# Patient Record
Sex: FEMALE | Race: WHITE | Employment: OTHER | URBAN - METROPOLITAN AREA
[De-identification: names, ages, dates, MRNs, and addresses within clinical notes are randomized per-mention and may not be internally consistent; named-entity substitution may affect disease eponyms.]

---

## 2018-01-23 ENCOUNTER — TRANSFERRED RECORDS (OUTPATIENT)
Dept: HEALTH INFORMATION MANAGEMENT | Facility: CLINIC | Age: 68
End: 2018-01-23

## 2018-05-25 ENCOUNTER — TRANSFERRED RECORDS (OUTPATIENT)
Dept: HEALTH INFORMATION MANAGEMENT | Facility: CLINIC | Age: 68
End: 2018-05-25

## 2018-06-08 ENCOUNTER — TRANSFERRED RECORDS (OUTPATIENT)
Dept: HEALTH INFORMATION MANAGEMENT | Facility: CLINIC | Age: 68
End: 2018-06-08

## 2018-06-12 ENCOUNTER — TRANSFERRED RECORDS (OUTPATIENT)
Dept: HEALTH INFORMATION MANAGEMENT | Facility: CLINIC | Age: 68
End: 2018-06-12

## 2018-06-13 ENCOUNTER — TELEPHONE (OUTPATIENT)
Dept: OTHER | Facility: CLINIC | Age: 68
End: 2018-06-13

## 2018-06-13 DIAGNOSIS — I65.29 CAROTID STENOSIS: Primary | ICD-10-CM

## 2018-06-13 NOTE — TELEPHONE ENCOUNTER
Reason for call:  Symptom   Symptom or request: Referral    Duration (how long have symptoms been present): N/A  Have you been treated for this before? Yes    Additional comments: Patient is coming to [place] for 3 months from [place] and was told to schedule a 2nd opinion consult when she gets here.    Patient stated she is having the clinic send records and US report and that she has a disc of a CTA of her carotid arteries.    Phone number to reach patient:  Home number on file [phone] (home)    Best Time:  Any    Can we leave a detailed message on this number?  YES

## 2018-06-13 NOTE — TELEPHONE ENCOUNTER
Patient called back and said she saw Dr. Salmon in [place]. They can be reached at [phone] (fax: [phone]).

## 2018-06-15 NOTE — TELEPHONE ENCOUNTER
I called the number given for Clinic in [place], medical records department. LVM for Maria Isabel in medical records.     KIAH SinghN, RN

## 2018-06-21 NOTE — TELEPHONE ENCOUNTER
Patient was scheduled on 6/27/18 with Dr. Posada. Patient declined to schedule US as she states she has had a recent US done in [place] and she is unclear if insurance will pay for another one. I left another message for medical records at Dr. Salmon's office. I also asked the patient to call to request medical records be sent to us.

## 2018-06-21 NOTE — TELEPHONE ENCOUNTER
Pt needs to be scheduled for bilateral u/s of carotids and OV with vascular surgery. Pt needs to bring in medical records or have them sent to us from [place] in order to conduct OV.     Routing to  to coordinate.     Camila Jackson, KIAHN, RN

## 2018-06-26 ENCOUNTER — TELEPHONE (OUTPATIENT)
Dept: OTHER | Facility: CLINIC | Age: 68
End: 2018-06-26

## 2018-06-26 NOTE — TELEPHONE ENCOUNTER
Contacted Dr. Salmon's office and asked to speak with a member of his care team.  LVM requesting a call back ASAP.  Provided direct phone number.  KIAH BenderN, RN

## 2018-06-26 NOTE — TELEPHONE ENCOUNTER
Attempted to contact pt regarding necessary reports and progress notes.  Of note, no reports or progress notes have been received.  Provided pt direct phone number for pt to call back to discuss.  KIAH BenderN, RN

## 2018-06-26 NOTE — TELEPHONE ENCOUNTER
Received a call back from [facility]  They stated they cannot take verbal requests via phone and all requests must be sent via fax.  Faxed request for relevant progress notes/imaging reports to [phone].  Verified via RightFax.    Updated pt.  Pt states she has an MRI on disc, and the report.  Pt has had a carotid US completed, but has no documentation of results.  Will await reports to be faxed from [facility] and also discuss with Dr. Posada.   Ciera Aly, KIAHN, RN

## 2018-06-26 NOTE — TELEPHONE ENCOUNTER
Spoke with patient.  Informed her as of note, there have been no reports or progress notes received.      Contacted Dr. Salmon's office and was transferred to medical records.  [facility] for Maria Isabel to call back ASAP and provided direct phone number.  Will continue to attempt to locate records.  Ciera Aly, KIAHN, RN

## 2018-06-27 ENCOUNTER — OFFICE VISIT (OUTPATIENT)
Dept: SURGERY | Facility: CLINIC | Age: 68
End: 2018-06-27
Payer: MEDICARE

## 2018-06-27 VITALS
HEIGHT: 65 IN | OXYGEN SATURATION: 97 % | SYSTOLIC BLOOD PRESSURE: 130 MMHG | WEIGHT: 142 LBS | RESPIRATION RATE: 16 BRPM | DIASTOLIC BLOOD PRESSURE: 74 MMHG | HEART RATE: 83 BPM | BODY MASS INDEX: 23.66 KG/M2

## 2018-06-27 DIAGNOSIS — I65.22 CAROTID STENOSIS, ASYMPTOMATIC, LEFT: Primary | ICD-10-CM

## 2018-06-27 PROCEDURE — 99204 OFFICE O/P NEW MOD 45 MIN: CPT | Performed by: SURGERY

## 2018-06-27 ASSESSMENT — ENCOUNTER SYMPTOMS
CONSTIPATION: 1
CHANGE IN BOWEL HABIT: 1

## 2018-06-27 NOTE — MR AVS SNAPSHOT
"              After Visit Summary   2018    Maria Alejandra Buck    MRN: [id number]           Patient Information     Date Of Birth          1950        Visit Information        Provider Department      2018 10:15 AM Jorge Posada MD [facility]      Today's Diagnoses     Carotid stenosis, asymptomatic, left    -  1       Follow-ups after your visit        Who to contact     If you have questions or need follow up information about today's clinic visit or your schedule please contact [facility] directly at [phone].  Normal or non-critical lab and imaging results will be communicated to you by xiao qu wu youhart, letter or phone within 4 business days after the clinic has received the results. If you do not hear from us within 7 days, please contact the clinic through xiao qu wu youhart or phone. If you have a critical or abnormal lab result, we will notify you by phone as soon as possible.  Submit refill requests through [employer] or call your pharmacy and they will forward the refill request to us. Please allow 3 business days for your refill to be completed.          Additional Information About Your Visit        MyChart Information     [employer] lets you send messages to your doctor, view your test results, renew your prescriptions, schedule appointments and more. To sign up, go to [email or web address] . Click on \"Log in\" on the left side of the screen, which will take you to the Welcome page. Then click on \"Sign up Now\" on the right side of the page.     You will be asked to enter the access code listed below, as well as some personal information. Please follow the directions to create your username and password.     Your access code is: [email or web address]  Expires: 2018  9:45 AM     Your access code will  in 90 days. If you need help or a new code, please call your [facility] clinic or [phone].        Care EveryWhere ID     " "This is your Care EveryWhere ID. This could be used by other organizations to access your [facility] medical records  [id number]        Your Vitals Were     Pulse Respirations Height Pulse Oximetry Breastfeeding? BMI (Body Mass Index)    83 16 5' 5\" (1.651 m) 97% No 23.63 kg/m2       Blood Pressure from Last 3 Encounters:   06/27/18 130/74   09/09/16 149/77   08/20/15 104/70    Weight from Last 3 Encounters:   06/27/18 142 lb (64.4 kg)   08/20/15 142 lb 14.4 oz (64.8 kg)   08/17/15 150 lb (68 kg)              Today, you had the following     No orders found for display       Primary Care Provider Fax #    Provider Not In System [phone]                Equal Access to Services     JUSTYN OSBORN : Juanita paulino Sofito, waaxda luqadaha, qaybta kaalmada audreyyalayla, balbina farfan . So [facility] [phone].    ATENCIÓN: Si habla español, tiene a pereira disposición servicios gratuitos de asistencia lingüística. Llame al [phone].    We comply with applicable federal civil rights laws and [place] laws. We do not discriminate on the basis of race, color, national origin, age, disability, sex, sexual orientation, or gender identity.            Thank you!     Thank you for choosing [facility]  for your care. Our goal is always to provide you with excellent care. Hearing back from our patients is one way we can continue to improve our services. Please take a few minutes to complete the written survey that you may receive in the mail after your visit with us. Thank you!             Your Updated Medication List - Protect others around you: Learn how to safely use, store and throw away your medicines at www.disposemymeds.org.          This list is accurate as of 6/27/18 11:59 PM.  Always use your most recent med list.                   Brand Name Dispense Instructions for use Diagnosis    AMLODIPINE BESYLATE PO      Take 10 mg by mouth daily        ASPIRIN PO      Take 81 " mg by mouth daily        atorvastatin 10 MG tablet    LIPITOR     Take 10 mg by mouth daily        GEMFIBROZIL PO      Take 600 mg by mouth        LISINOPRIL PO      Take 20 mg by mouth daily        OMEPRAZOLE PO           PAROXETINE HCL PO      Take 20 mg by mouth daily        PRORENAL VITAL PO

## 2018-06-27 NOTE — TELEPHONE ENCOUNTER
Fax received with recent progress notes, CTA Head Neck results, and carotid US from [facility].  Sent to [facility] for stat scan into Epic, and also faxed to [facility] for Dr. Posada to review.  Ciera Aly, KIAHN, RN

## 2018-06-27 NOTE — PROGRESS NOTES
HPI      ROS (Review of Systems):      Positive for Hx of rheumatic fever, hoarseness and osteoporosis.   Respiratory: Positive for dyspnea.    Cardiovascular: Positive for hypertension, hyperlipidemia and arrhythmia.   GASTROINTESTINAL: Positive for constipation and change in bowel habit.   Genitourinary: Positive for nocturia.          Physical Exam

## 2018-06-28 ENCOUNTER — TELEPHONE (OUTPATIENT)
Dept: OTHER | Facility: CLINIC | Age: 68
End: 2018-06-28

## 2018-06-28 NOTE — PROGRESS NOTES
67 y/o female smoker with left carotid stenosis and changes in left MCA distribution on MRI (outside) as well as a documented left carotid stenosis 70% on Ultrasound and CTA.  Carotids 4/4 left bruit. Cr.N. 2-12 intact motor/sensoryt intact.  Counseled in smoking cessation.  Left carotid endarterectomy indicated.  Discussed risks goals and alternatives in detail with the patient.  She appears to understand and wishes to proceed.  Face to face time 45 minutes greater than 50% in consultation.

## 2018-06-29 NOTE — TELEPHONE ENCOUNTER
Maria Alejandra called to schedule.  She lives in [place] but is up here visiting her sister.  She will go to her sister's clinic to get her pre-op @ [facility].  I mailed the packet to Maria Alejandra at her sister's address of [address] [place] [id number].    Type of surgery: LEFT CAROTID ENDARTERECTOMY WITH EEG  Location of surgery: [facility]  Date and time of surgery: 07/17/18 @ 7:30AM  Surgeon: Dr. Posada  Pre-Op Appt Date: pt to schedule at [facility]  Post-Op Appt Date: Pt to schedule   Packet sent out: Yes  Pre-cert/Authorization completed:  Yes  Date: 06/29/18  Nai Phelps,

## 2018-07-10 ENCOUNTER — OFFICE VISIT (OUTPATIENT)
Dept: OTHER | Facility: CLINIC | Age: 68
End: 2018-07-10
Attending: SURGERY
Payer: MEDICARE

## 2018-07-10 VITALS — DIASTOLIC BLOOD PRESSURE: 78 MMHG | HEART RATE: 82 BPM | SYSTOLIC BLOOD PRESSURE: 150 MMHG

## 2018-07-10 DIAGNOSIS — I65.22 CAROTID STENOSIS, ASYMPTOMATIC, LEFT: Primary | ICD-10-CM

## 2018-07-10 PROCEDURE — G0463 HOSPITAL OUTPT CLINIC VISIT: HCPCS

## 2018-07-10 PROCEDURE — 99213 OFFICE O/P EST LOW 20 MIN: CPT | Mod: ZP | Performed by: SURGERY

## 2018-07-10 NOTE — LETTER
[facility] [address] [place] [place] [address]  [place] [address]  Phone: [phone]  Fax: [phone]          July 10, 2018    RE: Maria Alejandra Buck, : 1950      Discussed operation risks goals and alternatives in detail with the patient and her  again.  She appears to understand and wishes to proceed.          Sincerely,    Jorge Posada MD          [facility] [address] [place] [place]  [place] [address]  Phone: [phone]  Fax: [phone]

## 2018-07-10 NOTE — MR AVS SNAPSHOT
"              After Visit Summary   7/10/2018    Maria Alejandra Bukc    MRN: [id number]           Patient Information     Date Of Birth          1950        Visit Information        Provider Department      7/10/2018 9:45 AM Jorge Posada MD [facility]      Today's Diagnoses     Carotid stenosis, asymptomatic, left    -  1       Follow-ups after your visit        Your next 10 appointments already scheduled     Jul 17, 2018   Procedure with Jorge Posada MD   [facility] (--)    [address]  [facility] [address]   [phone]            Jul 17, 2018  7:30 AM CDT   [facility] [place] OR with Jorge Posada MD   [facility]    [facility]   [phone]              Who to contact     If you have questions or need follow up information about today's clinic visit or your schedule please contact [facility] directly at [phone].  Normal or non-critical lab and imaging results will be communicated to you by Squeehart, letter or phone within 4 business days after the clinic has received the results. If you do not hear from us within 7 days, please contact the clinic through buuteeqt or phone. If you have a critical or abnormal lab result, we will notify you by phone as soon as possible.  Submit refill requests through [employer] or call your pharmacy and they will forward the refill request to us. Please allow 3 business days for your refill to be completed.          Additional Information About Your Visit        Squeehart Information     [employer] lets you send messages to your doctor, view your test results, renew your prescriptions, schedule appointments and more. To sign up, go to [email or web address] . Click on \"Log in\" on the left side of the screen, which " "will take you to the Welcome page. Then click on \"Sign up Now\" on the right side of the page.     You will be asked to enter the access code listed below, as well as some personal information. Please follow the directions to create your username and password.     Your access code is: [email or web address]  Expires: 2018  9:45 AM     Your access code will  in 90 days. If you need help or a new code, please call your [place] clinic or [phone].        Care EveryWhere ID     This is your Care EveryWhere ID. This could be used by other organizations to access your [place] medical records  [id number]        Your Vitals Were     Pulse Breastfeeding?                82 No           Blood Pressure from Last 3 Encounters:   07/10/18 150/78   18 130/74   16 149/77    Weight from Last 3 Encounters:   18 142 lb (64.4 kg)   08/20/15 142 lb 14.4 oz (64.8 kg)   08/17/15 150 lb (68 kg)              Today, you had the following     No orders found for display       Primary Care Provider Office Phone # Fax #    Mckenna Wilkinson [phone]       [facility] [address]  [place] [address]        Equal Access to Services     JUSTYN OSBORN : Hadii aad ku hadasho Soomaali, waaxda luqadaha, qaybta kaalmada adeegyada, waxay idiin hayaan audrey farfna . So [facility] [phone].    ATENCIÓN: Si habla español, tiene a pereira disposición servicios gratuitos de asistencia lingüística. Llame al [phone].    We comply with applicable federal civil rights laws and [place] laws. We do not discriminate on the basis of race, color, national origin, age, disability, sex, sexual orientation, or gender identity.            Thank you!     Thank you for choosing [facility] [place]  for your care. Our goal is always to provide you with excellent care. Hearing back from our patients is one way we can continue to improve our services. Please take a few minutes to complete the " written survey that you may receive in the mail after your visit with us. Thank you!             Your Updated Medication List - Protect others around you: Learn how to safely use, store and throw away your medicines at www.disposemymeds.org.          This list is accurate as of 7/10/18 10:33 AM.  Always use your most recent med list.                   Brand Name Dispense Instructions for use Diagnosis    AMLODIPINE BESYLATE PO      Take 10 mg by mouth daily        ASPIRIN PO      Take 81 mg by mouth daily        atorvastatin 10 MG tablet    LIPITOR     Take 10 mg by mouth daily        GEMFIBROZIL PO      Take 600 mg by mouth        LISINOPRIL PO      Take 20 mg by mouth daily        OMEPRAZOLE PO           PAROXETINE HCL PO      Take 20 mg by mouth daily        PRORENAL VITAL PO

## 2018-07-10 NOTE — NURSING NOTE
"Maria Alejandra Buck is a 68 year old female who presents for:  Chief Complaint   Patient presents with     RECHECK     wanted to meet with Dr. Posada prior to surgery scheduled on 7/17         Vitals:    Vitals:    07/10/18 0958 07/10/18 0959   BP: 144/82 150/78   BP Location: Left arm Left arm   Patient Position: Chair Chair   Cuff Size: Adult Regular Adult Regular   Pulse: 86 82       BMI:  Estimated body mass index is 23.63 kg/(m^2) as calculated from the following:    Height as of 6/27/18: 5' 5\" (1.651 m).    Weight as of 6/27/18: 142 lb (64.4 kg).    Pain Score:  Data Unavailable        Vanda Norton MA      "

## 2018-07-17 ENCOUNTER — OFFICE VISIT (OUTPATIENT)
Dept: SURGERY | Facility: PHYSICIAN GROUP | Age: 68
End: 2018-07-17
Payer: MEDICARE

## 2018-07-17 ENCOUNTER — ANESTHESIA EVENT (OUTPATIENT)
Dept: SURGERY | Facility: CLINIC | Age: 68
DRG: 039 | End: 2018-07-17
Payer: MEDICARE

## 2018-07-17 ENCOUNTER — HOSPITAL ENCOUNTER (INPATIENT)
Facility: CLINIC | Age: 68
LOS: 1 days | Discharge: HOME | DRG: 039 | End: 2018-07-18
Attending: SURGERY | Admitting: SURGERY
Payer: MEDICARE

## 2018-07-17 ENCOUNTER — ANESTHESIA (OUTPATIENT)
Dept: SURGERY | Facility: CLINIC | Age: 68
DRG: 039 | End: 2018-07-17
Payer: MEDICARE

## 2018-07-17 DIAGNOSIS — E78.5 HYPERLIPIDEMIA LDL GOAL <70: ICD-10-CM

## 2018-07-17 DIAGNOSIS — Z53.9 ERRONEOUS ENCOUNTER--DISREGARD: Primary | ICD-10-CM

## 2018-07-17 DIAGNOSIS — I65.22 LEFT CAROTID STENOSIS: Primary | ICD-10-CM

## 2018-07-17 DIAGNOSIS — Z72.0 TOBACCO ABUSE: ICD-10-CM

## 2018-07-17 LAB
ABO + RH BLD: NORMAL
ABO + RH BLD: NORMAL
ANION GAP SERPL CALCULATED.3IONS-SCNC: 7 MMOL/L (ref 3–14)
BLD GP AB SCN SERPL QL: NORMAL
BLOOD BANK CMNT PATIENT-IMP: NORMAL
BUN SERPL-MCNC: 31 MG/DL (ref 7–30)
CALCIUM SERPL-MCNC: 9.1 MG/DL (ref 8.5–10.1)
CHLORIDE SERPL-SCNC: 109 MMOL/L (ref 94–109)
CHOLEST SERPL-MCNC: 178 MG/DL
CO2 SERPL-SCNC: 25 MMOL/L (ref 20–32)
CREAT SERPL-MCNC: 1.49 MG/DL (ref 0.52–1.04)
GFR SERPL CREATININE-BSD FRML MDRD: 35 ML/MIN/1.7M2
GLUCOSE SERPL-MCNC: 102 MG/DL (ref 70–99)
HBA1C MFR BLD: 6.1 % (ref 0–5.6)
HDLC SERPL-MCNC: 33 MG/DL
LDLC SERPL CALC-MCNC: 72 MG/DL
NONHDLC SERPL-MCNC: 145 MG/DL
POTASSIUM SERPL-SCNC: 4.1 MMOL/L (ref 3.4–5.3)
SODIUM SERPL-SCNC: 141 MMOL/L (ref 133–144)
SPECIMEN EXP DATE BLD: NORMAL
TRIGL SERPL-MCNC: 364 MG/DL

## 2018-07-17 PROCEDURE — 25000125 ZZHC RX 250: Performed by: ANESTHESIOLOGY

## 2018-07-17 PROCEDURE — 03UJ0JZ SUPPLEMENT LEFT COMMON CAROTID ARTERY WITH SYNTHETIC SUBSTITUTE, OPEN APPROACH: ICD-10-PCS | Performed by: SURGERY

## 2018-07-17 PROCEDURE — 25000128 H RX IP 250 OP 636: Performed by: NURSE ANESTHETIST, CERTIFIED REGISTERED

## 2018-07-17 PROCEDURE — 25000132 ZZH RX MED GY IP 250 OP 250 PS 637: Mod: GY | Performed by: PHYSICIAN ASSISTANT

## 2018-07-17 PROCEDURE — 25000128 H RX IP 250 OP 636: Performed by: SURGERY

## 2018-07-17 PROCEDURE — 37000008 ZZH ANESTHESIA TECHNICAL FEE, 1ST 30 MIN: Performed by: SURGERY

## 2018-07-17 PROCEDURE — 36000093 ZZH SURGERY LEVEL 4 1ST 30 MIN: Performed by: SURGERY

## 2018-07-17 PROCEDURE — 71000012 ZZH RECOVERY PHASE 1 LEVEL 1 FIRST HR: Performed by: SURGERY

## 2018-07-17 PROCEDURE — 36415 COLL VENOUS BLD VENIPUNCTURE: CPT | Performed by: SURGERY

## 2018-07-17 PROCEDURE — 80048 BASIC METABOLIC PNL TOTAL CA: CPT | Performed by: SURGERY

## 2018-07-17 PROCEDURE — 27211022 ZZHC OR IOM SUPPLIES OPNP: Performed by: SURGERY

## 2018-07-17 PROCEDURE — 35301 RECHANNELING OF ARTERY: CPT | Mod: LT | Performed by: SURGERY

## 2018-07-17 PROCEDURE — 03CJ0Z6: ICD-10-PCS | Performed by: SURGERY

## 2018-07-17 PROCEDURE — C1768 GRAFT, VASCULAR: HCPCS | Performed by: SURGERY

## 2018-07-17 PROCEDURE — A9270 NON-COVERED ITEM OR SERVICE: HCPCS | Mod: GY | Performed by: PHYSICIAN ASSISTANT

## 2018-07-17 PROCEDURE — 25000128 H RX IP 250 OP 636: Performed by: ANESTHESIOLOGY

## 2018-07-17 PROCEDURE — 86901 BLOOD TYPING SEROLOGIC RH(D): CPT | Performed by: SURGERY

## 2018-07-17 PROCEDURE — 86850 RBC ANTIBODY SCREEN: CPT | Performed by: SURGERY

## 2018-07-17 PROCEDURE — 84132 ASSAY OF SERUM POTASSIUM: CPT | Performed by: SURGERY

## 2018-07-17 PROCEDURE — 80061 LIPID PANEL: CPT | Performed by: SURGERY

## 2018-07-17 PROCEDURE — 12000007 ZZH R&B INTERMEDIATE

## 2018-07-17 PROCEDURE — 25000125 ZZHC RX 250: Performed by: NURSE ANESTHETIST, CERTIFIED REGISTERED

## 2018-07-17 PROCEDURE — 27210995 ZZH RX 272: Performed by: SURGERY

## 2018-07-17 PROCEDURE — 25000566 ZZH SEVOFLURANE, EA 15 MIN: Performed by: SURGERY

## 2018-07-17 PROCEDURE — 40000169 ZZH STATISTIC PRE-PROCEDURE ASSESSMENT I: Performed by: SURGERY

## 2018-07-17 PROCEDURE — 25000128 H RX IP 250 OP 636

## 2018-07-17 PROCEDURE — 86900 BLOOD TYPING SEROLOGIC ABO: CPT | Performed by: SURGERY

## 2018-07-17 PROCEDURE — 25000131 ZZH RX MED GY IP 250 OP 636 PS 637: Mod: GY | Performed by: ANESTHESIOLOGY

## 2018-07-17 PROCEDURE — 03CL0ZZ EXTIRPATION OF MATTER FROM LEFT INTERNAL CAROTID ARTERY, OPEN APPROACH: ICD-10-PCS | Performed by: SURGERY

## 2018-07-17 PROCEDURE — 37000009 ZZH ANESTHESIA TECHNICAL FEE, EACH ADDTL 15 MIN: Performed by: SURGERY

## 2018-07-17 PROCEDURE — 99223 1ST HOSP IP/OBS HIGH 75: CPT | Performed by: INTERNAL MEDICINE

## 2018-07-17 PROCEDURE — 03UL0JZ SUPPLEMENT LEFT INTERNAL CAROTID ARTERY WITH SYNTHETIC SUBSTITUTE, OPEN APPROACH: ICD-10-PCS | Performed by: SURGERY

## 2018-07-17 PROCEDURE — 4A10X4Z MONITORING OF CENTRAL NERVOUS ELECTRICAL ACTIVITY, EXTERNAL APPROACH: ICD-10-PCS | Performed by: SURGERY

## 2018-07-17 PROCEDURE — 83036 HEMOGLOBIN GLYCOSYLATED A1C: CPT | Performed by: SURGERY

## 2018-07-17 PROCEDURE — 93010 ELECTROCARDIOGRAM REPORT: CPT | Performed by: INTERNAL MEDICINE

## 2018-07-17 PROCEDURE — 03CN0ZZ EXTIRPATION OF MATTER FROM LEFT EXTERNAL CAROTID ARTERY, OPEN APPROACH: ICD-10-PCS | Performed by: SURGERY

## 2018-07-17 PROCEDURE — 71000013 ZZH RECOVERY PHASE 1 LEVEL 1 EA ADDTL HR: Performed by: SURGERY

## 2018-07-17 PROCEDURE — 27210794 ZZH OR GENERAL SUPPLY STERILE: Performed by: SURGERY

## 2018-07-17 PROCEDURE — 25000128 H RX IP 250 OP 636: Performed by: PHYSICIAN ASSISTANT

## 2018-07-17 PROCEDURE — 36000063 ZZH SURGERY LEVEL 4 EA 15 ADDTL MIN: Performed by: SURGERY

## 2018-07-17 PROCEDURE — 93005 ELECTROCARDIOGRAM TRACING: CPT

## 2018-07-17 DEVICE — GRAFT HEMASHIELD 0.3X3" M002000195090: Type: IMPLANTABLE DEVICE | Site: CAROTID | Status: FUNCTIONAL

## 2018-07-17 RX ORDER — HYDROMORPHONE HYDROCHLORIDE 1 MG/ML
.3-.5 INJECTION, SOLUTION INTRAMUSCULAR; INTRAVENOUS; SUBCUTANEOUS EVERY 5 MIN PRN
Status: DISCONTINUED | OUTPATIENT
Start: 2018-07-17 | End: 2018-07-17 | Stop reason: HOSPADM

## 2018-07-17 RX ORDER — LISINOPRIL 20 MG/1
20 TABLET ORAL EVERY EVENING
Status: DISCONTINUED | OUTPATIENT
Start: 2018-07-17 | End: 2018-07-18 | Stop reason: HOSPADM

## 2018-07-17 RX ORDER — SODIUM CHLORIDE, SODIUM LACTATE, POTASSIUM CHLORIDE, CALCIUM CHLORIDE 600; 310; 30; 20 MG/100ML; MG/100ML; MG/100ML; MG/100ML
INJECTION, SOLUTION INTRAVENOUS CONTINUOUS
Status: DISCONTINUED | OUTPATIENT
Start: 2018-07-17 | End: 2018-07-17 | Stop reason: HOSPADM

## 2018-07-17 RX ORDER — MAGNESIUM HYDROXIDE 1200 MG/15ML
LIQUID ORAL PRN
Status: DISCONTINUED | OUTPATIENT
Start: 2018-07-17 | End: 2018-07-17 | Stop reason: HOSPADM

## 2018-07-17 RX ORDER — HYDROMORPHONE HYDROCHLORIDE 1 MG/ML
0.2 INJECTION, SOLUTION INTRAMUSCULAR; INTRAVENOUS; SUBCUTANEOUS
Status: DISCONTINUED | OUTPATIENT
Start: 2018-07-17 | End: 2018-07-18 | Stop reason: HOSPADM

## 2018-07-17 RX ORDER — METOPROLOL TARTRATE 1 MG/ML
5 INJECTION, SOLUTION INTRAVENOUS EVERY 6 HOURS
Status: DISCONTINUED | OUTPATIENT
Start: 2018-07-18 | End: 2018-07-18 | Stop reason: HOSPADM

## 2018-07-17 RX ORDER — AMOXICILLIN 250 MG
2 CAPSULE ORAL 2 TIMES DAILY
Status: DISCONTINUED | OUTPATIENT
Start: 2018-07-17 | End: 2018-07-18 | Stop reason: HOSPADM

## 2018-07-17 RX ORDER — PROPOFOL 10 MG/ML
INJECTION, EMULSION INTRAVENOUS PRN
Status: DISCONTINUED | OUTPATIENT
Start: 2018-07-17 | End: 2018-07-17

## 2018-07-17 RX ORDER — FENTANYL CITRATE 50 UG/ML
INJECTION, SOLUTION INTRAMUSCULAR; INTRAVENOUS PRN
Status: DISCONTINUED | OUTPATIENT
Start: 2018-07-17 | End: 2018-07-17

## 2018-07-17 RX ORDER — CEFAZOLIN SODIUM 2 G/100ML
2 INJECTION, SOLUTION INTRAVENOUS
Status: COMPLETED | OUTPATIENT
Start: 2018-07-17 | End: 2018-07-17

## 2018-07-17 RX ORDER — PROCHLORPERAZINE MALEATE 5 MG
5 TABLET ORAL EVERY 6 HOURS PRN
Status: DISCONTINUED | OUTPATIENT
Start: 2018-07-17 | End: 2018-07-18 | Stop reason: HOSPADM

## 2018-07-17 RX ORDER — NEOSTIGMINE METHYLSULFATE 1 MG/ML
VIAL (ML) INJECTION PRN
Status: DISCONTINUED | OUTPATIENT
Start: 2018-07-17 | End: 2018-07-17

## 2018-07-17 RX ORDER — ACETAMINOPHEN 325 MG/1
975 TABLET ORAL EVERY 8 HOURS
Status: DISCONTINUED | OUTPATIENT
Start: 2018-07-17 | End: 2018-07-18 | Stop reason: HOSPADM

## 2018-07-17 RX ORDER — DEXAMETHASONE SODIUM PHOSPHATE 4 MG/ML
INJECTION, SOLUTION INTRA-ARTICULAR; INTRALESIONAL; INTRAMUSCULAR; INTRAVENOUS; SOFT TISSUE PRN
Status: DISCONTINUED | OUTPATIENT
Start: 2018-07-17 | End: 2018-07-17

## 2018-07-17 RX ORDER — ONDANSETRON 2 MG/ML
INJECTION INTRAMUSCULAR; INTRAVENOUS PRN
Status: DISCONTINUED | OUTPATIENT
Start: 2018-07-17 | End: 2018-07-17

## 2018-07-17 RX ORDER — HEPARIN SODIUM 1000 [USP'U]/ML
INJECTION, SOLUTION INTRAVENOUS; SUBCUTANEOUS PRN
Status: DISCONTINUED | OUTPATIENT
Start: 2018-07-17 | End: 2018-07-17 | Stop reason: HOSPADM

## 2018-07-17 RX ORDER — LIDOCAINE 40 MG/G
CREAM TOPICAL
Status: DISCONTINUED | OUTPATIENT
Start: 2018-07-17 | End: 2018-07-18 | Stop reason: HOSPADM

## 2018-07-17 RX ORDER — ATORVASTATIN CALCIUM 40 MG/1
40 TABLET, FILM COATED ORAL EVERY EVENING
Status: DISCONTINUED | OUTPATIENT
Start: 2018-07-17 | End: 2018-07-17

## 2018-07-17 RX ORDER — ONDANSETRON 2 MG/ML
4 INJECTION INTRAMUSCULAR; INTRAVENOUS EVERY 6 HOURS PRN
Status: DISCONTINUED | OUTPATIENT
Start: 2018-07-17 | End: 2018-07-18 | Stop reason: HOSPADM

## 2018-07-17 RX ORDER — ESMOLOL HYDROCHLORIDE 10 MG/ML
INJECTION INTRAVENOUS PRN
Status: DISCONTINUED | OUTPATIENT
Start: 2018-07-17 | End: 2018-07-17

## 2018-07-17 RX ORDER — GEMFIBROZIL 600 MG/1
600 TABLET, FILM COATED ORAL EVERY OTHER DAY
Status: DISCONTINUED | OUTPATIENT
Start: 2018-07-18 | End: 2018-07-17 | Stop reason: ALTCHOICE

## 2018-07-17 RX ORDER — NALOXONE HYDROCHLORIDE 0.4 MG/ML
.1-.4 INJECTION, SOLUTION INTRAMUSCULAR; INTRAVENOUS; SUBCUTANEOUS
Status: DISCONTINUED | OUTPATIENT
Start: 2018-07-17 | End: 2018-07-18 | Stop reason: HOSPADM

## 2018-07-17 RX ORDER — FENTANYL CITRATE 50 UG/ML
25-50 INJECTION, SOLUTION INTRAMUSCULAR; INTRAVENOUS
Status: DISCONTINUED | OUTPATIENT
Start: 2018-07-17 | End: 2018-07-17 | Stop reason: HOSPADM

## 2018-07-17 RX ORDER — ONDANSETRON 4 MG/1
4 TABLET, ORALLY DISINTEGRATING ORAL EVERY 30 MIN PRN
Status: DISCONTINUED | OUTPATIENT
Start: 2018-07-17 | End: 2018-07-17 | Stop reason: HOSPADM

## 2018-07-17 RX ORDER — ACETAMINOPHEN 325 MG/1
650 TABLET ORAL EVERY 4 HOURS PRN
Status: DISCONTINUED | OUTPATIENT
Start: 2018-07-20 | End: 2018-07-18 | Stop reason: HOSPADM

## 2018-07-17 RX ORDER — DIPHENHYDRAMINE HYDROCHLORIDE 50 MG/ML
12.5 INJECTION INTRAMUSCULAR; INTRAVENOUS EVERY 6 HOURS PRN
Status: DISCONTINUED | OUTPATIENT
Start: 2018-07-17 | End: 2018-07-18 | Stop reason: HOSPADM

## 2018-07-17 RX ORDER — PAROXETINE 40 MG/1
40 TABLET, FILM COATED ORAL DAILY
Status: DISCONTINUED | OUTPATIENT
Start: 2018-07-17 | End: 2018-07-18 | Stop reason: HOSPADM

## 2018-07-17 RX ORDER — GLYCOPYRROLATE 0.2 MG/ML
INJECTION, SOLUTION INTRAMUSCULAR; INTRAVENOUS PRN
Status: DISCONTINUED | OUTPATIENT
Start: 2018-07-17 | End: 2018-07-17

## 2018-07-17 RX ORDER — SODIUM CHLORIDE 9 MG/ML
INJECTION, SOLUTION INTRAVENOUS CONTINUOUS
Status: DISCONTINUED | OUTPATIENT
Start: 2018-07-17 | End: 2018-07-18 | Stop reason: HOSPADM

## 2018-07-17 RX ORDER — DIPHENHYDRAMINE HCL 12.5MG/5ML
12.5 LIQUID (ML) ORAL EVERY 6 HOURS PRN
Status: DISCONTINUED | OUTPATIENT
Start: 2018-07-17 | End: 2018-07-18 | Stop reason: HOSPADM

## 2018-07-17 RX ORDER — LABETALOL HYDROCHLORIDE 5 MG/ML
10 INJECTION, SOLUTION INTRAVENOUS ONCE
Status: COMPLETED | OUTPATIENT
Start: 2018-07-17 | End: 2018-07-17

## 2018-07-17 RX ORDER — AMOXICILLIN 250 MG
1 CAPSULE ORAL 2 TIMES DAILY
Status: DISCONTINUED | OUTPATIENT
Start: 2018-07-17 | End: 2018-07-18 | Stop reason: HOSPADM

## 2018-07-17 RX ORDER — NICOTINE 21 MG/24HR
1 PATCH, TRANSDERMAL 24 HOURS TRANSDERMAL DAILY
Status: DISCONTINUED | OUTPATIENT
Start: 2018-07-17 | End: 2018-07-18 | Stop reason: HOSPADM

## 2018-07-17 RX ORDER — EPHEDRINE SULFATE 50 MG/ML
INJECTION, SOLUTION INTRAMUSCULAR; INTRAVENOUS; SUBCUTANEOUS PRN
Status: DISCONTINUED | OUTPATIENT
Start: 2018-07-17 | End: 2018-07-17

## 2018-07-17 RX ORDER — LIDOCAINE HYDROCHLORIDE 20 MG/ML
INJECTION, SOLUTION INFILTRATION; PERINEURAL PRN
Status: DISCONTINUED | OUTPATIENT
Start: 2018-07-17 | End: 2018-07-17

## 2018-07-17 RX ORDER — ASPIRIN 81 MG/1
81 TABLET ORAL DAILY
Status: DISCONTINUED | OUTPATIENT
Start: 2018-07-18 | End: 2018-07-18 | Stop reason: HOSPADM

## 2018-07-17 RX ORDER — OXYCODONE HYDROCHLORIDE 5 MG/1
5 TABLET ORAL
Status: DISCONTINUED | OUTPATIENT
Start: 2018-07-17 | End: 2018-07-18 | Stop reason: HOSPADM

## 2018-07-17 RX ORDER — ONDANSETRON 2 MG/ML
4 INJECTION INTRAMUSCULAR; INTRAVENOUS EVERY 30 MIN PRN
Status: DISCONTINUED | OUTPATIENT
Start: 2018-07-17 | End: 2018-07-17 | Stop reason: HOSPADM

## 2018-07-17 RX ORDER — ONDANSETRON 4 MG/1
4 TABLET, ORALLY DISINTEGRATING ORAL EVERY 6 HOURS PRN
Status: DISCONTINUED | OUTPATIENT
Start: 2018-07-17 | End: 2018-07-18 | Stop reason: HOSPADM

## 2018-07-17 RX ORDER — LABETALOL HYDROCHLORIDE 5 MG/ML
10 INJECTION, SOLUTION INTRAVENOUS EVERY 10 MIN PRN
Status: DISCONTINUED | OUTPATIENT
Start: 2018-07-17 | End: 2018-07-18 | Stop reason: HOSPADM

## 2018-07-17 RX ORDER — HYDROMORPHONE HYDROCHLORIDE 1 MG/ML
INJECTION, SOLUTION INTRAMUSCULAR; INTRAVENOUS; SUBCUTANEOUS
Status: COMPLETED
Start: 2018-07-17 | End: 2018-07-17

## 2018-07-17 RX ORDER — ASPIRIN 600 MG/1
600 SUPPOSITORY RECTAL ONCE
Status: COMPLETED | OUTPATIENT
Start: 2018-07-17 | End: 2018-07-17

## 2018-07-17 RX ORDER — NALOXONE HYDROCHLORIDE 0.4 MG/ML
.1-.4 INJECTION, SOLUTION INTRAMUSCULAR; INTRAVENOUS; SUBCUTANEOUS
Status: DISCONTINUED | OUTPATIENT
Start: 2018-07-17 | End: 2018-07-17

## 2018-07-17 RX ORDER — ATORVASTATIN CALCIUM 40 MG/1
80 TABLET, FILM COATED ORAL EVERY EVENING
Status: DISCONTINUED | OUTPATIENT
Start: 2018-07-17 | End: 2018-07-18 | Stop reason: HOSPADM

## 2018-07-17 RX ADMIN — FENTANYL CITRATE 100 MCG: 50 INJECTION, SOLUTION INTRAMUSCULAR; INTRAVENOUS at 07:35

## 2018-07-17 RX ADMIN — NEOSTIGMINE METHYLSULFATE 4 MG: 1 INJECTION, SOLUTION INTRAVENOUS at 09:20

## 2018-07-17 RX ADMIN — Medication 0.5 MG: at 12:33

## 2018-07-17 RX ADMIN — SODIUM CHLORIDE, POTASSIUM CHLORIDE, SODIUM LACTATE AND CALCIUM CHLORIDE: 600; 310; 30; 20 INJECTION, SOLUTION INTRAVENOUS at 06:31

## 2018-07-17 RX ADMIN — LIDOCAINE HYDROCHLORIDE 80 MG: 20 INJECTION, SOLUTION INFILTRATION; PERINEURAL at 07:35

## 2018-07-17 RX ADMIN — LIDOCAINE HYDROCHLORIDE 1 ML: 10 INJECTION, SOLUTION EPIDURAL; INFILTRATION; INTRACAUDAL; PERINEURAL at 06:31

## 2018-07-17 RX ADMIN — ACETAMINOPHEN 975 MG: 325 TABLET, FILM COATED ORAL at 20:59

## 2018-07-17 RX ADMIN — GLYCOPYRROLATE 0.6 MG: 0.2 INJECTION, SOLUTION INTRAMUSCULAR; INTRAVENOUS at 09:20

## 2018-07-17 RX ADMIN — LABETALOL HYDROCHLORIDE 10 MG: 5 INJECTION, SOLUTION INTRAVENOUS at 09:54

## 2018-07-17 RX ADMIN — LIDOCAINE HYDROCHLORIDE 20 MG: 20 INJECTION, SOLUTION INFILTRATION; PERINEURAL at 08:03

## 2018-07-17 RX ADMIN — Medication 5 MG: at 09:06

## 2018-07-17 RX ADMIN — PHENYLEPHRINE HYDROCHLORIDE 0.25 MCG/KG/MIN: 10 INJECTION, SOLUTION INTRAMUSCULAR; INTRAVENOUS; SUBCUTANEOUS at 07:45

## 2018-07-17 RX ADMIN — FENTANYL CITRATE 50 MCG: 50 INJECTION, SOLUTION INTRAMUSCULAR; INTRAVENOUS at 08:03

## 2018-07-17 RX ADMIN — PROPOFOL 50 MG: 10 INJECTION, EMULSION INTRAVENOUS at 07:39

## 2018-07-17 RX ADMIN — CEFAZOLIN SODIUM 2 G: 2 INJECTION, SOLUTION INTRAVENOUS at 07:45

## 2018-07-17 RX ADMIN — ATORVASTATIN CALCIUM 80 MG: 40 TABLET, FILM COATED ORAL at 20:59

## 2018-07-17 RX ADMIN — LISINOPRIL 20 MG: 20 TABLET ORAL at 20:59

## 2018-07-17 RX ADMIN — Medication 5 MG: at 08:07

## 2018-07-17 RX ADMIN — PAROXETINE 40 MG: 40 TABLET, FILM COATED ORAL at 20:59

## 2018-07-17 RX ADMIN — DEXAMETHASONE SODIUM PHOSPHATE 4 MG: 4 INJECTION, SOLUTION INTRA-ARTICULAR; INTRALESIONAL; INTRAMUSCULAR; INTRAVENOUS; SOFT TISSUE at 07:45

## 2018-07-17 RX ADMIN — MIDAZOLAM 1 MG: 1 INJECTION INTRAMUSCULAR; INTRAVENOUS at 07:30

## 2018-07-17 RX ADMIN — ESMOLOL HYDROCHLORIDE 30 MG: 10 INJECTION, SOLUTION INTRAVENOUS at 09:32

## 2018-07-17 RX ADMIN — ASPIRIN 600 MG: 600 SUPPOSITORY RECTAL at 10:25

## 2018-07-17 RX ADMIN — ONDANSETRON 4 MG: 2 INJECTION INTRAMUSCULAR; INTRAVENOUS at 09:08

## 2018-07-17 RX ADMIN — OXYCODONE HYDROCHLORIDE 5 MG: 5 TABLET ORAL at 21:00

## 2018-07-17 RX ADMIN — SODIUM CHLORIDE, POTASSIUM CHLORIDE, SODIUM LACTATE AND CALCIUM CHLORIDE: 600; 310; 30; 20 INJECTION, SOLUTION INTRAVENOUS at 08:37

## 2018-07-17 RX ADMIN — ROCURONIUM BROMIDE 50 MG: 10 INJECTION INTRAVENOUS at 07:35

## 2018-07-17 RX ADMIN — NICOTINE 1 PATCH: 14 PATCH, EXTENDED RELEASE TRANSDERMAL at 14:50

## 2018-07-17 RX ADMIN — PHENYLEPHRINE HYDROCHLORIDE 50 MCG: 10 INJECTION, SOLUTION INTRAMUSCULAR; INTRAVENOUS; SUBCUTANEOUS at 07:56

## 2018-07-17 RX ADMIN — SODIUM CHLORIDE: 9 INJECTION, SOLUTION INTRAVENOUS at 14:51

## 2018-07-17 RX ADMIN — ACETAMINOPHEN 975 MG: 325 TABLET, FILM COATED ORAL at 14:50

## 2018-07-17 RX ADMIN — MIDAZOLAM 1 MG: 1 INJECTION INTRAMUSCULAR; INTRAVENOUS at 07:27

## 2018-07-17 RX ADMIN — ESMOLOL HYDROCHLORIDE 20 MG: 10 INJECTION, SOLUTION INTRAVENOUS at 09:27

## 2018-07-17 RX ADMIN — FENTANYL CITRATE 50 MCG: 50 INJECTION, SOLUTION INTRAMUSCULAR; INTRAVENOUS at 07:39

## 2018-07-17 RX ADMIN — ESMOLOL HYDROCHLORIDE 20 MG: 10 INJECTION, SOLUTION INTRAVENOUS at 09:29

## 2018-07-17 RX ADMIN — PROPOFOL 150 MG: 10 INJECTION, EMULSION INTRAVENOUS at 07:35

## 2018-07-17 RX ADMIN — FENTANYL CITRATE 50 MCG: 50 INJECTION, SOLUTION INTRAMUSCULAR; INTRAVENOUS at 08:02

## 2018-07-17 ASSESSMENT — VISUAL ACUITY
OU: NORMAL ACUITY

## 2018-07-17 ASSESSMENT — ACTIVITIES OF DAILY LIVING (ADL)
ADLS_ACUITY_SCORE: 10
COGNITION: 0 - NO COGNITION ISSUES REPORTED
ADLS_ACUITY_SCORE: 10
ADLS_ACUITY_SCORE: 10

## 2018-07-17 ASSESSMENT — ENCOUNTER SYMPTOMS: DYSRHYTHMIAS: 1

## 2018-07-17 ASSESSMENT — LIFESTYLE VARIABLES: TOBACCO_USE: 1

## 2018-07-17 ASSESSMENT — COPD QUESTIONNAIRES: COPD: 1

## 2018-07-17 NOTE — PROGRESS NOTES
Admission medication history interview status for the 7/17/2018  admission is complete. See EPIC admission navigator for prior to admission medications     Medication history source reliability:Good    Medication history interview source(s):Patient    Medication history resources (including written lists, pill bottles, clinic record):None    Primary pharmacy.[facility]    Additional medication history information not noted on PTA med list :None    Time spent in this activity: 45 minutes    Prior to Admission medications    Medication Sig Last Dose Taking? Auth Provider   Acetaminophen (TYLENOL PO) Take 500 mg by mouth daily as needed for mild pain or fever more than a week at prn Yes Reported, Patient   ASPIRIN PO Take 81 mg by mouth daily 7/16/2018 at pm Yes Reported, Patient   ATORVASTATIN CALCIUM PO Take 40 mg by mouth every evening 7/16/2018 at pm Yes Reported, Patient   Doxycycline Monohydrate (VIBRAMYCIN PO) Take 100 mg by mouth 2 times daily For skin more than a 2 weeks Yes Reported, Patient   GEMFIBROZIL PO Take 600 mg by mouth every other day  7/16/2018 at pm Yes Reported, Patient   LISINOPRIL PO Take 20 mg by mouth every evening  7/16/2018 at pm Yes Reported, Patient   LORATADINE PO Take 10 mg by mouth daily as needed  more than a week at prn Yes Reported, Patient   Multiple Vitamins-Minerals (PRORENAL VITAL PO) Take 1 tablet by mouth daily  more than a week Yes Reported, Patient   OMEPRAZOLE PO Take 20 mg by mouth every other day  7/17/2018 at 0430 Yes Reported, Patient   PAROXETINE HCL PO Take 40 mg by mouth daily  7/16/2018 at pm Yes Reported, Patient   Probiotic Product (PROBIOTIC PO) Take 1 tablet by mouth every evening  7/16/2018 at pm Yes Reported, Patient   Clopidogrel Bisulfate (PLAVIX PO) Take 75 mg by mouth daily 7/10/2018  Reported, Patient

## 2018-07-17 NOTE — IP AVS SNAPSHOT
MRN:[id number]                      After Visit Summary   7/17/2018    Maria Alejandra Buck    MRN: [id number]           Thank you!     Thank you for choosing [facility] for your care. Our goal is always to provide you with excellent care. Hearing back from our patients is one way we can continue to improve our services. Please take a few minutes to complete the written survey that you may receive in the mail after you visit with us. Thank you!        Patient Information     Date Of Birth          1950        Designated Caregiver       Most Recent Value    Caregiver    Will someone help with your care after discharge? yes    Name of designated caregiver Shandra spouse    Phone number of caregiver [phone]    Caregiver address [address] [place], [place]  ( staying with sister)      About your hospital stay     You were admitted on:  July 17, 2018 You last received care in the:  [facility] [address] [facility]    You were discharged on:  July 18, 2018        Reason for your hospital stay       Left carotid artery stenosis                  Who to Call     For medical emergencies, please call 911.  For non-urgent questions about your medical care, please call your primary care provider or clinic, [phone]  For questions related to your surgery, please call your surgery clinic        Attending Provider     Provider Specialty    Jorge Posada MD Surgery       Primary Care Provider Office Phone # Fax #    Mckenna Wilkinson [phone]      After Care Instructions     Activity       No heavy lifting (<10 lbs for 4 weeks            Diet       Regular diet as tolerated            Discharge Instructions       1. Increase your Atorvastatin to 80 mg daily. Stop your Gemfibrozil.     2. Stop smoking! Utilize nicotine patches to assist with this. Start with 14 mg patches and continue for 30 days. Then decrease to 7 mg patches daily for 30 days.                   Follow-up Appointments     Follow-up and recommended labs and tests        Please call to setup a followup with Dr. Posada in 2 weeks                  Further instructions from your care team       Carotid Endartectomy  Post-Operative Instructions    Typical length of stay in the hospital is 1-2 days.  On occasion, an evening in the Intensive Care Unit may be required for blood pressure regulation.    Activity    Most people are able to resume normal activities 1-2 weeks after surgery.  The key is to gradually increase your level of activity as you are able.  It is not uncommon to feel easily fatigued - this will improve with time.      You should avoid heavy lifting more than 30 lbs for 1 week.      You may return to work when you feel able - typically 1-2 weeks after surgery, depending on the type of work you do.      You should not drive a car for 1 week after surgery.  In addition,  you can not be taking prescription strength pain medication and you must feel strong enough to drive safely.    Incision Care    You can shower or bathe 2 days after surgery - avoid rubbing and soaking your incision.      You may have strips of white tape called  steri-strips  across your incision; they should stay on for 5-7 days or until the ends start to curl up.  You can then remove the steri-strips, or we will remove them at your post-operative appointment.      Avoid shaving directly over the incision for 2-3 weeks.    Common Concerns    You may notice mild skin numbness on the side of your surgery in your neck, chin, face, and earlobe.  This is due to nerve  irritation  and will gradually resolve over the next several months.  Call our office if you experience any short-lasting episode of numbness or weakness affecting one side of your body.      Some bruising and firmness around you incision can be expected.  This will soften and resolve over the next few weeks.  You may notice that some discoloration spreads to an area  lower than the incision, such as the shoulder, neck or upper chest.  Likewise, swelling can occur near the incision or below the chin.  Call our office if the swelling or bruising worsens, or if you have difficulty swallowing.    Diet    You may resume a regular diet before you leave the hospital.  You may find that it is best to try smaller, more frequent meals until your appetite returns.    Medications    You may be started on a blood thinner after surgery - typically Aspirin and / or Plavix.      You may be given a prescription for pain medication after surgery.  If you need to have this refilled, please call your pharmacy where you had it filled.  They will then call us for approval.  Please do not call after hours for a refill on pain medication.    Post-Operative Appointments    You need to see your surgeon in the clinic approximately 1-2 weeks after surgery.  Post-operative appointment:   Date: _____________________________  Time: ____________________________  Dr. ______________________________      You will have an ultrasound test and evaluation with your surgeon 90 days (3 months) after surgery.      We will notify you by mail when you are due to schedule further ultrasound testing and evaluation; typically this is done annually.     When You Should Call Our Office    Body aches, chills or temperature greater than 101      Incision redness or drainage      Loss of vision in one eye      Loss of strength / weakness in one side of your body      Severe headache      Difficulty with speech      If you will be having an invasive procedure, such as a colonoscopy or dental work within one year of your surgery, you may need to take an antibiotic prior to the procedure if you had a Dacron patch with your surgery; please call us so we can assist you with this.    Call our main number, [phone], for assistance with any concerns or questions.     Revised 5/2014      Follow-up with Dr. Posada in 1-2 weeks. Call  "[phone]    Pending Results     Date and Time Order Name Status Description    2018 0606 EKG 12-lead, tracing only Preliminary             Admission Information     Date & Time Provider Department Dept. Phone    2018 Jorge Posada MD [facility] [address] [facility] [phone]      Your Vitals Were     Blood Pressure Pulse Temperature Respirations Height Weight    156/76 (BP Location: Right arm) 61 98  F (36.7  C) (Oral) 11 1.676 m (5' 6\") 65.9 kg (145 lb 3.2 oz)    Pulse Oximetry BMI (Body Mass Index)                100% 23.44 kg/m2          MyChart Information     [facility] lets you send messages to your doctor, view your test results, renew your prescriptions, schedule appointments and more. To sign up, go to [email or web address] . Click on \"Log in\" on the left side of the screen, which will take you to the Welcome page. Then click on \"Sign up Now\" on the right side of the page.     You will be asked to enter the access code listed below, as well as some personal information. Please follow the directions to create your username and password.     Your access code is: [email or web address]  Expires: 2018  9:45 AM     Your access code will  in 90 days. If you need help or a new code, please call your [facility] clinic or [phone].        Care EveryWhere ID     This is your Care EveryWhere ID. This could be used by other organizations to access your [facility] medical records  [id number]        Equal Access to Services     [facility]: Hadii ewa paulino Sofito, waaxda luqadaha, qaybta kaalmada balbina ramirez idiin hayaan adeeg kharash la'aan . So [facility] [phone].    ATENCIÓN: Si habla español, tiene a pereira disposición servicios gratuitos de asistencia lingüística. Llame al [phone].    We comply with applicable federal civil rights laws and [place] laws. We do not discriminate on the basis of race, color, national origin, age, disability, sex, sexual " orientation, or gender identity.               Review of your medicines      START taking        Dose / Directions    * nicotine 7 MG/24HR 24 hr patch   Commonly known as:  NICODERM CQ   Used for:  Tobacco abuse        Dose:  1 patch   Place 1 patch onto the skin every 24 hours Start after completing 14 mg patches.   Quantity:  30 patch   Refills:  0       * nicotine 14 MG/24HR 24 hr patch   Commonly known as:  NICODERM CQ   Used for:  Tobacco abuse        Dose:  1 patch   Start taking on:  7/19/2018   Place 1 patch onto the skin daily Step 1   Quantity:  30 patch   Refills:  0       oxyCODONE IR 5 MG tablet   Commonly known as:  ROXICODONE        Dose:  5 mg   Take 1 tablet (5 mg) by mouth every 3 hours as needed for other (pain control or improvement in physical function. Hold dose for analgesic side effects.)   Quantity:  10 tablet   Refills:  0       * Notice:  This list has 2 medication(s) that are the same as other medications prescribed for you. Read the directions carefully, and ask your doctor or other care provider to review them with you.      CONTINUE these medicines which may have CHANGED, or have new prescriptions. If we are uncertain of the size of tablets/capsules you have at home, strength may be listed as something that might have changed.        Dose / Directions    atorvastatin 80 MG tablet   Commonly known as:  LIPITOR   This may have changed:    - medication strength  - how much to take   Used for:  Hyperlipidemia LDL goal <70        Dose:  80 mg   Take 1 tablet (80 mg) by mouth every evening   Quantity:  30 tablet   Refills:  0         CONTINUE these medicines which have NOT CHANGED        Dose / Directions    ASPIRIN PO        Dose:  81 mg   Take 81 mg by mouth daily   Refills:  0       LISINOPRIL PO        Dose:  20 mg   Take 20 mg by mouth every evening   Refills:  0       LORATADINE PO        Dose:  10 mg   Take 10 mg by mouth daily as needed   Refills:  0       OMEPRAZOLE PO        Dose:   20 mg   Take 20 mg by mouth every other day   Refills:  0       PAROXETINE HCL PO        Dose:  40 mg   Take 40 mg by mouth daily   Refills:  0       PROBIOTIC PO        Dose:  1 tablet   Take 1 tablet by mouth every evening   Refills:  0       PRORENAL VITAL PO        Dose:  1 tablet   Take 1 tablet by mouth daily   Refills:  0       TYLENOL PO        Dose:  500 mg   Take 500 mg by mouth daily as needed for mild pain or fever   Refills:  0       VIBRAMYCIN PO        Dose:  100 mg   Take 100 mg by mouth 2 times daily For skin   Refills:  0         STOP taking     GEMFIBROZIL PO           PLAVIX PO                Where to get your medicines      These medications were sent to [facility] [place], [place] - [address]  [address], [place] [address]     Phone:  [phone]     nicotine 14 MG/24HR 24 hr patch    nicotine 7 MG/24HR 24 hr patch         Some of these will need a paper prescription and others can be bought over the counter. Ask your nurse if you have questions.     Bring a paper prescription for each of these medications     oxyCODONE IR 5 MG tablet       You don't need a prescription for these medications     atorvastatin 80 MG tablet                Protect others around you: Learn how to safely use, store and throw away your medicines at www.disposemymeds.org.        ANTIBIOTIC INSTRUCTION     You've Been Prescribed an Antibiotic - Now What?  Your healthcare team thinks that you or your loved one might have an infection. Some infections can be treated with antibiotics, which are powerful, life-saving drugs. Like all medications, antibiotics have side effects and should only be used when necessary. There are some important things you should know about your antibiotic treatment.      Your healthcare team may run tests before you start taking an antibiotic.    Your team may take samples (e.g., from your blood, urine or other areas) to run tests to look for bacteria. These test  can be important to determine if you need an antibiotic at all and, if you do, which antibiotic will work best.      Within a few days, your healthcare team might change or even stop your antibiotic.    Your team may start you on an antibiotic while they are working to find out what is making you sick.    Your team might change your antibiotic because test results show that a different antibiotic would be better to treat your infection.    In some cases, once your team has more information, they learn that you do not need an antibiotic at all. They may find out that you don't have an infection, or that the antibiotic you're taking won't work against your infection. For example, an infection caused by a virus can't be treated with antibiotics. Staying on an antibiotic when you don't need it is more likely to be harmful than helpful.      You may experience side effects from your antibiotic.    Like all medications, antibiotics have side effects. Some of these can be serious.    Let you healthcare team know if you have any known allergies when you are admitted to the hospital.    One significant side effect of nearly all antibiotics is the risk of severe and sometimes deadly diarrhea caused by Clostridium difficile (C. Difficile). This occurs when a person takes antibiotics because some good germs are destroyed. Antibiotic use allows C. diificile to take over, putting patients at high risk for this serious infection.    As a patient or caregiver, it is important to understand your or your loved one's antibiotic treatment. It is especially important for caregivers to speak up when patients can't speak for themselves. Here are some important questions to ask your healthcare team.    What infection is this antibiotic treating and how do you know I have that infection?    What side effects might occur from this antibiotic?    How long will I need to take this antibiotic?    Is it safe to take this antibiotic with other  medications or supplements (e.g., vitamins) that I am taking?     Are there any special directions I need to know about taking this antibiotic? For example, should I take it with food?    How will I be monitored to know whether my infection is responding to the antibiotic?    What tests may help to make sure the right antibiotic is prescribed for me?      Information provided by:  www.cdc.gov/getsmart  U.S. Department of Health and Human Services  Centers for disease Control and Prevention  National Center for Emerging and Zoonotic Infectious Diseases  Division of Healthcare Quality Promotion        Information about OPIOIDS     PRESCRIPTION OPIOIDS: WHAT YOU NEED TO KNOW   We gave you an opioid (narcotic) pain medicine. It is important to manage your pain, but opioids are not always the best choice. You should first try all the other options your care team gave you. Take this medicine for as short a time (and as few doses) as possible.     These medicines have risks:    DO NOT drive when on new or higher doses of pain medicine. These medicines can affect your alertness and reaction times, and you could be arrested for driving under the influence (DUI). If you need to use opioids long-term, talk to your care team about driving.    DO NOT operate heave machinery    DO NOT do any other dangerous activities while taking these medicines.     DO NOT drink any alcohol while taking these medicines.      If the opioid prescribed includes acetaminophen, DO NOT take with any other medicines that contain acetaminophen. Read all labels carefully. Look for the word  acetaminophen  or  Tylenol.  Ask your pharmacist if you have questions or are unsure.    You can get addicted to pain medicines, especially if you have a history of addiction (chemical, alcohol or substance dependence). Talk to your care team about ways to reduce this risk.    Store your pills in a secure place, locked if possible. We will not replace any lost or  stolen medicine. If you don t finish your medicine, please throw away (dispose) as directed by your pharmacist. The [place] Pollution Control Agency has more information about safe disposal: https://www.pca.[facility].[place].us/living-green/managing-unwanted-medications.     All opioids tend to cause constipation. Drink plenty of water and eat foods that have a lot of fiber, such as fruits, vegetables, prune juice, apple juice and high-fiber cereal. Take a laxative (Miralax, milk of magnesia, Colace, Senna) if you don t move your bowels at least every other day.              Medication List: This is a list of all your medications and when to take them. Check marks below indicate your daily home schedule. Keep this list as a reference.      Medications           Morning Afternoon Evening Bedtime As Needed    ASPIRIN PO   Take 81 mg by mouth daily   Last time this was given:  81 mg on 7/18/2018  8:26 AM                                atorvastatin 80 MG tablet   Commonly known as:  LIPITOR   Take 1 tablet (80 mg) by mouth every evening   Last time this was given:  80 mg on 7/17/2018  8:59 PM                                   LISINOPRIL PO   Take 20 mg by mouth every evening   Last time this was given:  20 mg on 7/17/2018  8:59 PM                                   LORATADINE PO   Take 10 mg by mouth daily as needed                                   * nicotine 7 MG/24HR 24 hr patch   Commonly known as:  NICODERM CQ   Place 1 patch onto the skin every 24 hours Start after completing 14 mg patches.                                   * nicotine 14 MG/24HR 24 hr patch   Commonly known as:  NICODERM CQ   Place 1 patch onto the skin daily Step 1   Start taking on:  7/19/2018   Last time this was given:  1 patch on 7/18/2018  8:26 AM                                   OMEPRAZOLE PO   Take 20 mg by mouth every other day            Every other day - resume home schedule                       oxyCODONE IR 5 MG tablet   Commonly known as:   ROXICODONE   Take 1 tablet (5 mg) by mouth every 3 hours as needed for other (pain control or improvement in physical function. Hold dose for analgesic side effects.)   Last time this was given:  5 mg on 7/17/2018  9:00 PM                                   PAROXETINE HCL PO   Take 40 mg by mouth daily   Last time this was given:  40 mg on 7/17/2018  8:59 PM                                   PROBIOTIC PO   Take 1 tablet by mouth every evening                                   PRORENAL VITAL PO   Take 1 tablet by mouth daily                                   TYLENOL PO   Take 500 mg by mouth daily as needed for mild pain or fever   Last time this was given:  975 mg on 7/18/2018 12:22 PM                                   VIBRAMYCIN PO   Take 100 mg by mouth 2 times daily For skin                                      * Notice:  This list has 2 medication(s) that are the same as other medications prescribed for you. Read the directions carefully, and ask your doctor or other care provider to review them with you.              More Information        After Carotid Artery Surgery: At Home  You ll start feeling back to normal within a day or 2 of getting home. But don t forget that you just had surgery. You need to take it easy, even if you feel fine. Follow any instructions your doctor gives you. In most cases, stitches dissolve on their own.    Speeding your recovery  The following tips can help speed your recovery:    Spend your first few days relaxing at home. You can read, watch TV, or do other quiet, restful activities.    Don t do strenuous activities until your doctor says it s OK (usually 1 to 2 weeks).     Take medicine as instructed.    Don t drive until your doctor says it s OK. This will most likely take 1 to 2 weeks.    Once your doctor says you can shower again, it s OK to get the area of your incision wet. But don t scrub it.    If you shave, be careful around the wound. You may want to use an electric  oxana.  When to call 911  A stroke is a medical emergency. Call 911 right away if you have any of these symptoms of stroke:    Weakness, tingling, or loss of feeling on one side of your face or body    Sudden double vision or trouble seeing in one or both eyes    Sudden trouble taking or slurred speech    Sudden, severe headache  F.A.S.T is an easy way to remember the signs of stroke.  F.A.S.T stands for:    F is for face drooping. One side of the face is drooping or numb.    A is for arm weakness. One arm is weak or numb. When the person lifts both arms at the same time, one arm may drift downward.    S is for speech difficulty. You may notice slurred speech or trouble speaking. The person can't repeat a simple sentence correctly when asked.    T is for time to call 911. If someone shows any of these symptoms, even if they go away, call 911 right away. Make note of the time the symptoms first appeared.      When to call your healthcare provider  Contact your healthcare provider right away if you have any of these:    Your neck swells    Redness or fluid is coming from the wound    Your face, an arm or a leg becomes numb or weak    You have sudden changes in vision or loss of vision in 1 eye    You have trouble speaking, swallowing, or breathing    You have a fever above 100.4 F (38 C)    You have a severe headache or eye pain on the same side of the body you had surgery on  If your doctor s office is closed, go to the hospital emergency room.  Date Last Reviewed: 5/1/2017 2000-2017 [employer]. [address] [place] [address], [place] [address]. All rights reserved. This information is not intended as a substitute for professional medical care. Always follow your healthcare professional's instructions.

## 2018-07-17 NOTE — PROGRESS NOTES
HOSPITALIST CONSULT CHART CHECK:    Hospitalist service was consulted for cross coverage only. We will peripherally follow and chart check throughout the week.      - For vascular medical concerns during business hours M-F, call the [facility] at [phone] to have the rounding/on call Vascular Medicine (NOT VASCULAR SURGERY) MD paged.    - After business hours M-F, for medical concerns on this patient, please page hospitalist staff.    - For vascular surgical questions, please page the appropriate surgeon (primary vascular surgeon or on call vascular surgeon) based upon the time of day.       Fritz Cantor PA-C

## 2018-07-17 NOTE — ANESTHESIA POSTPROCEDURE EVALUATION
Patient: Maria Alejandra Buck    Procedure(s):  LEFT CAROTID ENDARTERECTOMY WITH EEG - Wound Class: I-Clean    Diagnosis:LEFT CAROTID STENOSIS  Diagnosis Additional Information: No value filed.    Anesthesia Type:  General, ETT    Note:  Anesthesia Post Evaluation    Patient location during evaluation: PACU  Patient participation: Able to fully participate in evaluation  Level of consciousness: awake and alert  Pain management: adequate  Airway patency: patent  Cardiovascular status: acceptable  Respiratory status: acceptable and unassisted  Hydration status: acceptable  PONV: none             Last vitals:  Vitals:    07/17/18 1020 07/17/18 1025 07/17/18 1030   BP: 114/69 113/59 108/55   Pulse:      Resp: 14 13 12   Temp:      SpO2: 94% 94% 95%         Electronically Signed By: Monster Cedillo MD  July 17, 2018  11:04 AM

## 2018-07-17 NOTE — CONSULTS
[facility]    Vascular Medicine Consultation     Date of Admission:  7/17/2018  Date of Consult (When I saw the patient): 07/17/18         Physician Supervisory Attestation:   I have reviewed and discussed with the physician assistant their history, physical and plan and independently interviewed and examined Maria Alejandra Buck and agree with the plan as stated in the physician assistant note.      Maria Alejandra Buck is a 68 year old female smoker with hyperlipidemia, hypertension, prior AAA s/p EVAR 2010, right renal artery stenosis s/p right renal artery stenting 2010, and now progressive left carotid artery stenosis. She lives in [place] 9 months out of the year. At her recent visit, they ordered a CT scan which revealed significant left carotid artery stenosis to 60-70% with some ulceration. She was started on Plavix in addition to her aspirin and advised to see Vascular Surgery. She elected to be seen by Vascular Surgery in [place] instead. On 6/27/18 she was seen in consultation by Dr. Posada. A left carotid endarterectomy was recommended.   She presented for this today. She is doing well post-operatively and remains neurologically intact.   Her pain is presently well controlled. Family at bed side.  Surgical site looks good.  She is in NSR, BP good rtange    At present our recs,    Post op cares, activity, antiplatelet meds per vas surgery  Monitor neuro status  Keep SBP under 130 range  She was taking atorva and gemfibrozil , risk of myalgias, myopathy or Rhabdo is high . Will stop gemfibrozil and increase atorva dose to 80 mg daily  Consume fish 2-3 times a day, lose weight  assist in quitting smoking see below  ? Hx of afib but previous ekgs available data does not mention A fib , monitor for now, if any recurrence of afib, she will be a candidate for anticoagulation    Thank you for the consultation  Vascular medicine service will follow with you    Cc Dr. DYAN Posada, primary MD [place]  Reynaldo Roe MD,[facility]  Vascular Medicine   7/17/2018          Assessment & Plan   1. Asymptomatic high grade left carotid artery stenosis s/p left carotid endarterectomy 7/17/18    Post-operative cares per Dr. Posada / Vascular Surgery. She is doing well and remains neurologically intact. She should continue aspirin 81 mg daily.     2. Hyperlipidemia    Her lipid panel this admission is significant for an LDL of 72, HDL 33, triglycerides 364, and total cholesterol 178. She has been on Atorvastatin 40 mg daily and Gemfibrozil. Given her carotid disease, she would benefit from more aggressive lipid lowering to an LDL of less than 70. In addition, she does complain of bilateral lower extremity cramping. This may be secondary to the combination of her Gemfibrozil and Atorvastatin. Therefore, her Gemfibrozil will be discontinued and her Atorvastatin dose will be increased to 80 mg daily. She should have a lipid panel repeated in 3 months through her primary care provider to ascertain whether or not her lipids are at goal on this regimen.     3. Ongoing tobacco use    She continues to smoke about 1/2 pack per day. The importance of smoking cessation was discussed. She would like some assistance with this. She tried Chantix in the past but did not like the vivid dreams she experienced while on this. He is presently on Paxil, so will avoid adding Wellbutrin. She is willing to try nicotine patches. She will start with 14 mg patches daily for 30 days and then decrease to 7 mg patches for the next 30 days.     4. Hypertension    She will be continued on her outpatient Lisinopril. Her blood pressure will be monitored closely.     5. Infrarenal abdominal aortic aneurysm and right renal artery stenosis s/p EVAR and right renal stenting 2010 in [place]    This has been followed in [place] with her last scan being 1-2 years ago. This should continue to be monitored by her providers in [place].     6. Possible atrial  fibrillation    Her chart lists in the history section atrial fibrillation. She states she has had atrial fibrillation in the past. However, no note on file mentions anything about this, all cardiac imaging in the system is noting normal sinus rhythm, and she has been only in normal sinus rhythm in the hospital so far. She denies ever being on any anticoagulation. She will be monitored closely on telemetry this admission. If she does go into atrial fibrillation at some point, consider initiating anticoagulation.         Reason for Consult   Reason for consult: Asked by Dr. Posada to evaluate vascular risk factors and assist with medical management in this 68 year old female smoker with hyperlipidemia, hypertension, prior AAA s/p EVAR and now critical left carotid artery stenosis s/p left carotid endarterectomy today.     Primary Care Physician   Mckenna Wilkinson      History of Present Illness   Maria Alejandra Buck is a 68 year old female smoker with hyperlipidemia, hypertension, prior AAA s/p EVAR 2010, right renal artery stenosis s/p right renal artery stenting 2010, and now progressive left carotid artery stenosis. She lives in [place] 9 months out of the year. At her recent visit, they ordered a CT scan which revealed significant left carotid artery stenosis to 60-70% with some ulceration. She was started on Plavix in addition to her aspirin and advised to see Vascular Surgery. She elected to be seen by Vascular Surgery in [place] instead. On 6/27/18 she was seen in consultation by Dr. Posada. A left carotid endarterectomy was recommended. She presented for this today. She is doing well post-operatively and remains neurologically intact. Her pain is presently well controlled.     Past Medical History   Past Medical History:   Diagnosis Date     Aneurysm of renal artery (H)     left     Atrial fibrillation (H)      COPD (chronic obstructive pulmonary disease) (H)      High cholesterol      Hypertension      Mixed  hyperlipidemia      PVD (peripheral vascular disease) (H)      Stenosis of left carotid artery      Tobacco use disorder        Past Surgical History   EVAR 2010 - in [place]  Right renal artery stenting 2010 - in [place]  Hysterectomy    Prior to Admission Medications   Prior to Admission Medications   Prescriptions Last Dose Informant Patient Reported? Taking?   ASPIRIN PO 7/16/2018 at pm Self Yes Yes   Sig: Take 81 mg by mouth daily   ATORVASTATIN CALCIUM PO 7/16/2018 at pm Self Yes Yes   Sig: Take 40 mg by mouth every evening   Acetaminophen (TYLENOL PO) more than a week at prn Self Yes Yes   Sig: Take 500 mg by mouth daily as needed for mild pain or fever   Clopidogrel Bisulfate (PLAVIX PO) 7/10/2018 Self Yes No   Sig: Take 75 mg by mouth daily   Doxycycline Monohydrate (VIBRAMYCIN PO) more than a 2 weeks Self Yes Yes   Sig: Take 100 mg by mouth 2 times daily For skin   GEMFIBROZIL PO 7/16/2018 at pm Self Yes Yes   Sig: Take 600 mg by mouth every other day    LISINOPRIL PO 7/16/2018 at pm Self Yes Yes   Sig: Take 20 mg by mouth every evening    LORATADINE PO more than a week at prn Self Yes Yes   Sig: Take 10 mg by mouth daily as needed    Multiple Vitamins-Minerals (PRORENAL VITAL PO) more than a week Self Yes Yes   Sig: Take 1 tablet by mouth daily    OMEPRAZOLE PO 7/17/2018 at 0430 Self Yes Yes   Sig: Take 20 mg by mouth every other day    PAROXETINE HCL PO 7/16/2018 at pm Self Yes Yes   Sig: Take 40 mg by mouth daily    Probiotic Product (PROBIOTIC PO) 7/16/2018 at pm Self Yes Yes   Sig: Take 1 tablet by mouth every evening       Facility-Administered Medications: None     Allergies   Allergies   Allergen Reactions     Iodine Hives       Social History   Maria Alejandra Buck  reports that she has been smoking.  She has a 22.50 pack-year smoking history. She has never used smokeless tobacco. She reports that she drinks alcohol. She reports that she does not use illicit drugs.    Family History   Family History    Problem Relation Age of Onset     Alzheimer Disease Mother      Cerebrovascular Disease Father      Dementia Maternal Grandmother      Diabetes Brother      Alzheimer Disease Brother      Parkinsonism Brother      Cerebrovascular Disease Brother      Breast Cancer Sister      HEART DISEASE Sister        Review of Systems   The 10 point Review of Systems is negative other than noted in the HPI or here.     Physical Exam   Temp: 97  F (36.1  C) Temp src: Temporal BP: 108/55 Pulse: 61 Heart Rate: 72 Resp: 12 SpO2: 95 % O2 Device: Nasal cannula Oxygen Delivery: 4 LPM  Vital Signs with Ranges  Temp:  [97  F (36.1  C)] 97  F (36.1  C)  Pulse:  [61] 61  Heart Rate:  [61-96] 72  Resp:  [10-18] 12  BP: (102-175)/(55-93) 108/55  MAP:  [77 mmHg-184 mmHg] 81 mmHg  Arterial Line BP: (127-184)/(50-84) 133/57  SpO2:  [94 %-100 %] 95 %  143 lbs 0 oz    Constitutional: awake, alert, cooperative, no apparent distress, and appears stated age  Eyes: Lids and lashes normal, pupils equal, round and reactive to light, extra ocular muscles intact, sclera clear, conjunctiva normal  ENT: normocepalic, without obvious abnormality, oropharynx pink and moist  Hematologic / Lymphatic: no lymphadenopathy  Respiratory: No increased work of breathing, good air exchange, clear to auscultation bilaterally, no crackles or wheezing  Cardiovascular: regular rate and rhythm, normal S1 and S2 and no murmur noted  GI: Normal bowel sounds, soft, non-distended, non-tender  Skin: no redness, warmth, or swelling, no rashes. Surgical site c/d/i with bandage over it.   Musculoskeletal: There is no redness, warmth, or swelling of the joints.  Full range of motion noted.  Motor strength is 5 out of 5 all extremities bilaterally.  Tone is normal.  Neurologic: Awake, alert, oriented to name, place and time.  Cranial nerves II-XII are grossly intact.  Motor is 5 out of 5 bilaterally.    Neuropsychiatric:  Normal affect, memory, insight.  Pulses: Palpable pedal  pulses bilaterally. No carotid bruits appreciated.     Data   Most Recent 3 CBC's:  Recent Labs   Lab Test  09/09/16   1555  08/17/15   1052   WBC  7.6  5.7   HGB  12.8  13.0   MCV  103*  101*   PLT  305  262     Most Recent 3 BMP's:  Recent Labs   Lab Test  07/17/18   0630  09/09/16   1555  08/20/15   1034   NA  141  139  138   POTASSIUM  4.1  4.0  4.6   CHLORIDE  109  107  107   CO2  25  24  20   BUN  31*  31*  34*   CR  1.49*  1.13*  1.38*   ANIONGAP  7  8  11   BRUCE  9.1  9.0  9.2   GLC  102*  123*  91     Most Recent Cholesterol Panel:  Recent Labs   Lab Test  07/17/18   0630   CHOL  178   LDL  72   HDL  33*   TRIG  364*     Most Recent Hemoglobin A1c:  Recent Labs   Lab Test  07/17/18   0630   A1C  6.1*

## 2018-07-17 NOTE — ANESTHESIA CARE TRANSFER NOTE
Patient: Maria Alejandra Buck    Procedure(s):  LEFT CAROTID ENDARTERECTOMY WITH EEG - Wound Class: I-Clean    Diagnosis: LEFT CAROTID STENOSIS  Diagnosis Additional Information: No value filed.    Anesthesia Type:   General, ETT     Note:  Airway :Face Mask  Patient transferred to:PACU  Comments: RN called for an antihypertensive due to pressures  Of 180/80 approximately.Handoff Report: Identifed the Patient, Identified the Reponsible Provider, Reviewed the pertinent medical history, Discussed the surgical course, Reviewed Intra-OP anesthesia mangement and issues during anesthesia, Set expectations for post-procedure period and Allowed opportunity for questions and acknowledgement of understanding      Vitals: (Last set prior to Anesthesia Care Transfer)    CRNA VITALS  7/17/2018 0904 - 7/17/2018 0943      7/17/2018             Pulse: 93    ART BP: 191/82    ART Mean: 132    Ht Rate: 94    SpO2: 100 %    Resp Rate (set): 10                Electronically Signed By: FERNANDO Duarte CRNA  July 17, 2018  9:43 AM

## 2018-07-17 NOTE — ANESTHESIA PROCEDURE NOTES
ARTERIAL LINE PROCEDURE NOTE:   Pre-Procedure  Performed by KOFI SALINAS  Location: pre-op      Pre-Anesthestic Checklist: patient identified, IV checked, site marked, risks and benefits discussed, informed consent, monitors and equipment checked, pre-op evaluation, at physician/surgeon's request and post-op pain management    Timeout  Correct Patient: Yes   Correct Procedure: Yes   Correct Site: Yes   Correct Laterality: Yes   Correct Position: Yes   Site Marked: Yes   .   Procedure Documentation  Procedure: arterial line  Diagnosis:Carotic Stenosis ASA 3  Supine  Insertion Site:radial, right.Skin infiltrated with 1 mL of 1% lidocaine. Injection technique: Seldinger Technique  .  .  Patient Prep;all elements of maximal sterile barrier technique followed, mask, hat, sterile gown, sterile gloves, draped, hand hygiene, chlorhexidine gluconate and isopropyl alcohol    Assessment/Narrative    Catheter: 20 gauge, 1.75 in/4.5 cm quick cath (integral wire)      Tegaderm dressing used.    Arterial waveform: Yes IBP within 10% of NIBP: Yes

## 2018-07-17 NOTE — MR AVS SNAPSHOT
"              After Visit Summary   2018    Maria Alejandra Buck    MRN: [id number]           Patient Information     Date Of Birth          1950        Visit Information        Provider Department      2018 7:30 AM Wilfredo Hoskins PA-C; Jorge Posada MD [facility]      Today's Diagnoses     ERRONEOUS ENCOUNTER--DISREGARD    -  1       Follow-ups after your visit        Who to contact     If you have questions or need follow up information about today's clinic visit or your schedule please contact [facility] directly at [phone].  Normal or non-critical lab and imaging results will be communicated to you by [employer], letter or phone within 4 business days after the clinic has received the results. If you do not hear from us within 7 days, please contact the clinic through [employer] or phone. If you have a critical or abnormal lab result, we will notify you by phone as soon as possible.  Submit refill requests through [employer] or call your pharmacy and they will forward the refill request to us. Please allow 3 business days for your refill to be completed.          Additional Information About Your Visit        MyChart Information     [employer] lets you send messages to your doctor, view your test results, renew your prescriptions, schedule appointments and more. To sign up, go to [email or web address] . Click on \"Log in\" on the left side of the screen, which will take you to the Welcome page. Then click on \"Sign up Now\" on the right side of the page.     You will be asked to enter the access code listed below, as well as some personal information. Please follow the directions to create your username and password.     Your access code is: [email or web address]  Expires: 2019 10:10 AM     Your access code will  in 90 days. If you need help or a new code, please call your [facility] clinic or " [phone].        Care EveryWhere ID     This is your Care EveryWhere ID. This could be used by other organizations to access your [facility] medical records  [id number]         Blood Pressure from Last 3 Encounters:   09/13/18 136/80   07/25/18 118/73   07/18/18 156/76    Weight from Last 3 Encounters:   09/13/18 145 lb (65.8 kg)   07/18/18 145 lb 3.2 oz (65.9 kg)   06/27/18 142 lb (64.4 kg)              We Performed the Following     ERRONEOUS ENCOUNTER--DISREGARD          Today's Medication Changes      Notice     This visit is during an admission. Changes to the med list made in this visit will be reflected in the After Visit Summary of the admission.             Primary Care Provider Office Phone # Fax #    Mckenna Wilkinson [phone]       [facility] [address]   [facility] [address]        Equal Access to Services     JUSTYN OSBONR AH: Hadii ewa ku hadasho Soomaali, waaxda luqadaha, qaybta kaalmada adeegyada, balbina dickey haycorby farfan . So [facility] [phone].    ATENCIÓN: Si habla español, tiene a pereira disposición servicios gratuitos de asistencia lingüística. Llame al [phone].    We comply with applicable federal civil rights laws and [place] laws. We do not discriminate on the basis of race, color, national origin, age, disability, sex, sexual orientation, or gender identity.            Thank you!     Thank you for choosing [facility]  for your care. Our goal is always to provide you with excellent care. Hearing back from our patients is one way we can continue to improve our services. Please take a few minutes to complete the written survey that you may receive in the mail after your visit with us. Thank you!             Your Updated Medication List - Protect others around you: Learn how to safely use, store and throw away your medicines at www.disposemymeds.org.      Notice     This visit is during an admission. Changes to  the med list made in this visit will be reflected in the After Visit Summary of the admission.

## 2018-07-17 NOTE — BRIEF OP NOTE
[facility] Brief Operative Note    Pre-operative diagnosis: LEFT CAROTID STENOSIS   Post-operative diagnosis same   Procedure: Procedure(s):  LEFT CAROTID ENDARTERECTOMY WITH DACRON PATCH ANGIOPLASTY, SHUNTING AND EEG MONITORING - Wound Class: I-Clean   Surgeon(s): Surgeon(s) and Role:     * Jorge Posada MD - Primary     * Wilfredo Hoskins PA-C - Assisting   Estimated blood loss: 20 mL    Specimens: * No specimens in log *   Findings: Stable EEG  Patient moved extremities at completion.  Hypertensive at wake up.   No apparent complications.   Wilfredo Hoskins PA-C  Office: [phone]  Pager: [phone]

## 2018-07-17 NOTE — OP NOTE
Procedure Date: 07/17/2018      DATE OF PROCEDURE: 07/17/2018.      PREOPERATIVE DIAGNOSIS:  High-grade stenosis left internal carotid artery.      POSTOPERATIVE DIAGNOSIS:  High grade stenosis left internal carotid artery.      PROCEDURE:  Left carotid thromboendarterectomy with Dacron patch angioplasty.      SURGICAL ASSISTANT:  Wilfredo Hoskins PA-C.       DESCRIPTION OF PROCEDURE:  Under satisfactory general endotracheal anesthesia, the patient's left neck was prepped and draped in routine sterile fashion.  Appropriate timeout was performed and then a primary longitudinal incision was made along the anterior border of the left sternocleidomastoid muscle.  Assistance was required in this case for exposure of the artery, help with hemostasis as well as help with the patch angioplasty of the carotid artery.  The platysma was divided using electrocautery.  Dissection was sharply carried down to the carotid sheath at the level of the omohyoid muscle.  The carotid sheath was sharply opened and the common carotid artery was dissected out using sharp and blunt dissection and encircled with a vessel loop.  Dissection was then carried up along the anterior surface of the carotid artery to its bifurcation.  Venous tributaries to internal jugular system were clamped, divided and ligated with interrupted ties of 2-0 and 3-0 silk.  Superior thyroid and external carotid arteries were isolated using sharp and blunt dissection and encircled with vessel loops.  The internal carotid artery was then freed up almost to the base of the skull, again using sharp and blunt dissection.  Hypoglossal nerve was identified and preserved.  The patient was systemically heparinized with 5000 units of IV heparin.  The internal carotid, external carotid and common carotid arteries were occluded.  The common carotid artery was opened along to the arteriotomy.  This was carried up onto the internal carotid artery for approximately 3-3.5 cm.  The  patient had very high-grade ulcerative plaque present in the proximal internal carotid artery I would estimate at 80% stenosis.  A Sundt shunt was placed in the usual fashion and flow was restored to left cerebral hemisphere.  Atheromatous plaque was in her rectovaginal level of the common carotid artery and transected.  The endarterectomy was then carried up to the carotid bifurcation.  The external carotid artery was endarterectomized using eversion technique over approximately 5-6 mm and plaque was removed.  The endarterectomy was then carried up onto the internal carotid artery to a level that began to thin out posteriorly.  At this level, the intima was sharply divided and specimen was removed en bloc.  There was a shallow intimal flap left distally which was tacked down with interrupted sutures of 7-0 Prolene.  The endarterectomy segment was thoroughly washed with dilute heparinized saline solution.  All this debris was removed.  The arteriotomy was then closed with a Dacron patch.  This was cut to size and fixed in place circumferentially in the internal carotid artery with running suture of 7-0 Prolene and in the common carotid artery with a running suture of 6-0 Prolene.  Prior to completion of the patch angioplasty, the Sundt shunt was removed.  The internal carotid, external carotid and common carotid arteries were all flushed.  The endarterectomy segment was again washed with dilute heparinized saline solution until clear, then closure of patch angioplasty was completed.  Flow was restored up through the external carotid artery for several beats and then up through the internal carotid artery.  The wound was irrigated out with saline solution and checked for hemostasis.  The wound was then closed in layers with interrupted sutures of 3-0 Vicryl.  Skin was closed with a 4-0 subcuticular Vicryl stitch.  Sponge and needle counts were correct at the termination of the procedure.  The patient tolerated the  procedure well and was taken to the post-anesthetic recovery room in satisfactory condition.  She did undergo EEG monitoring throughout the case, and no significant changes were noted.         MONICA ATKINSON III, MD             D: 2018   T: 2018   MT: [id number]      Name:     ADRIANA CARUSO   MRN:      [id number]        Account:        [id number]   :      1950           Procedure Date: 2018      Document: [id number]

## 2018-07-17 NOTE — IP AVS SNAPSHOT
[facility] [facility]    [address] [place] [address]    Phone:  [phone]                                       After Visit Summary   7/17/2018    Maria Alejandra Buck    MRN: [id number]           After Visit Summary Signature Page     I have received my discharge instructions, and my questions have been answered. I have discussed any challenges I see with this plan with the nurse or doctor.    ..........................................................................................................................................  Patient/Patient Representative Signature      ..........................................................................................................................................  Patient Representative Print Name and Relationship to Patient    ..................................................               ................................................  Date                                            Time    ..........................................................................................................................................  Reviewed by Signature/Title    ...................................................              ..............................................  Date                                                            Time

## 2018-07-17 NOTE — ANESTHESIA PREPROCEDURE EVALUATION
Anesthesia Evaluation     .             ROS/MED HX    ENT/Pulmonary:     (+)tobacco use, Current use COPD, , . .   (-) sleep apnea   Neurologic:     (+)TIA     Cardiovascular:     (+) Dyslipidemia, hypertension-Peripheral Vascular Disease-- Carotid Stenosis, --. : . . . :. dysrhythmias a-fib, .       METS/Exercise Tolerance:  >4 METS   Hematologic:         Musculoskeletal:         GI/Hepatic:        (-) GERD   Renal/Genitourinary:     (+) chronic renal disease, type: CRI,       Endo:      (-) Type II DM   Psychiatric:     (+) psychiatric history depression      Infectious Disease:         Malignancy:         Other:                     Physical Exam  Normal systems: cardiovascular, pulmonary and dental    Airway   Mallampati: II  TM distance: >3 FB  Neck ROM: full    Dental     Cardiovascular       Pulmonary                     Anesthesia Plan      History & Physical Review  History and physical reviewed and following examination; no interval change.    ASA Status:  3 .    NPO Status:  > 8 hours    Plan for General and ETT with Intravenous and Propofol induction. Maintenance will be Inhalation.    PONV prophylaxis:  Ondansetron (or other 5HT-3)  Additional equipment: Arterial Line      Postoperative Care  Postoperative pain management:  IV analgesics and Oral pain medications.      Consents  Anesthetic plan, risks, benefits and alternatives discussed with:  Patient and Spouse..                          .

## 2018-07-18 VITALS
HEIGHT: 66 IN | OXYGEN SATURATION: 100 % | WEIGHT: 145.2 LBS | BODY MASS INDEX: 23.33 KG/M2 | DIASTOLIC BLOOD PRESSURE: 76 MMHG | RESPIRATION RATE: 16 BRPM | SYSTOLIC BLOOD PRESSURE: 156 MMHG | TEMPERATURE: 98 F | HEART RATE: 61 BPM

## 2018-07-18 LAB
GLUCOSE BLDC GLUCOMTR-MCNC: 91 MG/DL (ref 70–99)
INTERPRETATION ECG - MUSE: NORMAL

## 2018-07-18 PROCEDURE — 25000132 ZZH RX MED GY IP 250 OP 250 PS 637: Mod: GY | Performed by: PHYSICIAN ASSISTANT

## 2018-07-18 PROCEDURE — 25000128 H RX IP 250 OP 636: Performed by: PHYSICIAN ASSISTANT

## 2018-07-18 PROCEDURE — A9270 NON-COVERED ITEM OR SERVICE: HCPCS | Mod: GY | Performed by: PHYSICIAN ASSISTANT

## 2018-07-18 PROCEDURE — 00000146 ZZHCL STATISTIC GLUCOSE BY METER IP

## 2018-07-18 PROCEDURE — 25000125 ZZHC RX 250: Performed by: PHYSICIAN ASSISTANT

## 2018-07-18 PROCEDURE — 99233 SBSQ HOSP IP/OBS HIGH 50: CPT | Performed by: INTERNAL MEDICINE

## 2018-07-18 RX ORDER — NICOTINE 21 MG/24HR
1 PATCH, TRANSDERMAL 24 HOURS TRANSDERMAL DAILY
Qty: 30 PATCH | Refills: 0 | Status: SHIPPED | OUTPATIENT
Start: 2018-07-19

## 2018-07-18 RX ORDER — OXYCODONE HYDROCHLORIDE 5 MG/1
5 TABLET ORAL
Qty: 10 TABLET | Refills: 0 | Status: SHIPPED | OUTPATIENT
Start: 2018-07-18 | End: 2018-09-13

## 2018-07-18 RX ORDER — ATORVASTATIN CALCIUM 80 MG/1
80 TABLET, FILM COATED ORAL EVERY EVENING
Qty: 30 TABLET
Start: 2018-07-18

## 2018-07-18 RX ADMIN — NICOTINE 1 PATCH: 14 PATCH, EXTENDED RELEASE TRANSDERMAL at 08:26

## 2018-07-18 RX ADMIN — ACETAMINOPHEN 975 MG: 325 TABLET, FILM COATED ORAL at 12:22

## 2018-07-18 RX ADMIN — ASPIRIN 81 MG: 81 TABLET, COATED ORAL at 08:26

## 2018-07-18 RX ADMIN — OXYCODONE HYDROCHLORIDE 5 MG: 5 TABLET ORAL at 12:53

## 2018-07-18 RX ADMIN — SODIUM CHLORIDE: 9 INJECTION, SOLUTION INTRAVENOUS at 04:56

## 2018-07-18 RX ADMIN — ACETAMINOPHEN 975 MG: 325 TABLET, FILM COATED ORAL at 04:29

## 2018-07-18 RX ADMIN — METOPROLOL TARTRATE 5 MG: 5 INJECTION, SOLUTION INTRAVENOUS at 08:37

## 2018-07-18 RX ADMIN — SENNOSIDES AND DOCUSATE SODIUM 1 TABLET: 8.6; 5 TABLET ORAL at 08:25

## 2018-07-18 ASSESSMENT — VISUAL ACUITY
OU: NORMAL ACUITY

## 2018-07-18 ASSESSMENT — ACTIVITIES OF DAILY LIVING (ADL)
ADLS_ACUITY_SCORE: 9

## 2018-07-18 NOTE — DISCHARGE INSTRUCTIONS
Carotid Endartectomy  Post-Operative Instructions    Typical length of stay in the hospital is 1-2 days.  On occasion, an evening in the Intensive Care Unit may be required for blood pressure regulation.    Activity    Most people are able to resume normal activities 1-2 weeks after surgery.  The key is to gradually increase your level of activity as you are able.  It is not uncommon to feel easily fatigued - this will improve with time.      You should avoid heavy lifting more than 30 lbs for 1 week.      You may return to work when you feel able - typically 1-2 weeks after surgery, depending on the type of work you do.      You should not drive a car for 1 week after surgery.  In addition,  you can not be taking prescription strength pain medication and you must feel strong enough to drive safely.    Incision Care    You can shower or bathe 2 days after surgery - avoid rubbing and soaking your incision.      You may have strips of white tape called  steri-strips  across your incision; they should stay on for 5-7 days or until the ends start to curl up.  You can then remove the steri-strips, or we will remove them at your post-operative appointment.      Avoid shaving directly over the incision for 2-3 weeks.    Common Concerns    You may notice mild skin numbness on the side of your surgery in your neck, chin, face, and earlobe.  This is due to nerve  irritation  and will gradually resolve over the next several months.  Call our office if you experience any short-lasting episode of numbness or weakness affecting one side of your body.      Some bruising and firmness around you incision can be expected.  This will soften and resolve over the next few weeks.  You may notice that some discoloration spreads to an area lower than the incision, such as the shoulder, neck or upper chest.  Likewise, swelling can occur near the incision or below the chin.  Call our office if the swelling or bruising worsens, or if you have  difficulty swallowing.    Diet    You may resume a regular diet before you leave the hospital.  You may find that it is best to try smaller, more frequent meals until your appetite returns.    Medications    You may be started on a blood thinner after surgery - typically Aspirin and / or Plavix.      You may be given a prescription for pain medication after surgery.  If you need to have this refilled, please call your pharmacy where you had it filled.  They will then call us for approval.  Please do not call after hours for a refill on pain medication.    Post-Operative Appointments    You need to see your surgeon in the clinic approximately 1-2 weeks after surgery.  Post-operative appointment:   Date: _____________________________  Time: ____________________________  Dr. ______________________________      You will have an ultrasound test and evaluation with your surgeon 90 days (3 months) after surgery.      We will notify you by mail when you are due to schedule further ultrasound testing and evaluation; typically this is done annually.     When You Should Call Our Office    Body aches, chills or temperature greater than 101      Incision redness or drainage      Loss of vision in one eye      Loss of strength / weakness in one side of your body      Severe headache      Difficulty with speech      If you will be having an invasive procedure, such as a colonoscopy or dental work within one year of your surgery, you may need to take an antibiotic prior to the procedure if you had a Dacron patch with your surgery; please call us so we can assist you with this.    Call our main number, [phone], for assistance with any concerns or questions.     Revised 5/2014      Follow-up with Dr. Posada in 1-2 weeks. Call [phone]

## 2018-07-18 NOTE — PLAN OF CARE
Problem: Patient Care Overview  Goal: Plan of Care/Patient Progress Review  Outcome: Improving  Pt alert and oriented; lethargic most of shift. VSS on RA (2L NC overnight d/t desaturation to mid 80s). Tele: NSR. Neuros/CMS intact. LS diminished. BS active; passing flatus. AUO. Neck incision dressing with scant shadow drainage; marked and unchanged. Pain managed with tylenol, oxycodone, and ice packs. Up with assist of 1. D/C'd off of IMC this AM.

## 2018-07-18 NOTE — PROGRESS NOTES
HOSPITALIST CONSULT CHART CHECK:  7/18/2018      Hospitalist service was consulted for cross coverage only. We will peripherally follow and chart check throughout the week.        - For vascular medical concerns during business hours M-F, call the [facility] at [phone] to have the rounding/on call Vascular Medicine (NOT VASCULAR SURGERY) MD paged.      - After business hours M-F, for medical concerns on this patient, please page hospitalist staff.      - For vascular surgical questions, please page the appropriate surgeon (primary vascular surgeon or on call vascular surgeon) based upon the time of day.           FERNANDO Prakash, CNP  Hospitalist Service  [facility]  7/18/2018   7:46 AM  [phone]

## 2018-07-18 NOTE — PROGRESS NOTES
[facility]  Vascular Medicine Progress Note          Assessment and Plan:     1. Asymptomatic high grade left carotid artery stenosis s/p left carotid endarterectomy 7/17/18     Doing well     Neurologically intact.     Continue aspirin 81 mg daily.     Home today.     2. Hyperlipidemia     LDL of 72 on Atorvastatin 40 mg daily and Gemfibrozil.     Given carotid disease, would benefit from  LDL of less than 70.     Does complain of bilateral lower extremity cramping, likely secondary to the combination of  Gemfibrozil and Atorvastatin. Gemfibrozil discontinued. Atorvastatin increased to 80 mg daily. She should have a lipid panel repeated in 3 months through her primary care provider to ascertain whether or not her lipids are at goal on this regimen. If still having myalgias, switch to Cresotr 40 mg daily.     3. Ongoing tobacco use     Continues to smoke about 1/2 pack per day.     Smoking cessation was discussed.    Nicotine 14 mg patches daily for 30 days and then decrease to 7 mg patches for the next 30 days.      4. Hypertension     Continue outpatient Lisinopril.     5. Infrarenal abdominal aortic aneurysm and right renal artery stenosis s/p EVAR and right renal stenting 2010 in [place]     This has been followed in [place] with her last scan being 1-2 years ago. This should continue to be monitored by her providers in [place].      6. Possible atrial fibrillation     Her chart lists in the history section atrial fibrillation. She states she has had atrial fibrillation in the past. However, no note on file mentions anything about this, all cardiac imaging in the system is noting normal sinus rhythm, and she has been only in normal sinus rhythm in the hospital so far. She denies ever being on any anticoagulation. She will be monitored closely on telemetry this admission. If she does go into atrial fibrillation at some point, consider initiating anticoagulation.              Interval History:    doing well; no cp, sob, n/v/d, or abd pain.              Review of Systems:   The 10 point Review of Systems is negative other than noted in the HPI             Medications:       acetaminophen  975 mg Oral Q8H     aspirin  81 mg Oral Daily     atorvastatin (LIPITOR) tablet 80 mg  80 mg Oral QPM     lisinopril (PRINIVIL/ZESTRIL) tablet 20 mg  20 mg Oral QPM     metoprolol  5 mg Intravenous Q6H     nicotine  1 patch Transdermal Daily     nicotine   Transdermal Q8H     nicotine   Transdermal Daily     [START ON 7/19/2018] omeprazole (priLOSEC) CR capsule 20 mg  20 mg Oral Every Other Day     PARoxetine (PAXIL) tablet 40 mg  40 mg Oral Daily     senna-docusate  1 tablet Oral BID    Or     senna-docusate  2 tablet Oral BID     sodium chloride (PF)  3 mL Intracatheter Q8H                  Physical Exam:     Patient Vitals for the past 24 hrs:   BP Temp Temp src Heart Rate Resp SpO2 Weight   07/18/18 0758 156/76 98  F (36.7  C) Oral 76 - 100 % -   07/18/18 0600 140/71 - - 76 11 99 % -   07/18/18 0430 - 97.6  F (36.4  C) Oral - - - -   07/18/18 0429 - - - - - - 65.9 kg (145 lb 3.2 oz)   07/18/18 0400 134/73 - - 69 10 99 % -   07/18/18 0200 136/69 - - 72 14 99 % -   07/18/18 0000 165/79 98  F (36.7  C) Oral 84 15 95 % -   07/17/18 2200 149/76 - - 80 11 90 % -   07/17/18 2145 - - - 75 14 93 % -   07/17/18 2100 150/88 - - 78 13 94 % -   07/17/18 1949 139/73 98  F (36.7  C) Oral 77 12 90 % -   07/17/18 1500 120/64 - - 76 10 95 % -   07/17/18 1445 113/64 - - 72 (!) 6 94 % -   07/17/18 1149 135/76 - - 75 (!) 5 95 % -   07/17/18 1110 109/60 - - 73 12 95 % -   07/17/18 1100 112/60 - - 72 11 95 % -   07/17/18 1050 109/59 - - 73 11 96 % -   07/17/18 1040 110/58 - - 71 12 95 % -   07/17/18 1030 108/55 - - 72 12 95 % -   07/17/18 1025 113/59 - - 70 13 94 % -   07/17/18 1020 114/69 - - 71 14 94 % -   07/17/18 1015 114/59 - - 70 13 94 % -   07/17/18 1010 102/64 - - 72 14 95 % -   07/17/18 1005 114/60 - - 73 17 94 % -   07/17/18 1000  123/60 - - 70 15 99 % -   07/17/18 0955 115/61 - - 70 14 98 % -   07/17/18 0950 125/61 - - 83 18 100 % -   07/17/18 0948 124/65 - - 96 10 99 % -     Wt Readings from Last 4 Encounters:   07/18/18 65.9 kg (145 lb 3.2 oz)   06/27/18 64.4 kg (142 lb)   08/20/15 64.8 kg (142 lb 14.4 oz)   08/17/15 68 kg (150 lb)       Intake/Output Summary (Last 24 hours) at 07/18/18 0939  Last data filed at 07/18/18 0641   Gross per 24 hour   Intake             1455 ml   Output             1200 ml   Net              255 ml     Constitutional: normal  Eyes: normal  ENT: normal  Neck: Supple, symmetrical, trachea midline, no adenopathy, thyroid symmetric, not enlarged and no tenderness, skin normal. No hematoma  Hematologic / Lymphatic: normal  Back: normal  Lungs: No increased work of breathing, good air exchange, clear to auscultation bilaterally, no crackles or wheezing.  Cardiovascular: Regular rate and rhythm, normal S1 and S2, no S3 or S4, and no murmur noted.  Chest / Breast: normal  Abdomen: normal  Genitourinary: normal  Musculoskeletal: No redness, warmth, or swelling of the joints.  Full range of motion noted.  Motor strength is 5 out of 5 all extremities bilaterally.  Tone is normal.  Neurologic: CN intact  Neuropsychiatric: normal  Skin: normal           Data:     Results for orders placed or performed during the hospital encounter of 07/17/18 (from the past 24 hour(s))   Glucose by meter   Result Value Ref Range    Glucose 91 70 - 99 mg/dL     35 minutes total care today. Extended time secondary to first time I have seen this patient, chart review.

## 2018-07-18 NOTE — PLAN OF CARE
Problem: Carotid Endarterectomy (Adult)  Goal: Signs and Symptoms of Listed Potential Problems Will be Absent, Minimized or Managed (Carotid Endarterectomy)  Signs and symptoms of listed potential problems will be absent, minimized or managed by discharge/transition of care (reference Carotid Endarterectomy (Adult) CPG).   Outcome: Improving  Oriented x4 although remains lethargic, rates pain 5-7/10 although rests comfortably between cares. Neuros and CMS intact-deneis any N/T, or change in facial sensation. Dress marked upon arrival from PACU, no change throughout shift, site soft. VSS, 92% on RA, remains on IMC. Ambulating often with assist of 1 to BR. Voiding frequently.Tolerating small amount of dinner.

## 2018-07-18 NOTE — DISCHARGE SUMMARY
[facility]    Discharge Summary  Vascular Surgery    Date of Admission:  7/17/2018  Date of Discharge:  7/18/2018  1:19 PM  Discharging Provider: Hollis Sun MD    Discharge Diagnoses   Patient Active Problem List   Diagnosis     Hypertension     High cholesterol     Renal insufficiency     Left carotid stenosis       Procedure/Surgery Information   Procedure: Procedure(s):  LEFT CAROTID ENDARTERECTOMY WITH EEG - Wound Class: I-Clean   Surgeon(s): Surgeon(s) and Role:     * Jorge Posada MD - Primary     * Wilfredo Hoskins PA-C - Assisting   Specimens: * No specimens in log *   Non-operative procedures None performed     History of Present Illness   Maria Alejandra Buck is a 68 year old female smoker with hyperlipidemia, hypertension, prior AAA s/p EVAR 2010, right renal artery stenosis s/p right renal artery stenting 2010, and now progressive left carotid artery stenosis. She lives in [place] 9 months out of the year. At her recent visit, they ordered a CT scan which revealed significant left carotid artery stenosis to 60-70% with some ulceration. She was started on Plavix in addition to her aspirin and advised to see Vascular Surgery. She elected to be seen by Vascular Surgery in [place] instead. On 6/27/18 she was seen in consultation by Dr. Posada. A left carotid endarterectomy was recommended.     Hospital Course   The patient was admitted to the Vascular Surgery Service on 7/17/18, when they had been previously evaluated and cleared by Anesthesia.  The patient went to the operating room for the above mentioned procedure.  For full details of the procedure, please refer to Dr. Posada's previously dictated operative report.  Following the procedure, the patient was taken from the operating room to the Post Anesthesia Care Unit for further resuscitation and was deemed stable to be transferred to the floor for further monitoring.    On POD 1, the patient was tolerating a diet, ambulating,  and taking pain medication by mouth with adequate control.  She was neuro intact and had no major complaints.  The patient had been evaluated by optimized by the Vascular Medicine Service.  Activity restrictions and wound care instructions were also provided;the patient expressed understanding. The patient was deemed stable for discharge home.    Active Problems:    Left carotid stenosis      Post-operative pain control: included Oxycodone and will be Oxycodone on discharge.   - During her hospitalization, Maria Alejandra experienced pain due to postop pain.  The pain plan for discharge was discussed with Maria Alejandra and the plan was created in a collaborative fashion.      Medications discontinued or adjusted during this hospitalization: per Vascular Medicine    Antibiotics prescribed at discharge: None prescribed     Imaging study follow up needs:   -None performed    Significant Findings: As above    Hollis Sun MD    Discharge Disposition   Discharged to home   Condition at discharge: Stable    Pending Results   Final pathology results: No pathology submitted    Primary Care Physician   Mckenna Wilkinson        Consultations This Hospital Stay   ANESTHESIOLOGY IP CONSULT  SMOKING CESSATION PROGRAM IP CONSULT  HOSPITALIST IP CONSULT    Time Spent on this Encounter   I have spent greater than 30 minutes on this discharge.    Discharge Orders     Reason for your hospital stay   Left carotid artery stenosis     Follow-up and recommended labs and tests    Please call to setup a followup with Dr. Posada in 2 weeks     Activity   No heavy lifting (<10 lbs for 4 weeks     Discharge Instructions   1. Increase your Atorvastatin to 80 mg daily. Stop your Gemfibrozil.     2. Stop smoking! Utilize nicotine patches to assist with this. Start with 14 mg patches and continue for 30 days. Then decrease to 7 mg patches daily for 30 days.     Full Code     Diet   Regular diet as tolerated       Discharge Medications   Discharge Medication List as  of 7/18/2018 12:31 PM      START taking these medications    Details   nicotine (NICODERM CQ) 14 MG/24HR 24 hr patch Place 1 patch onto the skin daily Step 1, Disp-30 patch, R-0, E-Prescribe      nicotine (NICODERM CQ) 7 MG/24HR 24 hr patch Place 1 patch onto the skin every 24 hours Start after completing 14 mg patches., Disp-30 patch, R-0, E-Prescribe      oxyCODONE IR (ROXICODONE) 5 MG tablet Take 1 tablet (5 mg) by mouth every 3 hours as needed for other (pain control or improvement in physical function. Hold dose for analgesic side effects.), Disp-10 tablet, R-0, Local Print         CONTINUE these medications which have CHANGED    Details   atorvastatin (LIPITOR) 80 MG tablet Take 1 tablet (80 mg) by mouth every evening, Disp-30 tablet, No Print Out         CONTINUE these medications which have NOT CHANGED    Details   Acetaminophen (TYLENOL PO) Take 500 mg by mouth daily as needed for mild pain or fever, Historical      ASPIRIN PO Take 81 mg by mouth daily, Historical      Doxycycline Monohydrate (VIBRAMYCIN PO) Take 100 mg by mouth 2 times daily For skin, Historical      LISINOPRIL PO Take 20 mg by mouth every evening , Historical      LORATADINE PO Take 10 mg by mouth daily as needed , Historical      Multiple Vitamins-Minerals (PRORENAL VITAL PO) Take 1 tablet by mouth daily , Historical      OMEPRAZOLE PO Take 20 mg by mouth every other day , Historical      PAROXETINE HCL PO Take 40 mg by mouth daily , Historical      Probiotic Product (PROBIOTIC PO) Take 1 tablet by mouth every evening , Historical         STOP taking these medications       Clopidogrel Bisulfate (PLAVIX PO) Comments:   Reason for Stopping:         GEMFIBROZIL PO Comments:   Reason for Stopping:             Allergies   Allergies   Allergen Reactions     Iodine Hives     Data   Most Recent 3 CBC's:  Recent Labs   Lab Test  09/09/16   1555  08/17/15   1052   WBC  7.6  5.7   HGB  12.8  13.0   MCV  103*  101*   PLT  305  262      Most Recent  3 BMP's:  Recent Labs   Lab Test  07/17/18   0630  09/09/16   1555  08/20/15   1034   NA  141  139  138   POTASSIUM  4.1  4.0  4.6   CHLORIDE  109  107  107   CO2  25  24  20   BUN  31*  31*  34*   CR  1.49*  1.13*  1.38*   ANIONGAP  7  8  11   BRUCE  9.1  9.0  9.2   GLC  102*  123*  91     Most Recent 2 LFT's:No lab results found.  Most Recent INR's and Anticoagulation Dosing History:  Anticoagulation Dose History     There is no flowsheet data to display.        Most Recent 3 Troponin's:No lab results found.  Most Recent Cholesterol Panel:  Recent Labs   Lab Test  07/17/18   0630   CHOL  178   LDL  72   HDL  33*   TRIG  364*     Most Recent 6 Bacteria Isolates From Any Culture (See EPIC Reports for Culture Details):  Recent Labs   Lab Test  08/17/15   1012   CULT  >100,000 colonies/mL Escherichia coli*     Most Recent TSH, T4 and A1c Labs:  Recent Labs   Lab Test  07/17/18   0630   A1C  6.1*

## 2018-07-18 NOTE — PROGRESS NOTES
Surgery    Patient seen and examined.   Good progress.  Agree with student note below.  Home today    Wilfredo Hoskins PA-C      General Surgery Progress Note    Subj: Maria Alejandra is doing well this morning, with pain well-controlled with scheduled Tylenol. Breathing comfortably on RA, with VSS. She is OOB with assistance to the bathroom, good UOP. She is tolerating CLD, she is hungry and asking for regular diet today.  No HA.    I/O  UOP- 1200 mL 7/17    Exam  Gen: alert, pleasant, and cooperative, in NAD  HEENT: steri-strips in place over wound on left side of neck, surgical site c/d/i. No evidence of hematoma or infection. Mildly tender to palpation. Trachea is midline.  Resp: breathing comfortably on RA  CV: RRR, no murmurs  Extremities: WWP  Neuro: CN II-XII intact. Normal strength, tone, and movement bilateral UE/LE.    No new labs or imaging    A/P  Maria Alejandra is a 68 year-old female who is POD#1 s/p L CEA. Neurological exam is intact, surgical site is c/d/i. Will advance diet to regular today, continue to encourage OOB. Continue pain management with prn Tylenol. From a surgical standpoint, okay to discharge home today, patient's  is planning to provide transport. Discussed wound care, post-operative driving recommendations, and follow-up with Dr. Posada in Surgery Clinic in 2 weeks. Emphasized importance of smoking cessation. Patient voiced understanding.    SARITA BuchananS  General Surgery

## 2018-07-18 NOTE — PLAN OF CARE
Problem: Patient Care Overview  Goal: Plan of Care/Patient Progress Review  Outcome: Completed Date Met: 07/18/18  Alert and oriented x4. Vital signs stable except asymptomatic HTN. Standby assist. Tolerating regular diet. Bowel sounds normoactive, passing gas. Voiding adequately. Incision closed with steri strips; incision clean, dry, and intact. Pain managed with PRN oxycodone. Patient discharging to home, verbalized understanding of discharge teaching.

## 2018-07-18 NOTE — PROGRESS NOTES
[facility]    Vascular Surgery  Daily Progress Note    Assessment & Plan     67 yo F s/p L CEA on 7/17  Plan:    -doing well postop, neuro intact, incision c/d/i  -ADAT, HLIV  -OOB  -cont pain regimen prn  -discussed wound care and activity restrictions  -f/u Vascular Medicine recs  -dispo pending    Active Problems:    Left carotid stenosis      # Pain Assessment:  Current Pain Score 7/18/2018   Patient currently in pain? yes   Pain score (0-10) 5   Pain location Incision   Pain descriptors Aching   Maria Alejandra s pain level was assessed and she currently denies pain.        Hollis Sun MD    Interval History     NAEON.  Neuro intact, L neck incision c/d/i, tolerated PO intake    Physical Exam   Temp: 98  F (36.7  C) Temp src: Oral BP: 140/71   Heart Rate: 76 Resp: 11 SpO2: 100 % O2 Device: Nasal cannula Oxygen Delivery: 1.5 LPM  Vitals:    07/17/18 0627 07/18/18 0429   Weight: 64.9 kg (143 lb) 65.9 kg (145 lb 3.2 oz)     Vital Signs with Ranges  Temp:  [97.6  F (36.4  C)-98  F (36.7  C)] 98  F (36.7  C)  Heart Rate:  [69-96] 76  Resp:  [5-18] 11  BP: (102-175)/(55-93) 140/71  MAP:  [76 mmHg-184 mmHg] 76 mmHg  Arterial Line BP: (122-184)/(50-84) 122/54  SpO2:  [90 %-100 %] 100 %  I/O last 3 completed shifts:  In: 2455 [P.O.:640; I.V.:1815]  Out: 1220 [Urine:1200; Blood:20]    Constitutional:  NAD  HEENT: normocephalic, L neck c/d/i, dressing in place  CV: RRR  Pulm: CTAB, nonlabored respirations  GI: soft, NT/ND  MSK: warm, dry, pink  Neuro: A&O, motor/sensory grossly intact  Psych: Appropriate    Medications     nitroPRUsside (NIPRIDE) IV infusion ADULT/PEDS GREATER than or EQUAL to 45 kg std conc       sodium chloride 70 mL/hr at 07/18/18 0456        acetaminophen  975 mg Oral Q8H     aspirin  81 mg Oral Daily     atorvastatin (LIPITOR) tablet 80 mg  80 mg Oral QPM     lisinopril (PRINIVIL/ZESTRIL) tablet 20 mg  20 mg Oral QPM     metoprolol  5 mg Intravenous Q6H     nicotine  1 patch Transdermal  Daily     nicotine   Transdermal Q8H     nicotine   Transdermal Daily     [START ON 7/19/2018] omeprazole (priLOSEC) CR capsule 20 mg  20 mg Oral Every Other Day     PARoxetine (PAXIL) tablet 40 mg  40 mg Oral Daily     senna-docusate  1 tablet Oral BID    Or     senna-docusate  2 tablet Oral BID     sodium chloride (PF)  3 mL Intracatheter Q8H       Data   No lab results found in last 7 days.

## 2018-07-19 ENCOUNTER — TELEPHONE (OUTPATIENT)
Dept: INTERNAL MEDICINE | Facility: CLINIC | Age: 68
End: 2018-07-19

## 2018-07-20 ENCOUNTER — TELEPHONE (OUTPATIENT)
Dept: OTHER | Facility: CLINIC | Age: 68
End: 2018-07-20

## 2018-07-20 NOTE — TELEPHONE ENCOUNTER
Pt is status post LEFT CAROTID ENDARTERECTOMY WITH DACRON PATCH ANGIOPLASTY on 7/17/18    [facility] for patient to call with questions or concerns.   Pt scheduled with Dr. Posada on 7/25/18 for post op.        Eliane DUPONTN, RN

## 2018-07-25 ENCOUNTER — OFFICE VISIT (OUTPATIENT)
Dept: OTHER | Facility: CLINIC | Age: 68
End: 2018-07-25
Attending: SURGERY
Payer: MEDICARE

## 2018-07-25 VITALS — HEART RATE: 96 BPM | SYSTOLIC BLOOD PRESSURE: 118 MMHG | DIASTOLIC BLOOD PRESSURE: 73 MMHG

## 2018-07-25 DIAGNOSIS — Z09 FOLLOW-UP EXAMINATION FOLLOWING SURGERY: Primary | ICD-10-CM

## 2018-07-25 DIAGNOSIS — I65.22 CAROTID STENOSIS, LEFT: Primary | ICD-10-CM

## 2018-07-25 PROCEDURE — 99024 POSTOP FOLLOW-UP VISIT: CPT | Mod: ZP | Performed by: SURGERY

## 2018-07-25 PROCEDURE — G0463 HOSPITAL OUTPT CLINIC VISIT: HCPCS

## 2018-07-25 NOTE — MR AVS SNAPSHOT
"              After Visit Summary   2018    Maria Alejandra Buck    MRN: [id number]           Patient Information     Date Of Birth          1950        Visit Information        Provider Department      2018 1:15 PM Jorge Posada MD [facility] [place]      Today's Diagnoses     Follow-up examination following surgery    -  1       Follow-ups after your visit        Who to contact     If you have questions or need follow up information about today's clinic visit or your schedule please contact [facility] directly at [phone].  Normal or non-critical lab and imaging results will be communicated to you by Hoot.Mehart, letter or phone within 4 business days after the clinic has received the results. If you do not hear from us within 7 days, please contact the clinic through Hoot.Mehart or phone. If you have a critical or abnormal lab result, we will notify you by phone as soon as possible.  Submit refill requests through Maraquia or call your pharmacy and they will forward the refill request to us. Please allow 3 business days for your refill to be completed.          Additional Information About Your Visit        MyChart Information     Maraquia lets you send messages to your doctor, view your test results, renew your prescriptions, schedule appointments and more. To sign up, go to [email or web address] . Click on \"Log in\" on the left side of the screen, which will take you to the Welcome page. Then click on \"Sign up Now\" on the right side of the page.     You will be asked to enter the access code listed below, as well as some personal information. Please follow the directions to create your username and password.     Your access code is: [email or web address]  Expires: 2018  9:45 AM     Your access code will  in 90 days. If you need help or a new code, please call your [place] clinic or [phone].        Care " EveryWhere ID     This is your Care EveryWhere ID. This could be used by other organizations to access your [place] medical records  [id number]        Your Vitals Were     Pulse Breastfeeding?                96 No           Blood Pressure from Last 3 Encounters:   07/25/18 118/73   07/18/18 156/76   07/10/18 150/78    Weight from Last 3 Encounters:   07/18/18 145 lb 3.2 oz (65.9 kg)   06/27/18 142 lb (64.4 kg)   08/20/15 142 lb 14.4 oz (64.8 kg)              Today, you had the following     No orders found for display       Primary Care Provider Office Phone # Fax #    Mckenna Wilkinson [phone]       [facility] [address] [address]        Equal Access to Services     [facility]: Hadii ewa damon hadasho Soomaali, waaxda luqadaha, qaybta kaalmada adeegyada, balbina farfan . So [facility] [phone].    ATENCIÓN: Si habla español, tiene a pereira disposición servicios gratuitos de asistencia lingüística. Farrah al [phone].    We comply with applicable federal civil rights laws and [place] laws. We do not discriminate on the basis of race, color, national origin, age, disability, sex, sexual orientation, or gender identity.            Thank you!     Thank you for choosing [facility] [place]  for your care. Our goal is always to provide you with excellent care. Hearing back from our patients is one way we can continue to improve our services. Please take a few minutes to complete the written survey that you may receive in the mail after your visit with us. Thank you!             Your Updated Medication List - Protect others around you: Learn how to safely use, store and throw away your medicines at www.disposemymeds.org.          This list is accurate as of 7/25/18  1:38 PM.  Always use your most recent med list.                   Brand Name Dispense Instructions for use Diagnosis    ASPIRIN PO      Take 81 mg by mouth daily         atorvastatin 80 MG tablet    LIPITOR    30 tablet    Take 1 tablet (80 mg) by mouth every evening    Hyperlipidemia LDL goal <70       LISINOPRIL PO      Take 20 mg by mouth every evening        LORATADINE PO      Take 10 mg by mouth daily as needed        * nicotine 7 MG/24HR 24 hr patch    NICODERM CQ    30 patch    Place 1 patch onto the skin every 24 hours Start after completing 14 mg patches.    Tobacco abuse       * nicotine 14 MG/24HR 24 hr patch    NICODERM CQ    30 patch    Place 1 patch onto the skin daily Step 1    Tobacco abuse       OMEPRAZOLE PO      Take 20 mg by mouth every other day        oxyCODONE IR 5 MG tablet    ROXICODONE    10 tablet    Take 1 tablet (5 mg) by mouth every 3 hours as needed for other (pain control or improvement in physical function. Hold dose for analgesic side effects.)    Left carotid stenosis       PAROXETINE HCL PO      Take 40 mg by mouth daily        PROBIOTIC PO      Take 1 tablet by mouth every evening        PRORENAL VITAL PO      Take 1 tablet by mouth daily        TYLENOL PO      Take 500 mg by mouth daily as needed for mild pain or fever        VIBRAMYCIN PO      Take 100 mg by mouth 2 times daily For skin        * Notice:  This list has 2 medication(s) that are the same as other medications prescribed for you. Read the directions carefully, and ask your doctor or other care provider to review them with you.

## 2018-07-25 NOTE — PROGRESS NOTES
Doing well  No complaints other than tightness of her incision.  Evan removed incision  Healing nicely.  Neuro  Intact.  Discussed  Wound care and activity.  F/U with U/S early September--she leaves for [place] 9/15.

## 2018-07-25 NOTE — LETTER
[facility] [place] [address]  [place] [address]  Phone: [phone]  Fax: [phone]        2018    Re: Maria Alejandra ROWE Heber - 1950    Doing well  No complaints other than tightness of her incision.  Steris removed incision  Healing nicely.  Neuro  Intact.  Discussed  Wound care and activity.  F/U with U/S early September--she leaves for [place] 9/15.      Jorge Posada MD

## 2018-07-25 NOTE — NURSING NOTE
"Maria Alejandra Buck is a 68 year old female who presents for:  Chief Complaint   Patient presents with     RECHECK     1st PO for LEFT CAROTID ENDARTERECTOMY, patient requested the date due to possibly being out of town the following week.         Vitals:    Vitals:    07/25/18 1311   BP: 118/73   BP Location: Left arm   Patient Position: Chair   Cuff Size: Adult Regular   Pulse: 96       BMI:  Estimated body mass index is 23.44 kg/(m^2) as calculated from the following:    Height as of 7/17/18: 5' 6\" (1.676 m).    Weight as of 7/18/18: 145 lb 3.2 oz (65.9 kg).    Pain Score:  Data Unavailable        Annalise Gage"

## 2018-07-26 ENCOUNTER — TELEPHONE (OUTPATIENT)
Dept: OTHER | Facility: CLINIC | Age: 68
End: 2018-07-26

## 2018-07-26 NOTE — TELEPHONE ENCOUNTER
Left message on home number for patient to call back to schedule carotid US and follow up appointment with Dr. Posada in September.

## 2018-07-30 NOTE — TELEPHONE ENCOUNTER
Left message for patient to return our call to schedule an appointment with Dr. Posada.      Annalise Gage MA

## 2018-08-27 NOTE — PROGRESS NOTES
This encounter was opened in error. Please disregard.    This encounter was opened in error. Please disregard.

## 2018-09-13 ENCOUNTER — HOSPITAL ENCOUNTER (OUTPATIENT)
Dept: ULTRASOUND IMAGING | Facility: CLINIC | Age: 68
Discharge: HOME | End: 2018-09-13
Attending: SURGERY | Admitting: SURGERY
Payer: MEDICARE

## 2018-09-13 ENCOUNTER — OFFICE VISIT (OUTPATIENT)
Dept: SURGERY | Facility: CLINIC | Age: 68
End: 2018-09-13
Payer: MEDICARE

## 2018-09-13 VITALS
RESPIRATION RATE: 16 BRPM | SYSTOLIC BLOOD PRESSURE: 136 MMHG | HEIGHT: 66 IN | WEIGHT: 145 LBS | DIASTOLIC BLOOD PRESSURE: 80 MMHG | HEART RATE: 86 BPM | OXYGEN SATURATION: 95 % | BODY MASS INDEX: 23.3 KG/M2

## 2018-09-13 DIAGNOSIS — I65.22 CAROTID STENOSIS, LEFT: ICD-10-CM

## 2018-09-13 DIAGNOSIS — I65.22 CAROTID STENOSIS, ASYMPTOMATIC, LEFT: Primary | ICD-10-CM

## 2018-09-13 PROCEDURE — 99024 POSTOP FOLLOW-UP VISIT: CPT | Performed by: SURGERY

## 2018-09-13 PROCEDURE — 93882 EXTRACRANIAL UNI/LTD STUDY: CPT | Mod: LT

## 2018-09-13 NOTE — PROGRESS NOTES
No complaints Wound well healed. Carotids 4/4 no bruits motor/sensory intact Cr. N. 2-12 intact. Ultrasound shows widely patent endarterectomy site  Continue you ASA follow up one year.

## 2018-09-13 NOTE — LETTER
[facility] [place] [address]  [place] [address]  Phone: [phone]  Fax: [phone]      2018    RE: Maria Alejandra Buck, : 1950      No complaints Wound well healed. Carotids 4/4 no bruits motor/sensory intact Cr. N. 2-12 intact. Ultrasound shows widely patent endarterectomy site  Continue you ASA follow up one year.        Sincerely,    Jorge Posada MD          [facility] [address]  [facility] [address]  Phone: [phone]  Fax: [phone]

## 2018-09-13 NOTE — MR AVS SNAPSHOT
"              After Visit Summary   9/13/2018    Maria Alejandra Buck    MRN: [id number]           Patient Information     Date Of Birth          1950        Visit Information        Provider Department      9/13/2018 2:15 PM Jorge Posada MD [facility]      Today's Diagnoses     Carotid stenosis, asymptomatic, left    -  1       Follow-ups after your visit        Your next 10 appointments already scheduled     Sep 13, 2018  2:15 PM CDT   Return Visit with Jorge Posada MD   [facility] ([facility])    [facility] [address] [facility] [address]  [facility] [id number] [phone]              Who to contact     If you have questions or need follow up information about today's clinic visit or your schedule please contact [facility] directly at [phone].  Normal or non-critical lab and imaging results will be communicated to you by MyChart, letter or phone within 4 business days after the clinic has received the results. If you do not hear from us within 7 days, please contact the clinic through MyChart or phone. If you have a critical or abnormal lab result, we will notify you by phone as soon as possible.  Submit refill requests through [employer] or call your pharmacy and they will forward the refill request to us. Please allow 3 business days for your refill to be completed.          Additional Information About Your Visit        MyChart Information     [employer] lets you send messages to your doctor, view your test results, renew your prescriptions, schedule appointments and more. To sign up, go to [email or web address] . Click on \"Log in\" on the left side of the screen, which will take you to the Welcome page. Then click on \"Sign up Now\" on the right side of the page.     You will be asked to enter the access code listed below, as well as some personal information. Please follow " "the directions to create your username and password.     Your access code is: [email or web address]  Expires: 2018  9:45 AM     Your access code will  in 90 days. If you need help or a new code, please call your [place] clinic or [phone].        Care EveryWhere ID     This is your Care EveryWhere ID. This could be used by other organizations to access your [place] medical records  [id number]        Your Vitals Were     Pulse Respirations Height Pulse Oximetry BMI (Body Mass Index)       86 16 5' 6\" (1.676 m) 95% 23.4 kg/m2        Blood Pressure from Last 3 Encounters:   18 136/80   18 118/73   18 156/76    Weight from Last 3 Encounters:   18 145 lb (65.8 kg)   18 145 lb 3.2 oz (65.9 kg)   18 142 lb (64.4 kg)              Today, you had the following     No orders found for display         Today's Medication Changes          These changes are accurate as of 18  2:14 PM.  If you have any questions, ask your nurse or doctor.               Stop taking these medicines if you haven't already. Please contact your care team if you have questions.     oxyCODONE IR 5 MG tablet   Commonly known as:  ROXICODONE   Stopped by:  Jorge Posada MD                    Primary Care Provider Office Phone # Fax #    Mckenna Wilkinson [phone]       [facility] [address]  [place] [address]        Equal Access to Services     [facility]: Hadii ewa damon hadarono Sofito, waaxda luqadaha, qaybta kaalmada ashley, waxkhushboo noble farfan . So [facility] [phone].    ATENCIÓN: Si habla español, tiene a pereira disposición servicios gratuitos de asistencia lingüística. Llame al [phone].    We comply with applicable federal civil rights laws and [place] laws. We do not discriminate on the basis of race, color, national origin, age, disability, sex, sexual orientation, or gender identity.            Thank you!     Thank you for " choosing [facility] [place]  for your care. Our goal is always to provide you with excellent care. Hearing back from our patients is one way we can continue to improve our services. Please take a few minutes to complete the written survey that you may receive in the mail after your visit with us. Thank you!             Your Updated Medication List - Protect others around you: Learn how to safely use, store and throw away your medicines at www.disposemymeds.org.          This list is accurate as of 9/13/18  2:14 PM.  Always use your most recent med list.                   Brand Name Dispense Instructions for use Diagnosis    ASPIRIN PO      Take 81 mg by mouth daily        atorvastatin 80 MG tablet    LIPITOR    30 tablet    Take 1 tablet (80 mg) by mouth every evening    Hyperlipidemia LDL goal <70       LISINOPRIL PO      Take 20 mg by mouth every evening        LORATADINE PO      Take 10 mg by mouth daily as needed        * nicotine 7 MG/24HR 24 hr patch    NICODERM CQ    30 patch    Place 1 patch onto the skin every 24 hours Start after completing 14 mg patches.    Tobacco abuse       * nicotine 14 MG/24HR 24 hr patch    NICODERM CQ    30 patch    Place 1 patch onto the skin daily Step 1    Tobacco abuse       OMEPRAZOLE PO      Take 20 mg by mouth every other day        PAROXETINE HCL PO      Take 40 mg by mouth daily        PROBIOTIC PO      Take 1 tablet by mouth every evening        PRORENAL VITAL PO      Take 1 tablet by mouth daily        TYLENOL PO      Take 500 mg by mouth daily as needed for mild pain or fever        VIBRAMYCIN PO      Take 100 mg by mouth 2 times daily For skin        * Notice:  This list has 2 medication(s) that are the same as other medications prescribed for you. Read the directions carefully, and ask your doctor or other care provider to review them with you.

## 2019-07-11 DIAGNOSIS — I65.22 LEFT CAROTID STENOSIS: Primary | ICD-10-CM

## 2019-07-11 DIAGNOSIS — I65.29 CAROTID STENOSIS: ICD-10-CM

## 2020-06-24 ENCOUNTER — APPOINTMENT (OUTPATIENT)
Dept: GENERAL RADIOLOGY | Facility: CLINIC | Age: 70
End: 2020-06-24
Attending: FAMILY MEDICINE
Payer: MEDICARE

## 2020-06-24 ENCOUNTER — HOSPITAL ENCOUNTER (EMERGENCY)
Facility: CLINIC | Age: 70
Discharge: HOME | End: 2020-06-24
Attending: FAMILY MEDICINE | Admitting: FAMILY MEDICINE
Payer: MEDICARE

## 2020-06-24 VITALS
TEMPERATURE: 97.2 F | HEART RATE: 90 BPM | RESPIRATION RATE: 18 BRPM | DIASTOLIC BLOOD PRESSURE: 90 MMHG | SYSTOLIC BLOOD PRESSURE: 156 MMHG | OXYGEN SATURATION: 98 %

## 2020-06-24 DIAGNOSIS — R09.A2 SENSATION OF FOREIGN BODY IN THROAT: ICD-10-CM

## 2020-06-24 PROCEDURE — 70360 X-RAY EXAM OF NECK: CPT | Mod: TC

## 2020-06-24 PROCEDURE — 99283 EMERGENCY DEPT VISIT LOW MDM: CPT | Performed by: FAMILY MEDICINE

## 2020-06-24 PROCEDURE — 99282 EMERGENCY DEPT VISIT SF MDM: CPT | Mod: Z6 | Performed by: FAMILY MEDICINE

## 2020-06-24 ASSESSMENT — ENCOUNTER SYMPTOMS
GASTROINTESTINAL NEGATIVE: 1
FEVER: 0
CONSTITUTIONAL NEGATIVE: 1
SHORTNESS OF BREATH: 0
COUGH: 0
CHEST TIGHTNESS: 0
TROUBLE SWALLOWING: 1

## 2020-06-24 NOTE — ED AVS SNAPSHOT
[facility] Emergency Department  [address] [place] [address]  Phone:  [phone]  Fax:  [phone]                                    Maria Alejandra Buck   MRN: [id number]    Department:  [facility] Emergency Department   Date of Visit:  6/24/2020           After Visit Summary Signature Page    I have received my discharge instructions, and my questions have been answered. I have discussed any challenges I see with this plan with the nurse or doctor.    ..........................................................................................................................................  Patient/Patient Representative Signature      ..........................................................................................................................................  Patient Representative Print Name and Relationship to Patient    ..................................................               ................................................  Date                                   Time    ..........................................................................................................................................  Reviewed by Signature/Title    ...................................................              ..............................................  Date                                               Time          22EPIC Rev 08/18

## 2020-06-25 NOTE — DISCHARGE INSTRUCTIONS
Please read and follow the handout(s) instructions. Return, if needed, for increased or worsening symptoms and as directed by the handout(s). This handout describes dysphagia and not a foreign body but the treatment recommendations are the same.    I'm glad you are feeling improved.

## 2020-06-25 NOTE — ED PROVIDER NOTES
History     Chief Complaint   Patient presents with     Swallowed Foreign Body     HPI  Maria Alejandra Buck is a 70 year old female who presents emergency room today secondary concerns of having a candied mint stuck in her throat.  She [place] her home in the [facility] when the patient states that she was sucking on a peppermint candy when she swallowed it and it got stuck in her food too.  She denies any problems breathing.  She has had no difficulty handling her secretions but states that she has pain to her neck area when swallowing.  She denies difficulty talking or change in her voice.  She states that the episode happened about 15 minutes ago.    Allergies:  Allergies   Allergen Reactions     Iodine Hives       Problem List:    Patient Active Problem List    Diagnosis Date Noted     Left carotid stenosis 07/17/2018     Priority: Medium     Renal insufficiency 08/20/2015     Priority: Medium     Hypertension      Priority: Medium     High cholesterol      Priority: Medium        Past Medical History:    Past Medical History:   Diagnosis Date     Aneurysm of renal artery (H)      Atrial fibrillation (H)      COPD (chronic obstructive pulmonary disease) (H)      High cholesterol      Hypertension      Mixed hyperlipidemia      PVD (peripheral vascular disease) (H)      Stenosis of left carotid artery      Tobacco use disorder        Past Surgical History:    Past Surgical History:   Procedure Laterality Date     ABDOMEN SURGERY       aneurism      left femoral, mesh     ENDARTERECTOMY CAROTID Left 7/17/2018    Procedure: ENDARTERECTOMY CAROTID;  LEFT CAROTID ENDARTERECTOMY WITH EEG;  Surgeon: Jorge Posada MD;  Location:  OR     GENITOURINARY SURGERY      stent rt kidney       Family History:    Family History   Problem Relation Age of Onset     Alzheimer Disease Mother      Cerebrovascular Disease Father      Dementia Maternal Grandmother      Diabetes  Brother      Alzheimer Disease Brother      Parkinsonism Brother      Cerebrovascular Disease Brother      Breast Cancer Sister      Heart Disease Sister        Social History:  Marital Status:   [2]  Social History     Tobacco Use     Smoking status: Current Every Day Smoker     Packs/day: 0.50     Years: 45.00     Pack years: 22.50     Smokeless tobacco: Never Used   Substance Use Topics     Alcohol use: Yes     Comment: social     Drug use: No        Medications:    Acetaminophen (TYLENOL PO)  ASPIRIN PO  atorvastatin (LIPITOR) 80 MG tablet  Doxycycline Monohydrate (VIBRAMYCIN PO)  LISINOPRIL PO  LORATADINE PO  Multiple Vitamins-Minerals (PRORENAL VITAL PO)  nicotine (NICODERM CQ) 14 MG/24HR 24 hr patch  nicotine (NICODERM CQ) 7 MG/24HR 24 hr patch  OMEPRAZOLE PO  PAROXETINE HCL PO  Probiotic Product (PROBIOTIC PO)          Review of Systems   Constitutional: Negative.  Negative for fever.   HENT: Positive for trouble swallowing (FB sensation ).    Respiratory: Negative for cough, chest tightness and shortness of breath.    Cardiovascular: Negative for chest pain.   Gastrointestinal: Negative.    All other systems reviewed and are negative.      Physical Exam   BP: (!) 156/90  Pulse: 90  Temp: 97.2  F (36.2  C)  Resp: 18  SpO2: 98 %      Physical Exam  Nursing note reviewed.   Constitutional:       General: She is in acute distress (Mild - FB sensation throat).      Appearance: She is normal weight.   HENT:      Head: Normocephalic and atraumatic.      Nose: Nose normal.      Mouth/Throat:      Mouth: Mucous membranes are moist.      Pharynx: Oropharynx is clear. No oropharyngeal exudate or posterior oropharyngeal erythema.      Comments: No FB noted to the posterior pharyngeal area.  Neck:      Musculoskeletal: Normal range of motion and neck supple. No neck rigidity.   Cardiovascular:      Rate and Rhythm: Normal rate.   Pulmonary:      Effort: Pulmonary effort is normal. No respiratory distress.       Breath sounds: No stridor. No wheezing, rhonchi or rales.   Abdominal:      Palpations: Abdomen is soft.      Tenderness: There is no abdominal tenderness.   Musculoskeletal: Normal range of motion.   Skin:     Capillary Refill: Capillary refill takes less than 2 seconds.   Neurological:      General: No focal deficit present.      Mental Status: She is alert and oriented to person, place, and time.         ED Course     ED Course as of Jun 24 2141 Wed Jun 24, 2020 2020 I rechecked on the patient and gave her some water in a cup with a straw to sip on.  She states that she thinks it is actually getting smaller less painful currently.  Still awaiting her x-ray to be done.        Procedures               Critical Care time:  none               Results for orders placed or performed during the hospital encounter of 06/24/20 (from the past 24 hour(s))   Neck soft tissue XR    Narrative    NECK SOFT TISSUE   6/24/2020 8:18 PM     HISTORY: Foreign body sensation throat. Swallowed a candy mint.    COMPARISON: None.      Impression    IMPRESSION: No radiopaque foreign body is identified. Moderate  degenerative change is present throughout the cervical spine with  reversal of the normal cervical lordosis and grade 1 anterolisthesis  of C4 on C5. Carotid calcifications noted on the right.    MIRANDA ZURITA MD       Medications - No data to display    Assessments & Plan (with Medical Decision Making)  Patient with gradual resolution of her symptomatology during the ER stay.  She was feeling much improved was able to tolerate oral liquids without difficulty.  She had no difficulty breathing throughout her ER stay.  X-ray exam was reassuring.  Patient instructed on symptoms of dysphasia and some things that she can do to try to help those symptoms as needed.  Encouraged to return should she have any increasing or worsening symptoms.  She felt comfortable with return to home at this time with her friend.     I have reviewed  the nursing notes.    I have reviewed the findings, diagnosis, plan and need for follow up with the patient.       Discharge Medication List as of 6/24/2020  8:58 PM               I verbally discussed the findings of the evaluation today in the ER. I have verbally discussed with Maria Alejandra the suggested treatment(s) as described in the discharge instructions and handouts.      I have verbally suggested she follow-up in her clinic or return to the ER for increased symptoms. See the follow-up recommendations documented  in the after visit summary in this visit's EPIC chart.      Final diagnoses:   Sensation of foreign body in throat       6/24/2020   [facility] EMERGENCY DEPARTMENT     Luis Eduardo Coppola,   06/24/20 5186

## 2025-07-15 ENCOUNTER — APPOINTMENT (OUTPATIENT)
Dept: CT IMAGING | Facility: CLINIC | Age: 75
End: 2025-07-15
Attending: PHYSICIAN ASSISTANT
Payer: MEDICARE

## 2025-07-15 ENCOUNTER — APPOINTMENT (OUTPATIENT)
Dept: GENERAL RADIOLOGY | Facility: CLINIC | Age: 75
End: 2025-07-15
Attending: PHYSICIAN ASSISTANT
Payer: MEDICARE

## 2025-07-15 ENCOUNTER — HOSPITAL ENCOUNTER (INPATIENT)
Facility: CLINIC | Age: 75
End: 2025-07-15
Attending: PHYSICIAN ASSISTANT | Admitting: INTERNAL MEDICINE
Payer: MEDICARE

## 2025-07-15 DIAGNOSIS — E87.20 METABOLIC ACIDOSIS: ICD-10-CM

## 2025-07-15 DIAGNOSIS — R11.0 NAUSEA: ICD-10-CM

## 2025-07-15 DIAGNOSIS — N18.4 CHRONIC KIDNEY DISEASE, STAGE IV (SEVERE) (H): ICD-10-CM

## 2025-07-15 DIAGNOSIS — I10 HYPERTENSION, UNSPECIFIED TYPE: ICD-10-CM

## 2025-07-15 DIAGNOSIS — S32.501A CLOSED FRACTURE OF RIGHT PUBIS, UNSPECIFIED PORTION OF PUBIS, INITIAL ENCOUNTER (H): ICD-10-CM
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
## 2025-07-15 DIAGNOSIS — S00.03XA SCALP HEMATOMA, INITIAL ENCOUNTER: ICD-10-CM

## 2025-07-15 DIAGNOSIS — R42 DIZZINESS: Primary | ICD-10-CM

## 2025-07-15 LAB
ANION GAP SERPL CALCULATED.3IONS-SCNC: 15 MMOL/L (ref 7–15)
ATRIAL RATE - MUSE: 61 BPM
BASOPHILS # BLD AUTO: 0.1 10E3/UL (ref 0–0.2)
BASOPHILS NFR BLD AUTO: 1 %
BUN SERPL-MCNC: 49 MG/DL (ref 8–23)
CALCIUM SERPL-MCNC: 9 MG/DL (ref 8.8–10.4)
CHLORIDE SERPL-SCNC: 105 MMOL/L (ref 98–107)
CREAT SERPL-MCNC: 3.28 MG/DL (ref 0.51–0.95)
DIASTOLIC BLOOD PRESSURE - MUSE: NORMAL MMHG
EGFRCR SERPLBLD CKD-EPI 2021: 14 ML/MIN/1.73M2
EOSINOPHIL # BLD AUTO: 0.8 10E3/UL (ref 0–0.7)
EOSINOPHIL NFR BLD AUTO: 10 %
ERYTHROCYTE [DISTWIDTH] IN BLOOD BY AUTOMATED COUNT: 14.8 % (ref 10–15)
GLUCOSE SERPL-MCNC: 114 MG/DL (ref 70–99)
HCO3 SERPL-SCNC: 21 MMOL/L (ref 22–29)
HCT VFR BLD AUTO: 33.6 % (ref 35–47)
HGB BLD-MCNC: 11 G/DL (ref 11.7–15.7)
HOLD SPECIMEN: NORMAL
HOLD SPECIMEN: NORMAL
IMM GRANULOCYTES # BLD: 0 10E3/UL
IMM GRANULOCYTES NFR BLD: 0 %
INTERPRETATION ECG - MUSE: NORMAL
LYMPHOCYTES # BLD AUTO: 2.1 10E3/UL (ref 0.8–5.3)
LYMPHOCYTES NFR BLD AUTO: 29 %
MCH RBC QN AUTO: 33.7 PG (ref 26.5–33)
MCHC RBC AUTO-ENTMCNC: 32.7 G/DL (ref 31.5–36.5)
MCV RBC AUTO: 103 FL (ref 78–100)
MONOCYTES # BLD AUTO: 0.7 10E3/UL (ref 0–1.3)
MONOCYTES NFR BLD AUTO: 9 %
NEUTROPHILS # BLD AUTO: 3.8 10E3/UL (ref 1.6–8.3)
NEUTROPHILS NFR BLD AUTO: 51 %
NRBC # BLD AUTO: 0 10E3/UL
NRBC BLD AUTO-RTO: 0 /100
P AXIS - MUSE: 31 DEGREES
PLATELET # BLD AUTO: 235 10E3/UL (ref 150–450)
POTASSIUM SERPL-SCNC: 4 MMOL/L (ref 3.4–5.3)
PR INTERVAL - MUSE: 190 MS
QRS DURATION - MUSE: 62 MS
QT - MUSE: 444 MS
QTC - MUSE: 446 MS
R AXIS - MUSE: -13 DEGREES
RBC # BLD AUTO: 3.26 10E6/UL (ref 3.8–5.2)
SODIUM SERPL-SCNC: 141 MMOL/L (ref 135–145)
SYSTOLIC BLOOD PRESSURE - MUSE: NORMAL MMHG
T AXIS - MUSE: -17 DEGREES
VENTRICULAR RATE- MUSE: 61 BPM
WBC # BLD AUTO: 7.4 10E3/UL (ref 4–11)

## 2025-07-15 PROCEDURE — 72125 CT NECK SPINE W/O DYE: CPT

## 2025-07-15 PROCEDURE — G0378 HOSPITAL OBSERVATION PER HR: HCPCS

## 2025-07-15 PROCEDURE — 250N000013 HC RX MED GY IP 250 OP 250 PS 637: Performed by: PHYSICIAN ASSISTANT

## 2025-07-15 PROCEDURE — 85025 COMPLETE CBC W/AUTO DIFF WBC: CPT | Performed by: PHYSICIAN ASSISTANT

## 2025-07-15 PROCEDURE — 93005 ELECTROCARDIOGRAM TRACING: CPT

## 2025-07-15 PROCEDURE — 73502 X-RAY EXAM HIP UNI 2-3 VIEWS: CPT

## 2025-07-15 PROCEDURE — 70450 CT HEAD/BRAIN W/O DYE: CPT

## 2025-07-15 PROCEDURE — 73700 CT LOWER EXTREMITY W/O DYE: CPT | Mod: RT

## 2025-07-15 PROCEDURE — 99285 EMERGENCY DEPT VISIT HI MDM: CPT | Mod: 25 | Performed by: PHYSICIAN ASSISTANT

## 2025-07-15 PROCEDURE — 96374 THER/PROPH/DIAG INJ IV PUSH: CPT

## 2025-07-15 PROCEDURE — 36415 COLL VENOUS BLD VENIPUNCTURE: CPT | Performed by: PHYSICIAN ASSISTANT

## 2025-07-15 PROCEDURE — 73030 X-RAY EXAM OF SHOULDER: CPT | Mod: LT

## 2025-07-15 PROCEDURE — 70486 CT MAXILLOFACIAL W/O DYE: CPT

## 2025-07-15 PROCEDURE — 82374 ASSAY BLOOD CARBON DIOXIDE: CPT | Performed by: PHYSICIAN ASSISTANT

## 2025-07-15 PROCEDURE — 258N000003 HC RX IP 258 OP 636: Performed by: PHYSICIAN ASSISTANT

## 2025-07-15 PROCEDURE — 250N000011 HC RX IP 250 OP 636: Performed by: PHYSICIAN ASSISTANT

## 2025-07-15 PROCEDURE — 99223 1ST HOSP IP/OBS HIGH 75: CPT | Performed by: PHYSICIAN ASSISTANT

## 2025-07-15 RX ORDER — ACETAMINOPHEN 325 MG/1
650 TABLET ORAL ONCE
Status: COMPLETED | OUTPATIENT
Start: 2025-07-15 | End: 2025-07-15

## 2025-07-15 RX ORDER — CLOPIDOGREL BISULFATE 75 MG/1
75 TABLET ORAL DAILY
Status: DISCONTINUED | OUTPATIENT
Start: 2025-07-16 | End: 2025-07-19 | Stop reason: HOSPADM

## 2025-07-15 RX ORDER — HYDRALAZINE HYDROCHLORIDE 25 MG/1
25 TABLET, FILM COATED ORAL ONCE
Status: COMPLETED | OUTPATIENT
Start: 2025-07-15 | End: 2025-07-15

## 2025-07-15 RX ORDER — NIFEDIPINE 30 MG/1
30 TABLET, EXTENDED RELEASE ORAL DAILY
Status: ON HOLD | COMMUNITY
End: 2025-07-19

## 2025-07-15 RX ORDER — HYDROXYZINE HYDROCHLORIDE 25 MG/1
25 TABLET, FILM COATED ORAL EVERY 6 HOURS PRN
Status: DISCONTINUED | OUTPATIENT
Start: 2025-07-15 | End: 2025-07-18

## 2025-07-15 RX ORDER — FAMOTIDINE 20 MG/1
40 TABLET, FILM COATED ORAL DAILY
Status: DISCONTINUED | OUTPATIENT
Start: 2025-07-16 | End: 2025-07-16

## 2025-07-15 RX ORDER — ONDANSETRON 2 MG/ML
4 INJECTION INTRAMUSCULAR; INTRAVENOUS EVERY 6 HOURS PRN
Status: DISCONTINUED | OUTPATIENT
Start: 2025-07-15 | End: 2025-07-19 | Stop reason: HOSPADM

## 2025-07-15 RX ORDER — NALOXONE HYDROCHLORIDE 0.4 MG/ML
0.4 INJECTION, SOLUTION INTRAMUSCULAR; INTRAVENOUS; SUBCUTANEOUS
Status: DISCONTINUED | OUTPATIENT
Start: 2025-07-15 | End: 2025-07-19 | Stop reason: HOSPADM

## 2025-07-15 RX ORDER — CARVEDILOL 25 MG/1
12.5 TABLET ORAL 2 TIMES DAILY WITH MEALS
Status: ON HOLD | COMMUNITY

## 2025-07-15 RX ORDER — HYDROXYZINE HYDROCHLORIDE 50 MG/1
50 TABLET, FILM COATED ORAL EVERY 6 HOURS PRN
Status: DISCONTINUED | OUTPATIENT
Start: 2025-07-15 | End: 2025-07-18

## 2025-07-15 RX ORDER — NIFEDIPINE 30 MG/1
30 TABLET, EXTENDED RELEASE ORAL DAILY
Status: DISCONTINUED | OUTPATIENT
Start: 2025-07-16 | End: 2025-07-16

## 2025-07-15 RX ORDER — NALOXONE HYDROCHLORIDE 0.4 MG/ML
0.2 INJECTION, SOLUTION INTRAMUSCULAR; INTRAVENOUS; SUBCUTANEOUS
Status: DISCONTINUED | OUTPATIENT
Start: 2025-07-15 | End: 2025-07-19 | Stop reason: HOSPADM

## 2025-07-15 RX ORDER — FAMOTIDINE 40 MG/1
40 TABLET, FILM COATED ORAL DAILY
Status: ON HOLD | COMMUNITY

## 2025-07-15 RX ORDER — LABETALOL HYDROCHLORIDE 5 MG/ML
10 INJECTION, SOLUTION INTRAVENOUS EVERY 6 HOURS PRN
Status: DISCONTINUED | OUTPATIENT
Start: 2025-07-15 | End: 2025-07-19 | Stop reason: HOSPADM

## 2025-07-15 RX ORDER — ICOSAPENT ETHYL 1 G/1
2 CAPSULE ORAL 2 TIMES DAILY WITH MEALS
Status: ON HOLD | COMMUNITY

## 2025-07-15 RX ORDER — ATORVASTATIN CALCIUM 40 MG/1
80 TABLET, FILM COATED ORAL EVERY EVENING
Status: DISCONTINUED | OUTPATIENT
Start: 2025-07-15 | End: 2025-07-19 | Stop reason: HOSPADM

## 2025-07-15 RX ORDER — ONDANSETRON 4 MG/1
4 TABLET, ORALLY DISINTEGRATING ORAL EVERY 6 HOURS PRN
Status: DISCONTINUED | OUTPATIENT
Start: 2025-07-15 | End: 2025-07-19 | Stop reason: HOSPADM

## 2025-07-15 RX ORDER — ACETAMINOPHEN 500 MG
1000 TABLET ORAL 3 TIMES DAILY
Status: DISCONTINUED | OUTPATIENT
Start: 2025-07-15 | End: 2025-07-19 | Stop reason: HOSPADM

## 2025-07-15 RX ORDER — PROCHLORPERAZINE MALEATE 5 MG/1
5 TABLET ORAL EVERY 6 HOURS PRN
Status: DISCONTINUED | OUTPATIENT
Start: 2025-07-15 | End: 2025-07-19 | Stop reason: HOSPADM

## 2025-07-15 RX ORDER — METHOCARBAMOL 500 MG/1
500 TABLET, FILM COATED ORAL 3 TIMES DAILY PRN
Status: DISCONTINUED | OUTPATIENT
Start: 2025-07-15 | End: 2025-07-19 | Stop reason: HOSPADM

## 2025-07-15 RX ORDER — AMOXICILLIN 250 MG
2 CAPSULE ORAL 2 TIMES DAILY PRN
Status: DISCONTINUED | OUTPATIENT
Start: 2025-07-15 | End: 2025-07-19 | Stop reason: HOSPADM

## 2025-07-15 RX ORDER — CARVEDILOL 12.5 MG/1
12.5 TABLET ORAL 2 TIMES DAILY WITH MEALS
Status: DISCONTINUED | OUTPATIENT
Start: 2025-07-15 | End: 2025-07-19 | Stop reason: HOSPADM

## 2025-07-15 RX ORDER — HYDROMORPHONE HCL IN WATER/PF 6 MG/30 ML
0.2 PATIENT CONTROLLED ANALGESIA SYRINGE INTRAVENOUS
Refills: 0 | Status: DISCONTINUED | OUTPATIENT
Start: 2025-07-15 | End: 2025-07-19 | Stop reason: HOSPADM

## 2025-07-15 RX ORDER — ACETAMINOPHEN 325 MG/1
325-650 TABLET ORAL EVERY 6 HOURS PRN
Status: ON HOLD | COMMUNITY
End: 2025-07-19

## 2025-07-15 RX ORDER — ICOSAPENT ETHYL 1 G/1
2 CAPSULE ORAL 2 TIMES DAILY WITH MEALS
Status: DISCONTINUED | OUTPATIENT
Start: 2025-07-15 | End: 2025-07-19 | Stop reason: HOSPADM

## 2025-07-15 RX ORDER — LIDOCAINE 4 G/G
1 PATCH TOPICAL
Status: DISCONTINUED | OUTPATIENT
Start: 2025-07-15 | End: 2025-07-19 | Stop reason: HOSPADM

## 2025-07-15 RX ORDER — PAROXETINE 20 MG/1
40 TABLET, FILM COATED ORAL DAILY
Status: DISCONTINUED | OUTPATIENT
Start: 2025-07-16 | End: 2025-07-19 | Stop reason: HOSPADM

## 2025-07-15 RX ORDER — HYDRALAZINE HYDROCHLORIDE 25 MG/1
25 TABLET, FILM COATED ORAL 3 TIMES DAILY
Status: DISCONTINUED | OUTPATIENT
Start: 2025-07-15 | End: 2025-07-16

## 2025-07-15 RX ORDER — CLOPIDOGREL BISULFATE 75 MG/1
75 TABLET ORAL DAILY
Status: ON HOLD | COMMUNITY

## 2025-07-15 RX ORDER — MECLIZINE HYDROCHLORIDE 25 MG/1
25 TABLET ORAL 3 TIMES DAILY PRN
Status: DISCONTINUED | OUTPATIENT
Start: 2025-07-15 | End: 2025-07-17

## 2025-07-15 RX ORDER — ISOSORBIDE DINITRATE 20 MG/1
20 TABLET ORAL
Status: ON HOLD | COMMUNITY

## 2025-07-15 RX ORDER — HYDROMORPHONE HCL IN WATER/PF 6 MG/30 ML
0.4 PATIENT CONTROLLED ANALGESIA SYRINGE INTRAVENOUS
Refills: 0 | Status: DISCONTINUED | OUTPATIENT
Start: 2025-07-15 | End: 2025-07-19 | Stop reason: HOSPADM

## 2025-07-15 RX ORDER — ISOSORBIDE DINITRATE 10 MG/1
20 TABLET ORAL
Status: DISCONTINUED | OUTPATIENT
Start: 2025-07-15 | End: 2025-07-19 | Stop reason: HOSPADM

## 2025-07-15 RX ORDER — OXYCODONE HYDROCHLORIDE 5 MG/1
5 TABLET ORAL EVERY 4 HOURS PRN
Refills: 0 | Status: DISCONTINUED | OUTPATIENT
Start: 2025-07-15 | End: 2025-07-18

## 2025-07-15 RX ORDER — HYDRALAZINE HYDROCHLORIDE 25 MG/1
25 TABLET, FILM COATED ORAL 3 TIMES DAILY
Status: ON HOLD | COMMUNITY
End: 2025-07-19

## 2025-07-15 RX ORDER — AMOXICILLIN 250 MG
1 CAPSULE ORAL 2 TIMES DAILY PRN
Status: DISCONTINUED | OUTPATIENT
Start: 2025-07-15 | End: 2025-07-19 | Stop reason: HOSPADM

## 2025-07-15 RX ADMIN — LIDOCAINE 1 PATCH: 4 PATCH TOPICAL at 20:21

## 2025-07-15 RX ADMIN — OXYCODONE HYDROCHLORIDE 5 MG: 5 TABLET ORAL at 17:28

## 2025-07-15 RX ADMIN — HYDROMORPHONE HYDROCHLORIDE 0.2 MG: 0.2 INJECTION, SOLUTION INTRAMUSCULAR; INTRAVENOUS; SUBCUTANEOUS at 16:32

## 2025-07-15 RX ADMIN — ISOSORBIDE DINITRATE 20 MG: 10 TABLET ORAL at 16:32

## 2025-07-15 RX ADMIN — ATORVASTATIN CALCIUM 80 MG: 40 TABLET, FILM COATED ORAL at 20:22

## 2025-07-15 RX ADMIN — HYDRALAZINE HYDROCHLORIDE 25 MG: 25 TABLET, FILM COATED ORAL at 14:01

## 2025-07-15 RX ADMIN — METHOCARBAMOL 500 MG: 500 TABLET ORAL at 20:22

## 2025-07-15 RX ADMIN — ACETAMINOPHEN 1000 MG: 500 TABLET, FILM COATED ORAL at 16:37

## 2025-07-15 RX ADMIN — ACETAMINOPHEN 650 MG: 325 TABLET, FILM COATED ORAL at 11:01

## 2025-07-15 RX ADMIN — SODIUM CHLORIDE 500 ML: 0.9 INJECTION, SOLUTION INTRAVENOUS at 13:59

## 2025-07-15 RX ADMIN — CARVEDILOL 12.5 MG: 12.5 TABLET, FILM COATED ORAL at 16:31

## 2025-07-15 RX ADMIN — HYDRALAZINE HYDROCHLORIDE 25 MG: 25 TABLET ORAL at 18:25

## 2025-07-15 RX ADMIN — ACETAMINOPHEN 1000 MG: 500 TABLET, FILM COATED ORAL at 20:22

## 2025-07-15 ASSESSMENT — ACTIVITIES OF DAILY LIVING (ADL)
ADLS_ACUITY_SCORE: 42
ADLS_ACUITY_SCORE: 38
ADLS_ACUITY_SCORE: 38
ADLS_ACUITY_SCORE: 42
ADLS_ACUITY_SCORE: 38
ADLS_ACUITY_SCORE: 42
ADLS_ACUITY_SCORE: 38
ADLS_ACUITY_SCORE: 42
ADLS_ACUITY_SCORE: 38
ADLS_ACUITY_SCORE: 26
ADLS_ACUITY_SCORE: 42

## 2025-07-15 ASSESSMENT — COLUMBIA-SUICIDE SEVERITY RATING SCALE - C-SSRS
6. HAVE YOU EVER DONE ANYTHING, STARTED TO DO ANYTHING, OR PREPARED TO DO ANYTHING TO END YOUR LIFE?: NO
1. IN THE PAST MONTH, HAVE YOU WISHED YOU WERE DEAD OR WISHED YOU COULD GO TO SLEEP AND NOT WAKE UP?: NO
2. HAVE YOU ACTUALLY HAD ANY THOUGHTS OF KILLING YOURSELF IN THE PAST MONTH?: NO

## 2025-07-15 NOTE — PROVIDER NOTIFICATION
1820  Spoke with Dorene Ag PA-C regarding /87.  Administer scheduled Hydralazine and PRN order entered.

## 2025-07-15 NOTE — ED PROVIDER NOTES
ED APC SUPERVISION NOTE:   I evaluated this patient in conjunction with Lita Cagle PA-C  I have participated in the care of the patient and personally performed key elements of the history, exam, and medical decision making.      HPI:   Maria Alejandra Buck is a 75 year old female on Plavix with a history of PAD, CKD, AAA, hyperlipidemia, and hypertension here for evaluation of fall. She tripped last night and landed on a tile floor, and reports pain in her right groin, hip, buttock, and lower back. She also has bruising to the right side of her face and left shoulder pain. She can weight bear but not ambulate, and was unable to get up after the incident. She has had no loss of consciousness. She denies headache, chest pain, shortness of breath, dizziness, dental injury, head pain, neck pain, abdominal pain, or pain when taking a deep breath. She has no hematuria or changes in urination. No history of hip fracture or alcohol use. She takes Plavix daily and notes that she has not missed any doses of this. She reports that she has also been experiencing some vertigo for the past four months but none recently.     Independent Historian:   Family member as detailed above.    Review of External Notes: Did review recent admission in June of this year      EXAM:   Nursing note and vitals reviewed.  HENT:   Head: Ecchymosis noted to the right periorbital area  Mouth/Throat: Oropharynx is clear and moist.   Eyes: Conjunctivae and EOM are normal. Pupils are equal, round, and reactive to light.   Cardiovascular: Normal rate, regular rhythm.  Pulmonary/Chest: Effort normal.  Musculoskeletal: The patient exhibits no edema.   Neurological: The patient is alert.        No facial droop or focal extremity weakness.   Skin: Skin is warm and dry. No rash noted.   Psychiatric: The patient has a normal mood and affect. The patient's behavior is normal.      Independent Interpretation (X-rays, CTs, rhythm strip):  Head CT shows No  intracranial hemorrhage or midline shift.   XR shoulder left shows no fracture or dislocation  XR Pelvis w Hip Right shows No obvious right hip fracture    Consultations/Discussion of Management or Tests:  Admitting Hospitalist, Dr. Estrada     MIPS:      None    MEDICAL DECISION MAKING/ASSESSMENT AND PLAN:   Patient is a 75-year-old female presenting to the emergency department after a mechanical fall.  She did have head strike and is complaining of pain to the right hip and groin.  CT scans were done here which showed no evidence of acute intracranial etiology, of facial fracture or cervical spine fracture.  Patient's initial pelvis x-ray was negative for fracture but she was endorsing continued significant pain.  Because of this, CT scan was done which showed a pubic rami fracture.  Given the amount of pain the patient is having, feel that she would benefit from admission to the hospital for further evaluation and management.     DIAGNOSIS:     ICD-10-CM    1. Closed fracture of right pubis, unspecified portion of pubis, initial encounter (H)  S32.501A       2. Scalp hematoma, initial encounter  S00.03XA       3. Fall, initial encounter  W19.XXXA       4. Hypertension, unspecified type  I10             Scribe Disclosure:  IRizwan, am serving as a scribe at 1:18 PM on 7/15/2025 to document services personally performed by Lita Cagle PA-C based on my observations and the provider's statements to me.     Scribe Disclosure:  IDianelys, am serving as a scribe  at 1:19 PM on 7/15/2025 to document services personally performed by Lita Cagle PA-C based on my observations and the provider's statements to me.     7/15/2025  [facility] EMERGENCY DEPT     Josemanuel Banda, DO  07/15/25 [id number]

## 2025-07-15 NOTE — ED PROVIDER NOTES
Emergency Department Note      History of Present Illness     Chief Complaint   Fall      HPI   Maria Alejandra Buck is a 75 year old female on Plavix with a history of PAD, CKD stage 4, COPD, AAA, hyperlipidemia, and hypertension here for evaluation of fall. The patient states that last night she tripped on her flip flops in the laundry room at an extended stay in [place] and landed on her face on tile floor. She reports having pain in her right groin, hip, buttock, and lower back. She also has left shoulder pain. She also has bruising to the right side face. She was unable to get up after the incident and she is able to bear weight, but not ambulate. She has tried using ice which has helped with her swelling to her face. No loss of consciousness. No headache. No chest pain. No shortness of breath. No dizziness when the fall occurred. No dental injury. No head pain. No pain when taking a deep breath. No neck pain. No abdominal pain. No hematuria. No changes in urination. No missed dosage of Plavix. No history of a broken hip or hip surgery. No alcohol use. She notes that she has been having vertigo for the past 4 months. She also has been hospitalized in [place] 2 times recently for     Independent Historian   None    Review of External Notes   I reviewed family medicine note from 06/16/2025.  Hospital discharge summary 6/5/2025. Admitted for ROMI on CKD stage 4 and hypertension. Discharge Cr was 2.4. Taking isordil, hydralazine, nifedipine, and Coreg for BP control.     Past Medical History     Medical History and Problem List   Mixed hyperlipidemia   Tobacco use disorder  Essential hypertension  Pure hypercholesterolemia  PAD   Depressive disorder  Raynaud's syndrome  CKD stage 3a  AAA    Medications   Aspirin 81 mg  Plavix  Paxil  Lisinopril  Acetaminophen  Nascobal  Lipitor  Omeprazole    Surgical History   Abdomen surgery  Aneurism  Endarterectomy carotid  Genitourinary surgery  IR renal biopsy left    Physical  Exam     Patient Vitals for the past 24 hrs:   BP Temp Pulse Resp SpO2   07/15/25 1500 (!) 187/106 -- 60 -- 95 %   07/15/25 1450 -- -- -- -- 97 %   07/15/25 1440 -- -- -- -- 96 %   07/15/25 1430 -- -- -- -- 96 %   07/15/25 1401 (!) 186/97 -- -- -- --   07/15/25 1400 -- -- -- -- 96 %   07/15/25 1330 -- -- -- -- 96 %   07/15/25 1315 (!) 202/100 -- 59 20 97 %   07/15/25 1004 (!) 208/98 98.1  F (36.7  C) 71 18 98 %     Physical Exam  Constitutional: Alert, attentive, GCS 15  HENT:    Head: Periorbital ecchymosis and right-sided scalp hematoma. No open wounds.     Nose: Nose normal.    Mouth/Throat: Oropharynx is clear, mucous membranes are moist    Ears: No hemotympanum  Eyes: EOM are normal and without pain. Pupils equal and reactive.  Neck: Normal range of motion. No rigidity.  CV: regular rate and rhythm; no murmurs, rubs or gallups  Chest: Effort normal and breath sounds normal.   GI:  There is no tenderness. No distension. Normal bowel sounds  MSK: Reproducible tenderness along lateral right hip. Pain with active and passive flexion of right hip. Posterior left shoulder tenderness along proximal left humeral head. Equal hand . Peripheral pulses intact in upper and lower extremities with normal sensation.   Neurological: Alert, attentive, Oriented x 3. No facial asymmetry. CN II-XII intact. 5/5 strength in upper and lower extremities. Normal range of motion in all four extremities. Distal sensation in hands and feet intact. Unable to bear weight and ambulate.  Skin: Skin is warm and dry.    Diagnostics     Lab Results   Labs Ordered and Resulted from Time of ED Arrival to Time of ED Departure   BASIC METABOLIC PANEL - Abnormal       Result Value    Sodium 141      Potassium 4.0      Chloride 105      Carbon Dioxide (CO2) 21 (*)     Anion Gap 15      Urea Nitrogen 49.0 (*)     Creatinine 3.28 (*)     GFR Estimate 14 (*)     Calcium 9.0      Glucose 114 (*)    CBC WITH PLATELETS AND DIFFERENTIAL - Abnormal    WBC  Count 7.4      RBC Count 3.26 (*)     Hemoglobin 11.0 (*)     Hematocrit 33.6 (*)      (*)     MCH 33.7 (*)     MCHC 32.7      RDW 14.8      Platelet Count 235      % Neutrophils 51      % Lymphocytes 29      % Monocytes 9      % Eosinophils 10      % Basophils 1      % Immature Granulocytes 0      NRBCs per 100 WBC 0      Absolute Neutrophils 3.8      Absolute Lymphocytes 2.1      Absolute Monocytes 0.7      Absolute Eosinophils 0.8 (*)     Absolute Basophils 0.1      Absolute Immature Granulocytes 0.0      Absolute NRBCs 0.0         Imaging   CT Hip Right w/o Contrast   Final Result   IMPRESSION:   1.  The right hip is negative for fracture or CT evidence of avascular necrosis.   2.  Nondisplaced but minimally comminuted fracture of the right pubic body extends into the junction with the right superior and inferior pubic rami.   3.  Degenerative change at the right hip joint itself.   4.  No evidence for soft tissue mass or fluid collection.         CT Cervical Spine w/o Contrast   Final Result   IMPRESSION:   HEAD CT:   1.  No acute intracranial hemorrhage, extra-axial fluid collection, or mass effect.   2.  Age-indeterminate right basal ganglia infarct, potentially chronic.   3.  Brain atrophy and presumed chronic small vessel ischemic changes, as described.   4.  Right frontotemporal scalp hematoma without underlying calvarial fracture.      FACIAL BONE CT:   1.  No acute facial fracture.   2.  Right frontotemporal scalp soft tissue swelling, which may represent contusion.      CERVICAL SPINE CT:   1.  No acute cervical spine fracture.   2.  Multilevel degenerative changes, as described.   3.  Irregular soft tissue density in the left upper pulmonary lobe/apex, nonspecific. This could potentially represent scarring.      Pulmonary nodule recommendation: Proceed with a complete chest CT examination for further evaluation as early as possible (Per Fleischner Society guidelines).         CT Facial Bones  without Contrast   Final Result   IMPRESSION:   HEAD CT:   1.  No acute intracranial hemorrhage, extra-axial fluid collection, or mass effect.   2.  Age-indeterminate right basal ganglia infarct, potentially chronic.   3.  Brain atrophy and presumed chronic small vessel ischemic changes, as described.   4.  Right frontotemporal scalp hematoma without underlying calvarial fracture.      FACIAL BONE CT:   1.  No acute facial fracture.   2.  Right frontotemporal scalp soft tissue swelling, which may represent contusion.      CERVICAL SPINE CT:   1.  No acute cervical spine fracture.   2.  Multilevel degenerative changes, as described.   3.  Irregular soft tissue density in the left upper pulmonary lobe/apex, nonspecific. This could potentially represent scarring.      Pulmonary nodule recommendation: Proceed with a complete chest CT examination for further evaluation as early as possible (Per Fleischner Society guidelines).         Head CT w/o contrast   Final Result   IMPRESSION:   HEAD CT:   1.  No acute intracranial hemorrhage, extra-axial fluid collection, or mass effect.   2.  Age-indeterminate right basal ganglia infarct, potentially chronic.   3.  Brain atrophy and presumed chronic small vessel ischemic changes, as described.   4.  Right frontotemporal scalp hematoma without underlying calvarial fracture.      FACIAL BONE CT:   1.  No acute facial fracture.   2.  Right frontotemporal scalp soft tissue swelling, which may represent contusion.      CERVICAL SPINE CT:   1.  No acute cervical spine fracture.   2.  Multilevel degenerative changes, as described.   3.  Irregular soft tissue density in the left upper pulmonary lobe/apex, nonspecific. This could potentially represent scarring.      Pulmonary nodule recommendation: Proceed with a complete chest CT examination for further evaluation as early as possible (Per Fleischner Society guidelines).         XR Pelvis w Hip Right 1 View   Final Result   IMPRESSION:  Both hips negative for fracture. Pelvis negative for fracture. Aortic endograft.      XR Shoulder Left G/E 3 Views   Final Result   IMPRESSION: No acute fracture or malalignment. Mild glenohumeral and acromioclavicular joint degenerative changes. Osteopenia. Chronic lung changes. Aortic atherosclerosis.          Independent Interpretation   CT Head: No intracranial hemorrhage.  X-ray right hip shows no fracture  X-ray left shoulder shows no fracture    ED Course      Medications Administered   Medications   acetaminophen (TYLENOL) tablet 650 mg (650 mg Oral $Given 7/15/25 1101)   sodium chloride 0.9% BOLUS 500 mL (500 mLs Intravenous $New Bag 7/15/25 1359)   hydrALAZINE (APRESOLINE) tablet 25 mg (25 mg Oral $Given 7/15/25 1401)       Procedures   Procedures     Discussion of Management   Admitting Hospitalist, Monica FELIPE for Dr. Ryan.    ED Course   ED Course as of 07/15/25 1516   Tue Jul 15, 2025   1031 I obtained history and examined the patient as noted above.        Additional Documentation  None    Medical Decision Making / Diagnosis     CMS Diagnoses: None    MIPS   None      MDM   Maria Alejandra Buck is a 75 year old female on Plavix with PAD, CKD stage 4, COPD, and hypertension who presents for evaluation after mechanical fall at home last night.  She is afebrile, hypertensive at 187/126, nonhypoxic.  Physical examination reveals no focal neurological deficits.  There is a right sided scalp hematoma with right periorbital ecchymosis. Extraocular eye movements are normal.  There is also right hip pain suspicious for hip or pelvic fracture.  CT scan of the head  reveals a right-sided scalp hematoma consistent with exam.  No intracranial hemorrhage.  Cervical CT also shows no evidence of vertebral injury.  Facial CT negative for fractures.  Left x-ray of the shoulder reveals no fractures or dislocation.  Initial x-ray of the bilateral hips and pelvis reveals no fractures however patient unable to bear weight  and I am suspicious for occult pelvic or hip fracture.  CT scan of the right hip reveals evidence of a right pubic body fracture.  Screening labs today reveal likely acute kidney injury on top of chronic kidney disease and IV fluids started.  Screening EKG reveals no evidence of ACS.  Results reviewed with patient and family at bedside.  She is unable to bear weight and ambulate and so will be admitted to the hospital team for further evaluation of pelvic fracture. She remains hemodynamically stable and I ordered her usual dose of hydralazine to bring her blood pressure down. Patient and family comfortable with plan.  She is ultimately accepted for hospital service with Monica ARCOS PA-C for Dr. Ryan.    Disposition   The patient was admitted to the hospital.     Diagnosis     ICD-10-CM    1. Closed fracture of right pubis, unspecified portion of pubis, initial encounter (H)  S32.501A       2. Scalp hematoma, initial encounter  S00.03XA       3. Fall, initial encounter  W19.XXXA       4. Hypertension, unspecified type  I10            Discharge Medications   New Prescriptions    No medications on file         Scribe Disclosure:  I, Latanya Hoskins, am serving as a scribe at 10:21 AM on 7/15/2025 to document services personally performed by Lita Cagle PA-C based on my observations and the provider's statements to me.        Lita Cagle PA-C  07/15/25 [id number]

## 2025-07-15 NOTE — H&P
[facility]    Hospitalist History and Physical    Name: Maria Alejandra Buck    MRN: [id number]  YOB: 1950    Age: 75 year old  Date of Admission:  7/15/2025  Date of Service (when I saw the patient): 07/15/25    Assessment & Plan   Maria Alejandra Buck is a 75 year old female with PMH significant for CAD, CKD stage IV, hypertension, hyperlipidemia, intra-abdominal aortic aneurysm with prior stenting, chronic vertigo who presents to the ED on 7/15/2025 for evaluation of right hip pain after a fall.    Of note the patient is currently visiting from [place] and was recently admitted at [facility] in [place] from 6/5-6/17/2025 for hypertensive urgency, ROMI on CKD stage IIIa with recent renal biopsy per nephrology recommendations.    ED workup reveals: initial BP of 208/98, intermittently bradycardic into the 50s, CO2 of 21, BUN of 49, creatinine of 3.28, GFR 14, glucose of 114, hemoglobin of 11.0, EKG tracing showing rate of 61 bpm in SR with minimal voltage criteria for LVH, nonspecific ST abnormality, CT of head negative for acute intracranial apology, age-indeterminate right basal ganglia infarct, potentially chronic, brain atrophy and presumed chronic small vessel ischemic changes, right frontal temporal scalp hematoma, without acute fracture, CT cervical spine without acute fracture, irregular soft tissue density in the left upper pulmonary lobe, apex, nonspecific, could potentially represent scarring, CT of right hip negative for fracture, nondisplaced but minimally comminuted fracture of the right pubic body extended into the junction with the right superior and inferior pubic rami.     S/p fall  Closed head injury with right frontotemporal scalp hematoma  Right hip pain due to right pubic body fracture   W[facility] the [facility] when she slipped on her sandals and fell onto her right side hitting her face. Patient has  significant periorbital ecchymosis and frontotemporal hematoma.  CT of head and CT cervical spine negative for acute fracture.  Noted to have pain in right hip located in groin and lateral hip with palpation.  Patient has increased pain with any attempts at mobility.  CT of right hip shows right pubic body fracture that extends into the junction with the right superior and inferior pubic rami.  - Orthopedic surgery consult, suspect nonoperative management   - WBAT  - Pain control with scheduled Tylenol, lidocaine patch, as needed Robaxin, as needed Atarax, as needed low-dose oxycodone, and IV Dilaudid available for severe breakthrough pain  - PT consult   - SW consult to assist with discharge planning     CKD stage IV  Baseline creatinine had previously been 1.3, had previously gone up to 2.7 then down to 2.4 when admitted in May of 2025.  Has continued to trend upward and has been around 3.4 when admitted in 06/2025.  Patient follows with nephrology and during this most recent hospital stay had an ultrasound that was unremarkable and noted to have significant proteinuria with protein/creatinine ratio of 15, 525.  Patient was on Plavix for PVD which was held during her last hospital stay and she had a renal biopsy done on 6/16. Creatinine on 6/17 in care everywhere was 3.30  -unable to review results of recent renal biopsy, will see if patient's daughter has more information in regards to this  -creatinine near recent baseline at 3.28  -pending creatinine trend would consider nephrology consult here, otherwise patient reports follow up with provider while here in [place]     Hypertensive urgency  Per most recent discharge summary from [place] the patient is on carvedilol, amlodipine at baseline with addition of hydralazine during her last hospitalization.  Nephrology had recommended Imdur, hydralazine, nifedipine, and carvedilol.  Most recent renal Doppler on 5/21 did not show evidence of JACY.  Recent echocardiogram  while in [place] on 5/22 showed EF of 65% with grade 1 diastolic dysfunction.  Patient is again extremely hypertensive, suspect in relation to pain.   - per patient SBP has been 190-200 recently, she has not been telling her daughter due to not being told to come back into the hospital   - Continue PTA isordil dinitrate, hydralazine, nifedipine, and carvedilol   - consider further adjustments of antihypertensives pending BP trend overnight once pain improving     Chronic vertigo  Following with ENT in [place]. MRI brain on 5/22 negative for acute pathology.  -PRN meclizine     AAA  Endoleak and dilation with aorta proximal to endograft.  S/p recent endovascular aneurysm repair on 4/25.  - Continue PTA Plavix and statin    H/o CVA, chronic infarcts in right basal ganglia and right thalamus   Previously known to have chronic infarct based on MRI of brain in 05/2025. Chronic infarct in right basal ganglia again noted on CT of head on admission.     Right lung adenocarcinoma  S/p SBRT in 2021, continue outpatient follow-up once back in [place]    Cognitive impairment  During most recent hospitalization in June 2025 in [place] there was concern for patient having poor short-term memory as well as daughter noted concern that patient and her  are not safe to live at home.  Patient typically would come stay with her daughter for the summer in [place], but this may be a more permanent resident for them to be closer to family for more support.  -will further discuss support and disposition with daughter     Clinically Significant Risk Factors Present on Admission                 # Drug Induced Platelet Defect: home medication list includes an antiplatelet medication   # Hypertension: Noted on problem list                    DVT Prophylaxis: Pneumatic Compression Devices  Code Status: Full Code, discussed with patient  Disposition: Expected discharge in 24-48 hours pending pain control and stable kidney function, will  admit to observation     Primary Care Physician   Mckenna Wilkinson    Chief Complaint   S/p fall, right hip pain     History obtained from discussion with ED provider, Lita Cagle PA-C, chart review, and interview with patient which is limited due to being a poor historian.     History of Present Illness   Maria Alejandra Buck is a 75 year old female who presents after a fall with right hip pain.  Patient states that she typically visits [place] from June to September to get away from the [place] heat and due to previously living here.  She tends to be visiting her daughter.  Patient's daughter has recently been helping her set up her medications.  The patient is staying at an extended stay hotel in [place] and today was going to get her laundry out of the dryer when she opened the door and slipped on the sandals she was wearing onto her right side and hitting the right side of her face.  She states he had a difficult time crawling due to pain in her right groin and buttocks.  Denies any pain on her left side or associated numbness or tingling into her right leg.  Denies any headache, nausea, vomiting, or chest pain.  She does note chronic dizziness for the last 4 months.  She notes shortness of breath and weakness in her legs with walking an extended period of time from what she believes to be deconditioning due to recent hospital stays the last couple of months.  She does state that she will have intermittent nausea with an episode of emesis after eating.  She does have a history of acid reflux for which she is on Pepcid.  She does note recently that her blood pressure has been higher at home with her systolic pressure being greater than 200, but patient has been fearful to tell her daughter due to concern that she would have her come in for evaluation.    Past Medical History    Past Medical History:   Diagnosis Date    Aneurysm of renal artery     left    Atrial fibrillation (H)     COPD (chronic obstructive  pulmonary disease) (H)     High cholesterol     Hypertension     Mixed hyperlipidemia     PVD (peripheral vascular disease)     Stenosis of left carotid artery     Tobacco use disorder      Past Surgical History   Past Surgical History:   Procedure Laterality Date    ABDOMEN SURGERY      aneurism      left femoral, mesh    ENDARTERECTOMY CAROTID Left 7/17/2018    Procedure: ENDARTERECTOMY CAROTID;  LEFT CAROTID ENDARTERECTOMY WITH EEG;  Surgeon: Jorge Posada MD;  Location: [facility] OR    GENITOURINARY SURGERY      stent rt kidney    IR RENAL BIOPSY LEFT  6/16/2025     Prior to Admission Medications   Prior to Admission Medications   Prescriptions Last Dose Informant Patient Reported? Taking?   LORATADINE PO  Self Yes Yes   Sig: Take 10 mg by mouth daily as needed    Multiple Vitamins-Minerals (MULTIVITAMIN WOMEN PO) 7/14/2025  Yes Yes   Sig: Take 1 tablet by mouth daily.   NIFEdipine ER OSMOTIC (PROCARDIA XL) 30 MG 24 hr tablet 7/15/2025 Morning  Yes Yes   Sig: Take 30 mg by mouth daily.   PAROXETINE HCL PO 7/15/2025 Morning Self Yes Yes   Sig: Take 40 mg by mouth daily    UNKNOWN TO PATIENT 7/15/2025 Morning  Yes Yes   Sig: Take 1 tablet by mouth daily. A vitamin for dizziness - they can't remember name of vitamin   acetaminophen (TYLENOL) 325 MG tablet 7/14/2025  Yes Yes   Sig: Take 325-650 mg by mouth every 6 hours as needed for mild pain.   atorvastatin (LIPITOR) 80 MG tablet 7/14/2025 Evening  No Yes   Sig: Take 1 tablet (80 mg) by mouth every evening   carvedilol (COREG) 25 MG tablet 7/15/2025 Morning  Yes Yes   Sig: Take 12.5 mg by mouth 2 times daily (with meals).   clopidogrel (PLAVIX) 75 MG tablet 7/15/2025 Morning  Yes Yes   Sig: Take 75 mg by mouth daily.   diphenhydrAMINE-acetaminophen (TYLENOL PM)  MG tablet 7/14/2025 Evening  Yes Yes   Sig: Take 1 tablet by mouth nightly as needed for sleep.   famotidine (PEPCID) 40 MG tablet 7/15/2025 Morning  Yes Yes   Sig: Take 40 mg by mouth daily.    hydrALAZINE (APRESOLINE) 25 MG tablet 7/15/2025 Morning  Yes Yes   Sig: Take 25 mg by mouth 3 times daily.   icosapent ethyl (VASCEPA) 1 g CAPS capsule 7/15/2025 Morning  Yes Yes   Sig: Take 2 g by mouth 2 times daily (with meals).   isosorbide dinitrate (ISORDIL) 20 MG tablet 7/15/2025 Morning  Yes Yes   Sig: Take 20 mg by mouth 3 times daily.      Facility-Administered Medications: None     Allergies   Allergies   Allergen Reactions    Iodine Hives       Social History   Social History     Tobacco Use    Smoking status: Every Day     Current packs/day: 0.50     Average packs/day: 0.5 packs/day for 45.0 years (22.5 ttl pk-yrs)     Types: Cigarettes    Smokeless tobacco: Never   Substance Use Topics    Alcohol use: Yes     Comment: social     Social History     Social History Narrative    Not on file     Family History   Family history reviewed with patient and is noncontributory.    Review of Systems   A Comprehensive greater than 10 system review of systems was carried out.  Pertinent positives and negatives are noted above.  Otherwise negative for contributory information.    Physical Exam   Temp: 98.7  F (37.1  C) Temp src: Oral BP: (!) 190/105 Pulse: 62   Resp: 20 SpO2: 98 % O2 Device: None (Room air)    Vital Signs with Ranges  Temp:  [98.1  F (36.7  C)-98.7  F (37.1  C)] 98.7  F (37.1  C)  Pulse:  [59-71] 62  Resp:  [18-20] 20  BP: (186-208)/() 190/105  SpO2:  [95 %-98 %] 98 %  0 lbs 0 oz    GEN:  Alert, oriented x 3, appears comfortable laying on bed, no overt distress  HEENT:  Normocephalic, hematoma to right forehead, periorbital ecchymosis, no scleral icterus, no nasal discharge, mouth moist.  CV:  Regular rate and rhythm, no murmur or JVD.  S1 + S2 noted, no S3 or S4.  LUNGS:  Clear to auscultation bilaterally without rales/rhonchi/wheezing/retractions.  Symmetric chest rise on inhalation noted.  ABD:  Active bowel sounds, soft, non-tender/non-distended.  No rebound/guarding/rigidity.  EXT: mild  bilateral LE pitting edema.  No cyanosis.  No acute joint synovitis noted. Tenderness over right groin and lateral hip with palpation   SKIN:  Dry to touch, no exanthems noted in the visualized areas.  NEURO: slightly decreased strength in bilateral LEs otherwise intact in UEs, sensation to touch grossly intact.  Coordination symmetric on general exam.  No new focal deficits appreciated.    Data   Data reviewed today:  I personally reviewed the EKG tracing showing rate of 61 bpm in SR with minimal voltage criteria for LVH, nonspecific ST abnormality.    Results for orders placed or performed during the hospital encounter of 07/15/25   Head CT w/o contrast     Status: None    Narrative    EXAM: CT HEAD W/O CONTRAST, CT FACIAL BONES WITHOUT CONTRAST, CT CERVICAL SPINE W/O CONTRAST  LOCATION: [facility]  DATE: 7/15/2025    INDICATION: Fall, right scalp hematoma  COMPARISON: None.  TECHNIQUE:   1) Routine CT Head without IV contrast. Multiplanar reformats. Dose reduction techniques were used.  2) Routine CT Facial Bones without IV contrast. Multiplanar reformats. Dose reduction techniques were used.  3) Routine CT Cervical Spine without IV contrast. Multiplanar reformats. Dose reduction techniques were used.    FINDINGS:  HEAD CT:   INTRACRANIAL CONTENTS: The ventricles appear proportionate to the sulci. There is a right basal ganglia infarct, age indeterminate, but potentially chronic. Mild to moderate patchy nonspecific hypoattenuation in the cerebral white matter, likely due to   chronic small vessel ischemic disease. Mild to moderate generalized brain parenchymal volume loss. No hyperdense acute intracranial hemorrhage, extra-axial fluid collection, or mass effect. Atherosclerotic calcifications primarily involving the carotid   siphons.    OSSEOUS STRUCTURES/SOFT TISSUES: Focal right frontotemporal scalp hematoma measuring approximately 12-13 mm with adjacent superficial soft tissue swelling  of the right scalp. No underlying displaced calvarial fracture is appreciated.     FACIAL BONE CT:  The pterygoid plates are intact. The bilateral zygomatic arches, sphenotemporal buttresses, the walls of both orbits, and the walls of the maxillary sinuses appear intact. No displaced nasal arch or nasal septal fracture is identified. The anterior skull   base appears to be intact. The mandible appears intact. There appears to be mild symmetric anterior subluxation of the mandibular condyles with respect to the glenoid fossae, which may be positional.    Right frontotemporal scalp soft tissue swelling, which may represent soft tissue contusion. Changes of bilateral cataract surgery are noted. The post septal intraorbital soft tissue structures otherwise appear unremarkable. The paranasal sinuses are free   of significant disease. The mastoid and middle ear cavities appear grossly clear.    CERVICAL SPINE CT:   VERTEBRA: No acute cervical spine fracture identified. Vertebral body heights are normal. Straightening of the normal cervical lordosis. Minimal anterolisthesis of C4 on C5. Minimal broad levoconvex curvature centered at the C7 level.     CANAL/FORAMINA: Moderate to severe C6-7 disc space narrowing and moderate appearing disc space narrowing at C3-4 and C5-6 with mild disc space narrowing elsewhere. Scattered marginal endplate and uncovertebral spurs. Multilevel degenerative facet   disease. Mild degenerative change of the median atlantoaxial joint. Multilevel disc osteophyte complexes/disc bulges. There appears to be up to moderate central spinal canal stenosis at the C3-4 and C5-6 levels with some flattening of the normal spinal   cord contour at those levels. There is at least moderate neural foraminal stenosis on the left at C3-C4 with at least mild to moderate bordering on moderate left C4-5 and right C5-6 neural foraminal stenosis. Milder degrees of foraminal narrowing   elsewhere.    PARASPINAL: Right  greater than left carotid bifurcation atherosclerotic calcification. There is some irregular soft tissue density in the left upper pulmonary lobe/apex, nonspecific, possibly representing scarring. One of these lesions measures up to   approximately 11-12 mm (series 4 image 84). The prevertebral soft tissues otherwise appear unremarkable.       Impression    IMPRESSION:  HEAD CT:  1.  No acute intracranial hemorrhage, extra-axial fluid collection, or mass effect.  2.  Age-indeterminate right basal ganglia infarct, potentially chronic.  3.  Brain atrophy and presumed chronic small vessel ischemic changes, as described.  4.  Right frontotemporal scalp hematoma without underlying calvarial fracture.    FACIAL BONE CT:  1.  No acute facial fracture.  2.  Right frontotemporal scalp soft tissue swelling, which may represent contusion.    CERVICAL SPINE CT:  1.  No acute cervical spine fracture.  2.  Multilevel degenerative changes, as described.  3.  Irregular soft tissue density in the left upper pulmonary lobe/apex, nonspecific. This could potentially represent scarring.    Pulmonary nodule recommendation: Proceed with a complete chest CT examination for further evaluation as early as possible (Per Fleischner Society guidelines).     CT Facial Bones without Contrast     Status: None    Narrative    EXAM: CT HEAD W/O CONTRAST, CT FACIAL BONES WITHOUT CONTRAST, CT CERVICAL SPINE W/O CONTRAST  LOCATION: [facility]  DATE: 7/15/2025    INDICATION: Fall, right scalp hematoma  COMPARISON: None.  TECHNIQUE:   1) Routine CT Head without IV contrast. Multiplanar reformats. Dose reduction techniques were used.  2) Routine CT Facial Bones without IV contrast. Multiplanar reformats. Dose reduction techniques were used.  3) Routine CT Cervical Spine without IV contrast. Multiplanar reformats. Dose reduction techniques were used.    FINDINGS:  HEAD CT:   INTRACRANIAL CONTENTS: The ventricles appear proportionate  to the sulci. There is a right basal ganglia infarct, age indeterminate, but potentially chronic. Mild to moderate patchy nonspecific hypoattenuation in the cerebral white matter, likely due to   chronic small vessel ischemic disease. Mild to moderate generalized brain parenchymal volume loss. No hyperdense acute intracranial hemorrhage, extra-axial fluid collection, or mass effect. Atherosclerotic calcifications primarily involving the carotid   siphons.    OSSEOUS STRUCTURES/SOFT TISSUES: Focal right frontotemporal scalp hematoma measuring approximately 12-13 mm with adjacent superficial soft tissue swelling of the right scalp. No underlying displaced calvarial fracture is appreciated.     FACIAL BONE CT:  The pterygoid plates are intact. The bilateral zygomatic arches, sphenotemporal buttresses, the walls of both orbits, and the walls of the maxillary sinuses appear intact. No displaced nasal arch or nasal septal fracture is identified. The anterior skull   base appears to be intact. The mandible appears intact. There appears to be mild symmetric anterior subluxation of the mandibular condyles with respect to the glenoid fossae, which may be positional.    Right frontotemporal scalp soft tissue swelling, which may represent soft tissue contusion. Changes of bilateral cataract surgery are noted. The post septal intraorbital soft tissue structures otherwise appear unremarkable. The paranasal sinuses are free   of significant disease. The mastoid and middle ear cavities appear grossly clear.    CERVICAL SPINE CT:   VERTEBRA: No acute cervical spine fracture identified. Vertebral body heights are normal. Straightening of the normal cervical lordosis. Minimal anterolisthesis of C4 on C5. Minimal broad levoconvex curvature centered at the C7 level.     CANAL/FORAMINA: Moderate to severe C6-7 disc space narrowing and moderate appearing disc space narrowing at C3-4 and C5-6 with mild disc space narrowing elsewhere.  Scattered marginal endplate and uncovertebral spurs. Multilevel degenerative facet   disease. Mild degenerative change of the median atlantoaxial joint. Multilevel disc osteophyte complexes/disc bulges. There appears to be up to moderate central spinal canal stenosis at the C3-4 and C5-6 levels with some flattening of the normal spinal   cord contour at those levels. There is at least moderate neural foraminal stenosis on the left at C3-C4 with at least mild to moderate bordering on moderate left C4-5 and right C5-6 neural foraminal stenosis. Milder degrees of foraminal narrowing   elsewhere.    PARASPINAL: Right greater than left carotid bifurcation atherosclerotic calcification. There is some irregular soft tissue density in the left upper pulmonary lobe/apex, nonspecific, possibly representing scarring. One of these lesions measures up to   approximately 11-12 mm (series 4 image 84). The prevertebral soft tissues otherwise appear unremarkable.       Impression    IMPRESSION:  HEAD CT:  1.  No acute intracranial hemorrhage, extra-axial fluid collection, or mass effect.  2.  Age-indeterminate right basal ganglia infarct, potentially chronic.  3.  Brain atrophy and presumed chronic small vessel ischemic changes, as described.  4.  Right frontotemporal scalp hematoma without underlying calvarial fracture.    FACIAL BONE CT:  1.  No acute facial fracture.  2.  Right frontotemporal scalp soft tissue swelling, which may represent contusion.    CERVICAL SPINE CT:  1.  No acute cervical spine fracture.  2.  Multilevel degenerative changes, as described.  3.  Irregular soft tissue density in the left upper pulmonary lobe/apex, nonspecific. This could potentially represent scarring.    Pulmonary nodule recommendation: Proceed with a complete chest CT examination for further evaluation as early as possible (Per Fleischner Society guidelines).     CT Cervical Spine w/o Contrast     Status: None    Narrative    EXAM: CT HEAD  W/O CONTRAST, CT FACIAL BONES WITHOUT CONTRAST, CT CERVICAL SPINE W/O CONTRAST  LOCATION: [facility]  DATE: 7/15/2025    INDICATION: Fall, right scalp hematoma  COMPARISON: None.  TECHNIQUE:   1) Routine CT Head without IV contrast. Multiplanar reformats. Dose reduction techniques were used.  2) Routine CT Facial Bones without IV contrast. Multiplanar reformats. Dose reduction techniques were used.  3) Routine CT Cervical Spine without IV contrast. Multiplanar reformats. Dose reduction techniques were used.    FINDINGS:  HEAD CT:   INTRACRANIAL CONTENTS: The ventricles appear proportionate to the sulci. There is a right basal ganglia infarct, age indeterminate, but potentially chronic. Mild to moderate patchy nonspecific hypoattenuation in the cerebral white matter, likely due to   chronic small vessel ischemic disease. Mild to moderate generalized brain parenchymal volume loss. No hyperdense acute intracranial hemorrhage, extra-axial fluid collection, or mass effect. Atherosclerotic calcifications primarily involving the carotid   siphons.    OSSEOUS STRUCTURES/SOFT TISSUES: Focal right frontotemporal scalp hematoma measuring approximately 12-13 mm with adjacent superficial soft tissue swelling of the right scalp. No underlying displaced calvarial fracture is appreciated.     FACIAL BONE CT:  The pterygoid plates are intact. The bilateral zygomatic arches, sphenotemporal buttresses, the walls of both orbits, and the walls of the maxillary sinuses appear intact. No displaced nasal arch or nasal septal fracture is identified. The anterior skull   base appears to be intact. The mandible appears intact. There appears to be mild symmetric anterior subluxation of the mandibular condyles with respect to the glenoid fossae, which may be positional.    Right frontotemporal scalp soft tissue swelling, which may represent soft tissue contusion. Changes of bilateral cataract surgery are noted. The post  septal intraorbital soft tissue structures otherwise appear unremarkable. The paranasal sinuses are free   of significant disease. The mastoid and middle ear cavities appear grossly clear.    CERVICAL SPINE CT:   VERTEBRA: No acute cervical spine fracture identified. Vertebral body heights are normal. Straightening of the normal cervical lordosis. Minimal anterolisthesis of C4 on C5. Minimal broad levoconvex curvature centered at the C7 level.     CANAL/FORAMINA: Moderate to severe C6-7 disc space narrowing and moderate appearing disc space narrowing at C3-4 and C5-6 with mild disc space narrowing elsewhere. Scattered marginal endplate and uncovertebral spurs. Multilevel degenerative facet   disease. Mild degenerative change of the median atlantoaxial joint. Multilevel disc osteophyte complexes/disc bulges. There appears to be up to moderate central spinal canal stenosis at the C3-4 and C5-6 levels with some flattening of the normal spinal   cord contour at those levels. There is at least moderate neural foraminal stenosis on the left at C3-C4 with at least mild to moderate bordering on moderate left C4-5 and right C5-6 neural foraminal stenosis. Milder degrees of foraminal narrowing   elsewhere.    PARASPINAL: Right greater than left carotid bifurcation atherosclerotic calcification. There is some irregular soft tissue density in the left upper pulmonary lobe/apex, nonspecific, possibly representing scarring. One of these lesions measures up to   approximately 11-12 mm (series 4 image 84). The prevertebral soft tissues otherwise appear unremarkable.       Impression    IMPRESSION:  HEAD CT:  1.  No acute intracranial hemorrhage, extra-axial fluid collection, or mass effect.  2.  Age-indeterminate right basal ganglia infarct, potentially chronic.  3.  Brain atrophy and presumed chronic small vessel ischemic changes, as described.  4.  Right frontotemporal scalp hematoma without underlying calvarial  fracture.    FACIAL BONE CT:  1.  No acute facial fracture.  2.  Right frontotemporal scalp soft tissue swelling, which may represent contusion.    CERVICAL SPINE CT:  1.  No acute cervical spine fracture.  2.  Multilevel degenerative changes, as described.  3.  Irregular soft tissue density in the left upper pulmonary lobe/apex, nonspecific. This could potentially represent scarring.    Pulmonary nodule recommendation: Proceed with a complete chest CT examination for further evaluation as early as possible (Per Fleischner Society guidelines).     XR Shoulder Left G/E 3 Views     Status: None    Narrative    EXAM: XR SHOULDER LEFT G/E 3 VIEWS  LOCATION: [facility]  DATE: 7/15/2025    INDICATION: fall, proximal humerus pain  COMPARISON: None.      Impression    IMPRESSION: No acute fracture or malalignment. Mild glenohumeral and acromioclavicular joint degenerative changes. Osteopenia. Chronic lung changes. Aortic atherosclerosis.   XR Pelvis w Hip Right 1 View     Status: None    Narrative    EXAM: XR PELVIS AND HIP RIGHT 1 VIEW  LOCATION: [facility]  DATE: 7/15/2025    INDICATION: fall, suspected right hip fracture  COMPARISON: None.      Impression    IMPRESSION: Both hips negative for fracture. Pelvis negative for fracture. Aortic endograft.   CT Hip Right w/o Contrast     Status: None    Narrative    EXAM: CT HIP RIGHT W/O CONTRAST  LOCATION: [facility]  DATE: 7/15/2025    INDICATION: right hip pain, negative x ray  COMPARISON: None.  TECHNIQUE: Noncontrast. Axial, sagittal and coronal thin-section reconstruction. Dose reduction techniques were used.     FINDINGS:     BONES:  -The right hip is negative for fracture or CT evidence of avascular necrosis. There is a nondisplaced fracture of the right pubic body which extends into the junction with the right-sided pubic rami. There is degenerative change at the right hip joint.   The visualized  portion of the bony pelvis is otherwise negative.    SOFT TISSUES:  -Normal.      Impression    IMPRESSION:  1.  The right hip is negative for fracture or CT evidence of avascular necrosis.  2.  Nondisplaced but minimally comminuted fracture of the right pubic body extends into the junction with the right superior and inferior pubic rami.  3.  Degenerative change at the right hip joint itself.  4.  No evidence for soft tissue mass or fluid collection.     Basic metabolic panel (BMP)     Status: Abnormal   Result Value Ref Range    Sodium 141 135 - 145 mmol/L    Potassium 4.0 3.4 - 5.3 mmol/L    Chloride 105 98 - 107 mmol/L    Carbon Dioxide (CO2) 21 (L) 22 - 29 mmol/L    Anion Gap 15 7 - 15 mmol/L    Urea Nitrogen 49.0 (H) 8.0 - 23.0 mg/dL    Creatinine 3.28 (H) 0.51 - 0.95 mg/dL    GFR Estimate 14 (L) >60 mL/min/1.73m2    Calcium 9.0 8.8 - 10.4 mg/dL    Glucose 114 (H) 70 - 99 mg/dL   Burns Draw     Status: None    Narrative    The following orders were created for panel order Burns Draw.  Procedure                               Abnormality         Status                     ---------                               -----------         ------                     Extra Blue Top Tube[[id number]]                             Final result               Extra Red Top Tube[[id number]]                              Final result                 Please view results for these tests on the individual orders.   CBC with platelets and differential     Status: Abnormal   Result Value Ref Range    WBC Count 7.4 4.0 - 11.0 10e3/uL    RBC Count 3.26 (L) 3.80 - 5.20 10e6/uL    Hemoglobin 11.0 (L) 11.7 - 15.7 g/dL    Hematocrit 33.6 (L) 35.0 - 47.0 %     (H) 78 - 100 fL    MCH 33.7 (H) 26.5 - 33.0 pg    MCHC 32.7 31.5 - 36.5 g/dL    RDW 14.8 10.0 - 15.0 %    Platelet Count 235 150 - 450 10e3/uL    % Neutrophils 51 %    % Lymphocytes 29 %    % Monocytes 9 %    % Eosinophils 10 %    % Basophils 1 %    % Immature Granulocytes 0 %     NRBCs per 100 WBC 0 <1 /100    Absolute Neutrophils 3.8 1.6 - 8.3 10e3/uL    Absolute Lymphocytes 2.1 0.8 - 5.3 10e3/uL    Absolute Monocytes 0.7 0.0 - 1.3 10e3/uL    Absolute Eosinophils 0.8 (H) 0.0 - 0.7 10e3/uL    Absolute Basophils 0.1 0.0 - 0.2 10e3/uL    Absolute Immature Granulocytes 0.0 <=0.4 10e3/uL    Absolute NRBCs 0.0 10e3/uL   Extra Blue Top Tube     Status: None   Result Value Ref Range    Hold Specimen JIC    Extra Red Top Tube     Status: None   Result Value Ref Range    Hold Specimen JIC    EKG 12 lead     Status: None   Result Value Ref Range    Systolic Blood Pressure  mmHg    Diastolic Blood Pressure  mmHg    Ventricular Rate 61 BPM    Atrial Rate 61 BPM    HI Interval 190 ms    QRS Duration 62 ms     ms    QTc 446 ms    P Axis 31 degrees    R AXIS -13 degrees    T Axis -17 degrees    Interpretation ECG       Sinus rhythm  Minimal voltage criteria for LVH, may be normal variant ( R in aVL )  Nonspecific ST abnormality  Abnormal ECG  When compared with ECG of 17-Jul-2018 07:07,  ST now depressed in Inferior leads  T wave inversion now evident in Inferior leads  Unconfirmed report - interpretation of this ECG is computer generated - see medical record for final interpretation  Confirmed by - EMERGENCY ROOM, PHYSICIAN ([id number]),  Rafi Queen ([id number]) on 7/15/2025 1:17:31 PM     CBC with differential     Status: Abnormal    Narrative    The following orders were created for panel order CBC with differential.  Procedure                               Abnormality         Status                     ---------                               -----------         ------                     CBC with platelets and ...[[id number]]  Abnormal            Final result                 Please view results for these tests on the individual orders.     Orly King PA-C  [facility]  Securely message with the Vocera Web Console (learn more here)  Text page via Eko  Paging/Directory

## 2025-07-15 NOTE — PHARMACY-ADMISSION MEDICATION HISTORY
Pharmacist Admission Medication History    Admission medication history is complete. The information provided in this note is only as accurate as the sources available at the time of the update.    Information Source(s): Patient, Family member, and CareEverywhere/SureScripts via in-person and phone    Pertinent Information: daughter had list and sets up meds  spoke with by phone    Changes made to PTA medication list:  Added: hydralazine, Icosapent, coreg, tylenol pm, plavix, famotidine, isosorbide, Nifedipine, unknown vitamin  Deleted: Doxycyline (old rx), lisinopril 20 mg daily, Nicotine patches, omeprazole 20 mg, probiotic  Changed: tylenol to regular strength, mvi    Allergies reviewed with patient and updates made in EHR: yes    Medication History Completed By: Asmita Szymanski [facility] 7/15/2025 2:32 PM    PTA Med List   Medication Sig Last Dose/Taking    acetaminophen (TYLENOL) 325 MG tablet Take 325-650 mg by mouth every 6 hours as needed for mild pain. 7/14/2025    atorvastatin (LIPITOR) 80 MG tablet Take 1 tablet (80 mg) by mouth every evening 7/14/2025 Evening    carvedilol (COREG) 25 MG tablet Take 12.5 mg by mouth 2 times daily (with meals). 7/15/2025 Morning    clopidogrel (PLAVIX) 75 MG tablet Take 75 mg by mouth daily. 7/15/2025 Morning    diphenhydrAMINE-acetaminophen (TYLENOL PM)  MG tablet Take 1 tablet by mouth nightly as needed for sleep. 7/14/2025 Evening    famotidine (PEPCID) 40 MG tablet Take 40 mg by mouth daily. 7/15/2025 Morning    hydrALAZINE (APRESOLINE) 25 MG tablet Take 25 mg by mouth 3 times daily. 7/15/2025 Morning    icosapent ethyl (VASCEPA) 1 g CAPS capsule Take 2 g by mouth 2 times daily (with meals). 7/15/2025 Morning    isosorbide dinitrate (ISORDIL) 20 MG tablet Take 20 mg by mouth 3 times daily. 7/15/2025 Morning    LORATADINE PO Take 10 mg by mouth daily as needed  Taking As Needed    Multiple Vitamins-Minerals (MULTIVITAMIN WOMEN PO) Take 1 tablet by mouth daily.  7/14/2025    NIFEdipine ER OSMOTIC (PROCARDIA XL) 30 MG 24 hr tablet Take 30 mg by mouth daily. 7/15/2025 Morning    PAROXETINE HCL PO Take 40 mg by mouth daily  7/15/2025 Morning    UNKNOWN TO PATIENT Take 1 tablet by mouth daily. A vitamin for dizziness - they can't remember name of vitamin 7/15/2025 Morning

## 2025-07-15 NOTE — ED TRIAGE NOTES
Patient states she tripped in her laundry room last night causing her to fall on a tile floor. Patient reports pain in her right groin, buttock, low back. Patient also has brusing to her face.      Triage Assessment (Adult)       Row Name 07/15/25 1002          Triage Assessment    Airway WDL WDL        Respiratory WDL    Respiratory WDL WDL        Peripheral/Neurovascular WDL    Peripheral Neurovascular WDL WDL

## 2025-07-15 NOTE — PLAN OF CARE
"Goal Outcome Evaluation:    Acute Traumatic Pain     1.  Pain controlled with oral analgesia: No      2.  Vital signs stable: No      3.  Diagnotic testing complete: No      4.  Cleared from consultants (if applicable): No      5. Return to near baseline physical activity: No    Hypertensive restarted scheduled BP medications; administered Hydralazine early.  PRN available. Received PRN IV Dilaudid x 1; Oxycodone PO x 1 and scheduled Tylenol.  Pain titrated down from 10/10 to 6/10. Daughter reports pt is on a renal diet at home with potassium/protein restrictions and would like her diet changed--sticky note to MD--okay to address in AM per daughter.         Plan of Care Reviewed With: patient    Overall Patient Progress: no changeOverall Patient Progress: no change    Outcome Evaluation: Orthopedic consult; pain control.          Problem: Adult Inpatient Plan of Care  Goal: Plan of Care Review  Description: The Plan of Care Review/Shift note should be completed every shift.  The Outcome Evaluation is a brief statement about your assessment that the patient is improving, declining, or no change.  This information will be displayed automatically on your shift  note.  Outcome: Progressing  Flowsheets (Taken 7/15/2025 1811)  Outcome Evaluation:   Orthopedic consult   pain control.  Plan of Care Reviewed With: patient  Overall Patient Progress: no change  Goal: Patient-Specific Goal (Individualized)  Description: You can add care plan individualizations to a care plan. Examples of Individualization might be:  \"Parent requests to be called daily at 9am for status\", \"I have a hard time hearing out of my right ear\", or \"Do not touch me to wake me up as it startles  me\".  Outcome: Progressing  Goal: Absence of Hospital-Acquired Illness or Injury  Outcome: Progressing  Intervention: Prevent Skin Injury  Recent Flowsheet Documentation  Taken 7/15/2025 6980 by Quin Gardiner RN  Body Position:   position changed " independently   weight shifting  Intervention: Prevent and Manage VTE (Venous Thromboembolism) Risk  Recent Flowsheet Documentation  Taken 7/15/2025 1529 by Quin Gardiner RN  VTE Prevention/Management: patient refused intervention  Intervention: Prevent Infection  Recent Flowsheet Documentation  Taken 7/15/2025 1529 by Quin Gardiner RN  Infection Prevention:   hand hygiene promoted   personal protective equipment utilized   rest/sleep promoted   single patient room provided  Goal: Optimal Comfort and Wellbeing  Outcome: Progressing  Intervention: Monitor Pain and Promote Comfort  Recent Flowsheet Documentation  Taken 7/15/2025 1728 by Quin Gardiner RN  Pain Management Interventions: medication (see MAR)  Taken 7/15/2025 1632 by Quin Gardiner RN  Pain Management Interventions: medication (see MAR)  Taken 7/15/2025 1529 by Quin Gardiner RN  Pain Management Interventions: (no orders) MD notified (comment)  Goal: Readiness for Transition of Care  Outcome: Progressing  Intervention: Mutually Develop Transition Plan  Recent Flowsheet Documentation  Taken 7/15/2025 1541 by Quin Gardiner RN  Equipment Currently Used at Home: (pt reports supposed to use a walker) none     Problem: Delirium  Goal: Optimal Coping  Outcome: Progressing  Goal: Improved Behavioral Control  Outcome: Progressing  Goal: Improved Attention and Thought Clarity  Outcome: Progressing  Goal: Improved Sleep  Outcome: Progressing     Problem: Pain Acute  Goal: Optimal Pain Control and Function  Outcome: Progressing  Intervention: Develop Pain Management Plan  Recent Flowsheet Documentation  Taken 7/15/2025 1728 by Quin Gardiner RN  Pain Management Interventions: medication (see MAR)  Taken 7/15/2025 1632 by Quin Gardiner RN  Pain Management Interventions: medication (see MAR)  Taken 7/15/2025 1529 by Quin Gardiner RN  Pain Management Interventions: (no orders) MD notified (comment)

## 2025-07-15 NOTE — ED NOTES
[facility]  ED Nurse Handoff Report    ED Chief complaint: Fall  . ED Diagnosis:   Final diagnoses:   Closed fracture of right pubis, unspecified portion of pubis, initial encounter (H)   Scalp hematoma, initial encounter   Fall, initial encounter   Hypertension, unspecified type       Allergies:   Allergies   Allergen Reactions    Iodine Hives       Code Status: Full Code    Activity level - Baseline/Home:  independent.  Activity Level - Current:   in bed.   Lift room needed: No.   Bariatric: No   Needed: No   Isolation: No.   Infection: Not Applicable.     Respiratory status: Room air    Vital Signs (within 30 minutes):   Vitals:    07/15/25 1004   BP: (!) 208/98   Pulse: 71   Resp: 18   Temp: 98.1  F (36.7  C)   SpO2: 98%       Cardiac Rhythm:  ,      Pain level:    Patient confused: No.   Patient Falls Risk: nonskid shoes/slippers when out of bed and patient and family education.   Elimination Status: has not voided this shift     Patient Report - Initial Complaint: Fall.   Focused Assessment: per provider note:   Maria Alejandra Buck is a 75 year old female on Plavix with a history of PAD, CKD stage 4, COPD, AAA, hyperlipidemia, and hypertension here for evaluation of fall. The patient states that last night she tripped on her flip flops in the laundry room at an extended stay in [place] and landed on her face on tile floor. She reports having pain in her right groin, hip, buttock, and lower back. She also has left shoulder pain. She also has bruising to the right side face. She was unable to get up after the incident and she is able to bear weight, but not ambulate. She has tried using ice which has helped with her swelling to her face. No loss of consciousness. No headache. No chest pain. No shortness of breath. No dizziness when the fall occurred. No dental injury. No head pain. No pain when taking a deep breath. No neck pain. No abdominal pain. No hematuria. No changes in  urination. No missed dosage of Plavix. No history of a broken hip or hip surgery. No alcohol use. She notes that she has been having vertigo for the past 4 months. She also has been hospitalized in [place] 2 times recently     Abnormal Results:   Labs Ordered and Resulted from Time of ED Arrival to Time of ED Departure   BASIC METABOLIC PANEL - Abnormal       Result Value    Sodium 141      Potassium 4.0      Chloride 105      Carbon Dioxide (CO2) 21 (*)     Anion Gap 15      Urea Nitrogen 49.0 (*)     Creatinine 3.28 (*)     GFR Estimate 14 (*)     Calcium 9.0      Glucose 114 (*)    CBC WITH PLATELETS AND DIFFERENTIAL - Abnormal    WBC Count 7.4      RBC Count 3.26 (*)     Hemoglobin 11.0 (*)     Hematocrit 33.6 (*)      (*)     MCH 33.7 (*)     MCHC 32.7      RDW 14.8      Platelet Count 235      % Neutrophils 51      % Lymphocytes 29      % Monocytes 9      % Eosinophils 10      % Basophils 1      % Immature Granulocytes 0      NRBCs per 100 WBC 0      Absolute Neutrophils 3.8      Absolute Lymphocytes 2.1      Absolute Monocytes 0.7      Absolute Eosinophils 0.8 (*)     Absolute Basophils 0.1      Absolute Immature Granulocytes 0.0      Absolute NRBCs 0.0          CT Hip Right w/o Contrast   Final Result   IMPRESSION:   1.  The right hip is negative for fracture or CT evidence of avascular necrosis.   2.  Nondisplaced but minimally comminuted fracture of the right pubic body extends into the junction with the right superior and inferior pubic rami.   3.  Degenerative change at the right hip joint itself.   4.  No evidence for soft tissue mass or fluid collection.         CT Cervical Spine w/o Contrast   Final Result   IMPRESSION:   HEAD CT:   1.  No acute intracranial hemorrhage, extra-axial fluid collection, or mass effect.   2.  Age-indeterminate right basal ganglia infarct, potentially chronic.   3.  Brain atrophy and presumed chronic small vessel ischemic changes, as described.   4.  Right  frontotemporal scalp hematoma without underlying calvarial fracture.      FACIAL BONE CT:   1.  No acute facial fracture.   2.  Right frontotemporal scalp soft tissue swelling, which may represent contusion.      CERVICAL SPINE CT:   1.  No acute cervical spine fracture.   2.  Multilevel degenerative changes, as described.   3.  Irregular soft tissue density in the left upper pulmonary lobe/apex, nonspecific. This could potentially represent scarring.      Pulmonary nodule recommendation: Proceed with a complete chest CT examination for further evaluation as early as possible (Per Fleischner Society guidelines).         CT Facial Bones without Contrast   Final Result   IMPRESSION:   HEAD CT:   1.  No acute intracranial hemorrhage, extra-axial fluid collection, or mass effect.   2.  Age-indeterminate right basal ganglia infarct, potentially chronic.   3.  Brain atrophy and presumed chronic small vessel ischemic changes, as described.   4.  Right frontotemporal scalp hematoma without underlying calvarial fracture.      FACIAL BONE CT:   1.  No acute facial fracture.   2.  Right frontotemporal scalp soft tissue swelling, which may represent contusion.      CERVICAL SPINE CT:   1.  No acute cervical spine fracture.   2.  Multilevel degenerative changes, as described.   3.  Irregular soft tissue density in the left upper pulmonary lobe/apex, nonspecific. This could potentially represent scarring.      Pulmonary nodule recommendation: Proceed with a complete chest CT examination for further evaluation as early as possible (Per Fleischner Society guidelines).         Head CT w/o contrast   Final Result   IMPRESSION:   HEAD CT:   1.  No acute intracranial hemorrhage, extra-axial fluid collection, or mass effect.   2.  Age-indeterminate right basal ganglia infarct, potentially chronic.   3.  Brain atrophy and presumed chronic small vessel ischemic changes, as described.   4.  Right frontotemporal scalp hematoma without  underlying calvarial fracture.      FACIAL BONE CT:   1.  No acute facial fracture.   2.  Right frontotemporal scalp soft tissue swelling, which may represent contusion.      CERVICAL SPINE CT:   1.  No acute cervical spine fracture.   2.  Multilevel degenerative changes, as described.   3.  Irregular soft tissue density in the left upper pulmonary lobe/apex, nonspecific. This could potentially represent scarring.      Pulmonary nodule recommendation: Proceed with a complete chest CT examination for further evaluation as early as possible (Per Fleischner Society guidelines).         XR Pelvis w Hip Right 1 View   Final Result   IMPRESSION: Both hips negative for fracture. Pelvis negative for fracture. Aortic endograft.      XR Shoulder Left G/E 3 Views   Final Result   IMPRESSION: No acute fracture or malalignment. Mild glenohumeral and acromioclavicular joint degenerative changes. Osteopenia. Chronic lung changes. Aortic atherosclerosis.          Treatments provided: see MAR  Family Comments: at bedside  OBS brochure/video discussed/provided to patient:  yes  ED Medications:   Medications   sodium chloride 0.9% BOLUS 500 mL (has no administration in time range)   acetaminophen (TYLENOL) tablet 650 mg (650 mg Oral $Given 7/15/25 1101)       Drips infusing:  No  For the majority of the shift this patient was Green.   Interventions performed were see above/epic.    Sepsis treatment initiated: No    Cares/treatment/interventions/medications to be completed following ED care: consults and symptom control    ED Nurse Name: Chelsi Henning RN  1:14 PM    RECEIVING UNIT ED HANDOFF REVIEW    Above ED Nurse Handoff Report was reviewed: Yes  Reviewed by: ej Gardiner RN on July 15, 2025 at 2:15 PM   COLIN Ann called the ED to inform them the note was read: No

## 2025-07-16 ENCOUNTER — APPOINTMENT (OUTPATIENT)
Dept: PHYSICAL THERAPY | Facility: CLINIC | Age: 75
End: 2025-07-16
Attending: PHYSICIAN ASSISTANT
Payer: MEDICARE

## 2025-07-16 LAB
ALBUMIN MFR UR ELPH: 7.3 MG/DL
ANION GAP SERPL CALCULATED.3IONS-SCNC: 10 MMOL/L (ref 7–15)
BASOPHILS # BLD AUTO: 0.1 10E3/UL (ref 0–0.2)
BASOPHILS NFR BLD AUTO: 1 %
BUN SERPL-MCNC: 43 MG/DL (ref 8–23)
CALCIUM SERPL-MCNC: 8.6 MG/DL (ref 8.8–10.4)
CHLORIDE SERPL-SCNC: 110 MMOL/L (ref 98–107)
CREAT SERPL-MCNC: 3.15 MG/DL (ref 0.51–0.95)
CREAT UR-MCNC: <1.1 MG/DL
EGFRCR SERPLBLD CKD-EPI 2021: 15 ML/MIN/1.73M2
EOSINOPHIL # BLD AUTO: 0.8 10E3/UL (ref 0–0.7)
EOSINOPHIL NFR BLD AUTO: 12 %
ERYTHROCYTE [DISTWIDTH] IN BLOOD BY AUTOMATED COUNT: 15 % (ref 10–15)
GLUCOSE SERPL-MCNC: 111 MG/DL (ref 70–99)
HCO3 SERPL-SCNC: 19 MMOL/L (ref 22–29)
HCT VFR BLD AUTO: 31.7 % (ref 35–47)
HGB BLD-MCNC: 10.4 G/DL (ref 11.7–15.7)
IMM GRANULOCYTES # BLD: 0 10E3/UL
IMM GRANULOCYTES NFR BLD: 0 %
LYMPHOCYTES # BLD AUTO: 1.7 10E3/UL (ref 0.8–5.3)
LYMPHOCYTES NFR BLD AUTO: 27 %
MCH RBC QN AUTO: 34.2 PG (ref 26.5–33)
MCHC RBC AUTO-ENTMCNC: 32.8 G/DL (ref 31.5–36.5)
MCV RBC AUTO: 104 FL (ref 78–100)
MONOCYTES # BLD AUTO: 0.6 10E3/UL (ref 0–1.3)
MONOCYTES NFR BLD AUTO: 9 %
NEUTROPHILS # BLD AUTO: 3.4 10E3/UL (ref 1.6–8.3)
NEUTROPHILS NFR BLD AUTO: 51 %
NRBC # BLD AUTO: 0 10E3/UL
NRBC BLD AUTO-RTO: 0 /100
PLATELET # BLD AUTO: 218 10E3/UL (ref 150–450)
POTASSIUM SERPL-SCNC: 3.9 MMOL/L (ref 3.4–5.3)
PROT/CREAT 24H UR: NORMAL MG/G{CREAT}
RBC # BLD AUTO: 3.04 10E6/UL (ref 3.8–5.2)
SODIUM SERPL-SCNC: 139 MMOL/L (ref 135–145)
WBC # BLD AUTO: 6.6 10E3/UL (ref 4–11)

## 2025-07-16 PROCEDURE — 85025 COMPLETE CBC W/AUTO DIFF WBC: CPT | Performed by: PHYSICIAN ASSISTANT

## 2025-07-16 PROCEDURE — G0378 HOSPITAL OBSERVATION PER HR: HCPCS

## 2025-07-16 PROCEDURE — 36415 COLL VENOUS BLD VENIPUNCTURE: CPT | Performed by: PHYSICIAN ASSISTANT

## 2025-07-16 PROCEDURE — 250N000011 HC RX IP 250 OP 636: Performed by: PHYSICIAN ASSISTANT

## 2025-07-16 PROCEDURE — 96375 TX/PRO/DX INJ NEW DRUG ADDON: CPT

## 2025-07-16 PROCEDURE — 250N000013 HC RX MED GY IP 250 OP 250 PS 637: Performed by: INTERNAL MEDICINE

## 2025-07-16 PROCEDURE — 99222 1ST HOSP IP/OBS MODERATE 55: CPT | Performed by: INTERNAL MEDICINE

## 2025-07-16 PROCEDURE — 84156 ASSAY OF PROTEIN URINE: CPT | Performed by: INTERNAL MEDICINE

## 2025-07-16 PROCEDURE — 97530 THERAPEUTIC ACTIVITIES: CPT | Mod: GP | Performed by: PHYSICAL THERAPIST

## 2025-07-16 PROCEDURE — 97161 PT EVAL LOW COMPLEX 20 MIN: CPT | Mod: GP | Performed by: PHYSICAL THERAPIST

## 2025-07-16 PROCEDURE — 250N000013 HC RX MED GY IP 250 OP 250 PS 637: Performed by: PHYSICIAN ASSISTANT

## 2025-07-16 PROCEDURE — 97116 GAIT TRAINING THERAPY: CPT | Mod: GP | Performed by: PHYSICAL THERAPIST

## 2025-07-16 PROCEDURE — 250N000009 HC RX 250: Performed by: INTERNAL MEDICINE

## 2025-07-16 PROCEDURE — 82435 ASSAY OF BLOOD CHLORIDE: CPT | Performed by: PHYSICIAN ASSISTANT

## 2025-07-16 PROCEDURE — 120N000001 HC R&B MED SURG/OB

## 2025-07-16 PROCEDURE — 258N000002 HC RX IP 258 OP 250: Performed by: INTERNAL MEDICINE

## 2025-07-16 PROCEDURE — 99232 SBSQ HOSP IP/OBS MODERATE 35: CPT | Performed by: PHYSICIAN ASSISTANT

## 2025-07-16 RX ORDER — HYDRALAZINE HYDROCHLORIDE 25 MG/1
50 TABLET, FILM COATED ORAL 3 TIMES DAILY
Status: DISCONTINUED | OUTPATIENT
Start: 2025-07-16 | End: 2025-07-19 | Stop reason: HOSPADM

## 2025-07-16 RX ORDER — FAMOTIDINE 20 MG/1
20 TABLET, FILM COATED ORAL EVERY OTHER DAY
Status: DISCONTINUED | OUTPATIENT
Start: 2025-07-17 | End: 2025-07-19 | Stop reason: HOSPADM

## 2025-07-16 RX ORDER — FAMOTIDINE 20 MG/1
20 TABLET, FILM COATED ORAL DAILY
Status: DISCONTINUED | OUTPATIENT
Start: 2025-07-17 | End: 2025-07-16

## 2025-07-16 RX ORDER — NIFEDIPINE 30 MG/1
60 TABLET, EXTENDED RELEASE ORAL DAILY
Status: DISCONTINUED | OUTPATIENT
Start: 2025-07-17 | End: 2025-07-18

## 2025-07-16 RX ORDER — SODIUM BICARBONATE 650 MG/1
1300 TABLET ORAL 2 TIMES DAILY
Status: DISCONTINUED | OUTPATIENT
Start: 2025-07-16 | End: 2025-07-19 | Stop reason: HOSPADM

## 2025-07-16 RX ADMIN — ATORVASTATIN CALCIUM 80 MG: 40 TABLET, FILM COATED ORAL at 21:00

## 2025-07-16 RX ADMIN — CLOPIDOGREL BISULFATE 75 MG: 75 TABLET, FILM COATED ORAL at 08:07

## 2025-07-16 RX ADMIN — PAROXETINE HYDROCHLORIDE 40 MG: 20 TABLET, FILM COATED ORAL at 08:07

## 2025-07-16 RX ADMIN — LIDOCAINE 1 PATCH: 4 PATCH TOPICAL at 20:59

## 2025-07-16 RX ADMIN — ACETAMINOPHEN 1000 MG: 500 TABLET, FILM COATED ORAL at 20:59

## 2025-07-16 RX ADMIN — ISOSORBIDE DINITRATE 20 MG: 10 TABLET ORAL at 12:05

## 2025-07-16 RX ADMIN — CARVEDILOL 12.5 MG: 12.5 TABLET, FILM COATED ORAL at 17:12

## 2025-07-16 RX ADMIN — OXYCODONE HYDROCHLORIDE 5 MG: 5 TABLET ORAL at 13:37

## 2025-07-16 RX ADMIN — NIFEDIPINE 30 MG: 30 TABLET, FILM COATED, EXTENDED RELEASE ORAL at 09:31

## 2025-07-16 RX ADMIN — LABETALOL HYDROCHLORIDE 10 MG: 5 INJECTION, SOLUTION INTRAVENOUS at 08:23

## 2025-07-16 RX ADMIN — HYDRALAZINE HYDROCHLORIDE 50 MG: 25 TABLET ORAL at 21:00

## 2025-07-16 RX ADMIN — SODIUM BICARBONATE: 84 INJECTION, SOLUTION INTRAVENOUS at 16:15

## 2025-07-16 RX ADMIN — HYDRALAZINE HYDROCHLORIDE 25 MG: 25 TABLET ORAL at 08:07

## 2025-07-16 RX ADMIN — CARVEDILOL 12.5 MG: 12.5 TABLET, FILM COATED ORAL at 08:07

## 2025-07-16 RX ADMIN — OXYCODONE HYDROCHLORIDE 2.5 MG: 5 TABLET ORAL at 08:15

## 2025-07-16 RX ADMIN — MECLIZINE HYDROCHLORIDE 25 MG: 25 TABLET ORAL at 13:55

## 2025-07-16 RX ADMIN — ISOSORBIDE DINITRATE 20 MG: 10 TABLET ORAL at 17:12

## 2025-07-16 RX ADMIN — OXYCODONE HYDROCHLORIDE 2.5 MG: 5 TABLET ORAL at 17:17

## 2025-07-16 RX ADMIN — ACETAMINOPHEN 1000 MG: 500 TABLET, FILM COATED ORAL at 13:36

## 2025-07-16 RX ADMIN — SODIUM BICARBONATE 1300 MG: 650 TABLET ORAL at 20:59

## 2025-07-16 RX ADMIN — HYDRALAZINE HYDROCHLORIDE 25 MG: 25 TABLET ORAL at 13:37

## 2025-07-16 RX ADMIN — ISOSORBIDE DINITRATE 20 MG: 10 TABLET ORAL at 08:07

## 2025-07-16 RX ADMIN — ACETAMINOPHEN 1000 MG: 500 TABLET, FILM COATED ORAL at 08:06

## 2025-07-16 RX ADMIN — FAMOTIDINE 40 MG: 20 TABLET, FILM COATED ORAL at 08:08

## 2025-07-16 ASSESSMENT — ACTIVITIES OF DAILY LIVING (ADL)
ADLS_ACUITY_SCORE: 37
ADLS_ACUITY_SCORE: 37
ADLS_ACUITY_SCORE: 34
ADLS_ACUITY_SCORE: 38
ADLS_ACUITY_SCORE: 37
ADLS_ACUITY_SCORE: 37
ADLS_ACUITY_SCORE: 38
ADLS_ACUITY_SCORE: 38
ADLS_ACUITY_SCORE: 37
ADLS_ACUITY_SCORE: 37
ADLS_ACUITY_SCORE: 34
ADLS_ACUITY_SCORE: 38
ADLS_ACUITY_SCORE: 37
DEPENDENT_IADLS:: CLEANING;COOKING;LAUNDRY;SHOPPING;TRANSPORTATION
ADLS_ACUITY_SCORE: 38
ADLS_ACUITY_SCORE: 37
ADLS_ACUITY_SCORE: 38
ADLS_ACUITY_SCORE: 37
ADLS_ACUITY_SCORE: 38
ADLS_ACUITY_SCORE: 38

## 2025-07-16 NOTE — PROGRESS NOTES
[facility]                                                                                   OUTPATIENT PHYSICAL THERAPY    PLAN OF TREATMENT FOR OUTPATIENT REHABILITATION   Patient's Last Name, First Name, Maria Alejandra Wallace YOB: 1950   Provider's Name   [facility]   Medical Record No.  [id number]     Onset Date: 07/15/25 Start of Care Date: 07/16/25     Medical Diagnosis:  Right hip pain due to right pubic body fracture               PT Diagnosis:  Impaired functional mobility Certification Dates:  From: 07/16/25  To: 07/21/25       See note for plan of treatment, functional goals, and certification details.    I CERTIFY THE NEED FOR THESE SERVICES FURNISHED UNDER        THIS PLAN OF TREATMENT AND WHILE UNDER MY CARE (Physician co-signature of this document indicates review and certification of the therapy plan).               07/16/25 0952   Appointment Info   Signing Clinician's Name / Credentials (PT) Gemini Urbina DPT   Quick Adds   Quick Adds Certification   Living Environment   Living Environment Comments Pt resides in [place], but is visiting family in [place] and staying at an extended stay [facility] through september. Hotel has elevator access, and hotel room is about 100' from elevator.   Self-Care   Usual Activity Tolerance good   Current Activity Tolerance fair   Regular Exercise No   Equipment Currently Used at Home none   Fall history within last six months yes   Number of times patient has fallen within last six months 2   General Information   Onset of Illness/Injury or Date of Surgery 07/15/25   Referring Physician Orly King PA-C   Patient/Family Therapy Goals Statement (PT) Open to suggestions   Pertinent History of Current Problem (include personal factors and/or comorbidities that impact the POC) Maria Alejandra Buck is a 75 year old female with PMH significant for CAD, CKD stage IV, hypertension, hyperlipidemia,  intra-abdominal aortic aneurysm with prior stenting, chronic vertigo who presents to the ED on 7/15/2025 for evaluation of right hip pain after a fall.   Weight-Bearing Status - LLE weight-bearing as tolerated   Weight-Bearing Status - RLE weight-bearing as tolerated   Cognition   Affect/Mental Status (Cognition) WFL   Orientation Status (Cognition) oriented x 4   Follows Commands (Cognition) WFL   Cognitive Status Comments forgetful   Pain Assessment   Patient Currently in Pain Yes, see Vital Sign flowsheet   Integumentary/Edema   Integumentary/Edema Comments bruising noted to R eye region   Posture    Posture Forward head position;Protracted shoulders   Range of Motion (ROM)   Range of Motion ROM is WFL   Strength (Manual Muscle Testing)   Strength (Manual Muscle Testing) Deficits observed during functional mobility   Bed Mobility   Comment, (Bed Mobility) sit<>supine with CGA   Transfers   Comment, (Transfers) sit<>stand with Gunjan   Gait/Stairs (Locomotion)   Distance in Feet (Gait) 10   Comment, (Gait/Stairs) Gunjan with FWW   Balance   Balance Comments benefits from B UE support for safe dynamic mobility   Sensory Examination   Sensory Perception patient reports no sensory changes   Coordination   Coordination no deficits were identified   Muscle Tone   Muscle Tone no deficits were identified   Clinical Impression   Criteria for Skilled Therapeutic Intervention Yes, treatment indicated   PT Diagnosis (PT) Impaired functional mobility   Influenced by the following impairments Pain, weakness, deconditioning, baseline dizziness   Functional limitations due to impairments Difficulty with bed mobiltiy, transfers, ambulation   Clinical Presentation (PT Evaluation Complexity) stable   Clinical Presentation Rationale clinical reasoning   Clinical Decision Making (Complexity) low complexity   Planned Therapy Interventions (PT) balance training;bed mobility training;gait training;home exercise program;patient/family  education;strengthening;stretching;transfer training;progressive activity/exercise;home program guidelines   Risk & Benefits of therapy have been explained evaluation/treatment results reviewed;care plan/treatment goals reviewed;risks/benefits reviewed;current/potential barriers reviewed;participants voiced agreement with care plan;participants included;patient;spouse/significant other;daughter   PT Total Evaluation Time   PT Eval, Low Complexity Minutes ([id number]) 12   Therapy Certification   Start of care date 07/16/25   Certification date from 07/16/25   Certification date to 07/21/25   Medical Diagnosis Right hip pain due to right pubic body fracture   Physical Therapy Goals   PT Frequency Daily   PT Predicted Duration/Target Date for Goal Attainment 07/21/25   PT Goals Bed Mobility;Transfers;Gait   PT: Bed Mobility Modified independent;Supine to/from sit   PT: Transfers Modified independent;Sit to/from stand   PT: Gait Modified independent;Rolling walker;150 feet   PT Discharge Planning   PT Discharge Recommendation (DC Rec) home with assist;home with home care physical therapy   PT Rationale for DC Rec At this time the patient is below baseline mobility, requiring Ax1 for all functional mobility. Pt would benefit from TCU stay, however does not have coverage at this time. Therefore rec return to extended stay hotel with spouse and HHPT/OT to progress functional mobility in her current home setting.   PT Brief overview of current status Ax1 WW x 90   PT Total Distance Amb During Session (feet) 90   Physical Therapy Time and Intention   Total Session Time (sum of timed and untimed services) 12

## 2025-07-16 NOTE — PLAN OF CARE
PRIMARY DIAGNOSIS: Pubis Fracture s/p Fall  OUTPATIENT/OBSERVATION GOALS TO BE MET BEFORE DISCHARGE:  ADLs back to baseline: No    Activity and level of assistance: Up with maximum assistance. Consider SW and/or PT evaluation.     Pain status: Improved-controlled with oral pain medications.    Return to near baseline physical activity: No     Discharge Planner Nurse   Safe discharge environment identified: No  Barriers to discharge: Yes       Entered by: Melvina Parks RN 07/16/2025 3:09 AM     Please review provider order for any additional goals.   Nurse to notify provider when observation goals have been met and patient is ready for discharge.    Goal Outcome Evaluation:      Plan of Care Reviewed With: patient    Overall Patient Progress: improvingOverall Patient Progress: improving    Outcome Evaluation: Pt is alert and oriented x4, forgetful. Pain managed with Tylenol, Robaxin and Oxycodone. Lidocaine applied to the R Hip. Ortho, PT/SW consult.

## 2025-07-16 NOTE — CONSULTS
[facility]    Orthopedic Consultation    Maria Alejandra Buck MRN# [id number]   Age: 75 year old YOB: 1950     Date of Admission:  7/15/2025    Reason for consult: right fracture of pubic body, extends into the junction with right superior and inferior pubic rami        Requesting physician: Orly King PA-C        Level of consult: One-time consult to assist in determining a diagnosis and to recommend an appropriate treatment plan           Assessment and Plan:   Assessment:   75-year-old female admitted to the observation unit following a fall at her extended stay hotel and sustained fracture of the right pubic body, no further fractures noted of the pelvis or hip.  Of note patient was admitted in hypertensive urgency.  She has past medical history significant for chronic kidney disease, hypertension, hyperlipidemia and intra-abdominal aortic aneurysm.      Plan:   Nonoperative management is recommended for her right pubic body fracture which extends to the junction with the right superior and inferior pubic rami:    -WBAT RLE with walker (likely x 6-8 weeks).  If patient has significant pain of the right pelvic region, it is appropriate for her to limit mobilization to chair for bed to chair x approximately 2 weeks.   -Mobilize with PT/OT.  -Pain control regimen per primary team.   -Disposition per primary team.  Of note, patient's daughter does mention recent history of generalized weakness and it seems that the patient could potentially benefit from rehabilitation.  Please discuss further with the family.  Physical therapy recommends home with assist her home with physical therapy today.  Follow-up with the social/care management referral.    -Follow up with hip specialist at [facility] in 6 weeks for repeat x-rays and reevaluation. Please call [phone] ([place]) or [phone] ([place]) to schedule.     Please contact orthopedic trauma team if any  questions or concerns arise interim otherwise orthopedics will sign off at this time.           Chief Complaint:   Mechanical fall, pubic body fracture         History of Present Illness:     Maria Alejandra Buck is a markedly pleasant 75-year-old female with the below listed past medical history who presented to the emergency department 7/15/2025 for mechanical fall.  Patient is visiting from [place] and is currently staying at an extended stay hotel and reports that she fell while walking to the laundry room.  She reports tripping over her sandals.  She presented to the emergency department for evaluation and was found to be in hypertensive urgency.  In addition to her right pubic body fracture she does have right frontal temporal scalp hematoma, periorbital ecchymosis.  Patient was admitted overnight to the observation unit for further management.  On the orthopedic consultation this morning, the patient does endorse having right pelvic pain.  She was able to transfer from bed to bedside commode with pain properly controlled.  She was able to bear weight within expectations this morning.  She does endorse feeling weak and it is difficult for her to straight leg raise.  She otherwise has no other orthopedic spine.  Upon talking with her daughter, her daughter has a concern for progressive weakness and deconditioning.  Daughter has concerned about her kidney function and overall health.  She reports that her mother is unable to use a walker due to the lack of upper body strength.           Past Medical History:     Past Medical History:   Diagnosis Date    Aneurysm of renal artery     left    Atrial fibrillation (H)     COPD (chronic obstructive pulmonary disease) (H)     High cholesterol     Hypertension     Mixed hyperlipidemia     PVD (peripheral vascular disease)     Stenosis of left carotid artery     Tobacco use disorder              Past Surgical History:     Past Surgical History:   Procedure Laterality Date     ABDOMEN SURGERY      aneurism      left femoral, mesh    ENDARTERECTOMY CAROTID Left 7/17/2018    Procedure: ENDARTERECTOMY CAROTID;  LEFT CAROTID ENDARTERECTOMY WITH EEG;  Surgeon: Jorge Posada MD;  Location: [facility] OR    GENITOURINARY SURGERY      stent rt kidney    IR RENAL BIOPSY LEFT  6/16/2025             Social History:     Social History     Tobacco Use    Smoking status: Every Day     Current packs/day: 0.50     Average packs/day: 0.5 packs/day for 45.0 years (22.5 ttl pk-yrs)     Types: Cigarettes    Smokeless tobacco: Never   Substance Use Topics    Alcohol use: Yes     Comment: social             Family History:     Family History   Problem Relation Age of Onset    Alzheimer Disease Mother     Cerebrovascular Disease Father     Dementia Maternal Grandmother     Diabetes Brother     Alzheimer Disease Brother     Parkinsonism Brother     Cerebrovascular Disease Brother     Breast Cancer Sister     Heart Disease Sister              Immunizations:     VACCINE / DOSE   Diptheria   DPT   DTAP   HBIG   Hepatitis A   Hepatitis B   HIB   Influenza   Measles   Meningococcal   MMR   Mumps   Pneumococcal   Polio   Rubella   Small Pox   TDAP   Varicella   Zoster             Allergies:     Allergies   Allergen Reactions    Iodine Hives             Medications:     Current Facility-Administered Medications   Medication Dose Route Frequency Provider Last Rate Last Admin    acetaminophen (TYLENOL) tablet 1,000 mg  1,000 mg Oral TID Orly King PA-C   1,000 mg at 07/16/25 0806    atorvastatin (LIPITOR) tablet 80 mg  80 mg Oral QPM Orly King PA-C   80 mg at 07/15/25 2022    carvedilol (COREG) tablet 12.5 mg  12.5 mg Oral BID w/meals Orly King PA-C   12.5 mg at 07/16/25 0807    clopidogrel (PLAVIX) tablet 75 mg  75 mg Oral Daily Orly King PA-C   75 mg at 07/16/25 0807    diphenhydrAMINE (BENADRYL) 25 mg, acetaminophen (TYLENOL) 500 mg alternative for Tylenol PM   Oral At  Bedtime PRN Orly King PA-C        famotidine (PEPCID) tablet 40 mg  40 mg Oral Daily Orly King PA-C   40 mg at 07/16/25 0808    hydrALAZINE (APRESOLINE) tablet 25 mg  25 mg Oral TID Orly King PA-C   25 mg at 07/16/25 0807    HYDROmorphone (DILAUDID) injection 0.2 mg  0.2 mg Intravenous Q2H PRN Orly King PA-C   0.2 mg at 07/15/25 1632    HYDROmorphone (DILAUDID) injection 0.4 mg  0.4 mg Intravenous Q2H PRN Orly King PA-C        hydrOXYzine HCl (ATARAX) tablet 25 mg  25 mg Oral Q6H PRN Orly King PA-C        Or    hydrOXYzine HCl (ATARAX) tablet 50 mg  50 mg Oral Q6H PRN Orly King PA-C        [Held by provider] icosapent ethyl (VASCEPA) capsule 2 g  2 g Oral BID w/meals Orly King PA-C        isosorbide dinitrate (ISORDIL) tablet 20 mg  20 mg Oral TID Orly King PA-C   20 mg at 07/16/25 0807    labetalol (NORMODYNE/TRANDATE) injection 10 mg  10 mg Intravenous Q6H PRN Dorene Ag PA-C   10 mg at 07/16/25 0823    Lidocaine (LIDOCARE) 4 % Patch 1 patch  1 patch Transdermal Q24h Orly King PA-C   1 patch at 07/15/25 2021    meclizine (ANTIVERT) tablet 25 mg  25 mg Oral TID PRN Orly King PA-C        methocarbamol (ROBAXIN) tablet 500 mg  500 mg Oral TID PRN Orly King PA-C   500 mg at 07/15/25 2022    naloxone (NARCAN) injection 0.2 mg  0.2 mg Intravenous Q2 Min PRN Orly King PA-C        Or    naloxone (NARCAN) injection 0.4 mg  0.4 mg Intravenous Q2 Min PRN Orly King PA-C        Or    naloxone (NARCAN) injection 0.2 mg  0.2 mg Intramuscular Q2 Min PRN Orly King PA-C        Or    naloxone (NARCAN) injection 0.4 mg  0.4 mg Intramuscular Q2 Min PRN Orly King PA-C        NIFEdipine ER OSMOTIC (PROCARDIA XL) 24 hr tablet 30 mg  30 mg Oral Daily Orly King PA-C   30 mg at 07/16/25 0931    ondansetron (ZOFRAN ODT) ODT tab 4 mg  4 mg Oral Q6H PRN  Orly King PA-C        Or    ondansetron (ZOFRAN) injection 4 mg  4 mg Intravenous Q6H PRN Orly King PA-C        oxyCODONE (ROXICODONE) tablet 5 mg  5 mg Oral Q4H PRN Orly King PA-C   5 mg at 07/15/25 1728    oxyCODONE IR (ROXICODONE) half-tab 2.5 mg  2.5 mg Oral Q4H PRN Orly King PA-C   2.5 mg at 07/16/25 0815    PARoxetine (PAXIL) tablet 40 mg  40 mg Oral Daily Orly King PA-C   40 mg at 07/16/25 0807    prochlorperazine (COMPAZINE) injection 5 mg  5 mg Intravenous Q6H PRN Orly King PA-C        Or    prochlorperazine (COMPAZINE) tablet 5 mg  5 mg Oral Q6H PRN Orly King PA-C        senna-docusate (SENOKOT-S/PERICOLACE) 8.6-50 MG per tablet 1 tablet  1 tablet Oral BID PRN Orly King PA-C        Or    senna-docusate (SENOKOT-S/PERICOLACE) 8.6-50 MG per tablet 2 tablet  2 tablet Oral BID PRN Orly King PA-C                   Physical Exam:   All vitals have been reviewed  Patient Vitals for the past 24 hrs:   BP Temp Temp src Pulse Resp SpO2   07/16/25 0934 (!) 167/79 -- -- -- -- --   07/16/25 0816 (!) 209/107 98.4  F (36.9  C) Oral -- 16 96 %   07/16/25 0437 (!) 149/77 97.7  F (36.5  C) Oral 66 16 96 %   07/15/25 2337 135/69 97.9  F (36.6  C) Oral 63 16 95 %   07/15/25 1818 (!) 173/87 97.9  F (36.6  C) Oral 61 18 95 %   07/15/25 1531 (!) 190/105 98.7  F (37.1  C) Oral 62 20 98 %   07/15/25 1500 (!) 187/106 -- -- 60 -- 95 %   07/15/25 1450 -- -- -- -- -- 97 %   07/15/25 1440 -- -- -- -- -- 96 %   07/15/25 1430 -- -- -- -- -- 96 %   07/15/25 1401 (!) 186/97 -- -- -- -- --   07/15/25 1400 -- -- -- -- -- 96 %   07/15/25 1330 -- -- -- -- -- 96 %   07/15/25 1315 (!) 202/100 -- -- 59 20 97 %       Intake/Output Summary (Last 24 hours) at 7/16/2025 1141  Last data filed at 7/16/2025 0440  Gross per 24 hour   Intake --   Output 400 ml   Net -400 ml       Constitutional: Pleasant, alert, appropriate, following commands.  HEENT:  Periorbital ecchymosis  Respiratory: Unlabored breathing no audible wheeze  Cardiovascular: Regular rate and rhythm per pulses.  GI: Abdomen is non-distended.  Lymph/Hematologic: No lymphadenopathy in areas examined.  Genitourinary:  No butt  Skin: No rashes, no cyanosis, no edema.  Musculoskeletal: Patient lying supine in hospital bed with bilateral lower extremity extended.  On evaluation of the right hip and pelvic region, her skin is intact with no abrasions, rashes, ecchymosis or erythema.  She is tender to palpation along the lateral aspect of the right hip.  Denies pain along the anterior joint line.  She does report pain into the groin region.  Her compartments are soft and compressible.  She has reduced ability to straight leg raise on the right in comparison to the left.  She has 5 out of 5 strength of dorsiflexion plantarflexion bilateral lower extremity.  Sensation is intact in all distributions.  Palpable DP pulse.  She is neurovascularly intact.  She does tolerate full motion of the right hip including flexion and rotation at the hip joint with no limitations however she does endorse pain.  Neurologic: normal without focal findings, mental status, speech normal  Neuropsychiatric: Stable          Data:   All laboratory data reviewed  Results for orders placed or performed during the hospital encounter of 07/15/25   Head CT w/o contrast     Status: None    Narrative    EXAM: CT HEAD W/O CONTRAST, CT FACIAL BONES WITHOUT CONTRAST, CT CERVICAL SPINE W/O CONTRAST  LOCATION: [facility]  DATE: 7/15/2025    INDICATION: Fall, right scalp hematoma  COMPARISON: None.  TECHNIQUE:   1) Routine CT Head without IV contrast. Multiplanar reformats. Dose reduction techniques were used.  2) Routine CT Facial Bones without IV contrast. Multiplanar reformats. Dose reduction techniques were used.  3) Routine CT Cervical Spine without IV contrast. Multiplanar reformats. Dose reduction techniques were  used.    FINDINGS:  HEAD CT:   INTRACRANIAL CONTENTS: The ventricles appear proportionate to the sulci. There is a right basal ganglia infarct, age indeterminate, but potentially chronic. Mild to moderate patchy nonspecific hypoattenuation in the cerebral white matter, likely due to   chronic small vessel ischemic disease. Mild to moderate generalized brain parenchymal volume loss. No hyperdense acute intracranial hemorrhage, extra-axial fluid collection, or mass effect. Atherosclerotic calcifications primarily involving the carotid   siphons.    OSSEOUS STRUCTURES/SOFT TISSUES: Focal right frontotemporal scalp hematoma measuring approximately 12-13 mm with adjacent superficial soft tissue swelling of the right scalp. No underlying displaced calvarial fracture is appreciated.     FACIAL BONE CT:  The pterygoid plates are intact. The bilateral zygomatic arches, sphenotemporal buttresses, the walls of both orbits, and the walls of the maxillary sinuses appear intact. No displaced nasal arch or nasal septal fracture is identified. The anterior skull   base appears to be intact. The mandible appears intact. There appears to be mild symmetric anterior subluxation of the mandibular condyles with respect to the glenoid fossae, which may be positional.    Right frontotemporal scalp soft tissue swelling, which may represent soft tissue contusion. Changes of bilateral cataract surgery are noted. The post septal intraorbital soft tissue structures otherwise appear unremarkable. The paranasal sinuses are free   of significant disease. The mastoid and middle ear cavities appear grossly clear.    CERVICAL SPINE CT:   VERTEBRA: No acute cervical spine fracture identified. Vertebral body heights are normal. Straightening of the normal cervical lordosis. Minimal anterolisthesis of C4 on C5. Minimal broad levoconvex curvature centered at the C7 level.     CANAL/FORAMINA: Moderate to severe C6-7 disc space narrowing and moderate  appearing disc space narrowing at C3-4 and C5-6 with mild disc space narrowing elsewhere. Scattered marginal endplate and uncovertebral spurs. Multilevel degenerative facet   disease. Mild degenerative change of the median atlantoaxial joint. Multilevel disc osteophyte complexes/disc bulges. There appears to be up to moderate central spinal canal stenosis at the C3-4 and C5-6 levels with some flattening of the normal spinal   cord contour at those levels. There is at least moderate neural foraminal stenosis on the left at C3-C4 with at least mild to moderate bordering on moderate left C4-5 and right C5-6 neural foraminal stenosis. Milder degrees of foraminal narrowing   elsewhere.    PARASPINAL: Right greater than left carotid bifurcation atherosclerotic calcification. There is some irregular soft tissue density in the left upper pulmonary lobe/apex, nonspecific, possibly representing scarring. One of these lesions measures up to   approximately 11-12 mm (series 4 image 84). The prevertebral soft tissues otherwise appear unremarkable.       Impression    IMPRESSION:  HEAD CT:  1.  No acute intracranial hemorrhage, extra-axial fluid collection, or mass effect.  2.  Age-indeterminate right basal ganglia infarct, potentially chronic.  3.  Brain atrophy and presumed chronic small vessel ischemic changes, as described.  4.  Right frontotemporal scalp hematoma without underlying calvarial fracture.    FACIAL BONE CT:  1.  No acute facial fracture.  2.  Right frontotemporal scalp soft tissue swelling, which may represent contusion.    CERVICAL SPINE CT:  1.  No acute cervical spine fracture.  2.  Multilevel degenerative changes, as described.  3.  Irregular soft tissue density in the left upper pulmonary lobe/apex, nonspecific. This could potentially represent scarring.    Pulmonary nodule recommendation: Proceed with a complete chest CT examination for further evaluation as early as possible (Per Fleischner Society  guidelines).     CT Facial Bones without Contrast     Status: None    Narrative    EXAM: CT HEAD W/O CONTRAST, CT FACIAL BONES WITHOUT CONTRAST, CT CERVICAL SPINE W/O CONTRAST  LOCATION: [facility]  DATE: 7/15/2025    INDICATION: Fall, right scalp hematoma  COMPARISON: None.  TECHNIQUE:   1) Routine CT Head without IV contrast. Multiplanar reformats. Dose reduction techniques were used.  2) Routine CT Facial Bones without IV contrast. Multiplanar reformats. Dose reduction techniques were used.  3) Routine CT Cervical Spine without IV contrast. Multiplanar reformats. Dose reduction techniques were used.    FINDINGS:  HEAD CT:   INTRACRANIAL CONTENTS: The ventricles appear proportionate to the sulci. There is a right basal ganglia infarct, age indeterminate, but potentially chronic. Mild to moderate patchy nonspecific hypoattenuation in the cerebral white matter, likely due to   chronic small vessel ischemic disease. Mild to moderate generalized brain parenchymal volume loss. No hyperdense acute intracranial hemorrhage, extra-axial fluid collection, or mass effect. Atherosclerotic calcifications primarily involving the carotid   siphons.    OSSEOUS STRUCTURES/SOFT TISSUES: Focal right frontotemporal scalp hematoma measuring approximately 12-13 mm with adjacent superficial soft tissue swelling of the right scalp. No underlying displaced calvarial fracture is appreciated.     FACIAL BONE CT:  The pterygoid plates are intact. The bilateral zygomatic arches, sphenotemporal buttresses, the walls of both orbits, and the walls of the maxillary sinuses appear intact. No displaced nasal arch or nasal septal fracture is identified. The anterior skull   base appears to be intact. The mandible appears intact. There appears to be mild symmetric anterior subluxation of the mandibular condyles with respect to the glenoid fossae, which may be positional.    Right frontotemporal scalp soft tissue swelling, which  may represent soft tissue contusion. Changes of bilateral cataract surgery are noted. The post septal intraorbital soft tissue structures otherwise appear unremarkable. The paranasal sinuses are free   of significant disease. The mastoid and middle ear cavities appear grossly clear.    CERVICAL SPINE CT:   VERTEBRA: No acute cervical spine fracture identified. Vertebral body heights are normal. Straightening of the normal cervical lordosis. Minimal anterolisthesis of C4 on C5. Minimal broad levoconvex curvature centered at the C7 level.     CANAL/FORAMINA: Moderate to severe C6-7 disc space narrowing and moderate appearing disc space narrowing at C3-4 and C5-6 with mild disc space narrowing elsewhere. Scattered marginal endplate and uncovertebral spurs. Multilevel degenerative facet   disease. Mild degenerative change of the median atlantoaxial joint. Multilevel disc osteophyte complexes/disc bulges. There appears to be up to moderate central spinal canal stenosis at the C3-4 and C5-6 levels with some flattening of the normal spinal   cord contour at those levels. There is at least moderate neural foraminal stenosis on the left at C3-C4 with at least mild to moderate bordering on moderate left C4-5 and right C5-6 neural foraminal stenosis. Milder degrees of foraminal narrowing   elsewhere.    PARASPINAL: Right greater than left carotid bifurcation atherosclerotic calcification. There is some irregular soft tissue density in the left upper pulmonary lobe/apex, nonspecific, possibly representing scarring. One of these lesions measures up to   approximately 11-12 mm (series 4 image 84). The prevertebral soft tissues otherwise appear unremarkable.       Impression    IMPRESSION:  HEAD CT:  1.  No acute intracranial hemorrhage, extra-axial fluid collection, or mass effect.  2.  Age-indeterminate right basal ganglia infarct, potentially chronic.  3.  Brain atrophy and presumed chronic small vessel ischemic changes, as  described.  4.  Right frontotemporal scalp hematoma without underlying calvarial fracture.    FACIAL BONE CT:  1.  No acute facial fracture.  2.  Right frontotemporal scalp soft tissue swelling, which may represent contusion.    CERVICAL SPINE CT:  1.  No acute cervical spine fracture.  2.  Multilevel degenerative changes, as described.  3.  Irregular soft tissue density in the left upper pulmonary lobe/apex, nonspecific. This could potentially represent scarring.    Pulmonary nodule recommendation: Proceed with a complete chest CT examination for further evaluation as early as possible (Per Fleischner Society guidelines).     CT Cervical Spine w/o Contrast     Status: None    Narrative    EXAM: CT HEAD W/O CONTRAST, CT FACIAL BONES WITHOUT CONTRAST, CT CERVICAL SPINE W/O CONTRAST  LOCATION: [facility]  DATE: 7/15/2025    INDICATION: Fall, right scalp hematoma  COMPARISON: None.  TECHNIQUE:   1) Routine CT Head without IV contrast. Multiplanar reformats. Dose reduction techniques were used.  2) Routine CT Facial Bones without IV contrast. Multiplanar reformats. Dose reduction techniques were used.  3) Routine CT Cervical Spine without IV contrast. Multiplanar reformats. Dose reduction techniques were used.    FINDINGS:  HEAD CT:   INTRACRANIAL CONTENTS: The ventricles appear proportionate to the sulci. There is a right basal ganglia infarct, age indeterminate, but potentially chronic. Mild to moderate patchy nonspecific hypoattenuation in the cerebral white matter, likely due to   chronic small vessel ischemic disease. Mild to moderate generalized brain parenchymal volume loss. No hyperdense acute intracranial hemorrhage, extra-axial fluid collection, or mass effect. Atherosclerotic calcifications primarily involving the carotid   siphons.    OSSEOUS STRUCTURES/SOFT TISSUES: Focal right frontotemporal scalp hematoma measuring approximately 12-13 mm with adjacent superficial soft tissue  swelling of the right scalp. No underlying displaced calvarial fracture is appreciated.     FACIAL BONE CT:  The pterygoid plates are intact. The bilateral zygomatic arches, sphenotemporal buttresses, the walls of both orbits, and the walls of the maxillary sinuses appear intact. No displaced nasal arch or nasal septal fracture is identified. The anterior skull   base appears to be intact. The mandible appears intact. There appears to be mild symmetric anterior subluxation of the mandibular condyles with respect to the glenoid fossae, which may be positional.    Right frontotemporal scalp soft tissue swelling, which may represent soft tissue contusion. Changes of bilateral cataract surgery are noted. The post septal intraorbital soft tissue structures otherwise appear unremarkable. The paranasal sinuses are free   of significant disease. The mastoid and middle ear cavities appear grossly clear.    CERVICAL SPINE CT:   VERTEBRA: No acute cervical spine fracture identified. Vertebral body heights are normal. Straightening of the normal cervical lordosis. Minimal anterolisthesis of C4 on C5. Minimal broad levoconvex curvature centered at the C7 level.     CANAL/FORAMINA: Moderate to severe C6-7 disc space narrowing and moderate appearing disc space narrowing at C3-4 and C5-6 with mild disc space narrowing elsewhere. Scattered marginal endplate and uncovertebral spurs. Multilevel degenerative facet   disease. Mild degenerative change of the median atlantoaxial joint. Multilevel disc osteophyte complexes/disc bulges. There appears to be up to moderate central spinal canal stenosis at the C3-4 and C5-6 levels with some flattening of the normal spinal   cord contour at those levels. There is at least moderate neural foraminal stenosis on the left at C3-C4 with at least mild to moderate bordering on moderate left C4-5 and right C5-6 neural foraminal stenosis. Milder degrees of foraminal narrowing   elsewhere.    PARASPINAL:  Right greater than left carotid bifurcation atherosclerotic calcification. There is some irregular soft tissue density in the left upper pulmonary lobe/apex, nonspecific, possibly representing scarring. One of these lesions measures up to   approximately 11-12 mm (series 4 image 84). The prevertebral soft tissues otherwise appear unremarkable.       Impression    IMPRESSION:  HEAD CT:  1.  No acute intracranial hemorrhage, extra-axial fluid collection, or mass effect.  2.  Age-indeterminate right basal ganglia infarct, potentially chronic.  3.  Brain atrophy and presumed chronic small vessel ischemic changes, as described.  4.  Right frontotemporal scalp hematoma without underlying calvarial fracture.    FACIAL BONE CT:  1.  No acute facial fracture.  2.  Right frontotemporal scalp soft tissue swelling, which may represent contusion.    CERVICAL SPINE CT:  1.  No acute cervical spine fracture.  2.  Multilevel degenerative changes, as described.  3.  Irregular soft tissue density in the left upper pulmonary lobe/apex, nonspecific. This could potentially represent scarring.    Pulmonary nodule recommendation: Proceed with a complete chest CT examination for further evaluation as early as possible (Per Fleischner Society guidelines).     XR Shoulder Left G/E 3 Views     Status: None    Narrative    EXAM: XR SHOULDER LEFT G/E 3 VIEWS  LOCATION: [facility]  DATE: 7/15/2025    INDICATION: fall, proximal humerus pain  COMPARISON: None.      Impression    IMPRESSION: No acute fracture or malalignment. Mild glenohumeral and acromioclavicular joint degenerative changes. Osteopenia. Chronic lung changes. Aortic atherosclerosis.   XR Pelvis w Hip Right 1 View     Status: None    Narrative    EXAM: XR PELVIS AND HIP RIGHT 1 VIEW  LOCATION: [facility]  DATE: 7/15/2025    INDICATION: fall, suspected right hip fracture  COMPARISON: None.      Impression    IMPRESSION: Both hips negative  for fracture. Pelvis negative for fracture. Aortic endograft.   CT Hip Right w/o Contrast     Status: None    Narrative    EXAM: CT HIP RIGHT W/O CONTRAST  LOCATION: [facility]  DATE: 7/15/2025    INDICATION: right hip pain, negative x ray  COMPARISON: None.  TECHNIQUE: Noncontrast. Axial, sagittal and coronal thin-section reconstruction. Dose reduction techniques were used.     FINDINGS:     BONES:  -The right hip is negative for fracture or CT evidence of avascular necrosis. There is a nondisplaced fracture of the right pubic body which extends into the junction with the right-sided pubic rami. There is degenerative change at the right hip joint.   The visualized portion of the bony pelvis is otherwise negative.    SOFT TISSUES:  -Normal.      Impression    IMPRESSION:  1.  The right hip is negative for fracture or CT evidence of avascular necrosis.  2.  Nondisplaced but minimally comminuted fracture of the right pubic body extends into the junction with the right superior and inferior pubic rami.  3.  Degenerative change at the right hip joint itself.  4.  No evidence for soft tissue mass or fluid collection.     Basic metabolic panel (BMP)     Status: Abnormal   Result Value Ref Range    Sodium 141 135 - 145 mmol/L    Potassium 4.0 3.4 - 5.3 mmol/L    Chloride 105 98 - 107 mmol/L    Carbon Dioxide (CO2) 21 (L) 22 - 29 mmol/L    Anion Gap 15 7 - 15 mmol/L    Urea Nitrogen 49.0 (H) 8.0 - 23.0 mg/dL    Creatinine 3.28 (H) 0.51 - 0.95 mg/dL    GFR Estimate 14 (L) >60 mL/min/1.73m2    Calcium 9.0 8.8 - 10.4 mg/dL    Glucose 114 (H) 70 - 99 mg/dL   [place] Draw     Status: None    Narrative    The following orders were created for panel order [place] Draw.  Procedure                               Abnormality         Status                     ---------                               -----------         ------                     Extra Blue Top Tube[[id number]]                             Final  result               Extra Red Top Tube[[id number]]                              Final result                 Please view results for these tests on the individual orders.   CBC with platelets and differential     Status: Abnormal   Result Value Ref Range    WBC Count 7.4 4.0 - 11.0 10e3/uL    RBC Count 3.26 (L) 3.80 - 5.20 10e6/uL    Hemoglobin 11.0 (L) 11.7 - 15.7 g/dL    Hematocrit 33.6 (L) 35.0 - 47.0 %     (H) 78 - 100 fL    MCH 33.7 (H) 26.5 - 33.0 pg    MCHC 32.7 31.5 - 36.5 g/dL    RDW 14.8 10.0 - 15.0 %    Platelet Count 235 150 - 450 10e3/uL    % Neutrophils 51 %    % Lymphocytes 29 %    % Monocytes 9 %    % Eosinophils 10 %    % Basophils 1 %    % Immature Granulocytes 0 %    NRBCs per 100 WBC 0 <1 /100    Absolute Neutrophils 3.8 1.6 - 8.3 10e3/uL    Absolute Lymphocytes 2.1 0.8 - 5.3 10e3/uL    Absolute Monocytes 0.7 0.0 - 1.3 10e3/uL    Absolute Eosinophils 0.8 (H) 0.0 - 0.7 10e3/uL    Absolute Basophils 0.1 0.0 - 0.2 10e3/uL    Absolute Immature Granulocytes 0.0 <=0.4 10e3/uL    Absolute NRBCs 0.0 10e3/uL   Extra Blue Top Tube     Status: None   Result Value Ref Range    Hold Specimen [facility]    Extra Red Top Tube     Status: None   Result Value Ref Range    Hold Specimen [facility]    Basic metabolic panel     Status: Abnormal   Result Value Ref Range    Sodium 139 135 - 145 mmol/L    Potassium 3.9 3.4 - 5.3 mmol/L    Chloride 110 (H) 98 - 107 mmol/L    Carbon Dioxide (CO2) 19 (L) 22 - 29 mmol/L    Anion Gap 10 7 - 15 mmol/L    Urea Nitrogen 43.0 (H) 8.0 - 23.0 mg/dL    Creatinine 3.15 (H) 0.51 - 0.95 mg/dL    GFR Estimate 15 (L) >60 mL/min/1.73m2    Calcium 8.6 (L) 8.8 - 10.4 mg/dL    Glucose 111 (H) 70 - 99 mg/dL   CBC with platelets and differential     Status: Abnormal   Result Value Ref Range    WBC Count 6.6 4.0 - 11.0 10e3/uL    RBC Count 3.04 (L) 3.80 - 5.20 10e6/uL    Hemoglobin 10.4 (L) 11.7 - 15.7 g/dL    Hematocrit 31.7 (L) 35.0 - 47.0 %     (H) 78 - 100 fL    MCH 34.2 (H) 26.5 - 33.0  pg    MCHC 32.8 31.5 - 36.5 g/dL    RDW 15.0 10.0 - 15.0 %    Platelet Count 218 150 - 450 10e3/uL    % Neutrophils 51 %    % Lymphocytes 27 %    % Monocytes 9 %    % Eosinophils 12 %    % Basophils 1 %    % Immature Granulocytes 0 %    NRBCs per 100 WBC 0 <1 /100    Absolute Neutrophils 3.4 1.6 - 8.3 10e3/uL    Absolute Lymphocytes 1.7 0.8 - 5.3 10e3/uL    Absolute Monocytes 0.6 0.0 - 1.3 10e3/uL    Absolute Eosinophils 0.8 (H) 0.0 - 0.7 10e3/uL    Absolute Basophils 0.1 0.0 - 0.2 10e3/uL    Absolute Immature Granulocytes 0.0 <=0.4 10e3/uL    Absolute NRBCs 0.0 10e3/uL   EKG 12 lead     Status: None   Result Value Ref Range    Systolic Blood Pressure  mmHg    Diastolic Blood Pressure  mmHg    Ventricular Rate 61 BPM    Atrial Rate 61 BPM    ND Interval 190 ms    QRS Duration 62 ms     ms    QTc 446 ms    P Axis 31 degrees    R AXIS -13 degrees    T Axis -17 degrees    Interpretation ECG       Sinus rhythm  Minimal voltage criteria for LVH, may be normal variant ( R in aVL )  Nonspecific ST abnormality  Abnormal ECG  When compared with ECG of 17-Jul-2018 07:07,  ST now depressed in Inferior leads  T wave inversion now evident in Inferior leads  Unconfirmed report - interpretation of this ECG is computer generated - see medical record for final interpretation  Confirmed by - EMERGENCY ROOM, PHYSICIAN ([id number]),  Rafi Queen ([id number]) on 7/15/2025 1:17:31 PM     CBC with differential     Status: Abnormal    Narrative    The following orders were created for panel order CBC with differential.  Procedure                               Abnormality         Status                     ---------                               -----------         ------                     CBC with platelets and ...[[id number]]  Abnormal            Final result                 Please view results for these tests on the individual orders.   CBC with platelets differential     Status: Abnormal    Narrative    The following orders were  created for panel order CBC with platelets differential.  Procedure                               Abnormality         Status                     ---------                               -----------         ------                     CBC with platelets and ...[[id number]]  Abnormal            Final result                 Please view results for these tests on the individual orders.          Attestation:  Amount of time performed on this consult: 50 minutes.    Daniela Amezcua PA-C  [facility] Trauma Team   [facility]  Non-operative provider (Floor TRACY/ Trauma Rounds)

## 2025-07-16 NOTE — PROGRESS NOTES
[facility]    Medicine Progress Note - Hospitalist Service    Date of Admission:  7/15/2025    Assessment & Plan   Maria Alejandra Buck is a 75 year old female with PMH significant for CAD, CKD stage IV, hypertension, hyperlipidemia, intra-abdominal aortic aneurysm with prior stenting, chronic vertigo who presents to the ED on 7/15/2025 for evaluation of right hip pain after a fall.    Of note the patient is currently visiting from [place] and was recently admitted at [facility] in [place] from 6/5-6/17/2025 for hypertensive urgency, ROMI on CKD stage IIIa with recent renal biopsy per nephrology recommendations.    ED workup reveals: initial BP of 208/98, intermittently bradycardic into the 50s, CO2 of 21, BUN of 49, creatinine of 3.28, GFR 14, glucose of 114, hemoglobin of 11.0, EKG tracing showing rate of 61 bpm in SR with minimal voltage criteria for LVH, nonspecific ST abnormality, CT of head negative for acute intracranial apology, age-indeterminate right basal ganglia infarct, potentially chronic, brain atrophy and presumed chronic small vessel ischemic changes, right frontal temporal scalp hematoma, without acute fracture, CT cervical spine without acute fracture, irregular soft tissue density in the left upper pulmonary lobe, apex, nonspecific, could potentially represent scarring, CT of right hip negative for fracture, nondisplaced but minimally comminuted fracture of the right pubic body extended into the junction with the right superior and inferior pubic rami.     S/p fall  Closed head injury with right frontotemporal scalp hematoma  Right hip pain due to right pubic body fracture   Walking into laundry room at extended stay hotel where the patient is residing when she slipped on her sandals and fell onto her right side hitting her face. Patient has significant periorbital ecchymosis and frontotemporal hematoma.  CT of head and CT cervical spine negative for acute fracture.   Noted to have pain in right hip located in groin and lateral hip with palpation.  Patient has increased pain with any attempts at mobility.  CT of right hip shows right pubic body fracture that extends into the junction with the right superior and inferior pubic rami.  - Orthopedic surgery consult, nonoperative management   - WBAT of RLE with walker (6-8 weeks), if significant pain at the right pelvic region it would be appropriate to limit her mobilization to chair for bed to chair for approximately 2 weeks  - Pain control with scheduled Tylenol, lidocaine patch, as needed Robaxin, as needed Atarax, as needed low-dose oxycodone, and IV Dilaudid available for severe breakthrough pain  - PT consult, recommending home with HC if able since from out of state and does not have TCU coverage which would be more ideal    - follow up with [facility] specialist in 6 weeks for repeat xrays and reevaluation   - SW consult to assist with discharge planning     CKD stage IV  Baseline creatinine had previously been 1.3, had previously gone up to 2.7 then down to 2.4 when admitted in May of 2025.  Has continued to trend upward and has been around 3.4 when admitted in 06/2025.  Patient follows with nephrology and during this most recent hospital stay had an ultrasound that was unremarkable and noted to have significant proteinuria with protein/creatinine ratio of 15, 525.  Patient was on Plavix for PVD which was held during her last hospital stay and she had a renal biopsy done on 6/16. 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  Most recent renal doppler on 5/21 did not show evidence of JACY.  Recent echocardiogram while in [place] on 5/22 showed EF of 65% with grade 1 diastolic dysfunction.  Patient is again extremely hypertensive, suspected initially in part due to pain.   - per patient SBP has been 190-200 recently, she has not been telling her daughter due to not being told to come back into the hospital   - Continued PTA isordil dinitrate, hydralazine, nifedipine, and carvedilol   - nephrology consulted on 7/16 to assist with BP management, recommending increasing Hydralazine to 50 mg TID and increase Nifedipine ER to 60 mg     Chronic vertigo  Following with ENT in [place]. MRI brain on 5/22 negative for acute pathology.  -PRN meclizine   -suppose to follow up with ENT as outpatient for further testing but unable to arrange due to readmission in 06/2025, supposedly wanted to avoid any medications for vertigo but preventing further falls with increased dizziness more important currently and can coordinate holding if needed when back in [place] for ENT follow up     AAA  PVD  Endoleak and dilation with aorta proximal to endograft.  S/p recent endovascular aneurysm repair on 4/25.  - Continue PTA Plavix and statin    H/o CVA, chronic infarcts in right basal ganglia and right thalamus   Previously known to have chronic infarct based on MRI of brain in 05/2025. Chronic infarct in right basal ganglia again noted on CT of head on admission.     Right lung adenocarcinoma  S/p SBRT in 2021, continue outpatient follow-up once back in [place]    COPD without acute exacerbation  Tobacco abuse, ongoing  Continues to smoke 0.5 ppd. No wheezing on exam. Not currently on daily inhalers.   -encourage smoking cessation    Cognitive impairment  During most recent hospitalization in June 2025  in [place] there was concern for patient having poor short-term memory as well as daughter noted concern that patient and her  are not safe to live at home.  Patient typically would come stay with her daughter for the summer in [place].  -confirmed with daughter plan for the patient to return back to [place] in the fall  -Patient's  will need to attend appointments with her  -Daughter is considering if she can convince her parents to move here to be able to help with her chronic medical issues, patient is reluctant for treatment/lifestyle changes to better her health at this time        Diet: Renal Diet (non-dialysis)    DVT Prophylaxis: Pneumatic Compression Devices  Rao Catheter: Not present  Lines: None     Cardiac Monitoring: None  Code Status: Full Code      Clinically Significant Risk Factors Present on Admission          # Hyperchloremia: Highest Cl = 110 mmol/L in last 2 days, will monitor as appropriate            # Drug Induced Platelet Defect: home medication list includes an antiplatelet medication   # Hypertension: Noted on problem list      # Anemia: based on hgb <11                  Social Drivers of Health    Depression: At risk (6/5/2025)    Received from [facility]    PHQ-2     Depression Risk: 3   Tobacco Use: High Risk (7/15/2025)    Patient History     Smoking Tobacco Use: Every Day     Smokeless Tobacco Use: Never          Disposition Plan     Medically Ready for Discharge: Anticipated Tomorrow     The patient's care was discussed with the Bedside Nurse, Patient, and Patient's Family.    Orly King PA-C  Hospitalist Service  [facility]  Securely message with [employer] (more info)  Text page via [employer] Paging/Directory   ______________________________________________________________________    Interval History   Patient reports pain controlled on current regimen.  Patient states that when her blood pressure is good it still is consistently elevated  in the 1 6170 range.  Blood pressure has been higher here which is concerning to her daughter.  Discussed recent adjustments made by nephrology prior to coming to [place].  She was supposed to have follow-up with nephrology today but this is rescheduled for 2 weeks from now.    Patient's daughter and  at bedside and updated on current plan with all questions answered.     Physical Exam   Vital Signs: Temp: 98.4  F (36.9  C) Temp src: Oral BP: (!) 174/92 Pulse: 63   Resp: 16 SpO2: 96 % O2 Device: None (Room air)    Weight: 126 lbs 8.7 oz    GEN:  Alert, oriented x 3, appears comfortable sitting up in bed, no overt distress, poor historian   HEENT:  Normocephalic, hematoma to right forehead, periorbital ecchymosis  CV:  Regular rate and rhythm, no murmur or JVD.  LUNGS:  Clear to auscultation bilaterally without rales/rhonchi/wheezing/retractions.  Symmetric chest rise on inhalation noted.  ABD:  Active bowel sounds, soft, non-tender/non-distended.  No rebound/guarding/rigidity.  EXT: mild bilateral LE pitting edema.  No cyanosis.  No acute joint synovitis noted. Tenderness over right groin and lateral hip with palpation   SKIN:  Dry to touch, no exanthems noted in the visualized areas.  NEURO: slightly decreased strength in bilateral LEs otherwise intact in UEs, sensation to touch grossly intact.  Coordination symmetric on general exam.  No new focal deficits appreciated.    Medical Decision Making             Data

## 2025-07-16 NOTE — PLAN OF CARE
PRIMARY DIAGNOSIS: Pubis Fracture s/p Fall  OUTPATIENT/OBSERVATION GOALS TO BE MET BEFORE DISCHARGE:  ADLs back to baseline: No    Activity and level of assistance: Up with maximum assistance. Consider SW and/or PT evaluation.     Pain status: Improved-controlled with oral pain medications.    Return to near baseline physical activity: No     Discharge Planner Nurse   Safe discharge environment identified: No  Barriers to discharge: Yes       Entered by: Melvina Parks RN 07/16/2025 3:11 AM   Blood pressure 135/69, pulse 63, temperature 97.9  F (36.6  C), temperature source Oral, resp. rate 16, SpO2 95%, not currently breastfeeding.   Please review provider order for any additional goals.   Nurse to notify provider when observation goals have been met and patient is ready for discharge.    Goal Outcome Evaluation:      Plan of Care Reviewed With: patient    Overall Patient Progress: improvingOverall Patient Progress: improving    Outcome Evaluation: Pt is alert and oriented x4, forgetful. Pain managed with Tylenol, Robaxin and Oxycodone. Lidocaine applied to the R Hip. Ortho, PT/SW consult.

## 2025-07-16 NOTE — CONSULTS
Nephrology Consultation      Maria Alejandra Buck MRN# [id number]   YOB: 1950 Age: 75 year old   Date of Admission: 7/15/2025     Reason for consult: I was asked by Dr. King to evaulate this patient for chronic kidney disease stage IV..           Assessment and Plan:   Chronic kidney disease stage IV: She appears to have longstanding chronic kidney disease and has been followed by a nephrologist in [place] where she lives.  She is up here visiting.  It is uncertain clear whether she has seen a nephrologist locally or not.  In the medical record I see some chart entries from [facility].  She apparently had a kidney biopsy done down in [place] in 6/25.  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  She was hospitalized last month with chronic kidney disease stage IV and increasing creatinine.    Comorbidities include hypertension, peripheral vascular disease, aortic aneurysm, ASCVD, COPD with ongoing smoking.  She apparently has anemia as well.    She appears to have chronic progressive kidney disease but no acute kidney trouble at this point.  I am going to adjust her blood pressure medications.  I will start oral sodium bicarbonate.  We will give her IV fluids with bicarb overnight to make sure she is well-hydrated after her fall.  We will recheck her labs in the morning.    Fall with pelvic fracture: She has been seen by orthopedic surgery.  Does not appear that surgical intervention is planned.  Our main concern here will be to avoid nonsteroidal anti-inflammatory drugs or IV contrast and to keep her well-hydrated.    She has hypertension.  He renal ultrasound with vascular evaluation was done in 5/25 and 6/25.  The kidneys were of normal size and configuration without hydronephrosis but the study of the renal artery is unclear from the report that I found.  She does not have any abdominal bruits but she certainly has a  history of intra-abdominal vascular disease with an aortic aneurysm.  She is an ongoing smoker.  Again I will adjust her blood pressure medications.  We will avoid diuretics and ACE/ARB for now.             Chief Complaint:   Fall with pelvic fracture    History is obtained from the patient and electronic health record         History of Present Illness:   This patient is a 75 year old female who presents with fall and pelvic fracture.  She has been seen by orthopedic surgery.  She has a longstanding history of chronic kidney disease and is followed by nephrologist in [place] where she lives.    8/15 creatinine 1.38 estimated due 2038 7/18 creatinine 1.41 estimated GFR 35  1/24 creatinine 1.30 estimated GFR 44  5/25 Creatinine 3.0 eGFR 16  6/25 creatinine 3.3 estimated GFR 14  7/15/2025 (admission here) creatinine 3.28  7/16/2025 creatinine 3.15 estimated GFR 15 sodium 139 potassium 3.9 bicarbonate 19    As noted above ultrasound studies have been done which showed normal-sized kidneys without hydronephrosis and normal echogenicity.  It is unclear what the results of the vascular evaluation were.  The patient tells me that she has been followed by her nephrologist for a long time in [place] and they have told her that her kidney function is steady.  My review of the historical record shows that her creatinine has been slowly getting worse over time.      She is not diabetic.  She has COPD and is an ongoing smoker.  She apparently has diffuse vascular disease.                Past Medical History:     Past Medical History:   Diagnosis Date    Aneurysm of renal artery     left    Atrial fibrillation (H)     COPD (chronic obstructive pulmonary disease) (H)     High cholesterol     Hypertension     Mixed hyperlipidemia     PVD (peripheral vascular disease)     Stenosis of left carotid artery     Tobacco use disorder              Past Surgical History:     Past Surgical History:   Procedure Laterality Date    ABDOMEN  SURGERY      aneurism      left femoral, mesh    ENDARTERECTOMY CAROTID Left 7/17/2018    Procedure: ENDARTERECTOMY CAROTID;  LEFT CAROTID ENDARTERECTOMY WITH EEG;  Surgeon: Jorge Posada MD;  Location: [facility] OR    GENITOURINARY SURGERY      stent rt kidney    IR RENAL BIOPSY LEFT  6/16/2025               Social History:     Social History     Tobacco Use    Smoking status: Every Day     Current packs/day: 0.50     Average packs/day: 0.5 packs/day for 45.0 years (22.5 ttl pk-yrs)     Types: Cigarettes    Smokeless tobacco: Never   Substance Use Topics    Alcohol use: Yes     Comment: social             Family History:     Family History   Problem Relation Age of Onset    Alzheimer Disease Mother     Cerebrovascular Disease Father     Dementia Maternal Grandmother     Diabetes Brother     Alzheimer Disease Brother     Parkinsonism Brother     Cerebrovascular Disease Brother     Breast Cancer Sister     Heart Disease Sister              Allergies:     Allergies   Allergen Reactions    Iodine Hives             Medications:     Current Facility-Administered Medications   Medication Dose Route Frequency Provider Last Rate Last Admin    acetaminophen (TYLENOL) tablet 1,000 mg  1,000 mg Oral TID Orly King PA-C   1,000 mg at 07/16/25 1336    atorvastatin (LIPITOR) tablet 80 mg  80 mg Oral QPM Orly King PA-C   80 mg at 07/15/25 2022    carvedilol (COREG) tablet 12.5 mg  12.5 mg Oral BID w/meals Orly King PA-C   12.5 mg at 07/16/25 0807    clopidogrel (PLAVIX) tablet 75 mg  75 mg Oral Daily Orly King PA-C   75 mg at 07/16/25 0807    [START ON 7/17/2025] famotidine (PEPCID) tablet 20 mg  20 mg Oral Daily Orly King PA-C        hydrALAZINE (APRESOLINE) tablet 50 mg  50 mg Oral TID Andrew Mansfield MD        [Held by provider] icosapent ethyl (VASCEPA) capsule 2 g  2 g Oral BID w/meals Orly King PA-C        isosorbide dinitrate (ISORDIL) tablet 20 mg   20 mg Oral TID Orly King PA-C   20 mg at 25 1205    Lidocaine (LIDOCARE) 4 % Patch 1 patch  1 patch Transdermal Q24h Orly King PA-C   1 patch at 07/15/25 2021    [START ON 2025] NIFEdipine ER OSMOTIC (PROCARDIA XL) 24 hr tablet 60 mg  60 mg Oral Daily Andrew Mansfield MD        PARoxetine (PAXIL) tablet 40 mg  40 mg Oral Daily Orly King PA-C   40 mg at 25 0807    sodium bicarbonate tablet 1,300 mg  1,300 mg Oral BID Andrew Mansfield MD                 Review of Systems:     She currently has pain from her closed head injury from falling.  She is not short of breath and denies chest or abdominal pain.  She has a walker by the bedside but has not been ambulating yet.            Physical Exam:   Vitals were reviewed  Temp: 98.2  F (36.8  C) Temp src: Oral BP: (!) 189/93 Pulse: 63   Resp: 16 SpO2: 97 % O2 Device: None (Room air)    Wt Readings from Last 4 Encounters:   25 57.4 kg (126 lb 8.7 oz)   18 65.8 kg (145 lb)   18 65.9 kg (145 lb 3.2 oz)   18 64.4 kg (142 lb)     Blood pressure range: Systolic (24hrs), Av , Min:135 , Max:209     Blood pressure range: Diastolic (24hrs), Av, Min:69, Max:107      Intake/Output Summary (Last 24 hours) at 2025 1532  Last data filed at 2025 1000  Gross per 24 hour   Intake 210 ml   Output 400 ml   Net -190 ml     She is frail but alert.  She has a large scalp hematoma and periorbital ecchymosis on the right these are tender to palpation  Neck is supple  Lungs show clear anterior breath sounds  Cardiac exam shows a regular rhythm normal S1-S2 I do not hear a murmur or rub  Abdomen shows normal bowel sounds I do not appreciate a bruit it is soft and nontender  Lower extremities show diminished pulses but no edema           Data:     Recent Results (from the past 24 hours)   CBC with platelets differential    Narrative    The following orders were created for panel order CBC with  platelets differential.  Procedure                               Abnormality         Status                     ---------                               -----------         ------                     CBC with platelets and ...[[id number]]  Abnormal            Final result                 Please view results for these tests on the individual orders.   Basic metabolic panel   Result Value Ref Range    Sodium 139 135 - 145 mmol/L    Potassium 3.9 3.4 - 5.3 mmol/L    Chloride 110 (H) 98 - 107 mmol/L    Carbon Dioxide (CO2) 19 (L) 22 - 29 mmol/L    Anion Gap 10 7 - 15 mmol/L    Urea Nitrogen 43.0 (H) 8.0 - 23.0 mg/dL    Creatinine 3.15 (H) 0.51 - 0.95 mg/dL    GFR Estimate 15 (L) >60 mL/min/1.73m2    Calcium 8.6 (L) 8.8 - 10.4 mg/dL    Glucose 111 (H) 70 - 99 mg/dL   CBC with platelets and differential   Result Value Ref Range    WBC Count 6.6 4.0 - 11.0 10e3/uL    RBC Count 3.04 (L) 3.80 - 5.20 10e6/uL    Hemoglobin 10.4 (L) 11.7 - 15.7 g/dL    Hematocrit 31.7 (L) 35.0 - 47.0 %     (H) 78 - 100 fL    MCH 34.2 (H) 26.5 - 33.0 pg    MCHC 32.8 31.5 - 36.5 g/dL    RDW 15.0 10.0 - 15.0 %    Platelet Count 218 150 - 450 10e3/uL    % Neutrophils 51 %    % Lymphocytes 27 %    % Monocytes 9 %    % Eosinophils 12 %    % Basophils 1 %    % Immature Granulocytes 0 %    NRBCs per 100 WBC 0 <1 /100    Absolute Neutrophils 3.4 1.6 - 8.3 10e3/uL    Absolute Lymphocytes 1.7 0.8 - 5.3 10e3/uL    Absolute Monocytes 0.6 0.0 - 1.3 10e3/uL    Absolute Eosinophils 0.8 (H) 0.0 - 0.7 10e3/uL    Absolute Basophils 0.1 0.0 - 0.2 10e3/uL    Absolute Immature Granulocytes 0.0 <=0.4 10e3/uL    Absolute NRBCs 0.0 10e3/uL

## 2025-07-16 NOTE — PLAN OF CARE
"Goal Outcome Evaluation:       Oxy 2.5 mg given for pain. Patient walked in hallway.      PRIMARY DIAGNOSIS: \"GENERIC\" NURSING  OUTPATIENT/OBSERVATION GOALS TO BE MET BEFORE DISCHARGE:  ADLs back to baseline: Yes    Activity and level of assistance: Up with standby assistance.    Pain status: Improved-controlled with oral pain medications.    Return to near baseline physical activity: No     Discharge Planner Nurse   Safe discharge environment identified: Yes  Barriers to discharge: Yes, Cr 3.1       Entered by: Patrizia Nettles RN 07/16/2025 6:23 PM     Please review provider order for any additional goals.   Nurse to notify provider when observation goals have been met and patient is ready for discharge.    Problem: Adult Inpatient Plan of Care  Goal: Plan of Care Review  Description: The Plan of Care Review/Shift note should be completed every shift.  The Outcome Evaluation is a brief statement about your assessment that the patient is improving, declining, or no change.  This information will be displayed automatically on your shift  note.  Outcome: Progressing  Goal: Patient-Specific Goal (Individualized)  Description: You can add care plan individualizations to a care plan. Examples of Individualization might be:  \"Parent requests to be called daily at 9am for status\", \"I have a hard time hearing out of my right ear\", or \"Do not touch me to wake me up as it startles  me\".  Outcome: Progressing  Goal: Absence of Hospital-Acquired Illness or Injury  Outcome: Progressing  Intervention: Identify and Manage Fall Risk  Recent Flowsheet Documentation  Taken 7/16/2025 1739 by Patrizia Nettles RN  Safety Promotion/Fall Prevention:   activity supervised   assistive device/personal items within reach   clutter free environment maintained   patient and family education   nonskid shoes/slippers when out of bed  Intervention: Prevent Skin Injury  Recent Flowsheet Documentation  Taken 7/16/2025 1739 by Patrizia Nettles, " RN  Body Position: position changed independently  Goal: Optimal Comfort and Wellbeing  Outcome: Progressing  Goal: Readiness for Transition of Care  Outcome: Progressing     Problem: Delirium  Goal: Optimal Coping  Outcome: Progressing  Goal: Improved Behavioral Control  Outcome: Progressing  Intervention: Minimize Safety Risk  Recent Flowsheet Documentation  Taken 7/16/2025 1739 by Patrizia Nettles RN  Enhanced Safety Measures: pain management  Goal: Improved Attention and Thought Clarity  Outcome: Progressing  Goal: Improved Sleep  Outcome: Progressing     Problem: Pain Acute  Goal: Optimal Pain Control and Function  Outcome: Progressing

## 2025-07-16 NOTE — PROGRESS NOTES
PRIMARY DIAGNOSIS: Fall/Pelvic Fx  OUTPATIENT/OBSERVATION GOALS TO BE MET BEFORE DISCHARGE  1. Orthostatic performed: No    2. Tolerating PO medications: Yes    3. Return to near baseline physical activity: No    4. Cleared for discharge by consultants (if involved): No    Discharge Planner Nurse   Safe discharge environment identified: No  Barriers to discharge: Yes       Entered by: Fide Menjivar RN 07/16/2025 9:53 AM     Please review provider order for any additional goals.   Nurse to notify provider when observation goals have been met and patient is ready for discharge.

## 2025-07-16 NOTE — CONSULTS
Care Management Initial Consult    General Information  Assessment completed with: Patient,    Type of CM/SW Visit: Initial Assessment    Primary Care Provider verified and updated as needed: Yes   Readmission within the last 30 days: no previous admission in last 30 days      Reason for Consult: discharge planning    Communication Assessment  Patient's communication style: spoken language (English or Bilingual)    Hearing Difficulty or Deaf: no   Wear Glasses or Blind: no    Cognitive  Cognitive/Neuro/Behavioral: WDL, all  Level of Consciousness: alert  Arousal Level: opens eyes spontaneously  Orientation: oriented x 4  Mood/Behavior: calm, cooperative  Best Language: 0 - No aphasia  Speech: clear, spontaneous, logical    Living Environment:   People in home: spouse     Current living Arrangements: other (see comments) (Extended stay hotel)      Able to return to prior arrangements: yes     Family/Social Support:  Care provided by: self  Provides care for: no one  Marital Status:   Support system: , Children          Description of Support System: Supportive, Involved    Support Assessment: Adequate family and caregiver support    Current Resources:   Patient receiving home care services: No  Community Resources: None  Equipment currently used at home: none  Supplies currently used at home: None    Lifestyle & Psychosocial Needs:  Social Drivers of Health     Food Insecurity: Low Risk  (7/15/2025)    Food Insecurity     Within the past 12 months, did you worry that your food would run out before you got money to buy more?: No     Within the past 12 months, did the food you bought just not last and you didn t have money to get more?: No   Depression: At risk (6/5/2025)    Received from [facility]    PHQ-2     Depression Risk: 3   Housing Stability: Low Risk  (7/15/2025)    Housing Stability     Do you have housing? : Yes     Are you worried about losing your housing?: No   Tobacco Use: High Risk  (7/15/2025)    Patient History     Smoking Tobacco Use: Every Day     Smokeless Tobacco Use: Never     Passive Exposure: Not on file   Financial Resource Strain: Low Risk  (7/15/2025)    Financial Resource Strain     Within the past 12 months, have you or your family members you live with been unable to get utilities (heat, electricity) when it was really needed?: No   Alcohol Use: Not on file   Transportation Needs: Low Risk  (7/15/2025)    Transportation Needs     Within the past 12 months, has lack of transportation kept you from medical appointments, getting your medicines, non-medical meetings or appointments, work, or from getting things that you need?: No   Physical Activity: Not on file   Interpersonal Safety: Not At Risk (6/6/2025)    Received from [facility]    Humiliation, Afraid, Rape, and Kick questionnaire     Fear of Current or Ex-Partner: No     Emotionally Abused: No     Physically Abused: No     Sexually Abused: No   Stress: Not on file   Social Connections: Not on file   Health Literacy: Not on file     Functional Status:  Prior to admission patient needed assistance:   Dependent ADLs:: Independent  Dependent IADLs:: Cleaning, Cooking, Laundry, Shopping, Transportation (Shared with )  Assesssment of Functional Status: Not at  functional baseline    Additional Information:  CM consulted for discharge planning, met with pt at bedside to discuss. Pt lives with  in [place] but spends summers in [place] from June- September. They have been staying in an extended stay hotel in [place]. She is independent at baseline, pt and  share in IADLs.     Reviewed PT recommendations for TCU at discharge, reviewed deposit and OOP costs associated due to no coverage for TCU with [facility] status and Medicare. Pt reports that she would be unable to pay for this. Discussed return to hotel with support from  + HC PT/OT services. Unfortunately pt is not established with PCP in [place] and would need  an appointment after discharge, HC could then be ordered at that appointment. Pt agreeable to this plan but would like CM to coordinate with her dtr Yesenia, called Yesenia and LUIS A requesting call back.     Next Steps: Follow-up with Yesenia, arrange follow-up appointment     Nannette Crespo RN BSN   Inpatient Care Coordination  [facility]   Phone [phone]

## 2025-07-16 NOTE — PLAN OF CARE
PRIMARY DIAGNOSIS: Right hip pain d/t right pubic bone fracture   OUTPATIENT/OBSERVATION GOALS TO BE MET BEFORE DISCHARGE:  ADLs back to baseline: No    Activity and level of assistance: Up with maximum assistance. Consider SW and/or PT evaluation.     Pain status: Improved-controlled with oral pain medications. Pt reports having pain in her right groin, hip, buttock, and lower back. She also has left shoulder pain. She also has bruising to the right side face    Return to near baseline physical activity: No, WBAT     Discharge Planner Nurse   Safe discharge environment identified: No  Barriers to discharge: Yes       Entered by: Melvina Parks RN 07/16/2025 5:02 AM   Blood pressure (!) 149/77, pulse 66, temperature 97.7  F (36.5  C), temperature source Oral, resp. rate 16, SpO2 96%, not currently breastfeeding.   Please review provider order for any additional goals.   Nurse to notify provider when observation goals have been met and patient is ready for discharge.    Goal Outcome Evaluation:      Plan of Care Reviewed With: patient    Overall Patient Progress: improvingOverall Patient Progress: improving    Outcome Evaluation: Pt is alert and oriented x4, forgetful. Pain managed with Tylenol, Robaxin and Oxycodone. Lidocaine applied to the R Hip. Ortho, PT/SW consult.

## 2025-07-17 ENCOUNTER — APPOINTMENT (OUTPATIENT)
Dept: PHYSICAL THERAPY | Facility: CLINIC | Age: 75
End: 2025-07-17
Payer: MEDICARE

## 2025-07-17 VITALS
SYSTOLIC BLOOD PRESSURE: 154 MMHG | OXYGEN SATURATION: 95 % | WEIGHT: 117.9 LBS | RESPIRATION RATE: 16 BRPM | TEMPERATURE: 98.3 F | DIASTOLIC BLOOD PRESSURE: 80 MMHG | HEART RATE: 70 BPM | HEIGHT: 65 IN | BODY MASS INDEX: 19.64 KG/M2

## 2025-07-17 LAB
ALBUMIN SERPL BCG-MCNC: 3.2 G/DL (ref 3.5–5.2)
ALP SERPL-CCNC: 61 U/L (ref 40–150)
ALT SERPL W P-5'-P-CCNC: 11 U/L (ref 0–50)
ANION GAP SERPL CALCULATED.3IONS-SCNC: 12 MMOL/L (ref 7–15)
AST SERPL W P-5'-P-CCNC: 22 U/L (ref 0–45)
BILIRUB SERPL-MCNC: 0.3 MG/DL
BUN SERPL-MCNC: 40.6 MG/DL (ref 8–23)
CALCIUM SERPL-MCNC: 8.4 MG/DL (ref 8.8–10.4)
CHLORIDE SERPL-SCNC: 106 MMOL/L (ref 98–107)
CREAT SERPL-MCNC: 3.01 MG/DL (ref 0.51–0.95)
EGFRCR SERPLBLD CKD-EPI 2021: 16 ML/MIN/1.73M2
ERYTHROCYTE [DISTWIDTH] IN BLOOD BY AUTOMATED COUNT: 14.9 % (ref 10–15)
GLUCOSE SERPL-MCNC: 101 MG/DL (ref 70–99)
HCO3 SERPL-SCNC: 23 MMOL/L (ref 22–29)
HCT VFR BLD AUTO: 31.6 % (ref 35–47)
HGB BLD-MCNC: 10.5 G/DL (ref 11.7–15.7)
MAGNESIUM SERPL-MCNC: 2 MG/DL (ref 1.7–2.3)
MCH RBC QN AUTO: 34.1 PG (ref 26.5–33)
MCHC RBC AUTO-ENTMCNC: 33.2 G/DL (ref 31.5–36.5)
MCV RBC AUTO: 103 FL (ref 78–100)
PHOSPHATE SERPL-MCNC: 3.6 MG/DL (ref 2.5–4.5)
PLATELET # BLD AUTO: 216 10E3/UL (ref 150–450)
POTASSIUM SERPL-SCNC: 3.8 MMOL/L (ref 3.4–5.3)
PROT SERPL-MCNC: 6.7 G/DL (ref 6.4–8.3)
RBC # BLD AUTO: 3.08 10E6/UL (ref 3.8–5.2)
SODIUM SERPL-SCNC: 141 MMOL/L (ref 135–145)
WBC # BLD AUTO: 7.5 10E3/UL (ref 4–11)

## 2025-07-17 PROCEDURE — 84100 ASSAY OF PHOSPHORUS: CPT | Performed by: INTERNAL MEDICINE

## 2025-07-17 PROCEDURE — 36415 COLL VENOUS BLD VENIPUNCTURE: CPT | Performed by: INTERNAL MEDICINE

## 2025-07-17 PROCEDURE — 250N000009 HC RX 250: Performed by: INTERNAL MEDICINE

## 2025-07-17 PROCEDURE — 97530 THERAPEUTIC ACTIVITIES: CPT | Mod: GP | Performed by: PHYSICAL THERAPIST

## 2025-07-17 PROCEDURE — 250N000013 HC RX MED GY IP 250 OP 250 PS 637: Performed by: INTERNAL MEDICINE

## 2025-07-17 PROCEDURE — 258N000002 HC RX IP 258 OP 250: Performed by: INTERNAL MEDICINE

## 2025-07-17 PROCEDURE — 99232 SBSQ HOSP IP/OBS MODERATE 35: CPT | Performed by: INTERNAL MEDICINE

## 2025-07-17 PROCEDURE — 85014 HEMATOCRIT: CPT | Performed by: INTERNAL MEDICINE

## 2025-07-17 PROCEDURE — 250N000013 HC RX MED GY IP 250 OP 250 PS 637: Performed by: PHYSICIAN ASSISTANT

## 2025-07-17 PROCEDURE — 82040 ASSAY OF SERUM ALBUMIN: CPT | Performed by: INTERNAL MEDICINE

## 2025-07-17 PROCEDURE — 120N000001 HC R&B MED SURG/OB

## 2025-07-17 PROCEDURE — 97116 GAIT TRAINING THERAPY: CPT | Mod: GP | Performed by: PHYSICAL THERAPIST

## 2025-07-17 PROCEDURE — 83735 ASSAY OF MAGNESIUM: CPT | Performed by: INTERNAL MEDICINE

## 2025-07-17 RX ORDER — MECLIZINE HYDROCHLORIDE 25 MG/1
25 TABLET ORAL EVERY 6 HOURS SCHEDULED
Status: DISCONTINUED | OUTPATIENT
Start: 2025-07-17 | End: 2025-07-19 | Stop reason: HOSPADM

## 2025-07-17 RX ORDER — CLONIDINE 0.3 MG/24H
1 PATCH, EXTENDED RELEASE TRANSDERMAL WEEKLY
Status: DISCONTINUED | OUTPATIENT
Start: 2025-07-17 | End: 2025-07-19 | Stop reason: HOSPADM

## 2025-07-17 RX ADMIN — HYDRALAZINE HYDROCHLORIDE 50 MG: 25 TABLET ORAL at 08:21

## 2025-07-17 RX ADMIN — HYDRALAZINE HYDROCHLORIDE 50 MG: 25 TABLET ORAL at 21:32

## 2025-07-17 RX ADMIN — NIFEDIPINE 60 MG: 30 TABLET, FILM COATED, EXTENDED RELEASE ORAL at 08:20

## 2025-07-17 RX ADMIN — MECLIZINE HYDROCHLORIDE 25 MG: 25 TABLET ORAL at 19:38

## 2025-07-17 RX ADMIN — CLONIDINE 1 PATCH: 0.3 PATCH TRANSDERMAL at 12:04

## 2025-07-17 RX ADMIN — ISOSORBIDE DINITRATE 20 MG: 10 TABLET ORAL at 12:09

## 2025-07-17 RX ADMIN — MECLIZINE HYDROCHLORIDE 25 MG: 25 TABLET ORAL at 12:40

## 2025-07-17 RX ADMIN — CARVEDILOL 12.5 MG: 12.5 TABLET, FILM COATED ORAL at 19:38

## 2025-07-17 RX ADMIN — SODIUM BICARBONATE: 84 INJECTION, SOLUTION INTRAVENOUS at 03:22

## 2025-07-17 RX ADMIN — PAROXETINE HYDROCHLORIDE 40 MG: 20 TABLET, FILM COATED ORAL at 08:20

## 2025-07-17 RX ADMIN — SODIUM BICARBONATE 1300 MG: 650 TABLET ORAL at 21:32

## 2025-07-17 RX ADMIN — HYDRALAZINE HYDROCHLORIDE 50 MG: 25 TABLET ORAL at 14:01

## 2025-07-17 RX ADMIN — OXYCODONE HYDROCHLORIDE 5 MG: 5 TABLET ORAL at 21:31

## 2025-07-17 RX ADMIN — ACETAMINOPHEN 1000 MG: 500 TABLET, FILM COATED ORAL at 21:31

## 2025-07-17 RX ADMIN — ACETAMINOPHEN 1000 MG: 500 TABLET, FILM COATED ORAL at 14:01

## 2025-07-17 RX ADMIN — OXYCODONE HYDROCHLORIDE 2.5 MG: 5 TABLET ORAL at 08:25

## 2025-07-17 RX ADMIN — SODIUM BICARBONATE 1300 MG: 650 TABLET ORAL at 08:21

## 2025-07-17 RX ADMIN — CARVEDILOL 12.5 MG: 12.5 TABLET, FILM COATED ORAL at 08:20

## 2025-07-17 RX ADMIN — MECLIZINE HYDROCHLORIDE 25 MG: 25 TABLET ORAL at 08:26

## 2025-07-17 RX ADMIN — LIDOCAINE 1 PATCH: 4 PATCH TOPICAL at 21:32

## 2025-07-17 RX ADMIN — ACETAMINOPHEN 1000 MG: 500 TABLET, FILM COATED ORAL at 08:21

## 2025-07-17 RX ADMIN — ISOSORBIDE DINITRATE 20 MG: 10 TABLET ORAL at 19:38

## 2025-07-17 RX ADMIN — FAMOTIDINE 20 MG: 20 TABLET, FILM COATED ORAL at 08:21

## 2025-07-17 RX ADMIN — METHOCARBAMOL 500 MG: 500 TABLET ORAL at 12:40

## 2025-07-17 RX ADMIN — CLOPIDOGREL BISULFATE 75 MG: 75 TABLET, FILM COATED ORAL at 08:20

## 2025-07-17 RX ADMIN — ATORVASTATIN CALCIUM 80 MG: 40 TABLET, FILM COATED ORAL at 21:31

## 2025-07-17 RX ADMIN — ISOSORBIDE DINITRATE 20 MG: 10 TABLET ORAL at 08:21

## 2025-07-17 ASSESSMENT — ACTIVITIES OF DAILY LIVING (ADL)
ADLS_ACUITY_SCORE: 34

## 2025-07-17 NOTE — PLAN OF CARE
"BP (!) 142/72 (BP Location: Left arm)   Pulse 71   Temp 98  F (36.7  C) (Oral)   Resp 16   Ht 1.651 m (5' 5\")   Wt 53.5 kg (117 lb 14.4 oz)   SpO2 95%   BMI 19.62 kg/m      Pt's alert and oriented x4. Able to make needs known. Denies headache/n/v/d. Pain managed with scheduled Tylenol, Lidocaine patch, prn Oxycodone and utilizing T-Pump. Voids spontaneously. SBA w/ walker and GB. Able to ambulating to the bathroom. SL. Renal diet and tolerating well. Has Catapres patch in place on left shoulder.    Plan: Pain management  Consults: Nephrology/ PT- recommends TCU, SW.     Goal Outcome Evaluation:      Plan of Care Reviewed With: patient    Overall Patient Progress: improvingOverall Patient Progress: improving    Outcome Evaluation: Pt's alert and oriented x4. Able to make needs known.      Problem: Adult Inpatient Plan of Care  Goal: Plan of Care Review  Description: The Plan of Care Review/Shift note should be completed every shift.  The Outcome Evaluation is a brief statement about your assessment that the patient is improving, declining, or no change.  This information will be displayed automatically on your shift  note.  Outcome: Progressing  Flowsheets (Taken 7/17/2025 0538)  Outcome Evaluation: Pt's alert and oriented x4. Able to make needs known.  Plan of Care Reviewed With: patient  Overall Patient Progress: improving  Goal: Patient-Specific Goal (Individualized)  Description: You can add care plan individualizations to a care plan. Examples of Individualization might be:  \"Parent requests to be called daily at 9am for status\", \"I have a hard time hearing out of my right ear\", or \"Do not touch me to wake me up as it startles  me\".  Outcome: Progressing  Goal: Absence of Hospital-Acquired Illness or Injury  Outcome: Progressing  Intervention: Identify and Manage Fall Risk  Recent Flowsheet Documentation  Taken 7/17/2025 1603 by Miguelina Tesfaye RN  Safety Promotion/Fall Prevention:   activity " supervised   lighting adjusted   mobility aid in reach   nonskid shoes/slippers when out of bed   clutter free environment maintained   room organization consistent  Intervention: Prevent Skin Injury  Recent Flowsheet Documentation  Taken 7/17/2025 1600 by Miguelina Tesfaye RN  Body Position:   position changed independently   weight shifting  Intervention: Prevent Infection  Recent Flowsheet Documentation  Taken 7/17/2025 1603 by Miguelina Tesfaye RN  Infection Prevention:   hand hygiene promoted   personal protective equipment utilized   rest/sleep promoted   single patient room provided   cohorting utilized  Goal: Optimal Comfort and Wellbeing  Outcome: Progressing  Intervention: Provide Person-Centered Care  Recent Flowsheet Documentation  Taken 7/17/2025 1603 by Miguelina Tesfaye RN  Trust Relationship/Rapport: care explained  Goal: Readiness for Transition of Care  Outcome: Progressing     Problem: Delirium  Goal: Optimal Coping  Outcome: Progressing  Goal: Improved Behavioral Control  Outcome: Progressing  Intervention: Minimize Safety Risk  Recent Flowsheet Documentation  Taken 7/17/2025 1603 by Miguelina Tesfaye RN  Enhanced Safety Measures: pain management  Trust Relationship/Rapport: care explained  Goal: Improved Attention and Thought Clarity  Outcome: Progressing  Goal: Improved Sleep  Outcome: Progressing     Problem: Pain Acute  Goal: Optimal Pain Control and Function  Outcome: Progressing  Intervention: Prevent or Manage Pain  Recent Flowsheet Documentation  Taken 7/17/2025 1603 by Miguelina Tesfaye RN  Medication Review/Management: medications reviewed

## 2025-07-17 NOTE — PLAN OF CARE
"  Problem: Adult Inpatient Plan of Care  Goal: Plan of Care Review  Description: The Plan of Care Review/Shift note should be completed every shift.  The Outcome Evaluation is a brief statement about your assessment that the patient is improving, declining, or no change.  This information will be displayed automatically on your shift  note.  Outcome: Progressing  Flowsheets (Taken 7/17/2025 1031)  Outcome Evaluation: Pt walking witgh standy be walker/gaitbelt. Stable VS with hypertension. Pt tolerating med and po gettin good paincontrilwith oxy po.IV infusing ns with na/bico3 at 100/hr.  Goal: Patient-Specific Goal (Individualized)  Description: You can add care plan individualizations to a care plan. Examples of Individualization might be:  \"Parent requests to be called daily at 9am for status\", \"I have a hard time hearing out of my right ear\", or \"Do not touch me to wake me up as it startles  me\".  Outcome: Progressing  Goal: Absence of Hospital-Acquired Illness or Injury  Outcome: Progressing  Goal: Optimal Comfort and Wellbeing  Outcome: Progressing  Goal: Readiness for Transition of Care  Outcome: Progressing     Problem: Delirium  Goal: Optimal Coping  Outcome: Progressing  Goal: Improved Behavioral Control  Outcome: Progressing  Goal: Improved Attention and Thought Clarity  Outcome: Progressing  Goal: Improved Sleep  Outcome: Progressing     Problem: Pain Acute  Goal: Optimal Pain Control and Function  Outcome: Progressing   Goal Outcome Evaluation:                 Outcome Evaluation: Pt walking witgh standy be walker/gaitbelt. Stable VS with hypertension. Pt tolerating med and po gettin good paincontrilwith oxy po.IV infusing ns with na/bico3 at 100/hr.          "

## 2025-07-17 NOTE — PROGRESS NOTES
"SPIRITUAL HEALTH SERVICES Progress Note  Obs 2    Met with patient and her  regarding length of stay visit for emotional support.    Patient shared that she fell at their extended stay motel, where they spend the summer to escape the heat in [place], [place], where they retired.  (While here, they catch up with their daughter and other relatives.)      She added, \"I got a black eye, and also broke my hip.\"  Medical record lists history of CAD, CKD -IV, hypertension, hyperlipidemia, intra-abdominal aortic aneurysm, chronic vertigo and other hospital problems.    Patient is Jehovah's witness.  She is not affiliated with a [employer]ab[employer]ist at present.    No spiritual needs.  [facility] remains available for emotional support upon request.    Rev. Kym Kenyon M.Div.  Staff   Phone  [phone]    "

## 2025-07-17 NOTE — PLAN OF CARE
"Goal Outcome Evaluation:      Plan of Care Reviewed With: patient    Overall Patient Progress: improvingOverall Patient Progress: improving    Outcome Evaluation: Pt is alert nd oriented x4. Pleasantly interactive. SBA with walker and has been ambulating to the bathroom. Pain is well controlled.  L PIV infusing [id number] at 75ml/hr. Tolerating Renal diet.  PT recommends TCU, SW following for discharge planning.    Blood pressure (!) 168/86, pulse 68, temperature 98  F (36.7  C), temperature source Oral, resp. rate 18, height 1.651 m (5' 5\"), weight 57.4 kg (126 lb 8.7 oz), SpO2 93%, not currently breastfeeding.     Problem: Adult Inpatient Plan of Care  Goal: Plan of Care Review  Description: The Plan of Care Review/Shift note should be completed every shift.  The Outcome Evaluation is a brief statement about your assessment that the patient is improving, declining, or no change.  This information will be displayed automatically on your shift  note.  Outcome: Progressing  Flowsheets (Taken 7/17/2025 1352)  Outcome Evaluation: Pt is alert nd oriented x4. Pleasantly interactive. SBA with walker and has been ambulating to the bathroom. Pain is well controlled.  L PIV infusing [id number] at 75ml/hr. Tolerating Renal diet.  PT recommends TCU, SW following for discharge planning.  Plan of Care Reviewed With: patient  Overall Patient Progress: improving  Goal: Patient-Specific Goal (Individualized)  Description: You can add care plan individualizations to a care plan. Examples of Individualization might be:  \"Parent requests to be called daily at 9am for status\", \"I have a hard time hearing out of my right ear\", or \"Do not touch me to wake me up as it startles  me\".  Outcome: Progressing  Goal: Absence of Hospital-Acquired Illness or Injury  Outcome: Progressing  Intervention: Identify and Manage Fall Risk  Recent Flowsheet Documentation  Taken 7/16/2025 2295 by Melvina Parks, RN  Safety Promotion/Fall " Prevention:   activity supervised   lighting adjusted   clutter free environment maintained   mobility aid in reach   nonskid shoes/slippers when out of bed   room door open  Intervention: Prevent Skin Injury  Recent Flowsheet Documentation  Taken 7/16/2025 2146 by Melvina Parks RN  Body Position:   position changed independently   weight shifting  Intervention: Prevent and Manage VTE (Venous Thromboembolism) Risk  Recent Flowsheet Documentation  Taken 7/16/2025 2146 by Melvina Parks RN  VTE Prevention/Management: SCDs on (sequential compression devices)  Intervention: Prevent Infection  Recent Flowsheet Documentation  Taken 7/16/2025 2146 by Melvina Parks RN  Infection Prevention:   hand hygiene promoted   personal protective equipment utilized   rest/sleep promoted   single patient room provided  Goal: Optimal Comfort and Wellbeing  Outcome: Progressing  Goal: Readiness for Transition of Care  Outcome: Progressing     Problem: Delirium  Goal: Optimal Coping  Outcome: Progressing  Goal: Improved Behavioral Control  Outcome: Progressing  Intervention: Minimize Safety Risk  Recent Flowsheet Documentation  Taken 7/16/2025 2146 by Melvina Praks RN  Enhanced Safety Measures: pain management  Goal: Improved Attention and Thought Clarity  Outcome: Progressing  Goal: Improved Sleep  Outcome: Progressing     Problem: Pain Acute  Goal: Optimal Pain Control and Function  Outcome: Progressing  Intervention: Prevent or Manage Pain  Recent Flowsheet Documentation  Taken 7/16/2025 2146 by Melvina Parks RN  Medication Review/Management: medications reviewed

## 2025-07-17 NOTE — PROGRESS NOTES
[facility]    Medicine Progress Note - Hospitalist Service    Date of Admission:  7/15/2025    Assessment & Plan   Maria Alejandra Buck is a 75 year old female with PMH significant for CAD, CKD stage IV, hypertension, hyperlipidemia, intra-abdominal aortic aneurysm with prior stenting, chronic vertigo who presents to the ED on 7/15/2025 for evaluation of right hip pain after a fall.    Of note the patient is currently visiting from [place] and was recently admitted at [facility] in [place] from 6/5-6/17/2025 for hypertensive urgency, ROMI on CKD stage IIIa with recent renal biopsy per nephrology recommendations.    ED workup reveals: initial BP of 208/98, intermittently bradycardic into the 50s, CO2 of 21, BUN of 49, creatinine of 3.28, GFR 14, glucose of 114, hemoglobin of 11.0, EKG tracing showing rate of 61 bpm in SR with minimal voltage criteria for LVH, nonspecific ST abnormality, CT of head negative for acute intracranial apology, age-indeterminate right basal ganglia infarct, potentially chronic, brain atrophy and presumed chronic small vessel ischemic changes, right frontal temporal scalp hematoma, without acute fracture, CT cervical spine without acute fracture, irregular soft tissue density in the left upper pulmonary lobe, apex, nonspecific, could potentially represent scarring, CT of right hip negative for fracture, nondisplaced but minimally comminuted fracture of the right pubic body extended into the junction with the right superior and inferior pubic rami.     S/p fall  Closed head injury with right frontotemporal scalp hematoma  Right hip pain due to right pubic body fracture   Walking into laundry room at extended stay hotel where the patient is residing when she slipped on her sandals and fell onto her right side hitting her face. Patient has significant periorbital ecchymosis and frontotemporal hematoma.  CT of head and CT cervical spine negative for acute fracture.   Noted to have pain in right hip located in groin and lateral hip with palpation.  Patient has increased pain with any attempts at mobility.  CT of right hip shows right pubic body fracture that extends into the junction with the right superior and inferior pubic rami.  - Orthopedic surgery consult, nonoperative management   - WBAT of RLE with walker (6-8 weeks), if significant pain at the right pelvic region it would be appropriate to limit her mobilization to chair for bed to chair for approximately 2 week  - pain previously controlled on 7/16, increased pain on 7/17, will try to utilize atarax with oxycodone and robaxin to see if helpful   - Pain control with scheduled Tylenol, lidocaine patch, as needed Robaxin, as needed Atarax, as needed low-dose oxycodone, and IV Dilaudid available for severe breakthrough pain (limit as much as possible)   - PT consult, recommending home with HC if able since from out of state and does not have TCU coverage, reassessed on 7/17 and more strongly recommending TCU at this point, will need to continue to monitor pending improvement in pain   - follow up with [facility] specialist in 6 weeks for repeat xrays and reevaluation   - SW consult to assist with discharge planning     CKD stage IV  Baseline creatinine had previously been 1.3, had previously gone up to 2.7 then down to 2.4 when admitted in May of 2025.  Has continued to trend upward and has been around 3.4 when admitted in 06/2025.  Patient follows with nephrology and during this most recent hospital stay in [place] had an ultrasound that was unremarkable and noted to have significant proteinuria with protein/creatinine ratio of 15, 525.  Patient was on Plavix for PVD which was held during her last hospital stay and she had a renal biopsy done on 6/16. Creatinine on 6/17 in care everywhere was 3.30  -renal biopsy result from [place] reports cholesterol emboli and nodular glomerulosclerosis without further details but per discussion with  daughter thought to be in relation to untreated diabetes  -creatinine near recent baseline  -no current plans for dialysis, producing urine   -nephrology consult to assist with BP management, recommending starting sodium bicarbonate and give IVFs to hydrate patient as best as possible prior to discharge   -creatinine improved to 3.01 on 7/17 with nephrology recommending stopping IVFs    Hypertensive urgency  CAD  Per most recent discharge summary from [place] [facility] had recommended isordil dinitrate, hydralazine, nifedipine, and carvedilol.  Most recent renal doppler on 5/21 did not show evidence of JACY.  Recent echocardiogram while in [place] on 5/22 showed EF of 65% with grade 1 diastolic dysfunction.  Patient is again extremely hypertensive, suspected initially in part due to pain.   - per patient SBP has been 190-200 recently, she has not been telling her daughter due to not being told to come back into the hospital   - Continued PTA isordil dinitrate, hydralazine, nifedipine, and carvedilol   - nephrology consulted on 7/16 to assist with BP management, recommending increasing Hydralazine to 50 mg TID and increase Nifedipine ER to 60 mg   - 7/17 - BP improving to 150s/80s after medications, nephrology added weekly Clonidine patch     Chronic vertigo  Following with ENT in [place]. MRI brain on 5/22 negative for acute pathology.  -change to scheduled Meclizine 25 mg every 6 hours to see if improvement in dizziness since worsening on 7/17  -orthostatic blood pressures negative on 7/17   -suppose to follow up with ENT as outpatient for further testing but unable to arrange due to readmission in 06/2025, supposedly wanted to avoid any medications for vertigo but preventing further falls with increased dizziness more important currently and can coordinate holding if needed when back in [place] for ENT follow up     AAA  PVD  Endoleak and dilation with aorta proximal to endograft.  S/p recent endovascular aneurysm  repair on 4/25.  - Continue PTA Plavix and statin    H/o CVA, chronic infarcts in right basal ganglia and right thalamus   Previously known to have chronic infarct based on MRI of brain in 05/2025. Chronic infarct in right basal ganglia again noted on CT of head on admission.     Right lung adenocarcinoma  S/p SBRT in 2021, continue outpatient follow-up once back in [place]    COPD without acute exacerbation  Tobacco abuse, ongoing  Continues to smoke 0.5 ppd. No wheezing on exam. Not currently on daily inhalers.   -encourage smoking cessation    Cognitive impairment  During most recent hospitalization in June 2025 in [place] there was concern for patient having poor short-term memory as well as daughter noted concern that patient and her  are not safe to live at home.  Patient typically would come stay with her daughter for the summer in [place].  -confirmed with daughter plan for the patient to return back to [place] in the fall  -Patient's  will need to attend appointments with her  -Daughter is considering if she can convince her parents to move here to be able to help with her chronic medical issues, patient is reluctant for treatment/lifestyle changes to better her health at this time        Diet: Renal Diet (non-dialysis)    DVT Prophylaxis: Pneumatic Compression Devices  Rao Catheter: Not present  Lines: None     Cardiac Monitoring: None  Code Status: Full Code      Clinically Significant Risk Factors Present on Admission          # Hyperchloremia: Highest Cl = 110 mmol/L in last 2 days, will monitor as appropriate      # Hypocalcemia: Lowest Ca = 8.4 mg/dL in last 2 days, will monitor and replace as appropriate     # Hypoalbuminemia: Lowest albumin = 3.2 g/dL at 7/17/2025  6:20 AM, will monitor as appropriate   # Drug Induced Platelet Defect: home medication list includes an antiplatelet medication   # Hypertension: Noted on problem list      # Anemia: based on hgb <11                 Social Drivers of Health    Depression: At risk (6/5/2025)    Received from [facility]    PHQ-2     Depression Risk: 3   Tobacco Use: High Risk (7/15/2025)    Patient History     Smoking Tobacco Use: Every Day     Smokeless Tobacco Use: Never          Disposition Plan     Medically Ready for Discharge: Anticipated Tomorrow       The patient's care was discussed with the Bedside Nurse, Patient, and Patient's Family.    Orly King PA-C  Hospitalist Service  [facility]  Securely message with "[email or web address] (more info)  Text page via [email or web address] Paging/Directory   ______________________________________________________________________    Interval History   Has received 2 doses of the meclizine but is stating that her dizziness seems to be worse today.  She reports increased pain with ambulating today.  She has been taking the same medications as yesterday with not as much relief.  Blood pressure has improved with adjustments made by nephrology.    Daughter, , and sister are at bedside and thought that nephrology was going to further adjust medications.  Daughter expresses concern that patient has increased dizziness with her recent fall if she went home today she would be at higher risk for falling again.     Physical Exam   Vital Signs: Temp: 98  F (36.7  C) Temp src: Oral BP: (!) 151/83 Pulse: 73   Resp: 16 SpO2: 94 % O2 Device: None (Room air)    Weight: 117 lbs 14.4 oz    General Appearance: Sitting up in bed, pleasant, forgetful and poor history, NAD  HEENT: NC, hematoma to right forehead, periorbital ecchymosis  Respiratory: CTAB without wheezing or rales   Cardiovascular: RRR without murmur or rub  GI: soft, nontender, nondistended, normoactive bowel sounds   Skin: warm, dry  Ext:tenderness over right groin and lateral hip with palpation; slight decreased strength in bilateral LEs    Medical Decision Making       60 MINUTES SPENT BY ME on the date of service doing chart review, history,  exam, documentation & further activities per the note.      Data   ------------------------- PAST 24 HR DATA REVIEWED -----------------------------------------------

## 2025-07-17 NOTE — PROGRESS NOTES
Care Management Follow Up    Length of Stay (days): 1    Expected Discharge Date: 07/18/2025     Concerns to be Addressed: discharge planning     Patient plan of care discussed at interdisciplinary rounds: Yes    Anticipated Discharge Disposition: Home     Anticipated Discharge Services: None  Anticipated Discharge DME: None    Patient/family educated on Medicare website which has current facility and service quality ratings: yes  Education Provided on the Discharge Plan: Yes  Patient/Family in Agreement with the Plan: yes    Handoff Completed: No, handoff not indicated or clinically appropriate    Additional Information:  Met with pt, dtr and  at bedside, reviewed plan for discharge back to extended stay hot with  providing assist of 1 w/ WW for all mobility. Pt is scheduled to establish care with a new PCP on Monday 7/21 and will plan to have HC PT/OT ordered at that time.     Of note, pt flipped to IP on 7/16 and has had 1 midnight as IP. If she remains hospitalized for 2 more midnights and PT continues to recommend TCU, we could explore TCU at discharge as she would then have traditional Medicare coverage. Reviewed this information with pt and family at bedside.     Nannette Crespo RN BSN   Inpatient Care Coordination  [facility]   Phone [phone]

## 2025-07-17 NOTE — PROGRESS NOTES
Assessment and Plan:     CKD-4: Longstanding chronic kidney disease, followed by nephrologist in [place].  She is up here visiting.    She is on half-normal saline with 75 of bicarb at 100 mL/h.  She is also getting sodium bicarbonate tablets 1300 mg twice a day.    Labs today show sodium 141, potassium 3.8, bicarbonate 23.  Her creatinine has improved:3.28 > 3.15 > 3.01.   Her hemoglobin is 10.5.    I will stop the IV fluids.  We can simply monitor her kidney function at this point.  Avoid IV contrast and nonsteroidal anti-inflammatory drugs.    F/U in our clinic 2 weeks after discharge: [facility] Hospital Follow up. [phone].            Interval History:   Hypertension: Currently on carvedilol 12.5 mg twice a day, hydralazine 50 mg 3 times a day, isosorbide 20 mg 3 times a day, nifedipine ER 60 mg/day.  Average blood pressure over the last 24 hours 168/87 with pulse 66.  I will further adjust her blood pressure medications.    Add catapres patch.       Peripheral vascular disease    COPD with ongoing smoking    ASCVD    Fall with pelvic fracture: Followed by orthopedic surgery.                 Review of Systems:   Ambulating with walker.  Not much pain with ambulation.  Denies shortness of breath or chest pain.  Taking p.o. well.          Medications:     Current Facility-Administered Medications   Medication Dose Route Frequency Provider Last Rate Last Admin    acetaminophen (TYLENOL) tablet 1,000 mg  1,000 mg Oral TID Orly King PA-C   1,000 mg at 07/17/25 0821    atorvastatin (LIPITOR) tablet 80 mg  80 mg Oral QPM Orly King PA-C   80 mg at 07/16/25 2100    carvedilol (COREG) tablet 12.5 mg  12.5 mg Oral BID w/meals Orly King PA-C   12.5 mg at 07/17/25 0820    clopidogrel (PLAVIX) tablet 75 mg  75 mg Oral Daily Orly King PA-C   75 mg at 07/17/25 0820    famotidine (PEPCID) tablet 20 mg  20 mg Oral Every Other Day Diana Estrada MD   20 mg  "at 07/17/25 0821    hydrALAZINE (APRESOLINE) tablet 50 mg  50 mg Oral TID Andrew Mansfield MD   50 mg at 07/17/25 0821    [Held by provider] icosapent ethyl (VASCEPA) capsule 2 g  2 g Oral BID w/meals Orly King PA-C        isosorbide dinitrate (ISORDIL) tablet 20 mg  20 mg Oral TID Orly King PA-C   20 mg at 07/17/25 0821    Lidocaine (LIDOCARE) 4 % Patch 1 patch  1 patch Transdermal Q24h Orly King PA-C   1 patch at 07/16/25 2059    NIFEdipine ER OSMOTIC (PROCARDIA XL) 24 hr tablet 60 mg  60 mg Oral Daily Andrew Mansfield MD   60 mg at 07/17/25 0820    PARoxetine (PAXIL) tablet 40 mg  40 mg Oral Daily Orly King PA-C   40 mg at 07/17/25 0820    sodium bicarbonate tablet 1,300 mg  1,300 mg Oral BID Andrew Mansfield MD   1,300 mg at 07/17/25 0821     Current Facility-Administered Medications   Medication Dose Route Frequency Provider Last Rate Last Admin    sodium chloride 0.45 % 1,000 mL with sodium bicarbonate 75 mEq/L infusion   Intravenous Continuous Andrew Mansfield  mL/hr at 07/17/25 0322 New Bag at 07/17/25 0322     Current active medications and PTA medications reviewed, see medication list for details.            Physical Exam:   Vitals were reviewed  Patient Vitals for the past 24 hrs:   BP Temp Temp src Pulse Resp SpO2 Height Weight   07/17/25 1006 (!) 151/83 -- -- -- -- -- -- --   07/17/25 0802 (!) 191/93 98  F (36.7  C) Oral 73 16 94 % -- --   07/17/25 0609 -- -- -- -- -- -- -- 53.5 kg (117 lb 14.4 oz)   07/17/25 0333 (!) 168/86 98  F (36.7  C) Oral 68 18 93 % -- --   07/16/25 2341 134/75 97.8  F (36.6  C) Oral 64 18 92 % -- --   07/16/25 2025 (!) 168/89 98.5  F (36.9  C) Oral 62 18 96 % -- --   07/16/25 1545 (!) 174/92 98.4  F (36.9  C) Oral 63 16 96 % -- --   07/16/25 1508 -- -- -- -- -- -- 1.651 m (5' 5\") 57.4 kg (126 lb 8.7 oz)   07/16/25 1142 (!) 189/93 98.2  F (36.8  C) Oral 63 16 97 % -- --       Temp:  [97.8  F (36.6 "  C)-98.5  F (36.9  C)] 98  F (36.7  C)  Pulse:  [62-73] 73  Resp:  [16-18] 16  BP: (134-191)/(75-93) 151/83  SpO2:  [92 %-97 %] 94 %    Temperatures:  Current - Temp: 98  F (36.7  C); Max - Temp  Av.2  F (36.8  C)  Min: 97.8  F (36.6  C)  Max: 98.5  F (36.9  C)  Respiration range: Resp  Av  Min: 16  Max: 18  Pulse range: Pulse  Av.5  Min: 62  Max: 73  Blood pressure range: Systolic (24hrs), Av , Min:134 , Max:191   ; Diastolic (24hrs), Av, Min:75, Max:93    Pulse oximetry range: SpO2  Av.7 %  Min: 92 %  Max: 97 %    I/O last 3 completed shifts:  In: 210 [P.O.:210]  Out: -     No intake or output data in the 24 hours ending 25 1012    Alert and responsive  Has right periorbital ecchymosis and right scalp hematoma  Lungs with clear anterior breath sounds  Cardiac exam regular rhythm normal S1-S2 2/6 systolic murmur no jugular venous distention  Lower extremities no edema       Wt Readings from Last 4 Encounters:   25 53.5 kg (117 lb 14.4 oz)   18 65.8 kg (145 lb)   18 65.9 kg (145 lb 3.2 oz)   18 64.4 kg (142 lb)          Data:          Lab Results   Component Value Date     2025     2025     07/15/2025     2018     2016     2015    Lab Results   Component Value Date    CHLORIDE 106 2025    CHLORIDE 110 2025    CHLORIDE 105 07/15/2025    CHLORIDE 109 2018    CHLORIDE 107 2016    CHLORIDE 107 2015    Lab Results   Component Value Date    BUN 40.6 2025    BUN 43.0 2025    BUN 49.0 07/15/2025    BUN 31 2018    BUN 31 2016    BUN 34 2015      Lab Results   Component Value Date    POTASSIUM 3.8 2025    POTASSIUM 3.9 2025    POTASSIUM 4.0 07/15/2025    POTASSIUM 4.1 2018    POTASSIUM 4.0 2016    POTASSIUM 4.6 2015    Lab Results   Component Value Date    CO2 23 2025    CO2 19 2025    CO2 21 07/15/2025     CO2 25 07/17/2018    CO2 24 09/09/2016    CO2 20 08/20/2015    Lab Results   Component Value Date    CR 3.01 07/17/2025    CR 3.15 07/16/2025    CR 3.28 07/15/2025    CR 1.49 07/17/2018    CR 1.13 09/09/2016    CR 1.38 08/20/2015        Recent Labs   Lab Test 07/17/25  0620 07/16/25  0839 07/15/25  1243   WBC 7.5 6.6 7.4   HGB 10.5* 10.4* 11.0*   HCT 31.6* 31.7* 33.6*   * 104* 103*    218 235     Recent Labs   Lab Test 07/17/25  0620   AST 22   ALT 11   ALKPHOS 61   BILITOTAL 0.3       Recent Labs   Lab Test 07/17/25  0620   MAG 2.0     Recent Labs   Lab Test 07/17/25 0620   PHOS 3.6     Recent Labs   Lab Test 07/17/25  0620 07/16/25  0839 07/15/25  1243   BRUCE 8.4* 8.6* 9.0       Lab Results   Component Value Date    BRUCE 8.4 (L) 07/17/2025     Lab Results   Component Value Date    WBC 7.5 07/17/2025    HGB 10.5 (L) 07/17/2025    HCT 31.6 (L) 07/17/2025     (H) 07/17/2025     07/17/2025     Lab Results   Component Value Date     07/17/2025    POTASSIUM 3.8 07/17/2025    CHLORIDE 106 07/17/2025    CO2 23 07/17/2025     (H) 07/17/2025     Lab Results   Component Value Date    BUN 40.6 (H) 07/17/2025    CR 3.01 (H) 07/17/2025     Lab Results   Component Value Date    MAG 2.0 07/17/2025     Lab Results   Component Value Date    PHOS 3.6 07/17/2025       Creatinine   Date Value Ref Range Status   07/17/2025 3.01 (H) 0.51 - 0.95 mg/dL Final   07/16/2025 3.15 (H) 0.51 - 0.95 mg/dL Final   07/15/2025 3.28 (H) 0.51 - 0.95 mg/dL Final   07/17/2018 1.49 (H) 0.52 - 1.04 mg/dL Final   09/09/2016 1.13 (H) 0.52 - 1.04 mg/dL Final   08/20/2015 1.38 (H) 0.52 - 1.04 mg/dL Final   08/17/2015 1.55 (H) 0.52 - 1.04 mg/dL Final       Attestation:  I have reviewed today's vital signs, notes, medications, labs and imaging.     Andrew Mansfield MD

## 2025-07-18 ENCOUNTER — APPOINTMENT (OUTPATIENT)
Dept: PHYSICAL THERAPY | Facility: CLINIC | Age: 75
End: 2025-07-18
Payer: MEDICARE

## 2025-07-18 LAB
ANION GAP SERPL CALCULATED.3IONS-SCNC: 11 MMOL/L (ref 7–15)
BUN SERPL-MCNC: 41 MG/DL (ref 8–23)
CALCIUM SERPL-MCNC: 8.4 MG/DL (ref 8.8–10.4)
CHLORIDE SERPL-SCNC: 102 MMOL/L (ref 98–107)
CREAT SERPL-MCNC: 3.11 MG/DL (ref 0.51–0.95)
EGFRCR SERPLBLD CKD-EPI 2021: 15 ML/MIN/1.73M2
GLUCOSE SERPL-MCNC: 98 MG/DL (ref 70–99)
HCO3 SERPL-SCNC: 24 MMOL/L (ref 22–29)
MAGNESIUM SERPL-MCNC: 2 MG/DL (ref 1.7–2.3)
PHOSPHATE SERPL-MCNC: 3.5 MG/DL (ref 2.5–4.5)
POTASSIUM SERPL-SCNC: 3.1 MMOL/L (ref 3.4–5.3)
POTASSIUM SERPL-SCNC: 3.7 MMOL/L (ref 3.4–5.3)
SODIUM SERPL-SCNC: 137 MMOL/L (ref 135–145)

## 2025-07-18 PROCEDURE — 99232 SBSQ HOSP IP/OBS MODERATE 35: CPT | Performed by: INTERNAL MEDICINE

## 2025-07-18 PROCEDURE — 250N000011 HC RX IP 250 OP 636: Performed by: PHYSICIAN ASSISTANT

## 2025-07-18 PROCEDURE — 84100 ASSAY OF PHOSPHORUS: CPT | Performed by: INTERNAL MEDICINE

## 2025-07-18 PROCEDURE — 120N000001 HC R&B MED SURG/OB

## 2025-07-18 PROCEDURE — 84132 ASSAY OF SERUM POTASSIUM: CPT | Performed by: INTERNAL MEDICINE

## 2025-07-18 PROCEDURE — 83735 ASSAY OF MAGNESIUM: CPT | Performed by: INTERNAL MEDICINE

## 2025-07-18 PROCEDURE — 97530 THERAPEUTIC ACTIVITIES: CPT | Mod: GP

## 2025-07-18 PROCEDURE — 250N000013 HC RX MED GY IP 250 OP 250 PS 637: Performed by: PHYSICIAN ASSISTANT

## 2025-07-18 PROCEDURE — 97116 GAIT TRAINING THERAPY: CPT | Mod: GP

## 2025-07-18 PROCEDURE — 99232 SBSQ HOSP IP/OBS MODERATE 35: CPT | Performed by: NURSE PRACTITIONER

## 2025-07-18 PROCEDURE — 84132 ASSAY OF SERUM POTASSIUM: CPT | Performed by: NURSE PRACTITIONER

## 2025-07-18 PROCEDURE — 36415 COLL VENOUS BLD VENIPUNCTURE: CPT | Performed by: NURSE PRACTITIONER

## 2025-07-18 PROCEDURE — 36415 COLL VENOUS BLD VENIPUNCTURE: CPT | Performed by: INTERNAL MEDICINE

## 2025-07-18 PROCEDURE — 250N000013 HC RX MED GY IP 250 OP 250 PS 637: Performed by: NURSE PRACTITIONER

## 2025-07-18 PROCEDURE — 250N000013 HC RX MED GY IP 250 OP 250 PS 637: Performed by: INTERNAL MEDICINE

## 2025-07-18 RX ORDER — NIFEDIPINE 30 MG/1
90 TABLET, EXTENDED RELEASE ORAL DAILY
Status: DISCONTINUED | OUTPATIENT
Start: 2025-07-19 | End: 2025-07-19 | Stop reason: HOSPADM

## 2025-07-18 RX ORDER — HYDROMORPHONE HYDROCHLORIDE 2 MG/1
2 TABLET ORAL
Refills: 0 | Status: DISCONTINUED | OUTPATIENT
Start: 2025-07-18 | End: 2025-07-19 | Stop reason: HOSPADM

## 2025-07-18 RX ORDER — POTASSIUM CHLORIDE 1500 MG/1
20 TABLET, EXTENDED RELEASE ORAL ONCE
Status: COMPLETED | OUTPATIENT
Start: 2025-07-18 | End: 2025-07-18

## 2025-07-18 RX ADMIN — PAROXETINE HYDROCHLORIDE 40 MG: 20 TABLET, FILM COATED ORAL at 10:36

## 2025-07-18 RX ADMIN — MECLIZINE HYDROCHLORIDE 25 MG: 25 TABLET ORAL at 00:52

## 2025-07-18 RX ADMIN — ISOSORBIDE DINITRATE 20 MG: 10 TABLET ORAL at 10:35

## 2025-07-18 RX ADMIN — ISOSORBIDE DINITRATE 20 MG: 10 TABLET ORAL at 19:34

## 2025-07-18 RX ADMIN — SODIUM BICARBONATE 1300 MG: 650 TABLET ORAL at 19:33

## 2025-07-18 RX ADMIN — MECLIZINE HYDROCHLORIDE 25 MG: 25 TABLET ORAL at 13:09

## 2025-07-18 RX ADMIN — MECLIZINE HYDROCHLORIDE 25 MG: 25 TABLET ORAL at 19:34

## 2025-07-18 RX ADMIN — MECLIZINE HYDROCHLORIDE 25 MG: 25 TABLET ORAL at 05:58

## 2025-07-18 RX ADMIN — HYDRALAZINE HYDROCHLORIDE 50 MG: 25 TABLET ORAL at 19:34

## 2025-07-18 RX ADMIN — CARVEDILOL 12.5 MG: 12.5 TABLET, FILM COATED ORAL at 19:34

## 2025-07-18 RX ADMIN — ACETAMINOPHEN 1000 MG: 500 TABLET, FILM COATED ORAL at 19:33

## 2025-07-18 RX ADMIN — LIDOCAINE 1 PATCH: 4 PATCH TOPICAL at 19:20

## 2025-07-18 RX ADMIN — CARVEDILOL 12.5 MG: 12.5 TABLET, FILM COATED ORAL at 10:35

## 2025-07-18 RX ADMIN — SODIUM BICARBONATE 1300 MG: 650 TABLET ORAL at 10:35

## 2025-07-18 RX ADMIN — ATORVASTATIN CALCIUM 80 MG: 40 TABLET, FILM COATED ORAL at 19:33

## 2025-07-18 RX ADMIN — NIFEDIPINE 60 MG: 30 TABLET, FILM COATED, EXTENDED RELEASE ORAL at 10:34

## 2025-07-18 RX ADMIN — HYDRALAZINE HYDROCHLORIDE 50 MG: 25 TABLET ORAL at 10:34

## 2025-07-18 RX ADMIN — ONDANSETRON 4 MG: 4 TABLET, ORALLY DISINTEGRATING ORAL at 11:21

## 2025-07-18 RX ADMIN — MECLIZINE HYDROCHLORIDE 25 MG: 25 TABLET ORAL at 23:28

## 2025-07-18 RX ADMIN — ACETAMINOPHEN 1000 MG: 500 TABLET, FILM COATED ORAL at 10:35

## 2025-07-18 RX ADMIN — POTASSIUM CHLORIDE 20 MEQ: 20 TABLET, EXTENDED RELEASE ORAL at 10:36

## 2025-07-18 RX ADMIN — CLOPIDOGREL BISULFATE 75 MG: 75 TABLET, FILM COATED ORAL at 10:35

## 2025-07-18 ASSESSMENT — ACTIVITIES OF DAILY LIVING (ADL)
ADLS_ACUITY_SCORE: 34

## 2025-07-18 NOTE — PLAN OF CARE
"Summary: Closed head injury with right frontotemporal scalp hematoma;   Right hip pain due to right pubic body fracture  Orientation: A&O x 4  Vitals/Tele: VSS on RA; HTN  IV Access/drains: PIV SL  Diet: Renal  Mobility: A1GBW  GI/: Continent of B&B  Wound/Skin: Significant scattered bruising from fall   Discharge Plan: Pending    See Flow sheets for assessment     Goal Outcome Evaluation:    Plan of Care Reviewed With: patient  Overall Patient Progress: improving  Outcome Evaluation: VSS on RA; A&O x 4; Endorses pain on ambulation (10/10); CMS intact; Mild nausea - no emesis; good appetite    Problem: Adult Inpatient Plan of Care  Goal: Plan of Care Review  Description: The Plan of Care Review/Shift note should be completed every shift.  The Outcome Evaluation is a brief statement about your assessment that the patient is improving, declining, or no change.  This information will be displayed automatically on your shift  note.  Outcome: Progressing  Flowsheets (Taken 7/18/2025 9643)  Outcome Evaluation:   VSS on RA   A&O x 4   Endorses pain on ambulation (10/10)   CMS intact   Mild nausea - no emesis   good appetite  Plan of Care Reviewed With: patient  Overall Patient Progress: improving  Goal: Patient-Specific Goal (Individualized)  Description: You can add care plan individualizations to a care plan. Examples of Individualization might be:  \"Parent requests to be called daily at 9am for status\", \"I have a hard time hearing out of my right ear\", or \"Do not touch me to wake me up as it startles  me\".  Outcome: Progressing  Goal: Absence of Hospital-Acquired Illness or Injury  Outcome: Progressing  Intervention: Identify and Manage Fall Risk  Recent Flowsheet Documentation  Taken 7/18/2025 7536 by Sneha Tinoco, RN  Safety Promotion/Fall Prevention:   activity supervised   assistive device/personal items within reach   clutter free environment maintained   mobility aid in reach   lighting adjusted   supervised " activity   safety round/check completed  Intervention: Prevent Skin Injury  Recent Flowsheet Documentation  Taken 7/18/2025 0800 by Sneha Tinoco RN  Body Position: position changed independently  Taken 7/18/2025 0730 by Sneha Tinoco RN  Skin Protection: adhesive use limited  Intervention: Prevent Infection  Recent Flowsheet Documentation  Taken 7/18/2025 0730 by Sneha Tinoco RN  Infection Prevention:   rest/sleep promoted   personal protective equipment utilized   hand hygiene promoted  Goal: Optimal Comfort and Wellbeing  Outcome: Progressing  Goal: Readiness for Transition of Care  Outcome: Progressing     Problem: Delirium  Goal: Optimal Coping  Outcome: Progressing  Goal: Improved Behavioral Control  Outcome: Progressing  Goal: Improved Attention and Thought Clarity  Outcome: Progressing  Goal: Improved Sleep  Outcome: Progressing     Problem: Pain Acute  Goal: Optimal Pain Control and Function  Outcome: Progressing

## 2025-07-18 NOTE — PLAN OF CARE
"Patient is Aox4, VSS, up with SBA walker and gait, tolerating renal diet and oral medications, PIV in the left arm SL, has scheduled Meclizine for vertigo, RN Mg & Phos protocol's, PT and Ortho following, on room air, no pain noted at this time, Clonidine patch in place, SW following for safe discharge planning, sleeping well, able to make needs known, will continue to monitor and provide cares.       Problem: Adult Inpatient Plan of Care  Goal: Plan of Care Review  Description: The Plan of Care Review/Shift note should be completed every shift.  The Outcome Evaluation is a brief statement about your assessment that the patient is improving, declining, or no change.  This information will be displayed automatically on your shift  note.  Outcome: Progressing  Flowsheets (Taken 7/18/2025 0508)  Outcome Evaluation: Patient Aox4, VSS, up with SBA in the room, able to make needs known.  Plan of Care Reviewed With: patient  Overall Patient Progress: improving  Goal: Patient-Specific Goal (Individualized)  Description: You can add care plan individualizations to a care plan. Examples of Individualization might be:  \"Parent requests to be called daily at 9am for status\", \"I have a hard time hearing out of my right ear\", or \"Do not touch me to wake me up as it startles  me\".  Outcome: Progressing  Goal: Absence of Hospital-Acquired Illness or Injury  Outcome: Progressing  Intervention: Identify and Manage Fall Risk  Recent Flowsheet Documentation  Taken 7/18/2025 0051 by Genevieve Cary RN  Safety Promotion/Fall Prevention:   activity supervised   assistive device/personal items within reach   clutter free environment maintained   nonskid shoes/slippers when out of bed   room near nurse's station   room organization consistent   safety round/check completed   supervised activity  Intervention: Prevent Skin Injury  Recent Flowsheet Documentation  Taken 7/18/2025 0051 by Genevieve Cary RN  Body Position: position changed " independently  Intervention: Prevent and Manage VTE (Venous Thromboembolism) Risk  Recent Flowsheet Documentation  Taken 7/18/2025 0051 by Genevieve Cary RN  VTE Prevention/Management: SCDs on (sequential compression devices)  Goal: Optimal Comfort and Wellbeing  Outcome: Progressing  Intervention: Provide Person-Centered Care  Recent Flowsheet Documentation  Taken 7/18/2025 0051 by Genevieve Cary RN  Trust Relationship/Rapport: care explained  Goal: Readiness for Transition of Care  Outcome: Progressing  Problem: Delirium  Goal: Optimal Coping  Outcome: Progressing  Goal: Improved Behavioral Control  Outcome: Progressing  Intervention: Minimize Safety Risk  Recent Flowsheet Documentation  Taken 7/18/2025 0051 by Genevieve Cary RN  Enhanced Safety Measures: pain management  Trust Relationship/Rapport: care explained  Goal: Improved Attention and Thought Clarity  Outcome: Progressing  Goal: Improved Sleep  Outcome: Progressing     Problem: Pain Acute  Goal: Optimal Pain Control and Function  Outcome: Progressing  Intervention: Prevent or Manage Pain  Recent Flowsheet Documentation  Taken 7/18/2025 0051 by Genevieve Cary RN  Medication Review/Management: medications reviewed         Goal Outcome Evaluation:      Plan of Care Reviewed With: patient    Overall Patient Progress: improvingOverall Patient Progress: improving    Outcome Evaluation: Patient Aox4, VSS, up with SBA in the room, able to make needs known.

## 2025-07-18 NOTE — PROGRESS NOTES
Assessment and Plan:     CKD-4: She follows with a nephrologist where she lives in [place].  Labs today show sodium 137, potassium 3.1, bicarbonate 24.  Her creatinine is 3.11, stable to improved.  Estimated GFR 15.    IV fluids have been stopped.    She got potassium replacement today with 20 mill equivalents x 1.  She is on sodium bicarbonate 1300 mg twice a day.    We will sign off.  We can see her back in clinic after she finishes her TCU stay.    F/[facility] in our clinic [facility] after [facility]. [phone].             Interval History:   Hypertension: She is on carvedilol, Catapres patch, hydralazine, isosorbide, nifedipine ER.  Blood pressure remains elevated, currently 169/85 with pulse 77.  I will adjust her blood pressure medications.  Further adjustments can be made as an outpatient.    Peripheral vascular disease:     COPD: History of smoking.  ASCVD    Recent fall with pelvic fracture: Followed by orthopedic surgery.                     Review of Systems:   She is up with a walker and gait belt.  Taking p.o. diet well.  She is alert and oriented.          Medications:     Current Facility-Administered Medications   Medication Dose Route Frequency Provider Last Rate Last Admin    acetaminophen (TYLENOL) tablet 1,000 mg  1,000 mg Oral TID Orly King PA-C   1,000 mg at 07/18/25 1035    atorvastatin (LIPITOR) tablet 80 mg  80 mg Oral QPM Orly King PA-C   80 mg at 07/17/25 2131    carvedilol (COREG) tablet 12.5 mg  12.5 mg Oral BID w/meals Orly King PA-C   12.5 mg at 07/18/25 1035    cloNIDine (CATAPRES-TTS1) Patch in Place   Transdermal Q8H Andrew Mansfield MD        cloNIDine (CATAPRES-TTS3) 0.3 MG/24HR WK patch 1 patch  1 patch Transdermal Weekly Andrew Mansfield MD   1 patch at 07/17/25 1204    clopidogrel (PLAVIX) tablet 75 mg  75 mg Oral Daily Orly King PA-C   75 mg at 07/18/25 1035    famotidine  (PEPCID) tablet 20 mg  20 mg Oral Every Other Day Diana Estrada MD   20 mg at 07/17/25 0821    hydrALAZINE (APRESOLINE) tablet 50 mg  50 mg Oral TID Andrew Mansfield MD   50 mg at 07/18/25 1034    [Held by provider] icosapent ethyl (VASCEPA) capsule 2 g  2 g Oral BID w/meals Orly King PA-C        isosorbide dinitrate (ISORDIL) tablet 20 mg  20 mg Oral TID Orly King PA-C   20 mg at 07/18/25 1035    Lidocaine (LIDOCARE) 4 % Patch 1 patch  1 patch Transdermal Q24h Orly King PA-C   1 patch at 07/17/25 2132    meclizine (ANTIVERT) tablet 25 mg  25 mg Oral Q6H CHIDI Orly King PA-C   25 mg at 07/18/25 0558    [START ON 7/19/2025] NIFEdipine ER OSMOTIC (PROCARDIA XL) 24 hr tablet 90 mg  90 mg Oral Daily Andrew Mansfield MD        PARoxetine (PAXIL) tablet 40 mg  40 mg Oral Daily Orly King PA-C   40 mg at 07/18/25 1036    sodium bicarbonate tablet 1,300 mg  1,300 mg Oral BID Andrew Mansfield MD   1,300 mg at 07/18/25 1035     Current Facility-Administered Medications   Medication Dose Route Frequency Provider Last Rate Last Admin     Current active medications and PTA medications reviewed, see medication list for details.            Physical Exam:   Vitals were reviewed  Patient Vitals for the past 24 hrs:   BP Temp Temp src Pulse Resp SpO2   07/18/25 0845 (!) 169/85 98.7  F (37.1  C) Oral 77 16 95 %   07/18/25 0330 (!) 141/88 97.8  F (36.6  C) Oral 74 16 95 %   07/17/25 2300 (!) 154/80 98.3  F (36.8  C) Oral 70 16 95 %   07/17/25 2126 (!) 166/89 -- -- 78 -- --   07/17/25 1925 (!) 163/82 98.2  F (36.8  C) Oral 77 18 95 %   07/17/25 1543 (!) 142/72 98  F (36.7  C) Oral 71 16 95 %   07/17/25 1401 (!) 159/82 -- -- -- -- --   07/17/25 1330 (!) 150/85 -- -- -- -- --       Temp:  [97.8  F (36.6  C)-98.7  F (37.1  C)] 98.7  F (37.1  C)  Pulse:  [70-78] 77  Resp:  [16-18] 16  BP: (141-169)/(72-89) 169/85  SpO2:  [95 %] 95 %    Temperatures:  Current -  Temp: 98.7  F (37.1  C); Max - Temp  Av.2  F (36.8  C)  Min: 97.8  F (36.6  C)  Max: 98.7  F (37.1  C)  Respiration range: Resp  Av.4  Min: 16  Max: 18  Pulse range: Pulse  Av.5  Min: 70  Max: 78  Blood pressure range: Systolic (24hrs), Av , Min:141 , Max:169   ; Diastolic (24hrs), Av, Min:72, Max:89    Pulse oximetry range: SpO2  Av %  Min: 95 %  Max: 95 %    No intake/output data recorded.    No intake or output data in the 24 hours ending 25 1048    Alert and responsive resting comfortably in bed  Periorbital and facial ecchymosis is resolving       Wt Readings from Last 4 Encounters:   25 53.5 kg (117 lb 14.4 oz)   18 65.8 kg (145 lb)   18 65.9 kg (145 lb 3.2 oz)   18 64.4 kg (142 lb)          Data:          Lab Results   Component Value Date     2025     2025     2025     2018     2016     2015    Lab Results   Component Value Date    CHLORIDE 102 2025    CHLORIDE 106 2025    CHLORIDE 110 2025    CHLORIDE 109 2018    CHLORIDE 107 2016    CHLORIDE 107 2015    Lab Results   Component Value Date    BUN 41.0 2025    BUN 40.6 2025    BUN 43.0 2025    BUN 31 2018    BUN 31 2016    BUN 34 2015      Lab Results   Component Value Date    POTASSIUM 3.1 2025    POTASSIUM 3.8 2025    POTASSIUM 3.9 2025    POTASSIUM 4.1 2018    POTASSIUM 4.0 2016    POTASSIUM 4.6 2015    Lab Results   Component Value Date    CO2 24 2025    CO2 23 2025    CO2 19 2025    CO2 25 2018    CO2 24 2016    CO2 20 2015    Lab Results   Component Value Date    CR 3.11 2025    CR 3.01 2025    CR 3.15 2025    CR 1.49 2018    CR 1.13 2016    CR 1.38 2015        Recent Labs   Lab Test 25  0620 25  0839 07/15/25  1243   WBC 7.5 6.6 7.4    HGB 10.5* 10.4* 11.0*   HCT 31.6* 31.7* 33.6*   * 104* 103*    218 235     Recent Labs   Lab Test 07/17/25  0620   AST 22   ALT 11   ALKPHOS 61   BILITOTAL 0.3       Recent Labs   Lab Test 07/18/25  0550 07/17/25  0620   MAG 2.0 2.0     Recent Labs   Lab Test 07/18/25  0550 07/17/25  0620   PHOS 3.5 3.6     Recent Labs   Lab Test 07/18/25  0550 07/17/25  0620 07/16/25  0839   BRUCE 8.4* 8.4* 8.6*       Lab Results   Component Value Date    BRUCE 8.4 (L) 07/18/2025     Lab Results   Component Value Date    WBC 7.5 07/17/2025    HGB 10.5 (L) 07/17/2025    HCT 31.6 (L) 07/17/2025     (H) 07/17/2025     07/17/2025     Lab Results   Component Value Date     07/18/2025    POTASSIUM 3.1 (L) 07/18/2025    CHLORIDE 102 07/18/2025    CO2 24 07/18/2025    GLC 98 07/18/2025     Lab Results   Component Value Date    BUN 41.0 (H) 07/18/2025    CR 3.11 (H) 07/18/2025     Lab Results   Component Value Date    MAG 2.0 07/18/2025     Lab Results   Component Value Date    PHOS 3.5 07/18/2025       Creatinine   Date Value Ref Range Status   07/18/2025 3.11 (H) 0.51 - 0.95 mg/dL Final   07/17/2025 3.01 (H) 0.51 - 0.95 mg/dL Final   07/16/2025 3.15 (H) 0.51 - 0.95 mg/dL Final   07/15/2025 3.28 (H) 0.51 - 0.95 mg/dL Final   07/17/2018 1.49 (H) 0.52 - 1.04 mg/dL Final   09/09/2016 1.13 (H) 0.52 - 1.04 mg/dL Final   08/20/2015 1.38 (H) 0.52 - 1.04 mg/dL Final   08/17/2015 1.55 (H) 0.52 - 1.04 mg/dL Final       Attestation:  I have reviewed today's vital signs, notes, medications, labs and imaging.     Andrew Mansfield MD

## 2025-07-18 NOTE — PROGRESS NOTES
Care Management Follow Up    Length of Stay (days): 2    Expected Discharge Date: 07/18/2025     Concerns to be Addressed: discharge planning     Patient plan of care discussed at interdisciplinary rounds: Yes    Anticipated Discharge Disposition: Transitional Care     Anticipated Discharge Services: None  Anticipated Discharge DME: None    Patient/family educated on Medicare website which has current facility and service quality ratings: yes  Education Provided on the Discharge Plan: Yes  Patient/Family in Agreement with the Plan: yes    Referrals Placed by CM/SW: Post Acute Facilities  Private pay costs discussed: Not applicable    Discussed  Partnership in Safe Discharge Planning  document with patient/family: Yes     Handoff Completed: No, handoff not indicated or clinically appropriate    Additional Information:  Met with pt and  at bedside to discuss TCU recommendations further as pt has been IP 2 nights and will not be discharging today per provider, thus pt will have TCU coverage. They are agreeable to TCU at discharge and would like referrals sent to 1) [facility] 2) [facility] 3) [place] for a shared room. Referrals sent, awaiting responses. Family will plan to transport at discharge.     Next Steps: follow-up with TCU referrals, PAS    Update 4119: Pt was accepted to [facility] TCU tomorrow 7/19 at 1300, updated pt at bedside and spoke with  and daughter via phone. PAS completed. CM to call and update TCU tomorrow at P: [phone] and send orders to F: [phone]. Pt is agreeable to facility requirements of not smoking.     Nannette Crespo RN BSN   Inpatient Care Coordination  [facility]   Phone [phone]

## 2025-07-18 NOTE — PROGRESS NOTES
[facility]    Medicine Progress Note - Hospitalist Service    Date of Admission:  7/15/2025    Assessment & Plan   Maria Alejandra Buck is a 75 year old female with PMH significant for CAD, CKD stage IV, hypertension, hyperlipidemia, intra-abdominal aortic aneurysm with prior stenting, chronic vertigo who presents to the ED on 7/15/2025 for evaluation of right hip pain after a fall.    Of note the patient is currently visiting from [place] and was recently admitted at [facility] in [place] from 6/5-6/17/2025 for hypertensive urgency, ROMI on CKD stage IIIa with recent renal biopsy per nephrology recommendations.    ED workup reveals: initial BP of 208/98, intermittently bradycardic into the 50s, CO2 of 21, BUN of 49, creatinine of 3.28, GFR 14, glucose of 114, hemoglobin of 11.0, EKG tracing showing rate of 61 bpm in SR with minimal voltage criteria for LVH, nonspecific ST abnormality, CT of head negative for acute intracranial pathology age-indeterminate right basal ganglia infarct, potentially chronic, brain atrophy and presumed chronic small vessel ischemic changes, right frontal temporal scalp hematoma, without acute fracture, CT cervical spine without acute fracture, irregular soft tissue density in the left upper pulmonary lobe, apex, nonspecific, could potentially represent scarring, CT of right hip negative for fracture, nondisplaced but minimally comminuted fracture of the right pubic body extended into the junction with the right superior and inferior pubic rami.     S/p fall  Closed head injury with right frontotemporal scalp hematoma  Right hip pain due to right pubic body fracture   Walking into laundry room at extended stay hotel where the patient is residing when she slipped on her sandals and fell onto her right side hitting her face. Patient has significant periorbital ecchymosis and frontotemporal hematoma.  CT of head and CT cervical spine negative for acute fracture.   Noted to have pain in right hip located in groin and lateral hip with palpation.  Patient has increased pain with any attempts at mobility.  CT of right hip shows right pubic body fracture that extends into the junction with the right superior and inferior pubic rami.  - Orthopedic surgery consult, nonoperative management   - WBAT of RLE with walker (6-8 weeks), if significant pain at the right pelvic region it would be appropriate to limit her mobilization to chair for bed to chair for approximately 2 week  - pain previously controlled on 7/16, increased pain on 7/17 and again on 7/18.  Discontinue PO oxycodone and trail PO dilaudid.  Scheduled and PRN APAP, lidocaine patch, and Robaxin.  Discontinue atarax.  - PT consult. Pending TCU placement. CM/SW following for discharge coordination.   - Follow up with [facility] hip specialist in 6 weeks for repeat xrays and reevaluation     Hypertensive urgency  CAD  Per most recent discharge summary from [place] [facility] had recommended isordil dinitrate, hydralazine, nifedipine, and carvedilol.  Most recent renal doppler on 5/21 did not show evidence of JACY.  Recent echocardiogram while in [place] on 5/22 showed EF of 65% with grade 1 diastolic dysfunction.- Continue carvedilol, clonidine patch (added this stay weekly), hydralazine, and nifedipine.   - Continue isosorbide     AAA  PVD  Endoleak and dilation with aorta proximal to endograft.  S/p recent endovascular aneurysm repair on 4/25.  - Continue PTA Plavix and statin    CKD stage IV  Hypokalemia  Baseline creatinine had previously been 1.3, had previously gone up to 2.7 then down to 2.4 when admitted in May of 2025.  Has continued to trend upward and has been around 3.4 when admitted in 06/2025.  Patient follows with nephrology and during this most recent hospital stay in [place] had an ultrasound that was unremarkable and noted to have significant proteinuria with protein/creatinine ratio of 15, 525.  Patient was on Plavix  for PVD which was held during her last hospital stay and she had a renal biopsy done on 6/16. Creatinine on 6/17 in care everywhere was 3.30. 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 Creatinine near recent baseline.  Initially received IVF and bicarb but now on sodium bicarb alone. Seen by nephrology during this hospital stay and they signed off on 7/18/2025.  Will see as outpatient.    - Replete K PRN.  Mg normal.      --------------------------------------------------  Chronic medical issues:    Chronic vertigo  Following with ENT in [place]. MRI brain on 5/22 negative for acute pathology. Continue meclizine.  Discontinue additional atarax.  Orthostatic blood pressures negative on 7/17. Follow up with ENT as outpatient as per prior plan.      H/o CVA, chronic infarcts in right basal ganglia and right thalamus   Previously known to have chronic infarct based on MRI of brain in 05/2025. Chronic infarct in right basal ganglia again noted on CT of head on admission.     Right lung adenocarcinoma  S/p SBRT in 2021, continue outpatient follow-up once back in [place]    COPD without acute exacerbation  Tobacco abuse, ongoing  Continues to smoke 0.5 ppd. No wheezing on exam. Not currently on daily inhalers.   Encourage smoking cessation    Cognitive impairment  During most recent hospitalization in June 2025 in [place] there was concern for patient having poor short-term memory as well as daughter noted concern that patient and her  are not safe to live at home.  Patient typically would come stay with her daughter for the summer in [place]. Confirmed with daughter plan for the patient to return back to [place] in the fall.  Spouse/family will need to attend appointments with her.  Would benefit from close family supervision. Consider ADELIA.            Diet: Renal  Diet (non-dialysis)    DVT Prophylaxis: Pneumatic Compression Devices  Rao Catheter: Not present  Lines: None     Cardiac Monitoring: None  Code Status: Full Code      Clinically Significant Risk Factors        # Hypokalemia: Lowest K = 3.1 mmol/L in last 2 days, will replace as needed    # Hypocalcemia: Lowest Ca = 8.4 mg/dL in last 2 days, will monitor and replace as appropriate     # Hypoalbuminemia: Lowest albumin = 3.2 g/dL at 7/17/2025  6:20 AM, will monitor as appropriate     # Hypertension: Noted on problem list                       Social Drivers of Health    Depression: At risk (6/5/2025)    Received from [employer]    PHQ-2     Depression Risk: 3   Tobacco Use: High Risk (7/15/2025)    Patient History     Smoking Tobacco Use: Every Day     Smokeless Tobacco Use: Never          Disposition Plan     Medically Ready for Discharge: Anticipated Tomorrow       The patient's care was discussed with the Bedside Nurse, Patient, and Patient's Family.    FERNANDO Gomez [facility]  Hospitalist Service  [facility]  Securely message with alike (more info)  Text page via [employer] Paging/Directory   ______________________________________________________________________    Interval History   Seen and examined. Chart, imaging, and data reviewed.    Denies CP or SOB.  VSS. BP improving with medication changes.    Pain control remains an issue and medications being adjusted.    Planning for TCU.  Hope to be able to discharge next 1-2 days.     Physical Exam   Vital Signs: Temp: 98.7  F (37.1  C) Temp src: Oral BP: (!) 169/85 Pulse: 77   Resp: 16 SpO2: 95 % O2 Device: None (Room air)    Weight: 117 lbs 14.4 oz    General Appearance: Sitting up in bed, pleasant, forgetful and poor historian, NAD  HEENT: NC, hematoma to right forehead, periorbital ecchymosis  Respiratory: CTAB without wheezing or rales   Cardiovascular: RRR without murmur or rub  GI: soft, nontender, nondistended, normoactive bowel  sounds   Skin: warm, dry  Ext:tenderness over right groin and lateral hip with palpation; slight decreased strength in bilateral LEs    Medical Decision Making       45 MINUTES SPENT BY ME on the date of service doing chart review, history, exam, documentation & further activities per the note.      Data   ------------------------- PAST 24 HR DATA REVIEWED -----------------------------------------------

## 2025-07-19 VITALS
HEART RATE: 75 BPM | HEIGHT: 65 IN | SYSTOLIC BLOOD PRESSURE: 155 MMHG | BODY MASS INDEX: 19.64 KG/M2 | WEIGHT: 117.9 LBS | OXYGEN SATURATION: 90 % | DIASTOLIC BLOOD PRESSURE: 77 MMHG | RESPIRATION RATE: 16 BRPM | TEMPERATURE: 98 F

## 2025-07-19 LAB
HOLD SPECIMEN: NORMAL
MAGNESIUM SERPL-MCNC: 2.1 MG/DL (ref 1.7–2.3)
POTASSIUM SERPL-SCNC: 3.5 MMOL/L (ref 3.4–5.3)

## 2025-07-19 PROCEDURE — 250N000013 HC RX MED GY IP 250 OP 250 PS 637: Performed by: INTERNAL MEDICINE

## 2025-07-19 PROCEDURE — 250N000013 HC RX MED GY IP 250 OP 250 PS 637: Performed by: PHYSICIAN ASSISTANT

## 2025-07-19 PROCEDURE — 36415 COLL VENOUS BLD VENIPUNCTURE: CPT | Performed by: NURSE PRACTITIONER

## 2025-07-19 PROCEDURE — 99239 HOSP IP/OBS DSCHRG MGMT >30: CPT | Performed by: NURSE PRACTITIONER

## 2025-07-19 PROCEDURE — 83735 ASSAY OF MAGNESIUM: CPT | Performed by: NURSE PRACTITIONER

## 2025-07-19 PROCEDURE — 84132 ASSAY OF SERUM POTASSIUM: CPT | Performed by: INTERNAL MEDICINE

## 2025-07-19 RX ORDER — HYDROMORPHONE HYDROCHLORIDE 2 MG/1
1-2 TABLET ORAL
Qty: 20 TABLET | Refills: 0 | Status: SHIPPED | OUTPATIENT
Start: 2025-07-19 | End: 2025-07-21

## 2025-07-19 RX ORDER — LIDOCAINE 4 G/G
2 PATCH TOPICAL EVERY 24 HOURS
Status: ON HOLD | DISCHARGE
Start: 2025-07-19

## 2025-07-19 RX ORDER — HYDRALAZINE HYDROCHLORIDE 50 MG/1
50 TABLET, FILM COATED ORAL 3 TIMES DAILY
Status: ON HOLD | DISCHARGE
Start: 2025-07-19

## 2025-07-19 RX ORDER — ACETAMINOPHEN 500 MG
1000 TABLET ORAL 3 TIMES DAILY
DISCHARGE
Start: 2025-07-19 | End: 2025-07-28

## 2025-07-19 RX ORDER — SODIUM BICARBONATE 650 MG/1
1300 TABLET ORAL 2 TIMES DAILY
Status: ON HOLD | DISCHARGE
Start: 2025-07-19

## 2025-07-19 RX ORDER — ONDANSETRON 4 MG/1
4 TABLET, ORALLY DISINTEGRATING ORAL EVERY 6 HOURS PRN
Status: ON HOLD | DISCHARGE
Start: 2025-07-19

## 2025-07-19 RX ORDER — NIFEDIPINE 90 MG/1
90 TABLET, FILM COATED, EXTENDED RELEASE ORAL DAILY
Status: ON HOLD | DISCHARGE
Start: 2025-07-20

## 2025-07-19 RX ORDER — AMOXICILLIN 250 MG
1 CAPSULE ORAL 2 TIMES DAILY PRN
Status: ON HOLD | DISCHARGE
Start: 2025-07-19

## 2025-07-19 RX ORDER — ACETAMINOPHEN 325 MG/1
325-650 TABLET ORAL EVERY 6 HOURS PRN
DISCHARGE
Start: 2025-07-19 | End: 2025-07-21

## 2025-07-19 RX ORDER — CLONIDINE 0.3 MG/24H
1 PATCH, EXTENDED RELEASE TRANSDERMAL WEEKLY
Status: ON HOLD | DISCHARGE
Start: 2025-07-24

## 2025-07-19 RX ORDER — MECLIZINE HYDROCHLORIDE 25 MG/1
25 TABLET ORAL EVERY 6 HOURS
Status: ON HOLD | DISCHARGE
Start: 2025-07-19

## 2025-07-19 RX ORDER — METHOCARBAMOL 500 MG/1
500 TABLET, FILM COATED ORAL 3 TIMES DAILY PRN
Status: ON HOLD | DISCHARGE
Start: 2025-07-19

## 2025-07-19 RX ADMIN — ISOSORBIDE DINITRATE 20 MG: 10 TABLET ORAL at 09:00

## 2025-07-19 RX ADMIN — FAMOTIDINE 20 MG: 20 TABLET, FILM COATED ORAL at 08:24

## 2025-07-19 RX ADMIN — CARVEDILOL 12.5 MG: 12.5 TABLET, FILM COATED ORAL at 08:24

## 2025-07-19 RX ADMIN — MECLIZINE HYDROCHLORIDE 25 MG: 25 TABLET ORAL at 11:40

## 2025-07-19 RX ADMIN — ISOSORBIDE DINITRATE 20 MG: 10 TABLET ORAL at 02:51

## 2025-07-19 RX ADMIN — PAROXETINE HYDROCHLORIDE 40 MG: 20 TABLET, FILM COATED ORAL at 08:24

## 2025-07-19 RX ADMIN — ACETAMINOPHEN 1000 MG: 500 TABLET, FILM COATED ORAL at 08:24

## 2025-07-19 RX ADMIN — SODIUM BICARBONATE 1300 MG: 650 TABLET ORAL at 08:57

## 2025-07-19 RX ADMIN — HYDRALAZINE HYDROCHLORIDE 50 MG: 25 TABLET ORAL at 08:23

## 2025-07-19 RX ADMIN — CLOPIDOGREL BISULFATE 75 MG: 75 TABLET, FILM COATED ORAL at 08:24

## 2025-07-19 RX ADMIN — MECLIZINE HYDROCHLORIDE 25 MG: 25 TABLET ORAL at 05:46

## 2025-07-19 RX ADMIN — NIFEDIPINE 90 MG: 30 TABLET, FILM COATED, EXTENDED RELEASE ORAL at 08:23

## 2025-07-19 ASSESSMENT — ACTIVITIES OF DAILY LIVING (ADL)
ADLS_ACUITY_SCORE: 33
ADLS_ACUITY_SCORE: 34
ADLS_ACUITY_SCORE: 33
ADLS_ACUITY_SCORE: 34
ADLS_ACUITY_SCORE: 33
ADLS_ACUITY_SCORE: 33
ADLS_ACUITY_SCORE: 34
ADLS_ACUITY_SCORE: 33

## 2025-07-19 NOTE — PROGRESS NOTES
[facility]    Medicine Progress Note - Hospitalist Service    Date of Admission:  7/15/2025    Assessment & Plan   Maria Alejandra Buck is a 75 year old female with PMH significant for CAD, CKD stage IV, hypertension, hyperlipidemia, intra-abdominal aortic aneurysm with prior stenting, chronic vertigo who presents to the ED on 7/15/2025 for evaluation of right hip pain after a fall.    Of note the patient is currently visiting from [place] and was recently admitted at [facility] in [place] from 6/5-6/17/2025 for hypertensive urgency, ROMI on CKD stage IIIa with recent renal biopsy per nephrology recommendations.    ED workup reveals: initial BP of 208/98, intermittently bradycardic into the 50s, CO2 of 21, BUN of 49, creatinine of 3.28, GFR 14, glucose of 114, hemoglobin of 11.0, EKG tracing showing rate of 61 bpm in SR with minimal voltage criteria for LVH, nonspecific ST abnormality, CT of head negative for acute intracranial pathology age-indeterminate right basal ganglia infarct, potentially chronic, brain atrophy and presumed chronic small vessel ischemic changes, right frontal temporal scalp hematoma, without acute fracture, CT cervical spine without acute fracture, irregular soft tissue density in the left upper pulmonary lobe, apex, nonspecific, could potentially represent scarring, CT of right hip negative for fracture, nondisplaced but minimally comminuted fracture of the right pubic body extended into the junction with the right superior and inferior pubic rami.     S/p fall  Closed head injury with right frontotemporal scalp hematoma  Right hip pain due to right pubic body fracture   Walking into laundry room at extended stay hotel where the patient is residing when she slipped on her sandals and fell onto her right side hitting her face. Patient has significant periorbital ecchymosis and frontotemporal hematoma.  CT of head and CT cervical spine negative for acute fracture.   Noted to have pain in right hip located in groin and lateral hip with palpation.  Patient has increased pain with any attempts at mobility.  CT of right hip shows right pubic body fracture that extends into the junction with the right superior and inferior pubic rami.  - Orthopedic surgery consult, nonoperative management   - WBAT of RLE with walker (6-8 weeks), if significant pain at the right pelvic region it would be appropriate to limit her mobilization to chair for bed to chair for approximately 2 week  - pain previously controlled on 7/16, increased pain on 7/17 and again on 7/18.  Discontinue PO oxycodone and trail PO dilaudid which has been beneficial.  Scheduled and PRN APAP, lidocaine patch, and Robaxin.  Discontinue atarax. Judicious use of opiates given CKD stage IV.    - PT consult. Pending TCU placement. CM/SW following for discharge coordination.   - Follow up with [facility] specialist in 6 weeks for repeat xrays and reevaluation     Hypertensive urgency  CAD  Per most recent discharge summary from [place] [facility] had recommended isordil dinitrate, hydralazine, nifedipine, and carvedilol.  Most recent renal doppler on 5/21 did not show evidence of JACY.  Recent echocardiogram while in [place] on 5/22 showed EF of 65% with grade 1 diastolic dysfunction.- Continue carvedilol, clonidine patch (added this stay weekly), hydralazine, and nifedipine.   - Continue isosorbide     AAA  PVD  Endoleak and dilation with aorta proximal to endograft.  S/p recent endovascular aneurysm repair on 4/25.  - Continue PTA Plavix and statin    CKD stage IV  Hypokalemia  Baseline creatinine had previously been 1.3, had previously gone up to 2.7 then down to 2.4 when admitted in May of 2025.  Has continued to trend upward and has been around 3.4 when admitted in 06/2025.  Patient follows with nephrology and during this most recent hospital stay in [place] had an ultrasound that was unremarkable and noted to have significant  proteinuria with protein/creatinine ratio of 15, 525.  Patient was on Plavix for PVD which was held during her last hospital stay and she had a renal biopsy done on 6/16. Creatinine on 6/17 in care everywhere was 3.30. 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 Creatinine near recent baseline.  Initially received IVF and bicarb but now on sodium bicarb alone. Seen by nephrology during this hospital stay and they signed off on 7/18/2025.  Will see as outpatient.    - Replete K PRN.  Mg normal.    - Repeat BMP in 1 week.     --------------------------------------------------  Chronic medical issues:    Chronic vertigo  Following with ENT in [place]. MRI brain on 5/22 negative for acute pathology. Continue meclizine.  Discontinue additional atarax.  Orthostatic blood pressures negative on 7/17. Follow up with ENT as outpatient as per prior plan.      H/o CVA, chronic infarcts in right basal ganglia and right thalamus   Previously known to have chronic infarct based on MRI of brain in 05/2025. Chronic infarct in right basal ganglia again noted on CT of head on admission.  Continue clopidogrel.     Right lung adenocarcinoma  S/p SBRT in 2021, continue outpatient follow-up once back in [place]    COPD without acute exacerbation  Tobacco abuse, ongoing  Continues to smoke 0.5 ppd. No wheezing on exam. Not currently on daily inhalers.   Encourage smoking cessation    Cognitive impairment  During most recent hospitalization in June 2025 in [place] there was concern for patient having poor short-term memory as well as daughter noted concern that patient and her  are not safe to live at home.  Patient typically would come stay with her daughter for the summer in [place]. Confirmed with daughter plan for the patient to return back to [place] in the fall.  Spouse/family  will need to attend appointments with her.  Would benefit from close family supervision. Consider ADELIA.  OT consult at TCU.           Diet: Renal Diet (non-dialysis)  Diet    DVT Prophylaxis: Pneumatic Compression Devices  Rao Catheter: Not present  Lines: None     Cardiac Monitoring: None  Code Status: Full Code      Clinically Significant Risk Factors        # Hypokalemia: Lowest K = 3.1 mmol/L in last 2 days, will replace as needed        # Hypoalbuminemia: Lowest albumin = 3.2 g/dL at 7/17/2025  6:20 AM, will monitor as appropriate     # Hypertension: Noted on problem list                       Social Drivers of Health    Depression: At risk (6/5/2025)    Received from [employer]    PHQ-2     Depression Risk: 3   Tobacco Use: High Risk (7/15/2025)    Patient History     Smoking Tobacco Use: Every Day     Smokeless Tobacco Use: Never          Disposition Plan     Medically Ready for Discharge: Anticipated Tomorrow       The patient's care was discussed with the Bedside Nurse, Patient, and Patient's Family.    FERNANDO [facility]  Hospitalist Service  [facility]  Securely message with [employer] (more info)  Text page via [employer] Paging/Directory   ______________________________________________________________________    Interval History   Resting comfortably.  Pain control improved.  VSS.    Planning for rehab today.      Physical Exam   Vital Signs: Temp: 98  F (36.7  C) Temp src: Oral BP: (!) 155/77 Pulse: 75   Resp: 16 SpO2: (!) 90 % O2 Device: None (Room air)    Weight: 117 lbs 14.4 oz    General Appearance: Resting comfortably, NAD  HEENT: NC, hematoma to right forehead, periorbital ecchymosis  Respiratory: CTAB without wheezing or rales   Cardiovascular: RRR without murmur or rub  GI: soft, nontender, nondistended, normoactive bowel sounds   Skin: warm, dry  Ext:tenderness over right groin and lateral hip with palpation; slight decreased strength in bilateral LE. CR < 3 seconds.  BRITTNEE.    Medical Decision Making       45 MINUTES SPENT BY ME on the date of service doing chart review, history, exam, documentation & further activities per the note.      Data   ------------------------- PAST 24 HR DATA REVIEWED -----------------------------------------------  Potassium   Date Value Ref Range Status   07/19/2025 3.5 3.4 - 5.3 mmol/L Final   07/17/2018 4.1 3.4 - 5.3 mmol/L Final      Creatinine   Date Value Ref Range Status   07/18/2025 3.11 (H) 0.51 - 0.95 mg/dL Final   07/17/2018 1.49 (H) 0.52 - 1.04 mg/dL Final

## 2025-07-19 NOTE — PLAN OF CARE
"[id number]    Inpatient Progress Note:    BP (!) 149/81 (BP Location: Left arm)   Pulse 71   Temp 97.8  F (36.6  C) (Oral)   Resp 16   Ht 1.651 m (5' 5\")   Wt 53.5 kg (117 lb 14.4 oz)   SpO2 92%   BMI 19.62 kg/m         Orientation: A&Ox4  Pain status: Pain when ambulating managed w Scheduled medications  Activity: Ax1 walker GB, WBAT on RLE  Resp: WDL, on RA  Cardiac: WDL, denies chest pain  GI: WDL  :  WDL  Skin: X, scattered bruising  LDA: PIV  Infusions: SL  Pertinent Labs: K and Mag protocol  Diet: Renal diet  Consults: Ortho/SW/PT  Discharge Plan: [facility] 7/19 @1300 family to transport    Will continue to monitor and provide cares.     Gemini Rodriguez RN       Problem: Adult Inpatient Plan of Care  Goal: Plan of Care Review  Description: The Plan of Care Review/Shift note should be completed every shift.  The Outcome Evaluation is a brief statement about your assessment that the patient is improving, declining, or no change.  This information will be displayed automatically on your shift  note.  Outcome: Progressing  Flowsheets (Taken 7/19/2025 6057)  Outcome Evaluation: A&Ox4, VVS Ax1 walke GB. R hip pain. Ortho/PT/Sw following- TCU 7/19 @1300 family to transport to [facility]. WBAt on RLE  Plan of Care Reviewed With: patient  Overall Patient Progress: improving  Goal: Patient-Specific Goal (Individualized)  Description: You can add care plan individualizations to a care plan. Examples of Individualization might be:  \"Parent requests to be called daily at 9am for status\", \"I have a hard time hearing out of my right ear\", or \"Do not touch me to wake me up as it startles  me\".  Outcome: Progressing  Goal: Absence of Hospital-Acquired Illness or Injury  Outcome: Progressing  Intervention: Identify and Manage Fall Risk  Recent Flowsheet Documentation  Taken 7/19/2025 0336 by Gemini Rodriguez RN  Safety Promotion/Fall Prevention:   activity supervised   assistive device/personal items within reach   clutter " free environment maintained   nonskid shoes/slippers when out of bed   patient and family education   safety round/check completed  Intervention: Prevent Infection  Recent Flowsheet Documentation  Taken 7/19/2025 0336 by Gemini Rodriguez RN  Infection Prevention:   rest/sleep promoted   personal protective equipment utilized   hand hygiene promoted  Goal: Optimal Comfort and Wellbeing  Outcome: Progressing  Goal: Readiness for Transition of Care  Outcome: Progressing     Problem: Delirium  Goal: Optimal Coping  Outcome: Progressing  Goal: Improved Behavioral Control  Outcome: Progressing  Intervention: Minimize Safety Risk  Recent Flowsheet Documentation  Taken 7/19/2025 0336 by Gemini Rodriguez RN  Enhanced Safety Measures: pain management  Goal: Improved Attention and Thought Clarity  Outcome: Progressing  Goal: Improved Sleep  Outcome: Progressing     Problem: Pain Acute  Goal: Optimal Pain Control and Function  Outcome: Progressing  Intervention: Prevent or Manage Pain  Recent Flowsheet Documentation  Taken 7/19/2025 0336 by Gemini Rodriguez RN  Medication Review/Management: medications reviewed   Goal Outcome Evaluation:      Plan of Care Reviewed With: patient    Overall Patient Progress: improvingOverall Patient Progress: improving    Outcome Evaluation: A&Ox4, VVS Ax1 walke GB. R hip pain. Ortho/PT/Sw following- TCU 7/19 @1300 family to transport to [facility]. WBAt on RLE

## 2025-07-19 NOTE — PLAN OF CARE
"Temp: 98.1  F (36.7  C) Temp src: Oral BP: (!) 154/87 Pulse: 74   Resp: 17 SpO2: 92 % O2 Device: None (Room air)       A&Ox4, forgetful. Up to the bathroom Ax1 with walker and gait belt, per pt she can be unsteady at times due to chronic vertigo- scheduled meclizine given. Complains of right hip/groin pain, 10/10 pain with movement, denies pain at rest, scheduled tylenol given, lidocaine patch placed to right hip. Plan- DW, ortho signed off- WBAT RLE, PT recommending TCU- [facility] TCU accepted pt for 7/19- at 1300, labetalol prn for sbp>180, pain control prn, mag/potassium protocol- recheck in am, nephrology signed off.      Problem: Adult Inpatient Plan of Care  Goal: Plan of Care Review  Description: The Plan of Care Review/Shift note should be completed every shift.  The Outcome Evaluation is a brief statement about your assessment that the patient is improving, declining, or no change.  This information will be displayed automatically on your shift  note.  Outcome: Progressing  Goal: Patient-Specific Goal (Individualized)  Description: You can add care plan individualizations to a care plan. Examples of Individualization might be:  \"Parent requests to be called daily at 9am for status\", \"I have a hard time hearing out of my right ear\", or \"Do not touch me to wake me up as it startles  me\".  Outcome: Progressing  Goal: Absence of Hospital-Acquired Illness or Injury  Outcome: Progressing  Intervention: Identify and Manage Fall Risk  Recent Flowsheet Documentation  Taken 7/18/2025 1918 by Tonya Shah, RN  Safety Promotion/Fall Prevention:   safety round/check completed   nonskid shoes/slippers when out of bed  Intervention: Prevent Skin Injury  Recent Flowsheet Documentation  Taken 7/18/2025 1918 by Tonya Shah RN  Body Position: position changed independently  Intervention: Prevent and Manage VTE (Venous Thromboembolism) Risk  Recent Flowsheet Documentation  Taken 7/18/2025 1918 by Tonya Shah, " RN  VTE Prevention/Management: (refusing) SCDs off (sequential compression devices)  Goal: Optimal Comfort and Wellbeing  Outcome: Progressing  Intervention: Monitor Pain and Promote Comfort  Recent Flowsheet Documentation  Taken 7/18/2025 1932 by Tonya Shah RN  Pain Management Interventions: (scheduled tylenol given and lidocaine patch placed to right hip) medication (see MAR)  Goal: Readiness for Transition of Care  Outcome: Progressing     Problem: Delirium  Goal: Optimal Coping  Outcome: Progressing  Goal: Improved Behavioral Control  Outcome: Progressing  Goal: Improved Attention and Thought Clarity  Outcome: Progressing  Goal: Improved Sleep  Outcome: Progressing     Problem: Pain Acute  Goal: Optimal Pain Control and Function  Outcome: Progressing  Intervention: Develop Pain Management Plan  Recent Flowsheet Documentation  Taken 7/18/2025 1932 by Tonya Shah RN  Pain Management Interventions: (scheduled tylenol given and lidocaine patch placed to right hip) medication (see MAR)  Intervention: Prevent or Manage Pain  Recent Flowsheet Documentation  Taken 7/18/2025 1918 by Tonya Shah RN  Medication Review/Management: medications reviewed

## 2025-07-19 NOTE — DISCHARGE SUMMARY
[facility]  Hospitalist Discharge Summary      Date of Admission:  7/15/2025  Date of Discharge:  7/19/2025  Discharging Provider: FERNANDO Gomez CNP  Discharge Service: Hospitalist Service    Discharge Diagnoses   See below    Clinically Significant Risk Factors          Follow-ups Needed After Discharge   Follow-up Appointments       Follow Up and recommended labs and tests      Follow up with [facility].  The following labs/tests are recommended: CBC and BMP in 1 week.  Follow with [facility] after 2-4 weeks.  Call [phone] to schedule.  Let them know that you were seen in the hospital by renal medicine and need to be seen in follow up.  Follow up with hip specialist at [facility] in 6 weeks for repeat x-rays and reevaluation. Please call [phone] ([place]) or [phone] ([place]) to schedule.                Unresulted Labs Ordered in the Past 30 Days of this Admission       No orders found from 6/15/2025 to 7/16/2025.        These results will be followed up by NA    Discharge Disposition   Discharged to short-term care facility  Condition at discharge: Stable    Hospital Course   Maria Alejandra Buck is a 75 year old female with PMH significant for CAD, CKD stage IV, hypertension, hyperlipidemia, intra-abdominal aortic aneurysm with prior stenting, chronic vertigo who presents to the ED on 7/15/2025 for evaluation of right hip pain after a fall.    Of note the patient is currently visiting from [place] and was recently admitted at [facility] in [place] from 6/5-6/17/2025 for hypertensive urgency, ROMI on CKD stage IIIa with recent renal biopsy per nephrology recommendations.    ED workup reveals: initial BP of 208/98, intermittently bradycardic into the 50s, CO2 of 21, BUN of 49, creatinine of 3.28, GFR 14, glucose of 114, hemoglobin of 11.0, EKG tracing showing rate of 61 bpm in SR with minimal voltage criteria for LVH,  nonspecific ST abnormality, CT of head negative for acute intracranial pathology age-indeterminate right basal ganglia infarct, potentially chronic, brain atrophy and presumed chronic small vessel ischemic changes, right frontal temporal scalp hematoma, without acute fracture, CT cervical spine without acute fracture, irregular soft tissue density in the left upper pulmonary lobe, apex, nonspecific, could potentially represent scarring, CT of right hip negative for fracture, nondisplaced but minimally comminuted fracture of the right pubic body extended into the junction with the right superior and inferior pubic rami.     S/p fall  Closed head injury with right frontotemporal scalp hematoma  Right hip pain due to right pubic body fracture   Walking into laundry room at extended stay hotel where the patient is residing when she slipped on her sandals and fell onto her right side hitting her face. Patient has significant periorbital ecchymosis and frontotemporal hematoma.  CT of head and CT cervical spine negative for acute fracture.  Noted to have pain in right hip located in groin and lateral hip with palpation.  Patient has increased pain with any attempts at mobility.  CT of right hip shows right pubic body fracture that extends into the junction with the right superior and inferior pubic rami.  - Orthopedic surgery consult, nonoperative management   - WBAT of RLE with walker (6-8 weeks), if significant pain at the right pelvic region it would be appropriate to limit her mobilization to chair for bed to chair for approximately 2 week  - pain previously controlled on 7/16, increased pain on 7/17 and again on 7/18.  Discontinue PO oxycodone and trail PO dilaudid which has been beneficial.  Scheduled and PRN APAP, lidocaine patch, and Robaxin.  Discontinue atarax. Judicious use of opiates given CKD stage IV  - PT consult. Pending TCU placement. CM/SW following for discharge coordination  - Follow up with [facility]  specialist in 6 weeks for repeat xrays and reevaluation   - PCP referral placed for outpatient follow up    Hypertensive urgency  CAD  Per most recent discharge summary from [place] [facility] had recommended isordil dinitrate, hydralazine, nifedipine, and carvedilol.  Most recent renal doppler on 5/21 did not show evidence of JACY.  Recent echocardiogram while in [place] on 5/22 showed EF of 65% with grade 1 diastolic dysfunction.- Continue carvedilol, clonidine patch (added this stay weekly), hydralazine, and nifedipine.   - Continue isosorbide     AAA  PVD  Endoleak and dilation with aorta proximal to endograft.  S/p recent endovascular aneurysm repair on 4/25.  - Continue PTA Plavix and statin    CKD stage IV  Hypokalemia  Baseline creatinine had previously been 1.3, had previously gone up to 2.7 then down to 2.4 when admitted in May of 2025.  Has continued to trend upward and has been around 3.4 when admitted in 06/2025.  Patient follows with nephrology and during this most recent hospital stay in [place] had an ultrasound that was unremarkable and noted to have significant proteinuria with protein/creatinine ratio of 15, 525.  Patient was on Plavix for PVD which was held during her last hospital stay and she had a renal biopsy done on 6/16. Creatinine on 6/17 in care everywhere was 3.30. 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 Creatinine near recent baseline.  Initially received IVF and bicarb but now on sodium bicarb alone. Seen by nephrology during this hospital stay and they signed off on 7/18/2025.  Will see as outpatient.    - Replete K PRN.  Mg normal    - Repeat BMP in 1 week    --------------------------------------------------  Chronic medical issues:    Chronic vertigo  Following with ENT in [place]. MRI brain on 5/22 negative for acute pathology. Continue  meclizine.  Discontinue additional atarax.  Orthostatic blood pressures negative on 7/17. Follow up with ENT as outpatient as per prior plan.      H/o CVA, chronic infarcts in right basal ganglia and right thalamus   Previously known to have chronic infarct based on MRI of brain in 05/2025. Chronic infarct in right basal ganglia again noted on CT of head on admission.  Continue clopidogrel.     Right lung adenocarcinoma  S/p SBRT in 2021, continue outpatient follow-up once back in [place]    COPD without acute exacerbation  Tobacco abuse, ongoing  Continues to smoke 0.5 ppd. No wheezing on exam. Not currently on daily inhalers.   Encourage smoking cessation    Cognitive impairment  During most recent hospitalization in June 2025 in [place] there was concern for patient having poor short-term memory as well as daughter noted concern that patient and her  are not safe to live at home.  Patient typically would come stay with her daughter for the summer in [place]. Confirmed with daughter plan for the patient to return back to [place] in the fall.  Spouse/family will need to attend appointments with her.  Would benefit from close family supervision. Consider shelter.  OT consult at TCU.     Consultations This Hospital Stay   CARE MANAGEMENT / SOCIAL WORK IP CONSULT  PHYSICAL THERAPY ADULT IP CONSULT  ORTHOPEDIC SURGERY IP CONSULT  NEPHROLOGY IP CONSULT  PHYSICAL THERAPY ADULT IP CONSULT  OCCUPATIONAL THERAPY ADULT IP CONSULT    Code Status   Full Code    Time Spent on this Encounter   I, FERNANDO Gomez CNP, personally saw the patient today and spent greater than 30 minutes discharging this patient.       FERNANDO Gomez CNP  [facility]BSERVATION DEPT  [address] [facility] [address]  Phone: [phone]  ______________________________________________________________________    Physical Exam   Vital Signs: Temp: 98  F (36.7  C) Temp src: Oral BP: (!) 155/77 Pulse: 75    Resp: 16 SpO2: (!) 90 % O2 Device: None (Room air)    Weight: 117 lbs 14.4 oz  General Appearance:  Resting comfortably, NAD  HEENT: NC, hematoma to right forehead, periorbital ecchymosis  Respiratory: CTAB without wheezing or rales   Cardiovascular: RRR without murmur or rub  GI: soft, nontender, nondistended, normoactive bowel sounds   Skin: warm, dry  Ext:tenderness over right groin and lateral hip with palpation; slight decreased strength in bilateral LE. CR < 3 seconds. SILT.       Primary Care Physician   Mckenna Wilkinson    Discharge Orders      Primary Care Referral      General info for SNF    Length of Stay Estimate: Short Term Care: Estimated # of Days <30  Condition at Discharge: Stable  Level of care:skilled   Rehabilitation Potential: good  Admission H&P remains valid and up-to-date: Yes  Recent Chemotherapy: N/A  Use Nursing Home Standing Orders: Yes     Mantoux instructions    Give two-step Mantoux (PPD) Per Facility Policy Yes     Follow Up and recommended labs and tests    Follow up with intermediate physician.  The following labs/tests are recommended: CBC and BMP in 1 week.  Follow with [facility] after 2-4 weeks.  Call [phone] to schedule.  Let them know that you were seen in the hospital by renal medicine and need to be seen in follow up.  Follow up with hip specialist at [facility] in 6 weeks for repeat x-rays and reevaluation. Please call [phone] ([place]) or [phone] ([place]) to schedule.     Activity - Up with nursing assistance     Weight bearing status    WBAT with walker.  Wean from walker as able.     Reason for your hospital stay    Fall  Scalp/facial hematoma  Pubic body fracture  Essential HTN  CAD  AAA  PVD  CKD stage IV  COPD  Cognitive impairment     Full Code     Physical Therapy Adult Consult    Evaluate and treat as clinically indicated.    Reason:  pubic rami fracture.  Deconditioning.     Occupational Therapy Adult Consult    Evaluate and  treat as clinically indicated.    Reason:  pubic fx.  Cognitive impairment.  Deconditioning.     Fall precautions     Diet    Follow this diet upon discharge: Current Diet:Orders Placed This Encounter      Renal Diet (non-dialysis)       Significant Results and Procedures   Most Recent 3 CBC's:  Recent Labs   Lab Test 07/17/25  0620 07/16/25  0839 07/15/25  1243   WBC 7.5 6.6 7.4   HGB 10.5* 10.4* 11.0*   * 104* 103*    218 235     Most Recent 3 BMP's:  Recent Labs   Lab Test 07/19/25  0510 07/18/25  1554 07/18/25  0550 07/17/25  0620 07/16/25  0839   NA  --   --  137 141 139   POTASSIUM 3.5 3.7 3.1* 3.8 3.9   CHLORIDE  --   --  102 106 110*   CO2  --   --  24 23 19*   BUN  --   --  41.0* 40.6* 43.0*   CR  --   --  3.11* 3.01* 3.15*   ANIONGAP  --   --  11 12 10   BRUCE  --   --  8.4* 8.4* 8.6*   GLC  --   --  98 101* 111*     Most Recent 2 LFT's:  Recent Labs   Lab Test 07/17/25  0620   AST 22   ALT 11   ALKPHOS 61   BILITOTAL 0.3     Most Recent 6 glucoses:  Recent Labs   Lab Test 07/18/25  0550 07/17/25  0620 07/16/25  0839 07/15/25  1243 07/17/18  0630   GLC 98 101* 111* 114* 102*     Most Recent Anemia Panel:  Recent Labs   Lab Test 07/17/25  0620   WBC 7.5   HGB 10.5*   HCT 31.6*   *          Results for orders placed or performed during the hospital encounter of 07/15/25   Head CT w/o contrast    Narrative    EXAM: CT HEAD W/O CONTRAST, CT FACIAL BONES WITHOUT CONTRAST, CT CERVICAL SPINE W/O CONTRAST  LOCATION: [facility]  DATE: 7/15/2025    INDICATION: Fall, right scalp hematoma  COMPARISON: None.  TECHNIQUE:   1) Routine CT Head without IV contrast. Multiplanar reformats. Dose reduction techniques were used.  2) Routine CT Facial Bones without IV contrast. Multiplanar reformats. Dose reduction techniques were used.  3) Routine CT Cervical Spine without IV contrast. Multiplanar reformats. Dose reduction techniques were used.    FINDINGS:  HEAD CT:    INTRACRANIAL CONTENTS: The ventricles appear proportionate to the sulci. There is a right basal ganglia infarct, age indeterminate, but potentially chronic. Mild to moderate patchy nonspecific hypoattenuation in the cerebral white matter, likely due to   chronic small vessel ischemic disease. Mild to moderate generalized brain parenchymal volume loss. No hyperdense acute intracranial hemorrhage, extra-axial fluid collection, or mass effect. Atherosclerotic calcifications primarily involving the carotid   siphons.    OSSEOUS STRUCTURES/SOFT TISSUES: Focal right frontotemporal scalp hematoma measuring approximately 12-13 mm with adjacent superficial soft tissue swelling of the right scalp. No underlying displaced calvarial fracture is appreciated.     FACIAL BONE CT:  The pterygoid plates are intact. The bilateral zygomatic arches, sphenotemporal buttresses, the walls of both orbits, and the walls of the maxillary sinuses appear intact. No displaced nasal arch or nasal septal fracture is identified. The anterior skull   base appears to be intact. The mandible appears intact. There appears to be mild symmetric anterior subluxation of the mandibular condyles with respect to the glenoid fossae, which may be positional.    Right frontotemporal scalp soft tissue swelling, which may represent soft tissue contusion. Changes of bilateral cataract surgery are noted. The post septal intraorbital soft tissue structures otherwise appear unremarkable. The paranasal sinuses are free   of significant disease. The mastoid and middle ear cavities appear grossly clear.    CERVICAL SPINE CT:   VERTEBRA: No acute cervical spine fracture identified. Vertebral body heights are normal. Straightening of the normal cervical lordosis. Minimal anterolisthesis of C4 on C5. Minimal broad levoconvex curvature centered at the C7 level.     CANAL/FORAMINA: Moderate to severe C6-7 disc space narrowing and moderate appearing disc space narrowing at  C3-4 and C5-6 with mild disc space narrowing elsewhere. Scattered marginal endplate and uncovertebral spurs. Multilevel degenerative facet   disease. Mild degenerative change of the median atlantoaxial joint. Multilevel disc osteophyte complexes/disc bulges. There appears to be up to moderate central spinal canal stenosis at the C3-4 and C5-6 levels with some flattening of the normal spinal   cord contour at those levels. There is at least moderate neural foraminal stenosis on the left at C3-C4 with at least mild to moderate bordering on moderate left C4-5 and right C5-6 neural foraminal stenosis. Milder degrees of foraminal narrowing   elsewhere.    PARASPINAL: Right greater than left carotid bifurcation atherosclerotic calcification. There is some irregular soft tissue density in the left upper pulmonary lobe/apex, nonspecific, possibly representing scarring. One of these lesions measures up to   approximately 11-12 mm (series 4 image 84). The prevertebral soft tissues otherwise appear unremarkable.       Impression    IMPRESSION:  HEAD CT:  1.  No acute intracranial hemorrhage, extra-axial fluid collection, or mass effect.  2.  Age-indeterminate right basal ganglia infarct, potentially chronic.  3.  Brain atrophy and presumed chronic small vessel ischemic changes, as described.  4.  Right frontotemporal scalp hematoma without underlying calvarial fracture.    FACIAL BONE CT:  1.  No acute facial fracture.  2.  Right frontotemporal scalp soft tissue swelling, which may represent contusion.    CERVICAL SPINE CT:  1.  No acute cervical spine fracture.  2.  Multilevel degenerative changes, as described.  3.  Irregular soft tissue density in the left upper pulmonary lobe/apex, nonspecific. This could potentially represent scarring.    Pulmonary nodule recommendation: Proceed with a complete chest CT examination for further evaluation as early as possible (Per Fleischner Society guidelines).     CT Facial Bones without  Contrast    Narrative    EXAM: CT HEAD W/O CONTRAST, CT FACIAL BONES WITHOUT CONTRAST, CT CERVICAL SPINE W/O CONTRAST  LOCATION: [facility]  DATE: 7/15/2025    INDICATION: Fall, right scalp hematoma  COMPARISON: None.  TECHNIQUE:   1) Routine CT Head without IV contrast. Multiplanar reformats. Dose reduction techniques were used.  2) Routine CT Facial Bones without IV contrast. Multiplanar reformats. Dose reduction techniques were used.  3) Routine CT Cervical Spine without IV contrast. Multiplanar reformats. Dose reduction techniques were used.    FINDINGS:  HEAD CT:   INTRACRANIAL CONTENTS: The ventricles appear proportionate to the sulci. There is a right basal ganglia infarct, age indeterminate, but potentially chronic. Mild to moderate patchy nonspecific hypoattenuation in the cerebral white matter, likely due to   chronic small vessel ischemic disease. Mild to moderate generalized brain parenchymal volume loss. No hyperdense acute intracranial hemorrhage, extra-axial fluid collection, or mass effect. Atherosclerotic calcifications primarily involving the carotid   siphons.    OSSEOUS STRUCTURES/SOFT TISSUES: Focal right frontotemporal scalp hematoma measuring approximately 12-13 mm with adjacent superficial soft tissue swelling of the right scalp. No underlying displaced calvarial fracture is appreciated.     FACIAL BONE CT:  The pterygoid plates are intact. The bilateral zygomatic arches, sphenotemporal buttresses, the walls of both orbits, and the walls of the maxillary sinuses appear intact. No displaced nasal arch or nasal septal fracture is identified. The anterior skull   base appears to be intact. The mandible appears intact. There appears to be mild symmetric anterior subluxation of the mandibular condyles with respect to the glenoid fossae, which may be positional.    Right frontotemporal scalp soft tissue swelling, which may represent soft tissue contusion. Changes of bilateral  cataract surgery are noted. The post septal intraorbital soft tissue structures otherwise appear unremarkable. The paranasal sinuses are free   of significant disease. The mastoid and middle ear cavities appear grossly clear.    CERVICAL SPINE CT:   VERTEBRA: No acute cervical spine fracture identified. Vertebral body heights are normal. Straightening of the normal cervical lordosis. Minimal anterolisthesis of C4 on C5. Minimal broad levoconvex curvature centered at the C7 level.     CANAL/FORAMINA: Moderate to severe C6-7 disc space narrowing and moderate appearing disc space narrowing at C3-4 and C5-6 with mild disc space narrowing elsewhere. Scattered marginal endplate and uncovertebral spurs. Multilevel degenerative facet   disease. Mild degenerative change of the median atlantoaxial joint. Multilevel disc osteophyte complexes/disc bulges. There appears to be up to moderate central spinal canal stenosis at the C3-4 and C5-6 levels with some flattening of the normal spinal   cord contour at those levels. There is at least moderate neural foraminal stenosis on the left at C3-C4 with at least mild to moderate bordering on moderate left C4-5 and right C5-6 neural foraminal stenosis. Milder degrees of foraminal narrowing   elsewhere.    PARASPINAL: Right greater than left carotid bifurcation atherosclerotic calcification. There is some irregular soft tissue density in the left upper pulmonary lobe/apex, nonspecific, possibly representing scarring. One of these lesions measures up to   approximately 11-12 mm (series 4 image 84). The prevertebral soft tissues otherwise appear unremarkable.       Impression    IMPRESSION:  HEAD CT:  1.  No acute intracranial hemorrhage, extra-axial fluid collection, or mass effect.  2.  Age-indeterminate right basal ganglia infarct, potentially chronic.  3.  Brain atrophy and presumed chronic small vessel ischemic changes, as described.  4.  Right frontotemporal scalp hematoma without  underlying calvarial fracture.    FACIAL BONE CT:  1.  No acute facial fracture.  2.  Right frontotemporal scalp soft tissue swelling, which may represent contusion.    CERVICAL SPINE CT:  1.  No acute cervical spine fracture.  2.  Multilevel degenerative changes, as described.  3.  Irregular soft tissue density in the left upper pulmonary lobe/apex, nonspecific. This could potentially represent scarring.    Pulmonary nodule recommendation: Proceed with a complete chest CT examination for further evaluation as early as possible (Per Fleischner Society guidelines).     CT Cervical Spine w/o Contrast    Narrative    EXAM: CT HEAD W/O CONTRAST, CT FACIAL BONES WITHOUT CONTRAST, CT CERVICAL SPINE W/O CONTRAST  LOCATION: [facility]  DATE: 7/15/2025    INDICATION: Fall, right scalp hematoma  COMPARISON: None.  TECHNIQUE:   1) Routine CT Head without IV contrast. Multiplanar reformats. Dose reduction techniques were used.  2) Routine CT Facial Bones without IV contrast. Multiplanar reformats. Dose reduction techniques were used.  3) Routine CT Cervical Spine without IV contrast. Multiplanar reformats. Dose reduction techniques were used.    FINDINGS:  HEAD CT:   INTRACRANIAL CONTENTS: The ventricles appear proportionate to the sulci. There is a right basal ganglia infarct, age indeterminate, but potentially chronic. Mild to moderate patchy nonspecific hypoattenuation in the cerebral white matter, likely due to   chronic small vessel ischemic disease. Mild to moderate generalized brain parenchymal volume loss. No hyperdense acute intracranial hemorrhage, extra-axial fluid collection, or mass effect. Atherosclerotic calcifications primarily involving the carotid   siphons.    OSSEOUS STRUCTURES/SOFT TISSUES: Focal right frontotemporal scalp hematoma measuring approximately 12-13 mm with adjacent superficial soft tissue swelling of the right scalp. No underlying displaced calvarial fracture is  appreciated.     FACIAL BONE CT:  The pterygoid plates are intact. The bilateral zygomatic arches, sphenotemporal buttresses, the walls of both orbits, and the walls of the maxillary sinuses appear intact. No displaced nasal arch or nasal septal fracture is identified. The anterior skull   base appears to be intact. The mandible appears intact. There appears to be mild symmetric anterior subluxation of the mandibular condyles with respect to the glenoid fossae, which may be positional.    Right frontotemporal scalp soft tissue swelling, which may represent soft tissue contusion. Changes of bilateral cataract surgery are noted. The post septal intraorbital soft tissue structures otherwise appear unremarkable. The paranasal sinuses are free   of significant disease. The mastoid and middle ear cavities appear grossly clear.    CERVICAL SPINE CT:   VERTEBRA: No acute cervical spine fracture identified. Vertebral body heights are normal. Straightening of the normal cervical lordosis. Minimal anterolisthesis of C4 on C5. Minimal broad levoconvex curvature centered at the C7 level.     CANAL/FORAMINA: Moderate to severe C6-7 disc space narrowing and moderate appearing disc space narrowing at C3-4 and C5-6 with mild disc space narrowing elsewhere. Scattered marginal endplate and uncovertebral spurs. Multilevel degenerative facet   disease. Mild degenerative change of the median atlantoaxial joint. Multilevel disc osteophyte complexes/disc bulges. There appears to be up to moderate central spinal canal stenosis at the C3-4 and C5-6 levels with some flattening of the normal spinal   cord contour at those levels. There is at least moderate neural foraminal stenosis on the left at C3-C4 with at least mild to moderate bordering on moderate left C4-5 and right C5-6 neural foraminal stenosis. Milder degrees of foraminal narrowing   elsewhere.    PARASPINAL: Right greater than left carotid bifurcation atherosclerotic calcification.  There is some irregular soft tissue density in the left upper pulmonary lobe/apex, nonspecific, possibly representing scarring. One of these lesions measures up to   approximately 11-12 mm (series 4 image 84). The prevertebral soft tissues otherwise appear unremarkable.       Impression    IMPRESSION:  HEAD CT:  1.  No acute intracranial hemorrhage, extra-axial fluid collection, or mass effect.  2.  Age-indeterminate right basal ganglia infarct, potentially chronic.  3.  Brain atrophy and presumed chronic small vessel ischemic changes, as described.  4.  Right frontotemporal scalp hematoma without underlying calvarial fracture.    FACIAL BONE CT:  1.  No acute facial fracture.  2.  Right frontotemporal scalp soft tissue swelling, which may represent contusion.    CERVICAL SPINE CT:  1.  No acute cervical spine fracture.  2.  Multilevel degenerative changes, as described.  3.  Irregular soft tissue density in the left upper pulmonary lobe/apex, nonspecific. This could potentially represent scarring.    Pulmonary nodule recommendation: Proceed with a complete chest CT examination for further evaluation as early as possible (Per Fleischner Society guidelines).     XR Shoulder Left G/E 3 Views    Narrative    EXAM: XR SHOULDER LEFT G/E 3 VIEWS  LOCATION: [facility]  DATE: 7/15/2025    INDICATION: fall, proximal humerus pain  COMPARISON: None.      Impression    IMPRESSION: No acute fracture or malalignment. Mild glenohumeral and acromioclavicular joint degenerative changes. Osteopenia. Chronic lung changes. Aortic atherosclerosis.   XR Pelvis w Hip Right 1 View    Narrative    EXAM: XR PELVIS AND HIP RIGHT 1 VIEW  LOCATION: [facility]  DATE: 7/15/2025    INDICATION: fall, suspected right hip fracture  COMPARISON: None.      Impression    IMPRESSION: Both hips negative for fracture. Pelvis negative for fracture. Aortic endograft.   CT Hip Right w/o Contrast    Narrative     EXAM: CT HIP RIGHT W/O CONTRAST  LOCATION: [facility]  DATE: 7/15/2025    INDICATION: right hip pain, negative x ray  COMPARISON: None.  TECHNIQUE: Noncontrast. Axial, sagittal and coronal thin-section reconstruction. Dose reduction techniques were used.     FINDINGS:     BONES:  -The right hip is negative for fracture or CT evidence of avascular necrosis. There is a nondisplaced fracture of the right pubic body which extends into the junction with the right-sided pubic rami. There is degenerative change at the right hip joint.   The visualized portion of the bony pelvis is otherwise negative.    SOFT TISSUES:  -Normal.      Impression    IMPRESSION:  1.  The right hip is negative for fracture or CT evidence of avascular necrosis.  2.  Nondisplaced but minimally comminuted fracture of the right pubic body extends into the junction with the right superior and inferior pubic rami.  3.  Degenerative change at the right hip joint itself.  4.  No evidence for soft tissue mass or fluid collection.         Discharge Medications      Review of your medicines        START taking        Dose / Directions   cloNIDine 0.3 MG/24HR WK patch  Commonly known as: CATAPRES-TTS3      Dose: 1 patch  Start taking on: July 24, 2025  Place 1 patch over 168 hours onto the skin once a week.  Refills: 0     HYDROmorphone 2 MG tablet  Commonly known as: DILAUDID      Dose: 1-2 mg  Take 0.5-1 tablets (1-2 mg) by mouth every 3 hours as needed (pain 4-6/10 = 1 mg and pain 7-10/10 = 2 mg).  Quantity: 20 tablet  Refills: 0     Lidocaine 4 % Patch  Commonly known as: LIDOCARE      Dose: 2 patch  Place 2 patches over 12 hours onto the skin every 24 hours. To prevent lidocaine toxicity, patient should be patch free for 12 hrs daily.  Apply to affected area.  Refills: 0     meclizine 25 MG tablet  Commonly known as: ANTIVERT  Used for: Dizziness      Dose: 25 mg  Take 1 tablet (25 mg) by mouth every 6 hours.  Refills: 0      methocarbamol 500 MG tablet  Commonly known as: ROBAXIN      Dose: 500 mg  Take 1 tablet (500 mg) by mouth 3 times daily as needed for muscle spasms.  Refills: 0     ondansetron 4 MG ODT tab  Commonly known as: ZOFRAN ODT  Used for: Nausea      Dose: 4 mg  Take 1 tablet (4 mg) by mouth every 6 hours as needed.  Refills: 0     senna-docusate 8.6-50 MG tablet  Commonly known as: SENOKOT-S/PERICOLACE      Dose: 1 tablet  Take 1 tablet by mouth 2 times daily as needed for constipation.  Refills: 0     sodium bicarbonate 650 MG tablet  Used for: Metabolic acidosis      Dose: 1,300 mg  Take 2 tablets (1,300 mg) by mouth 2 times daily.  Refills: 0            CHANGE how you take these medications        Dose / Directions   * acetaminophen 500 MG tablet  Commonly known as: TYLENOL  This may have changed:   medication strength  how much to take  when to take this  reasons to take this      Dose: 1,000 mg  Take 2 tablets (1,000 mg) by mouth 3 times daily.  Refills: 0     * acetaminophen 325 MG tablet  Commonly known as: TYLENOL  This may have changed: You were already taking a medication with the same name, and this prescription was added. Make sure you understand how and when to take each.      Dose: 325-650 mg  Take 1-2 tablets (325-650 mg) by mouth every 6 hours as needed for mild pain.  Refills: 0     hydrALAZINE 50 MG tablet  Commonly known as: APRESOLINE  This may have changed:   medication strength  how much to take      Dose: 50 mg  Take 1 tablet (50 mg) by mouth 3 times daily.  Refills: 0     NIFEdipine ER 90 MG 24 hr tablet  Commonly known as: ADALAT CC  This may have changed:   medication strength  how much to take      Dose: 90 mg  Start taking on: July 20, 2025  Take 1 tablet (90 mg) by mouth daily.  Refills: 0           * This list has 2 medication(s) that are the same as other medications prescribed for you. Read the directions carefully, and ask your doctor or other care provider to review them with you.                 CONTINUE these medicines which have NOT CHANGED        Dose / Directions   atorvastatin 80 MG tablet  Commonly known as: LIPITOR  Used for: Hyperlipidemia LDL goal <70      Dose: 80 mg  Take 1 tablet (80 mg) by mouth every evening  Quantity: 30 tablet  Refills: 0     carvedilol 25 MG tablet  Commonly known as: COREG      Dose: 12.5 mg  Take 12.5 mg by mouth 2 times daily (with meals).  Refills: 0     clopidogrel 75 MG tablet  Commonly known as: PLAVIX      Dose: 75 mg  Take 75 mg by mouth daily.  Refills: 0     diphenhydrAMINE-acetaminophen  MG tablet  Commonly known as: TYLENOL PM      Dose: 1 tablet  Take 1 tablet by mouth nightly as needed for sleep.  Refills: 0     famotidine 40 MG tablet  Commonly known as: PEPCID      Dose: 40 mg  Take 40 mg by mouth daily.  Refills: 0     icosapent ethyl 1 g Caps capsule  Commonly known as: VASCEPA      Dose: 2 g  Take 2 g by mouth 2 times daily (with meals).  Refills: 0     isosorbide dinitrate 20 MG tablet  Commonly known as: ISORDIL      Dose: 20 mg  Take 20 mg by mouth 3 times daily.  Refills: 0     MULTIVITAMIN WOMEN PO      Dose: 1 tablet  Take 1 tablet by mouth daily.  Refills: 0     PAROXETINE HCL PO      Dose: 40 mg  Take 40 mg by mouth daily  Refills: 0            STOP taking      LORATADINE PO        UNKNOWN TO PATIENT                  Where to get your medicines        Some of these will need a paper prescription and others can be bought over the counter. Ask your nurse if you have questions.    Bring a paper prescription for each of these medications  HYDROmorphone 2 MG tablet       Allergies   Allergies   Allergen Reactions    Iodine Hives

## 2025-07-19 NOTE — PLAN OF CARE
"Physical Therapy Discharge Summary     Reason for therapy discharge:    Discharged to transitional care facility.     Progress towards therapy goal(s). See goals on Care Plan in [facility] electronic health record for goal details.  Goals not met.  Barriers to achieving goals:  discharge to TCU   Therapy recommendation(s):    Continued therapy is recommended.  Rationale/Recommendations:  Per last documenting therapist \"At this time the patient is below baseline mobility, requiring Ax1 for all functional mobility with decreased tolerance to activity. Pt would benefit from TCU stay, and family has significant concerns about pt returning home and falling again. Recommend use of FWW instead of 4WW for all mobility at this time and TCU for further strengthening and mobility training once discharged.\"  *Note based on previous PT notes, not seen by documenting therapist    "

## 2025-07-19 NOTE — PROGRESS NOTES
Care Management Discharge Note    Discharge Date: 07/19/2025       Discharge Disposition: Transitional Care    Discharge Services: None    Discharge DME: None    Discharge Transportation: family or friend will provide    Private pay costs discussed: Not applicable    Does the patient's insurance plan have a 3 day qualifying hospital stay waiver?  No    PAS Confirmation Code: [id number]  Patient/family educated on Medicare website which has current facility and service quality ratings: yes    Education Provided on the Discharge Plan: Yes  Persons Notified of Discharge Plans: [facility]  Patient/Family in Agreement with the Plan: yes    Handoff Referral Completed: No, handoff not indicated or clinically appropriate    Additional Information:  Spoke with Padmini charge nurse at [facility] and confirmed patient can come today.  Have sent discharge orders and family will be transporting about 1300.    ENMA Garcia  [phone]

## 2025-07-21 ENCOUNTER — TRANSITIONAL CARE UNIT VISIT (OUTPATIENT)
Dept: GERIATRICS | Facility: CLINIC | Age: 75
End: 2025-07-21
Payer: MEDICARE

## 2025-07-21 ENCOUNTER — LAB REQUISITION (OUTPATIENT)
Dept: LAB | Facility: CLINIC | Age: 75
End: 2025-07-21
Payer: COMMERCIAL

## 2025-07-21 VITALS
SYSTOLIC BLOOD PRESSURE: 137 MMHG | HEART RATE: 82 BPM | TEMPERATURE: 97.2 F | OXYGEN SATURATION: 96 % | DIASTOLIC BLOOD PRESSURE: 80 MMHG | WEIGHT: 119.2 LBS | BODY MASS INDEX: 19.86 KG/M2 | HEIGHT: 65 IN | RESPIRATION RATE: 18 BRPM

## 2025-07-21 DIAGNOSIS — Z86.73 HISTORY OF CVA (CEREBROVASCULAR ACCIDENT): ICD-10-CM

## 2025-07-21 DIAGNOSIS — Z91.81 HISTORY OF FALL: Primary | ICD-10-CM

## 2025-07-21 DIAGNOSIS — Z11.1 ENCOUNTER FOR SCREENING FOR RESPIRATORY TUBERCULOSIS: ICD-10-CM

## 2025-07-21 DIAGNOSIS — I73.9 PERIPHERAL VASCULAR DISEASE: ICD-10-CM

## 2025-07-21 DIAGNOSIS — N18.5 CKD (CHRONIC KIDNEY DISEASE) STAGE 5, GFR LESS THAN 15 ML/MIN (H): ICD-10-CM

## 2025-07-21 DIAGNOSIS — S09.90XD CLOSED HEAD INJURY, SUBSEQUENT ENCOUNTER: ICD-10-CM

## 2025-07-21 DIAGNOSIS — S32.591D OTHER CLOSED FRACTURE OF RIGHT PUBIS WITH ROUTINE HEALING, SUBSEQUENT ENCOUNTER: ICD-10-CM

## 2025-07-21 DIAGNOSIS — I10 HYPERTENSION, UNSPECIFIED TYPE: ICD-10-CM

## 2025-07-21 DIAGNOSIS — S32.501A CLOSED FRACTURE OF RIGHT PUBIS, UNSPECIFIED PORTION OF PUBIS, INITIAL ENCOUNTER (H): ICD-10-CM

## 2025-07-21 PROCEDURE — 99310 SBSQ NF CARE HIGH MDM 45: CPT | Performed by: PHYSICIAN ASSISTANT

## 2025-07-21 RX ORDER — HYDROMORPHONE HYDROCHLORIDE 2 MG/1
TABLET ORAL
Qty: 10 TABLET | Refills: 0 | Status: SHIPPED | OUTPATIENT
Start: 2025-07-21 | End: 2025-07-24

## 2025-07-21 NOTE — LETTER
7/21/2025      Maria Alejandra Buck  [address]  [facility] [address]        [facility] GERIATRICS    PRIMARY CARE PROVIDER AND CLINIC:  Mckenna Wilkinson MD, [address] / [facility] [id number]  Chief Complaint   Patient presents with     Hospital F/U      [facility] Medical Record Number:  [id number]  Place of Service where encounter took place:  [facility] ([facility]) [[id number]]    Maria Alejandra Buck  is a 75 year old  (1950), admitted to the above facility from  [facility]. Hospital stay 7/15/25 through 7/19/25..   HPI:    Notes from hospitalization reviewed including H&P Ortho consult, nephrology consult and D/c summary    Maria Alejandra Buck is a pleasant 75-year-old female with a past medical history of CKD, peripheral vascular disease, AAA, hypertension who was recently hospitalized at [facility].  Presented for evaluation after a fall.  Found to have a right pubic bone fracture as well as facial hematoma.  Additionally noted to have acute ROMI.  Recently had hospitalizations in [place] for hypertensive urgency and progressive CKD.  Nephrology was consulted this hospital stay.  Her BP medications were adjusted.  Sodium bicarb was added.  She is staying locally in [place] until Labor Day and it was encouraged that she establish with nephrology in the area.  Deconditioned compared to baseline so discharged to [facility]    Maria Alejandra is evaluated alongside her sister.  She just finished working with physical therapy.  Therapy noted she did well but having a lot of pain with any mobility.  We reviewed current pain management.  She feels it would be helpful to take hydromorphone scheduled in the morning with additional available as needed.  Does note some mild nausea.  Appetite has been diminished.  Urinates multiple times per day.  Denies edema.  No shortness of breath.  She lives in [place] full-time with her  but they do spend the summers here.  At baseline she  does not ambulate with a walker    Message left for daughter Lindsay to introduce self and will discuss plan of care    Review of nursing home EMR: -[id number]  CODE STATUS/ADVANCE DIRECTIVES DISCUSSION:  Full Code  CPR/Full code   ALLERGIES:   Allergies   Allergen Reactions     Iodine Hives      PAST MEDICAL HISTORY:   Past Medical History:   Diagnosis Date     Aneurysm of renal artery     left     Atrial fibrillation (H)      COPD (chronic obstructive pulmonary disease) (H)      High cholesterol      Hypertension      Mixed hyperlipidemia      PVD (peripheral vascular disease)      Stenosis of left carotid artery      Tobacco use disorder       PAST SURGICAL HISTORY:   has a past surgical history that includes Abdomen surgery; aneurism; Abdomen surgery; Endarterectomy carotid (Left, 7/17/2018); and IR Renal Biopsy Left (6/16/2025).  FAMILY HISTORY: family history includes Alzheimer Disease in her brother and mother; Breast Cancer in her sister; Cerebrovascular Disease in her brother and father; Dementia in her maternal grandmother; Diabetes in her brother; Heart Disease in her sister; Parkinsonism in her brother.  SOCIAL HISTORY:   reports that she has been smoking. She has a 22.5 pack-year smoking history. She has never used smokeless tobacco. She reports current alcohol use. She reports that she does not use drugs.  Patient's living condition: lives with spouse lives in [place] majority of the year.  Spends summers in [place]    Post Discharge Medication Reconciliation Status:   MED REC REQUIRED  Post Medication Reconciliation Status: discharge medications reconciled and changed, per note/orders       Current Outpatient Medications   Medication Sig Dispense Refill     acetaminophen (TYLENOL) 500 MG tablet Take 2 tablets (1,000 mg) by mouth 3 times daily.       atorvastatin (LIPITOR) 80 MG tablet Take 1 tablet (80 mg) by mouth every evening 30 tablet      carvedilol (COREG) 25 MG tablet Take 12.5 mg by mouth 2 times  daily (with meals).       [START ON 7/24/2025] cloNIDine (CATAPRES-TTS3) 0.3 MG/24HR WK patch Place 1 patch over 168 hours onto the skin once a week.       clopidogrel (PLAVIX) 75 MG tablet Take 75 mg by mouth daily.       famotidine (PEPCID) 40 MG tablet Take 40 mg by mouth daily.       hydrALAZINE (APRESOLINE) 50 MG tablet Take 1 tablet (50 mg) by mouth 3 times daily.       HYDROmorphone (DILAUDID) 2 MG tablet Take 1 tablet (2 mg) by mouth daily (with breakfast). May also take 0.5-1 tablets (1-2 mg) 4 times daily as needed (pain 4-6/10 = 1 mg and pain 7-10/10 = 2 mg). 10 tablet 0     icosapent ethyl (VASCEPA) 1 g CAPS capsule Take 2 g by mouth 2 times daily (with meals).       isosorbide dinitrate (ISORDIL) 20 MG tablet Take 20 mg by mouth 3 times daily.       Lidocaine (LIDOCARE) 4 % Patch Place 2 patches over 12 hours onto the skin every 24 hours. To prevent lidocaine toxicity, patient should be patch free for 12 hrs daily.  Apply to affected area.       meclizine (ANTIVERT) 25 MG tablet Take 1 tablet (25 mg) by mouth every 6 hours.       methocarbamol (ROBAXIN) 500 MG tablet Take 1 tablet (500 mg) by mouth 3 times daily as needed for muscle spasms.       Multiple Vitamins-Minerals (MULTIVITAMIN WOMEN PO) Take 1 tablet by mouth daily.       NIFEdipine ER OSMOTIC (ADALAT CC) 90 MG 24 hr tablet Take 1 tablet (90 mg) by mouth daily.       ondansetron (ZOFRAN ODT) 4 MG ODT tab Take 1 tablet (4 mg) by mouth every 6 hours as needed.       PAROXETINE HCL PO Take 40 mg by mouth daily        senna-docusate (SENOKOT-S/PERICOLACE) 8.6-50 MG tablet Take 1 tablet by mouth 2 times daily as needed for constipation.       sodium bicarbonate 650 MG tablet Take 2 tablets (1,300 mg) by mouth 2 times daily.       No current facility-administered medications for this visit.       ROS:  10 point ROS of systems including Constitutional, Eyes, Respiratory, Cardiovascular, Gastroenterology, Genitourinary, Integumentary, Musculoskeletal,  "Psychiatric were all negative except for pertinent positives noted in my HPI.    Vitals:  /80   Pulse 82   Temp 97.2  F (36.2  C)   Resp 18   Ht 1.651 m (5' 5\")   Wt 54.1 kg (119 lb 3.2 oz)   SpO2 96%   BMI 19.84 kg/m    Exam:  Physical Exam  Vitals (Facility EMR) reviewed.   Constitutional:       General: She is not in acute distress.  HENT:      Head: Normocephalic.      Comments: Right facial ecchymosis  Eyes:      General: No scleral icterus.  Cardiovascular:      Rate and Rhythm: Normal rate and regular rhythm.   Pulmonary:      Effort: Pulmonary effort is normal.   Abdominal:      General: There is no distension.      Tenderness: There is no abdominal tenderness.   Musculoskeletal:      Right lower leg: No edema.      Left lower leg: No edema.   Skin:     General: Skin is warm and dry.      Findings: No rash.   Neurological:      Mental Status: She is alert. Mental status is at baseline.   Psychiatric:         Behavior: Behavior normal.         Lab/Diagnostic data:  Recent labs in [facility] reviewed by me today.  and Most Recent 3 CBC's:  Recent Labs   Lab Test 07/17/25  0620 07/16/25  0839 07/15/25  1243   WBC 7.5 6.6 7.4   HGB 10.5* 10.4* 11.0*   * 104* 103*    218 235     Most Recent 3 BMP's:  Recent Labs   Lab Test 07/19/25  0510 07/18/25  1554 07/18/25  0550 07/17/25  0620 07/16/25  0839   NA  --   --  137 141 139   POTASSIUM 3.5 3.7 3.1* 3.8 3.9   CHLORIDE  --   --  102 106 110*   CO2  --   --  24 23 19*   BUN  --   --  41.0* 40.6* 43.0*   CR  --   --  3.11* 3.01* 3.15*   ANIONGAP  --   --  11 12 10   BRUCE  --   --  8.4* 8.4* 8.6*   GLC  --   --  98 101* 111*     Most Recent 2 LFT's:  Recent Labs   Lab Test 07/17/25  0620   AST 22   ALT 11   ALKPHOS 61   BILITOTAL 0.3     Most Recent TSH and T4:No lab results found.  Most Recent Hemoglobin A1c:  Recent Labs   Lab Test 07/17/18  0630   A1C 6.1*     Most Recent 6 glucoses:  Recent Labs   Lab Test 07/18/25  0550 07/17/25  0620 " 07/16/25  0839 07/15/25  1243 07/17/18  0630   GLC 98 101* 111* 114* 102*     Most Recent Urinalysis:No lab results found.    Results for orders placed or performed during the hospital encounter of 07/15/25   Head CT w/o contrast     Narrative     EXAM: CT HEAD W/O CONTRAST, CT FACIAL BONES WITHOUT CONTRAST, CT CERVICAL SPINE W/O CONTRAST  LOCATION: [facility]  DATE: 7/15/2025     INDICATION: Fall, right scalp hematoma  COMPARISON: None.  TECHNIQUE:   1) Routine CT Head without IV contrast. Multiplanar reformats. Dose reduction techniques were used.  2) Routine CT Facial Bones without IV contrast. Multiplanar reformats. Dose reduction techniques were used.  3) Routine CT Cervical Spine without IV contrast. Multiplanar reformats. Dose reduction techniques were used.     FINDINGS:  HEAD CT:   INTRACRANIAL CONTENTS: The ventricles appear proportionate to the sulci. There is a right basal ganglia infarct, age indeterminate, but potentially chronic. Mild to moderate patchy nonspecific hypoattenuation in the cerebral white matter, likely due to   chronic small vessel ischemic disease. Mild to moderate generalized brain parenchymal volume loss. No hyperdense acute intracranial hemorrhage, extra-axial fluid collection, or mass effect. Atherosclerotic calcifications primarily involving the carotid   siphons.     OSSEOUS STRUCTURES/SOFT TISSUES: Focal right frontotemporal scalp hematoma measuring approximately 12-13 mm with adjacent superficial soft tissue swelling of the right scalp. No underlying displaced calvarial fracture is appreciated.      FACIAL BONE CT:  The pterygoid plates are intact. The bilateral zygomatic arches, sphenotemporal buttresses, the walls of both orbits, and the walls of the maxillary sinuses appear intact. No displaced nasal arch or nasal septal fracture is identified. The anterior skull   base appears to be intact. The mandible appears intact. There appears to be mild  symmetric anterior subluxation of the mandibular condyles with respect to the glenoid fossae, which may be positional.     Right frontotemporal scalp soft tissue swelling, which may represent soft tissue contusion. Changes of bilateral cataract surgery are noted. The post septal intraorbital soft tissue structures otherwise appear unremarkable. The paranasal sinuses are free   of significant disease. The mastoid and middle ear cavities appear grossly clear.     CERVICAL SPINE CT:   VERTEBRA: No acute cervical spine fracture identified. Vertebral body heights are normal. Straightening of the normal cervical lordosis. Minimal anterolisthesis of C4 on C5. Minimal broad levoconvex curvature centered at the C7 level.      CANAL/FORAMINA: Moderate to severe C6-7 disc space narrowing and moderate appearing disc space narrowing at C3-4 and C5-6 with mild disc space narrowing elsewhere. Scattered marginal endplate and uncovertebral spurs. Multilevel degenerative facet   disease. Mild degenerative change of the median atlantoaxial joint. Multilevel disc osteophyte complexes/disc bulges. There appears to be up to moderate central spinal canal stenosis at the C3-4 and C5-6 levels with some flattening of the normal spinal   cord contour at those levels. There is at least moderate neural foraminal stenosis on the left at C3-C4 with at least mild to moderate bordering on moderate left C4-5 and right C5-6 neural foraminal stenosis. Milder degrees of foraminal narrowing   elsewhere.     PARASPINAL: Right greater than left carotid bifurcation atherosclerotic calcification. There is some irregular soft tissue density in the left upper pulmonary lobe/apex, nonspecific, possibly representing scarring. One of these lesions measures up to   approximately 11-12 mm (series 4 image 84). The prevertebral soft tissues otherwise appear unremarkable.         Impression     IMPRESSION:  HEAD CT:  1.  No acute intracranial hemorrhage, extra-axial  fluid collection, or mass effect.  2.  Age-indeterminate right basal ganglia infarct, potentially chronic.  3.  Brain atrophy and presumed chronic small vessel ischemic changes, as described.  4.  Right frontotemporal scalp hematoma without underlying calvarial fracture.     FACIAL BONE CT:  1.  No acute facial fracture.  2.  Right frontotemporal scalp soft tissue swelling, which may represent contusion.     CERVICAL SPINE CT:  1.  No acute cervical spine fracture.  2.  Multilevel degenerative changes, as described.  3.  Irregular soft tissue density in the left upper pulmonary lobe/apex, nonspecific. This could potentially represent scarring.     Pulmonary nodule recommendation: Proceed with a complete chest CT examination for further evaluation as early as possible (Per Fleischner Society guidelines).        CT Hip Right w/o Contrast     Narrative     EXAM: CT HIP RIGHT W/O CONTRAST  LOCATION: [facility]  DATE: 7/15/2025     INDICATION: right hip pain, negative x ray  COMPARISON: None.  TECHNIQUE: Noncontrast. Axial, sagittal and coronal thin-section reconstruction. Dose reduction techniques were used.      FINDINGS:      BONES:  -The right hip is negative for fracture or CT evidence of avascular necrosis. There is a nondisplaced fracture of the right pubic body which extends into the junction with the right-sided pubic rami. There is degenerative change at the right hip joint.   The visualized portion of the bony pelvis is otherwise negative.     SOFT TISSUES:  -Normal.        Impression     IMPRESSION:  1.  The right hip is negative for fracture or CT evidence of avascular necrosis.  2.  Nondisplaced but minimally comminuted fracture of the right pubic body extends into the junction with the right superior and inferior pubic rami.  3.  Degenerative change at the right hip joint itself.  4.  No evidence for soft tissue mass or fluid collection.        ort Surgical Pathology Report                          Case: [id number]                                  Authorizing Provider:  Christopher Arias MD      Collected:           06/16/2025 1314              Ordering Location:     [place] [address] [address]    Received:            06/16/2025 1402                                     (B) Telemetry                                                                Pathologist:           Yasir Dale MD                                                      Specimen:    Kidney, Left, Left kidney biopsy                                                       FINAL DIAGNOSIS    Left kidney:  -Cholesterol emboli.  -Nodular glomerulosclerosis.  -Please see the [facility] report for complete details (document scanned and attached to this surgical pathology report).       ASSESSMENT/PLAN:  Mechanical fall  Closed head injury  PT/OT  Supportive cares    Right Pubic body fracture Orthopedics consulted.  Conservative management recommended  WBAT  Scheduled Tylenol  Adjust hydromorphone to 2 mg scheduled in the morning.  May also take 1-2 mg every 4 hours as needed  Continue as needed methocarbamol  As needed methocarbamol and hydromorphone  Follow-up with orthopedics 9/2    Hypertensive urgency  CAD: Nephrology adjusted medications during hospital stay.  Continues on carvedilol, weekly clonidine patch, hydralazine, isosorbide, and nifedipine  Monitor BP.  Do expect some fluctuations in the setting of pain    CKD stage V: Estimated Creatinine Clearance: 13.3 mL/min (A) (based on SCr of 3.11 mg/dL (H)).  Continue sodium bicarb  BMP 7/28  Planning to establish with nephrology locally      AAA  PVD: S/p endovascular repair April 2025  Continue atorvastatin, icosapent and clopidogrel    Chronic vertigo  Follow-up with ENT in [place]  Continue as needed meclizine  Denies dizziness today    History of CVA  Continue atorvastatin  BP management as above    COPD  Tobacco use, ongoing  History of right lung adenocarcinoma does not appear  in acute exacerbation.  Not on inhalers at baseline  Monitor:     A total >45 min were spent on this hospital follow-up visit including review of hospitalization, medication reconciliation, evaluating patient discussing plan of care, writing orders and documenting.  All services performed the day of visit    This note was completed in part using Dragon voice recognition software. Although reviewed after completion, some word and grammatical errors may occur.    Electronically signed by:  Tyra Heller PA-C                    Sincerely,        Tyra Heller PA-C    Electronically signed

## 2025-07-21 NOTE — PROGRESS NOTES
[facility] GERIATRICS    PRIMARY CARE PROVIDER AND CLINIC:  Mckenna Wilkinson MD, [address] / [facility] [address]  Chief Complaint   Patient presents with    Hospital F/U      [facility] Medical Record Number:  [id number]  Place of Service where encounter took place:  [facility] ([facility]) [[id number]]    Maria Alejandra Buck  is a 75 year old  (1950), admitted to the above facility from  [facility]. Hospital stay 7/15/25 through 7/19/25..   HPI:    Notes from hospitalization reviewed including H&P Ortho consult, nephrology consult and D/c summary    Maria Alejandra Buck is a pleasant 75-year-old female with a past medical history of CKD, peripheral vascular disease, AAA, hypertension who was recently hospitalized at [facility].  Presented for evaluation after a fall.  Found to have a right pubic bone fracture as well as facial hematoma.  Additionally noted to have acute ROMI.  Recently had hospitalizations in [place] for hypertensive urgency and progressive CKD.  Nephrology was consulted this hospital stay.  Her BP medications were adjusted.  Sodium bicarb was added.  She is staying locally in [place] until Labor Day and it was encouraged that she establish with nephrology in the area.  Deconditioned compared to baseline so discharged to [facility]    Maria Alejandra is evaluated alongside her sister.  She just finished working with physical therapy.  Therapy noted she did well but having a lot of pain with any mobility.  We reviewed current pain management.  She feels it would be helpful to take hydromorphone scheduled in the morning with additional available as needed.  Does note some mild nausea.  Appetite has been diminished.  Urinates multiple times per day.  Denies edema.  No shortness of breath.  She lives in [place] full-time with her  but they do spend the summers here.  At baseline she does not ambulate with a walker    Message left for daughter Lindsay to introduce self  and will discuss plan of care    Review of nursing home EMR: -148  CODE STATUS/ADVANCE DIRECTIVES DISCUSSION:  Full Code  CPR/Full code   ALLERGIES:   Allergies   Allergen Reactions    Iodine Hives      PAST MEDICAL HISTORY:   Past Medical History:   Diagnosis Date    Aneurysm of renal artery     left    Atrial fibrillation (H)     COPD (chronic obstructive pulmonary disease) (H)     High cholesterol     Hypertension     Mixed hyperlipidemia     PVD (peripheral vascular disease)     Stenosis of left carotid artery     Tobacco use disorder       PAST SURGICAL HISTORY:   has a past surgical history that includes Abdomen surgery; aneurism; Abdomen surgery; Endarterectomy carotid (Left, 7/17/2018); and IR Renal Biopsy Left (6/16/2025).  FAMILY HISTORY: family history includes Alzheimer Disease in her brother and mother; Breast Cancer in her sister; Cerebrovascular Disease in her brother and father; Dementia in her maternal grandmother; Diabetes in her brother; Heart Disease in her sister; Parkinsonism in her brother.  SOCIAL HISTORY:   reports that she has been smoking. She has a 22.5 pack-year smoking history. She has never used smokeless tobacco. She reports current alcohol use. She reports that she does not use drugs.  Patient's living condition: lives with spouse lives in [place] majority of the year.  Spends summers in [place]    Post Discharge Medication Reconciliation Status:   MED REC REQUIRED  Post Medication Reconciliation Status: discharge medications reconciled and changed, per note/orders       Current Outpatient Medications   Medication Sig Dispense Refill    acetaminophen (TYLENOL) 500 MG tablet Take 2 tablets (1,000 mg) by mouth 3 times daily.      atorvastatin (LIPITOR) 80 MG tablet Take 1 tablet (80 mg) by mouth every evening 30 tablet     carvedilol (COREG) 25 MG tablet Take 12.5 mg by mouth 2 times daily (with meals).      [START ON 7/24/2025] cloNIDine (CATAPRES-TTS3) 0.3 MG/24HR WK patch Place  1 patch over 168 hours onto the skin once a week.      clopidogrel (PLAVIX) 75 MG tablet Take 75 mg by mouth daily.      famotidine (PEPCID) 40 MG tablet Take 40 mg by mouth daily.      hydrALAZINE (APRESOLINE) 50 MG tablet Take 1 tablet (50 mg) by mouth 3 times daily.      HYDROmorphone (DILAUDID) 2 MG tablet Take 1 tablet (2 mg) by mouth daily (with breakfast). May also take 0.5-1 tablets (1-2 mg) 4 times daily as needed (pain 4-6/10 = 1 mg and pain 7-10/10 = 2 mg). 10 tablet 0    icosapent ethyl (VASCEPA) 1 g CAPS capsule Take 2 g by mouth 2 times daily (with meals).      isosorbide dinitrate (ISORDIL) 20 MG tablet Take 20 mg by mouth 3 times daily.      Lidocaine (LIDOCARE) 4 % Patch Place 2 patches over 12 hours onto the skin every 24 hours. To prevent lidocaine toxicity, patient should be patch free for 12 hrs daily.  Apply to affected area.      meclizine (ANTIVERT) 25 MG tablet Take 1 tablet (25 mg) by mouth every 6 hours.      methocarbamol (ROBAXIN) 500 MG tablet Take 1 tablet (500 mg) by mouth 3 times daily as needed for muscle spasms.      Multiple Vitamins-Minerals (MULTIVITAMIN WOMEN PO) Take 1 tablet by mouth daily.      NIFEdipine ER OSMOTIC (ADALAT CC) 90 MG 24 hr tablet Take 1 tablet (90 mg) by mouth daily.      ondansetron (ZOFRAN ODT) 4 MG ODT tab Take 1 tablet (4 mg) by mouth every 6 hours as needed.      PAROXETINE HCL PO Take 40 mg by mouth daily       senna-docusate (SENOKOT-S/PERICOLACE) 8.6-50 MG tablet Take 1 tablet by mouth 2 times daily as needed for constipation.      sodium bicarbonate 650 MG tablet Take 2 tablets (1,300 mg) by mouth 2 times daily.       No current facility-administered medications for this visit.       ROS:  10 point ROS of systems including Constitutional, Eyes, Respiratory, Cardiovascular, Gastroenterology, Genitourinary, Integumentary, Musculoskeletal, Psychiatric were all negative except for pertinent positives noted in my HPI.    Vitals:  /80   Pulse 82    "Temp 97.2  F (36.2  C)   Resp 18   Ht 1.651 m (5' 5\")   Wt 54.1 kg (119 lb 3.2 oz)   SpO2 96%   BMI 19.84 kg/m    Exam:  Physical Exam  Vitals (Facility EMR) reviewed.   Constitutional:       General: She is not in acute distress.  HENT:      Head: Normocephalic.      Comments: Right facial ecchymosis  Eyes:      General: No scleral icterus.  Cardiovascular:      Rate and Rhythm: Normal rate and regular rhythm.   Pulmonary:      Effort: Pulmonary effort is normal.   Abdominal:      General: There is no distension.      Tenderness: There is no abdominal tenderness.   Musculoskeletal:      Right lower leg: No edema.      Left lower leg: No edema.   Skin:     General: Skin is warm and dry.      Findings: No rash.   Neurological:      Mental Status: She is alert. Mental status is at baseline.   Psychiatric:         Behavior: Behavior normal.         Lab/Diagnostic data:  Recent labs in [facility] reviewed by me today.  and Most Recent 3 CBC's:  Recent Labs   Lab Test 07/17/25  0620 07/16/25  0839 07/15/25  1243   WBC 7.5 6.6 7.4   HGB 10.5* 10.4* 11.0*   * 104* 103*    218 235     Most Recent 3 BMP's:  Recent Labs   Lab Test 07/19/25  0510 07/18/25  1554 07/18/25  0550 07/17/25  0620 07/16/25  0839   NA  --   --  137 141 139   POTASSIUM 3.5 3.7 3.1* 3.8 3.9   CHLORIDE  --   --  102 106 110*   CO2  --   --  24 23 19*   BUN  --   --  41.0* 40.6* 43.0*   CR  --   --  3.11* 3.01* 3.15*   ANIONGAP  --   --  11 12 10   BRUCE  --   --  8.4* 8.4* 8.6*   GLC  --   --  98 101* 111*     Most Recent 2 LFT's:  Recent Labs   Lab Test 07/17/25  0620   AST 22   ALT 11   ALKPHOS 61   BILITOTAL 0.3     Most Recent TSH and T4:No lab results found.  Most Recent Hemoglobin A1c:  Recent Labs   Lab Test 07/17/18  0630   A1C 6.1*     Most Recent 6 glucoses:  Recent Labs   Lab Test 07/18/25  0550 07/17/25  0620 07/16/25  0839 07/15/25  1243 07/17/18  0630   GLC 98 101* 111* 114* 102*     Most Recent Urinalysis:No lab results " found.    Results for orders placed or performed during the hospital encounter of 07/15/25   Head CT w/o contrast     Narrative     EXAM: CT HEAD W/O CONTRAST, CT FACIAL BONES WITHOUT CONTRAST, CT CERVICAL SPINE W/O CONTRAST  LOCATION: [facility]  DATE: 7/15/2025     INDICATION: Fall, right scalp hematoma  COMPARISON: None.  TECHNIQUE:   1) Routine CT Head without IV contrast. Multiplanar reformats. Dose reduction techniques were used.  2) Routine CT Facial Bones without IV contrast. Multiplanar reformats. Dose reduction techniques were used.  3) Routine CT Cervical Spine without IV contrast. Multiplanar reformats. Dose reduction techniques were used.     FINDINGS:  HEAD CT:   INTRACRANIAL CONTENTS: The ventricles appear proportionate to the sulci. There is a right basal ganglia infarct, age indeterminate, but potentially chronic. Mild to moderate patchy nonspecific hypoattenuation in the cerebral white matter, likely due to   chronic small vessel ischemic disease. Mild to moderate generalized brain parenchymal volume loss. No hyperdense acute intracranial hemorrhage, extra-axial fluid collection, or mass effect. Atherosclerotic calcifications primarily involving the carotid   siphons.     OSSEOUS STRUCTURES/SOFT TISSUES: Focal right frontotemporal scalp hematoma measuring approximately 12-13 mm with adjacent superficial soft tissue swelling of the right scalp. No underlying displaced calvarial fracture is appreciated.      FACIAL BONE CT:  The pterygoid plates are intact. The bilateral zygomatic arches, sphenotemporal buttresses, the walls of both orbits, and the walls of the maxillary sinuses appear intact. No displaced nasal arch or nasal septal fracture is identified. The anterior skull   base appears to be intact. The mandible appears intact. There appears to be mild symmetric anterior subluxation of the mandibular condyles with respect to the glenoid fossae, which may be positional.      Right frontotemporal scalp soft tissue swelling, which may represent soft tissue contusion. Changes of bilateral cataract surgery are noted. The post septal intraorbital soft tissue structures otherwise appear unremarkable. The paranasal sinuses are free   of significant disease. The mastoid and middle ear cavities appear grossly clear.     CERVICAL SPINE CT:   VERTEBRA: No acute cervical spine fracture identified. Vertebral body heights are normal. Straightening of the normal cervical lordosis. Minimal anterolisthesis of C4 on C5. Minimal broad levoconvex curvature centered at the C7 level.      CANAL/FORAMINA: Moderate to severe C6-7 disc space narrowing and moderate appearing disc space narrowing at C3-4 and C5-6 with mild disc space narrowing elsewhere. Scattered marginal endplate and uncovertebral spurs. Multilevel degenerative facet   disease. Mild degenerative change of the median atlantoaxial joint. Multilevel disc osteophyte complexes/disc bulges. There appears to be up to moderate central spinal canal stenosis at the C3-4 and C5-6 levels with some flattening of the normal spinal   cord contour at those levels. There is at least moderate neural foraminal stenosis on the left at C3-C4 with at least mild to moderate bordering on moderate left C4-5 and right C5-6 neural foraminal stenosis. Milder degrees of foraminal narrowing   elsewhere.     PARASPINAL: Right greater than left carotid bifurcation atherosclerotic calcification. There is some irregular soft tissue density in the left upper pulmonary lobe/apex, nonspecific, possibly representing scarring. One of these lesions measures up to   approximately 11-12 mm (series 4 image 84). The prevertebral soft tissues otherwise appear unremarkable.         Impression     IMPRESSION:  HEAD CT:  1.  No acute intracranial hemorrhage, extra-axial fluid collection, or mass effect.  2.  Age-indeterminate right basal ganglia infarct, potentially chronic.  3.  Brain  atrophy and presumed chronic small vessel ischemic changes, as described.  4.  Right frontotemporal scalp hematoma without underlying calvarial fracture.     FACIAL BONE CT:  1.  No acute facial fracture.  2.  Right frontotemporal scalp soft tissue swelling, which may represent contusion.     CERVICAL SPINE CT:  1.  No acute cervical spine fracture.  2.  Multilevel degenerative changes, as described.  3.  Irregular soft tissue density in the left upper pulmonary lobe/apex, nonspecific. This could potentially represent scarring.     Pulmonary nodule recommendation: Proceed with a complete chest CT examination for further evaluation as early as possible (Per Fleischner Society guidelines).        CT Hip Right w/o Contrast     Narrative     EXAM: CT HIP RIGHT W/O CONTRAST  LOCATION: [facility]  DATE: 7/15/2025     INDICATION: right hip pain, negative x ray  COMPARISON: None.  TECHNIQUE: Noncontrast. Axial, sagittal and coronal thin-section reconstruction. Dose reduction techniques were used.      FINDINGS:      BONES:  -The right hip is negative for fracture or CT evidence of avascular necrosis. There is a nondisplaced fracture of the right pubic body which extends into the junction with the right-sided pubic rami. There is degenerative change at the right hip joint.   The visualized portion of the bony pelvis is otherwise negative.     SOFT TISSUES:  -Normal.        Impression     IMPRESSION:  1.  The right hip is negative for fracture or CT evidence of avascular necrosis.  2.  Nondisplaced but minimally comminuted fracture of the right pubic body extends into the junction with the right superior and inferior pubic rami.  3.  Degenerative change at the right hip joint itself.  4.  No evidence for soft tissue mass or fluid collection.        ort Surgical Pathology Report                         Case: [id number]                                  Authorizing Provider:  Christopher Arias MD       Collected:           06/16/2025 1314              Ordering Location:     [address]    Received:            06/16/2025 1402                                     (B) Telemetry                                                                Pathologist:           Yasir Dale MD                                                      Specimen:    Kidney, Left, Left kidney biopsy                                                       FINAL DIAGNOSIS    Left kidney:  -Cholesterol emboli.  -Nodular glomerulosclerosis.  -Please see the [facility] report for complete details (document scanned and attached to this surgical pathology report).       ASSESSMENT/PLAN:  Mechanical fall  Closed head injury  PT/OT  Supportive cares    Right Pubic body fracture Orthopedics consulted.  Conservative management recommended  WBAT  Scheduled Tylenol  Adjust hydromorphone to 2 mg scheduled in the morning.  May also take 1-2 mg every 4 hours as needed  Continue as needed methocarbamol  As needed methocarbamol and hydromorphone  Follow-up with orthopedics 9/2    Hypertensive urgency  CAD: Nephrology adjusted medications during hospital stay.  Continues on carvedilol, weekly clonidine patch, hydralazine, isosorbide, and nifedipine  Monitor BP.  Do expect some fluctuations in the setting of pain    CKD stage V: Estimated Creatinine Clearance: 13.3 mL/min (A) (based on SCr of 3.11 mg/dL (H)).  Continue sodium bicarb  BMP 7/28  Planning to establish with nephrology locally      AAA  PVD: S/p endovascular repair April 2025  Continue atorvastatin, icosapent and clopidogrel    Chronic vertigo  Follow-up with ENT in [place]  Continue as needed meclizine  Denies dizziness today    History of CVA  Continue atorvastatin  BP management as above    COPD  Tobacco use, ongoing  History of right lung adenocarcinoma does not appear in acute exacerbation.  Not on inhalers at baseline  Monitor:     A total >45 min were spent on this hospital  follow-up visit including review of hospitalization, medication reconciliation, evaluating patient discussing plan of care, writing orders and documenting.  All services performed the day of visit    This note was completed in part using Dragon voice recognition software. Although reviewed after completion, some word and grammatical errors may occur.    Electronically signed by:  Tyra Heller PA-C

## 2025-07-23 ENCOUNTER — TRANSITIONAL CARE UNIT VISIT (OUTPATIENT)
Dept: GERIATRICS | Facility: CLINIC | Age: 75
End: 2025-07-23
Payer: COMMERCIAL

## 2025-07-23 VITALS
HEIGHT: 65 IN | HEART RATE: 71 BPM | DIASTOLIC BLOOD PRESSURE: 74 MMHG | WEIGHT: 120 LBS | SYSTOLIC BLOOD PRESSURE: 131 MMHG | RESPIRATION RATE: 19 BRPM | BODY MASS INDEX: 19.99 KG/M2 | TEMPERATURE: 97.4 F | OXYGEN SATURATION: 95 %

## 2025-07-23 DIAGNOSIS — Z91.81 HISTORY OF FALL: Primary | ICD-10-CM

## 2025-07-23 DIAGNOSIS — N18.5 CKD (CHRONIC KIDNEY DISEASE) STAGE 5, GFR LESS THAN 15 ML/MIN (H): ICD-10-CM

## 2025-07-23 DIAGNOSIS — S32.591D OTHER CLOSED FRACTURE OF RIGHT PUBIS WITH ROUTINE HEALING, SUBSEQUENT ENCOUNTER: ICD-10-CM

## 2025-07-23 DIAGNOSIS — I10 HYPERTENSION, UNSPECIFIED TYPE: ICD-10-CM

## 2025-07-23 LAB
GAMMA INTERFERON BACKGROUND BLD IA-ACNC: 0.04 IU/ML
M TB IFN-G BLD-IMP: NEGATIVE
M TB IFN-G CD4+ BCKGRND COR BLD-ACNC: 9.96 IU/ML
MITOGEN IGNF BCKGRD COR BLD-ACNC: 0.01 IU/ML
MITOGEN IGNF BCKGRD COR BLD-ACNC: 0.02 IU/ML
QUANTIFERON MITOGEN: 10 IU/ML
QUANTIFERON NIL TUBE: 0.04 IU/ML
QUANTIFERON TB1 TUBE: 0.05 IU/ML
QUANTIFERON TB2 TUBE: 0.06

## 2025-07-23 PROCEDURE — 99309 SBSQ NF CARE MODERATE MDM 30: CPT | Performed by: PHYSICIAN ASSISTANT

## 2025-07-23 NOTE — LETTER
" 7/23/2025      Maria Alejandra Buck  [address]  [facility] [address]          Chief Complaint   Patient presents with     Nursing Home Acute       HPI:  Maria Alejandra Buck is a 75 year old  (1950), who is being seen today for an episodic care visit at: [facility] ([facility]) [[id number]].     Brief summary: Maria Alejandra Buck is a pleasant 75-year-old female with a past medical history of CKD, peripheral vascular disease, AAA, hypertension who was recently hospitalized at [facility].  Presented for evaluation after a fall.  Found to have a right pubic bone fracture as well as facial hematoma.  Additionally noted to have acute ROMI.  Recently had hospitalizations in [place] for hypertensive urgency and progressive CKD.  Nephrology was consulted this hospital stay.  Her BP medications were adjusted.  Sodium bicarb was added.  She is staying locally in [place] until Labor Day and it was encouraged that she establish with nephrology in the area.  Deconditioned compared to baseline so discharged to [facility]    Today's concern is: Bedside.  Overall doing well.  Still with pain in pelvis but better with scheduled and as needed hydromorphone.  On average using 2 doses per day.  Blood pressures can be high in the morning but to come down nicely with therapy.  Does note some occasional dizziness.  No constipation.        Review of nursing home EMR:-191, Wt 120,       Allergies, and PMH/PSH reviewed in [facility] today.    REVIEW OF SYSTEMS:  4 point ROS including Respiratory, CV, GI and , other than that noted in the HPI,  is negative    Objective:   /74   Pulse 71   Temp 97.4  F (36.3  C)   Resp 19   Ht 1.651 m (5' 5\")   Wt 54.4 kg (120 lb)   SpO2 95%   BMI 19.97 kg/m      Physical Exam  Vitals (Facility EMR) reviewed.   Constitutional:       General: She is not in acute distress.  HENT:      Head: Normocephalic.      Comments: Right frontal forehead contusion.  Facial ecchymosis.  Eyes:      " General: No scleral icterus.  Cardiovascular:      Rate and Rhythm: Regular rhythm.   Pulmonary:      Effort: Pulmonary effort is normal.      Breath sounds: No wheezing.   Musculoskeletal:      Right lower leg: No edema.      Left lower leg: No edema.   Skin:     General: Skin is warm and dry.      Findings: No rash.   Neurological:      Mental Status: She is alert. Mental status is at baseline.   Psychiatric:         Behavior: Behavior normal.          MED REC REQUIRED  Post Medication Reconciliation Status: discharge medications reconciled and changed, per note/orders      Recent labs in [facility] reviewed by me today.     Assessment/Plan:  Mechanical fall  Closed head injury  PT/OT  Supportive cares    Right Pubic body fracture Orthopedics consulted.  Conservative management recommended  WBAT  Continue multimodal pain regimen with scheduled Tylenol, hydromorphone and methocarbamol  PT/OT  Follow-up with orthopedics 9/2    Hypertensive urgency  CAD: Nephrology adjusted medications during hospital stay.  Blood pressure labile.  Likely multifactorial in the setting of pain and narcotics.  Tends to be high in the morning and improves with medication administration  Continues on carvedilol, weekly clonidine patch, hydralazine, isosorbide, and nifedipine. BP meds seem appropriately spaced through out the day. Do have room to uptitrate if needed. HR 70-80s but will hold for now      CKD stage V: Estimated Creatinine Clearance: 13.3 mL/min (A) (based on SCr of 3.11 mg/dL (H)).  Continue sodium bicarb  BMP scheduled 7/28  Planning to establish with nephrology locally      AAA  PVD: S/p endovascular repair April 2025  Continue atorvastatin, icosapent and clopidogrel    Chronic vertigo  Follow-up with ENT in [place]  Continue as needed meclizine      History of CVA  Continue atorvastatin  BP management as above    COPD  Tobacco use, ongoing  History of right lung adenocarcinoma does not appear in acute exacerbation.  Not on  inhalers at baseline  Monitor:     Orders:  NNO    Electronically signed by: Tyra Heller PA-C       Sincerely,        Tyra Heller PA-C    Electronically signed

## 2025-07-23 NOTE — PROGRESS NOTES
"  Chief Complaint   Patient presents with    Nursing Home Acute       HPI:  Maria Alejandra Buck is a 75 year old  (1950), who is being seen today for an episodic care visit at: [facility] ([facility]) [[id number]].     Brief summary: Maria Alejandra Buck is a pleasant 75-year-old female with a past medical history of CKD, peripheral vascular disease, AAA, hypertension who was recently hospitalized at [facility].  Presented for evaluation after a fall.  Found to have a right pubic bone fracture as well as facial hematoma.  Additionally noted to have acute ROMI.  Recently had hospitalizations in [place] for hypertensive urgency and progressive CKD.  Nephrology was consulted this hospital stay.  Her BP medications were adjusted.  Sodium bicarb was added.  She is staying locally in [place] until Labor Day and it was encouraged that she establish with nephrology in the area.  Deconditioned compared to baseline so discharged to U    Today's concern is: Bedside.  Overall doing well.  Still with pain in pelvis but better with scheduled and as needed hydromorphone.  On average using 2 doses per day.  Blood pressures can be high in the morning but to come down nicely with therapy.  Does note some occasional dizziness.  No constipation.        Review of nursing home EMR:-191, Wt 120,       Allergies, and PMH/PSH reviewed in [facility] today.    REVIEW OF SYSTEMS:  4 point ROS including Respiratory, CV, GI and , other than that noted in the HPI,  is negative    Objective:   /74   Pulse 71   Temp 97.4  F (36.3  C)   Resp 19   Ht 1.651 m (5' 5\")   Wt 54.4 kg (120 lb)   SpO2 95%   BMI 19.97 kg/m      Physical Exam  Vitals (Facility EMR) reviewed.   Constitutional:       General: She is not in acute distress.  HENT:      Head: Normocephalic.      Comments: Right frontal forehead contusion.  Facial ecchymosis.  Eyes:      General: No scleral icterus.  Cardiovascular:      Rate and Rhythm: Regular rhythm. "   Pulmonary:      Effort: Pulmonary effort is normal.      Breath sounds: No wheezing.   Musculoskeletal:      Right lower leg: No edema.      Left lower leg: No edema.   Skin:     General: Skin is warm and dry.      Findings: No rash.   Neurological:      Mental Status: She is alert. Mental status is at baseline.   Psychiatric:         Behavior: Behavior normal.          MED REC REQUIRED  Post Medication Reconciliation Status: discharge medications reconciled and changed, per note/orders      Recent labs in [facility] reviewed by me today.     Assessment/Plan:  Mechanical fall  Closed head injury  PT/OT  Supportive cares    Right Pubic body fracture Orthopedics consulted.  Conservative management recommended  WBAT  Continue multimodal pain regimen with scheduled Tylenol, hydromorphone and methocarbamol  PT/OT  Follow-up with orthopedics 9/2    Hypertensive urgency  CAD: Nephrology adjusted medications during hospital stay.  Blood pressure labile.  Likely multifactorial in the setting of pain and narcotics.  Tends to be high in the morning and improves with medication administration  Continues on carvedilol, weekly clonidine patch, hydralazine, isosorbide, and nifedipine. BP meds seem appropriately spaced through out the day. Do have room to uptitrate if needed. HR 70-80s but will hold for now      CKD stage V: Estimated Creatinine Clearance: 13.3 mL/min (A) (based on SCr of 3.11 mg/dL (H)).  Continue sodium bicarb  BMP scheduled 7/28  Planning to establish with nephrology locally      AAA  PVD: S/p endovascular repair April 2025  Continue atorvastatin, icosapent and clopidogrel    Chronic vertigo  Follow-up with ENT in [place]  Continue as needed meclizine      History of CVA  Continue atorvastatin  BP management as above    COPD  Tobacco use, ongoing  History of right lung adenocarcinoma does not appear in acute exacerbation.  Not on inhalers at baseline  Monitor:     Orders:  NNO    Electronically signed by: Tyra York  ALEJO Heller

## 2025-07-24 ENCOUNTER — LAB REQUISITION (OUTPATIENT)
Dept: LAB | Facility: CLINIC | Age: 75
End: 2025-07-24
Payer: MEDICARE

## 2025-07-24 DIAGNOSIS — S32.501A CLOSED FRACTURE OF RIGHT PUBIS, UNSPECIFIED PORTION OF PUBIS, INITIAL ENCOUNTER (H): ICD-10-CM

## 2025-07-24 DIAGNOSIS — N18.4 CHRONIC KIDNEY DISEASE, STAGE 4 (SEVERE) (H): ICD-10-CM

## 2025-07-24 RX ORDER — HYDROMORPHONE HYDROCHLORIDE 2 MG/1
TABLET ORAL
Qty: 30 TABLET | Refills: 0 | Status: SHIPPED | OUTPATIENT
Start: 2025-07-24

## 2025-07-28 ENCOUNTER — APPOINTMENT (OUTPATIENT)
Dept: GENERAL RADIOLOGY | Facility: CLINIC | Age: 75
DRG: 291 | End: 2025-07-28
Attending: EMERGENCY MEDICINE
Payer: MEDICARE

## 2025-07-28 ENCOUNTER — APPOINTMENT (OUTPATIENT)
Dept: ULTRASOUND IMAGING | Facility: CLINIC | Age: 75
DRG: 291 | End: 2025-07-28
Attending: EMERGENCY MEDICINE
Payer: MEDICARE

## 2025-07-28 ENCOUNTER — HOSPITAL ENCOUNTER (INPATIENT)
Facility: CLINIC | Age: 75
End: 2025-07-28
Attending: EMERGENCY MEDICINE | Admitting: STUDENT IN AN ORGANIZED HEALTH CARE EDUCATION/TRAINING PROGRAM
Payer: MEDICARE

## 2025-07-28 ENCOUNTER — APPOINTMENT (OUTPATIENT)
Dept: NUCLEAR MEDICINE | Facility: CLINIC | Age: 75
DRG: 291 | End: 2025-07-28
Attending: EMERGENCY MEDICINE
Payer: MEDICARE

## 2025-07-28 DIAGNOSIS — I21.4 NSTEMI (NON-ST ELEVATED MYOCARDIAL INFARCTION) (H): ICD-10-CM

## 2025-07-28 DIAGNOSIS — I65.22 LEFT CAROTID STENOSIS: ICD-10-CM

## 2025-07-28 DIAGNOSIS — R06.02 SHORTNESS OF BREATH: Primary | ICD-10-CM

## 2025-07-28 DIAGNOSIS — K21.9 GASTROESOPHAGEAL REFLUX DISEASE WITHOUT ESOPHAGITIS: ICD-10-CM

## 2025-07-28 DIAGNOSIS — R63.0 POOR APPETITE: ICD-10-CM

## 2025-07-28 DIAGNOSIS — J18.9 PNEUMONIA DUE TO INFECTIOUS ORGANISM, UNSPECIFIED LATERALITY, UNSPECIFIED PART OF LUNG: ICD-10-CM

## 2025-07-28 DIAGNOSIS — I73.9 PVD (PERIPHERAL VASCULAR DISEASE): ICD-10-CM

## 2025-07-28 DIAGNOSIS — I48.0 PAROXYSMAL ATRIAL FIBRILLATION (H): ICD-10-CM

## 2025-07-28 DIAGNOSIS — S32.501A CLOSED FRACTURE OF RIGHT PUBIS, UNSPECIFIED PORTION OF PUBIS, INITIAL ENCOUNTER (H): ICD-10-CM

## 2025-07-28 DIAGNOSIS — F41.9 ANXIETY: ICD-10-CM

## 2025-07-28 DIAGNOSIS — E78.00 HIGH CHOLESTEROL: ICD-10-CM

## 2025-07-28 DIAGNOSIS — I10 HYPERTENSION, UNSPECIFIED TYPE: ICD-10-CM

## 2025-07-28 DIAGNOSIS — J96.01 ACUTE RESPIRATORY FAILURE WITH HYPOXIA (H): ICD-10-CM

## 2025-07-28 DIAGNOSIS — N28.9 RENAL INSUFFICIENCY: ICD-10-CM

## 2025-07-28 DIAGNOSIS — N28.9 ACUTE ON CHRONIC RENAL INSUFFICIENCY: ICD-10-CM

## 2025-07-28 DIAGNOSIS — N18.9 ACUTE ON CHRONIC RENAL INSUFFICIENCY: ICD-10-CM

## 2025-07-28 LAB
ANION GAP SERPL CALCULATED.3IONS-SCNC: 18 MMOL/L (ref 7–15)
ATRIAL RATE - MUSE: 77 BPM
ATRIAL RATE - MUSE: 78 BPM
BASE EXCESS BLDV CALC-SCNC: 1 MMOL/L (ref -3–3)
BASE EXCESS BLDV CALC-SCNC: 3.6 MMOL/L (ref -3–3)
BASOPHILS # BLD AUTO: 0.1 10E3/UL (ref 0–0.2)
BASOPHILS NFR BLD AUTO: 1 %
BUN SERPL-MCNC: 58.1 MG/DL (ref 8–23)
CALCIUM SERPL-MCNC: 8 MG/DL (ref 8.8–10.4)
CHLORIDE SERPL-SCNC: 95 MMOL/L (ref 98–107)
CREAT SERPL-MCNC: 4.25 MG/DL (ref 0.51–0.95)
D DIMER PPP FEU-MCNC: 3.15 UG/ML FEU (ref 0–0.5)
DIASTOLIC BLOOD PRESSURE - MUSE: NORMAL MMHG
DIASTOLIC BLOOD PRESSURE - MUSE: NORMAL MMHG
EGFRCR SERPLBLD CKD-EPI 2021: 10 ML/MIN/1.73M2
EOSINOPHIL # BLD AUTO: 0.7 10E3/UL (ref 0–0.7)
EOSINOPHIL NFR BLD AUTO: 10 %
ERYTHROCYTE [DISTWIDTH] IN BLOOD BY AUTOMATED COUNT: 15.6 % (ref 10–15)
FLUAV RNA SPEC QL NAA+PROBE: NEGATIVE
FLUBV RNA RESP QL NAA+PROBE: NEGATIVE
GLUCOSE BLDC GLUCOMTR-MCNC: 129 MG/DL (ref 70–99)
GLUCOSE BLDC GLUCOMTR-MCNC: 137 MG/DL (ref 70–99)
GLUCOSE BLDC GLUCOMTR-MCNC: 139 MG/DL (ref 70–99)
GLUCOSE SERPL-MCNC: 101 MG/DL (ref 70–99)
HCO3 BLDV-SCNC: 26 MMOL/L (ref 21–28)
HCO3 BLDV-SCNC: 28 MMOL/L (ref 21–28)
HCO3 SERPL-SCNC: 23 MMOL/L (ref 22–29)
HCT VFR BLD AUTO: 26.7 % (ref 35–47)
HGB BLD-MCNC: 9.1 G/DL (ref 11.7–15.7)
HOLD SPECIMEN: NORMAL
HOLD SPECIMEN: NORMAL
IMM GRANULOCYTES # BLD: 0 10E3/UL
IMM GRANULOCYTES NFR BLD: 0 %
INTERPRETATION ECG - MUSE: NORMAL
INTERPRETATION ECG - MUSE: NORMAL
LACTATE SERPL-SCNC: 1 MMOL/L (ref 0.7–2)
LYMPHOCYTES # BLD AUTO: 2.4 10E3/UL (ref 0.8–5.3)
LYMPHOCYTES NFR BLD AUTO: 31 %
MAGNESIUM SERPL-MCNC: 2.1 MG/DL (ref 1.7–2.3)
MAGNESIUM SERPL-MCNC: 2.2 MG/DL (ref 1.7–2.3)
MCH RBC QN AUTO: 34.2 PG (ref 26.5–33)
MCHC RBC AUTO-ENTMCNC: 34.1 G/DL (ref 31.5–36.5)
MCV RBC AUTO: 100 FL (ref 78–100)
MONOCYTES # BLD AUTO: 0.9 10E3/UL (ref 0–1.3)
MONOCYTES NFR BLD AUTO: 11 %
NEUTROPHILS # BLD AUTO: 3.7 10E3/UL (ref 1.6–8.3)
NEUTROPHILS NFR BLD AUTO: 48 %
NRBC # BLD AUTO: 0 10E3/UL
NRBC BLD AUTO-RTO: 0 /100
NT-PROBNP SERPL-MCNC: ABNORMAL PG/ML (ref 0–624)
O2/TOTAL GAS SETTING VFR VENT: 3 %
O2/TOTAL GAS SETTING VFR VENT: 5 %
OXYHGB MFR BLDV: 85 % (ref 70–75)
OXYHGB MFR BLDV: 88 % (ref 70–75)
P AXIS - MUSE: 28 DEGREES
P AXIS - MUSE: 54 DEGREES
PCO2 BLDV: 39 MM HG (ref 40–50)
PCO2 BLDV: 41 MM HG (ref 40–50)
PH BLDV: 7.41 [PH] (ref 7.32–7.43)
PH BLDV: 7.46 [PH] (ref 7.32–7.43)
PLATELET # BLD AUTO: 349 10E3/UL (ref 150–450)
PO2 BLDV: 50 MM HG (ref 25–47)
PO2 BLDV: 58 MM HG (ref 25–47)
POTASSIUM SERPL-SCNC: 3.2 MMOL/L (ref 3.4–5.3)
PR INTERVAL - MUSE: 166 MS
PR INTERVAL - MUSE: 172 MS
PROCALCITONIN SERPL IA-MCNC: 0.67 NG/ML
QRS DURATION - MUSE: 74 MS
QRS DURATION - MUSE: 82 MS
QT - MUSE: 456 MS
QT - MUSE: 482 MS
QTC - MUSE: 519 MS
QTC - MUSE: 545 MS
R AXIS - MUSE: 18 DEGREES
R AXIS - MUSE: 23 DEGREES
RBC # BLD AUTO: 2.66 10E6/UL (ref 3.8–5.2)
RSV RNA SPEC NAA+PROBE: NEGATIVE
SAO2 % BLDV: 86.8 % (ref 70–75)
SAO2 % BLDV: 89.6 % (ref 70–75)
SARS-COV-2 RNA RESP QL NAA+PROBE: NEGATIVE
SODIUM SERPL-SCNC: 136 MMOL/L (ref 135–145)
SYSTOLIC BLOOD PRESSURE - MUSE: NORMAL MMHG
SYSTOLIC BLOOD PRESSURE - MUSE: NORMAL MMHG
T AXIS - MUSE: -12 DEGREES
T AXIS - MUSE: -18 DEGREES
TROPONIN T SERPL HS-MCNC: 52 NG/L
TROPONIN T SERPL HS-MCNC: 58 NG/L
TROPONIN T SERPL HS-MCNC: 65 NG/L
VENTRICULAR RATE- MUSE: 77 BPM
VENTRICULAR RATE- MUSE: 78 BPM
WBC # BLD AUTO: 7.7 10E3/UL (ref 4–11)

## 2025-07-28 PROCEDURE — 99285 EMERGENCY DEPT VISIT HI MDM: CPT | Mod: 25 | Performed by: EMERGENCY MEDICINE

## 2025-07-28 PROCEDURE — 250N000013 HC RX MED GY IP 250 OP 250 PS 637: Performed by: STUDENT IN AN ORGANIZED HEALTH CARE EDUCATION/TRAINING PROGRAM

## 2025-07-28 PROCEDURE — 85025 COMPLETE CBC W/AUTO DIFF WBC: CPT | Performed by: EMERGENCY MEDICINE

## 2025-07-28 PROCEDURE — 36415 COLL VENOUS BLD VENIPUNCTURE: CPT | Performed by: EMERGENCY MEDICINE

## 2025-07-28 PROCEDURE — 84145 PROCALCITONIN (PCT): CPT | Performed by: EMERGENCY MEDICINE

## 2025-07-28 PROCEDURE — 93005 ELECTROCARDIOGRAM TRACING: CPT | Mod: 76

## 2025-07-28 PROCEDURE — A9540 TC99M MAA: HCPCS | Performed by: EMERGENCY MEDICINE

## 2025-07-28 PROCEDURE — 93970 EXTREMITY STUDY: CPT

## 2025-07-28 PROCEDURE — 96368 THER/DIAG CONCURRENT INF: CPT

## 2025-07-28 PROCEDURE — 83880 ASSAY OF NATRIURETIC PEPTIDE: CPT | Performed by: EMERGENCY MEDICINE

## 2025-07-28 PROCEDURE — 250N000012 HC RX MED GY IP 250 OP 636 PS 637: Performed by: EMERGENCY MEDICINE

## 2025-07-28 PROCEDURE — 87637 SARSCOV2&INF A&B&RSV AMP PRB: CPT | Performed by: EMERGENCY MEDICINE

## 2025-07-28 PROCEDURE — 84484 ASSAY OF TROPONIN QUANT: CPT | Performed by: EMERGENCY MEDICINE

## 2025-07-28 PROCEDURE — 80048 BASIC METABOLIC PNL TOTAL CA: CPT | Performed by: EMERGENCY MEDICINE

## 2025-07-28 PROCEDURE — 250N000009 HC RX 250: Performed by: EMERGENCY MEDICINE

## 2025-07-28 PROCEDURE — 82805 BLOOD GASES W/O2 SATURATION: CPT | Performed by: EMERGENCY MEDICINE

## 2025-07-28 PROCEDURE — 96367 TX/PROPH/DG ADDL SEQ IV INF: CPT

## 2025-07-28 PROCEDURE — 85379 FIBRIN DEGRADATION QUANT: CPT | Performed by: EMERGENCY MEDICINE

## 2025-07-28 PROCEDURE — 83735 ASSAY OF MAGNESIUM: CPT | Performed by: EMERGENCY MEDICINE

## 2025-07-28 PROCEDURE — 94660 CPAP INITIATION&MGMT: CPT

## 2025-07-28 PROCEDURE — 83605 ASSAY OF LACTIC ACID: CPT | Performed by: EMERGENCY MEDICINE

## 2025-07-28 PROCEDURE — 343N000001 HC RX 343 MED OP 636: Performed by: EMERGENCY MEDICINE

## 2025-07-28 PROCEDURE — 71046 X-RAY EXAM CHEST 2 VIEWS: CPT

## 2025-07-28 PROCEDURE — 85004 AUTOMATED DIFF WBC COUNT: CPT | Performed by: EMERGENCY MEDICINE

## 2025-07-28 PROCEDURE — 93005 ELECTROCARDIOGRAM TRACING: CPT

## 2025-07-28 PROCEDURE — 999N000157 HC STATISTIC RCP TIME EA 10 MIN

## 2025-07-28 PROCEDURE — 5A09357 ASSISTANCE WITH RESPIRATORY VENTILATION, LESS THAN 24 CONSECUTIVE HOURS, CONTINUOUS POSITIVE AIRWAY PRESSURE: ICD-10-PCS | Performed by: STUDENT IN AN ORGANIZED HEALTH CARE EDUCATION/TRAINING PROGRAM

## 2025-07-28 PROCEDURE — 99223 1ST HOSP IP/OBS HIGH 75: CPT | Mod: AI | Performed by: STUDENT IN AN ORGANIZED HEALTH CARE EDUCATION/TRAINING PROGRAM

## 2025-07-28 PROCEDURE — 96374 THER/PROPH/DIAG INJ IV PUSH: CPT | Mod: 59

## 2025-07-28 PROCEDURE — 94640 AIRWAY INHALATION TREATMENT: CPT

## 2025-07-28 PROCEDURE — 99222 1ST HOSP IP/OBS MODERATE 55: CPT | Performed by: STUDENT IN AN ORGANIZED HEALTH CARE EDUCATION/TRAINING PROGRAM

## 2025-07-28 PROCEDURE — 250N000011 HC RX IP 250 OP 636: Performed by: EMERGENCY MEDICINE

## 2025-07-28 PROCEDURE — 250N000013 HC RX MED GY IP 250 OP 250 PS 637: Performed by: EMERGENCY MEDICINE

## 2025-07-28 PROCEDURE — 78580 LUNG PERFUSION IMAGING: CPT

## 2025-07-28 PROCEDURE — 200N000001 HC R&B ICU

## 2025-07-28 PROCEDURE — 99291 CRITICAL CARE FIRST HOUR: CPT | Mod: 25

## 2025-07-28 PROCEDURE — 96365 THER/PROPH/DIAG IV INF INIT: CPT | Mod: 59

## 2025-07-28 PROCEDURE — 250N000011 HC RX IP 250 OP 636: Performed by: STUDENT IN AN ORGANIZED HEALTH CARE EDUCATION/TRAINING PROGRAM

## 2025-07-28 PROCEDURE — 93308 TTE F-UP OR LMTD: CPT

## 2025-07-28 RX ORDER — CLOPIDOGREL BISULFATE 75 MG/1
75 TABLET ORAL DAILY
Status: DISPENSED | OUTPATIENT
Start: 2025-07-29

## 2025-07-28 RX ORDER — IPRATROPIUM BROMIDE AND ALBUTEROL SULFATE 2.5; .5 MG/3ML; MG/3ML
3 SOLUTION RESPIRATORY (INHALATION)
Status: DISCONTINUED | OUTPATIENT
Start: 2025-07-28 | End: 2025-07-28

## 2025-07-28 RX ORDER — NITROGLYCERIN 20 MG/100ML
10-200 INJECTION INTRAVENOUS CONTINUOUS
Status: DISPENSED | OUTPATIENT
Start: 2025-07-28

## 2025-07-28 RX ORDER — HYDRALAZINE HYDROCHLORIDE 50 MG/1
50 TABLET, FILM COATED ORAL 3 TIMES DAILY
Status: DISPENSED | OUTPATIENT
Start: 2025-07-28

## 2025-07-28 RX ORDER — METHOCARBAMOL 500 MG/1
500 TABLET, FILM COATED ORAL 3 TIMES DAILY PRN
Status: ACTIVE | OUTPATIENT
Start: 2025-07-28

## 2025-07-28 RX ORDER — DEXTROSE MONOHYDRATE 25 G/50ML
25-50 INJECTION, SOLUTION INTRAVENOUS
Status: ACTIVE | OUTPATIENT
Start: 2025-07-28

## 2025-07-28 RX ORDER — FUROSEMIDE 10 MG/ML
20 INJECTION INTRAMUSCULAR; INTRAVENOUS ONCE
Status: COMPLETED | OUTPATIENT
Start: 2025-07-28 | End: 2025-07-28

## 2025-07-28 RX ORDER — ACETAMINOPHEN 325 MG/1
650 TABLET ORAL EVERY 4 HOURS PRN
Status: ACTIVE | OUTPATIENT
Start: 2025-07-28

## 2025-07-28 RX ORDER — SODIUM BICARBONATE 650 MG/1
1300 TABLET ORAL 2 TIMES DAILY
Status: DISPENSED | OUTPATIENT
Start: 2025-07-28

## 2025-07-28 RX ORDER — ALBUTEROL SULFATE 0.83 MG/ML
2.5 SOLUTION RESPIRATORY (INHALATION) EVERY 6 HOURS PRN
Status: ON HOLD | COMMUNITY

## 2025-07-28 RX ORDER — NIFEDIPINE 90 MG/1
90 TABLET, EXTENDED RELEASE ORAL DAILY
Status: DISPENSED | OUTPATIENT
Start: 2025-07-28

## 2025-07-28 RX ORDER — CARVEDILOL 12.5 MG/1
12.5 TABLET ORAL 2 TIMES DAILY WITH MEALS
Status: DISCONTINUED | OUTPATIENT
Start: 2025-07-28 | End: 2025-07-31

## 2025-07-28 RX ORDER — CLONIDINE 0.3 MG/24H
1 PATCH, EXTENDED RELEASE TRANSDERMAL WEEKLY
Status: DISPENSED | OUTPATIENT
Start: 2025-07-31

## 2025-07-28 RX ORDER — IPRATROPIUM BROMIDE AND ALBUTEROL SULFATE 2.5; .5 MG/3ML; MG/3ML
3 SOLUTION RESPIRATORY (INHALATION) ONCE
Status: COMPLETED | OUTPATIENT
Start: 2025-07-28 | End: 2025-07-28

## 2025-07-28 RX ORDER — LORAZEPAM 0.5 MG/1
0.5 TABLET ORAL ONCE
Status: COMPLETED | OUTPATIENT
Start: 2025-07-28 | End: 2025-07-28

## 2025-07-28 RX ORDER — CEFTRIAXONE 2 G/1
2 INJECTION, POWDER, FOR SOLUTION INTRAMUSCULAR; INTRAVENOUS ONCE
Status: COMPLETED | OUTPATIENT
Start: 2025-07-28 | End: 2025-07-28

## 2025-07-28 RX ORDER — PAROXETINE 10 MG/1
40 TABLET, FILM COATED ORAL DAILY
Status: DISPENSED | OUTPATIENT
Start: 2025-07-29

## 2025-07-28 RX ORDER — NITROGLYCERIN 0.4 MG/1
0.4 TABLET SUBLINGUAL ONCE
Status: COMPLETED | OUTPATIENT
Start: 2025-07-28 | End: 2025-07-28

## 2025-07-28 RX ORDER — CARBOXYMETHYLCELLULOSE SODIUM 5 MG/ML
1 SOLUTION/ DROPS OPHTHALMIC
Status: ACTIVE | OUTPATIENT
Start: 2025-07-28

## 2025-07-28 RX ORDER — FUROSEMIDE 10 MG/ML
40 INJECTION INTRAMUSCULAR; INTRAVENOUS ONCE
Status: COMPLETED | OUTPATIENT
Start: 2025-07-28 | End: 2025-07-28

## 2025-07-28 RX ORDER — MECLIZINE HYDROCHLORIDE 25 MG/1
25 TABLET ORAL EVERY 6 HOURS
Status: DISPENSED | OUTPATIENT
Start: 2025-07-28

## 2025-07-28 RX ORDER — POTASSIUM CHLORIDE 1500 MG/1
40 TABLET, EXTENDED RELEASE ORAL ONCE
Status: COMPLETED | OUTPATIENT
Start: 2025-07-28 | End: 2025-07-28

## 2025-07-28 RX ORDER — NICOTINE POLACRILEX 4 MG
15-30 LOZENGE BUCCAL
Status: ACTIVE | OUTPATIENT
Start: 2025-07-28

## 2025-07-28 RX ORDER — ICOSAPENT ETHYL 1 G/1
2 CAPSULE ORAL 2 TIMES DAILY WITH MEALS
Status: ACTIVE | OUTPATIENT
Start: 2025-07-28

## 2025-07-28 RX ORDER — CEFTRIAXONE 1 G/1
1 INJECTION, POWDER, FOR SOLUTION INTRAMUSCULAR; INTRAVENOUS EVERY 24 HOURS
Status: DISPENSED | OUTPATIENT
Start: 2025-07-29

## 2025-07-28 RX ORDER — ALBUTEROL SULFATE 0.83 MG/ML
2.5 SOLUTION RESPIRATORY (INHALATION) EVERY 6 HOURS PRN
Status: DISPENSED | OUTPATIENT
Start: 2025-07-28

## 2025-07-28 RX ORDER — ACETAMINOPHEN 650 MG/1
650 SUPPOSITORY RECTAL EVERY 4 HOURS PRN
Status: ACTIVE | OUTPATIENT
Start: 2025-07-28

## 2025-07-28 RX ORDER — FAMOTIDINE 20 MG/1
40 TABLET, FILM COATED ORAL DAILY
Status: DISCONTINUED | OUTPATIENT
Start: 2025-07-29 | End: 2025-07-29

## 2025-07-28 RX ORDER — PREDNISONE 20 MG/1
60 TABLET ORAL ONCE
Status: COMPLETED | OUTPATIENT
Start: 2025-07-28 | End: 2025-07-28

## 2025-07-28 RX ORDER — ONDANSETRON 4 MG/1
4 TABLET, ORALLY DISINTEGRATING ORAL EVERY 6 HOURS PRN
Status: ACTIVE | OUTPATIENT
Start: 2025-07-28

## 2025-07-28 RX ORDER — DOXYCYCLINE 100 MG/10ML
100 INJECTION, POWDER, LYOPHILIZED, FOR SOLUTION INTRAVENOUS ONCE
Status: COMPLETED | OUTPATIENT
Start: 2025-07-28 | End: 2025-07-28

## 2025-07-28 RX ADMIN — SODIUM BICARBONATE 1300 MG: 650 TABLET ORAL at 20:04

## 2025-07-28 RX ADMIN — IPRATROPIUM BROMIDE AND ALBUTEROL SULFATE 3 ML: .5; 3 SOLUTION RESPIRATORY (INHALATION) at 06:52

## 2025-07-28 RX ADMIN — NIFEDIPINE 90 MG: 90 TABLET, EXTENDED RELEASE ORAL at 17:46

## 2025-07-28 RX ADMIN — KIT FOR THE PREPARATION OF TECHNETIUM TC 99M ALBUMIN AGGREGATED 6 MILLICURIE: 2.5 INJECTION, POWDER, FOR SOLUTION INTRAVENOUS at 11:44

## 2025-07-28 RX ADMIN — ICOSAPENT ETHYL 2 G: 1 CAPSULE ORAL at 20:05

## 2025-07-28 RX ADMIN — MECLIZINE HYDROCHLORIDE 25 MG: 25 TABLET ORAL at 17:46

## 2025-07-28 RX ADMIN — FUROSEMIDE 40 MG: 10 INJECTION, SOLUTION INTRAMUSCULAR; INTRAVENOUS at 16:52

## 2025-07-28 RX ADMIN — NITROGLYCERIN 0.4 MG: 0.4 TABLET SUBLINGUAL at 09:22

## 2025-07-28 RX ADMIN — DOXYCYCLINE 100 MG: 100 INJECTION, POWDER, LYOPHILIZED, FOR SOLUTION INTRAVENOUS at 07:32

## 2025-07-28 RX ADMIN — POTASSIUM CHLORIDE 40 MEQ: 20 TABLET, EXTENDED RELEASE ORAL at 06:52

## 2025-07-28 RX ADMIN — NITROGLYCERIN 10 MCG/MIN: 20 INJECTION INTRAVENOUS at 10:05

## 2025-07-28 RX ADMIN — CEFTRIAXONE 2 G: 2 INJECTION, POWDER, FOR SOLUTION INTRAMUSCULAR; INTRAVENOUS at 06:52

## 2025-07-28 RX ADMIN — CARVEDILOL 12.5 MG: 12.5 TABLET, FILM COATED ORAL at 17:24

## 2025-07-28 RX ADMIN — HYDRALAZINE HYDROCHLORIDE 50 MG: 50 TABLET ORAL at 17:24

## 2025-07-28 RX ADMIN — MECLIZINE HYDROCHLORIDE 25 MG: 25 TABLET ORAL at 22:50

## 2025-07-28 RX ADMIN — IPRATROPIUM BROMIDE AND ALBUTEROL SULFATE 3 ML: .5; 3 SOLUTION RESPIRATORY (INHALATION) at 09:07

## 2025-07-28 RX ADMIN — PREDNISONE 60 MG: 20 TABLET ORAL at 05:50

## 2025-07-28 RX ADMIN — FUROSEMIDE 20 MG: 10 INJECTION, SOLUTION INTRAMUSCULAR; INTRAVENOUS at 10:34

## 2025-07-28 RX ADMIN — IPRATROPIUM BROMIDE AND ALBUTEROL SULFATE 3 ML: .5; 3 SOLUTION RESPIRATORY (INHALATION) at 05:50

## 2025-07-28 ASSESSMENT — ACTIVITIES OF DAILY LIVING (ADL)
ADLS_ACUITY_SCORE: 53
ADLS_ACUITY_SCORE: 64
ADLS_ACUITY_SCORE: 53
ADLS_ACUITY_SCORE: 64
ADLS_ACUITY_SCORE: 53
ADLS_ACUITY_SCORE: 64
ADLS_ACUITY_SCORE: 53
ADLS_ACUITY_SCORE: 53
ADLS_ACUITY_SCORE: 64
ADLS_ACUITY_SCORE: 53
ADLS_ACUITY_SCORE: 64
ADLS_ACUITY_SCORE: 53

## 2025-07-28 ASSESSMENT — COLUMBIA-SUICIDE SEVERITY RATING SCALE - C-SSRS
1. IN THE PAST MONTH, HAVE YOU WISHED YOU WERE DEAD OR WISHED YOU COULD GO TO SLEEP AND NOT WAKE UP?: NO
2. HAVE YOU ACTUALLY HAD ANY THOUGHTS OF KILLING YOURSELF IN THE PAST MONTH?: NO
6. HAVE YOU EVER DONE ANYTHING, STARTED TO DO ANYTHING, OR PREPARED TO DO ANYTHING TO END YOUR LIFE?: NO

## 2025-07-28 NOTE — ED NOTES
RN called to the room for patient reporting SOB. Pt. Placed on monitor and found to have O2 sats of 85% on RA, slightly increased WOB noted. Pt. Placed on 5LPM NC with O2 sats improved to 95%, WOB improving. NSR on tele. Pt. Reports dry mouth. Primary RN Kem and ED MD Wilkerson notified.

## 2025-07-28 NOTE — LETTER
Dialysis Intake Checklist      Your Name: Shara Duran RN Contact Phone Number: [phone]     Fax Number and Email: [phone] Hospital/Practice: [facility]     Patient Name: Maria Alejandra ROWE Heber   Referring Nephrologist: Richmond Fernández Facility(s) or Zip Code:  [address]     Patient Started Date of Dialysis: 08/06/2025      Estimated Outpatient Start Date of Dialysis: 08/18/2025 (this is an estimate)   Treatment Duration & Frequency: Three times per week     Preferred Schedule: Tuesday, Thursday, and Saturday      Is the patient employed? No   Is there a working shift we can accommodate?  No   Is patient flexible? Yes Facility: [place] location       Modality:   In-Center Hemo   Diagnosis:   End Stage Renal Disease (ESRD - Stage 5 Chronic Kidney Disease)     Access Type(s):   CVC    If only a CVC, is there an active AV Access Plan (e.g., Vessel Mapping, Surgeon Consult, etc.)?:   No         Where will this patient be discharged to? Other: TCU     Is this patient trach or vent dependent? No     Does this patient have an L-VAD or Life Vest? No     Does this patient have outpatient dialysis history? No     Does this patient receive continuous medication via infusion pumps? No     Daily Care - Activity Management/Activity assistance needed?   Activity Management: up in chair   Activity Assistance Provided: assistance, 2 people   Assistive Device Utilized: lift device      Can this patient sit in a standard chair to dialyze? Yes     Require treatment in a bed?  No     Is the patient HEP B positive? No     Can this patient sign their own legal consents? Yes     Other special needs?        Allergies:   Allergies   Allergen Reactions    Iodine Hives        Medication List:   Current Facility-Administered Medications   Medication Dose Route Frequency Provider Last Rate Last Admin    - MEDICATION INSTRUCTIONS for Dialysis Patients -   Does not apply See Admin Instructions Macr  Mariano KENT MD        acetaminophen (TYLENOL) tablet 650 mg  650 mg Oral or Feeding Tube Q4H PRN Mariano Tran MD        Or    acetaminophen (TYLENOL) Suppository 650 mg  650 mg Rectal Q4H PRN Mariano Tran MD        diphenhydrAMINE (BENADRYL) liquid 25 mg  25 mg Oral or Feeding Tube At Bedtime PRN Virgilio Patel MD        And    acetaminophen (TYLENOL) tablet 500 mg  500 mg Oral or Feeding Tube At Bedtime PRN Virgilio Patel MD        albuterol (PROVENTIL) neb solution 2.5 mg  2.5 mg Nebulization Q6H PRN Virgilio Patel MD   2.5 mg at 07/31/25 2104    amiodarone (PACERONE) tablet 200 mg  200 mg Oral or Feeding Tube Daily Mariano Tran MD   200 mg at 08/08/25 0848    B and C vitamin Complex with folic acid (NEPHRONEX) liquid 5 mL  5 mL Per Feeding Tube Daily Mariano Tran MD   5 mL at 08/08/25 0853    carboxymethylcellulose PF (REFRESH PLUS) 0.5 % ophthalmic solution 1 drop  1 drop Both Eyes Q1H PRN Gilmer Swain MD        carvedilol (COREG) tablet 25 mg  25 mg Oral or Feeding Tube BID w/meals Mariano Tran MD   25 mg at 08/08/25 0847    clopidogrel (PLAVIX) tablet 75 mg  75 mg Oral or Feeding Tube Daily Mariano Tran MD   75 mg at 08/07/25 1755    dextrose 10% infusion   Intravenous Continuous PRN Mariano Tran MD        glucose gel 15-30 g  15-30 g Oral Q15 Min PRN Virgilio Patel MD        Or    dextrose 50 % injection 25-50 mL  25-50 mL Intravenous Q15 Min PRN Virgilio Patel MD        Or    glucagon injection 1 mg  1 mg Subcutaneous Q15 Min PRN Virgilio Patel MD        diazepam (VALIUM) injection 2.5 mg  2.5 mg Intravenous Q6H PRN Fran Pierson MD   2.5 mg at 08/07/25 0217    famotidine (PEPCID) tablet 20 mg  20 mg Oral or Feeding Tube Q48H Virgilio Patel MD   20 mg at 08/08/25 0848    HOLD: All Oral Medications   Does not apply HOLD Nhan Wilkerson MD        hydrALAZINE (APRESOLINE) tablet 50 mg  50 mg Oral or Feeding Tube TID Jamie Fragoso MD         HYDROmorphone (DILAUDID) injection 0.2 mg  0.2 mg Intravenous Q2H PRN Fran Pierson MD   0.2 mg at 08/07/25 0058    icosapent ethyl (VASCEPA) capsule 2 g  2 g Oral BID w/meals Virgilio Patel MD   2 g at 08/08/25 0853    isosorbide dinitrate (ISORDIL) tablet 40 mg  40 mg Oral or Feeding Tube TID Virgilio Patel MD   40 mg at 08/08/25 0848    levalbuterol (XOPENEX) neb solution 0.63 mg  0.63 mg Nebulization Q4H PRN Mariano Tran MD   0.63 mg at 08/05/25 1355    lidocaine (LMX4) cream   Topical Q1H PRN Gilmer Swain MD        lidocaine 1 % 0.1-1 mL  0.1-1 mL Other Q1H PRN Gilmer Swain MD        LORazepam (ATIVAN) injection 0.5 mg  0.5 mg Intravenous Q4H PRN Jonathan Light MD   0.5 mg at 08/06/25 2156    meclizine (ANTIVERT) tablet 25 mg  25 mg Oral or Feeding Tube Q6H Virgilio Patel MD   25 mg at 08/08/25 1223    methocarbamol (ROBAXIN) tablet 500 mg  500 mg Oral or Feeding Tube TID PRN Virgilio Patel MD        metoprolol (LOPRESSOR) injection 5 mg  5 mg Intravenous Q5 Min PRN Jonathan Light MD   5 mg at 08/05/25 0518    naloxone (NARCAN) injection 0.2 mg  0.2 mg Intravenous Q2 Min PRN Fran Pierson MD        Or    naloxone (NARCAN) injection 0.4 mg  0.4 mg Intravenous Q2 Min PRN Fran Pierson MD        Or    naloxone (NARCAN) injection 0.2 mg  0.2 mg Intramuscular Q2 Min PRN Fran Pierson MD        Or    naloxone (NARCAN) injection 0.4 mg  0.4 mg Intramuscular Q2 Min PRN Fran Pierson MD        NIFEdipine ER OSMOTIC (PROCARDIA XL) 24 hr tablet 90 mg  90 mg Oral Daily Mariano Tran MD   90 mg at 08/07/25 0936    No lozenges or gum should be given while patient on BIPAP/AVAPS/AVAPS AE   Does not apply Continuous PRN Gilmer Swain MD        ondansetron (ZOFRAN ODT) ODT tab 4 mg  4 mg Oral Q6H PRN Virgilio Patel MD   4 mg at 08/07/25 1122    PARoxetine (PAXIL) tablet 40 mg  40 mg Oral or Feeding Tube Daily Righernan,  "MD Virgilio   40 mg at 08/08/25 0848    Patient may continue current oral medications   Does not apply Continuous PRN Gilmer Swain MD        polyethylene glycol (MIRALAX) Packet 17 g  17 g Oral BID PRN Mariano Tran MD        sennosides (SENOKOT) tablet 2 tablet  2 tablet Oral BID Mariano Tran MD   2 tablet at 08/08/25 0848    sodium chloride (PF) 0.9% PF flush 3 mL  3 mL Intracatheter Q8H CHIDI Gilmer Swain MD   3 mL at 08/08/25 1305    sodium chloride (PF) 0.9% PF flush 3 mL  3 mL Intracatheter q1 min prn Gilmer Swain MD   3 mL at 08/03/25 0823          HEP Panel:  Hep B Antigen (HBsAG):   Lab Results   Component Value Date    HEPBANG Nonreactive 08/05/2025     Hep B Surface Antibody (HBsAb):   Lab Results   Component Value Date    AUSAB <3.50 08/05/2025     Hep B Total Core Antibody:   Lab Results   Component Value Date    HBCAB Nonreactive 08/05/2025        Chest X-Ray:   Results for orders placed during the hospital encounter of 07/28/25    XR ABDOMEN PORT 1 VIEW    Narrative  EXAM: XR ABDOMEN PORT 1 VIEW  LOCATION: [facility]  DATE: 8/7/2025    INDICATION: Feeding tube placement  COMPARISON: Chest x-ray earlier today    Impression  IMPRESSION: Feeding tube has been placed with tip either curved back in the gastric antrum or at the pylorus.  Right IJ central venous catheter tip in the low right atrium. Abdominal aortic endograft. Stable diffuse mixed interstitial and airspace opacities in the lungs. Stable small left pleural effusion.       PPD:  M TUBERCULOSIS RESULT: No results found for: \"TBRSLT\"  M TUBERCULOSIS ANTIGEN VALUE: No results found for: \"TBAGN\"             "

## 2025-07-28 NOTE — PHARMACY-ADMISSION MEDICATION HISTORY
Pharmacist Admission Medication History    Admission medication history is complete. The information provided in this note is only as accurate as the sources available at the time of the update.    Information Source(s): Facility ([facility]/) medication list/MAR via MAR    Pertinent Information: None    Changes made to PTA medication list:  Added: albuterol neb  Deleted: atorvastatin, Tylenol  Changed: paroxetine PO --> 40mg    Allergies reviewed with patient and updates made in EHR: unable to assess    Medication History Completed By: Katty Salas RPH 7/28/2025 9:32 AM    PTA Med List   Medication Sig Last Dose/Taking    albuterol (PROVENTIL) (2.5 MG/3ML) 0.083% neb solution Take 2.5 mg by nebulization every 6 hours as needed for shortness of breath, wheezing or cough. Taking As Needed    carvedilol (COREG) 25 MG tablet Take 12.5 mg by mouth 2 times daily (with meals). 7/27/2025 Evening    cloNIDine (CATAPRES-TTS3) 0.3 MG/24HR WK patch Place 1 patch over 168 hours onto the skin once a week. 7/24/2025 Morning    clopidogrel (PLAVIX) 75 MG tablet Take 75 mg by mouth daily. 7/27/2025 Morning    famotidine (PEPCID) 40 MG tablet Take 40 mg by mouth daily. 7/27/2025 Morning    hydrALAZINE (APRESOLINE) 50 MG tablet Take 1 tablet (50 mg) by mouth 3 times daily. 7/27/2025 Evening    HYDROmorphone (DILAUDID) 2 MG tablet Take 1 tablet (2 mg) by mouth daily (with breakfast). May also take 0.5-1 tablets (1-2 mg) 4 times daily as needed (pain 4-6/10 = 1 mg and pain 7-10/10 = 2 mg). 7/27/2025 Morning    icosapent ethyl (VASCEPA) 1 g CAPS capsule Take 2 g by mouth 2 times daily (with meals). 7/27/2025 Evening    isosorbide dinitrate (ISORDIL) 20 MG tablet Take 20 mg by mouth 3 times daily. 7/27/2025 Evening    Lidocaine (LIDOCARE) 4 % Patch Place 2 patches over 12 hours onto the skin every 24 hours. To prevent lidocaine toxicity, patient should be patch free for 12 hrs daily.  Apply to affected area. 7/27/2025 Evening     meclizine (ANTIVERT) 25 MG tablet Take 1 tablet (25 mg) by mouth every 6 hours. 7/27/2025 Evening    methocarbamol (ROBAXIN) 500 MG tablet Take 1 tablet (500 mg) by mouth 3 times daily as needed for muscle spasms. Taking As Needed    Multiple Vitamins-Minerals (MULTIVITAMIN WOMEN PO) Take 1 tablet by mouth daily. 7/27/2025 Morning    NIFEdipine ER OSMOTIC (ADALAT CC) 90 MG 24 hr tablet Take 1 tablet (90 mg) by mouth daily. 7/27/2025 Morning    ondansetron (ZOFRAN ODT) 4 MG ODT tab Take 1 tablet (4 mg) by mouth every 6 hours as needed. Taking As Needed    PARoxetine (PAXIL) 40 MG tablet Take 40 mg by mouth daily. 7/27/2025 Morning    senna-docusate (SENOKOT-S/PERICOLACE) 8.6-50 MG tablet Take 1 tablet by mouth 2 times daily as needed for constipation. Taking As Needed    sodium bicarbonate 650 MG tablet Take 2 tablets (1,300 mg) by mouth 2 times daily. 7/27/2025 Evening

## 2025-07-28 NOTE — PLAN OF CARE
"ICU End of Shift Summary.  For vital signs and complete assessments, please see documentation flowsheets.      Pertinent assessments: AOx4, intermittent forgetfulness. Denies pain but complains of hip pain with turns. Afebrile. Tele SR, QTc Long at 500.LS diminished, fine crackles to bases.  Tolerating being off BiPAP, sats high 90s on 3L nasal cannula. Purewik with 750mL measured output, lasix x1 dose given. No BM. Contusion to R frontal area of head present from fall prior to last admission. Pt and spouse report that the \"lump\" feels larger then it had previously, not additional falls per pt.  Major Shift Events: Admit to ICU. Increasing dose of Nitroglycerin. Nephro consult. Home meds resumed  Plan (Upcoming Events): Titrate nitroglycerin for SBP<150. BiPAP overnight  Discharge/Transfer Needs: TBD, return to TCU when ready for D/C     Bedside Shift Report Completed : Y  Bedside Safety Check Completed: Y     Goal Outcome Evaluation:      Plan of Care Reviewed With: patient, spouse    Overall Patient Progress: no changeOverall Patient Progress: no change    Outcome Evaluation: BP remains elevated despite increasing dose of Nitro. Home meds resumed. Lasix given with good response      Problem: Adult Inpatient Plan of Care  Goal: Plan of Care Review  Description: The Plan of Care Review/Shift note should be completed every shift.  The Outcome Evaluation is a brief statement about your assessment that the patient is improving, declining, or no change.  This information will be displayed automatically on your shift  note.  Outcome: Not Progressing  Flowsheets (Taken 7/28/2025 2209)  Outcome Evaluation: BP remains elevated despite increasing dose of Nitro. Home meds resumed. Lasix given with good response  Plan of Care Reviewed With:   patient   spouse  Overall Patient Progress: no change  Goal: Patient-Specific Goal (Individualized)  Description: You can add care plan individualizations to a care plan. Examples of " "Individualization might be:  \"Parent requests to be called daily at 9am for status\", \"I have a hard time hearing out of my right ear\", or \"Do not touch me to wake me up as it startles  me\".  Outcome: Not Progressing  Goal: Absence of Hospital-Acquired Illness or Injury  Outcome: Not Progressing  Intervention: Identify and Manage Fall Risk  Recent Flowsheet Documentation  Taken 7/28/2025 1700 by Toyin Rao RN  Safety Promotion/Fall Prevention:   activity supervised   assistive device/personal items within reach   clutter free environment maintained   nonskid shoes/slippers when out of bed   patient and family education   safety round/check completed  Taken 7/28/2025 1400 by Toyin Rao RN  Safety Promotion/Fall Prevention:   activity supervised   assistive device/personal items within reach   clutter free environment maintained   nonskid shoes/slippers when out of bed   patient and family education   safety round/check completed  Intervention: Prevent Skin Injury  Recent Flowsheet Documentation  Taken 7/28/2025 1700 by Toyin Rao RN  Body Position:   turned   left   heels elevated   side-lying 30 degrees  Taken 7/28/2025 1400 by Toyin Rao RN  Body Position: position changed independently  Goal: Optimal Comfort and Wellbeing  Outcome: Not Progressing  Intervention: Provide Person-Centered Care  Recent Flowsheet Documentation  Taken 7/28/2025 1700 by Toyin Rao RN  Trust Relationship/Rapport:   care explained   choices provided   questions answered   questions encouraged   reassurance provided  Taken 7/28/2025 1400 by Toyin Rao RN  Trust Relationship/Rapport:   care explained   choices provided   questions answered   questions encouraged   reassurance provided  Goal: Readiness for Transition of Care  Outcome: Not Progressing  Intervention: Mutually Develop Transition Plan  Recent Flowsheet Documentation  Taken 7/28/2025 1400 by Toyin Rao RN  Equipment Currently Used at Home:   walker, " standard   shower chair   grab bar, toilet     Problem: Delirium  Goal: Optimal Coping  Outcome: Not Progressing  Intervention: Optimize Psychosocial Adjustment to Delirium  Recent Flowsheet Documentation  Taken 7/28/2025 1700 by Toyin Rao RN  Family/Support System Care: involvement promoted  Taken 7/28/2025 1400 by Toyin Rao RN  Family/Support System Care: involvement promoted  Goal: Improved Behavioral Control  Outcome: Not Progressing  Intervention: Minimize Safety Risk  Recent Flowsheet Documentation  Taken 7/28/2025 1700 by Toyin Rao RN  Enhanced Safety Measures: pain management  Trust Relationship/Rapport:   care explained   choices provided   questions answered   questions encouraged   reassurance provided  Taken 7/28/2025 1400 by Toyin Rao RN  Enhanced Safety Measures: pain management  Trust Relationship/Rapport:   care explained   choices provided   questions answered   questions encouraged   reassurance provided  Goal: Improved Attention and Thought Clarity  Outcome: Not Progressing  Intervention: Maximize Cognitive Function  Recent Flowsheet Documentation  Taken 7/28/2025 1700 by Toyin Rao RN  Sensory Stimulation Regulation:   lighting decreased   care clustered  Reorientation Measures:   calendar in view   clock in view  Taken 7/28/2025 1400 by Toyin Rao RN  Sensory Stimulation Regulation:   lighting decreased   care clustered  Reorientation Measures:   calendar in view   clock in view  Goal: Improved Sleep  Outcome: Not Progressing     Problem: Skin Injury Risk Increased  Goal: Skin Health and Integrity  Outcome: Not Progressing  Intervention: Plan: Nurse Driven Intervention: Moisture Management  Recent Flowsheet Documentation  Taken 7/28/2025 1700 by Toyin Rao RN  Bathing/Skin Care:   back care   linen changed   electrode patches/site rotation  Taken 7/28/2025 1400 by Toyin Rao RN  Bathing/Skin Care:   back care   linen changed   electrode patches/site  rotation  Intervention: Plan: Nurse Driven Intervention: Friction and Shear  Recent Flowsheet Documentation  Taken 7/28/2025 1700 by Toyin Rao RN  Friction/Shear Interventions:   Lateral transfer device (hovermat, etc.)   Repositioning device (TAP system, etc.)   Assistive lifting device (portable/ceiling lift, etc.)  Taken 7/28/2025 1400 by Toyin Rao RN  Friction/Shear Interventions:   Lateral transfer device (hovermat, etc.)   Repositioning device (TAP system, etc.)   Assistive lifting device (portable/ceiling lift, etc.)  Intervention: Optimize Skin Protection  Recent Flowsheet Documentation  Taken 7/28/2025 1700 by Toyin Rao RN  Activity Management: activity adjusted per tolerance  Head of Bed (HOB) Positioning: HOB at 20-30 degrees  Taken 7/28/2025 1400 by Toyin Rao RN  Activity Management: activity adjusted per tolerance  Head of Bed (HOB) Positioning: HOB at 20-30 degrees     Problem: Breathing Pattern Ineffective  Goal: Effective Breathing Pattern  Outcome: Not Progressing  Intervention: Promote Improved Breathing Pattern  Recent Flowsheet Documentation  Taken 7/28/2025 1700 by Toyin Rao RN  Head of Bed (HOB) Positioning: HOB at 20-30 degrees  Taken 7/28/2025 1400 by Toyin Rao RN  Head of Bed (HOB) Positioning: HOB at 20-30 degrees     Problem: Comorbidity Management  Goal: Maintenance of COPD Symptom Control  Outcome: Not Progressing  Intervention: Maintain COPD Symptom Control  Recent Flowsheet Documentation  Taken 7/28/2025 1700 by Toyin Rao RN  Medication Review/Management: medications reviewed  Taken 7/28/2025 1400 by Toyin Rao RN  Medication Review/Management: medications reviewed  Goal: Blood Pressure in Desired Range  Outcome: Not Progressing  Intervention: Maintain Blood Pressure Management  Recent Flowsheet Documentation  Taken 7/28/2025 1700 by Toyin Rao RN  Medication Review/Management: medications reviewed  Taken 7/28/2025 1400 by Toyin Rao  RN  Medication Review/Management: medications reviewed

## 2025-07-28 NOTE — ED TRIAGE NOTES
Pt arrives via EMS for two weeks of worsening SOB. Pt is currently living in a TCU after sustaining a fall and pelvic fx. Denies cough or fever. Has hx of lung cancer. A/O, VSS on 3 L via NC.      Triage Assessment (Adult)       Row Name 07/28/25 0527          Triage Assessment    Airway WDL WDL        Respiratory WDL    Respiratory WDL X;rhythm/pattern     Rhythm/Pattern, Respiratory shortness of breath        Cardiac WDL    Cardiac WDL WDL        Cognitive/Neuro/Behavioral WDL    Cognitive/Neuro/Behavioral WDL WDL

## 2025-07-28 NOTE — PROGRESS NOTES
A BiPAP of  12/6 @ 30% (initially 100% FiO2) was applied to the pt via the mask for an increase in WOB and SOB. The skin in contact with the mask and straps looks good and intact. Pt is tolerating it well. RT will continue to monitor and assess the pt's respiratory status and needs.  RT/RN huddle to discuss contraindications prior to placement? Y/N? Y      Jimmy Michael, RT

## 2025-07-28 NOTE — ED PROVIDER NOTES
Emergency Department Note      History of Present Illness     Chief Complaint   Shortness of Breath      HPI   Maria Alejandra Buck is a 75 year old female with a history of hypertension, hyperlipidemia, peripheral artery disease, stage 3a chronic CKD, CAD, and hypokalemia who presents to the emergency department for evaluation of shortness of breath.  Patient reports that she has been feeling short of breath since her hospital discharge earlier this month on 7/19/2025, though since mid last week, her breathing has gotten worse, particularly over the last night.  She notes that while lying in bed last night, she felt as though her shortness of breath was worsening and ultimately contacted EMS for further evaluation.  Patient denies any chest pain or significant cough.  She does acknowledge there have been cases of COVID going around in her care facility as of late.  She has a history of paroxysmal A-fib, and states she typically can feel when she goes into A-fib.  Denies prior history of PE or DVT.  Notes prior history of lung cancer status post radiation treatment.    Independent Historian   None    Review of External Notes   I reviewed the patient's discharge summary note from 7/19/25 regarding the patient being hospitialized after a fall, scalp hematoma, and right public fracture. I also reviewed the nursing note from earlier, regarding the patient endorsing more shortness of breath and O2 stats at 90%.    Past Medical History     Medical History and Problem List   AAA  Benign renovascular hypertension   CAD   Hypokalemia   Stage 3a chronic CKD   Peripheral artery disease   Hyperlipidemia   Hypertension     Medications   Coreg   Plavix   Pepcid   Apresoline   Vascepa   Isordil   Adalat cc  Paxil   Sodium bicarbonate     Surgical History   Abdomen surgery   Endarterectomy carotid   Genitourinary surgery   Ir renal biopsy left   Kidney stent   Abdominal aneurysm   EGD   Renal artery stent right   Carotid surgery  "  Tonsillectomy   Hysterectomy     Physical Exam     Patient Vitals for the past 24 hrs:   BP Temp Temp src Pulse Resp SpO2 Height Weight   07/28/25 1450 (!) 158/86 -- -- 60 15 97 % -- --   07/28/25 1445 (!) 156/85 -- -- 61 13 96 % -- --   07/28/25 1440 (!) 159/88 -- -- 62 13 96 % -- --   07/28/25 1435 (!) 163/85 -- -- 59 15 96 % -- --   07/28/25 1430 (!) 165/85 -- -- 62 17 94 % -- --   07/28/25 1425 (!) 169/93 -- -- 63 23 97 % -- --   07/28/25 1420 (!) 159/90 -- -- 59 18 97 % -- --   07/28/25 1416 (!) 172/93 -- -- 66 19 95 % -- --   07/28/25 1400 (!) 175/97 97.7  F (36.5  C) Temporal 65 14 96 % 1.626 m (5' 4\") 54.8 kg (120 lb 13 oz)   07/28/25 1345 (!) 173/93 -- -- 63 13 98 % -- --   07/28/25 1330 (!) 182/99 -- -- 71 13 96 % -- --   07/28/25 1310 (!) 162/89 -- -- 67 10 97 % -- --   07/28/25 1300 (!) 162/82 -- -- 66 11 97 % -- --   07/28/25 1250 (!) 157/83 -- -- 64 16 97 % -- --   07/28/25 1240 (!) 150/80 -- -- 64 11 97 % -- --   07/28/25 1230 (!) 157/90 -- -- 65 11 98 % -- --   07/28/25 1220 (!) 160/87 -- -- 66 14 97 % -- --   07/28/25 1215 (!) 161/91 -- -- 67 11 97 % -- --   07/28/25 1205 -- -- -- 70 22 97 % -- --   07/28/25 1158 -- -- -- -- -- 97 % -- --   07/28/25 1130 (!) 170/98 -- -- 73 -- -- -- --   07/28/25 1128 -- -- -- 69 13 98 % -- --   07/28/25 1115 (!) 165/101 -- -- 71 12 98 % -- --   07/28/25 1100 (!) 163/101 -- -- 73 14 98 % -- --   07/28/25 1034 -- -- -- 73 12 97 % -- --   07/28/25 1033 -- -- -- 73 11 97 % -- --   07/28/25 1032 -- -- -- 73 14 97 % -- --   07/28/25 1031 -- -- -- 72 11 97 % -- --   07/28/25 1030 (!) 168/93 -- -- 73 -- -- -- --   07/28/25 0950 (!) 178/94 -- -- 76 -- -- -- --   07/28/25 0939 (!) 162/104 -- -- 77 16 96 % -- --   07/28/25 0929 -- -- -- 78 22 99 % -- --   07/28/25 0900 (!) 178/94 -- -- 78 16 96 % -- --   07/28/25 0856 (!) 185/97 -- -- -- -- -- -- --   07/28/25 0745 -- -- -- -- -- (!) 90 % -- --   07/28/25 0600 (!) 160/87 -- -- 69 -- 92 % -- --   07/28/25 0545 (!) 180/90 -- " -- -- -- 97 % -- --   07/28/25 0526 (!) 186/93 97.7  F (36.5  C) Oral 69 20 98 % -- --     Physical Exam    General:              Well-nourished              Speaking in full sentences  Eyes:              Conjunctiva without injection or scleral icterus  ENT:              Moist mucous membranes              Nares patent              Pinnae normal  Neck:              Full ROM              No stiffness appreciated  Resp:              Decreased breath sounds to L base compared with remainder of lung fields              Coarse lung sounds bilaterally              No wheezing or prolonged expiratory phase  CV:                    Normal rate, regular rhythm              S1 and S2 present              No murmur, gallop or rub  GI:              BS present              Abdomen soft without distention              Non-tender to light and deep palpation              No guarding or rebound tenderness  Skin:              Warm, dry, well perfused              Old appearing ecchymoses to R forehead  MSK:              Moves all extremities              No focal deformities or swelling              No calf tenderness  Neuro:              Alert              Answers questions appropriately              Moves all extremities equally  Psych:              Normal affect, normal mood       Diagnostics     Lab Results   Labs Ordered and Resulted from Time of ED Arrival to Time of ED Departure   BASIC METABOLIC PANEL (LIMITED OCCURRENCES) - Abnormal       Result Value    Sodium 136      Potassium 3.2 (*)     Chloride 95 (*)     Carbon Dioxide (CO2) 23      Anion Gap 18 (*)     Urea Nitrogen 58.1 (*)     Creatinine 4.25 (*)     GFR Estimate 10 (*)     Calcium 8.0 (*)     Glucose 101 (*)    PROCALCITONIN - Abnormal    Procalcitonin 0.67 (*)    BLOOD GAS VENOUS - Abnormal    pH Venous 7.46 (*)     pCO2 Venous 39 (*)     pO2 Venous 50 (*)     Bicarbonate Venous 28      Base Excess/Deficit Venous 3.6 (*)     FIO2 3      Oxyhemoglobin Venous 85  (*)     O2 Sat, Venous 86.8 (*)    NT-PROBNP - Abnormal    NT-proBNP 25,554 (*)    TROPONIN T, HIGH SENSITIVITY - Abnormal    Troponin T, High Sensitivity 65 (*)    CBC WITH PLATELETS AND DIFFERENTIAL - Abnormal    WBC Count 7.7      RBC Count 2.66 (*)     Hemoglobin 9.1 (*)     Hematocrit 26.7 (*)           MCH 34.2 (*)     MCHC 34.1      RDW 15.6 (*)     Platelet Count 349      % Neutrophils 48      % Lymphocytes 31      % Monocytes 11      % Eosinophils 10      % Basophils 1      % Immature Granulocytes 0      NRBCs per 100 WBC 0      Absolute Neutrophils 3.7      Absolute Lymphocytes 2.4      Absolute Monocytes 0.9      Absolute Eosinophils 0.7      Absolute Basophils 0.1      Absolute Immature Granulocytes 0.0      Absolute NRBCs 0.0     D DIMER QUANTITATIVE - Abnormal    D-Dimer Quantitative 3.15 (*)    TROPONIN T, HIGH SENSITIVITY - Abnormal    Troponin T, High Sensitivity 58 (*)    BLOOD GAS VENOUS - Abnormal    pH Venous 7.41      pCO2 Venous 41      pO2 Venous 58 (*)     Bicarbonate Venous 26      Base Excess/Deficit Venous 1.0      FIO2 5      Oxyhemoglobin Venous 88 (*)     O2 Sat, Venous 89.6 (*)    MAGNESIUM (LIMITED OCCURRENCES) - Normal    Magnesium 2.2     LACTIC ACID WHOLE BLOOD WITH 1X REPEAT IN 2 HR WHEN >2 - Normal    Lactic Acid, Initial 1.0     INFLUENZA A/B, RSV AND SARS-COV2 PCR - Normal    Influenza A PCR Negative      Influenza B PCR Negative      RSV PCR Negative      SARS CoV2 PCR Negative     MAGNESIUM (LIMITED OCCURRENCES) - Normal    Magnesium 2.1         Imaging   NM Lung Scan Perfusion Particulate   Final Result   IMPRESSION:       No evidence of pulmonary embolism.      POC US ECHO LIMITED   Final Result     Cardiac Ultrasound      Procedure Name: POC Ultrasound Cardiac Exam      Indication: Shortness of breath      Views: Parasternal long axis view  and pulmonary windows      Findings: B-lines in bilateral pulmonary windows, no pericardial effusion, LV>RV      Impression:  Suggestion of pulmonary edema, no RV strain      Study performed by: Nhan Wilkerson MD       Images archived: Yes       US Lower Extremity Venous Duplex Bilateral   Final Result   IMPRESSION:   1.  No deep venous thrombosis in the bilateral lower extremities.      XR Chest 2 Views   Final Result   IMPRESSION: Bilateral perihilar interstitial/airspace opacities, left greater than right, are nonspecific for postradiation change versus pneumonia. A small nodule in the peripheral right upper lobe measures 9 mm. Heart size and pulmonary vascularity are    normal. No pleural effusion or pneumothorax. Endoluminal stent graft seen in the abdominal aorta.          EKG 1   ECG taken at 0557, ECG read at 0605  Sinus rhythm with premature atrial complexes   Nonspecific ST and T wave abnormality   Abnormal ECG    Rate 77 bpm. LA interval 172 ms. QRS duration 82 ms. QT/QTc 482/545 ms. P-R-T axes 28 18 -12.    EKG 2  ECG taken at 0917, ECG read at 935  Normal sinus rhythm   Nonspecific ST and T wave abnormality   Prolonged QT   Abnormal ECG   Rate 78 bpm. LA interval 166 ms. QRS duration 74 ms. QT/QTc 456/519 ms. P-R-T axes 54 23 -18  .  Independent Interpretation   CXR: No pneumothorax. Shows infiltrates.     ED Course      Medications Administered   Medications   ipratropium - albuterol 0.5 mg/2.5 mg (3mg)/3 mL (DUONEB) neb solution 3 mL (3 mLs Nebulization $Given 7/28/25 0907)   No lozenges or gum should be given while patient on BIPAP/AVAPS/AVAPS AE (has no administration in time range)   carboxymethylcellulose PF (REFRESH PLUS) 0.5 % ophthalmic solution 1 drop (has no administration in time range)   HOLD: All Oral Medications (has no administration in time range)   nitroGLYcerin 50 mg in D5W 250 mL (adult std) infusion (70 mcg/min Intravenous Rate/Dose Change 7/28/25 1450)   glucose gel 15-30 g (has no administration in time range)     Or   dextrose 50 % injection 25-50 mL (has no administration in time range)     Or    glucagon injection 1 mg (has no administration in time range)   cefTRIAXone (ROCEPHIN) 1 g vial to attach to  mL bag for ADULTS or NS 50 mL bag for PEDS (has no administration in time range)   predniSONE (DELTASONE) tablet 60 mg (60 mg Oral $Given 7/28/25 0550)   cefTRIAXone (ROCEPHIN) 2 g vial to attach to  ml bag for ADULTS or NS 50 ml bag for PEDS (0 g Intravenous Stopped 7/28/25 0854)   doxycycline (VIBRAMYCIN) 100 mg vial to attach to  mL bag (0 mg Intravenous Stopped 7/28/25 0854)   ipratropium - albuterol 0.5 mg/2.5 mg (3mg)/3 mL (DUONEB) neb solution 3 mL (3 mLs Nebulization $Given 7/28/25 0652)   potassium chloride inés ER (KLOR-CON M20) CR tablet 40 mEq (40 mEq Oral $Given 7/28/25 0652)   nitroGLYcerin (NITROSTAT) sublingual tablet 0.4 mg (0.4 mg Sublingual $Given 7/28/25 0922)   LORazepam (ATIVAN) tablet 0.5 mg (0.5 mg Oral Not Given 7/28/25 0936)   furosemide (LASIX) injection 20 mg (20 mg Intravenous $Given 7/28/25 1034)   technetium pertechnetate with albumin (Tc99m MAA) radioisotope injection 3 millicurie (6 millicuries Intravenous $Given 7/28/25 1144)       Procedures   Procedures     Discussion of Management   See below    ED Course   ED Course as of 07/28/25 1511   Mon Jul 28, 2025   0600 I obtained history and examined the patient as noted above     0647 I rechecked the patient and explained findings.     0915 I rechecked the patient and explained findings.  The patient's breathing is worse, no chest pain, and I did a bedside ultrasound.   0952 I rechecked the patient and explained findings.   0954 Patient reevaluated on BiPAP.  She is feeling improved.   1005 Spoke with Neil Bishop NP of the hospitalist team   1052 Spoke with Dr. Patel who has accepted patient for admission to ICU   1103 Patient re-evaluated   1235 Patient re-evaluated       Additional Documentation  None    Medical Decision Making / Diagnosis     MIPS   None               MDM   Maria Alejandra Buck is a 75 year  old female presenting to the ER for evaluation of shortness of breath.  VS on presentation reveal elevated BP, and oxygen saturations in the mid 90s on 3 L per nasal cannula.  Broad differential diagnosis considered including but not limited to, pneumonia, pneumothorax, PE, ACS, CHF/hypertensive emergency, anemia, reactive airway disease, recurrent malignancy, among others.  History, exam, and ED course as outlined above.  Ultimately, I do suspect patient's symptoms are likely secondary to hypertensive urgency with associated pulmonary edema.  Chest x-ray does reveal interstitial airspace opacities left greater than right.  Bedside ultrasound demonstrates B-lines bilaterally, suggestive of edema, and patient's BNP is markedly elevated (25K).  Patient was provided sublingual nitroglycerin and ultimately started on BiPAP, with progressive improvement in symptoms.  She was provided 1 dose of Lasix, 20 mg IV, and will need to monitor closely for response and renal function.  Other causes for shortness of breath were considered.  Given consideration of possible pneumonia on chest x-ray as well as elevated procalcitonin, she was started empirically on antibiotics.  Lactate however is normal.  Viral testing negative.  Given recent pelvic fracture and hospitalization, pulmonary embolism also considered.  D-dimer did return elevated.  Venous ultrasounds of the legs negative.  Given known CKD, VQ scan was obtained, which at this point is negative for signs of PE.  EKG demonstrates sinus rhythm with nonspecific ST-T wave changes, and QT prolongation.  No significant dynamic changes noted on repeat EKG.  Initial troponin did return elevated at 65, with repeat troponin downtrending.  I suspect this most likely represents type II demand ischemia in the context of hypertensive urgency.  Patient was placed on a nitroglycerin drip, with further improvements in blood pressure and symptoms.  Ultimately will plan hospital admission to  the ICU for respiratory support as well as close monitoring of respiratory status, and ongoing management of kidney dysfunction.    Critical Care  The critical condition was: Respiratory Failure (acute)/Respiratory Arrest    Critical interventions performed or strongly considered: Non-invasive Ventilation: BiPAP/CPAP    Critical care time was 65 minutes exclusive of time spent on separately billable procedures.      Disposition   The patient was admitted to the hospital.     Diagnosis     ICD-10-CM    1. Shortness of breath  R06.02       2. Acute on chronic renal insufficiency  N28.9     N18.9       3. Pneumonia due to infectious organism, unspecified laterality, unspecified part of lung  J18.9       4. NSTEMI (non-ST elevated myocardial infarction) (H)  I21.4       5. Acute respiratory failure with hypoxia (H)  J96.01          Scribe Disclosure:  Tayler SHAW, am serving as a scribe at 7:06 AM on 7/28/2025 to document services personally performed by Nhan Wilkerson MD based on my observations and the provider's statements to me.        Nhan Wilkerson MD  07/28/25 7307

## 2025-07-28 NOTE — ED NOTES
[facility]  ED Nurse Handoff Report    ED Chief complaint: Shortness of Breath  . ED Diagnosis:   Final diagnoses:   Shortness of breath   Acute on chronic renal insufficiency   Pneumonia due to infectious organism, unspecified laterality, unspecified part of lung   NSTEMI (non-ST elevated myocardial infarction) (H)       Allergies:   Allergies   Allergen Reactions    Iodine Hives       Code Status: Full Code    Activity level - Baseline/Home:  walker.  Activity Level - Current:   assist of 1.   Lift room needed: No.   Bariatric: No   Needed: No   Isolation: No.   Infection: Not Applicable.     Respiratory status: Room air    Vital Signs (within 30 minutes):   Vitals:    07/28/25 0526 07/28/25 0545 07/28/25 0600   BP: (!) 186/93 (!) 180/90 (!) 160/87   Pulse: 69  69   Resp: 20     Temp: 97.7  F (36.5  C)     TempSrc: Oral     SpO2: 98% 97% 92%       Cardiac Rhythm:  ,      Pain level:    Patient confused: No.   Patient Falls Risk: nonskid shoes/slippers when out of bed, patient and family education, and activity supervised.   Elimination Status: not yet voided     Patient Report - Initial Complaint: SOB.   Focused Assessment: Pt is a 75 year old female who presents to the emergency department for evaluation of shortness of breath. The patient reports endorsing worsening today, prompting her visit to the ED today. She states that she was laying in bed and thought the shortness of breath would be gone the following morning, but it did not. She took a chest xray but does not know if the cause of her shortness of breath is from her chest or throat. The patient endorses a sore throat, feeling like she is losing her voice. She denies a cough and chest pain. She denies gaining weight. She denies a history of blood clots in the lungs. She denies a history of heart disease and heart attack. She reports a history of a heart murmur and AFIB. She states that her AFIB come and goes and has the  ability to feel when she is in AFIB. Of note, she states that there has been COVID going around in her care facility lately.      Abnormal Results:   Labs Ordered and Resulted from Time of ED Arrival to Time of ED Departure   BASIC METABOLIC PANEL (LIMITED OCCURRENCES) - Abnormal       Result Value    Sodium 136      Potassium 3.2 (*)     Chloride 95 (*)     Carbon Dioxide (CO2) 23      Anion Gap 18 (*)     Urea Nitrogen 58.1 (*)     Creatinine 4.25 (*)     GFR Estimate 10 (*)     Calcium 8.0 (*)     Glucose 101 (*)    PROCALCITONIN - Abnormal    Procalcitonin 0.67 (*)    BLOOD GAS VENOUS - Abnormal    pH Venous 7.46 (*)     pCO2 Venous 39 (*)     pO2 Venous 50 (*)     Bicarbonate Venous 28      Base Excess/Deficit Venous 3.6 (*)     FIO2 3      Oxyhemoglobin Venous 85 (*)     O2 Sat, Venous 86.8 (*)    NT-PROBNP - Abnormal    NT-proBNP 25,554 (*)    TROPONIN T, HIGH SENSITIVITY - Abnormal    Troponin T, High Sensitivity 65 (*)    CBC WITH PLATELETS AND DIFFERENTIAL - Abnormal    WBC Count 7.7      RBC Count 2.66 (*)     Hemoglobin 9.1 (*)     Hematocrit 26.7 (*)           MCH 34.2 (*)     MCHC 34.1      RDW 15.6 (*)     Platelet Count 349      % Neutrophils 48      % Lymphocytes 31      % Monocytes 11      % Eosinophils 10      % Basophils 1      % Immature Granulocytes 0      NRBCs per 100 WBC 0      Absolute Neutrophils 3.7      Absolute Lymphocytes 2.4      Absolute Monocytes 0.9      Absolute Eosinophils 0.7      Absolute Basophils 0.1      Absolute Immature Granulocytes 0.0      Absolute NRBCs 0.0     D DIMER QUANTITATIVE - Abnormal    D-Dimer Quantitative 3.15 (*)    MAGNESIUM (LIMITED OCCURRENCES) - Normal    Magnesium 2.2     LACTIC ACID WHOLE BLOOD WITH 1X REPEAT IN 2 HR WHEN >2   INFLUENZA A/B, RSV AND SARS-COV2 PCR   TROPONIN T, HIGH SENSITIVITY        XR Chest 2 Views   Final Result   IMPRESSION: Bilateral perihilar interstitial/airspace opacities, left greater than right, are nonspecific for  postradiation change versus pneumonia. A small nodule in the peripheral right upper lobe measures 9 mm. Heart size and pulmonary vascularity are    normal. No pleural effusion or pneumothorax. Endoluminal stent graft seen in the abdominal aorta.      US Lower Extremity Venous Duplex Bilateral    (Results Pending)   Lung vent and perf (NM)    (Results Pending)       Treatments provided: see MAR, notes, and flowsheets  Family Comments: n/a  OBS brochure/video discussed/provided to patient:  N/A  ED Medications:   Medications   ipratropium - albuterol 0.5 mg/2.5 mg (3mg)/3 mL (DUONEB) neb solution 3 mL (3 mLs Nebulization $Given 7/28/25 0550)   cefTRIAXone (ROCEPHIN) 2 g vial to attach to  ml bag for ADULTS or NS 50 ml bag for PEDS (2 g Intravenous $New Bag 7/28/25 0652)   doxycycline (VIBRAMYCIN) 100 mg vial to attach to  mL bag (has no administration in time range)   predniSONE (DELTASONE) tablet 60 mg (60 mg Oral $Given 7/28/25 0550)   ipratropium - albuterol 0.5 mg/2.5 mg (3mg)/3 mL (DUONEB) neb solution 3 mL (3 mLs Nebulization $Given 7/28/25 0652)   potassium chloride inés ER (KLOR-CON M20) CR tablet 40 mEq (40 mEq Oral $Given 7/28/25 0652)       Drips infusing:  Yes  For the majority of the shift this patient was Green.   Interventions performed were n/a.    Sepsis treatment initiated: No    Cares/treatment/interventions/medications to be completed following ED care: follow orders    ED Nurse Name: Tiana Stoddard RN  7:04 AM

## 2025-07-28 NOTE — CONSULTS
[facility]    Nephrology Consultation     Date of Admission:  7/28/2025    Assessment & Plan     Maria Alejandra Buck is a 75 year old female who was admitted on 7/28/2025.     Assessment:    Hypertensive emergency   Acute hypoxic respiratory failure   Possible CHF exacerbation  Bipap dependent hypoxia, pulmonary edema on CXR. Started nitroglycerin gtt. Improvement in hypoxia. Can give dose of IV lasix today and reassess tomorrow. BNP 25K, concern for CHF.   - IV lasix 40 mg one time   - continue PTA antihypertensives   - agree with use of nitroglycerin    ROMI on CKD IV   Previously  baseline 1.3-1.5, worsening in May/June 2025, new baseline appears to be ~3 with nephrotic range proteinuria. 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 This is most commonly seen in DM2, however can also be seen with smoking and HTN. Cr on admission 4.25, worse than recent baseline. In setting of hypertensive emergency, pulmonary edema, LE edema.     Metabolic acidosis   Continue PTA sodium bicarb.     Discussed with Dr. Patel. Reviewed notes from Dr. Patel, outpatient nephrology in [place], Dr. Wilkerson (ED).     Vee Encinas MD   [facility] - Nephrology  [phone]  --------------------------------------------------------------------------------------------  Reason for Consult     I was asked to see the patient for ROMI on CKD.    Primary Care Physician     Mckenna Wilkinson    Chief Complaint     SOB    History is obtained from the patient and chart review.      History of Present Illness     Maria Alejandra Buck is a 75 year old female who presents with progressive SOB. She had recent hospitalization for pelvic fracture, has been at TCU. Notes having SOB/congestion starting Thursday last week. Was worried she had covid because of others at TCU with it. SOB became progressively more severe. She denies cough, fevers, chills. Does have fatigue and poor PO intake lately from her symptoms.     Presented  on 3L NC satting mid 90s. Required bipap and became much more comfortable thereafter. BP notably very elevated, started on nitroglycerin gtt. Given one dose of IV lasix. CXR with pulmonary edema. Labs notable for Cr 4.25, BNP 25K. She had LE US without DVT, NM scan without PE.     When I saw her she remained on bipap. Denied SOB but was irritated with the bipap. Her hsuband was updated at bedside.     Past Medical History   I have reviewed this patient's medical history and updated it with pertinent information if needed.   Past Medical History:   Diagnosis Date    Aneurysm of renal artery     left    Atrial fibrillation (H)     COPD (chronic obstructive pulmonary disease) (H)     High cholesterol     Hypertension     Mixed hyperlipidemia     PVD (peripheral vascular disease)     Stenosis of left carotid artery     Tobacco use disorder        Past Surgical History   I have reviewed this patient's surgical history and updated it with pertinent information if needed.  Past Surgical History:   Procedure Laterality Date    ABDOMEN SURGERY      aneurism      left femoral, mesh    ENDARTERECTOMY CAROTID Left 7/17/2018    Procedure: ENDARTERECTOMY CAROTID;  LEFT CAROTID ENDARTERECTOMY WITH EEG;  Surgeon: Jorge Posada MD;  Location:  OR    GENITOURINARY SURGERY      stent rt kidney    IR RENAL BIOPSY LEFT  6/16/2025       Prior to Admission Medications   Prior to Admission Medications   Prescriptions Last Dose Informant Patient Reported? Taking?   HYDROmorphone (DILAUDID) 2 MG tablet 7/27/2025 Morning  No Yes   Sig: Take 1 tablet (2 mg) by mouth daily (with breakfast). May also take 0.5-1 tablets (1-2 mg) 4 times daily as needed (pain 4-6/10 = 1 mg and pain 7-10/10 = 2 mg).   Lidocaine (LIDOCARE) 4 % Patch 7/27/2025 Evening  No Yes   Sig: Place 2 patches over 12 hours onto the skin every 24 hours. To prevent lidocaine toxicity, patient should be patch free for 12 hrs daily.  Apply to affected area.   Multiple  Vitamins-Minerals (MULTIVITAMIN WOMEN PO) 7/27/2025 Morning  Yes Yes   Sig: Take 1 tablet by mouth daily.   NIFEdipine ER OSMOTIC (ADALAT CC) 90 MG 24 hr tablet 7/27/2025 Morning  No Yes   Sig: Take 1 tablet (90 mg) by mouth daily.   PARoxetine (PAXIL) 40 MG tablet 7/27/2025 Morning Self Yes Yes   Sig: Take 40 mg by mouth daily.   albuterol (PROVENTIL) (2.5 MG/3ML) 0.083% neb solution   Yes Yes   Sig: Take 2.5 mg by nebulization every 6 hours as needed for shortness of breath, wheezing or cough.   carvedilol (COREG) 25 MG tablet 7/27/2025 Evening  Yes Yes   Sig: Take 12.5 mg by mouth 2 times daily (with meals).   cloNIDine (CATAPRES-TTS3) 0.3 MG/24HR WK patch 7/24/2025 Morning  No Yes   Sig: Place 1 patch over 168 hours onto the skin once a week.   clopidogrel (PLAVIX) 75 MG tablet 7/27/2025 Morning  Yes Yes   Sig: Take 75 mg by mouth daily.   famotidine (PEPCID) 40 MG tablet 7/27/2025 Morning  Yes Yes   Sig: Take 40 mg by mouth daily.   hydrALAZINE (APRESOLINE) 50 MG tablet 7/27/2025 Evening  No Yes   Sig: Take 1 tablet (50 mg) by mouth 3 times daily.   icosapent ethyl (VASCEPA) 1 g CAPS capsule 7/27/2025 Evening  Yes Yes   Sig: Take 2 g by mouth 2 times daily (with meals).   isosorbide dinitrate (ISORDIL) 20 MG tablet 7/27/2025 Evening  Yes Yes   Sig: Take 20 mg by mouth 3 times daily.   meclizine (ANTIVERT) 25 MG tablet 7/27/2025 Evening  No Yes   Sig: Take 1 tablet (25 mg) by mouth every 6 hours.   methocarbamol (ROBAXIN) 500 MG tablet   No Yes   Sig: Take 1 tablet (500 mg) by mouth 3 times daily as needed for muscle spasms.   ondansetron (ZOFRAN ODT) 4 MG ODT tab   No Yes   Sig: Take 1 tablet (4 mg) by mouth every 6 hours as needed.   senna-docusate (SENOKOT-S/PERICOLACE) 8.6-50 MG tablet   No Yes   Sig: Take 1 tablet by mouth 2 times daily as needed for constipation.   sodium bicarbonate 650 MG tablet 7/27/2025 Evening  No Yes   Sig: Take 2 tablets (1,300 mg) by mouth 2 times daily.      Facility-Administered  "Medications: None     Allergies   Allergies   Allergen Reactions    Iodine Hives       Social History   I have reviewed this patient's social history and updated it with pertinent information if needed. Maria Alejandra Buck  reports that she has been smoking. She has a 22.5 pack-year smoking history. She has never used smokeless tobacco. She reports current alcohol use. She reports that she does not use drugs.    Family History   I have reviewed this patient's family history and updated it with pertinent information if needed.   Family History   Problem Relation Age of Onset    Alzheimer Disease Mother     Cerebrovascular Disease Father     Dementia Maternal Grandmother     Diabetes Brother     Alzheimer Disease Brother     Parkinsonism Brother     Cerebrovascular Disease Brother     Breast Cancer Sister     Heart Disease Sister        Review of Systems   The 10 point Review of Systems is negative other than noted in the HPI.     Physical Exam   Temp: 97.7  F (36.5  C) Temp src: Temporal BP: (!) 158/86 Pulse: 60   Resp: 15 SpO2: 97 % O2 Device: BiPAP/CPAP Oxygen Delivery: 5 LPM  Vital Signs with Ranges  Temp:  [97.7  F (36.5  C)] 97.7  F (36.5  C)  Pulse:  [59-78] 60  Resp:  [10-23] 15  BP: (150-186)/() 158/86  FiO2 (%):  [25 %-50 %] 25 %  SpO2:  [90 %-99 %] 97 %  120 lbs 12.99 oz    GENERAL: frail appearing  HEENT:  Normocephalic. Dry MM   CV: RRR, systolic murmur  RESP: coarse bilaterally   MUSCULOSKELETAL: 2-3+ ankle LE edema  NEURO:  Alert, oriented, normal speech  PSYCH: mood good, affect appropriate      Data   BMP  Recent Labs   Lab 07/28/25  1338 07/28/25  0537   NA  --  136   POTASSIUM  --  3.2*   CHLORIDE  --  95*   BRUCE  --  8.0*   CO2  --  23   BUN  --  58.1*   CR  --  4.25*   * 101*     Phos@[id number](phos:4)  CBC)  Recent Labs   Lab 07/28/25  0537   WBC 7.7   HGB 9.1*   HCT 26.7*           No lab results found in last 7 days.    Invalid input(s): \"BILIRUBININDIRECT\"  No lab results " "found in last 7 days.  No results found for: \"D2VIT\", \"D3VIT\", \"DTOT\"  Recent Labs   Lab 07/28/25  0537   HGB 9.1*   HCT 26.7*        No results for input(s): \"PTHI\" in the last 168 hours.  Color Urine (no units)   Date Value   09/09/2016 Light Yellow     Appearance Urine (no units)   Date Value   09/09/2016 Clear     Glucose Urine (mg/dL)   Date Value   09/09/2016 Negative     Bilirubin Urine (no units)   Date Value   09/09/2016 Negative     Ketones Urine (mg/dL)   Date Value   09/09/2016 Negative     Specific Gravity Urine (no units)   Date Value   09/09/2016 1.010     pH Urine (pH)   Date Value   09/09/2016 5.5     Protein Albumin Urine (mg/dL)   Date Value   09/09/2016 Negative     Nitrite Urine (no units)   Date Value   09/09/2016 Negative     Leukocyte Esterase Urine (no units)   Date Value   09/09/2016 Negative     CXR                                                                    IMPRESSION: Bilateral perihilar interstitial/airspace opacities, left greater than right, are nonspecific for postradiation change versus pneumonia. A small nodule in the peripheral right upper lobe measures 9 mm. Heart size and pulmonary vascularity are   normal. No pleural effusion or pneumothorax. Endoluminal stent graft seen in the abdominal aorta.      Vee Encinas MD   [facility] - Nephrology  [phone]   "

## 2025-07-28 NOTE — H&P
[facility]    History and Physical - Hospitalist Service       Date of Admission:  7/28/2025    Assessment & Plan      Maria Alejandra Buck is a 75 year old female admitted on 7/28/2025. She has a history of CAD, CKD stage IV, hypertension, hyperlipidemia, intra-abdominal aortic aneurysm with prior stenting, chronic vertigo, recent hospitalization for fall with pelvic fracture who presents the ED for shortness of breath.  Found to be in hypertensive emergency, started on BiPAP with improvement in respiratory effort, being admitted to ICU for further monitoring and treatment.    Acute hypoxic respiratory failure  Hypertensive emergency  Hypertensive urgency noted during hospitalization earlier this month.  Presents to the ED with several days of worsening shortness of breath, NT proBNP elevated >25,000.  Elevated with SBP in the 180s.  Started on nitro drip, placed on BiPAP.  Was having some shortness of breath and tachypnea on presentation, looks more comfortable and tolerating the BiPAP well.  D-dimer was checked in the ED, which was elevated although difficult to interpret in setting of CKD.  Did have a recent pubic body fracture after a fall so is at higher risk of PE.  Given kidney disease, will have VQ scan.  Given evidence of hypertensive emergency will hold off on anticoagulation for now, follow-up results of V/Q study.  Will try to lower blood pressure with nitro drip, BiPAP for work of breathing, will avoid diuretics acutely given significant kidney disease but will consult nephrology for assistance as well.  - Continuous BiPAP, work of breathing improved after starting this  - Nitroglycerin drip for hypertensive management, goal -180  - PTA hypertension meds include carvedilol, clonidine patch, hydralazine, isosorbide dinitrate, nifedipine.  Will restart once medications verified by pharmacy and able to take p.o.  - Follow-up VQ scan results, no empiric anticoagulation for  now    Acute kidney injury on CKD stage IV  Baseline creatinine had previously been around 1.5, however most recent values show baseline appears to be around 3.1.  Creatinine again acutely elevated at 4.25, likely due to hypertensive emergency as above.  Patient appears very comfortable on BiPAP, on nitro drip to manage blood pressure.  Will hold off on any diuretics despite elevated NT proBNP given his kidney injury, will consult nephrology for assistance as well given significant kidney disease.  - No diuretics for now  - Consult nephrology, appreciate input    Recent hospitalization for fall  Ongoing therapy at TCU  Right pubic body fracture  During hospitalization earlier this month, CT imaging showed right pubic body fracture that extends into the junction of the right superior and inferior pubic rami sustained during a fall at home.  Patient currently is at a TCU receiving therapy for management of this.  Will consult PT during this admission.  - Consult PT once respiratory status improved     Chronic vertigo  Following with ENT in [place]. MRI brain on 5/22 negative for acute pathology.  -PRN meclizine when able to take p.o.     AAA  Endoleak and dilation with aorta proximal to endograft.  S/p recent endovascular aneurysm repair on 4/25.  - Continue PTA Plavix and statin when able to take p.o.    HFpEF  Per most recent discharge summary from [place], Nephrology had recommended isordil dinitrate, hydralazine, nifedipine, and carvedilol. Most recent renal doppler on 5/21 did not show evidence of renal artery stenosis. Recent echocardiogram while in [place] on 5/22 showed EF of 65% with grade 1 diastolic dysfunction.  Will wait for pharmacy confirmation of current medication doses.  Nephrology consulted as above, appreciate assistance.  Will get an updated echocardiogram with significantly elevated NT proBNP, although this is likely from hypertensive emergency.  - Follow-up TTE results, ordering TTE for 7/29 to  allow for improvement in volume status and acute illness before assessing cardiac function     H/o CVA, chronic infarcts in right basal ganglia and right thalamus   Previously known to have chronic infarct based on MRI of brain in 05/2025. Chronic infarct in right basal ganglia again noted on CT history.  No change in mental status this admission, no head imaging indicated at this time.     Right lung adenocarcinoma  S/p SBRT in 2021, continue outpatient follow-up once back in [place].     Cognitive impairment  During recent hospitalization in June 2025 in [place] there was concern for patient having poor short-term memory as well as daughter noted concern that patient and her  are not safe to live at home.  Previously has spent time with daughter in [place].  Currently residing at [facility] for therapy following pelvis fracture after fall earlier this month.  Appreciate social work assistance for discharge planning.          Diet: NPO for Medical/Clinical Reasons Except for: Ice Chips, Meds  DVT Prophylaxis: Pneumatic Compression Devices  Rao Catheter: Not present  Lines: None     Cardiac Monitoring: ACTIVE order. Indication: ICU  Code Status: Full Code    Clinically Significant Risk Factors Present on Admission        # Hypokalemia: Lowest K = 3.2 mmol/L in last 2 days, will replace as needed   # Hypochloremia: Lowest Cl = 95 mmol/L in last 2 days, will monitor as appropriate        # Drug Induced Platelet Defect: home medication list includes an antiplatelet medication    # Acute Kidney Injury, unspecified: based on a >150% or 0.3 mg/dL increase in last creatinine compared to past 90 day average, will monitor renal function  # Hypertension: Noted on problem list   # Non-Invasive mechanical ventilation: current O2 Device: BiPAP/CPAP   # Anemia: based on hgb <11                  Disposition Plan     Medically Ready for Discharge: Anticipated in 2-4 Days           Virgilio Patel MD  Hospitalist Service  [facility]  [facility]  Securely message with [email or web address] (more info)  Text page via [facility] Paging/Directory     ______________________________________________________________________    Chief Complaint   Shortness of breath    History is obtained from the patient    History of Present Illness   Maria Alejandra Buck is a 75 year old female who has a history of CAD, CKD stage IV, hypertension, hyperlipidemia, intra-abdominal aortic aneurysm with prior stenting, chronic vertigo, recent hospitalization for fall with pelvic fracture who presents the ED for shortness of breath.  Recently admitted earlier this month after a fall, found to have a pelvic fracture.  At the time also had hypertensive urgency.  Ultimately discharged to TCU for ongoing therapy.  Since discharge has been having increased shortness of breath, eventually getting bad enough that she felt she needed to come to the ED. Denies any recent fevers, chills, night sweats. Does endorse a sore throat.  Does report that there have been several cases of COVID going around her TCU currently.    In the ED initially on 3 L of oxygen to maintain O2 sats.  O2 needs increased, eventually started on BiPAP for increased work of breathing and now looks much more comfortable.  Other vitals notable for hypertension with SBP in the 180s, DBP >90.  Labs notable for creatinine elevated to 4.25, potassium of 3.2, NT proBNP 25,000, mid range elevation of procalcitonin at 0.67, mildly elevated troponin, and elevated D-dimer.  VQ scan ordered due to kidney disease.  No leukocytosis.  Started on a nitro drip, being admitted to the ICU.    Past Medical History    Past Medical History:   Diagnosis Date    Aneurysm of renal artery     left    Atrial fibrillation (H)     COPD (chronic obstructive pulmonary disease) (H)     High cholesterol     Hypertension     Mixed hyperlipidemia     PVD (peripheral vascular disease)     Stenosis of left carotid artery     Tobacco use disorder        Past  Surgical History   Past Surgical History:   Procedure Laterality Date    ABDOMEN SURGERY      aneurism      left femoral, mesh    ENDARTERECTOMY CAROTID Left 7/17/2018    Procedure: ENDARTERECTOMY CAROTID;  LEFT CAROTID ENDARTERECTOMY WITH EEG;  Surgeon: Jorge Posada MD;  Location: [facility] OR    GENITOURINARY SURGERY      stent rt kidney    IR RENAL BIOPSY LEFT  6/16/2025       Prior to Admission Medications   Prior to Admission Medications   Prescriptions Last Dose Informant Patient Reported? Taking?   HYDROmorphone (DILAUDID) 2 MG tablet 7/27/2025 Morning  No Yes   Sig: Take 1 tablet (2 mg) by mouth daily (with breakfast). May also take 0.5-1 tablets (1-2 mg) 4 times daily as needed (pain 4-6/10 = 1 mg and pain 7-10/10 = 2 mg).   Lidocaine (LIDOCARE) 4 % Patch 7/27/2025 Evening  No Yes   Sig: Place 2 patches over 12 hours onto the skin every 24 hours. To prevent lidocaine toxicity, patient should be patch free for 12 hrs daily.  Apply to affected area.   Multiple Vitamins-Minerals (MULTIVITAMIN WOMEN PO) 7/27/2025 Morning  Yes Yes   Sig: Take 1 tablet by mouth daily.   NIFEdipine ER OSMOTIC (ADALAT CC) 90 MG 24 hr tablet 7/27/2025 Morning  No Yes   Sig: Take 1 tablet (90 mg) by mouth daily.   PARoxetine (PAXIL) 40 MG tablet 7/27/2025 Morning Self Yes Yes   Sig: Take 40 mg by mouth daily.   albuterol (PROVENTIL) (2.5 MG/3ML) 0.083% neb solution   Yes Yes   Sig: Take 2.5 mg by nebulization every 6 hours as needed for shortness of breath, wheezing or cough.   carvedilol (COREG) 25 MG tablet 7/27/2025 Evening  Yes Yes   Sig: Take 12.5 mg by mouth 2 times daily (with meals).   cloNIDine (CATAPRES-TTS3) 0.3 MG/24HR WK patch 7/24/2025 Morning  No Yes   Sig: Place 1 patch over 168 hours onto the skin once a week.   clopidogrel (PLAVIX) 75 MG tablet 7/27/2025 Morning  Yes Yes   Sig: Take 75 mg by mouth daily.   famotidine (PEPCID) 40 MG tablet 7/27/2025 Morning  Yes Yes   Sig: Take 40 mg by mouth daily.   hydrALAZINE  (APRESOLINE) 50 MG tablet 7/27/2025 Evening  No Yes   Sig: Take 1 tablet (50 mg) by mouth 3 times daily.   icosapent ethyl (VASCEPA) 1 g CAPS capsule 7/27/2025 Evening  Yes Yes   Sig: Take 2 g by mouth 2 times daily (with meals).   isosorbide dinitrate (ISORDIL) 20 MG tablet 7/27/2025 Evening  Yes Yes   Sig: Take 20 mg by mouth 3 times daily.   meclizine (ANTIVERT) 25 MG tablet 7/27/2025 Evening  No Yes   Sig: Take 1 tablet (25 mg) by mouth every 6 hours.   methocarbamol (ROBAXIN) 500 MG tablet   No Yes   Sig: Take 1 tablet (500 mg) by mouth 3 times daily as needed for muscle spasms.   ondansetron (ZOFRAN ODT) 4 MG ODT tab   No Yes   Sig: Take 1 tablet (4 mg) by mouth every 6 hours as needed.   senna-docusate (SENOKOT-S/PERICOLACE) 8.6-50 MG tablet   No Yes   Sig: Take 1 tablet by mouth 2 times daily as needed for constipation.   sodium bicarbonate 650 MG tablet 7/27/2025 Evening  No Yes   Sig: Take 2 tablets (1,300 mg) by mouth 2 times daily.      Facility-Administered Medications: None        Review of Systems    The 10 point Review of Systems is negative other than noted in the HPI or here.     Social History   I have reviewed this patient's social history and updated it with pertinent information if needed.  Social History     Tobacco Use    Smoking status: Every Day     Current packs/day: 0.50     Average packs/day: 0.5 packs/day for 45.0 years (22.5 ttl pk-yrs)     Types: Cigarettes    Smokeless tobacco: Never   Substance Use Topics    Alcohol use: Yes     Comment: social    Drug use: No        Physical Exam   Vital Signs: Temp: 97.7  F (36.5  C) Temp src: Temporal BP: (!) 158/86 Pulse: 60   Resp: 15 SpO2: 97 % O2 Device: BiPAP/CPAP Oxygen Delivery: 5 LPM  Weight: 120 lbs 12.99 oz    Constitutional: Lying in bed, appears fatigued but no acute distress  Respiratory: BiPAP in place, normal work of breathing, good aeration bilaterally  Cardiovascular: RRR, no MRG's  GI: Abdomen is soft, nontender,  nondistended  Neurologic: Alert and oriented x 3, fatigued but awakens easily and answers questions    Medical Decision Making       75 MINUTES SPENT BY ME on the date of service doing chart review, history, exam, documentation & further activities per the note.      Data     I have personally reviewed the following data over the past 24 hrs:    7.7  \   9.1 (L)   / 349     136 95 (L) 58.1 (H) /  139 (H)   3.2 (L) 23 4.25 (H) \     Trop: 52 (H) BNP: 25,554 (H)     Procal: 0.67 (H) CRP: N/A Lactic Acid: 1.0       INR:  N/A PTT:  N/A   D-dimer:  3.15 (H) Fibrinogen:  N/A       Imaging results reviewed over the past 24 hrs:   Recent Results (from the past 24 hours)   XR Chest 2 Views    Narrative    EXAM: XR CHEST 2 VIEWS  LOCATION: [facility]  DATE: 7/28/2025    INDICATION: Shortness of breath. Known lung cancer.  COMPARISON: 10/01/2013      Impression    IMPRESSION: Bilateral perihilar interstitial/airspace opacities, left greater than right, are nonspecific for postradiation change versus pneumonia. A small nodule in the peripheral right upper lobe measures 9 mm. Heart size and pulmonary vascularity are   normal. No pleural effusion or pneumothorax. Endoluminal stent graft seen in the abdominal aorta.   US Lower Extremity Venous Duplex Bilateral    Narrative    EXAM: US LOWER EXTREMITY VENOUS DUPLEX BILATERAL  LOCATION: [facility]  DATE: 7/28/2025    INDICATION: sob, elevated d dimer, CKD, ?DVT PE  COMPARISON: None.  TECHNIQUE: Venous Duplex ultrasound of bilateral lower extremities with and without compression, augmentation and duplex. Color flow and spectral Doppler with waveform analysis performed.    FINDINGS: Exam includes the common femoral, femoral, popliteal veins as well as segmentally visualized deep calf veins and greater saphenous vein.     RIGHT: No deep vein thrombosis. No superficial thrombophlebitis. Right popliteal cyst measuring 3.1 x 1.9 x 0.8  cm.    LEFT: No deep vein thrombosis. No superficial thrombophlebitis. No popliteal cyst.      Impression    IMPRESSION:  1.  No deep venous thrombosis in the bilateral lower extremities.   POC US ECHO LIMITED    Impression      Cardiac Ultrasound    Procedure Name: POC Ultrasound Cardiac Exam    Indication: Shortness of breath    Views: Parasternal long axis view  and pulmonary windows    Findings: B-lines in bilateral pulmonary windows, no pericardial effusion, LV>RV    Impression: Suggestion of pulmonary edema, no RV strain    Study performed by: Nhan Wilkerson MD     Images archived: Yes    NM Lung Scan Perfusion Particulate    Narrative    EXAM: NM LUNG SCAN PERFUSION PARTICULATE  LOCATION: [facility]  DATE: 7/28/2025    INDICATION: Shortness of breath  COMPARISON: Chest x-ray dated 7/28/2025  TECHNIQUE: 6.0 mCi technetium-99m MAA, IV. Standard lung perfusion imaging.    FINDINGS: Normal pulmonary perfusion without segmental defect.      Impression    IMPRESSION:     No evidence of pulmonary embolism.

## 2025-07-29 ENCOUNTER — APPOINTMENT (OUTPATIENT)
Dept: GENERAL RADIOLOGY | Facility: CLINIC | Age: 75
DRG: 291 | End: 2025-07-29
Attending: INTERNAL MEDICINE
Payer: MEDICARE

## 2025-07-29 LAB
ALLEN'S TEST: YES
ANION GAP SERPL CALCULATED.3IONS-SCNC: 17 MMOL/L (ref 7–15)
BASE EXCESS BLDA CALC-SCNC: 1.4 MMOL/L (ref -3–3)
BUN SERPL-MCNC: 59 MG/DL (ref 8–23)
CALCIUM SERPL-MCNC: 8.2 MG/DL (ref 8.8–10.4)
CHLORIDE SERPL-SCNC: 98 MMOL/L (ref 98–107)
CREAT SERPL-MCNC: 3.98 MG/DL (ref 0.51–0.95)
EGFRCR SERPLBLD CKD-EPI 2021: 11 ML/MIN/1.73M2
ERYTHROCYTE [DISTWIDTH] IN BLOOD BY AUTOMATED COUNT: 15.7 % (ref 10–15)
GLUCOSE BLDC GLUCOMTR-MCNC: 100 MG/DL (ref 70–99)
GLUCOSE BLDC GLUCOMTR-MCNC: 103 MG/DL (ref 70–99)
GLUCOSE BLDC GLUCOMTR-MCNC: 112 MG/DL (ref 70–99)
GLUCOSE BLDC GLUCOMTR-MCNC: 116 MG/DL (ref 70–99)
GLUCOSE BLDC GLUCOMTR-MCNC: 120 MG/DL (ref 70–99)
GLUCOSE BLDC GLUCOMTR-MCNC: 139 MG/DL (ref 70–99)
GLUCOSE SERPL-MCNC: 101 MG/DL (ref 70–99)
HCO3 BLD-SCNC: 26 MMOL/L (ref 21–28)
HCO3 SERPL-SCNC: 22 MMOL/L (ref 22–29)
HCT VFR BLD AUTO: 26.6 % (ref 35–47)
HGB BLD-MCNC: 9.1 G/DL (ref 11.7–15.7)
LVEF ECHO: NORMAL
MCH RBC QN AUTO: 34 PG (ref 26.5–33)
MCHC RBC AUTO-ENTMCNC: 34.2 G/DL (ref 31.5–36.5)
MCV RBC AUTO: 99 FL (ref 78–100)
O2/TOTAL GAS SETTING VFR VENT: 50 %
OXYHGB MFR BLDA: 97 % (ref 92–100)
PCO2 BLD: 42 MM HG (ref 35–45)
PH BLD: 7.41 [PH] (ref 7.35–7.45)
PLATELET # BLD AUTO: 385 10E3/UL (ref 150–450)
PO2 BLD: 110 MM HG (ref 80–105)
POTASSIUM SERPL-SCNC: 3.4 MMOL/L (ref 3.4–5.3)
PROCALCITONIN SERPL IA-MCNC: 0.49 NG/ML
RBC # BLD AUTO: 2.68 10E6/UL (ref 3.8–5.2)
SAO2 % BLDA: 98.7 % (ref 95–96)
SODIUM SERPL-SCNC: 137 MMOL/L (ref 135–145)
WBC # BLD AUTO: 7.6 10E3/UL (ref 4–11)

## 2025-07-29 PROCEDURE — 94660 CPAP INITIATION&MGMT: CPT

## 2025-07-29 PROCEDURE — 94640 AIRWAY INHALATION TREATMENT: CPT

## 2025-07-29 PROCEDURE — 250N000009 HC RX 250: Performed by: STUDENT IN AN ORGANIZED HEALTH CARE EDUCATION/TRAINING PROGRAM

## 2025-07-29 PROCEDURE — 36415 COLL VENOUS BLD VENIPUNCTURE: CPT | Performed by: STUDENT IN AN ORGANIZED HEALTH CARE EDUCATION/TRAINING PROGRAM

## 2025-07-29 PROCEDURE — 82310 ASSAY OF CALCIUM: CPT | Performed by: STUDENT IN AN ORGANIZED HEALTH CARE EDUCATION/TRAINING PROGRAM

## 2025-07-29 PROCEDURE — 85014 HEMATOCRIT: CPT | Performed by: STUDENT IN AN ORGANIZED HEALTH CARE EDUCATION/TRAINING PROGRAM

## 2025-07-29 PROCEDURE — 99233 SBSQ HOSP IP/OBS HIGH 50: CPT | Performed by: STUDENT IN AN ORGANIZED HEALTH CARE EDUCATION/TRAINING PROGRAM

## 2025-07-29 PROCEDURE — 82805 BLOOD GASES W/O2 SATURATION: CPT | Performed by: INTERNAL MEDICINE

## 2025-07-29 PROCEDURE — 82374 ASSAY BLOOD CARBON DIOXIDE: CPT | Performed by: STUDENT IN AN ORGANIZED HEALTH CARE EDUCATION/TRAINING PROGRAM

## 2025-07-29 PROCEDURE — 250N000013 HC RX MED GY IP 250 OP 250 PS 637: Performed by: INTERNAL MEDICINE

## 2025-07-29 PROCEDURE — 84145 PROCALCITONIN (PCT): CPT | Performed by: STUDENT IN AN ORGANIZED HEALTH CARE EDUCATION/TRAINING PROGRAM

## 2025-07-29 PROCEDURE — 36600 WITHDRAWAL OF ARTERIAL BLOOD: CPT

## 2025-07-29 PROCEDURE — 250N000013 HC RX MED GY IP 250 OP 250 PS 637: Performed by: STUDENT IN AN ORGANIZED HEALTH CARE EDUCATION/TRAINING PROGRAM

## 2025-07-29 PROCEDURE — 250N000011 HC RX IP 250 OP 636: Performed by: INTERNAL MEDICINE

## 2025-07-29 PROCEDURE — 71045 X-RAY EXAM CHEST 1 VIEW: CPT

## 2025-07-29 PROCEDURE — 120N000013 HC R&B IMCU

## 2025-07-29 PROCEDURE — 250N000011 HC RX IP 250 OP 636: Performed by: STUDENT IN AN ORGANIZED HEALTH CARE EDUCATION/TRAINING PROGRAM

## 2025-07-29 PROCEDURE — 999N000157 HC STATISTIC RCP TIME EA 10 MIN

## 2025-07-29 PROCEDURE — 255N000002 HC RX 255 OP 636: Performed by: STUDENT IN AN ORGANIZED HEALTH CARE EDUCATION/TRAINING PROGRAM

## 2025-07-29 PROCEDURE — 5A09557 ASSISTANCE WITH RESPIRATORY VENTILATION, GREATER THAN 96 CONSECUTIVE HOURS, CONTINUOUS POSITIVE AIRWAY PRESSURE: ICD-10-PCS | Performed by: STUDENT IN AN ORGANIZED HEALTH CARE EDUCATION/TRAINING PROGRAM

## 2025-07-29 PROCEDURE — 99232 SBSQ HOSP IP/OBS MODERATE 35: CPT | Performed by: STUDENT IN AN ORGANIZED HEALTH CARE EDUCATION/TRAINING PROGRAM

## 2025-07-29 RX ORDER — FAMOTIDINE 20 MG/1
20 TABLET, FILM COATED ORAL
Status: DISPENSED | OUTPATIENT
Start: 2025-07-31

## 2025-07-29 RX ORDER — CARBOXYMETHYLCELLULOSE SODIUM 5 MG/ML
1 SOLUTION/ DROPS OPHTHALMIC
Status: ACTIVE | OUTPATIENT
Start: 2025-07-29

## 2025-07-29 RX ORDER — LORAZEPAM 0.5 MG/1
0.5 TABLET ORAL ONCE
Status: COMPLETED | OUTPATIENT
Start: 2025-07-29 | End: 2025-07-29

## 2025-07-29 RX ORDER — FUROSEMIDE 10 MG/ML
40 INJECTION INTRAMUSCULAR; INTRAVENOUS ONCE
Status: COMPLETED | OUTPATIENT
Start: 2025-07-29 | End: 2025-07-29

## 2025-07-29 RX ORDER — HYDRALAZINE HYDROCHLORIDE 20 MG/ML
20 INJECTION INTRAMUSCULAR; INTRAVENOUS EVERY 4 HOURS
Status: DISCONTINUED | OUTPATIENT
Start: 2025-07-29 | End: 2025-07-30

## 2025-07-29 RX ORDER — FUROSEMIDE 10 MG/ML
40 INJECTION INTRAMUSCULAR; INTRAVENOUS EVERY 8 HOURS
Status: DISCONTINUED | OUTPATIENT
Start: 2025-07-29 | End: 2025-07-29

## 2025-07-29 RX ORDER — LIDOCAINE 40 MG/G
CREAM TOPICAL
Status: ACTIVE | OUTPATIENT
Start: 2025-07-29

## 2025-07-29 RX ADMIN — HYDRALAZINE HYDROCHLORIDE 50 MG: 50 TABLET ORAL at 09:31

## 2025-07-29 RX ADMIN — CLOPIDOGREL BISULFATE 75 MG: 75 TABLET, FILM COATED ORAL at 09:30

## 2025-07-29 RX ADMIN — SODIUM BICARBONATE 1300 MG: 650 TABLET ORAL at 20:14

## 2025-07-29 RX ADMIN — NIFEDIPINE 90 MG: 90 TABLET, EXTENDED RELEASE ORAL at 09:31

## 2025-07-29 RX ADMIN — ALBUTEROL SULFATE 2.5 MG: 2.5 SOLUTION RESPIRATORY (INHALATION) at 20:25

## 2025-07-29 RX ADMIN — HYDRALAZINE HYDROCHLORIDE 50 MG: 50 TABLET ORAL at 01:35

## 2025-07-29 RX ADMIN — FUROSEMIDE 40 MG: 10 INJECTION, SOLUTION INTRAMUSCULAR; INTRAVENOUS at 20:55

## 2025-07-29 RX ADMIN — ICOSAPENT ETHYL 2 G: 1 CAPSULE ORAL at 18:05

## 2025-07-29 RX ADMIN — HUMAN ALBUMIN MICROSPHERES AND PERFLUTREN 3 ML (DILUTED): 10; .22 INJECTION, SOLUTION INTRAVENOUS at 08:36

## 2025-07-29 RX ADMIN — ICOSAPENT ETHYL 2 G: 1 CAPSULE ORAL at 09:32

## 2025-07-29 RX ADMIN — HYDRALAZINE HYDROCHLORIDE 20 MG: 20 INJECTION INTRAMUSCULAR; INTRAVENOUS at 23:41

## 2025-07-29 RX ADMIN — SODIUM BICARBONATE 1300 MG: 650 TABLET ORAL at 09:30

## 2025-07-29 RX ADMIN — MIDAZOLAM 1.5 MG: 1 INJECTION INTRAMUSCULAR; INTRAVENOUS at 21:02

## 2025-07-29 RX ADMIN — PAROXETINE HYDROCHLORIDE 40 MG: 20 TABLET, FILM COATED ORAL at 09:31

## 2025-07-29 RX ADMIN — CARVEDILOL 12.5 MG: 12.5 TABLET, FILM COATED ORAL at 09:30

## 2025-07-29 RX ADMIN — MECLIZINE HYDROCHLORIDE 25 MG: 25 TABLET ORAL at 04:38

## 2025-07-29 RX ADMIN — FAMOTIDINE 40 MG: 20 TABLET, FILM COATED ORAL at 09:30

## 2025-07-29 RX ADMIN — HYDRALAZINE HYDROCHLORIDE 20 MG: 20 INJECTION INTRAMUSCULAR; INTRAVENOUS at 20:13

## 2025-07-29 RX ADMIN — MECLIZINE HYDROCHLORIDE 25 MG: 25 TABLET ORAL at 18:05

## 2025-07-29 RX ADMIN — CEFTRIAXONE 1 G: 1 INJECTION, POWDER, FOR SOLUTION INTRAMUSCULAR; INTRAVENOUS at 06:14

## 2025-07-29 RX ADMIN — CARVEDILOL 12.5 MG: 12.5 TABLET, FILM COATED ORAL at 18:04

## 2025-07-29 RX ADMIN — MECLIZINE HYDROCHLORIDE 25 MG: 25 TABLET ORAL at 12:16

## 2025-07-29 ASSESSMENT — ACTIVITIES OF DAILY LIVING (ADL)
ADLS_ACUITY_SCORE: 67
ADLS_ACUITY_SCORE: 64
ADLS_ACUITY_SCORE: 67
ADLS_ACUITY_SCORE: 64
ADLS_ACUITY_SCORE: 64
ADLS_ACUITY_SCORE: 67
ADLS_ACUITY_SCORE: 64
ADLS_ACUITY_SCORE: 67
ADLS_ACUITY_SCORE: 64
ADLS_ACUITY_SCORE: 67
ADLS_ACUITY_SCORE: 64
ADLS_ACUITY_SCORE: 67

## 2025-07-29 NOTE — PLAN OF CARE
"Goal Outcome Evaluation:      Plan of Care Reviewed With: patient, family    Overall Patient Progress: improvingOverall Patient Progress: improving    ICU End of Shift Summary.  For vital signs and complete assessments, please see documentation flowsheets.     Pertinent assessments: A&Ox4. Forgetful at times. Afebrile. Denies pain. VSS. Tele shows SR. 2L NC. Lungs with fine crackles in bases. LUIS. SOB during meals. Pulmonary toileting promoted. Purewick and brief in place. Up to commode with assist x2 and gait belt. PIVx2    Major Shift Events: -Transitioned to TMOF                                     -Weaning off O2 as able                                          Plan (Upcoming Events): Wean off O2 as able. Control BP with oral medication and transdermal patch.   Discharge/Transfer Needs: TCU for rehab     Bedside Shift Report Completed : yes  Bedside Safety Check Completed: yes        Problem: Adult Inpatient Plan of Care  Goal: Plan of Care Review  Description: The Plan of Care Review/Shift note should be completed every shift.  The Outcome Evaluation is a brief statement about your assessment that the patient is improving, declining, or no change.  This information will be displayed automatically on your shift  note.  Outcome: Progressing  Flowsheets (Taken 7/29/2025 1501)  Plan of Care Reviewed With:   patient   family  Overall Patient Progress: improving  Goal: Patient-Specific Goal (Individualized)  Description: You can add care plan individualizations to a care plan. Examples of Individualization might be:  \"Parent requests to be called daily at 9am for status\", \"I have a hard time hearing out of my right ear\", or \"Do not touch me to wake me up as it startles  me\".  Outcome: Progressing  Goal: Absence of Hospital-Acquired Illness or Injury  Outcome: Progressing  Intervention: Identify and Manage Fall Risk  Recent Flowsheet Documentation  Taken 7/29/2025 1215 by Stefanie Watts, RN  Safety Promotion/Fall " Prevention:   activity supervised   clutter free environment maintained   increased rounding and observation   increase visualization of patient   lighting adjusted   safety round/check completed   treat reversible contributory factors   treat underlying cause   room organization consistent   patient and family education   nonskid shoes/slippers when out of bed  Taken 7/29/2025 0845 by Stefanie Watts RN  Safety Promotion/Fall Prevention:   activity supervised   clutter free environment maintained   increased rounding and observation   increase visualization of patient   lighting adjusted   safety round/check completed   treat reversible contributory factors   treat underlying cause   room organization consistent   patient and family education   nonskid shoes/slippers when out of bed  Intervention: Prevent Skin Injury  Recent Flowsheet Documentation  Taken 7/29/2025 1415 by Stefanie Watts RN  Body Position:   position changed independently   right   side-lying  Taken 7/29/2025 1215 by Stefanie Watts RN  Body Position:   turned   log-rolled   weight shifting   supine, head elevated  Taken 7/29/2025 0845 by Stefanie Watts RN  Body Position:   turned   right   log-rolled   side-lying   weight shifting  Intervention: Prevent and Manage VTE (Venous Thromboembolism) Risk  Recent Flowsheet Documentation  Taken 7/29/2025 1215 by Stefanie Watts RN  VTE Prevention/Management: SCDs on (sequential compression devices)  Taken 7/29/2025 0845 by Stefanie Watts RN  VTE Prevention/Management: SCDs on (sequential compression devices)  Goal: Optimal Comfort and Wellbeing  Outcome: Progressing  Intervention: Provide Person-Centered Care  Recent Flowsheet Documentation  Taken 7/29/2025 1215 by Stefanie Watts RN  Trust Relationship/Rapport:   care explained   choices provided   emotional support provided   questions answered   reassurance provided   thoughts/feelings acknowledged   questions encouraged  Taken 7/29/2025 0845  by Stefanie Watts RN  Trust Relationship/Rapport:   care explained   choices provided   emotional support provided   questions answered   reassurance provided   thoughts/feelings acknowledged   questions encouraged  Goal: Readiness for Transition of Care  Outcome: Progressing

## 2025-07-29 NOTE — PROGRESS NOTES
[facility]    Medicine Progress Note - Hospitalist Service    Date of Admission:  7/28/2025    Assessment & Plan   75-year-old female with history of f CAD, CKD stage IV, hypertension, hyperlipidemia, intra-abdominal aortic aneurysm with prior stenting, chronic vertigo, recent hospitalization for fall with pelvic fracture who presents the ED for shortness of breath.      Had elevated D-dimer but VQ scan negative for PE, CT not done due to CKD.  proBNP more than 25,000.  Chest x-ray showed pulmonary edema.    Chest x-ray showed Bilateral perihilar interstitial/airspace opacities, left greater than right, are nonspecific for postradiation change versus pneumonia. A small nodule in the peripheral right upper lobe measures 9 mm.     Found to be in hypertensive emergency, admitted to ICU with BiPAP and nitroglycerin drip.    Acute hypoxic respiratory failure:  Diastolic CHF exacerbation:  Hypertensive emergency:  - Echo with EF of 55 to 60%.  Got Lasix for 40 mg IV x 1 and is now off nitroglycerin drip.  - Continue carvedilol 12.5 mg twice daily, hydralazine 50 mg 3 times daily, nifedipine 90 mg daily, Isordil 20 mg 3 times daily and clonidine patch 0.3 mg per 24 hours.  -Was empirically started on ceftriaxone on presentation.  Patient had no fever or leukocytosis.  Mildly elevated procalcitonin of 0.67 which is difficult to interpret in ROMI/CKD.  Chest x-ray showed perihilar interstitial/airspace opacities which is postradiation change versus pneumonia.  If repeat procalcitonin is negative, antibiotics can be discontinued.  - Transfer out of ICU.    ROMI on CKD stage IV:  Metabolic acidosis:  - Previous baseline of 1.3-1.5 which worsened in May/June 2025 and new baseline appears to be 3 with nephrotic range proteinuria.  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  - Creatinine on admission was 4.25, improved to 3.98 today.  Defer further  diuretic management to nephrology.    - Continue prior to admission dose of sodium bicarbonate.    Chronic medical conditions:  - AAA.  Endoleak status post recent endovascular aneurysm repair on 4/25.  Continue Plavix and statin.  - History of CVA.  Chronic infarct in the right basal ganglia noted on CT.  - Right lung adenocarcinoma.  Status post stereotactic body radiation therapy in 2021, follows up in [place].  - Chronic vertigo.  Follows up with ENT in [place] and on as needed meclizine.  MRI brain on 5/22 was negative for acute pathology.  - Depression pleasant lady.  Continue paroxetine.  - Hypertriglyceridemia.  On Vascepa.    Disposition  - Transfer to floor.            Diet: Low Saturated Fat Na <2400 mg    DVT Prophylaxis: Pneumatic Compression Devices  Rao Catheter: Not present  Lines: None     Cardiac Monitoring: ACTIVE order. Indication: ICU  Code Status: Full Code      Clinically Significant Risk Factors        # Hypokalemia: Lowest K = 3.2 mmol/L in last 2 days, will replace as needed   # Hypochloremia: Lowest Cl = 95 mmol/L in last 2 days, will monitor as appropriate         # Acute Kidney Injury, unspecified: based on a >150% or 0.3 mg/dL increase in last creatinine compared to past 90 day average, will monitor renal function  # Hypertension: Noted on problem list                       Social Drivers of Health    Depression: At risk (6/5/2025)    Received from [facility]    PHQ-2     Depression Risk: 3   Tobacco Use: High Risk (7/15/2025)    Patient History     Smoking Tobacco Use: Every Day     Smokeless Tobacco Use: Never   Interpersonal Safety: Unknown (7/28/2025)    Interpersonal Safety     Do you feel physically and emotionally safe where you currently live?: Patient unable to answer     Within the past 12 months, have you been hit, slapped, kicked or otherwise physically hurt by someone?: Patient unable to answer     Within the past 12 months, have you been humiliated or emotionally abused  in other ways by your partner or ex-partner?: Patient unable to answer          Disposition Plan     Medically Ready for Discharge: Anticipated Tomorrow             Jonathan Light MD  Hospitalist Service  [facility]  Securely message with [facility] (more info)  Text page via [employer] Paging/Directory   ______________________________________________________________________    Interval History   Shortness of breath has improved.  Off BiPAP and nitroglycerin drip.    Physical Exam   Vital Signs: Temp: 98.5  F (36.9  C) Temp src: Temporal BP: 135/68 Pulse: 78   Resp: 14 SpO2: 92 % O2 Device: Nasal cannula Oxygen Delivery: 2 LPM  Weight: 120 lbs 12.99 oz    General Appearance: Alert awake and oriented x 3  Respiratory: Basilar crackles.  Cardiovascular: S1-S2 normal  GI: Soft and nontender  Skin: No rash  Other: No edema      Medical Decision Making       60 MINUTES SPENT BY ME on the date of service doing chart review, history, exam, documentation & further activities per the note.      Data     I have personally reviewed the following data over the past 24 hrs:    7.6  \   9.1 (L)   / 385     137 98 59.0 (H) /  139 (H)   3.4 22 3.98 (H) \     Trop: 52 (H) BNP: N/A       Imaging results reviewed over the past 24 hrs:   Recent Results (from the past 24 hours)   Echocardiogram Complete   Result Value    LVEF  55-60%    Narrative    [id number]  [id number]  [id number]  [id number]^BEAU^     [facility]  Echocardiography Laboratory  [address] [place] [address]     Name: ADRIANA CARUSO  MRN: [id number]  : 1950  Study Date: 2025 07:49 AM  Age: 75 yrs  Gender: Female  Patient Location: [facility]  Reason For Study: Heart Failure  Ordering Physician: NORA BEACH  Performed By: Alcira Mitchell     BSA: 1.6 m2  Height: 64 in  Weight: 120 lb  HR: 79  BP: 148/81 mmHg  ______________________________________________________________________________  Procedure  Echocardiogram with  two-dimensional, color and spectral Doppler. Optison (NDC  #[id number]) given intravenously.  ______________________________________________________________________________  Interpretation Summary     The left ventricle visual ejection fraction is 55-60%.In some views distal  lateral and distal inferolateral wallhypokinesia  Diastolic Doppler findings (E/E' ratio and/or other parameters) suggest left  ventricular filling pressures are increased.  The right ventricular systolic function is normal.  The left atrium is severely dilated.  Restricted posterior mitral valve leaflet noted.There is moderate mitral  stenosis- Mean gradients 9 mm hg.There is moderate (2+) mitral regurgitation.  There is mild (1+) tricuspid regurgitation.  Pulmonary hypertension- RVSP 37 mm hg +RA.  IVC diameter <2.1 cm collapsing >50% with sniff suggests a normal RA pressure  of 3 mmHg.  ______________________________________________________________________________  Left Ventricle  The left ventricle is normal in size. There is normal left ventricular wall  thickness. The visual ejection fraction is 55-60%. Diastolic Doppler findings  (E/E' ratio and/or other parameters) suggest left ventricular filling  pressures are increased. In some views distal lateral and inferolateral  hypokinesia.     Right Ventricle  The right ventricle is normal size. The right ventricular systolic function is  normal.     Atria  The left atrium is severely dilated. Right atrial size is normal. There is no  color Doppler evidence of an atrial shunt.     Mitral Valve  Restricted posterior mitral valve leaflet noted. There is moderate (2+) mitral  regurgitation. There is moderate mitral stenosis. The mean mitral valve  gradient is 9.2 mmHg.     Tricuspid Valve  There is mild (1+) tricuspid regurgitation. The right ventricular systolic  pressure is approximated at 37.2 mmHg plus the right atrial pressure.  Pulmonary hypertension.     Aortic Valve  The aortic valve is  trileaflet. No aortic regurgitation is present. Mild  valvular aortic stenosis. The mean AoV pressure gradient is 10.0 mmHg.     Pulmonic Valve  There is trace pulmonic valvular regurgitation. There is no pulmonic valvular  stenosis.     Vessels  The aortic root is normal size. Normal size ascending aorta. IVC diameter <2.1  cm collapsing >50% with sniff suggests a normal RA pressure of 3 mmHg.     Pericardium  There is no pericardial effusion.     Rhythm  Sinus rhythm was noted.  ______________________________________________________________________________  MMode/2D Measurements & Calculations     IVSd: 0.89 cm  LVIDd: 4.8 cm  LVIDs: 3.0 cm  LVPWd: 1.00 cm  IVC diam: 2.0 cm  FS: 37.1 %  LV mass(C)d: 160.1 grams  LV mass(C)dI: 101.7 grams/m2  Ao root diam: 2.8 cm  asc Aorta Diam: 2.8 cm  LVOT diam: 1.8 cm  LVOT area: 2.5 cm2  Ao root diam index Ht(cm/m): 1.7  Ao root diam index BSA (cm/m2): 1.8  Asc Ao diam index BSA (cm/m2): 1.8  Asc Ao diam index Ht(cm/m): 1.7  LA Volume (BP): 89.3 ml     LA Volume Index (BP): 56.9 ml/m2  RV Base: 2.8 cm  RWT: 0.41  TAPSE: 2.2 cm     Doppler Measurements & Calculations  MV E max jose francisco: 204.0 cm/sec  MV A max jose francisco: 122.0 cm/sec  MV E/A: 1.7  MV max P.2 mmHg  MV mean P.2 mmHg  MV V2 VTI: 54.3 cm  MVA(VTI): 1.4 cm2  MV P1/2t max jose francisco: 218.2 cm/sec  MV P1/2t: 69.7 msec  MVA(P1/2t): 3.2 cm2  MV dec slope: 916.7 cm/sec2  MV dec time: 0.20 sec  Ao V2 max: 220.0 cm/sec  Ao max P.0 mmHg  Ao V2 mean: 148.0 cm/sec  Ao mean PG: 10.0 mmHg  Ao V2 VTI: 40.5 cm  JAE(I,D): 1.9 cm2  JAE(V,D): 1.6 cm2  LV V1 max P.0 mmHg  LV V1 max: 141.0 cm/sec  LV V1 VTI: 30.6 cm  MR PISA: 2.0 cm2  MR ERO: 0.11 cm2  MR volume: 21.3 ml  SV(LVOT): 77.7 ml  SI(LVOT): 49.4 ml/m2  PA acc time: 0.12 sec     TR max jose francisco: 304.9 cm/sec  TR max P.2 mmHg  AV Jose Francisco Ratio (DI): 0.64  JAE Index (cm2/m2): 1.2  E/E' av.7  Lateral E/e': 24.7  Medial E/e': 34.7  RV S Jose Francisco: 13.9 cm/sec      ______________________________________________________________________________  Report approved by: Demarco Niño MD on 07/29/2025 11:01 AM

## 2025-07-29 NOTE — CONSULTS
"  CLINICAL NUTRITION SERVICES - ASSESSMENT NOTE    Registered Dietitian Interventions:  Continue current diet order per provider - consider switch to renal diet if appropriate    Oral nutrition supplements: Nepro BID (10 am, 2 pm)    Recommended smaller, more frequent meals/snacks (goal of 4-6 intakes per day)    Provided general, healthful nutrition MNT       REASON FOR ASSESSMENT  Positive admission nutrition risk screen    PMH: CAD, CKD stage IV, hypertension, hyperlipidemia, intra-abdominal aortic aneurysm with prior stenting, chronic vertigo, recent hospitalization for fall with pelvic fracture      Per EMR, pt presented to ED on 7/28 with SOB. Found to be in hypertensive emergency, started on BIPAP w/ improvement in respiratory effort. Admitted to ICU.     INFORMATION OBTAINED  Assessed patient in room.    NUTRITION HISTORY  - Information obtained from chart review and pt at bedside w/ .  - Diet at home: Reports eating a regular diet at home. Did not verbalize any specific restrictions.   - Usual intakes: Reports usual intake of 2 meals + 2 oral nutrition supplements daily. Meals are smaller and sometimes she is only able to eat 1.  Breakfast: eggs and a bagel (50-75% intakes(  Dinner: half a hamburger  Oral nutrition supplements: Boost twice per day  - Barriers to PO intakes: Lack of appetite, does not like the food at [facility]. Maria Alejandra tells me that she has noticed her appetite decreasing over the last several months. She stated that this has been exacerbated by [facility] where she reports that she \"cannot stand the food\"  - Use of oral supplements: Boost BID  - Chewing/swallowing issues: Notes that sometimes it is difficult to chew and swallow because she gets dry mouth.  - Maria Alejandra denies any nausea or abdominal pain with eating.  - Meal preparation/grocery shopping: TCU  - Allergies: NKFA    Pt was seen by DTR on 6/12/25 at [facility]. Per this note, pt reported a good appetite and reported " "that she had been eating most meals with no food tolerance issues voiced. Pt's daughter was provided w/ education materials to reference regarding renal nutrition recommendations. Pt w/ % intakes documented.    Patient's  tells me that patient has been getting full fairly quickly and has had a general lack of interest in eating. She has been working to increase her intakes over the last several weeks, but has struggled d/t food available at her TCU.    CURRENT NUTRITION ORDERS  Diet: Low Saturated Fat/2400 mg Sodium (was NPO until late morning of 7/29)    CURRENT INTAKE/TOLERANCE  Very limited window of PO diet order (~ 1 hour). Therefore, unable to assess current intakes/tolerance. Per rounds, pt has been voicing that she is hungry and thirsty.     LABS  Nutrition-relevant labs: Reviewed  Noted: BUN 59(H), Cr 3.98(H),     MEDICATIONS  Nutrition-relevant medications: Reviewed        ANTHROPOMETRICS  Height: 162.6 cm (5' 4\")  Most Recent Weight: 54.8 kg (120 lb 13 oz)  BMI (kg/m ): Body mass index is 20.74 kg/m . (Normal BMI, but low for age)  Weight History:  Wt Readings from Last 10 Encounters:   07/28/25 54.8 kg (120 lb 13 oz)   07/23/25 54.4 kg (120 lb)   07/21/25 54.1 kg (119 lb 3.2 oz)   07/17/25 53.5 kg (117 lb 14.4 oz)   09/13/18 65.8 kg (145 lb)   07/18/18 65.9 kg (145 lb 3.2 oz)   06/27/18 64.4 kg (142 lb)   08/20/15 64.8 kg (142 lb 14.4 oz)   08/17/15 68 kg (150 lb)     04/14/25 : 57.6 kg (127 lb)  12/11/24 : 58.1 kg (128 lb)  09/13/24 : 59.2 kg (130 lb 9.6 oz)    -4.86% BW x 3 months. Weight is trending up over the last few weeks.      ASSESSED NUTRITION NEEDS  Dosing Weight: 54.8 kg, based on actual wt  Estimated Energy Needs: 1839-5928 kcals/day (25 - 30 kcals/kg)  Justification: Repletion and Underweight for age  Estimated Protein Needs: >/= 54.8 grams protein/day (>/=1 gram of pro/kg)  Justification: Preservation of LBM w/ consideration of CKD  Estimated Fluid Needs: 1 ml/kcal or " "per provider pending fluid status    SYSTEM AND PHYSICAL FINDINGS    Pt has been transitioned to NC. Likely transfer to floor today.    GI symptoms:   - Stools: None yet this admission  - Emesis: None this admission    Skin/wounds:   - Edema: 1+ (trace) to L arm, wrist, leg, and knee and b/l ankles  - Pressure Injuries/Nonhealing wounds: None currently documented    MALNUTRITION  % Intake: </=75% for >/= 1 month (severe)  % Weight Loss: Weight loss does not meet criteria   Subcutaneous Fat Loss: Orbital: Mild, Buccal: Mild, and Fat overlying the ribs: Mild  Muscle Loss: Temples (temporalis muscle): Mild, Clavicles (pectoralis and deltoids): Mild, Shoulders (deltoids): Moderate, Interosseous muscles: Moderate, and Scapula (latissimus dorsi, trapezious, deltoids): Moderate  Fluid Accumulation/Edema: Does not meet criteria  Malnutrition Diagnosis: Moderate malnutrition in the context of chronic illness  Malnutrition Present on Admission: Yes    NUTRITION DIAGNOSIS  Inadequate oral intake related to lack of appetite, dislike of food at transitional care facility as evidenced by reported intakes, fat losses, muscle losses, and meets malnutrition criteria.     INTERVENTIONS  See nutrition interventions above    Goals  Patient to consume % of nutritionally adequate meal trays TID, or the equivalent with supplements/snacks.     Monitoring/Evaluation  Progress toward goals will be monitored and evaluated per policy.        Mary Rider RD, LD  Clinical Dietitian  [facility]: \"Dietitian [[place]]\"  Office Phone: [phone]      "

## 2025-07-29 NOTE — PLAN OF CARE
Goal Outcome Evaluation:      Plan of Care Reviewed With: patient    Overall Patient Progress: improvingOverall Patient Progress: improving          ICU End of Shift Summary.  For vital signs and complete assessments, please see documentation flowsheets.     Pertinent assessments: Patient Alert and oriented forgetful at times. Afebrile, denies any pain. NC 2L,  jorge 93-95% no reports of SOB. LS dim. Tele SR, BP WDL after Walt stopped. PW in place fair urine ouput. No BM in this shift, BS present. NPO except medications. Blood sugar acceptable. 2 PIV both saline lock  Major Shift Events:   > Stopped Nitro  > On and off sleep  > Wean down NC to 2 L  Plan (Upcoming Events): Continue ongoing plan of care  Discharge/Transfer Needs: TBD    Bedside Shift Report Completed : Y  Bedside Safety Check Completed: Y      Problem: Adult Inpatient Plan of Care  Goal: Plan of Care Review  Description: The Plan of Care Review/Shift note should be completed every shift.  The Outcome Evaluation is a brief statement about your assessment that the patient is improving, declining, or no change.  This information will be displayed automatically on your shift  note.  Flowsheets (Taken 7/29/2025 0445)  Outcome Evaluation: Stooped Nitro. BP WDL.  Plan of Care Reviewed With: patient  Overall Patient Progress: improving  Goal: Absence of Hospital-Acquired Illness or Injury  Intervention: Identify and Manage Fall Risk  Recent Flowsheet Documentation  Taken 7/29/2025 0430 by Rachele Montano, RN  Safety Promotion/Fall Prevention:   activity supervised   assistive device/personal items within reach   clutter free environment maintained   nonskid shoes/slippers when out of bed   patient and family education   safety round/check completed  Taken 7/29/2025 0019 by Rachele Montano, RN  Safety Promotion/Fall Prevention:   activity supervised   assistive device/personal items within reach   clutter free environment maintained   nonskid  shoes/slippers when out of bed   patient and family education   safety round/check completed  Taken 7/28/2025 2000 by Rachele Montano RN  Safety Promotion/Fall Prevention:   activity supervised   assistive device/personal items within reach   clutter free environment maintained   nonskid shoes/slippers when out of bed   patient and family education   safety round/check completed  Intervention: Prevent Skin Injury  Recent Flowsheet Documentation  Taken 7/29/2025 0430 by Rachele Montano RN  Body Position:   turned   left   legs elevated   upper extremity elevated  Taken 7/29/2025 0019 by Rachele Montano RN  Body Position:   turned   heels elevated   side-lying 30 degrees   position changed independently   weight shifting  Taken 7/28/2025 2132 by Rachele Montano RN  Body Position:   turned   right   legs elevated   side-lying 30 degrees   upper extremity elevated  Taken 7/28/2025 2000 by Rachele Montano RN  Body Position:   turned   heels elevated   side-lying 30 degrees   position changed independently   weight shifting  Goal: Optimal Comfort and Wellbeing  Intervention: Provide Person-Centered Care  Recent Flowsheet Documentation  Taken 7/29/2025 0430 by Rachele Montano RN  Trust Relationship/Rapport:   care explained   choices provided   questions answered   questions encouraged   reassurance provided  Taken 7/29/2025 0019 by Rachele Montano RN  Trust Relationship/Rapport:   care explained   choices provided   questions answered   questions encouraged   reassurance provided  Taken 7/28/2025 2000 by Rachele Montano RN  Trust Relationship/Rapport:   care explained   choices provided   questions answered   questions encouraged   reassurance provided

## 2025-07-29 NOTE — PROGRESS NOTES
Renal Medicine Progress Note            Assessment/Plan:     Assessment:    Hypertensive emergency   Acute hypoxic respiratory failure   Acute on chronic diastolic HF   Bipap dependent hypoxia, pulmonary edema on CXR. Started nitroglycerin gtt, now weaned off. Improvement in hypoxia. Can give dose of IV lasix today and reassess tomorrow. BNP 25K. TTE with EF 55-60%, elevated L sided pressures and pulmonary HTN, but RA pressure estimated low.   - no diuretics today, low intravascular volume  - continue PTA antihypertensives      ROMI on CKD IV   Previously  baseline 1.3-1.5, worsening in May/June 2025, new baseline appears to be ~3 with nephrotic range proteinuria. 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 This is most commonly seen in DM2, however can also be seen with smoking and HTN. Cr on admission 4.25, worse than recent baseline. In setting of hypertensive emergency, pulmonary edema, LE edema.      Metabolic acidosis   Continue PTA sodium bicarb.     R lung adenocarcinoma   Received radiation in 2021.     Plan/Recs:  No diuretics, appears intravascularly hypovolemic   Encouraged PO intake   She will check if she has outpt nephrology follow up. Has a nephrologist in [place]     Discussed with Dr. Light. Reviewed notes from hospitalists.     Vee Encinas MD   [facility]  Office: [phone]          Interval History:      No acute events overnight   Off of nitroglycerin gtt   BP much improved today   SOB improved, no longer requiring bipap, on NC   Hungry and ate breakfast well   Mouth feels very dry   750 ml UOP yesterday, 550-800 ml thus far today   TTE with collapsible IVC, RA estimated at 3 mmHg, elevated L sided pressures and PA pressures   updated at bedside          Medications and Allergies:     Current Facility-Administered Medications   Medication Dose Route Frequency Provider Last Rate Last Admin    carvedilol (COREG) tablet 12.5 mg  12.5 mg Oral BID w/meals Rigstad,  "MD Virgilio   12.5 mg at 07/29/25 0930    cefTRIAXone (ROCEPHIN) 1 g vial to attach to  mL bag for ADULTS or NS 50 mL bag for PEDS  1 g Intravenous Q24H Virgilio Patel MD   1 g at 07/29/25 0614    [START ON 7/31/2025] cloNIDine (CATAPRES-TTS3) 0.3 MG/24HR WK patch 1 patch  1 patch Transdermal Weekly Virgilio Patel MD        And    cloNIDine (CATAPRES-TTS1) Patch in Place   Transdermal Q8H [facility] Virgilio Patel MD        clopidogrel (PLAVIX) tablet 75 mg  75 mg Oral Daily Virgilio Patel MD   75 mg at 07/29/25 0930    famotidine (PEPCID) tablet 40 mg  40 mg Oral Daily Virgilio Patel MD   40 mg at 07/29/25 0930    hydrALAZINE (APRESOLINE) tablet 50 mg  50 mg Oral TID Virgilio Patel MD   50 mg at 07/29/25 0931    icosapent ethyl (VASCEPA) capsule 2 g  2 g Oral BID w/meals Virgilio Patel MD   2 g at 07/29/25 0932    meclizine (ANTIVERT) tablet 25 mg  25 mg Oral Q6H Virgilio Patel MD   25 mg at 07/29/25 0438    NIFEdipine ER OSMOTIC (PROCARDIA XL) 24 hr tablet 90 mg  90 mg Oral Daily Virgilio Patel MD   90 mg at 07/29/25 0931    PARoxetine (PAXIL) tablet 40 mg  40 mg Oral Daily Virgilio Patel MD   40 mg at 07/29/25 0931    sodium bicarbonate tablet 1,300 mg  1,300 mg Oral BID Virgilio Patel MD   1,300 mg at 07/29/25 0930        Allergies   Allergen Reactions    Iodine Hives            Physical Exam:   Vitals were reviewed  BP (!) 160/86   Pulse 78   Temp 98.1  F (36.7  C) (Oral)   Resp 18   Ht 1.626 m (5' 4\")   Wt 54.8 kg (120 lb 13 oz)   SpO2 95%   BMI 20.74 kg/m      Wt Readings from Last 3 Encounters:   07/28/25 54.8 kg (120 lb 13 oz)   07/23/25 54.4 kg (120 lb)   07/21/25 54.1 kg (119 lb 3.2 oz)       Intake/Output Summary (Last 24 hours) at 7/29/2025 0949  Last data filed at 7/29/2025 0845  Gross per 24 hour   Intake 185.85 ml   Output 1300 ml   Net -1114.15 ml       GENERAL APPEARANCE: NAD, frail appearing  HEENT: normocephalic, dry MM   RESP: coarse  CV: RRR   EXTREMITIES/SKIN: trace -1+ ankle edema  NEURO:  " "alert, oriented, normal speech           Data:     BMP  Recent Labs   Lab 07/29/25  0754 07/29/25  0706 07/29/25  0427 07/29/25  0025 07/28/25  1338 07/28/25  0537   NA  --  137  --   --   --  136   POTASSIUM  --  3.4  --   --   --  3.2*   CHLORIDE  --  98  --   --   --  95*   BRUCE  --  8.2*  --   --   --  8.0*   CO2  --  22  --   --   --  23   BUN  --  59.0*  --   --   --  58.1*   CR  --  3.98*  --   --   --  4.25*   * 101* 103* 116*   < > 101*    < > = values in this interval not displayed.     CBC  Recent Labs   Lab 07/29/25  0706 07/28/25  0537   WBC 7.6 7.7   HGB 9.1* 9.1*   HCT 26.6* 26.7*   MCV 99 100    349     Lab Results   Component Value Date    AST 22 07/17/2025    ALT 11 07/17/2025    ALKPHOS 61 07/17/2025    BILITOTAL 0.3 07/17/2025     No results found for: \"INR\"  Color Urine (no units)   Date Value   09/09/2016 Light Yellow     Appearance Urine (no units)   Date Value   09/09/2016 Clear     Glucose Urine (mg/dL)   Date Value   09/09/2016 Negative     Bilirubin Urine (no units)   Date Value   09/09/2016 Negative     Ketones Urine (mg/dL)   Date Value   09/09/2016 Negative     Specific Gravity Urine (no units)   Date Value   09/09/2016 1.010     pH Urine (pH)   Date Value   09/09/2016 5.5     Protein Albumin Urine (mg/dL)   Date Value   09/09/2016 Negative     Nitrite Urine (no units)   Date Value   09/09/2016 Negative     Leukocyte Esterase Urine (no units)   Date Value   09/09/2016 Negative         Attestation:  I have reviewed today's vital signs, notes, medications, labs and imaging.    Vee Encinas MD  [facility]  Office: [phone]    "

## 2025-07-30 ENCOUNTER — APPOINTMENT (OUTPATIENT)
Dept: GENERAL RADIOLOGY | Facility: CLINIC | Age: 75
End: 2025-07-30
Attending: STUDENT IN AN ORGANIZED HEALTH CARE EDUCATION/TRAINING PROGRAM
Payer: MEDICARE

## 2025-07-30 LAB
ANION GAP SERPL CALCULATED.3IONS-SCNC: 14 MMOL/L (ref 7–15)
BASOPHILS # BLD AUTO: 0.1 10E3/UL (ref 0–0.2)
BASOPHILS NFR BLD AUTO: 1 %
BUN SERPL-MCNC: 62.8 MG/DL (ref 8–23)
CALCIUM SERPL-MCNC: 8 MG/DL (ref 8.8–10.4)
CHLORIDE SERPL-SCNC: 99 MMOL/L (ref 98–107)
CREAT SERPL-MCNC: 3.75 MG/DL (ref 0.51–0.95)
EGFRCR SERPLBLD CKD-EPI 2021: 12 ML/MIN/1.73M2
EOSINOPHIL # BLD AUTO: 0.2 10E3/UL (ref 0–0.7)
EOSINOPHIL NFR BLD AUTO: 3 %
ERYTHROCYTE [DISTWIDTH] IN BLOOD BY AUTOMATED COUNT: 15.7 % (ref 10–15)
GLUCOSE BLDC GLUCOMTR-MCNC: 104 MG/DL (ref 70–99)
GLUCOSE BLDC GLUCOMTR-MCNC: 114 MG/DL (ref 70–99)
GLUCOSE BLDC GLUCOMTR-MCNC: 114 MG/DL (ref 70–99)
GLUCOSE BLDC GLUCOMTR-MCNC: 126 MG/DL (ref 70–99)
GLUCOSE BLDC GLUCOMTR-MCNC: 143 MG/DL (ref 70–99)
GLUCOSE BLDC GLUCOMTR-MCNC: 144 MG/DL (ref 70–99)
GLUCOSE SERPL-MCNC: 101 MG/DL (ref 70–99)
HCO3 SERPL-SCNC: 24 MMOL/L (ref 22–29)
HCT VFR BLD AUTO: 28 % (ref 35–47)
HGB BLD-MCNC: 9.3 G/DL (ref 11.7–15.7)
IMM GRANULOCYTES # BLD: 0 10E3/UL
IMM GRANULOCYTES NFR BLD: 1 %
LYMPHOCYTES # BLD AUTO: 2.2 10E3/UL (ref 0.8–5.3)
LYMPHOCYTES NFR BLD AUTO: 29 %
MCH RBC QN AUTO: 33.7 PG (ref 26.5–33)
MCHC RBC AUTO-ENTMCNC: 33.2 G/DL (ref 31.5–36.5)
MCV RBC AUTO: 101 FL (ref 78–100)
MONOCYTES # BLD AUTO: 0.7 10E3/UL (ref 0–1.3)
MONOCYTES NFR BLD AUTO: 9 %
NEUTROPHILS # BLD AUTO: 4.5 10E3/UL (ref 1.6–8.3)
NEUTROPHILS NFR BLD AUTO: 58 %
NRBC # BLD AUTO: 0 10E3/UL
NRBC BLD AUTO-RTO: 0 /100
PLATELET # BLD AUTO: 377 10E3/UL (ref 150–450)
POTASSIUM SERPL-SCNC: 3.3 MMOL/L (ref 3.4–5.3)
RBC # BLD AUTO: 2.76 10E6/UL (ref 3.8–5.2)
SODIUM SERPL-SCNC: 137 MMOL/L (ref 135–145)
WBC # BLD AUTO: 7.8 10E3/UL (ref 4–11)

## 2025-07-30 PROCEDURE — 85004 AUTOMATED DIFF WBC COUNT: CPT | Performed by: STUDENT IN AN ORGANIZED HEALTH CARE EDUCATION/TRAINING PROGRAM

## 2025-07-30 PROCEDURE — 250N000011 HC RX IP 250 OP 636: Performed by: STUDENT IN AN ORGANIZED HEALTH CARE EDUCATION/TRAINING PROGRAM

## 2025-07-30 PROCEDURE — 99232 SBSQ HOSP IP/OBS MODERATE 35: CPT | Performed by: STUDENT IN AN ORGANIZED HEALTH CARE EDUCATION/TRAINING PROGRAM

## 2025-07-30 PROCEDURE — 80048 BASIC METABOLIC PNL TOTAL CA: CPT | Performed by: STUDENT IN AN ORGANIZED HEALTH CARE EDUCATION/TRAINING PROGRAM

## 2025-07-30 PROCEDURE — 80051 ELECTROLYTE PANEL: CPT | Performed by: STUDENT IN AN ORGANIZED HEALTH CARE EDUCATION/TRAINING PROGRAM

## 2025-07-30 PROCEDURE — 94660 CPAP INITIATION&MGMT: CPT

## 2025-07-30 PROCEDURE — 120N000004 HC R&B MS OVERFLOW

## 2025-07-30 PROCEDURE — 99233 SBSQ HOSP IP/OBS HIGH 50: CPT | Performed by: STUDENT IN AN ORGANIZED HEALTH CARE EDUCATION/TRAINING PROGRAM

## 2025-07-30 PROCEDURE — 250N000013 HC RX MED GY IP 250 OP 250 PS 637: Performed by: STUDENT IN AN ORGANIZED HEALTH CARE EDUCATION/TRAINING PROGRAM

## 2025-07-30 PROCEDURE — 36415 COLL VENOUS BLD VENIPUNCTURE: CPT | Performed by: STUDENT IN AN ORGANIZED HEALTH CARE EDUCATION/TRAINING PROGRAM

## 2025-07-30 PROCEDURE — 999N000157 HC STATISTIC RCP TIME EA 10 MIN

## 2025-07-30 PROCEDURE — 71045 X-RAY EXAM CHEST 1 VIEW: CPT

## 2025-07-30 RX ORDER — POTASSIUM CHLORIDE 1500 MG/1
20 TABLET, EXTENDED RELEASE ORAL ONCE
Status: COMPLETED | OUTPATIENT
Start: 2025-07-30 | End: 2025-07-30

## 2025-07-30 RX ORDER — ISOSORBIDE DINITRATE 20 MG/1
20 TABLET ORAL
Status: DISCONTINUED | OUTPATIENT
Start: 2025-07-30 | End: 2025-07-31

## 2025-07-30 RX ORDER — FUROSEMIDE 40 MG/1
40 TABLET ORAL
Status: DISPENSED | OUTPATIENT
Start: 2025-07-30

## 2025-07-30 RX ORDER — FUROSEMIDE 10 MG/ML
40 INJECTION INTRAMUSCULAR; INTRAVENOUS ONCE
Status: COMPLETED | OUTPATIENT
Start: 2025-07-30 | End: 2025-07-30

## 2025-07-30 RX ADMIN — SODIUM BICARBONATE 1300 MG: 650 TABLET ORAL at 20:47

## 2025-07-30 RX ADMIN — ISOSORBIDE DINITRATE 20 MG: 20 TABLET ORAL at 17:21

## 2025-07-30 RX ADMIN — FUROSEMIDE 40 MG: 10 INJECTION, SOLUTION INTRAMUSCULAR; INTRAVENOUS at 10:58

## 2025-07-30 RX ADMIN — NIFEDIPINE 90 MG: 90 TABLET, EXTENDED RELEASE ORAL at 08:20

## 2025-07-30 RX ADMIN — FUROSEMIDE 40 MG: 20 TABLET ORAL at 15:34

## 2025-07-30 RX ADMIN — POTASSIUM CHLORIDE 20 MEQ: 20 TABLET, EXTENDED RELEASE ORAL at 10:58

## 2025-07-30 RX ADMIN — ISOSORBIDE DINITRATE 20 MG: 20 TABLET ORAL at 08:56

## 2025-07-30 RX ADMIN — MECLIZINE HYDROCHLORIDE 25 MG: 25 TABLET ORAL at 10:58

## 2025-07-30 RX ADMIN — HYDRALAZINE HYDROCHLORIDE 50 MG: 50 TABLET ORAL at 17:21

## 2025-07-30 RX ADMIN — SODIUM BICARBONATE 1300 MG: 650 TABLET ORAL at 08:16

## 2025-07-30 RX ADMIN — CEFTRIAXONE 1 G: 1 INJECTION, POWDER, FOR SOLUTION INTRAMUSCULAR; INTRAVENOUS at 05:46

## 2025-07-30 RX ADMIN — HYDRALAZINE HYDROCHLORIDE 20 MG: 20 INJECTION INTRAMUSCULAR; INTRAVENOUS at 03:36

## 2025-07-30 RX ADMIN — ICOSAPENT ETHYL 2 G: 1 CAPSULE ORAL at 08:16

## 2025-07-30 RX ADMIN — MECLIZINE HYDROCHLORIDE 25 MG: 25 TABLET ORAL at 17:26

## 2025-07-30 RX ADMIN — MECLIZINE HYDROCHLORIDE 25 MG: 25 TABLET ORAL at 22:39

## 2025-07-30 RX ADMIN — PAROXETINE HYDROCHLORIDE 40 MG: 20 TABLET, FILM COATED ORAL at 08:19

## 2025-07-30 RX ADMIN — CLOPIDOGREL BISULFATE 75 MG: 75 TABLET, FILM COATED ORAL at 08:16

## 2025-07-30 RX ADMIN — ICOSAPENT ETHYL 2 G: 1 CAPSULE ORAL at 17:22

## 2025-07-30 RX ADMIN — HYDRALAZINE HYDROCHLORIDE 50 MG: 50 TABLET ORAL at 09:00

## 2025-07-30 RX ADMIN — CARVEDILOL 12.5 MG: 12.5 TABLET, FILM COATED ORAL at 08:16

## 2025-07-30 RX ADMIN — CARVEDILOL 12.5 MG: 12.5 TABLET, FILM COATED ORAL at 17:21

## 2025-07-30 ASSESSMENT — ACTIVITIES OF DAILY LIVING (ADL)
ADLS_ACUITY_SCORE: 67
ADLS_ACUITY_SCORE: 69
ADLS_ACUITY_SCORE: 67
ADLS_ACUITY_SCORE: 67
ADLS_ACUITY_SCORE: 69
ADLS_ACUITY_SCORE: 69
ADLS_ACUITY_SCORE: 67
ADLS_ACUITY_SCORE: 67
ADLS_ACUITY_SCORE: 69
ADLS_ACUITY_SCORE: 69
ADLS_ACUITY_SCORE: 62
ADLS_ACUITY_SCORE: 67
ADLS_ACUITY_SCORE: 69

## 2025-07-30 NOTE — PROGRESS NOTES
An ABG was completed on the pt's left brachial site @ 2159 on an FIO2 of 50%. Pressure was held until bleeding stopped. No complications noted during the procedure.      Jimmy Michael, RT

## 2025-07-30 NOTE — PLAN OF CARE
Goal Outcome Evaluation:      Plan of Care Reviewed With: patient, spouse    Overall Patient Progress: improvingOverall Patient Progress: improving    Outcome Evaluation: A&OX3- forgetful @ times- VSS- Afebrile- Tolerated being off Bi-Pap all day, O2 sats 95% on 1Lnc- Lungs fine crackles in bases- appitite poor, encouaged to drink boost- up in chair for 4 hours-  at bedside, updated on plan of care.

## 2025-07-30 NOTE — PROGRESS NOTES
"[id number]    Pt remained on BiPAP overnight without issue. No distress noted. Skin under mask looks good and intact.        07/30/25 0003 07/30/25 0332   CPAP/BiPAP/Settings   IPAP/EPAP (cmH2O) 14/7 14/7   Rate (breaths/min) 16 16   Oxygen (%) 35 35   Timed Inspiration (sec) 0.9 0.9   IPAP RISE  Settings (V60) 2 2   CPAP/BiPAP Patient Parameters   IPAP (cm H2O) 14 cmH2O 14 cmH2O   EPAP (cm H2O) 7 cmH2O 7 cmH2O   Pressure Support (cm H2O) 7 cmH2O 7 cmH2O   RR Total (breaths/min) 19 breaths/min 18 breaths/min   Vt (mL) 458 mL 659 mL   Minute Ventilation (L/min) 8.8 L/min 10 L/min   Peak Inspiratory Pressure (cm H2O) 14 cmH2O 15 cmH2O   Pt.  Leak (L/min) 3 L/min 27 L/min       BP (!) 148/78   Pulse 70   Temp 97.6  F (36.4  C) (Axillary)   Resp 23   Ht 1.626 m (5' 4\")   Wt 54.8 kg (120 lb 13 oz)   SpO2 98%   BMI 20.74 kg/m      Jazmin Pradip, RT    "

## 2025-07-30 NOTE — PROGRESS NOTES
A BiPAP of  14/7 @ 50% was applied to the pt via the mask for an increase in WOB and SOB. The skin in contact with the mask and straps looks good and intact. PRN neb given prior BiPAP placement with no improvement. Pt currently tolerating BiPAP well. RT will continue to follow and assess the pt's respiratory status and needs.   RT/RN huddle to discuss contraindications prior to placement? Y/N? Y      Jimmy Michael, RT

## 2025-07-30 NOTE — PROVIDER NOTIFICATION
1957: Patient is having increase WOB and LS is fine crackles  Sats down to 86-88% from 2L NC bumped to 8L buts stil sats to 88%, placed on oxymask 10L . Patient is also anxious MD placed a stat chest x-ray and lorazepam .5.RT also notified  nebs was administered. With no improvement with the breathing.   2040hr>  MD requested to come and  see the patient at bedside. MD came and assessed the patient. Chest x-ray was reviewed  by MD shows pulmonary edema and ordered for lasix 40mgs IV and administered.   2049hr Patient become more anxious and  unable to take oral meds, MD paged and he if can change to IV and  place an IV  versed and administered. RT was notified again because of increase WOB.

## 2025-07-30 NOTE — PLAN OF CARE
Called for escalating O2 needs, tachypnea, and anxiety due to WOB. CXR with worsening pulm edema. Patient seen at bedside and very anxious. Lasix 40mg IV x1 and Ativan 1mg oral x1 ordered.    Ezequiel Anderson MD

## 2025-07-30 NOTE — PLAN OF CARE
Goal Outcome Evaluation:      Plan of Care Reviewed With: patient    Overall Patient Progress: decliningOverall Patient Progress: declining    Outcome Evaluation: BIPAP overnight for increase WOB.    ICU End of Shift Summary.  For vital signs and complete assessments, please see documentation flowsheets.     Pertinent assessments: Patient lethargic after midazolam. Easy to arouse with voice, open eyes and following commands. Was on 2L NC then BIPAP for increase WOB. Fio2: 35% 14/7. LS fine crackles. Tele SR-SB up to 58.  Blood pressure within normal limits. NPO during bIPAP. BS present. No bm in this shift. PW in place minimal out put after lasix 40mgs.  MD Chapman notify no further order. Bladder scan @ 0030 with 328ml and @ 0425  got 455ml. Repeat @ 0600 422ml but bladder is distended. 2 PIV's.   Major Shift Events:   > Increased WOB, chest x-ray done and placed on BIPAP  > Lasix 40mgs for pulmonary edema with minimal out put  > Versed 1.5mg for anxiety.  > Bladder scan 3x, Straight cath 1x 550ml UOP  > wean down Fio2 from 50 to 35%, ABG completed  > Changed status to IMC  Plan (Upcoming Events): Continue ongoing plan of care  Discharge/Transfer Needs: TBD    Bedside Shift Report Completed : Y  Bedside Safety Check Completed: Y      Problem: Adult Inpatient Plan of Care  Goal: Plan of Care Review  Description: The Plan of Care Review/Shift note should be completed every shift.  The Outcome Evaluation is a brief statement about your assessment that the patient is improving, declining, or no change.  This information will be displayed automatically on your shift  note.  Flowsheets (Taken 7/30/2025 2624)  Outcome Evaluation: BIPAP overnight for increase WOB.  Plan of Care Reviewed With: patient  Overall Patient Progress: declining  Goal: Absence of Hospital-Acquired Illness or Injury  Intervention: Identify and Manage Fall Risk  Recent Flowsheet Documentation  Taken 7/30/2025 7615 by Rachele Montano RN  Safety  Promotion/Fall Prevention:   activity supervised   clutter free environment maintained   increased rounding and observation   increase visualization of patient   lighting adjusted   safety round/check completed   treat reversible contributory factors   treat underlying cause   room organization consistent   patient and family education   nonskid shoes/slippers when out of bed  Taken 7/30/2025 0000 by Rachele Montano RN  Safety Promotion/Fall Prevention:   activity supervised   clutter free environment maintained   increased rounding and observation   increase visualization of patient   lighting adjusted   safety round/check completed   treat reversible contributory factors   treat underlying cause   room organization consistent   patient and family education   nonskid shoes/slippers when out of bed  Taken 7/29/2025 1950 by Rachele Montano RN  Safety Promotion/Fall Prevention:   activity supervised   clutter free environment maintained   increased rounding and observation   increase visualization of patient   lighting adjusted   safety round/check completed   treat reversible contributory factors   treat underlying cause   room organization consistent   patient and family education   nonskid shoes/slippers when out of bed  Intervention: Prevent Skin Injury  Recent Flowsheet Documentation  Taken 7/30/2025 0400 by Rachele Montano RN  Body Position:   turned   log-rolled   right   lower extremity elevated   upper extremity elevated  Taken 7/30/2025 0200 by Rachele Montano RN  Body Position:   turned   weight shifting  Taken 7/30/2025 0000 by Rachele Montano RN  Body Position:   weight shifting   turned   left  Taken 7/29/2025 1950 by Rachele Montano RN  Body Position: weight shifting  Intervention: Prevent and Manage VTE (Venous Thromboembolism) Risk  Recent Flowsheet Documentation  Taken 7/30/2025 0336 by Rachele Montano RN  VTE Prevention/Management: SCDs on (sequential  compression devices)  Taken 7/30/2025 0000 by Rachele Montano RN  VTE Prevention/Management: SCDs on (sequential compression devices)  Taken 7/29/2025 1950 by Rachele Montano RN  VTE Prevention/Management: SCDs on (sequential compression devices)  Goal: Optimal Comfort and Wellbeing  Intervention: Provide Person-Centered Care  Recent Flowsheet Documentation  Taken 7/30/2025 0336 by Rachele Montano RN  Trust Relationship/Rapport:   care explained   choices provided   emotional support provided   questions answered   reassurance provided   thoughts/feelings acknowledged   questions encouraged  Taken 7/30/2025 0000 by Rachele Montano RN  Trust Relationship/Rapport:   care explained   choices provided   emotional support provided   questions answered   reassurance provided   thoughts/feelings acknowledged   questions encouraged  Taken 7/29/2025 1950 by Rachele Montano RN  Trust Relationship/Rapport:   care explained   choices provided   emotional support provided   questions answered   reassurance provided   thoughts/feelings acknowledged   questions encouraged

## 2025-07-30 NOTE — PROGRESS NOTES
[facility]    Medicine Progress Note - Hospitalist Service    Date of Admission:  7/28/2025    Assessment & Plan   75-year-old female with history of f CAD, CKD stage IV, hypertension, hyperlipidemia, intra-abdominal aortic aneurysm with prior stenting, chronic vertigo, recent hospitalization for fall with pelvic fracture who presents the ED for shortness of breath.      Had elevated D-dimer but VQ scan negative for PE, CT not done due to CKD.  proBNP more than 25,000.  Chest x-ray showed bilateral perihilar interstitial/airspace opacities, left greater than right, are nonspecific for postradiation change versus pneumonia. A small nodule in the peripheral right upper lobe measures 9 mm.     Found to be in hypertensive emergency, admitted to ICU with BiPAP and nitroglycerin drip.    Acute hypoxic respiratory failure:  Diastolic CHF exacerbation:  Hypertensive emergency:  - Echo with EF of 55 to 60%.  Got Lasix for 40 mg IV x 1 and you got off nitroglycerin drip.  Was not given further doses of Lasix due to ROMI and CKD but developed increased tachypnea on the evening of 7/29 with x-ray showing worsening central airspace opacities suspicious for multifocal pneumonia, pulmonary edema or ARDS and was given additional dose of Lasix 40 mg IV and was put on BiPAP.  - Continue carvedilol 12.5 mg twice daily, hydralazine 50 mg 3 times daily, nifedipine 90 mg daily, Isordil 20 mg 3 times daily and clonidine patch 0.3 mg per 24 hours.  -Was empirically started on ceftriaxone on presentation.  Patient had no fever or leukocytosis.  Mildly elevated procalcitonin of 0.67 which is difficult to interpret in ROMI/CKD and improved to 0.49 on recheck.  Chest x-ray showed perihilar interstitial/airspace opacities which is postradiation change versus pneumonia.  Give ceftriaxone for 5 days.  - Transfer out of ICU.    ROMI on CKD stage IV:  Metabolic acidosis:  - Previous baseline of 1.3-1.5 which worsened in May/June  2025 and new baseline appears to be 3 with nephrotic range proteinuria.  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  - Creatinine on admission was 4.25, improved to 3.98--> 3.75.  Defer further diuretic management to nephrology.    - Continue prior to admission dose of sodium bicarbonate.    Chronic medical conditions:  - AAA.  Endoleak status post recent endovascular aneurysm repair on 4/25.  Continue Plavix and statin.  - History of CVA.  Chronic infarct in the right basal ganglia noted on CT.  - Right lung adenocarcinoma.  Status post stereotactic body radiation therapy in 2021, follows up in [place].  His lung nodule on x-ray, can follow-up with her primary oncologist.  - Chronic vertigo.  Follows up with ENT in [place] and on as needed meclizine.  MRI brain on 5/22 was negative for acute pathology.  - Depression pleasant lady.  Continue paroxetine.  - Hypertriglyceridemia.  On Vascepa.    Disposition  - Transfer to floor.            Diet: Snacks/Supplements Adult: Nepro Oral Supplement; Between Meals  NPO for Medical/Clinical Reasons Except for: Meds, Ice Chips    DVT Prophylaxis: Pneumatic Compression Devices  Rao Catheter: Not present  Lines: None     Cardiac Monitoring: ACTIVE order. Indication: Acute decompensated heart failure (48 hours)  Code Status: Full Code      Clinically Significant Risk Factors        # Hypokalemia: Lowest K = 3.3 mmol/L in last 2 days, will replace as needed              # Hypertension: Noted on problem list             # Moderate Malnutrition: based on nutrition assessment and treatment provided per dietitian's recommendations., PRESENT ON ADMISSION          Social Drivers of Health    Depression: At risk (6/5/2025)    Received from [employer]    PHQ-2     Depression Risk: 3   Tobacco Use: High Risk (7/15/2025)    Patient History     Smoking Tobacco Use: Every Day     Smokeless Tobacco Use: Never   Interpersonal Safety:  Unknown (7/28/2025)    Interpersonal Safety     Do you feel physically and emotionally safe where you currently live?: Patient unable to answer     Within the past 12 months, have you been hit, slapped, kicked or otherwise physically hurt by someone?: Patient unable to answer     Within the past 12 months, have you been humiliated or emotionally abused in other ways by your partner or ex-partner?: Patient unable to answer          Disposition Plan     Medically Ready for Discharge: Anticipated Tomorrow             Jonathan Light MD  Hospitalist Service  [facility]  Securely message with [employer] (more info)  Text page via [facility] Paging/Directory   ______________________________________________________________________    Interval History   Had worsening shortness of breath last evening needing additional dose of Lasix and was put back on BiPAP.    Physical Exam   Vital Signs: Temp: 98.4  F (36.9  C) Temp src: Temporal BP: (!) 170/77 Pulse: 62   Resp: 13 SpO2: 98 % O2 Device: Oxymask Oxygen Delivery: 3 LPM  Weight: 118 lbs 9.72 oz    General Appearance: Alert awake and oriented x 3  Respiratory: Basilar crackles.  Cardiovascular: S1-S2 normal  GI: Soft and nontender  Skin: No rash  Other: Trace edema.      Medical Decision Making       60 MINUTES SPENT BY ME on the date of service doing chart review, history, exam, documentation & further activities per the note.      Data     I have personally reviewed the following data over the past 24 hrs:    7.8  \   9.3 (L)   / 377     137 99 62.8 (H) /  104 (H)   3.3 (L) 24 3.75 (H) \     Procal: N/A CRP: N/A Lactic Acid: N/A         Imaging results reviewed over the past 24 hrs:   Recent Results (from the past 24 hours)   XR Chest Port 1 View    Narrative    EXAM: XR CHEST PORT 1 VIEW  LOCATION: [facility]  DATE: 7/29/2025    INDICATION: acute hypoxia  COMPARISON: 7/28/2025      Impression    IMPRESSION:     Marked worsening of central  airspace opacities, suspicious for multifocal pneumonia, pulmonary edema, or ARDS.    Unchanged right pleural thickening. No new pleural effusion. No pneumothorax.    Stable cardiomediastinal silhouette. Aortic calcifications.    Partially visualized abdominal aortic endograft.   XR Chest Port 1 View    Narrative    EXAM: XR CHEST PORT 1 VIEW  LOCATION: [facility]  DATE: 7/30/2025    INDICATION: Pulmonary edema versus pneumonia.  COMPARISON: 7/29/2025.      Impression    IMPRESSION:   Ill-defined airspace opacities in the perihilar regions have overall improved slightly. Findings suggest pulmonary edema, although an infectious process cannot be excluded. Heart size is stable. Pulmonary vascularity is largely obscured. Aortic   calcification. No pneumothorax. Partially visualized abdominal aortic endograft.

## 2025-07-30 NOTE — PROGRESS NOTES
Renal Medicine Progress Note            Assessment/Plan:     Assessment:    Hypertensive emergency   Acute hypoxic respiratory failure   Acute on chronic diastolic HF   History of R renal artery stenting  Bipap dependent hypoxia, pulmonary edema on CXR. Started nitroglycerin gtt, now weaned off. Improvement in hypoxia. Can give dose of IV lasix today and reassess tomorrow. BNP 25K. TTE with EF 55-60%, elevated L sided pressures and pulmonary HTN, but RA pressure estimated low.  BP is quite labile, upon further review appears she has a R renal artery stent, will re-image renal arteries to ensure no new stenosis.   - IV lasix 40 mg x1, then start PO lasix 40 mg BID  - continue PTA antihypertensives   - renal US with duplex      ROMI on CKD IV   Previously  baseline 1.3-1.5, worsening in May/June 2025, new baseline appears to be ~3 with nephrotic range proteinuria. 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 This is most commonly seen in DM2, however can also be seen with smoking and HTN. Cr on admission 4.25, worse than recent baseline. In setting of hypertensive emergency, pulmonary edema, LE edema.      Metabolic acidosis   Continue PTA sodium bicarb.     R lung adenocarcinoma   Received radiation in 2021.     Plan/Recs:  - though appears intravascularly dry, will start diuretics to maintain respiratory status   - IV lasix 40mg one time, followed by PO lasix 40 mg BID   - renal US with duplex     Discussed with Dr. Light. Reviewed notes from hospitalists.     Vee Encinas MD   [facility]  Office: [phone]          Interval History:     Respiratory decompensation overnight with worsened hypoxia and increased work of breathing. Placed on bipap and given one dose IV lasix .   Cr improving slowly   She is slightly confused this morning, feels fatigued and sleepy. Denies SOB. Isn't sure why she's in the hospital and for so long   Poor appetite. No nausea or dysgeusia   Called and updated  her daughter Yesenia and got more info regarding patients renal disease. Pt primarily lives in [place] and just comes to [place] in summers, has no plans to move to [place] permanently.          Medications and Allergies:     Current Facility-Administered Medications   Medication Dose Route Frequency Provider Last Rate Last Admin    carvedilol (COREG) tablet 12.5 mg  12.5 mg Oral BID w/meals Virgilio Patel MD   12.5 mg at 07/30/25 0816    cefTRIAXone (ROCEPHIN) 1 g vial to attach to  mL bag for ADULTS or NS 50 mL bag for PEDS  1 g Intravenous Q24H Virgilio Patel MD   1 g at 07/30/25 0546    [START ON 7/31/2025] cloNIDine (CATAPRES-TTS3) 0.3 MG/24HR WK patch 1 patch  1 patch Transdermal Weekly Virgilio Patel MD        And    cloNIDine (CATAPRES-TTS1) Patch in Place   Transdermal Q8H [facility] Virgilio Patel MD        clopidogrel (PLAVIX) tablet 75 mg  75 mg Oral Daily Virgilio Patle MD   75 mg at 07/30/25 0816    [START ON 7/31/2025] famotidine (PEPCID) tablet 20 mg  20 mg Oral Q48H Jonathan Light MD        hydrALAZINE (APRESOLINE) tablet 50 mg  50 mg Oral TID Jonathan Light MD   50 mg at 07/30/25 0900    icosapent ethyl (VASCEPA) capsule 2 g  2 g Oral BID w/meals Virgilio Patel MD   2 g at 07/30/25 0816    isosorbide dinitrate (ISORDIL) tablet 20 mg  20 mg Oral TID Jonathan Light MD   20 mg at 07/30/25 0856    meclizine (ANTIVERT) tablet 25 mg  25 mg Oral Q6H Virgilio Patel MD   25 mg at 07/30/25 1058    NIFEdipine ER OSMOTIC (PROCARDIA XL) 24 hr tablet 90 mg  90 mg Oral Daily Virgilio Patel MD   90 mg at 07/30/25 0820    PARoxetine (PAXIL) tablet 40 mg  40 mg Oral Daily Virgilio Patel MD   40 mg at 07/30/25 0819    sodium bicarbonate tablet 1,300 mg  1,300 mg Oral BID Virgilio Patel MD   1,300 mg at 07/30/25 0816    sodium chloride (PF) 0.9% PF flush 3 mL  3 mL Intracatheter Q8H [facility] Gilmer Swain MD   3 mL at 07/30/25 0341        Allergies   Allergen Reactions    Iodine Hives            Physical Exam:   Vitals  "were reviewed  BP (!) 119/97   Pulse 74   Temp 98.4  F (36.9  C) (Temporal)   Resp 17   Ht 1.626 m (5' 4\")   Wt 53.8 kg (118 lb 9.7 oz)   SpO2 94%   BMI 20.36 kg/m      Wt Readings from Last 3 Encounters:   07/30/25 53.8 kg (118 lb 9.7 oz)   07/23/25 54.4 kg (120 lb)   07/21/25 54.1 kg (119 lb 3.2 oz)       Intake/Output Summary (Last 24 hours) at 7/29/2025 0949  Last data filed at 7/29/2025 0845  Gross per 24 hour   Intake 185.85 ml   Output 1300 ml   Net -1114.15 ml       GENERAL APPEARANCE: NAD, frail appearing  HEENT: normocephalic, dry MM   RESP: coarse  CV: RRR   EXTREMITIES/SKIN: no edema  NEURO:  alert, oriented, normal speech           Data:     BMP  Recent Labs   Lab 07/30/25  0753 07/30/25  0703 07/30/25  0355 07/30/25  0031 07/29/25  0754 07/29/25  0706 07/28/25  1338 07/28/25  0537   NA  --  137  --   --   --  137  --  136   POTASSIUM  --  3.3*  --   --   --  3.4  --  3.2*   CHLORIDE  --  99  --   --   --  98  --  95*   BRUCE  --  8.0*  --   --   --  8.2*  --  8.0*   CO2  --  24  --   --   --  22  --  23   BUN  --  62.8*  --   --   --  59.0*  --  58.1*   CR  --  3.75*  --   --   --  3.98*  --  4.25*   * 101* 114* 126*   < > 101*   < > 101*    < > = values in this interval not displayed.     CBC  Recent Labs   Lab 07/30/25  0703 07/29/25  0706 07/28/25  0537   WBC 7.8 7.6 7.7   HGB 9.3* 9.1* 9.1*   HCT 28.0* 26.6* 26.7*   * 99 100    385 349     Lab Results   Component Value Date    AST 22 07/17/2025    ALT 11 07/17/2025    ALKPHOS 61 07/17/2025    BILITOTAL 0.3 07/17/2025     No results found for: \"INR\"  Color Urine (no units)   Date Value   09/09/2016 Light Yellow     Appearance Urine (no units)   Date Value   09/09/2016 Clear     Glucose Urine (mg/dL)   Date Value   09/09/2016 Negative     Bilirubin Urine (no units)   Date Value   09/09/2016 Negative     Ketones Urine (mg/dL)   Date Value   09/09/2016 Negative     Specific Gravity Urine (no units)   Date Value   09/09/2016 " 1.010     pH Urine (pH)   Date Value   09/09/2016 5.5     Protein Albumin Urine (mg/dL)   Date Value   09/09/2016 Negative     Nitrite Urine (no units)   Date Value   09/09/2016 Negative     Leukocyte Esterase Urine (no units)   Date Value   09/09/2016 Negative         Attestation:  I have reviewed today's vital signs, notes, medications, labs and imaging.    Vee Encinas MD  [facility] Nephrology  Office: [phone]

## 2025-07-31 ENCOUNTER — APPOINTMENT (OUTPATIENT)
Dept: ULTRASOUND IMAGING | Facility: CLINIC | Age: 75
DRG: 291 | End: 2025-07-31
Attending: STUDENT IN AN ORGANIZED HEALTH CARE EDUCATION/TRAINING PROGRAM
Payer: MEDICARE

## 2025-07-31 ENCOUNTER — APPOINTMENT (OUTPATIENT)
Dept: GENERAL RADIOLOGY | Facility: CLINIC | Age: 75
DRG: 291 | End: 2025-07-31
Attending: STUDENT IN AN ORGANIZED HEALTH CARE EDUCATION/TRAINING PROGRAM
Payer: MEDICARE

## 2025-07-31 VITALS
HEIGHT: 64 IN | HEART RATE: 85 BPM | WEIGHT: 118.61 LBS | BODY MASS INDEX: 20.25 KG/M2 | OXYGEN SATURATION: 93 % | SYSTOLIC BLOOD PRESSURE: 128 MMHG | RESPIRATION RATE: 20 BRPM | DIASTOLIC BLOOD PRESSURE: 64 MMHG | TEMPERATURE: 98.2 F

## 2025-07-31 LAB
ANION GAP SERPL CALCULATED.3IONS-SCNC: 14 MMOL/L (ref 7–15)
BUN SERPL-MCNC: 56.6 MG/DL (ref 8–23)
CALCIUM SERPL-MCNC: 8.1 MG/DL (ref 8.8–10.4)
CHLORIDE SERPL-SCNC: 97 MMOL/L (ref 98–107)
CREAT SERPL-MCNC: 3.37 MG/DL (ref 0.51–0.95)
EGFRCR SERPLBLD CKD-EPI 2021: 14 ML/MIN/1.73M2
ERYTHROCYTE [DISTWIDTH] IN BLOOD BY AUTOMATED COUNT: 15.5 % (ref 10–15)
FERRITIN SERPL-MCNC: 156 NG/ML (ref 11–328)
GLUCOSE SERPL-MCNC: 115 MG/DL (ref 70–99)
HCO3 SERPL-SCNC: 24 MMOL/L (ref 22–29)
HCT VFR BLD AUTO: 27.1 % (ref 35–47)
HGB BLD-MCNC: 9.1 G/DL (ref 11.7–15.7)
IRON BINDING CAPACITY (ROCHE): 151 UG/DL (ref 240–430)
IRON SATN MFR SERPL: 21 % (ref 15–46)
IRON SERPL-MCNC: 31 UG/DL (ref 37–145)
MCH RBC QN AUTO: 34 PG (ref 26.5–33)
MCHC RBC AUTO-ENTMCNC: 33.6 G/DL (ref 31.5–36.5)
MCV RBC AUTO: 101 FL (ref 78–100)
PLATELET # BLD AUTO: 365 10E3/UL (ref 150–450)
POTASSIUM SERPL-SCNC: 3.2 MMOL/L (ref 3.4–5.3)
RBC # BLD AUTO: 2.68 10E6/UL (ref 3.8–5.2)
SODIUM SERPL-SCNC: 135 MMOL/L (ref 135–145)
WBC # BLD AUTO: 8.5 10E3/UL (ref 4–11)

## 2025-07-31 PROCEDURE — 82728 ASSAY OF FERRITIN: CPT | Performed by: STUDENT IN AN ORGANIZED HEALTH CARE EDUCATION/TRAINING PROGRAM

## 2025-07-31 PROCEDURE — 250N000013 HC RX MED GY IP 250 OP 250 PS 637: Performed by: STUDENT IN AN ORGANIZED HEALTH CARE EDUCATION/TRAINING PROGRAM

## 2025-07-31 PROCEDURE — 99233 SBSQ HOSP IP/OBS HIGH 50: CPT | Performed by: STUDENT IN AN ORGANIZED HEALTH CARE EDUCATION/TRAINING PROGRAM

## 2025-07-31 PROCEDURE — 80048 BASIC METABOLIC PNL TOTAL CA: CPT | Performed by: INTERNAL MEDICINE

## 2025-07-31 PROCEDURE — 120N000001 HC R&B MED SURG/OB

## 2025-07-31 PROCEDURE — 999N000157 HC STATISTIC RCP TIME EA 10 MIN

## 2025-07-31 PROCEDURE — 76770 US EXAM ABDO BACK WALL COMP: CPT

## 2025-07-31 PROCEDURE — 94660 CPAP INITIATION&MGMT: CPT

## 2025-07-31 PROCEDURE — 250N000013 HC RX MED GY IP 250 OP 250 PS 637: Performed by: INTERNAL MEDICINE

## 2025-07-31 PROCEDURE — 99222 1ST HOSP IP/OBS MODERATE 55: CPT | Performed by: INTERNAL MEDICINE

## 2025-07-31 PROCEDURE — 71045 X-RAY EXAM CHEST 1 VIEW: CPT

## 2025-07-31 PROCEDURE — 36415 COLL VENOUS BLD VENIPUNCTURE: CPT | Performed by: INTERNAL MEDICINE

## 2025-07-31 PROCEDURE — 99232 SBSQ HOSP IP/OBS MODERATE 35: CPT | Performed by: STUDENT IN AN ORGANIZED HEALTH CARE EDUCATION/TRAINING PROGRAM

## 2025-07-31 PROCEDURE — 82310 ASSAY OF CALCIUM: CPT | Performed by: INTERNAL MEDICINE

## 2025-07-31 PROCEDURE — 250N000011 HC RX IP 250 OP 636: Performed by: STUDENT IN AN ORGANIZED HEALTH CARE EDUCATION/TRAINING PROGRAM

## 2025-07-31 PROCEDURE — 85014 HEMATOCRIT: CPT | Performed by: INTERNAL MEDICINE

## 2025-07-31 PROCEDURE — 83550 IRON BINDING TEST: CPT | Performed by: STUDENT IN AN ORGANIZED HEALTH CARE EDUCATION/TRAINING PROGRAM

## 2025-07-31 PROCEDURE — 250N000009 HC RX 250: Performed by: STUDENT IN AN ORGANIZED HEALTH CARE EDUCATION/TRAINING PROGRAM

## 2025-07-31 RX ORDER — FUROSEMIDE 10 MG/ML
40 INJECTION INTRAMUSCULAR; INTRAVENOUS
Status: DISPENSED | OUTPATIENT
Start: 2025-07-31

## 2025-07-31 RX ORDER — CARVEDILOL 25 MG/1
25 TABLET ORAL 2 TIMES DAILY WITH MEALS
Status: DISPENSED | OUTPATIENT
Start: 2025-07-31

## 2025-07-31 RX ORDER — FUROSEMIDE 10 MG/ML
40 INJECTION INTRAMUSCULAR; INTRAVENOUS ONCE
Status: COMPLETED | OUTPATIENT
Start: 2025-07-31 | End: 2025-07-31

## 2025-07-31 RX ORDER — ISOSORBIDE DINITRATE 10 MG/1
40 TABLET ORAL
Status: DISPENSED | OUTPATIENT
Start: 2025-07-31

## 2025-07-31 RX ORDER — CARVEDILOL 12.5 MG/1
12.5 TABLET ORAL ONCE
Status: COMPLETED | OUTPATIENT
Start: 2025-07-31 | End: 2025-07-31

## 2025-07-31 RX ADMIN — FUROSEMIDE 40 MG: 10 INJECTION, SOLUTION INTRAMUSCULAR; INTRAVENOUS at 16:35

## 2025-07-31 RX ADMIN — FUROSEMIDE 40 MG: 10 INJECTION, SOLUTION INTRAMUSCULAR; INTRAVENOUS at 08:34

## 2025-07-31 RX ADMIN — ISOSORBIDE DINITRATE 40 MG: 10 TABLET ORAL at 13:09

## 2025-07-31 RX ADMIN — ISOSORBIDE DINITRATE 40 MG: 10 TABLET ORAL at 09:25

## 2025-07-31 RX ADMIN — NIFEDIPINE 90 MG: 90 TABLET, EXTENDED RELEASE ORAL at 09:25

## 2025-07-31 RX ADMIN — SODIUM BICARBONATE 1300 MG: 650 TABLET ORAL at 20:46

## 2025-07-31 RX ADMIN — HYDRALAZINE HYDROCHLORIDE 50 MG: 50 TABLET ORAL at 02:03

## 2025-07-31 RX ADMIN — CLONIDINE 1 PATCH: 0.3 PATCH, EXTENDED RELEASE TRANSDERMAL at 14:32

## 2025-07-31 RX ADMIN — PAROXETINE HYDROCHLORIDE 40 MG: 20 TABLET, FILM COATED ORAL at 09:23

## 2025-07-31 RX ADMIN — CLOPIDOGREL BISULFATE 75 MG: 75 TABLET, FILM COATED ORAL at 09:23

## 2025-07-31 RX ADMIN — HYDRALAZINE HYDROCHLORIDE 50 MG: 50 TABLET ORAL at 18:35

## 2025-07-31 RX ADMIN — ALBUTEROL SULFATE 2.5 MG: 2.5 SOLUTION RESPIRATORY (INHALATION) at 21:04

## 2025-07-31 RX ADMIN — CARVEDILOL 25 MG: 25 TABLET, FILM COATED ORAL at 18:35

## 2025-07-31 RX ADMIN — ICOSAPENT ETHYL 2 G: 1 CAPSULE ORAL at 19:24

## 2025-07-31 RX ADMIN — MECLIZINE HYDROCHLORIDE 25 MG: 25 TABLET ORAL at 18:35

## 2025-07-31 RX ADMIN — SODIUM BICARBONATE 1300 MG: 650 TABLET ORAL at 09:23

## 2025-07-31 RX ADMIN — MECLIZINE HYDROCHLORIDE 25 MG: 25 TABLET ORAL at 05:06

## 2025-07-31 RX ADMIN — FAMOTIDINE 20 MG: 20 TABLET, FILM COATED ORAL at 09:23

## 2025-07-31 RX ADMIN — CARVEDILOL 12.5 MG: 12.5 TABLET, FILM COATED ORAL at 13:09

## 2025-07-31 RX ADMIN — HYDRALAZINE HYDROCHLORIDE 50 MG: 50 TABLET ORAL at 09:23

## 2025-07-31 RX ADMIN — ICOSAPENT ETHYL 2 G: 1 CAPSULE ORAL at 13:10

## 2025-07-31 RX ADMIN — MECLIZINE HYDROCHLORIDE 25 MG: 25 TABLET ORAL at 13:09

## 2025-07-31 RX ADMIN — CEFTRIAXONE 1 G: 1 INJECTION, POWDER, FOR SOLUTION INTRAMUSCULAR; INTRAVENOUS at 05:06

## 2025-07-31 RX ADMIN — ISOSORBIDE DINITRATE 40 MG: 10 TABLET ORAL at 18:35

## 2025-07-31 RX ADMIN — CARVEDILOL 12.5 MG: 12.5 TABLET, FILM COATED ORAL at 09:23

## 2025-07-31 ASSESSMENT — ACTIVITIES OF DAILY LIVING (ADL)
ADLS_ACUITY_SCORE: 62
ADLS_ACUITY_SCORE: 65
ADLS_ACUITY_SCORE: 62
ADLS_ACUITY_SCORE: 65
ADLS_ACUITY_SCORE: 62
ADLS_ACUITY_SCORE: 62
ADLS_ACUITY_SCORE: 65
ADLS_ACUITY_SCORE: 65
ADLS_ACUITY_SCORE: 62
ADLS_ACUITY_SCORE: 62
ADLS_ACUITY_SCORE: 65
ADLS_ACUITY_SCORE: 65
DEPENDENT_IADLS:: INDEPENDENT
ADLS_ACUITY_SCORE: 65

## 2025-07-31 NOTE — CONSULTS
[facility]    Cardiology Consultation     Maria Alejandra Buck MRN#: [id number]   YOB: 1950 Age: 75 year old     Date of Admission:  7/28/2025    Consult Indication:  HTN urgency, pulmonary edema    Assessment & Plan     # HTN urgency/emergency  # Pulmonary edema   # CKD 4  # Elevated troponin, mild, flat trajectory, no angina, consistent with demand ischemia   # HL  # AAA s/p EVAR  # Right renal artery stenosis s/p stenting   # Moderate mitral stenosis, moderate MR  # Carotid artery stenosis s/p left CEA   # Possible paroxsymal atrial fibrillation, mentioned in chart but no clear documentation     - Increase carvedilol 12.5 mg twice daily to 25 mg twice daily, this will help with blood pressure control, also lower heart rate which will help given the moderate mitral stenosis  - Reasonable to continue the clonidine patch  - Continue hydralazine 50 mg 3 times daily, this can be uptitrated if needed  - Agree with increasing Isordil 20 mg 3 times daily to 40 mg 3 times daily  - Continue nifedipine 90 mg daily  - Diuretics per Nephrology  - Cardiology will follow    Please do not hesitate to page with any questions or concerns.     Josemanuel Cohen MD, [facility]  Cardiology  July 31, 2025    Voice recognition software utilized.   Moderate complexity     History of Present Illness     Patient is a 75-year-old female with a history of CAD, CKD stage IV, hypertension, hyperlipidemia, paroxysmal atrial fibrillation, abdominal aortic aneurysm with prior stenting, recent hospitalization for fall with pelvic fracture, hereditary hemochromatosis, presented on 7/28/2025 with dyspnea and orthopnea.      Reviewed notes through care everywhere, several recent hospitalizations.  Discharge summary from 5/24/2025 reviewed, patient was seen by her ENT earlier on the day of presentation was found to be markedly hypertensive, /112 mmHg.  She was admitted for further management of the  hypertensive urgency, at that time she was also found to have ROMI on CKD.  She had been on carvedilol and amlodipine at baseline carvedilol was increased per Nephrology, hydralazine was started, renal Doppler ultrasound negative for renal artery stenosis.  LVEF 65% grade 1 diastolic dysfunction.    Patient was hospitalized again 6/2025, reviewed discharge summary, presented with hypertension and lower extremity swelling, as well as ROMI on CKD.  Nephrology was consulted.  Patient was treated with diuretics, amlodipine was changed to nifedipine, started on Isordil, and HCTZ.      With that background in mind patient presented again to the ED on 7/28/2025 with worsening dyspnea, orthopnea, lower extremity swelling.  No chest pain.  In the ED BP was as high as 186/93 mmHg.  Reviewed ECG demonstrating sinus rhythm at 77 bpm with PACs, nonspecific ST segment changes, no clear ischemic changes.  QTc prolonged at 545 ms.  Labs notable for potassium 3.2, creatinine 4.25, NT proBNP 25,000, high-sensitivity troponin 65 and flat in trajectory.  Hemoglobin 9.1.  D-dimer 3.15.  VQ scan negative.  Initially treated in the ICU with BiPAP and nitroglycerin gtt.  Both of which have been discontinued.  Yesterday had been on carvedilol 12.5 mg twice daily, hydralazine 50 mg 3 times daily, nifedipine 90 mg daily, Isordil 20 mg 3 times daily, clonidine patch 0.2 mg per 24 hours.    TTE 7/29/2025  Interpretation Summary     The left ventricle visual ejection fraction is 55-60%.In some views distal  lateral and distal inferolateral wallhypokinesia  Diastolic Doppler findings (E/E' ratio and/or other parameters) suggest left  ventricular filling pressures are increased.  The right ventricular systolic function is normal.  The left atrium is severely dilated.  Restricted posterior mitral valve leaflet noted.There is moderate mitral  stenosis- Mean gradients 9 mm hg.There is moderate (2+) mitral regurgitation.  There is mild (1+) tricuspid  regurgitation.  Pulmonary hypertension- RVSP 37 mm hg +RA.  IVC diameter <2.1 cm collapsing >50% with sniff suggests a normal RA pressure  of 3 mmHg.      Past Medical History   Past Medical History:   Diagnosis Date    Aneurysm of renal artery     left    Atrial fibrillation (H)     COPD (chronic obstructive pulmonary disease) (H)     High cholesterol     Hypertension     Mixed hyperlipidemia     PVD (peripheral vascular disease)     Stenosis of left carotid artery     Tobacco use disorder        Past Surgical History   Past Surgical History:   Procedure Laterality Date    ABDOMEN SURGERY      aneurism      left femoral, mesh    ENDARTERECTOMY CAROTID Left 7/17/2018    Procedure: ENDARTERECTOMY CAROTID;  LEFT CAROTID ENDARTERECTOMY WITH EEG;  Surgeon: Jorge Posada MD;  Location: [facility] OR    GENITOURINARY SURGERY      stent rt kidney    IR RENAL BIOPSY LEFT  6/16/2025       Prior to Admission Medications   Prior to Admission Medications   Prescriptions Last Dose Informant Patient Reported? Taking?   HYDROmorphone (DILAUDID) 2 MG tablet 7/27/2025 Morning  No Yes   Sig: Take 1 tablet (2 mg) by mouth daily (with breakfast). May also take 0.5-1 tablets (1-2 mg) 4 times daily as needed (pain 4-6/10 = 1 mg and pain 7-10/10 = 2 mg).   Lidocaine (LIDOCARE) 4 % Patch 7/27/2025 Evening  No Yes   Sig: Place 2 patches over 12 hours onto the skin every 24 hours. To prevent lidocaine toxicity, patient should be patch free for 12 hrs daily.  Apply to affected area.   Multiple Vitamins-Minerals (MULTIVITAMIN WOMEN PO) 7/27/2025 Morning  Yes Yes   Sig: Take 1 tablet by mouth daily.   NIFEdipine ER OSMOTIC (ADALAT CC) 90 MG 24 hr tablet 7/27/2025 Morning  No Yes   Sig: Take 1 tablet (90 mg) by mouth daily.   PARoxetine (PAXIL) 40 MG tablet 7/27/2025 Morning Self Yes Yes   Sig: Take 40 mg by mouth daily.   albuterol (PROVENTIL) (2.5 MG/3ML) 0.083% neb solution   Yes Yes   Sig: Take 2.5 mg by nebulization every 6 hours as needed  for shortness of breath, wheezing or cough.   carvedilol (COREG) 25 MG tablet 7/27/2025 Evening  Yes Yes   Sig: Take 12.5 mg by mouth 2 times daily (with meals).   cloNIDine (CATAPRES-TTS3) 0.3 MG/24HR WK patch 7/24/2025 Morning  No Yes   Sig: Place 1 patch over 168 hours onto the skin once a week.   clopidogrel (PLAVIX) 75 MG tablet 7/27/2025 Morning  Yes Yes   Sig: Take 75 mg by mouth daily.   famotidine (PEPCID) 40 MG tablet 7/27/2025 Morning  Yes Yes   Sig: Take 40 mg by mouth daily.   hydrALAZINE (APRESOLINE) 50 MG tablet 7/27/2025 Evening  No Yes   Sig: Take 1 tablet (50 mg) by mouth 3 times daily.   icosapent ethyl (VASCEPA) 1 g CAPS capsule 7/27/2025 Evening  Yes Yes   Sig: Take 2 g by mouth 2 times daily (with meals).   isosorbide dinitrate (ISORDIL) 20 MG tablet 7/27/2025 Evening  Yes Yes   Sig: Take 20 mg by mouth 3 times daily.   meclizine (ANTIVERT) 25 MG tablet 7/27/2025 Evening  No Yes   Sig: Take 1 tablet (25 mg) by mouth every 6 hours.   methocarbamol (ROBAXIN) 500 MG tablet   No Yes   Sig: Take 1 tablet (500 mg) by mouth 3 times daily as needed for muscle spasms.   ondansetron (ZOFRAN ODT) 4 MG ODT tab   No Yes   Sig: Take 1 tablet (4 mg) by mouth every 6 hours as needed.   senna-docusate (SENOKOT-S/PERICOLACE) 8.6-50 MG tablet   No Yes   Sig: Take 1 tablet by mouth 2 times daily as needed for constipation.   sodium bicarbonate 650 MG tablet 7/27/2025 Evening  No Yes   Sig: Take 2 tablets (1,300 mg) by mouth 2 times daily.      Facility-Administered Medications: None     Current Facility-Administered Medications   Medication Dose Route Frequency Provider Last Rate Last Admin    acetaminophen (TYLENOL) tablet 650 mg  650 mg Oral Q4H PRN Virgilio Patel MD        Or    acetaminophen (TYLENOL) Suppository 650 mg  650 mg Rectal Q4H PRN Virgilio Patel MD        albuterol (PROVENTIL) neb solution 2.5 mg  2.5 mg Nebulization Q6H PRN Virgilio Patel MD   2.5 mg at 07/29/25 2025    carboxymethylcellulose PF  (REFRESH PLUS) 0.5 % ophthalmic solution 1 drop  1 drop Both Eyes Q1H PRN Gilmer Swain MD        carboxymethylcellulose PF (REFRESH PLUS) 0.5 % ophthalmic solution 1 drop  1 drop Both Eyes Q1H PRN Nhan Wilkerson MD        carvedilol (COREG) tablet 12.5 mg  12.5 mg Oral BID w/meals Virgilio Patel MD   12.5 mg at 07/31/25 0923    cefTRIAXone (ROCEPHIN) 1 g vial to attach to  mL bag for ADULTS or NS 50 mL bag for PEDS  1 g Intravenous Q24H Virgilio Patel MD   1 g at 07/31/25 0506    cloNIDine (CATAPRES-TTS3) 0.3 MG/24HR WK patch 1 patch  1 patch Transdermal Weekly Virgilio Patel MD        And    cloNIDine (CATAPRES-TTS1) Patch in Place   Transdermal Q8H [facility] Virgilio Patel MD        clopidogrel (PLAVIX) tablet 75 mg  75 mg Oral Daily Virgilio Patel MD   75 mg at 07/31/25 0923    glucose gel 15-30 g  15-30 g Oral Q15 Min PRN Virgilio Patel MD        Or    dextrose 50 % injection 25-50 mL  25-50 mL Intravenous Q15 Min PRN Virgilio Patel MD        Or    glucagon injection 1 mg  1 mg Subcutaneous Q15 Min PRN Virgilio Patel MD        diphenhydrAMINE (BENADRYL) 25 mg, acetaminophen (TYLENOL) 500 mg alternative for Tylenol PM   Oral At Bedtime PRN Jonathan Light MD        famotidine (PEPCID) tablet 20 mg  20 mg Oral Q48H Jonathan Light MD   20 mg at 07/31/25 0923    furosemide (LASIX) injection 40 mg  40 mg Intravenous BID Vee Encinas MD        [Held by provider] furosemide (LASIX) tablet 40 mg  40 mg Oral BID Vee Encinas MD   40 mg at 07/30/25 1534    HOLD: All Oral Medications   Does not apply HOLD Nhan Wilkerson MD        hydrALAZINE (APRESOLINE) tablet 50 mg  50 mg Oral TID Jonahtan Light MD   50 mg at 07/31/25 0923    icosapent ethyl (VASCEPA) capsule 2 g  2 g Oral BID w/meals Virgilio Patel MD   2 g at 07/30/25 1722    isosorbide dinitrate (ISORDIL) tablet 40 mg  40 mg Oral TID Jonathan Light MD   40 mg at 07/31/25 0925    lidocaine (LMX4) cream   Topical Q1H PRN  Gilmer Swain MD        lidocaine 1 % 0.1-1 mL  0.1-1 mL Other Q1H PRN Gilmer Swain MD        meclizine (ANTIVERT) tablet 25 mg  25 mg Oral Q6H Virgilio Patel MD   25 mg at 07/31/25 0506    methocarbamol (ROBAXIN) tablet 500 mg  500 mg Oral TID PRN Virgilio Patel MD        NIFEdipine ER OSMOTIC (PROCARDIA XL) 24 hr tablet 90 mg  90 mg Oral Daily Virgilio Patel MD   90 mg at 07/31/25 0925    nitroGLYcerin 50 mg in D5W 250 mL (adult std) infusion   mcg/min Intravenous Continuous Nhan Wilkerson MD   Stopped at 07/28/25 2045    No lozenges or gum should be given while patient on BIPAP/AVAPS/AVAPS AE   Does not apply Continuous PRN Gilmer Swain MD        No lozenges or gum should be given while patient on BIPAP/AVAPS/AVAPS AE   Does not apply Continuous PRN Nhan Wilkerson MD        ondansetron (ZOFRAN ODT) ODT tab 4 mg  4 mg Oral Q6H PRN Virgilio Patel MD        PARoxetine (PAXIL) tablet 40 mg  40 mg Oral Daily Virgilio Patel MD   40 mg at 07/31/25 0923    Patient may continue current oral medications   Does not apply Continuous PRN Gilmer Swain MD        sodium bicarbonate tablet 1,300 mg  1,300 mg Oral BID Virgilio Patel MD   1,300 mg at 07/31/25 0923    sodium chloride (PF) 0.9% PF flush 3 mL  3 mL Intracatheter Q8H CHIDI Gilmer Swain MD   3 mL at 07/31/25 0506    sodium chloride (PF) 0.9% PF flush 3 mL  3 mL Intracatheter q1 min prn Gilmer Swain MD         Current Facility-Administered Medications   Medication Dose Route Frequency Provider Last Rate Last Admin    acetaminophen (TYLENOL) tablet 650 mg  650 mg Oral Q4H PRN Virgilio Patel MD        Or    acetaminophen (TYLENOL) Suppository 650 mg  650 mg Rectal Q4H PRN Virgilio Patel MD        albuterol (PROVENTIL) neb solution 2.5 mg  2.5 mg Nebulization Q6H PRN Virgilio Patel MD   2.5 mg at 07/29/25 2025    carboxymethylcellulose PF (REFRESH PLUS) 0.5 %  ophthalmic solution 1 drop  1 drop Both Eyes Q1H PRN Gilmer Swain MD        carboxymethylcellulose PF (REFRESH PLUS) 0.5 % ophthalmic solution 1 drop  1 drop Both Eyes Q1H PRN Nhan Wilkerson MD        carvedilol (COREG) tablet 12.5 mg  12.5 mg Oral BID w/meals Virgilio Patel MD   12.5 mg at 07/31/25 0923    cefTRIAXone (ROCEPHIN) 1 g vial to attach to  mL bag for ADULTS or NS 50 mL bag for PEDS  1 g Intravenous Q24H Virgilio Patel MD   1 g at 07/31/25 0506    cloNIDine (CATAPRES-TTS3) 0.3 MG/24HR WK patch 1 patch  1 patch Transdermal Weekly Virgilio Patel MD        And    cloNIDine (CATAPRES-TTS1) Patch in Place   Transdermal Q8H [facility]I Virgilio Patel MD        clopidogrel (PLAVIX) tablet 75 mg  75 mg Oral Daily Virgilio Patel MD   75 mg at 07/31/25 0923    glucose gel 15-30 g  15-30 g Oral Q15 Min PRN Virgilio Patel MD        Or    dextrose 50 % injection 25-50 mL  25-50 mL Intravenous Q15 Min PRN Virgilio Patel MD        Or    glucagon injection 1 mg  1 mg Subcutaneous Q15 Min PRN Virgilio Patel MD        diphenhydrAMINE (BENADRYL) 25 mg, acetaminophen (TYLENOL) 500 mg alternative for Tylenol PM   Oral At Bedtime PRN Jonathan Light MD        famotidine (PEPCID) tablet 20 mg  20 mg Oral Q48H Jonathan Light MD   20 mg at 07/31/25 0923    furosemide (LASIX) injection 40 mg  40 mg Intravenous BID Vee Encinas MD        [Held by provider] furosemide (LASIX) tablet 40 mg  40 mg Oral BID Vee Encinas MD   40 mg at 07/30/25 1534    HOLD: All Oral Medications   Does not apply HOLD Nhan Wilkerson MD        hydrALAZINE (APRESOLINE) tablet 50 mg  50 mg Oral TID Jonathan Light MD   50 mg at 07/31/25 0923    icosapent ethyl (VASCEPA) capsule 2 g  2 g Oral BID w/meals Virgilio Patel MD   2 g at 07/30/25 1722    isosorbide dinitrate (ISORDIL) tablet 40 mg  40 mg Oral TID Jonathan Light MD   40 mg at 07/31/25 0925    lidocaine (LMX4) cream   Topical Q1H PRN Gilmer Swian  MD        lidocaine 1 % 0.1-1 mL  0.1-1 mL Other Q1H PRN Gilmer Swain MD        meclizine (ANTIVERT) tablet 25 mg  25 mg Oral Q6H Virgilio Patel MD   25 mg at 07/31/25 0506    methocarbamol (ROBAXIN) tablet 500 mg  500 mg Oral TID PRN Virgilio Patel MD        NIFEdipine ER OSMOTIC (PROCARDIA XL) 24 hr tablet 90 mg  90 mg Oral Daily Virgilio Patel MD   90 mg at 07/31/25 0925    nitroGLYcerin 50 mg in D5W 250 mL (adult std) infusion   mcg/min Intravenous Continuous Nhan Wilkerson MD   Stopped at 07/28/25 2045    No lozenges or gum should be given while patient on BIPAP/AVAPS/AVAPS AE   Does not apply Continuous PRN Gilmer Swain MD        No lozenges or gum should be given while patient on BIPAP/AVAPS/AVAPS AE   Does not apply Continuous PRN Nhan Wilkerson MD        ondansetron (ZOFRAN ODT) ODT tab 4 mg  4 mg Oral Q6H PRN Virgilio Patel MD        PARoxetine (PAXIL) tablet 40 mg  40 mg Oral Daily Virgilio Patel MD   40 mg at 07/31/25 0923    Patient may continue current oral medications   Does not apply Continuous PRN Gilmer Swain MD        sodium bicarbonate tablet 1,300 mg  1,300 mg Oral BID Virgilio Patel MD   1,300 mg at 07/31/25 0923    sodium chloride (PF) 0.9% PF flush 3 mL  3 mL Intracatheter Q8H CHIDI Gilmer Swain MD   3 mL at 07/31/25 0506    sodium chloride (PF) 0.9% PF flush 3 mL  3 mL Intracatheter q1 min prn Gilmer Swain MD         Allergies   Allergies   Allergen Reactions    Iodine Hives       Social History    reports that she has been smoking. She has a 22.5 pack-year smoking history. She has never used smokeless tobacco. She reports current alcohol use. She reports that she does not use drugs.    Family History   I have reviewed this patient's family history and updated it with pertinent information if needed.  Family History   Problem Relation Age of Onset    Alzheimer Disease Mother     Cerebrovascular  Disease Father     Dementia Maternal Grandmother     Diabetes Brother     Alzheimer Disease Brother     Parkinsonism Brother     Cerebrovascular Disease Brother     Breast Cancer Sister     Heart Disease Sister           Review of Systems   A comprehensive review of system was performed and is negative other than that noted in the HPI or here.     Physical Exam   Vital Signs with Ranges  Temp:  [98.2  F (36.8  C)-100  F (37.8  C)] 98.4  F (36.9  C)  Pulse:  [66-89] 72  Resp:  [12-29] 14  BP: ()/(54-92) 169/82  FiO2 (%):  [5 %-35 %] 5 %  SpO2:  [89 %-99 %] 94 %  Wt Readings from Last 4 Encounters:   25 53.8 kg (118 lb 9.7 oz)   25 54.4 kg (120 lb)   25 54.1 kg (119 lb 3.2 oz)   25 53.5 kg (117 lb 14.4 oz)     I/O last 3 completed shifts:  In: 810 [P.O.:700; I.V.:110]  Out: 825 [Urine:825]    Vital signs were personally reviewed:  Temperatures:  Current - Temp: 98.4  F (36.9  C); Max - Temp  Av.8  F (37.1  C)  Min: 98.2  F (36.8  C)  Max: 100  F (37.8  C)  Respiration range: Resp  Av  Min: 12  Max: 29  Pulse range: Pulse  Av  Min: 66  Max: 89  Blood pressure range: Systolic (24hrs), Av , Min:99 , Max:169   ; Diastolic (24hrs), Av, Min:54, Max:92    Pulse oximetry range: SpO2  Av.4 %  Min: 89 %  Max: 99 %    Intake/Output Summary (Last 24 hours) at 2025 1146  Last data filed at 2025 0951  Gross per 24 hour   Intake 610 ml   Output 1125 ml   Net -515 ml     118 lbs 9.72 oz  Body mass index is 20.36 kg/m .   Body surface area is 1.56 meters squared.    Physical Exam:   General/Constitutional: appears stated age, in no apparent distress, appears to be well nourished  Respiratory: mildly reduced breath sounds bilat  Cardiovascular: RRR, no murmurs    Laboratory tests personally reviewed:   CMP  Recent Labs   Lab 25  0559 25  2004 25  1612 25  1153 25  0753 25  0703 25  0754 25  0706 25  1338  "07/28/25  0843 07/28/25  0537     --   --   --   --  137  --  137  --   --  136   POTASSIUM 3.2*  --   --   --   --  3.3*  --  3.4  --   --  3.2*   CHLORIDE 97*  --   --   --   --  99  --  98  --   --  95*   CO2 24  --   --   --   --  24  --  22  --   --  23   ANIONGAP 14  --   --   --   --  14  --  17*  --   --  18*   * 114* 143* 144*   < > 101*   < > 101*   < >  --  101*   BUN 56.6*  --   --   --   --  62.8*  --  59.0*  --   --  58.1*   CR 3.37*  --   --   --   --  3.75*  --  3.98*  --   --  4.25*   GFRESTIMATED 14*  --   --   --   --  12*  --  11*  --   --  10*   BRUCE 8.1*  --   --   --   --  8.0*  --  8.2*  --   --  8.0*   MAG  --   --   --   --   --   --   --   --   --  2.1 2.2    < > = values in this interval not displayed.     CBC  Recent Labs   Lab 07/31/25  0559 07/30/25  0703 07/29/25  0706 07/28/25  0537   WBC 8.5 7.8 7.6 7.7   RBC 2.68* 2.76* 2.68* 2.66*   HGB 9.1* 9.3* 9.1* 9.1*   HCT 27.1* 28.0* 26.6* 26.7*   * 101* 99 100   MCH 34.0* 33.7* 34.0* 34.2*   MCHC 33.6 33.2 34.2 34.1   RDW 15.5* 15.7* 15.7* 15.6*    377 385 349     INRNo lab results found in last 7 days.  No results found for: \"TROPI\", \"TROPONIN\", \"TROPR\", \"TROPN\"  Recent Labs   Lab Test 07/17/18  0630   CHOL 178   HDL 33*   LDL 72   TRIG 364*     Lab Results   Component Value Date    A1C 6.1 07/17/2018     No results found for: \"TSH\"    Clinically Significant Risk Factors        # Hypokalemia: Lowest K = 3.2 mmol/L in last 2 days, will replace as needed   # Hypochloremia: Lowest Cl = 97 mmol/L in last 2 days, will monitor as appropriate          # Hypertension: Noted on problem list             # Moderate Malnutrition: based on nutrition assessment and treatment provided per dietitian's recommendations., PRESENT ON ADMISSION         Clinically Significant Risk Factors        # Hypokalemia: Lowest K = 3.2 mmol/L in last 2 days, will replace as needed   # Hypochloremia: Lowest Cl = 97 mmol/L in last 2 days, will " monitor as appropriate          # Hypertension: Noted on problem list             # Moderate Malnutrition: based on nutrition assessment and treatment provided per dietitian's recommendations., PRESENT ON ADMISSION

## 2025-07-31 NOTE — PROGRESS NOTES
[facility]    Medicine Progress Note - Hospitalist Service    Date of Admission:  7/28/2025    Assessment & Plan   75-year-old female with history of f CAD, CKD stage IV, hypertension, hyperlipidemia, intra-abdominal aortic aneurysm with prior stenting, chronic vertigo, recent hospitalization for fall with pelvic fracture who presents the ED for shortness of breath.      Had elevated D-dimer but VQ scan negative for PE, CT not done due to CKD.  proBNP more than 25,000.  Chest x-ray showed bilateral perihilar interstitial/airspace opacities, left greater than right, are nonspecific for postradiation change versus pneumonia. A small nodule in the peripheral right upper lobe measures 9 mm.     Found to be in hypertensive emergency, admitted to ICU with BiPAP and nitroglycerin drip.  She was weaned off nitroglycerin drip and BiPAP but has had repeated episodes of rising blood pressure and shortness of breath needing her to go back on BiPAP intermittently.  Had another episode this morning and back on BiPAP and was given IV Lasix.    Acute hypoxic respiratory failure:  Diastolic CHF exacerbation:  Hypertensive emergency:  - Echo with EF of 55 to 60%.  Initially got Lasix for 40 mg IV x 1 and got off nitroglycerin drip but had repeated episodes of rising blood pressure and flash pulmonary edema.  Back on BiPAP today.  Chest x-ray showed extensive bilateral perihilar opacities consistent with pulmonary edema or pneumonia, clinically consistent with pulmonary edema.  Checking ultrasound for renal artery stenosis and consult cardiology.  - Continue carvedilol 12.5 mg twice daily, hydralazine 50 mg 3 times daily, nifedipine 90 mg daily and clonidine patch 0.3 mg per 24 hours.  Isordil increased from 20 mg 3 times daily to 40 mg 3 times daily.  -Was empirically started on ceftriaxone on presentation.  Patient had no fever or leukocytosis.  Mildly elevated procalcitonin of 0.67 which is difficult to  interpret in ROMI/CKD and improved to 0.49 on recheck.  Give ceftriaxone for 5 days.    ROMI on CKD stage IV:  Metabolic acidosis:  - Previous baseline of 1.3-1.5 which worsened in May/June 2025 and new baseline appears to be 3 with nephrotic range proteinuria.  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  - Creatinine on admission was 4.25, improved to 3.98--> 3.75--> 3.37.  Defer further diuretic management to nephrology.    - Checking renal ultrasound for renal artery stenosis.  - Continue prior to admission dose of sodium bicarbonate.    Chronic medical conditions:  - AAA.  Endoleak status post recent endovascular aneurysm repair on 4/25.  Continue Plavix and statin.  - History of CVA.  Chronic infarct in the right basal ganglia noted on CT.  - Right lung adenocarcinoma.  Status post stereotactic body radiation therapy in 2021, follows up in [place].  His lung nodule on x-ray, can follow-up with her primary oncologist.  - Chronic vertigo.  Follows up with ENT in [place] and on as needed meclizine.  MRI brain on 5/22 was negative for acute pathology.  - Depression pleasant lady.  Continue paroxetine.  - Hypertriglyceridemia.  On Vascepa.            Diet: Snacks/Supplements Adult: Nepro Oral Supplement; Between Meals  2 Gram Sodium Diet    DVT Prophylaxis: Pneumatic Compression Devices  Rao Catheter: Not present  Lines: None     Cardiac Monitoring: ACTIVE order. Indication: Acute decompensated heart failure (48 hours)  Code Status: Full Code      Clinically Significant Risk Factors        # Hypokalemia: Lowest K = 3.2 mmol/L in last 2 days, will replace as needed   # Hypochloremia: Lowest Cl = 97 mmol/L in last 2 days, will monitor as appropriate            # Hypertension: Noted on problem list             # Moderate Malnutrition: based on nutrition assessment and treatment provided per dietitian's recommendations., PRESENT ON ADMISSION          Social  Drivers of Health    Depression: At risk (6/5/2025)    Received from [facility]    PHQ-2     Depression Risk: 3   Tobacco Use: High Risk (7/15/2025)    Patient History     Smoking Tobacco Use: Every Day     Smokeless Tobacco Use: Never   Interpersonal Safety: Unknown (7/28/2025)    Interpersonal Safety     Do you feel physically and emotionally safe where you currently live?: Patient unable to answer     Within the past 12 months, have you been hit, slapped, kicked or otherwise physically hurt by someone?: Patient unable to answer     Within the past 12 months, have you been humiliated or emotionally abused in other ways by your partner or ex-partner?: Patient unable to answer          Disposition Plan     Medically Ready for Discharge: Anticipated Tomorrow             Jonathan Light MD  Hospitalist Service  [facility]  Securely message with [employer] (more info)  Text page via [employer] Paging/Directory   ______________________________________________________________________    Interval History   Again had worsening shortness of breath this morning with crackles on lung exam and needed to be put back on BiPAP.    Physical Exam   Vital Signs: Temp: 98.4  F (36.9  C) Temp src: Axillary BP: (!) 169/82 Pulse: 72   Resp: 14 SpO2: 94 % O2 Device: Nasal cannula Oxygen Delivery: 2 LPM  Weight: 118 lbs 9.72 oz    General Appearance: Alert awake and oriented x 3.  Has BiPAP.  Respiratory: Basilar crackles.  Cardiovascular: S1-S2 normal  GI: Soft and nontender  Skin: No rash  Other: Trace edema.      Medical Decision Making       60 MINUTES SPENT BY ME on the date of service doing chart review, history, exam, documentation & further activities per the note.      Data     I have personally reviewed the following data over the past 24 hrs:    8.5  \   9.1 (L)   / 365     135 97 (L) 56.6 (H) /  115 (H)   3.2 (L) 24 3.37 (H) \       Imaging results reviewed over the past 24 hrs:   Recent Results (from the past 24  hours)   XR Chest Port 1 View    Narrative    EXAM: XR CHEST PORT 1 VIEW  LOCATION: [facility]  DATE: 7/31/2025    INDICATION: CHF  COMPARISON: 7/30/2025      Impression    IMPRESSION: Extensive bilateral perihilar opacities minimally increased, may represent pulmonary edema or pneumonia. No pleural effusion. Normal heart size. Obscured pulmonary vascularity. Aortic calcifications..

## 2025-07-31 NOTE — PLAN OF CARE
"  Pertinent assessments: Pt alert & oriented. Lungs diminished, intermittent nonproductive dry cough.      Normal sinus rhythm w/ long QTC on telemetry. Blood pressure slightly elevated.      Bowels active, no BM this shift. NPO for renal U/S the morning.     External catheter in place, moderate urine output, incomplete emptying of bladder.     Major Shift Events: Pt anxious and reported SOB, pt tachypneic.   Placed on BIPAP for about 10 min and requested to switch back to Nasal cannula.   On 2L nasal cannula all night.     Plan (Upcoming Events): Renal U/S today.   Discharge/Transfer Needs: TCU for rehab when medically clear.        Goal Outcome Evaluation:      Plan of Care Reviewed With: patient    Overall Patient Progress: improvingOverall Patient Progress: improving    Outcome Evaluation: Pt on 2L/NC all night. Anxious at time.        Problem: Adult Inpatient Plan of Care  Goal: Plan of Care Review  Description: The Plan of Care Review/Shift note should be completed every shift.  The Outcome Evaluation is a brief statement about your assessment that the patient is improving, declining, or no change.  This information will be displayed automatically on your shift  note.  Outcome: Progressing  Flowsheets (Taken 7/31/2025 0606)  Outcome Evaluation: Pt on 2L/NC all night. Anxious at time.  Plan of Care Reviewed With: patient  Overall Patient Progress: improving  Goal: Patient-Specific Goal (Individualized)  Description: You can add care plan individualizations to a care plan. Examples of Individualization might be:  \"Parent requests to be called daily at 9am for status\", \"I have a hard time hearing out of my right ear\", or \"Do not touch me to wake me up as it startles  me\".  Outcome: Progressing  Goal: Absence of Hospital-Acquired Illness or Injury  Outcome: Progressing  Intervention: Identify and Manage Fall Risk  Recent Flowsheet Documentation  Taken 7/31/2025 0400 by Kristy Wade, RN  Safety " Promotion/Fall Prevention:   assistive device/personal items within reach   clutter free environment maintained   increase visualization of patient   room near nurse's station   safety round/check completed  Taken 7/30/2025 2030 by Kristy Wade RN  Safety Promotion/Fall Prevention:   assistive device/personal items within reach   clutter free environment maintained   increase visualization of patient   room near nurse's station   safety round/check completed  Intervention: Prevent Skin Injury  Recent Flowsheet Documentation  Taken 7/31/2025 0400 by Kristy Wade RN  Body Position:   left   position changed independently  Skin Protection:   adhesive use limited   transparent dressing maintained   tubing/devices free from skin contact  Taken 7/31/2025 0200 by Kristy Wade RN  Body Position: position changed independently  Taken 7/30/2025 2245 by Kristy Wade RN  Body Position:   right   position changed independently   lower extremity elevated  Taken 7/30/2025 2030 by Kristy Wade RN  Body Position:   supine, head elevated   weight shifting   upper extremity elevated  Skin Protection:   adhesive use limited   transparent dressing maintained   tubing/devices free from skin contact  Intervention: Prevent and Manage VTE (Venous Thromboembolism) Risk  Recent Flowsheet Documentation  Taken 7/31/2025 0400 by Kristy Wade RN  VTE Prevention/Management: SCDs off (sequential compression devices)  Taken 7/30/2025 2030 by Kristy Wade RN  VTE Prevention/Management: SCDs on (sequential compression devices)  Goal: Optimal Comfort and Wellbeing  Outcome: Progressing  Intervention: Provide Person-Centered Care  Recent Flowsheet Documentation  Taken 7/31/2025 0400 by Kristy Wade RN  Trust Relationship/Rapport:   care explained   choices provided   questions answered   questions encouraged   thoughts/feelings acknowledged  Taken 7/30/2025 2030 by Sheri  Kristy CONDE RN  Trust Relationship/Rapport:   care explained   choices provided   questions answered   questions encouraged   thoughts/feelings acknowledged  Goal: Readiness for Transition of Care  Outcome: Progressing

## 2025-08-01 LAB
ANION GAP SERPL CALCULATED.3IONS-SCNC: 16 MMOL/L (ref 7–15)
BUN SERPL-MCNC: 55.5 MG/DL (ref 8–23)
CALCIUM SERPL-MCNC: 8.5 MG/DL (ref 8.8–10.4)
CHLORIDE SERPL-SCNC: 96 MMOL/L (ref 98–107)
CREAT SERPL-MCNC: 3.15 MG/DL (ref 0.51–0.95)
EGFRCR SERPLBLD CKD-EPI 2021: 15 ML/MIN/1.73M2
ERYTHROCYTE [DISTWIDTH] IN BLOOD BY AUTOMATED COUNT: 15.2 % (ref 10–15)
GLUCOSE SERPL-MCNC: 123 MG/DL (ref 70–99)
HCO3 SERPL-SCNC: 25 MMOL/L (ref 22–29)
HCT VFR BLD AUTO: 27.5 % (ref 35–47)
HGB BLD-MCNC: 9.2 G/DL (ref 11.7–15.7)
MAGNESIUM SERPL-MCNC: 1.9 MG/DL (ref 1.7–2.3)
MCH RBC QN AUTO: 34.1 PG (ref 26.5–33)
MCHC RBC AUTO-ENTMCNC: 33.5 G/DL (ref 31.5–36.5)
MCV RBC AUTO: 102 FL (ref 78–100)
PLATELET # BLD AUTO: 397 10E3/UL (ref 150–450)
POTASSIUM SERPL-SCNC: 3.2 MMOL/L (ref 3.4–5.3)
POTASSIUM SERPL-SCNC: 3.3 MMOL/L (ref 3.4–5.3)
RBC # BLD AUTO: 2.7 10E6/UL (ref 3.8–5.2)
SODIUM SERPL-SCNC: 137 MMOL/L (ref 135–145)
WBC # BLD AUTO: 9.2 10E3/UL (ref 4–11)

## 2025-08-01 PROCEDURE — 83735 ASSAY OF MAGNESIUM: CPT | Performed by: INTERNAL MEDICINE

## 2025-08-01 PROCEDURE — 250N000013 HC RX MED GY IP 250 OP 250 PS 637: Performed by: STUDENT IN AN ORGANIZED HEALTH CARE EDUCATION/TRAINING PROGRAM

## 2025-08-01 PROCEDURE — 36415 COLL VENOUS BLD VENIPUNCTURE: CPT | Performed by: INTERNAL MEDICINE

## 2025-08-01 PROCEDURE — 250N000013 HC RX MED GY IP 250 OP 250 PS 637: Performed by: INTERNAL MEDICINE

## 2025-08-01 PROCEDURE — 999N000157 HC STATISTIC RCP TIME EA 10 MIN

## 2025-08-01 PROCEDURE — 84132 ASSAY OF SERUM POTASSIUM: CPT | Performed by: INTERNAL MEDICINE

## 2025-08-01 PROCEDURE — 250N000013 HC RX MED GY IP 250 OP 250 PS 637: Performed by: NURSE PRACTITIONER

## 2025-08-01 PROCEDURE — 99233 SBSQ HOSP IP/OBS HIGH 50: CPT | Performed by: STUDENT IN AN ORGANIZED HEALTH CARE EDUCATION/TRAINING PROGRAM

## 2025-08-01 PROCEDURE — 99232 SBSQ HOSP IP/OBS MODERATE 35: CPT | Mod: FS | Performed by: NURSE PRACTITIONER

## 2025-08-01 PROCEDURE — 99233 SBSQ HOSP IP/OBS HIGH 50: CPT | Performed by: INTERNAL MEDICINE

## 2025-08-01 PROCEDURE — 80048 BASIC METABOLIC PNL TOTAL CA: CPT | Performed by: INTERNAL MEDICINE

## 2025-08-01 PROCEDURE — 120N000001 HC R&B MED SURG/OB

## 2025-08-01 PROCEDURE — 250N000011 HC RX IP 250 OP 636: Performed by: STUDENT IN AN ORGANIZED HEALTH CARE EDUCATION/TRAINING PROGRAM

## 2025-08-01 PROCEDURE — 85018 HEMOGLOBIN: CPT | Performed by: INTERNAL MEDICINE

## 2025-08-01 PROCEDURE — 94660 CPAP INITIATION&MGMT: CPT

## 2025-08-01 RX ORDER — POTASSIUM CHLORIDE 1500 MG/1
20 TABLET, EXTENDED RELEASE ORAL ONCE
Status: COMPLETED | OUTPATIENT
Start: 2025-08-01 | End: 2025-08-02

## 2025-08-01 RX ORDER — POTASSIUM CHLORIDE 1500 MG/1
20 TABLET, EXTENDED RELEASE ORAL ONCE
Status: COMPLETED | OUTPATIENT
Start: 2025-08-01 | End: 2025-08-01

## 2025-08-01 RX ADMIN — SODIUM BICARBONATE 1300 MG: 650 TABLET ORAL at 09:11

## 2025-08-01 RX ADMIN — MECLIZINE HYDROCHLORIDE 25 MG: 25 TABLET ORAL at 00:32

## 2025-08-01 RX ADMIN — HYDRALAZINE HYDROCHLORIDE 50 MG: 50 TABLET ORAL at 09:11

## 2025-08-01 RX ADMIN — SODIUM BICARBONATE 1300 MG: 650 TABLET ORAL at 20:54

## 2025-08-01 RX ADMIN — ACETAMINOPHEN: 500 TABLET, FILM COATED ORAL at 20:53

## 2025-08-01 RX ADMIN — ICOSAPENT ETHYL 2 G: 1 CAPSULE ORAL at 18:11

## 2025-08-01 RX ADMIN — FUROSEMIDE 40 MG: 10 INJECTION, SOLUTION INTRAMUSCULAR; INTRAVENOUS at 16:12

## 2025-08-01 RX ADMIN — CEFTRIAXONE 1 G: 1 INJECTION, POWDER, FOR SOLUTION INTRAMUSCULAR; INTRAVENOUS at 06:13

## 2025-08-01 RX ADMIN — MECLIZINE HYDROCHLORIDE 25 MG: 25 TABLET ORAL at 06:14

## 2025-08-01 RX ADMIN — POTASSIUM CHLORIDE 20 MEQ: 1500 TABLET, EXTENDED RELEASE ORAL at 16:54

## 2025-08-01 RX ADMIN — ISOSORBIDE DINITRATE 40 MG: 10 TABLET ORAL at 18:07

## 2025-08-01 RX ADMIN — ACETAMINOPHEN: 500 TABLET, FILM COATED ORAL at 00:32

## 2025-08-01 RX ADMIN — CARVEDILOL 25 MG: 25 TABLET, FILM COATED ORAL at 09:10

## 2025-08-01 RX ADMIN — HYDRALAZINE HYDROCHLORIDE 75 MG: 50 TABLET ORAL at 18:07

## 2025-08-01 RX ADMIN — HYDRALAZINE HYDROCHLORIDE 50 MG: 50 TABLET ORAL at 02:23

## 2025-08-01 RX ADMIN — MECLIZINE HYDROCHLORIDE 25 MG: 25 TABLET ORAL at 13:35

## 2025-08-01 RX ADMIN — ICOSAPENT ETHYL 2 G: 1 CAPSULE ORAL at 09:13

## 2025-08-01 RX ADMIN — ISOSORBIDE DINITRATE 40 MG: 10 TABLET ORAL at 13:35

## 2025-08-01 RX ADMIN — MECLIZINE HYDROCHLORIDE 25 MG: 25 TABLET ORAL at 18:07

## 2025-08-01 RX ADMIN — ISOSORBIDE DINITRATE 40 MG: 10 TABLET ORAL at 09:11

## 2025-08-01 RX ADMIN — CARVEDILOL 25 MG: 25 TABLET, FILM COATED ORAL at 18:07

## 2025-08-01 RX ADMIN — FUROSEMIDE 40 MG: 10 INJECTION, SOLUTION INTRAMUSCULAR; INTRAVENOUS at 09:11

## 2025-08-01 RX ADMIN — PAROXETINE HYDROCHLORIDE 40 MG: 10 TABLET, FILM COATED ORAL at 09:11

## 2025-08-01 RX ADMIN — NIFEDIPINE 90 MG: 90 TABLET, EXTENDED RELEASE ORAL at 09:14

## 2025-08-01 RX ADMIN — CLOPIDOGREL BISULFATE 75 MG: 75 TABLET, FILM COATED ORAL at 09:11

## 2025-08-01 ASSESSMENT — ACTIVITIES OF DAILY LIVING (ADL)
ADLS_ACUITY_SCORE: 65
ADLS_ACUITY_SCORE: 61
ADLS_ACUITY_SCORE: 65
ADLS_ACUITY_SCORE: 61
ADLS_ACUITY_SCORE: 65
ADLS_ACUITY_SCORE: 61
ADLS_ACUITY_SCORE: 61
ADLS_ACUITY_SCORE: 65
ADLS_ACUITY_SCORE: 61
ADLS_ACUITY_SCORE: 65
ADLS_ACUITY_SCORE: 61
ADLS_ACUITY_SCORE: 61

## 2025-08-01 NOTE — CONSULTS
Care Management Initial Consult    General Information  Assessment completed with: Patient, VM-chart review, Children, Spouse or significant other, spouse, Shandra and Yesenia longoria  Type of CM/SW Visit: Offer D/C Planning    Primary Care Provider verified and updated as needed: No   Readmission within the last 30 days: unable to assess      Reason for Consult: discharge planning  Advance Care Planning:            Communication Assessment  Patient's communication style: spoken language (English or Bilingual)    Hearing Difficulty or Deaf: yes   Wear Glasses or Blind: yes    Cognitive  Cognitive/Neuro/Behavioral: WDL  Level of Consciousness: alert  Arousal Level: opens eyes spontaneously  Orientation: oriented x 4 (sometimes frogetful)  Mood/Behavior: calm, cooperative  Best Language: 0 - No aphasia  Speech: logical, spontaneous, clear    Living Environment:   People in home: spouse     Current living Arrangements: other (see comments)      Able to return to prior arrangements: yes       Family/Social Support:  Care provided by: self  Provides care for: no one  Marital Status:   Support system: , Children          Description of Support System: Supportive, Involved    Support Assessment: Adequate family and caregiver support    Current Resources:   Patient receiving home care services: No        Community Resources: Transitional Care  Equipment currently used at home: walker, standard, shower chair, grab bar, toilet  Supplies currently used at home: None    Employment/Financial:  Employment Status:          Financial Concerns: none   Referral to Financial Worker: No       Does the patient's insurance plan have a 3 day qualifying hospital stay waiver?  No    Lifestyle & Psychosocial Needs:  Social Drivers of Health     Food Insecurity: Low Risk  (7/28/2025)    Food Insecurity     Within the past 12 months, did you worry that your food would run out before you got money to buy more?: No     Within the past  12 months, did the food you bought just not last and you didn t have money to get more?: No   Depression: At risk (6/5/2025)    Received from [facility]    PHQ-2     Depression Risk: 3   Housing Stability: Low Risk  (7/28/2025)    Housing Stability     Do you have housing? : Yes     Are you worried about losing your housing?: No   Tobacco Use: High Risk (7/15/2025)    Patient History     Smoking Tobacco Use: Every Day     Smokeless Tobacco Use: Never     Passive Exposure: Not on file   Financial Resource Strain: Low Risk  (7/28/2025)    Financial Resource Strain     Within the past 12 months, have you or your family members you live with been unable to get utilities (heat, electricity) when it was really needed?: No   Alcohol Use: Not on file   Transportation Needs: Low Risk  (7/28/2025)    Transportation Needs     Within the past 12 months, has lack of transportation kept you from medical appointments, getting your medicines, non-medical meetings or appointments, work, or from getting things that you need?: No   Physical Activity: Not on file   Interpersonal Safety: Unknown (7/28/2025)    Interpersonal Safety     Do you feel physically and emotionally safe where you currently live?: Patient unable to answer     Within the past 12 months, have you been hit, slapped, kicked or otherwise physically hurt by someone?: Patient unable to answer     Within the past 12 months, have you been humiliated or emotionally abused in other ways by your partner or ex-partner?: Patient unable to answer   Stress: Not on file   Social Connections: Not on file   Health Literacy: Not on file       Functional Status:  Prior to admission patient needed assistance:   Dependent ADLs:: Independent  Dependent IADLs:: Independent  Assesssment of Functional Status: Needs placement in a SNF/TCF for rehabilitation    Mental Health Status:  Mental Health Status: No Current Concerns       Chemical Dependency Status:  Chemical Dependency Status: No  Current Concerns             Values/Beliefs:  Spiritual, Cultural Beliefs, Gnosticist Practices, Values that affect care: no               Discussed  Partnership in Safe Discharge Planning  document with patient/family: No    Additional Information:  SW consulted for discharge planning. [facility] confirmed bed hold with facility and spouse.     Pt lives with  in [place] but spends summers in [place] from June- September. They have been staying in an extended stay hotel in [place]. She is independent at baseline, pt and  share in IADLs.     Spouse/family will transport at discharge.    Next Steps: monitor discharge and coordinate.      MICKEY Erickson, ANGELITA  Emergency Department/Inpatient Float  Care Coordination  [facility]  ED phone: [phone]      ANGELITA ERICKSON

## 2025-08-01 NOTE — PROGRESS NOTES
[facility]    Medicine Progress Note - Hospitalist Service    Date of Admission:  7/28/2025    Assessment & Plan   75-year-old female with history of f CAD, CKD stage IV, hypertension, hyperlipidemia, intra-abdominal aortic aneurysm with prior stenting, chronic vertigo, recent hospitalization for fall with pelvic fracture who presents the ED for shortness of breath.      Had elevated D-dimer but VQ scan negative for PE, CT not done due to CKD.  proBNP more than 25,000.  Chest x-ray showed bilateral perihilar interstitial/airspace opacities, left greater than right, are nonspecific for postradiation change versus pneumonia. A small nodule in the peripheral right upper lobe measures 9 mm.     Found to be in hypertensive emergency, admitted to ICU with BiPAP and nitroglycerin drip.  She was weaned off nitroglycerin drip and BiPAP but has had repeated episodes of rising blood pressure and shortness of breath needing her to go back on BiPAP intermittently.  Had another episode this morning and back on BiPAP and was given IV Lasix.    Acute hypoxic respiratory failure:  Diastolic CHF exacerbation:  Hypertensive emergency:     - Echo with EF of 55 to 60%.  Initially got Lasix for 40 mg IV x 1 and got off nitroglycerin drip but had repeated episodes of rising blood pressure and flash pulmonary edema.  Back on BiPAP today.  Chest x-ray showed extensive bilateral perihilar opacities consistent with pulmonary edema or pneumonia, clinically consistent with pulmonary edema.      - Continue carvedilol 25 mg twice daily, hydralazine 75 mg 3 times daily, nifedipine 90 mg daily and clonidine patch 0.3 mg per 24 hours.  Isordil increased to 40 mg 3 times daily.     -Was empirically started on ceftriaxone on presentation. Patient had no fever or leukocytosis.  Mildly elevated procalcitonin of 0.67 which is difficult to interpret in ROMI/CKD and improved to 0.49 on recheck.  Will complete ceftriaxone for 5  days.    ROMI on CKD stage IV:  Metabolic acidosis:  - Previous baseline of 1.3-1.5 which worsened in May/June 2025 and new baseline appears to be 3 with nephrotic range proteinuria.  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  - Creatinine on admission was 4.25, improved to 3.98--> 3.75--> 3.37-->3.15 .  -Will continue Lasix 40 mg IV bid per nephrology   - Checking renal ultrasound for renal artery stenosis.  - Continue prior to admission dose of sodium bicarbonate.    Acute on chronic diastolic congestive heart failure         -BNP elevated          -Will continue Lasix 40 mg IV bid as above         -Cardiology and nephrology following., Appreciate input    Hypertensive urgency           -Will continue coreg, clonidine patch, hydralazine, nifedipine as above           -Will monitor blood pressure     Chronic medical conditions:  - AAA.  Endoleak status post recent endovascular aneurysm repair on 4/25.  Continue Plavix and statin.  - History of CVA.  Chronic infarct in the right basal ganglia noted on CT.  - Right lung adenocarcinoma.  Status post stereotactic body radiation therapy in 2021, follows up in [place].  His lung nodule on x-ray, can follow-up with her primary oncologist.  - Chronic vertigo.  Follows up with ENT in [place] and on as needed meclizine.  MRI brain on 5/22 was negative for acute pathology.  - Depression pleasant lady.  Continue paroxetine.  - Hypertriglyceridemia.  On Vascepa.    Diet: Snacks/Supplements Adult: Nepro Oral Supplement; Between Meals  2 Gram Sodium Diet    DVT Prophylaxis: Pneumatic Compression Devices  Rao Catheter: Not present  Lines: None     Cardiac Monitoring: ACTIVE order. Indication: Acute decompensated heart failure (48 hours)  Code Status: Full Code      Clinically Significant Risk Factors        # Hypokalemia: Lowest K = 3.2 mmol/L in last 2 days, will replace as needed   # Hypochloremia: Lowest Cl = 96 mmol/L  in last 2 days, will monitor as appropriate            # Hypertension: Noted on problem list             # Moderate Malnutrition: based on nutrition assessment and treatment provided per dietitian's recommendations.    # Financial/Environmental Concerns: none         Social Drivers of Health    Depression: At risk (6/5/2025)    [facility]    PHQ-2     Depression Risk: 3   Tobacco Use: High Risk (7/15/2025)    Patient History     Smoking Tobacco Use: Every Day     Smokeless Tobacco Use: Never   Interpersonal Safety: Unknown (7/28/2025)    Interpersonal Safety     Do you feel physically and emotionally safe where you currently live?: Patient unable to answer     Within the past 12 months, have you been hit, slapped, kicked or otherwise physically hurt by someone?: Patient unable to answer     Within the past 12 months, have you been humiliated or emotionally abused in other ways by your partner or ex-partner?: Patient unable to answer          Disposition Plan     Medically Ready for Discharge: 2- 4 days      Fran Pierson MD, MD  Hospitalist Service  [facility]  Securely message with [employer] (more info)  Text page via [employer] Paging/Directory   ______________________________________________________________________    Interval History   Patient seen and examined today. Chart reviewed. Patient stated that she is feeling okay. She stated that her breathing is improving. She denies chest pain. She has no nausea or vomiting.     Physical Exam   Vital Signs: Temp: 98.4  F (36.9  C) Temp src: Oral BP: (!) 157/77 Pulse: 85   Resp: 28 SpO2: 94 % O2 Device: BiPAP/CPAP Oxygen Delivery: 4 LPM  Weight: 118 lbs 9.72 oz    General Appearance: Alert awake and oriented x 3.  Has BiPAP.  Respiratory: Basilar crackles.  Cardiovascular: S1-S2 normal  GI: Soft and nontender  Skin: No rash  Other: Trace edema.      Medical Decision Making       60 MINUTES SPENT BY ME on the date of service  doing chart review, history, exam, documentation & further activities per the note.      Data     I have personally reviewed the following data over the past 24 hrs:    9.2  \   9.2 (L)   / 397     137 96 (L) 55.5 (H) /  123 (H)   3.2 (L) 25 3.15 (H) \     Ferritin:  N/A % Retic:  N/A LDH:  N/A       Imaging results reviewed over the past 24 hrs:   Recent Results (from the past 24 hours)   US Renal Complete w Arterial Duplex    Narrative    EXAM:  1. RENAL ULTRASOUND   2. RENAL DUPLEX  LOCATION: [facility]  DATE: 7/31/2025    INDICATION: history of R renal artery stent, please evaluate for re stenosis given labile HTN (or new stenosis on L artery)  COMPARISON: None.  TECHNIQUE: Duplex imaging is performed utilizing gray-scale, two-dimensional images, and color-flow imaging. Doppler waveform analysis and spectral Doppler imaging is also performed.    FINDINGS:     RIGHT KIDNEY: 8.6 x 4.8 x 3.5 cm. Normal without hydronephrosis or masses. There is a 2.1 x 1.8 x 1.2 cm cyst at the lateral interpolar region of the right kidney.    LEFT KIDNEY 9.7 x 5.2 x 4.2 cm. Normal without hydronephrosis or masses..     BLADDER: Normal.    RENAL DUPLEX:  Aortic PSV: 56 cm/s, multiphasic  Right Renal Artery PSV: Normal, less than 200 cm/s (Normal considered less than 200 cm/s.)  Right Intrarenal Resistive Index: Normal, 0.7 or less  Left Renal Artery PSV Normal, less than 200 cm/s (Normal considered less than 200 cm/s.)  Left Intrarenal Resistive Index: Normal, 0.7 or less      Impression    IMPRESSION:   1.  Per sonographic criteria, no evidence for significant renal artery stenosis.

## 2025-08-01 NOTE — PLAN OF CARE
Heart Failure Care Map  GOALS TO BE MET BEFORE DISCHARGE:    1. Decrease congestion and/or edema with diuretic therapy to achieve near optimal volume status.     Dyspnea improved: No, further care required to meet this goal. Please explain sob with exertion    Edema improved: No, further care required to meet this goal. Please explain no change         Last 24 hour I/O:   Intake/Output Summary (Last 24 hours) at 8/1/2025 1501  Last data filed at 8/1/2025 0613  Gross per 24 hour   Intake 6 ml   Output 550 ml   Net -544 ml           Net I/O and Weights since admission:   07/02 2300 - 08/01 2259  In: 1001.85 [P.O.:700; I.V.:297.85]  Out: 3925 [Urine:3925]  Net: -2923.15     Vitals:    07/28/25 1400 07/30/25 0600   Weight: 54.8 kg (120 lb 13 oz) 53.8 kg (118 lb 9.7 oz)       2.  O2 sats > 90% on room air, or at prior home O2 therapy level.      Able to wean O2 this shift to keep sats above 90%?: No, further care required to meet this goal. Please explain intermittent on bipap    Does patient use Home O2? No          Current oxygenation status:   SpO2: 94 %     O2 Device: Nasal cannula, Oxygen Delivery: 4 LPM    3.  Tolerates ambulation and mobility near baseline.     Ambulation: No, further care required to meet this goal. Please explain sob and sleeping    Times patient ambulated with staff this shift: 1    Please review the Heart Failure Care Map for additional HF goal outcomes.     Jayden Hi RN  8/1/2025      Vss A&Ox4. Sleeping in between cares. Denies pain. Sob with mild exertion. Desats to 87% on 4lnc when up to bathroom and sob. Using bipap intermittently and with sleep. K 3.2  Problem: Breathing Pattern Ineffective  Goal: Effective Breathing Pattern  Outcome: Not Progressing     Problem: Comorbidity Management  Goal: Maintenance of COPD Symptom Control  Outcome: Not Progressing     Problem: Adult Inpatient Plan of Care  Goal: Plan of Care Review  Description: The Plan of Care Review/Shift note should be  "completed every shift.  The Outcome Evaluation is a brief statement about your assessment that the patient is improving, declining, or no change.  This information will be displayed automatically on your shift  note.  Outcome: Progressing  Flowsheets (Taken 8/1/2025 1457)  Outcome Evaluation: intermittent bipap  Plan of Care Reviewed With: patient  Overall Patient Progress: no change  Goal: Patient-Specific Goal (Individualized)  Description: You can add care plan individualizations to a care plan. Examples of Individualization might be:  \"Parent requests to be called daily at 9am for status\", \"I have a hard time hearing out of my right ear\", or \"Do not touch me to wake me up as it startles  me\".  Outcome: Progressing  Goal: Absence of Hospital-Acquired Illness or Injury  Outcome: Progressing  Intervention: Identify and Manage Fall Risk  Recent Flowsheet Documentation  Taken 8/1/2025 0900 by Jayden Hi RN  Safety Promotion/Fall Prevention:   activity supervised   assistive device/personal items within reach   clutter free environment maintained   increased rounding and observation   lighting adjusted   nonskid shoes/slippers when out of bed   patient and family education   room organization consistent   safety round/check completed  Intervention: Prevent Skin Injury  Recent Flowsheet Documentation  Taken 8/1/2025 0915 by Jayden Hi RN  Body Position: position changed independently  Intervention: Prevent and Manage VTE (Venous Thromboembolism) Risk  Recent Flowsheet Documentation  Taken 8/1/2025 0900 by Jayden Hi RN  VTE Prevention/Management: patient refused intervention  Goal: Optimal Comfort and Wellbeing  Outcome: Progressing  Intervention: Provide Person-Centered Care  Recent Flowsheet Documentation  Taken 8/1/2025 0900 by Jayden Hi RN  Trust Relationship/Rapport:   care explained   choices provided   emotional support provided   empathic listening provided   questions " answered   questions encouraged   reassurance provided   thoughts/feelings acknowledged  Goal: Readiness for Transition of Care  Outcome: Progressing     Problem: Delirium  Goal: Optimal Coping  Outcome: Progressing  Goal: Improved Behavioral Control  Outcome: Progressing  Intervention: Minimize Safety Risk  Recent Flowsheet Documentation  Taken 8/1/2025 0900 by Jayden Hi RN  Trust Relationship/Rapport:   care explained   choices provided   emotional support provided   empathic listening provided   questions answered   questions encouraged   reassurance provided   thoughts/feelings acknowledged  Goal: Improved Attention and Thought Clarity  Outcome: Progressing  Intervention: Maximize Cognitive Function  Recent Flowsheet Documentation  Taken 8/1/2025 0900 by Jayden Hi RN  Reorientation Measures:   calendar in view   clock in view  Goal: Improved Sleep  Outcome: Progressing     Problem: Skin Injury Risk Increased  Goal: Skin Health and Integrity  Outcome: Progressing  Intervention: Plan: Nurse Driven Intervention: Moisture Management  Recent Flowsheet Documentation  Taken 8/1/2025 0900 by Jayden Hi RN  Moisture Interventions:   Incontinence pad   Perineal cleanser   Encourage regular toileting  Intervention: Plan: Nurse Driven Intervention: Friction and Shear  Recent Flowsheet Documentation  Taken 8/1/2025 0900 by Jayden Hi RN  Friction/Shear Interventions: HOB 30 degrees or less  Intervention: Optimize Skin Protection  Recent Flowsheet Documentation  Taken 8/1/2025 0915 by Jayden Hi RN  Activity Management:   ambulated in room   ambulated to bathroom   back to bed     Problem: Comorbidity Management  Goal: Blood Pressure in Desired Range  Outcome: Progressing   Goal Outcome Evaluation:      Plan of Care Reviewed With: patient    Overall Patient Progress: no changeOverall Patient Progress: no change    Outcome Evaluation: intermittent bipap

## 2025-08-01 NOTE — PROGRESS NOTES
Renal Medicine Progress Note            Assessment/Plan:     Assessment:    Hypertensive emergency   Acute hypoxic respiratory failure   Acute on chronic diastolic HF   History of R renal artery stenting  Labile HTN  Bipap dependent hypoxia, pulmonary edema on CXR. Started nitroglycerin gtt, now weaned off. Improvement in hypoxia. Can give dose of IV lasix today and reassess tomorrow. BNP 25K. TTE with EF 55-60%, elevated L sided pressures and pulmonary HTN, but RA pressure estimated low.  BP is quite labile, upon further review appears she has a R renal artery stent, will re-image renal arteries to ensure no new stenosis. Did have AAA graft repair in April.   - continue IV lasix 40 mg BID  - continue PTA antihypertensives   - renal US with duplex      ROMI on CKD IV   Previously  baseline 1.3-1.5, worsening in May/June 2025, new baseline appears to be ~3 with nephrotic range proteinuria. 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 This is most commonly seen in DM2, however can also be seen with smoking and HTN. Bx also showed cholesterol emboli (likely due to endograft repair).  Cr on admission 4.25, worse than recent baseline. In setting of hypertensive emergency, pulmonary edema, LE edema. Improving with diuretics and BP control.   Bx results:       Metabolic acidosis, resolved    Continue PTA sodium bicarb.     R lung adenocarcinoma   Received radiation in 2021.     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 Pierson. Reviewed notes from hospitalists.     Vee Encinas MD   [facility]  Office: [phone]          Interval History:     Transferred out of ICU   Reports increased UOP, seems to not be accurate documented   Denies SOB, feels better in general   Poor appetite   No n/v   Used bipap overnight          Medications and Allergies:     Current Facility-Administered  Medications   Medication Dose Route Frequency Provider Last Rate Last Admin    carvedilol (COREG) tablet 25 mg  25 mg Oral BID w/meals Josemanuel Cohen MD   25 mg at 08/01/25 0910    cefTRIAXone (ROCEPHIN) 1 g vial to attach to  mL bag for ADULTS or NS 50 mL bag for PEDS  1 g Intravenous Q24H Virgilio Patel MD   1 g at 08/01/25 0613    cloNIDine (CATAPRES-TTS3) 0.3 MG/24HR WK patch 1 patch  1 patch Transdermal Weekly Virgilio Patel MD   1 patch at 07/31/25 1432    And    cloNIDine (CATAPRES-TTS1) Patch in Place   Transdermal Q8H [facility]I Virgilio Patel MD        clopidogrel (PLAVIX) tablet 75 mg  75 mg Oral Daily Virgilio Patel MD   75 mg at 08/01/25 0911    famotidine (PEPCID) tablet 20 mg  20 mg Oral Q48H Jonathan Light MD   20 mg at 07/31/25 0923    furosemide (LASIX) injection 40 mg  40 mg Intravenous BID Vee Encinas MD   40 mg at 08/01/25 0911    [Held by provider] furosemide (LASIX) tablet 40 mg  40 mg Oral BID Vee Encinas MD   40 mg at 07/30/25 1534    hydrALAZINE (APRESOLINE) tablet 50 mg  50 mg Oral TID Jonathan Light MD   50 mg at 08/01/25 0911    icosapent ethyl (VASCEPA) capsule 2 g  2 g Oral BID w/meals Virgilio Patel MD   2 g at 08/01/25 0913    isosorbide dinitrate (ISORDIL) tablet 40 mg  40 mg Oral TID Jonathan Light MD   40 mg at 08/01/25 0911    meclizine (ANTIVERT) tablet 25 mg  25 mg Oral Q6H Virgilio Patel MD   25 mg at 08/01/25 0614    NIFEdipine ER OSMOTIC (PROCARDIA XL) 24 hr tablet 90 mg  90 mg Oral Daily Virgilio Patel MD   90 mg at 08/01/25 0914    PARoxetine (PAXIL) tablet 40 mg  40 mg Oral Daily Virgilio Patel MD   40 mg at 08/01/25 0911    sodium bicarbonate tablet 1,300 mg  1,300 mg Oral BID Virgilio Patel MD   1,300 mg at 08/01/25 0911    sodium chloride (PF) 0.9% PF flush 3 mL  3 mL Intracatheter Q8H Gilmer Dozier MD   3 mL at 08/01/25 0919        Allergies   Allergen Reactions    Iodine Hives            Physical Exam:   Vitals were reviewed  BP (!)  "157/77 (BP Location: Left arm)   Pulse 85   Temp 98.4  F (36.9  C) (Oral)   Resp 28   Ht 1.626 m (5' 4\")   Wt 53.8 kg (118 lb 9.7 oz)   SpO2 94%   BMI 20.36 kg/m      Wt Readings from Last 3 Encounters:   07/30/25 53.8 kg (118 lb 9.7 oz)   07/23/25 54.4 kg (120 lb)   07/21/25 54.1 kg (119 lb 3.2 oz)       Intake/Output Summary (Last 24 hours) at 7/29/2025 0949  Last data filed at 7/29/2025 0845  Gross per 24 hour   Intake 185.85 ml   Output 1300 ml   Net -1114.15 ml       GENERAL APPEARANCE: NAD, frail appearing  HEENT: normocephalic, dry MM   RESP: coarse  CV: RRR   EXTREMITIES/SKIN: no edema  NEURO:  alert, oriented, normal speech           Data:     BMP  Recent Labs   Lab 08/01/25  0802 07/31/25 0559 07/30/25 2004 07/30/25  1612 07/30/25  0753 07/30/25  0703 07/29/25  0754 07/29/25  0706    135  --   --   --  137  --  137   POTASSIUM 3.2* 3.2*  --   --   --  3.3*  --  3.4   CHLORIDE 96* 97*  --   --   --  99  --  98   BRUCE 8.5* 8.1*  --   --   --  8.0*  --  8.2*   CO2 25 24  --   --   --  24  --  22   BUN 55.5* 56.6*  --   --   --  62.8*  --  59.0*   CR 3.15* 3.37*  --   --   --  3.75*  --  3.98*   * 115* 114* 143*   < > 101*   < > 101*    < > = values in this interval not displayed.     CBC  Recent Labs   Lab 08/01/25 0802 07/31/25 0559 07/30/25  0703 07/29/25  0706   WBC 9.2 8.5 7.8 7.6   HGB 9.2* 9.1* 9.3* 9.1*   HCT 27.5* 27.1* 28.0* 26.6*   * 101* 101* 99    365 377 385     Lab Results   Component Value Date    AST 22 07/17/2025    ALT 11 07/17/2025    ALKPHOS 61 07/17/2025    BILITOTAL 0.3 07/17/2025     No results found for: \"INR\"  Color Urine (no units)   Date Value   09/09/2016 Light Yellow     Appearance Urine (no units)   Date Value   09/09/2016 Clear     Glucose Urine (mg/dL)   Date Value   09/09/2016 Negative     Bilirubin Urine (no units)   Date Value   09/09/2016 Negative     Ketones Urine (mg/dL)   Date Value   09/09/2016 Negative     Specific Gravity Urine (no " units)   Date Value   09/09/2016 1.010     pH Urine (pH)   Date Value   09/09/2016 5.5     Protein Albumin Urine (mg/dL)   Date Value   09/09/2016 Negative     Nitrite Urine (no units)   Date Value   09/09/2016 Negative     Leukocyte Esterase Urine (no units)   Date Value   09/09/2016 Negative         Attestation:  I have reviewed today's vital signs, notes, medications, labs and imaging.    Vee Encinas MD  [facility] - Nephrology  Office: [phone]

## 2025-08-01 NOTE — PLAN OF CARE
"Shift Note [id number]:  Pt AxO x4.   Pt stated pain as 0/10.   Activity: Not oob during shift. Lift assist x2 per nursing report.   Tele: SR.  Respiratory: LS diminished, on 4L NC and intermittent bipap 35% FiO2. PRN nebulizer given x1 for SOB.  Cardiology, RT, nephrology, and SW following.   Pertinent Tests/Labs: Cr 3.37. K 3.2. Renal US 7/31. External cath in place.  Other: Tylenol PM substitute given for sleep.     Goal Outcome Evaluation:      Plan of Care Reviewed With: patient    Overall Patient Progress: no changeOverall Patient Progress: no change    Outcome Evaluation: See note above      Problem: Adult Inpatient Plan of Care  Goal: Plan of Care Review  Description: The Plan of Care Review/Shift note should be completed every shift.  The Outcome Evaluation is a brief statement about your assessment that the patient is improving, declining, or no change.  This information will be displayed automatically on your shift  note.  Outcome: Not Progressing  Flowsheets (Taken 8/1/2025 0342)  Outcome Evaluation: See note above  Plan of Care Reviewed With: patient  Overall Patient Progress: no change  Goal: Patient-Specific Goal (Individualized)  Description: You can add care plan individualizations to a care plan. Examples of Individualization might be:  \"Parent requests to be called daily at 9am for status\", \"I have a hard time hearing out of my right ear\", or \"Do not touch me to wake me up as it startles  me\".  Outcome: Not Progressing  Goal: Absence of Hospital-Acquired Illness or Injury  Outcome: Not Progressing  Intervention: Identify and Manage Fall Risk  Recent Flowsheet Documentation  Taken 7/31/2025 2051 by Tom Hamilton, RN  Safety Promotion/Fall Prevention:   activity supervised   assistive device/personal items within reach   clutter free environment maintained   lighting adjusted   nonskid shoes/slippers when out of bed   patient and family education   room organization consistent   safety round/check " completed  Intervention: Prevent Skin Injury  Recent Flowsheet Documentation  Taken 7/31/2025 2050 by Tom Hamilton RN  Body Position: position changed independently  Intervention: Prevent and Manage VTE (Venous Thromboembolism) Risk  Recent Flowsheet Documentation  Taken 7/31/2025 2051 by Tom Hamilton RN  VTE Prevention/Management: patient refused intervention  Intervention: Prevent Infection  Recent Flowsheet Documentation  Taken 7/31/2025 2051 by Tom Hamilton RN  Infection Prevention:   personal protective equipment utilized   hand hygiene promoted   rest/sleep promoted   single patient room provided  Goal: Optimal Comfort and Wellbeing  Outcome: Not Progressing  Goal: Readiness for Transition of Care  Outcome: Not Progressing     Problem: Delirium  Goal: Optimal Coping  Outcome: Not Progressing  Goal: Improved Behavioral Control  Outcome: Not Progressing  Goal: Improved Attention and Thought Clarity  Outcome: Not Progressing  Goal: Improved Sleep  Outcome: Not Progressing     Problem: Skin Injury Risk Increased  Goal: Skin Health and Integrity  Outcome: Not Progressing  Intervention: Plan: Nurse Driven Intervention: Moisture Management  Recent Flowsheet Documentation  Taken 7/31/2025 2051 by Tom Hamilton RN  Moisture Interventions:   Incontinence pad   Urinary collection device  Taken 7/31/2025 2000 by Tom Hamilton RN  Moisture Interventions:   Incontinence pad   Urinary collection device  Bathing/Skin Care: incontinence care  Intervention: Plan: Nurse Driven Intervention: Friction and Shear  Recent Flowsheet Documentation  Taken 7/31/2025 2051 by Tom Hamilton RN  Friction/Shear Interventions: HOB 30 degrees or less  Intervention: Optimize Skin Protection  Recent Flowsheet Documentation  Taken 7/31/2025 2050 by Tom Hamilton RN  Activity Management: activity adjusted per tolerance     Problem: Breathing Pattern Ineffective  Goal: Effective Breathing Pattern  Outcome: Not  Progressing     Problem: Comorbidity Management  Goal: Maintenance of COPD Symptom Control  Outcome: Not Progressing  Goal: Blood Pressure in Desired Range  Outcome: Not Progressing

## 2025-08-01 NOTE — PLAN OF CARE
"BP (!) 141/62   Pulse 80   Temp 98.1  F (36.7  C) (Oral)   Resp 20   Ht 1.626 m (5' 4\")   Wt 53.8 kg (118 lb 9.7 oz)   SpO2 94%   BMI 20.36 kg/m      VSS, PT x-david from ICU @1500. Tolerating 3-4LNC. Poor appetite. Denies pain, SoB, CP.   "

## 2025-08-01 NOTE — PROGRESS NOTES
[facility]  Cardiology Progress Note    Date of Service (when I saw the patient): 08/01/2025     Assessment & Plan   Maria Alejandra Buck is a 75 year old female who was admitted on 7/28/2025.     1.  : Hypertensive Urgency/ H/o Renal artery stenting  - PTA carvedilol increased to 25 mg BID   - PTA clonidine patch  - PTA hydralazine 50 mg TID  - PTA isordil 20 mg TID   - PTA nifedipine 90 mg daily     2.  : NSTEMI - likely demand ischemia   - Troponin elevation in flat trajectory, 65,58,52  - Denies chest pain.   - Echocardiogram showed normal EF 55-60%, normal RV size and function. Severely dilated LA.   - PTA clopidogrel     3.  Acute on chronic diastolic heart failure/ Acute hypoxic respiratory failure   - On Bipap   - NT pro BNP > 25,500  - IV lasix 40 mg BID, net neg 2.3 L , down 2 lbs.     4.  : Mitral stenosis/ regurgitation - moderate. Mean gradient 9 mmHg.   5.  : Pulmonary Hypertension   - Per echo,  RVSP 37 mmHg + RA pressure. On Bipap.   6.  CKD/ ROMI  - Creatinine 4.25 on admission, down trending 3.15.   - Nephrology following.     Plan   Blood pressures sub-optimal, increase hydralazine to 75 mg TID.    Diuresing well on IV lasix. Diuretics management per nephrology. Per GDMT, continue carvedilol, clonidine, isordil and nifedipine.   Denies chest pain. Shortness of breath improving w/ Bipap. Continue clopidogrel.   Cardiology to sign off.   Follow up with established cardiology group.       FERNANDO Benson CNP  Text Page  (M-F, 7:30 am - 4:00 pm)    Interval History   Breathing improved with diuretics and Bipap therapy. Denies chest pain, palpitations, edema or lightheadedness. Blood pressure improving but still sub-optimal. Increase hydralazine to 75 mg TID.     Physical Exam   Temp: 98.4  F (36.9  C) Temp src: Oral BP: (!) 157/77 Pulse: 85   Resp: 28 SpO2: 94 % O2 Device: BiPAP/CPAP Oxygen Delivery: 4 LPM  Vitals:    07/28/25 1400 07/30/25 0600   Weight: 54.8 kg (120 lb 13 oz)  53.8 kg (118 lb 9.7 oz)     Vital Signs with Ranges  Temp:  [97.5  F (36.4  C)-98.4  F (36.9  C)] 98.4  F (36.9  C)  Pulse:  [69-85] 85  Resp:  [20-28] 28  BP: (128-173)/(62-96) 157/77  FiO2 (%):  [35 %] 35 %  SpO2:  [87 %-95 %] 94 %  I/O last 3 completed shifts:  In: 6 [I.V.:6]  Out: 850 [Urine:850]    GEN:  In general, this is a ill looking female on Bipap   NECK: . Difficult to assess JVD with patient   C/V:  Regular rate and rhythm, no murmur, rub or gallop. No S3 or RV heave.   RESP: Respirations are labored, on Bipap.   EXTREM: No LE edema. No cyanosis or clubbing.  NEURO: Alert and oriented, cooperative. Lethargic.   PSYCH: Normal affect.  SKIN: Warm and dry. No rashes or petechiae appreciated.       Medications   Current Facility-Administered Medications   Medication Dose Route Frequency Provider Last Rate Last Admin    No lozenges or gum should be given while patient on BIPAP/AVAPS/AVAPS AE   Does not apply Continuous PRN Gilmer Swain MD        No lozenges or gum should be given while patient on BIPAP/AVAPS/AVAPS AE   Does not apply Continuous PRN Nhan Wilkerson MD        Patient may continue current oral medications   Does not apply Continuous PRN Gilmer Swain MD         Current Facility-Administered Medications   Medication Dose Route Frequency Provider Last Rate Last Admin    carvedilol (COREG) tablet 25 mg  25 mg Oral BID w/meals Josemanuel Cohen MD   25 mg at 08/01/25 0910    cefTRIAXone (ROCEPHIN) 1 g vial to attach to  mL bag for ADULTS or NS 50 mL bag for PEDS  1 g Intravenous Q24H Virgilio Patel MD   1 g at 08/01/25 0613    cloNIDine (CATAPRES-TTS3) 0.3 MG/24HR WK patch 1 patch  1 patch Transdermal Weekly Virgilio Patel MD   1 patch at 07/31/25 1432    And    cloNIDine (CATAPRES-TTS1) Patch in Place   Transdermal Q8H [facility] Virgilio Patel MD        clopidogrel (PLAVIX) tablet 75 mg  75 mg Oral Daily Virgilio Patel MD   75 mg at 08/01/25 0911    famotidine  (PEPCID) tablet 20 mg  20 mg Oral Q48H Jonathan Light MD   20 mg at 07/31/25 0923    furosemide (LASIX) injection 40 mg  40 mg Intravenous BID Vee Encinas MD   40 mg at 08/01/25 0911    [Held by provider] furosemide (LASIX) tablet 40 mg  40 mg Oral BID Vee Encinas MD   40 mg at 07/30/25 1534    hydrALAZINE (APRESOLINE) tablet 75 mg  75 mg Oral TID Dorie Jamil APRN CNP        icosapent ethyl (VASCEPA) capsule 2 g  2 g Oral BID w/meals Virgilio Patel MD   2 g at 08/01/25 0913    isosorbide dinitrate (ISORDIL) tablet 40 mg  40 mg Oral TID Jonathan Light MD   40 mg at 08/01/25 0911    meclizine (ANTIVERT) tablet 25 mg  25 mg Oral Q6H Virgilio Patel MD   25 mg at 08/01/25 0614    NIFEdipine ER OSMOTIC (PROCARDIA XL) 24 hr tablet 90 mg  90 mg Oral Daily Virgilio Patel MD   90 mg at 08/01/25 0914    PARoxetine (PAXIL) tablet 40 mg  40 mg Oral Daily Virgilio Patel MD   40 mg at 08/01/25 0911    sodium bicarbonate tablet 1,300 mg  1,300 mg Oral BID Virgilio Patel MD   1,300 mg at 08/01/25 0911    sodium chloride (PF) 0.9% PF flush 3 mL  3 mL Intracatheter Q8H Gilmer Dozier MD   3 mL at 08/01/25 0919       Data   Reviewed       I spent > 20 minutes face-to-face and/or coordinating care. Over 50% of our time on the unit was spent counseling the patient and/or coordinating care        FERNANDO Benson CNP 8/1/2025

## 2025-08-02 ENCOUNTER — HEALTH MAINTENANCE LETTER (OUTPATIENT)
Age: 75
End: 2025-08-02

## 2025-08-02 LAB
ANION GAP SERPL CALCULATED.3IONS-SCNC: 14 MMOL/L (ref 7–15)
BASE EXCESS BLDV CALC-SCNC: 2.1 MMOL/L (ref -3–3)
BUN SERPL-MCNC: 56 MG/DL (ref 8–23)
CALCIUM SERPL-MCNC: 8.8 MG/DL (ref 8.8–10.4)
CHLORIDE SERPL-SCNC: 96 MMOL/L (ref 98–107)
CREAT SERPL-MCNC: 3.4 MG/DL (ref 0.51–0.95)
EGFRCR SERPLBLD CKD-EPI 2021: 13 ML/MIN/1.73M2
ERYTHROCYTE [DISTWIDTH] IN BLOOD BY AUTOMATED COUNT: 15.2 % (ref 10–15)
GLUCOSE BLDC GLUCOMTR-MCNC: 111 MG/DL (ref 70–99)
GLUCOSE BLDC GLUCOMTR-MCNC: 113 MG/DL (ref 70–99)
GLUCOSE BLDC GLUCOMTR-MCNC: 133 MG/DL (ref 70–99)
GLUCOSE SERPL-MCNC: 114 MG/DL (ref 70–99)
HCO3 BLDV-SCNC: 27 MMOL/L (ref 21–28)
HCO3 SERPL-SCNC: 25 MMOL/L (ref 22–29)
HCT VFR BLD AUTO: 27.3 % (ref 35–47)
HGB BLD-MCNC: 9.1 G/DL (ref 11.7–15.7)
MAGNESIUM SERPL-MCNC: 1.9 MG/DL (ref 1.7–2.3)
MCH RBC QN AUTO: 33.7 PG (ref 26.5–33)
MCHC RBC AUTO-ENTMCNC: 33.3 G/DL (ref 31.5–36.5)
MCV RBC AUTO: 101 FL (ref 78–100)
O2/TOTAL GAS SETTING VFR VENT: 55 %
OXYHGB MFR BLDV: 93 % (ref 70–75)
PCO2 BLDV: 44 MM HG (ref 40–50)
PH BLDV: 7.4 [PH] (ref 7.32–7.43)
PLATELET # BLD AUTO: 390 10E3/UL (ref 150–450)
PO2 BLDV: 72 MM HG (ref 25–47)
POTASSIUM SERPL-SCNC: 3.7 MMOL/L (ref 3.4–5.3)
POTASSIUM SERPL-SCNC: 3.7 MMOL/L (ref 3.4–5.3)
RBC # BLD AUTO: 2.7 10E6/UL (ref 3.8–5.2)
SAO2 % BLDV: 94.3 % (ref 70–75)
SODIUM SERPL-SCNC: 135 MMOL/L (ref 135–145)
WBC # BLD AUTO: 9.3 10E3/UL (ref 4–11)

## 2025-08-02 PROCEDURE — 999N000157 HC STATISTIC RCP TIME EA 10 MIN

## 2025-08-02 PROCEDURE — 36415 COLL VENOUS BLD VENIPUNCTURE: CPT | Performed by: INTERNAL MEDICINE

## 2025-08-02 PROCEDURE — 82805 BLOOD GASES W/O2 SATURATION: CPT | Performed by: INTERNAL MEDICINE

## 2025-08-02 PROCEDURE — 84132 ASSAY OF SERUM POTASSIUM: CPT | Performed by: STUDENT IN AN ORGANIZED HEALTH CARE EDUCATION/TRAINING PROGRAM

## 2025-08-02 PROCEDURE — 250N000013 HC RX MED GY IP 250 OP 250 PS 637: Performed by: INTERNAL MEDICINE

## 2025-08-02 PROCEDURE — 99232 SBSQ HOSP IP/OBS MODERATE 35: CPT | Performed by: STUDENT IN AN ORGANIZED HEALTH CARE EDUCATION/TRAINING PROGRAM

## 2025-08-02 PROCEDURE — 94660 CPAP INITIATION&MGMT: CPT

## 2025-08-02 PROCEDURE — 999N000127 HC STATISTIC PERIPHERAL IV START W US GUIDANCE

## 2025-08-02 PROCEDURE — 99233 SBSQ HOSP IP/OBS HIGH 50: CPT | Performed by: INTERNAL MEDICINE

## 2025-08-02 PROCEDURE — 250N000011 HC RX IP 250 OP 636: Performed by: STUDENT IN AN ORGANIZED HEALTH CARE EDUCATION/TRAINING PROGRAM

## 2025-08-02 PROCEDURE — 250N000013 HC RX MED GY IP 250 OP 250 PS 637: Performed by: NURSE PRACTITIONER

## 2025-08-02 PROCEDURE — 250N000011 HC RX IP 250 OP 636: Performed by: INTERNAL MEDICINE

## 2025-08-02 PROCEDURE — 83735 ASSAY OF MAGNESIUM: CPT | Performed by: STUDENT IN AN ORGANIZED HEALTH CARE EDUCATION/TRAINING PROGRAM

## 2025-08-02 PROCEDURE — 85027 COMPLETE CBC AUTOMATED: CPT | Performed by: INTERNAL MEDICINE

## 2025-08-02 PROCEDURE — 250N000013 HC RX MED GY IP 250 OP 250 PS 637: Performed by: STUDENT IN AN ORGANIZED HEALTH CARE EDUCATION/TRAINING PROGRAM

## 2025-08-02 PROCEDURE — 999N000185 HC STATISTIC TRANSPORT TIME EA 15 MIN

## 2025-08-02 PROCEDURE — 80048 BASIC METABOLIC PNL TOTAL CA: CPT | Performed by: INTERNAL MEDICINE

## 2025-08-02 PROCEDURE — 200N000001 HC R&B ICU

## 2025-08-02 RX ORDER — HYDRALAZINE HYDROCHLORIDE 50 MG/1
100 TABLET, FILM COATED ORAL 3 TIMES DAILY
Status: DISCONTINUED | OUTPATIENT
Start: 2025-08-02 | End: 2025-08-06

## 2025-08-02 RX ORDER — NALOXONE HYDROCHLORIDE 0.4 MG/ML
0.2 INJECTION, SOLUTION INTRAMUSCULAR; INTRAVENOUS; SUBCUTANEOUS
Status: DISCONTINUED | OUTPATIENT
Start: 2025-08-02 | End: 2025-08-20 | Stop reason: HOSPADM

## 2025-08-02 RX ORDER — DIAZEPAM 10 MG/2ML
2.5 INJECTION, SOLUTION INTRAMUSCULAR; INTRAVENOUS EVERY 6 HOURS PRN
Status: DISCONTINUED | OUTPATIENT
Start: 2025-08-02 | End: 2025-08-11

## 2025-08-02 RX ORDER — NITROGLYCERIN 20 MG/100ML
10-200 INJECTION INTRAVENOUS CONTINUOUS
Status: DISCONTINUED | OUTPATIENT
Start: 2025-08-02 | End: 2025-08-06

## 2025-08-02 RX ORDER — HYDROMORPHONE HCL IN WATER/PF 6 MG/30 ML
0.2 PATIENT CONTROLLED ANALGESIA SYRINGE INTRAVENOUS
Status: DISCONTINUED | OUTPATIENT
Start: 2025-08-02 | End: 2025-08-20 | Stop reason: HOSPADM

## 2025-08-02 RX ORDER — LORAZEPAM 2 MG/ML
0.5 INJECTION INTRAMUSCULAR EVERY 4 HOURS PRN
Status: DISCONTINUED | OUTPATIENT
Start: 2025-08-02 | End: 2025-08-12

## 2025-08-02 RX ORDER — NALOXONE HYDROCHLORIDE 0.4 MG/ML
0.4 INJECTION, SOLUTION INTRAMUSCULAR; INTRAVENOUS; SUBCUTANEOUS
Status: DISCONTINUED | OUTPATIENT
Start: 2025-08-02 | End: 2025-08-20 | Stop reason: HOSPADM

## 2025-08-02 RX ADMIN — POTASSIUM CHLORIDE 20 MEQ: 1500 TABLET, EXTENDED RELEASE ORAL at 01:03

## 2025-08-02 RX ADMIN — CARVEDILOL 25 MG: 25 TABLET, FILM COATED ORAL at 11:44

## 2025-08-02 RX ADMIN — HYDRALAZINE HYDROCHLORIDE 100 MG: 50 TABLET ORAL at 16:11

## 2025-08-02 RX ADMIN — PAROXETINE HYDROCHLORIDE 40 MG: 10 TABLET, FILM COATED ORAL at 11:46

## 2025-08-02 RX ADMIN — NIFEDIPINE 90 MG: 90 TABLET, EXTENDED RELEASE ORAL at 16:15

## 2025-08-02 RX ADMIN — MECLIZINE HYDROCHLORIDE 25 MG: 25 TABLET ORAL at 06:34

## 2025-08-02 RX ADMIN — CEFTRIAXONE 1 G: 1 INJECTION, POWDER, FOR SOLUTION INTRAMUSCULAR; INTRAVENOUS at 06:33

## 2025-08-02 RX ADMIN — SODIUM BICARBONATE 1300 MG: 650 TABLET ORAL at 20:55

## 2025-08-02 RX ADMIN — HYDROMORPHONE HYDROCHLORIDE 0.2 MG: 0.2 INJECTION, SOLUTION INTRAMUSCULAR; INTRAVENOUS; SUBCUTANEOUS at 16:17

## 2025-08-02 RX ADMIN — NITROGLYCERIN 10 MCG/MIN: 20 INJECTION INTRAVENOUS at 09:17

## 2025-08-02 RX ADMIN — MECLIZINE HYDROCHLORIDE 25 MG: 25 TABLET ORAL at 01:02

## 2025-08-02 RX ADMIN — HYDROMORPHONE HYDROCHLORIDE 0.2 MG: 0.2 INJECTION, SOLUTION INTRAMUSCULAR; INTRAVENOUS; SUBCUTANEOUS at 21:00

## 2025-08-02 RX ADMIN — MECLIZINE HYDROCHLORIDE 25 MG: 25 TABLET ORAL at 16:12

## 2025-08-02 RX ADMIN — CLOPIDOGREL BISULFATE 75 MG: 75 TABLET, FILM COATED ORAL at 11:43

## 2025-08-02 RX ADMIN — DIAZEPAM 2.5 MG: 10 INJECTION, SOLUTION INTRAMUSCULAR; INTRAVENOUS at 11:55

## 2025-08-02 RX ADMIN — FUROSEMIDE 40 MG: 10 INJECTION, SOLUTION INTRAMUSCULAR; INTRAVENOUS at 08:15

## 2025-08-02 RX ADMIN — HYDRALAZINE HYDROCHLORIDE 75 MG: 50 TABLET ORAL at 01:03

## 2025-08-02 RX ADMIN — HYDROMORPHONE HYDROCHLORIDE 0.2 MG: 0.2 INJECTION, SOLUTION INTRAMUSCULAR; INTRAVENOUS; SUBCUTANEOUS at 10:13

## 2025-08-02 RX ADMIN — CARVEDILOL 25 MG: 25 TABLET, FILM COATED ORAL at 16:12

## 2025-08-02 RX ADMIN — FUROSEMIDE 40 MG: 10 INJECTION, SOLUTION INTRAMUSCULAR; INTRAVENOUS at 16:08

## 2025-08-02 ASSESSMENT — ACTIVITIES OF DAILY LIVING (ADL)
ADLS_ACUITY_SCORE: 64
ADLS_ACUITY_SCORE: 66
ADLS_ACUITY_SCORE: 64
ADLS_ACUITY_SCORE: 66
ADLS_ACUITY_SCORE: 64
ADLS_ACUITY_SCORE: 66
ADLS_ACUITY_SCORE: 64

## 2025-08-02 NOTE — PLAN OF CARE
"Goal Outcome Evaluation:      Plan of Care Reviewed With: patient, family    Overall Patient Progress: decliningOverall Patient Progress: declining    Outcome Evaluation: Patient transferred to icu today. patient alert, able to make needs known. patient on bipap, able to tolerate some breaks. meds adjusted for blood pressure,anxiety, and work of breathing. patient taking sips of oral supplement. on nitro drip. family at bedside.          Problem: Adult Inpatient Plan of Care  Goal: Plan of Care Review  Description: The Plan of Care Review/Shift note should be completed every shift.  The Outcome Evaluation is a brief statement about your assessment that the patient is improving, declining, or no change.  This information will be displayed automatically on your shift  note.  Outcome: Not Progressing  Flowsheets (Taken 8/2/2025 1630)  Outcome Evaluation: Patient transferred to icu today. patient alert, able to make needs known. patient on bipap, able to tolerate some breaks. meds adjusted for blood pressure,anxiety, and work of breathing. patient taking sips of oral supplement. on nitro drip. family at bedside.  Plan of Care Reviewed With:   patient   family  Overall Patient Progress: declining  Goal: Patient-Specific Goal (Individualized)  Description: You can add care plan individualizations to a care plan. Examples of Individualization might be:  \"Parent requests to be called daily at 9am for status\", \"I have a hard time hearing out of my right ear\", or \"Do not touch me to wake me up as it startles  me\".  Outcome: Not Progressing  Goal: Absence of Hospital-Acquired Illness or Injury  Outcome: Not Progressing  Intervention: Identify and Manage Fall Risk  Recent Flowsheet Documentation  Taken 8/2/2025 1600 by Aurea Farrell RN  Safety Promotion/Fall Prevention:   assistive device/personal items within reach   clutter free environment maintained   increased rounding and observation   increase visualization of " patient   lighting adjusted   nonskid shoes/slippers when out of bed   patient and family education   room organization consistent   safety round/check completed  Taken 8/2/2025 1000 by Aurea Farrell RN  Safety Promotion/Fall Prevention:   assistive device/personal items within reach   clutter free environment maintained   increased rounding and observation   increase visualization of patient   lighting adjusted   nonskid shoes/slippers when out of bed   patient and family education   room organization consistent   safety round/check completed  Intervention: Prevent Skin Injury  Recent Flowsheet Documentation  Taken 8/2/2025 1600 by Aurea Farrell RN  Body Position:   turned   heels elevated  Taken 8/2/2025 1200 by Aurea Farrell RN  Body Position:   turned   heels elevated  Intervention: Prevent Infection  Recent Flowsheet Documentation  Taken 8/2/2025 1600 by Aurea Farrell RN  Infection Prevention: single patient room provided  Taken 8/2/2025 1000 by Aurea Farrell RN  Infection Prevention: single patient room provided  Goal: Optimal Comfort and Wellbeing  Outcome: Not Progressing  Intervention: Provide Person-Centered Care  Recent Flowsheet Documentation  Taken 8/2/2025 1600 by Aurea Farrell RN  Trust Relationship/Rapport:   care explained   choices provided   emotional support provided   questions encouraged   questions answered   empathic listening provided   reassurance provided   thoughts/feelings acknowledged  Taken 8/2/2025 1000 by Aurea Farrell RN  Trust Relationship/Rapport:   care explained   choices provided   emotional support provided   questions encouraged   questions answered   empathic listening provided   reassurance provided   thoughts/feelings acknowledged  Goal: Readiness for Transition of Care  Outcome: Not Progressing     Problem: Delirium  Goal: Optimal Coping  Outcome: Not Progressing  Intervention: Optimize Psychosocial Adjustment to  Delirium  Recent Flowsheet Documentation  Taken 8/2/2025 1600 by Aurea Farrell RN  Family/Support System Care:   caregiver stress acknowledged   involvement promoted   presence promoted  Taken 8/2/2025 1000 by Aruea Farrell RN  Family/Support System Care:   caregiver stress acknowledged   involvement promoted   presence promoted  Goal: Improved Behavioral Control  Outcome: Not Progressing  Intervention: Minimize Safety Risk  Recent Flowsheet Documentation  Taken 8/2/2025 1600 by Aurea Farrell RN  Enhanced Safety Measures: pain management  Trust Relationship/Rapport:   care explained   choices provided   emotional support provided   questions encouraged   questions answered   empathic listening provided   reassurance provided   thoughts/feelings acknowledged  Taken 8/2/2025 1000 by Aurea Farrell RN  Enhanced Safety Measures: pain management  Trust Relationship/Rapport:   care explained   choices provided   emotional support provided   questions encouraged   questions answered   empathic listening provided   reassurance provided   thoughts/feelings acknowledged  Goal: Improved Attention and Thought Clarity  Outcome: Not Progressing  Goal: Improved Sleep  Outcome: Not Progressing     Problem: Skin Injury Risk Increased  Goal: Skin Health and Integrity  Outcome: Not Progressing  Intervention: Plan: Nurse Driven Intervention: Moisture Management  Recent Flowsheet Documentation  Taken 8/2/2025 1600 by Aurea Farrell RN  Moisture Interventions: Urinary collection device  Taken 8/2/2025 0925 by Aurea Farrell RN  Bathing/Skin Care:   bath, complete   dressed/undressed   electrode patches/site rotation   linen changed  Intervention: Plan: Nurse Driven Intervention: Friction and Shear  Recent Flowsheet Documentation  Taken 8/2/2025 1600 by Aurea Farrell RN  Friction/Shear Interventions:   Repositioning device (TAP system, etc.)   Assistive lifting device (portable/ceiling  lift, etc.)  Intervention: Optimize Skin Protection  Recent Flowsheet Documentation  Taken 8/2/2025 1600 by Aurea Farrell RN  Head of Bed ([facility]) Positioning: HOB at 20-30 degrees  Taken 8/2/2025 1200 by Aurea Farrell RN  Head of Bed ([facility]) Positioning: HOB at 20-30 degrees     Problem: Breathing Pattern Ineffective  Goal: Effective Breathing Pattern  Outcome: Not Progressing  Intervention: Promote Improved Breathing Pattern  Recent Flowsheet Documentation  Taken 8/2/2025 1600 by Aurea Farrell RN  Breathing Techniques/Airway Clearance: deep/controlled cough encouraged  Airway/Ventilation Management:   airway patency maintained   calming measures promoted   oxygen therapy provided   position adjusted   pulmonary hygiene promoted  Head of Bed (HOB) Positioning: HOB at 20-30 degrees  Taken 8/2/2025 1200 by Aurea Farrell RN  Head of Bed ([facility]) Positioning: HOB at 20-30 degrees  Taken 8/2/2025 1000 by Aurea Farrell RN  Breathing Techniques/Airway Clearance: deep/controlled cough encouraged  Airway/Ventilation Management:   airway patency maintained   calming measures promoted   oxygen therapy provided   position adjusted   pulmonary hygiene promoted     Problem: Comorbidity Management  Goal: Maintenance of COPD Symptom Control  Outcome: Not Progressing  Intervention: Maintain COPD Symptom Control  Recent Flowsheet Documentation  Taken 8/2/2025 1600 by Aurea Farrell RN  Breathing Techniques/Airway Clearance: deep/controlled cough encouraged  Taken 8/2/2025 1000 by Aurea Farrell RN  Breathing Techniques/Airway Clearance: deep/controlled cough encouraged  Goal: Blood Pressure in Desired Range  Outcome: Not Progressing

## 2025-08-02 NOTE — PLAN OF CARE
"A/Ox4.Pt is anxious at times. LS dim, on 4L NC and intermittently on BiPAP 35% fiO2 for shortness of breath. Purewick in place for I and O. Pt declined getting up in chair. Tele SR. Renal ultrasound completed today. On IV lasix. nephrology following. Cards signed off.    Goal Outcome Evaluation:      Plan of Care Reviewed With: patient    Overall Patient Progress: no changeOverall Patient Progress: no change    Outcome Evaluation: Intermittent bipap for shortness of breath. Currently on 4L NC.        Problem: Adult Inpatient Plan of Care  Goal: Plan of Care Review  Description: The Plan of Care Review/Shift note should be completed every shift.  The Outcome Evaluation is a brief statement about your assessment that the patient is improving, declining, or no change.  This information will be displayed automatically on your shift  note.  Outcome: Progressing  Flowsheets (Taken 8/1/2025 2853)  Outcome Evaluation: Intermittent bipap for shortness of breath. Currently on 4L NC.  Plan of Care Reviewed With: patient  Overall Patient Progress: no change  Goal: Patient-Specific Goal (Individualized)  Description: You can add care plan individualizations to a care plan. Examples of Individualization might be:  \"Parent requests to be called daily at 9am for status\", \"I have a hard time hearing out of my right ear\", or \"Do not touch me to wake me up as it startles  me\".  Outcome: Progressing  Goal: Absence of Hospital-Acquired Illness or Injury  Outcome: Progressing  Intervention: Identify and Manage Fall Risk  Recent Flowsheet Documentation  Taken 8/1/2025 2104 by Danna Garay, RN  Safety Promotion/Fall Prevention: safety round/check completed  Taken 8/1/2025 1813 by Danna Garay, RN  Safety Promotion/Fall Prevention: safety round/check completed  Taken 8/1/2025 1611 by Danna Garay, RN  Safety Promotion/Fall Prevention:   safety round/check completed   room organization consistent   patient and family education   " nonskid shoes/slippers when out of bed   clutter free environment maintained   activity supervised   assistive device/personal items within reach  Intervention: Prevent Skin Injury  Recent Flowsheet Documentation  Taken 8/1/2025 2104 by Danna Garay RN  Body Position: position changed independently  Taken 8/1/2025 1812 by Danna Garay RN  Body Position: position changed independently  Taken 8/1/2025 1611 by Danna Garay RN  Body Position: position changed independently  Intervention: Prevent and Manage VTE (Venous Thromboembolism) Risk  Recent Flowsheet Documentation  Taken 8/1/2025 1611 by Danna Garay RN  VTE Prevention/Management: patient refused intervention  Goal: Optimal Comfort and Wellbeing  Outcome: Progressing  Intervention: Provide Person-Centered Care  Recent Flowsheet Documentation  Taken 8/1/2025 1611 by Danna Garay RN  Trust Relationship/Rapport:   care explained   choices provided   questions answered   questions encouraged   reassurance provided   thoughts/feelings acknowledged  Goal: Readiness for Transition of Care  Outcome: Progressing     Problem: Delirium  Goal: Optimal Coping  Outcome: Progressing  Goal: Improved Behavioral Control  Outcome: Progressing  Intervention: Minimize Safety Risk  Recent Flowsheet Documentation  Taken 8/1/2025 1611 by Danna Garay RN  Enhanced Safety Measures:   assistive devices when indicated   pain management   patient/family teach back on injury risk  Trust Relationship/Rapport:   care explained   choices provided   questions answered   questions encouraged   reassurance provided   thoughts/feelings acknowledged  Goal: Improved Attention and Thought Clarity  Outcome: Progressing  Goal: Improved Sleep  Outcome: Progressing     Problem: Skin Injury Risk Increased  Goal: Skin Health and Integrity  Outcome: Progressing  Intervention: Plan: Nurse Driven Intervention: Moisture Management  Recent Flowsheet Documentation  Taken 8/1/2025 1611  by Danna Garay RN  Moisture Interventions:   Incontinence pad   Urinary collection device  Intervention: Plan: Nurse Driven Intervention: Friction and Shear  Recent Flowsheet Documentation  Taken 8/1/2025 1611 by Danna Garay RN  Friction/Shear Interventions: HOB 30 degrees or less  Intervention: Optimize Skin Protection  Recent Flowsheet Documentation  Taken 8/1/2025 1611 by Danna Garay, RN  Activity Management: activity adjusted per tolerance     Problem: Breathing Pattern Ineffective  Goal: Effective Breathing Pattern  Outcome: Progressing     Problem: Comorbidity Management  Goal: Maintenance of COPD Symptom Control  Outcome: Progressing  Intervention: Maintain COPD Symptom Control  Recent Flowsheet Documentation  Taken 8/1/2025 1611 by Danna Garay RN  Medication Review/Management: medications reviewed  Goal: Blood Pressure in Desired Range  Outcome: Progressing  Intervention: Maintain Blood Pressure Management  Recent Flowsheet Documentation  Taken 8/1/2025 1611 by Danna Garay RN  Medication Review/Management: medications reviewed

## 2025-08-02 NOTE — PROGRESS NOTES
Respiratory Therapy Note    Patient was transferred from room 350 to room 375 on BiPAP therapy. Patient tolerated well. RT to follow.    Laverne Garcia, RT  9:00 AM August 2, 2025

## 2025-08-02 NOTE — PROGRESS NOTES
[facility]    Medicine Progress Note - Hospitalist Service    Date of Admission:  7/28/2025    Assessment & Plan   75-year-old female with history of f CAD, CKD stage IV, hypertension, hyperlipidemia, intra-abdominal aortic aneurysm with prior stenting, chronic vertigo, recent hospitalization for fall with pelvic fracture who presents the ED for shortness of breath.      Had elevated D-dimer but VQ scan negative for PE, CT not done due to CKD.  proBNP more than 25,000.  Chest x-ray showed bilateral perihilar interstitial/airspace opacities, left greater than right, are nonspecific for postradiation change versus pneumonia. A small nodule in the peripheral right upper lobe measures 9 mm.     Found to be in hypertensive emergency, admitted to ICU with BiPAP and nitroglycerin drip.  She was weaned off nitroglycerin drip and BiPAP but has had repeated episodes of rising blood pressure and shortness of breath needing her to go back on BiPAP intermittently. Patient was treated with IV Lasix and subsequently transferred to telemetry floor.     8/2- Patient had increased shortness of breath this morning. Had elevated BP with SBP in 170s. Started on nitroglycerin drip and transferred back to ICU    Acute hypoxic respiratory failure:  Diastolic CHF exacerbation:  Hypertensive emergency:  -Echo with EF of 55 to 60%.  Initially got Lasix for 40 mg IV x 1 and got off nitroglycerin drip but had repeated episodes of rising blood pressure and flash pulmonary edema. Chest x-ray showed extensive bilateral perihilar opacities consistent with pulmonary edema or pneumonia, clinically consistent with pulmonary edema.    -She was treated with BiPAP and IV Lasix.  -Continue carvedilol 25 mg twice daily, hydralazine 75 mg 3 times daily, nifedipine 90 mg daily and clonidine patch 0.3 mg per 24 hours.  Isordil increased to 40 mg 3 times  daily.   -Was empirically started on ceftriaxone on presentation. Patient had no  fever or leukocytosis.    -Has  elevated procalcitonin of 0.67 which is difficult to interpret in ROMI/CKD and improved to 0.49 on recheck.  Will complete ceftriaxone for 5 days.  - Rapid response called this morning due to  increased shortness of breath.   -She was noted to have elevated BP with SBP in 170's. Started on nitroglycerin drip and transferred back to ICU.     ROMI on CKD stage IV:  Metabolic acidosis:  - Previous baseline of 1.3-1.5 which worsened in May/June 2025 and new baseline appears to be 3 with nephrotic range proteinuria.  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  -Received Lasix 40 mg IV bid per nephrology   -Creatinine on admission was 4.25, improved to 3.98--> 3.75--> 3.37-->3.15-->3.40 .  -Lasix held this morning due to rising creatinine. Further diuretic dosing per nephrology  -Renal US showed no evidence of renal artery stenosis   -Continue prior to admission dose of sodium bicarbonate.    Acute on chronic diastolic congestive heart failure  -BNP elevated   -Has increased dyspnea today. Started on nitroglycerin drip and transferred to ICU  -Diuretic dosing per nephrology     Hypertensive urgency   -Will continue coreg, clonidine patch, hydralazine, nifedipine as above   -Started on nitroglycerin drip  -Will monitor blood pressure     Chronic medical conditions  - AAA.  Endoleak status post recent endovascular aneurysm repair on 4/25.  Continue Plavix and statin.  - History of CVA.  Chronic infarct in the right basal ganglia noted on CT.  - Right lung adenocarcinoma.  Status post stereotactic body radiation therapy in 2021, follows up in [place].  His lung nodule on x-ray, can follow-up with her primary oncologist.  - Chronic vertigo.  Follows up with ENT in [place] and on as needed meclizine.  MRI brain on 5/22 was negative for acute pathology.  - Depression pleasant lady.  Continue paroxetine.  - Hypertriglyceridemia.  On  Vascepa.    Diet: Snacks/Supplements Adult: Nepro Oral Supplement; Between Meals  2 Gram Sodium Diet    DVT Prophylaxis: Pneumatic Compression Devices  Rao Catheter: Not present  Lines: None     Cardiac Monitoring: ACTIVE order. Indication: Acute decompensated heart failure (48 hours)  Code Status: Full Code      Clinically Significant Risk Factors        # Hypokalemia: Lowest K = 3.2 mmol/L in last 2 days, will replace as needed   # Hypochloremia: Lowest Cl = 96 mmol/L in last 2 days, will monitor as appropriate            # Hypertension: Noted on problem list             # Moderate Malnutrition: based on nutrition assessment and treatment provided per dietitian's recommendations.    # Financial/Environmental Concerns: none         Social Drivers of Health    Depression: At risk (6/5/2025)    Received from [employer]    PHQ-2     Depression Risk: 3   Tobacco Use: High Risk (7/31/2025)    Received from [employer]    Patient History     Smoking Tobacco Use: Every Day     Smokeless Tobacco Use: Never     Passive Exposure: Current   Interpersonal Safety: Unknown (7/28/2025)    Interpersonal Safety     Do you feel physically and emotionally safe where you currently live?: Patient unable to answer     Within the past 12 months, have you been hit, slapped, kicked or otherwise physically hurt by someone?: Patient unable to answer     Within the past 12 months, have you been humiliated or emotionally abused in other ways by your partner or ex-partner?: Patient unable to answer          Disposition Plan     Medically Ready for Discharge: 2- 4 days      Fran Pierson MD, MD  Hospitalist Service  [facility]  Securely message with [employer] (more info)  Text page via [facility] Paging/Directory   ______________________________________________________________________    Interval History   Patient seen and examined today. Has increased dyspnea. Put on BiPAP. Denies chest pain. Has no  fever    Physical Exam   Vital Signs: Temp: 97.6  F (36.4  C) Temp src: Tympanic BP: (!) 173/94 Pulse: 80   Resp: 19 SpO2: 93 % O2 Device: BiPAP/CPAP Oxygen Delivery: 4 LPM  Weight: 118 lbs 9.72 oz    General Appearance: Alert awake and oriented x 3. In respiratory distress. On  BiPAP.  Respiratory: Basilar crackles.  Cardiovascular: S1-S2 normal  GI: Soft and nontender  Skin: No rash  Other: Trace edema.      Medical Decision Making       60 MINUTES SPENT BY ME on the date of service doing chart review, history, exam, documentation & further activities per the note.      Data     I have personally reviewed the following data over the past 24 hrs:    9.3  \   9.1 (L)   / 390     135 96 (L) 56.0 (H) /  133 (H)   3.7; 3.7 25 3.40 (H) \       Imaging results reviewed over the past 24 hrs:   No results found for this or any previous visit (from the past 24 hours).

## 2025-08-02 NOTE — PROGRESS NOTES
Respiratory Therapy Note    Patient was placed on BiPAP for the night without issue. Skin checked with no signs of breakdown. RT to continue to monitor and assess during hospital stay.    Oxygen Therapy: BiPAP 10/5 30%    RT Shelli on 8/1/2025 at 9:35 PM

## 2025-08-02 NOTE — PROGRESS NOTES
"         [facility]  Respiratory Care Note      A BiPAP of  10/5 @ 45% continues to be applied to the pt via the full face and under the nose mask for an increase in WOB and SOB.  The skin in contact with the mask and straps looks good and intact. Pt is tolerating the mask changes well. RT will continue to monitor and assess the pt's respiratory status and needs.    Vital signs:  Temp: 97.6  F (36.4  C) Temp src: Tympanic BP: 134/80 Pulse: 68   Resp: 17 SpO2: 96 % O2 Device: BiPAP/CPAP Oxygen Delivery: 3 LPM Height: 162.6 cm (5' 4\") Weight: 53.8 kg (118 lb 9.7 oz)  Estimated body mass index is 20.36 kg/m  as calculated from the following:    Height as of this encounter: 1.626 m (5' 4\").    Weight as of this encounter: 53.8 kg (118 lb 9.7 oz).      Venous Blood Gas  Recent Labs   Lab 08/02/25  0822 07/29/25  2159 07/28/25  0920 07/28/25  0537   PHV 7.40  --  7.41 7.46*   PCO2V 44  --  41 39*   PO2V 72*  --  58* 50*   HCO3V 27  --  26 28   JURGEN 2.1  --  1.0 3.6*   O2PER 55 50 5 3     Past Medical History:   Diagnosis Date    Aneurysm of renal artery     left    Atrial fibrillation (H)     COPD (chronic obstructive pulmonary disease) (H)     High cholesterol     Hypertension     Mixed hyperlipidemia     PVD (peripheral vascular disease)     Stenosis of left carotid artery     Tobacco use disorder        Past Surgical History:   Procedure Laterality Date    ABDOMEN SURGERY      aneurism      left femoral, mesh    ENDARTERECTOMY CAROTID Left 7/17/2018    Procedure: ENDARTERECTOMY CAROTID;  LEFT CAROTID ENDARTERECTOMY WITH EEG;  Surgeon: Jorge Posada MD;  Location: [facility] OR    GENITOURINARY SURGERY      stent rt kidney    IR RENAL BIOPSY LEFT  6/16/2025       Family History   Problem Relation Age of Onset    Alzheimer Disease Mother     Cerebrovascular Disease Father     Dementia Maternal Grandmother     Diabetes Brother     Alzheimer Disease Brother     Parkinsonism Brother     Cerebrovascular " Disease Brother     Breast Cancer Sister     Heart Disease Sister        Social History     Tobacco Use    Smoking status: Every Day     Current packs/day: 0.50     Average packs/day: 0.5 packs/day for 45.0 years (22.5 ttl pk-yrs)     Types: Cigarettes    Smokeless tobacco: Never   Substance Use Topics    Alcohol use: Yes     Comment: social [facility]  8/2/2025

## 2025-08-02 NOTE — PLAN OF CARE
"Goal Outcome Evaluation:      Plan of Care Reviewed With: patient    Overall Patient Progress: no changeOverall Patient Progress: no change    Outcome Evaluation: pt a/ox4 really anxious, 85% on bipap with 30% FIO2, RT notified, increasede FIO2 to 40 %. stating at 94%. patch on right shoulder.      Problem: Adult Inpatient Plan of Care  Goal: Plan of Care Review  Description: The Plan of Care Review/Shift note should be completed every shift.  The Outcome Evaluation is a brief statement about your assessment that the patient is improving, declining, or no change.  This information will be displayed automatically on your shift  note.  Outcome: Progressing  Flowsheets (Taken 8/2/2025 0750)  Outcome Evaluation: pt a/ox4 really anxious, 85% on bipap with 30% FIO2, RT notified, increasede FIO2 to 40 %. stating at 94%. patch on right shoulder.  Plan of Care Reviewed With: patient  Overall Patient Progress: no change  Goal: Patient-Specific Goal (Individualized)  Description: You can add care plan individualizations to a care plan. Examples of Individualization might be:  \"Parent requests to be called daily at 9am for status\", \"I have a hard time hearing out of my right ear\", or \"Do not touch me to wake me up as it startles  me\".  Outcome: Progressing  Goal: Absence of Hospital-Acquired Illness or Injury  Outcome: Progressing  Intervention: Identify and Manage Fall Risk  Recent Flowsheet Documentation  Taken 8/2/2025 0300 by Otto Rosado RN  Safety Promotion/Fall Prevention: safety round/check completed  Intervention: Prevent and Manage VTE (Venous Thromboembolism) Risk  Recent Flowsheet Documentation  Taken 8/2/2025 0300 by Otto Rosado RN  VTE Prevention/Management: patient refused intervention  Intervention: Prevent Infection  Recent Flowsheet Documentation  Taken 8/2/2025 0300 by Otto Rosado RN  Infection Prevention: rest/sleep promoted  Taken 8/1/2025 2301 by Otto Rosado RN  Infection Prevention: " rest/sleep promoted  Goal: Optimal Comfort and Wellbeing  Outcome: Progressing  Intervention: Provide Person-Centered Care  Recent Flowsheet Documentation  Taken 8/2/2025 0300 by Otto Rosado RN  Trust Relationship/Rapport:   care explained   choices provided   questions answered   questions encouraged   reassurance provided   thoughts/feelings acknowledged  Goal: Readiness for Transition of Care  Outcome: Progressing     Problem: Delirium  Goal: Optimal Coping  Outcome: Progressing  Intervention: Optimize Psychosocial Adjustment to Delirium  Recent Flowsheet Documentation  Taken 8/2/2025 0300 by Otto Rosado RN  Supportive Measures:   active listening utilized   verbalization of feelings encouraged  Family/Support System Care: self-care encouraged  Goal: Improved Behavioral Control  Outcome: Progressing  Intervention: Prevent and Manage Agitation  Recent Flowsheet Documentation  Taken 8/2/2025 0300 by Otto Rosado RN  Environment Familiarity/Consistency: personal clothing/items utilized  Intervention: Minimize Safety Risk  Recent Flowsheet Documentation  Taken 8/2/2025 0300 by Otto Rosado RN  Enhanced Safety Measures:   assistive devices when indicated   pain management   patient/family teach back on injury risk  Trust Relationship/Rapport:   care explained   choices provided   questions answered   questions encouraged   reassurance provided   thoughts/feelings acknowledged  Goal: Improved Attention and Thought Clarity  Outcome: Progressing  Goal: Improved Sleep  Outcome: Progressing     Problem: Skin Injury Risk Increased  Goal: Skin Health and Integrity  Outcome: Progressing  Intervention: Plan: Nurse Driven Intervention: Moisture Management  Recent Flowsheet Documentation  Taken 8/2/2025 0300 by Otto Rosado RN  Moisture Interventions:   Encourage regular toileting   No brief in bed   Incontinence pad   Urinary collection device   Perineal cleanser  Intervention: Plan: Nurse Driven  Intervention: Friction and Shear  Recent Flowsheet Documentation  Taken 8/2/2025 0300 by Otto Rosado RN  Friction/Shear Interventions: HOB 30 degrees or less     Problem: Breathing Pattern Ineffective  Goal: Effective Breathing Pattern  Outcome: Progressing  Intervention: Promote Improved Breathing Pattern  Recent Flowsheet Documentation  Taken 8/2/2025 0300 by Otto Rosado RN  Supportive Measures:   active listening utilized   verbalization of feelings encouraged     Problem: Comorbidity Management  Goal: Maintenance of COPD Symptom Control  Outcome: Progressing  Intervention: Maintain COPD Symptom Control  Recent Flowsheet Documentation  Taken 8/2/2025 0300 by Otto Rosado RN  Medication Review/Management: medications reviewed  Goal: Blood Pressure in Desired Range  Outcome: Progressing  Intervention: Maintain Blood Pressure Management  Recent Flowsheet Documentation  Taken 8/2/2025 0300 by Otto Rosado RN  Medication Review/Management: medications reviewed

## 2025-08-02 NOTE — PROGRESS NOTES
Renal Medicine Progress Note            Assessment/Plan:     Assessment:    Hypertensive emergency   Acute hypoxic respiratory failure   Acute on chronic diastolic HF   History of R renal artery stenting  Labile HTN  Bipap dependent hypoxia, pulmonary edema on CXR. Started nitroglycerin gtt, now weaned off. Improvement in hypoxia. Can give dose of IV lasix today and reassess tomorrow. BNP 25K. TTE with EF 55-60%, elevated L sided pressures and pulmonary HTN, but RA pressure estimated low.  BP is quite labile, upon further review appears she has a R renal artery stent, will re-image renal arteries to ensure no new stenosis. Did have AAA graft repair in April.   - continue IV lasix 40 mg BID  - continue PTA antihypertensives, increase hydralazine further to 100 mg TID  - renal US with duplex- no JACY noted       ROMI on CKD IV   Previously  baseline 1.3-1.5, worsening in May/June 2025, new baseline appears to be ~3 with nephrotic range proteinuria. 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 This is most commonly seen in DM2, however can also be seen with smoking and HTN. Bx also showed cholesterol emboli (likely due to endograft repair).  Cr on admission 4.25, worse than recent baseline. In setting of hypertensive emergency, pulmonary edema, LE edema. Improving with diuretics and BP control.   Bx results:       Metabolic acidosis, resolved    Continue PTA sodium bicarb.     R lung adenocarcinoma   Received radiation in 2021.     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 Pierson. Reviewed notes from hospitalists.     Vee Encinas MD   [facility]  Office: [phone]          Interval History:     RRT this AM due to SOB and desat after patient pulled off bipap. Some component of anxiety, but possibly also flash pulmonary edema at play. Nitroglycerin gtt  started. Transferred to ICU  She reports feeling comfortable now but wants the bipap off when able   Denies new symptoms          Medications and Allergies:     Current Facility-Administered Medications   Medication Dose Route Frequency Provider Last Rate Last Admin    carvedilol (COREG) tablet 25 mg  25 mg Oral BID w/meals Josemanuel Cohen MD   25 mg at 08/01/25 1807    cefTRIAXone (ROCEPHIN) 1 g vial to attach to  mL bag for ADULTS or NS 50 mL bag for PEDS  1 g Intravenous Q24H Virgilio Patel MD   1 g at 08/02/25 0633    cloNIDine (CATAPRES-TTS3) 0.3 MG/24HR WK patch 1 patch  1 patch Transdermal Weekly Virgilio Patel MD   1 patch at 07/31/25 1432    And    cloNIDine (CATAPRES-TTS1) Patch in Place   Transdermal Q8H CHIDI Virgilio Patel MD        clopidogrel (PLAVIX) tablet 75 mg  75 mg Oral Daily Virgilio Patel MD   75 mg at 08/01/25 0911    famotidine (PEPCID) tablet 20 mg  20 mg Oral Q48H Jonathan Light MD   20 mg at 07/31/25 0923    furosemide (LASIX) injection 40 mg  40 mg Intravenous BID Vee Encinas MD   40 mg at 08/02/25 0815    [Held by provider] furosemide (LASIX) tablet 40 mg  40 mg Oral BID Vee Encinas MD   40 mg at 07/30/25 1534    hydrALAZINE (APRESOLINE) tablet 100 mg  100 mg Oral TID Vee Encinas MD        icosapent ethyl (VASCEPA) capsule 2 g  2 g Oral BID w/meals Virgilio Patel MD   2 g at 08/01/25 1811    isosorbide dinitrate (ISORDIL) tablet 40 mg  40 mg Oral TID Jonathan Light MD   40 mg at 08/01/25 1807    meclizine (ANTIVERT) tablet 25 mg  25 mg Oral Q6H Virgilio Patel MD   25 mg at 08/02/25 0634    NIFEdipine ER OSMOTIC (PROCARDIA XL) 24 hr tablet 90 mg  90 mg Oral Daily Virgilio Patel MD   90 mg at 08/01/25 0914    PARoxetine (PAXIL) tablet 40 mg  40 mg Oral Daily Virgilio Patel MD   40 mg at 08/01/25 0911    sodium bicarbonate tablet 1,300 mg  1,300 mg Oral BID Virgilio Patel MD   1,300 mg at 08/01/25 2054    sodium chloride (PF) 0.9% PF flush 3 mL  3 mL  "Intracatheter Q8H CHIDI Gilmer Swain MD   3 mL at 08/02/25 0639        Allergies   Allergen Reactions    Iodine Hives            Physical Exam:   Vitals were reviewed  BP (!) 167/91   Pulse 77   Temp 97.6  F (36.4  C) (Tympanic)   Resp (!) 36   Ht 1.626 m (5' 4\")   Wt 53.8 kg (118 lb 9.7 oz)   SpO2 96%   BMI 20.36 kg/m      Wt Readings from Last 3 Encounters:   07/30/25 53.8 kg (118 lb 9.7 oz)   07/23/25 54.4 kg (120 lb)   07/21/25 54.1 kg (119 lb 3.2 oz)       Intake/Output Summary (Last 24 hours) at 7/29/2025 0949  Last data filed at 7/29/2025 0845  Gross per 24 hour   Intake 185.85 ml   Output 1300 ml   Net -1114.15 ml       GENERAL APPEARANCE: NAD, frail appearing  HEENT: normocephalic, dry MM   RESP: coarse  CV: RRR   EXTREMITIES/SKIN: no edema  NEURO:  alert, oriented, normal speech           Data:     BMP  Recent Labs   Lab 08/02/25  0942 08/02/25  0357 08/01/25 2028 08/01/25  0802 07/31/25  0559 07/30/25  0753 07/30/25  0703   NA  --  135  --  137 135  --  137   POTASSIUM  --  3.7  3.7 3.3* 3.2* 3.2*  --  3.3*   CHLORIDE  --  96*  --  96* 97*  --  99   BRUCE  --  8.8  --  8.5* 8.1*  --  8.0*   CO2  --  25  --  25 24  --  24   BUN  --  56.0*  --  55.5* 56.6*  --  62.8*   CR  --  3.40*  --  3.15* 3.37*  --  3.75*   * 114*  --  123* 115*   < > 101*    < > = values in this interval not displayed.     CBC  Recent Labs   Lab 08/02/25  0357 08/01/25  0802 07/31/25  0559 07/30/25  0703   WBC 9.3 9.2 8.5 7.8   HGB 9.1* 9.2* 9.1* 9.3*   HCT 27.3* 27.5* 27.1* 28.0*   * 102* 101* 101*    397 365 377     Lab Results   Component Value Date    AST 22 07/17/2025    ALT 11 07/17/2025    ALKPHOS 61 07/17/2025    BILITOTAL 0.3 07/17/2025     No results found for: \"INR\"  Color Urine (no units)   Date Value   09/09/2016 Light Yellow     Appearance Urine (no units)   Date Value   09/09/2016 Clear     Glucose Urine (mg/dL)   Date Value   09/09/2016 Negative     Bilirubin Urine (no units) "   Date Value   09/09/2016 Negative     Ketones Urine (mg/dL)   Date Value   09/09/2016 Negative     Specific Gravity Urine (no units)   Date Value   09/09/2016 1.010     pH Urine (pH)   Date Value   09/09/2016 5.5     Protein Albumin Urine (mg/dL)   Date Value   09/09/2016 Negative     Nitrite Urine (no units)   Date Value   09/09/2016 Negative     Leukocyte Esterase Urine (no units)   Date Value   09/09/2016 Negative         Attestation:  I have reviewed today's vital signs, notes, medications, labs and imaging.    Vee Encinas MD  [facility] - Nephrology  Office: [phone]

## 2025-08-02 NOTE — PLAN OF CARE
"RRT called around 0815. Pt was anxious and restless stating she could not breath. She had pulled the bipap mask off. Sats 85% . Bipap replaced and o2 was increased to 100% on bipap . RRT called. B/p elevated. Pt started to relax sats increased to 98% 100% FIO2 on bipap and o2 was weaned down to 55%. Pt daughter present at bedside and is calling pt spouse to update. Pt was transferred to ICU for closer monitoring and nitroglycerin gtt. Report given to ICU RN taking over care   Problem: Adult Inpatient Plan of Care  Goal: Plan of Care Review  Description: The Plan of Care Review/Shift note should be completed every shift.  The Outcome Evaluation is a brief statement about your assessment that the patient is improving, declining, or no change.  This information will be displayed automatically on your shift  note.  Outcome: Not Progressing  Flowsheets (Taken 8/2/2025 0916)  Outcome Evaluation: transfer to icu  Plan of Care Reviewed With:   patient   family  Overall Patient Progress: declining  Goal: Patient-Specific Goal (Individualized)  Description: You can add care plan individualizations to a care plan. Examples of Individualization might be:  \"Parent requests to be called daily at 9am for status\", \"I have a hard time hearing out of my right ear\", or \"Do not touch me to wake me up as it startles  me\".  Outcome: Not Progressing  Goal: Absence of Hospital-Acquired Illness or Injury  Outcome: Not Progressing  Intervention: Identify and Manage Fall Risk  Recent Flowsheet Documentation  Taken 8/2/2025 0754 by Jayden Hi RN  Safety Promotion/Fall Prevention:   activity supervised   assistive device/personal items within reach   nonskid shoes/slippers when out of bed   patient and family education   safety round/check completed  Goal: Optimal Comfort and Wellbeing  Outcome: Not Progressing  Intervention: Provide Person-Centered Care  Recent Flowsheet Documentation  Taken 8/2/2025 0815 by Jayden Hi, " RN  Trust Relationship/Rapport:   care explained   choices provided   emotional support provided   empathic listening provided   questions answered   questions encouraged   reassurance provided   thoughts/feelings acknowledged  Goal: Readiness for Transition of Care  Outcome: Not Progressing     Problem: Delirium  Goal: Optimal Coping  Outcome: Not Progressing  Goal: Improved Behavioral Control  Outcome: Not Progressing  Intervention: Minimize Safety Risk  Recent Flowsheet Documentation  Taken 8/2/2025 0815 by Jayden Hi RN  Trust Relationship/Rapport:   care explained   choices provided   emotional support provided   empathic listening provided   questions answered   questions encouraged   reassurance provided   thoughts/feelings acknowledged  Goal: Improved Attention and Thought Clarity  Outcome: Not Progressing  Goal: Improved Sleep  Outcome: Not Progressing     Problem: Skin Injury Risk Increased  Goal: Skin Health and Integrity  Outcome: Not Progressing     Problem: Breathing Pattern Ineffective  Goal: Effective Breathing Pattern  Outcome: Not Progressing     Problem: Comorbidity Management  Goal: Maintenance of COPD Symptom Control  Outcome: Not Progressing  Goal: Blood Pressure in Desired Range  Outcome: Not Progressing   Goal Outcome Evaluation:      Plan of Care Reviewed With: patient, family    Overall Patient Progress: decliningOverall Patient Progress: declining    Outcome Evaluation: transfer to icu

## 2025-08-03 LAB
ANION GAP SERPL CALCULATED.3IONS-SCNC: 15 MMOL/L (ref 7–15)
BUN SERPL-MCNC: 59.7 MG/DL (ref 8–23)
CALCIUM SERPL-MCNC: 8.9 MG/DL (ref 8.8–10.4)
CHLORIDE SERPL-SCNC: 96 MMOL/L (ref 98–107)
CREAT SERPL-MCNC: 3.41 MG/DL (ref 0.51–0.95)
EGFRCR SERPLBLD CKD-EPI 2021: 13 ML/MIN/1.73M2
ERYTHROCYTE [DISTWIDTH] IN BLOOD BY AUTOMATED COUNT: 15.3 % (ref 10–15)
GLUCOSE BLDC GLUCOMTR-MCNC: 105 MG/DL (ref 70–99)
GLUCOSE BLDC GLUCOMTR-MCNC: 117 MG/DL (ref 70–99)
GLUCOSE BLDC GLUCOMTR-MCNC: 123 MG/DL (ref 70–99)
GLUCOSE BLDC GLUCOMTR-MCNC: 123 MG/DL (ref 70–99)
GLUCOSE BLDC GLUCOMTR-MCNC: 125 MG/DL (ref 70–99)
GLUCOSE BLDC GLUCOMTR-MCNC: 143 MG/DL (ref 70–99)
GLUCOSE SERPL-MCNC: 127 MG/DL (ref 70–99)
HCO3 SERPL-SCNC: 26 MMOL/L (ref 22–29)
HCT VFR BLD AUTO: 27 % (ref 35–47)
HGB BLD-MCNC: 9 G/DL (ref 11.7–15.7)
MAGNESIUM SERPL-MCNC: 2.1 MG/DL (ref 1.7–2.3)
MCH RBC QN AUTO: 34 PG (ref 26.5–33)
MCHC RBC AUTO-ENTMCNC: 33.3 G/DL (ref 31.5–36.5)
MCV RBC AUTO: 102 FL (ref 78–100)
PLATELET # BLD AUTO: 462 10E3/UL (ref 150–450)
POTASSIUM SERPL-SCNC: 3.7 MMOL/L (ref 3.4–5.3)
RBC # BLD AUTO: 2.65 10E6/UL (ref 3.8–5.2)
SODIUM SERPL-SCNC: 137 MMOL/L (ref 135–145)
WBC # BLD AUTO: 8.8 10E3/UL (ref 4–11)

## 2025-08-03 PROCEDURE — 99233 SBSQ HOSP IP/OBS HIGH 50: CPT | Performed by: STUDENT IN AN ORGANIZED HEALTH CARE EDUCATION/TRAINING PROGRAM

## 2025-08-03 PROCEDURE — 250N000013 HC RX MED GY IP 250 OP 250 PS 637: Performed by: STUDENT IN AN ORGANIZED HEALTH CARE EDUCATION/TRAINING PROGRAM

## 2025-08-03 PROCEDURE — 83735 ASSAY OF MAGNESIUM: CPT | Performed by: INTERNAL MEDICINE

## 2025-08-03 PROCEDURE — 85027 COMPLETE CBC AUTOMATED: CPT | Performed by: INTERNAL MEDICINE

## 2025-08-03 PROCEDURE — 80048 BASIC METABOLIC PNL TOTAL CA: CPT | Performed by: INTERNAL MEDICINE

## 2025-08-03 PROCEDURE — 250N000011 HC RX IP 250 OP 636: Performed by: STUDENT IN AN ORGANIZED HEALTH CARE EDUCATION/TRAINING PROGRAM

## 2025-08-03 PROCEDURE — 250N000011 HC RX IP 250 OP 636: Performed by: INTERNAL MEDICINE

## 2025-08-03 PROCEDURE — 200N000001 HC R&B ICU

## 2025-08-03 PROCEDURE — 250N000013 HC RX MED GY IP 250 OP 250 PS 637: Performed by: INTERNAL MEDICINE

## 2025-08-03 PROCEDURE — 36415 COLL VENOUS BLD VENIPUNCTURE: CPT | Performed by: INTERNAL MEDICINE

## 2025-08-03 PROCEDURE — 36415 COLL VENOUS BLD VENIPUNCTURE: CPT | Performed by: STUDENT IN AN ORGANIZED HEALTH CARE EDUCATION/TRAINING PROGRAM

## 2025-08-03 PROCEDURE — 83835 ASSAY OF METANEPHRINES: CPT | Performed by: STUDENT IN AN ORGANIZED HEALTH CARE EDUCATION/TRAINING PROGRAM

## 2025-08-03 PROCEDURE — 999N000157 HC STATISTIC RCP TIME EA 10 MIN

## 2025-08-03 PROCEDURE — 94660 CPAP INITIATION&MGMT: CPT

## 2025-08-03 RX ADMIN — CLOPIDOGREL BISULFATE 75 MG: 75 TABLET, FILM COATED ORAL at 08:11

## 2025-08-03 RX ADMIN — FUROSEMIDE 40 MG: 10 INJECTION, SOLUTION INTRAMUSCULAR; INTRAVENOUS at 16:19

## 2025-08-03 RX ADMIN — HYDROMORPHONE HYDROCHLORIDE 0.2 MG: 0.2 INJECTION, SOLUTION INTRAMUSCULAR; INTRAVENOUS; SUBCUTANEOUS at 16:19

## 2025-08-03 RX ADMIN — NITROGLYCERIN 40 MCG/MIN: 20 INJECTION INTRAVENOUS at 04:48

## 2025-08-03 RX ADMIN — HYDROMORPHONE HYDROCHLORIDE 0.2 MG: 0.2 INJECTION, SOLUTION INTRAMUSCULAR; INTRAVENOUS; SUBCUTANEOUS at 08:22

## 2025-08-03 RX ADMIN — ONDANSETRON 4 MG: 4 TABLET, ORALLY DISINTEGRATING ORAL at 08:23

## 2025-08-03 RX ADMIN — SODIUM BICARBONATE 1300 MG: 650 TABLET ORAL at 08:14

## 2025-08-03 RX ADMIN — ISOSORBIDE DINITRATE 40 MG: 10 TABLET ORAL at 17:23

## 2025-08-03 RX ADMIN — CARVEDILOL 25 MG: 25 TABLET, FILM COATED ORAL at 08:11

## 2025-08-03 RX ADMIN — MECLIZINE HYDROCHLORIDE 25 MG: 25 TABLET ORAL at 01:22

## 2025-08-03 RX ADMIN — MECLIZINE HYDROCHLORIDE 25 MG: 25 TABLET ORAL at 06:35

## 2025-08-03 RX ADMIN — HYDRALAZINE HYDROCHLORIDE 100 MG: 50 TABLET ORAL at 01:22

## 2025-08-03 RX ADMIN — PAROXETINE HYDROCHLORIDE 40 MG: 10 TABLET, FILM COATED ORAL at 08:14

## 2025-08-03 RX ADMIN — MECLIZINE HYDROCHLORIDE 25 MG: 25 TABLET ORAL at 17:21

## 2025-08-03 RX ADMIN — HYDRALAZINE HYDROCHLORIDE 100 MG: 50 TABLET ORAL at 08:12

## 2025-08-03 RX ADMIN — ISOSORBIDE DINITRATE 40 MG: 10 TABLET ORAL at 11:59

## 2025-08-03 RX ADMIN — SODIUM BICARBONATE 1300 MG: 650 TABLET ORAL at 20:17

## 2025-08-03 RX ADMIN — HYDRALAZINE HYDROCHLORIDE 100 MG: 50 TABLET ORAL at 17:22

## 2025-08-03 RX ADMIN — FUROSEMIDE 40 MG: 10 INJECTION, SOLUTION INTRAMUSCULAR; INTRAVENOUS at 08:25

## 2025-08-03 RX ADMIN — CARVEDILOL 25 MG: 25 TABLET, FILM COATED ORAL at 17:22

## 2025-08-03 RX ADMIN — CEFTRIAXONE 1 G: 1 INJECTION, POWDER, FOR SOLUTION INTRAMUSCULAR; INTRAVENOUS at 06:35

## 2025-08-03 RX ADMIN — MECLIZINE HYDROCHLORIDE 25 MG: 25 TABLET ORAL at 11:59

## 2025-08-03 RX ADMIN — NIFEDIPINE 90 MG: 90 TABLET, EXTENDED RELEASE ORAL at 08:17

## 2025-08-03 ASSESSMENT — ACTIVITIES OF DAILY LIVING (ADL)
ADLS_ACUITY_SCORE: 64

## 2025-08-03 NOTE — PLAN OF CARE
"Goal Outcome Evaluation:      Plan of Care Reviewed With: patient    Overall Patient Progress: no changeOverall Patient Progress: no change    Outcome Evaluation: Pt oriented x4, drowsy this shift.  Pt tele SR, BP stable.  Pt denying SOB, LS dim with crackles.  Pt tolerating BiPAP for a sustained period this shift, feelin some pain in bridge of nose and wanting a break at 445.  Pt with minimal UOP.  Some bilateral hand edema with R >L.      Problem: Adult Inpatient Plan of Care  Goal: Plan of Care Review  Description: The Plan of Care Review/Shift note should be completed every shift.  The Outcome Evaluation is a brief statement about your assessment that the patient is improving, declining, or no change.  This information will be displayed automatically on your shift  note.  Outcome: Progressing  Flowsheets (Taken 8/3/2025 0700)  Outcome Evaluation: Pt oriented x4, drowsy this shift.  Pt tele SR, BP stable.  Pt denying SOB, LS dim with crackles.  Pt tolerating BiPAP for a sustained period this shift, feelin some pain in bridge of nose and wanting a break at 445.  Pt with minimal UOP.  Some bilateral hand edema with R >L.  Plan of Care Reviewed With: patient  Overall Patient Progress: no change  Goal: Patient-Specific Goal (Individualized)  Description: You can add care plan individualizations to a care plan. Examples of Individualization might be:  \"Parent requests to be called daily at 9am for status\", \"I have a hard time hearing out of my right ear\", or \"Do not touch me to wake me up as it startles  me\".  Outcome: Progressing  Goal: Absence of Hospital-Acquired Illness or Injury  Outcome: Progressing  Intervention: Identify and Manage Fall Risk  Flowsheets  Taken 8/3/2025 0700  Safety Promotion/Fall Prevention:   assistive device/personal items within reach   clutter free environment maintained   increased rounding and observation   increase visualization of patient   lighting adjusted   nonskid shoes/slippers " when out of bed   patient and family education   room organization consistent   safety round/check completed  Taken 8/3/2025 0445  Safety Promotion/Fall Prevention:   assistive device/personal items within reach   clutter free environment maintained   increased rounding and observation   increase visualization of patient   lighting adjusted   nonskid shoes/slippers when out of bed   patient and family education   room organization consistent   safety round/check completed  Taken 8/2/2025 2345  Safety Promotion/Fall Prevention:   assistive device/personal items within reach   clutter free environment maintained   increased rounding and observation   increase visualization of patient   lighting adjusted   nonskid shoes/slippers when out of bed   patient and family education   room organization consistent   safety round/check completed  Taken 8/2/2025 2100  Safety Promotion/Fall Prevention:   assistive device/personal items within reach   clutter free environment maintained   increased rounding and observation   increase visualization of patient   lighting adjusted   nonskid shoes/slippers when out of bed   patient and family education   room organization consistent   safety round/check completed  Intervention: Prevent Skin Injury  Flowsheets  Taken 8/3/2025 0700  Body Position: turned  Skin Protection:   adhesive use limited   transparent dressing maintained   tubing/devices free from skin contact  Taken 8/3/2025 0445  Body Position: turned  Taken 8/2/2025 2345  Body Position: turned  Taken 8/2/2025 2100  Body Position: turned  Intervention: Prevent and Manage VTE (Venous Thromboembolism) Risk  Flowsheets  Taken 8/3/2025 0700  VTE Prevention/Management: patient refused intervention  Taken 8/3/2025 0445  VTE Prevention/Management: patient refused intervention  Taken 8/2/2025 2345  VTE Prevention/Management: patient refused intervention  Taken 8/2/2025 2100  VTE Prevention/Management: patient refused  intervention  Intervention: Prevent Infection  Flowsheets  Taken 8/3/2025 0700  Infection Prevention: single patient room provided  Taken 8/3/2025 0445  Infection Prevention: single patient room provided  Taken 8/2/2025 2345  Infection Prevention: single patient room provided  Taken 8/2/2025 2100  Infection Prevention: single patient room provided  Goal: Optimal Comfort and Wellbeing  Outcome: Progressing  Intervention: Monitor Pain and Promote Comfort  Flowsheets (Taken 8/3/2025 0700)  Pain Management Interventions: medication (see MAR)  Intervention: Provide Person-Centered Care  Flowsheets  Taken 8/3/2025 0700  Trust Relationship/Rapport:   care explained   choices provided   emotional support provided   questions encouraged   questions answered   empathic listening provided   reassurance provided   thoughts/feelings acknowledged  Taken 8/3/2025 0445  Trust Relationship/Rapport:   care explained   choices provided   emotional support provided   questions encouraged   questions answered   empathic listening provided   reassurance provided   thoughts/feelings acknowledged  Taken 8/2/2025 2345  Trust Relationship/Rapport:   care explained   choices provided   emotional support provided   questions encouraged   questions answered   empathic listening provided   reassurance provided   thoughts/feelings acknowledged  Taken 8/2/2025 2100  Trust Relationship/Rapport:   care explained   choices provided   emotional support provided   questions encouraged   questions answered   empathic listening provided   reassurance provided   thoughts/feelings acknowledged  Goal: Readiness for Transition of Care  Outcome: Progressing  Flowsheets (Taken 8/3/2025 0700)  Transportation Anticipated: family or friend will provide  Concerns to be Addressed: discharge planning  Intervention: Mutually Develop Transition Plan  Flowsheets (Taken 8/3/2025 0700)  Transportation Anticipated: family or friend will provide  Transportation Concerns:  none  Concerns to be Addressed: discharge planning  Equipment Currently Used at Home:   walker, standard   shower chair   grab bar, toilet

## 2025-08-03 NOTE — PROGRESS NOTES
Pt placed on BIPAP 10/5 and 45% via mask for overnight use. Skin integrity is good/intact. RT will monitor.    Magaly Hoang RT

## 2025-08-03 NOTE — PROGRESS NOTES
Renal Medicine Progress Note            Assessment/Plan:     Assessment:    Hypertensive emergency   Acute hypoxic respiratory failure   Acute on chronic diastolic HF   History of R renal artery stenting  Labile HTN  Bipap dependent hypoxia, pulmonary edema on CXR. Started nitroglycerin gtt, now weaned off. Improvement in hypoxia. Can give dose of IV lasix today and reassess tomorrow. BNP 25K. TTE with EF 55-60%, elevated L sided pressures and pulmonary HTN, but RA pressure estimated low.  BP is quite labile, upon further review appears she has a R renal artery stent, will re-image renal arteries to ensure no new stenosis. Did have AAA graft repair in April.   - continue IV lasix 40 mg BID  - continue PTA antihypertensives, increase hydralazine further to 100 mg TID  - renal US with duplex- no JACY noted       ROMI on CKD IV   Previously  baseline 1.3-1.5, worsening in May/June 2025, new baseline appears to be ~3 with nephrotic range proteinuria. 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 This is most commonly seen in DM2, however can also be seen with smoking and HTN. Bx also showed cholesterol emboli (likely due to endograft repair).  Cr on admission 4.25, worse than recent baseline. In setting of hypertensive emergency, pulmonary edema, LE edema. Improving with diuretics and BP control.   Bx results:       Metabolic acidosis, resolved    Continue PTA sodium bicarb.     R lung adenocarcinoma   Received radiation in 2021.     AAA s/p endograft   S/p endovascular aneurysm/endograft repair (4/2025)    Plan/Recs:  -  continue IV lasix 40 mg BID   - agree with pheo workup    Discussed with Dr. Light. Reviewed notes from hospitalists.     Vee Encinas MD   [facility]  Office: [phone]          Interval History:     No acute events overnight   Improved symptoms overall   Did require bipap yesterday and overnight, but now on 4L NC and resting peacefully   Daughter and  updated at  bedside. Did briefly discuss dialysis with them if she continues to have AKIs and progression of CKD. Currently she is making adequate urine and has no uremic symptoms, but she is at risk of further progression. They note she would not be a good candidate for home modalities due to issues with compliance. They are unsure if she would consider doing dialysis, she's stated before that she doesn't want to think about it.              Medications and Allergies:     Current Facility-Administered Medications   Medication Dose Route Frequency Provider Last Rate Last Admin    carvedilol (COREG) tablet 25 mg  25 mg Oral BID w/meals Josemanuel Cohen MD   25 mg at 08/03/25 0811    cefTRIAXone (ROCEPHIN) 1 g vial to attach to  mL bag for ADULTS or NS 50 mL bag for PEDS  1 g Intravenous Q24H Virgilio Patel MD   1 g at 08/03/25 0635    cloNIDine (CATAPRES-TTS3) 0.3 MG/24HR WK patch 1 patch  1 patch Transdermal Weekly Virgilio Patel MD   1 patch at 07/31/25 1432    And    cloNIDine (CATAPRES-TTS1) Patch in Place   Transdermal Q8H [facility] Virgilio Patel MD        clopidogrel (PLAVIX) tablet 75 mg  75 mg Oral Daily Virgilio Patel MD   75 mg at 08/03/25 0811    famotidine (PEPCID) tablet 20 mg  20 mg Oral Q48H Jonathan Light MD   20 mg at 07/31/25 0923    furosemide (LASIX) injection 40 mg  40 mg Intravenous BID Vee Encinas MD   40 mg at 08/03/25 0825    [Held by provider] furosemide (LASIX) tablet 40 mg  40 mg Oral BID Vee Encinas MD   40 mg at 07/30/25 1534    hydrALAZINE (APRESOLINE) tablet 100 mg  100 mg Oral TID Vee Encinas MD   100 mg at 08/03/25 0812    icosapent ethyl (VASCEPA) capsule 2 g  2 g Oral BID w/meals Virgilio Patel MD   2 g at 08/01/25 1811    isosorbide dinitrate (ISORDIL) tablet 40 mg  40 mg Oral TID Jonathan Light MD   40 mg at 08/03/25 1159    meclizine (ANTIVERT) tablet 25 mg  25 mg Oral Q6H Virgilio Patel MD   25 mg at 08/03/25 1159    NIFEdipine ER OSMOTIC (PROCARDIA XL) 24 hr  "tablet 90 mg  90 mg Oral Daily Virgilio Patel MD   90 mg at 08/03/25 0817    PARoxetine (PAXIL) tablet 40 mg  40 mg Oral Daily Virgilio Patel MD   40 mg at 08/03/25 0814    sodium bicarbonate tablet 1,300 mg  1,300 mg Oral BID Virgilio Patel MD   1,300 mg at 08/03/25 0814    sodium chloride (PF) 0.9% PF flush 3 mL  3 mL Intracatheter Q8H [facility] Gilmer Swain MD   3 mL at 08/03/25 0635        Allergies   Allergen Reactions    Iodine Hives            Physical Exam:   Vitals were reviewed  /72   Pulse 77   Temp 97.6  F (36.4  C) (Temporal)   Resp 22   Ht 1.626 m (5' 4\")   Wt 53.8 kg (118 lb 9.7 oz)   SpO2 96%   BMI 20.36 kg/m      Wt Readings from Last 3 Encounters:   07/30/25 53.8 kg (118 lb 9.7 oz)   07/23/25 54.4 kg (120 lb)   07/21/25 54.1 kg (119 lb 3.2 oz)       Intake/Output Summary (Last 24 hours) at 7/29/2025 0949  Last data filed at 7/29/2025 0845  Gross per 24 hour   Intake 185.85 ml   Output 1300 ml   Net -1114.15 ml       GENERAL APPEARANCE: NAD, frail appearing  HEENT: normocephalic, dry MM   RESP: coarse  CV: RRR   EXTREMITIES/SKIN:1+ edema  NEURO:  sleeping            Data:     BMP  Recent Labs   Lab 08/03/25  1158 08/03/25  0750 08/03/25  0546 08/03/25  0407 08/02/25  0942 08/02/25  0357 08/01/25 2028 08/01/25  0802 07/31/25  0559   NA  --   --  137  --   --  135  --  137 135   POTASSIUM  --   --  3.7  --   --  3.7  3.7 3.3* 3.2* 3.2*   CHLORIDE  --   --  96*  --   --  96*  --  96* 97*   BRUCE  --   --  8.9  --   --  8.8  --  8.5* 8.1*   CO2  --   --  26  --   --  25  --  25 24   BUN  --   --  59.7*  --   --  56.0*  --  55.5* 56.6*   CR  --   --  3.41*  --   --  3.40*  --  3.15* 3.37*   * 125* 127* 123*   < > 114*  --  123* 115*    < > = values in this interval not displayed.     CBC  Recent Labs   Lab 08/03/25  0546 08/02/25  0357 08/01/25  0802 07/31/25  0559   WBC 8.8 9.3 9.2 8.5   HGB 9.0* 9.1* 9.2* 9.1*   HCT 27.0* 27.3* 27.5* 27.1*   * 101* 102* 101*   PLT " "462* 390 397 365     Lab Results   Component Value Date    AST 22 07/17/2025    ALT 11 07/17/2025    ALKPHOS 61 07/17/2025    BILITOTAL 0.3 07/17/2025     No results found for: \"INR\"  Color Urine (no units)   Date Value   09/09/2016 Light Yellow     Appearance Urine (no units)   Date Value   09/09/2016 Clear     Glucose Urine (mg/dL)   Date Value   09/09/2016 Negative     Bilirubin Urine (no units)   Date Value   09/09/2016 Negative     Ketones Urine (mg/dL)   Date Value   09/09/2016 Negative     Specific Gravity Urine (no units)   Date Value   09/09/2016 1.010     pH Urine (pH)   Date Value   09/09/2016 5.5     Protein Albumin Urine (mg/dL)   Date Value   09/09/2016 Negative     Nitrite Urine (no units)   Date Value   09/09/2016 Negative     Leukocyte Esterase Urine (no units)   Date Value   09/09/2016 Negative         Attestation:  I have reviewed today's vital signs, notes, medications, labs and imaging.    Vee Encinas MD  [facility] - Nephrology  Office: [phone]    "

## 2025-08-03 NOTE — PROGRESS NOTES
[facility]    Medicine Progress Note - Hospitalist Service    Date of Admission:  7/28/2025    Assessment & Plan   75-year-old female with history of f CAD, CKD stage IV, hypertension, hyperlipidemia, intra-abdominal aortic aneurysm with prior stenting, chronic vertigo, recent hospitalization for fall with pelvic fracture who presents the ED for shortness of breath.      Had elevated D-dimer but VQ scan negative for PE, CT not done due to CKD.  proBNP more than 25,000.  Chest x-ray showed bilateral perihilar interstitial/airspace opacities, left greater than right, are nonspecific for postradiation change versus pneumonia. A small nodule in the peripheral right upper lobe measures 9 mm.     Found to be in hypertensive emergency, admitted to ICU with BiPAP and nitroglycerin drip.  She was weaned off nitroglycerin drip and BiPAP but has had repeated episodes of rising blood pressure and shortness of breath needing her to go back on BiPAP intermittently.  She was transferred to floor on 7/31 but came back to ICU on 8/2 after rapid response for flash pulmonary edema and hypertensive emergency.    Acute hypoxic respiratory failure:  Diastolic CHF exacerbation:  Recurrent flash pulmonary edema associated with episodes of high blood pressure.  Hypertensive emergency:  - Echo with EF of 55 to 60%.  Unclear what is causing recurrent sudden episodes of elevated blood pressure and flash pulmonary edema.  She has history of renal artery stenting and on renal arterial ultrasound done on 7/31 there was no sonographic evidence of renal artery stenosis.  Will also check metanephrines.  - Increase carvedilol to 25 mg twice daily, hydralazine 200 mg 3 times daily and Isordil increased to 40 mg 3 times daily.  Continue clonidine patch 0.3 mg weekly and nifedipine 90 mg daily.  Also started on the Lasix 40 mg IV twice daily.  -Was empirically started on ceftriaxone on presentation.  Patient had no fever or  leukocytosis.  Mildly elevated procalcitonin of 0.67 which is difficult to interpret in ROMI/CKD and improved to 0.49 on recheck.  Given ceftriaxone for 5 days.    ROMI on CKD stage IV:  Metabolic acidosis:  - Previous baseline of 1.3-1.5 which worsened in May/June 2025 and new baseline appears to be 3 with nephrotic range proteinuria.  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  - Creatinine on admission was 4.25, now stable in 3.4 range defer further diuretic management to nephrology.    - Continue prior to admission dose of sodium bicarbonate.    Chronic medical conditions:  - AAA.  Endoleak status post recent endovascular aneurysm repair on 4/25.  Continue Plavix and statin.  - History of CVA.  Chronic infarct in the right basal ganglia noted on CT.  - Right lung adenocarcinoma.  Status post stereotactic body radiation therapy in 2021, follows up in [place].  His lung nodule on x-ray, can follow-up with her primary oncologist.  - Chronic vertigo.  Follows up with ENT in [place] and on as needed meclizine.  MRI brain on 5/22 was negative for acute pathology.  - Depression pleasant lady.  Continue paroxetine.  - Hypertriglyceridemia.  On Vascepa.            Diet: Snacks/Supplements Adult: Nepro Oral Supplement; Between Meals  2 Gram Sodium Diet    DVT Prophylaxis: Pneumatic Compression Devices  Rao Catheter: Not present  Lines: None     Cardiac Monitoring: ACTIVE order. Indication: Acute decompensated heart failure (48 hours)  Code Status: Full Code      Clinically Significant Risk Factors        # Hypokalemia: Lowest K = 3.3 mmol/L in last 2 days, will replace as needed   # Hypochloremia: Lowest Cl = 96 mmol/L in last 2 days, will monitor as appropriate            # Hypertension: Noted on problem list             # Moderate Malnutrition: based on nutrition assessment and treatment provided per dietitian's recommendations.    # Financial/Environmental  Concerns: none         Social Drivers of Health    Depression: At risk (6/5/2025)    Received from [facility]    PHQ-2     Depression Risk: 3   Tobacco Use: High Risk (7/31/2025)    Received from [facility]    Patient History     Smoking Tobacco Use: Every Day     Smokeless Tobacco Use: Never     Passive Exposure: Current   Interpersonal Safety: Unknown (7/28/2025)    Interpersonal Safety     Do you feel physically and emotionally safe where you currently live?: Patient unable to answer     Within the past 12 months, have you been hit, slapped, kicked or otherwise physically hurt by someone?: Patient unable to answer     Within the past 12 months, have you been humiliated or emotionally abused in other ways by your partner or ex-partner?: Patient unable to answer          Disposition Plan     Medically Ready for Discharge: Anticipated in 2-4 Days will keep in ICU overnight to make sure she does not have another flash pulmonary edema.             Jonathan Light MD  Hospitalist Service  [facility]  Securely message with [email or web address] (more info)  Text page via [employer] Paging/Directory   ______________________________________________________________________    Interval History   Off BiPAP now and breathing better.  Multiple family members at bedside.    Physical Exam   Vital Signs: Temp: 97.6  F (36.4  C) Temp src: Temporal BP: 120/72 Pulse: 77   Resp: 22 SpO2: 96 % O2 Device: Nasal cannula Oxygen Delivery: 4 LPM  Weight: 118 lbs 9.72 oz    General Appearance: Alert awake and oriented x 3.  Respiratory: Basilar crackles.  Cardiovascular: S1-S2 normal  GI: Soft and nontender  Skin: No rash  Other: Trace edema.      Medical Decision Making       60 MINUTES SPENT BY ME on the date of service doing chart review, history, exam, documentation & further activities per the note.      Data     I have personally reviewed the following data over the past 24 hrs:    8.8  \   9.0 (L)   / 462 (H)     137  96 (L) 59.7 (H) /  143 (H)   3.7 26 3.41 (H) \       Imaging results reviewed over the past 24 hrs:   No results found for this or any previous visit (from the past 24 hours).

## 2025-08-03 NOTE — PROGRESS NOTES
"[facility]  Respiratory Care Note      Pt remained off the BiPAP throughout the day and is currently on a 3 LPM NC satting 94%. RT will continue to monitor and assess the pt's current respiratory status and needs.     Vital signs:  Temp: 98  F (36.7  C) Temp src: Temporal BP: 121/61 Pulse: 85   Resp: 12 SpO2: 94 % O2 Device: Nasal cannula Oxygen Delivery: 3 LPM Height: 162.6 cm (5' 4\") Weight: 53.8 kg (118 lb 9.7 oz)  Estimated body mass index is 20.36 kg/m  as calculated from the following:    Height as of this encounter: 1.626 m (5' 4\").    Weight as of this encounter: 53.8 kg (118 lb 9.7 oz).      Past Medical History:   Diagnosis Date    Aneurysm of renal artery     left    Atrial fibrillation (H)     COPD (chronic obstructive pulmonary disease) (H)     High cholesterol     Hypertension     Mixed hyperlipidemia     PVD (peripheral vascular disease)     Stenosis of left carotid artery     Tobacco use disorder        Past Surgical History:   Procedure Laterality Date    ABDOMEN SURGERY      aneurism      left femoral, mesh    ENDARTERECTOMY CAROTID Left 7/17/2018    Procedure: ENDARTERECTOMY CAROTID;  LEFT CAROTID ENDARTERECTOMY WITH EEG;  Surgeon: Jorge Posada MD;  Location: [facility] OR    GENITOURINARY SURGERY      stent rt kidney    IR RENAL BIOPSY LEFT  6/16/2025       Family History   Problem Relation Age of Onset    Alzheimer Disease Mother     Cerebrovascular Disease Father     Dementia Maternal Grandmother     Diabetes Brother     Alzheimer Disease Brother     Parkinsonism Brother     Cerebrovascular Disease Brother     Breast Cancer Sister     Heart Disease Sister        Social History     Tobacco Use    Smoking status: Every Day     Current packs/day: 0.50     Average packs/day: 0.5 packs/day for 45.0 years (22.5 ttl pk-yrs)     Types: Cigarettes    Smokeless tobacco: Never   Substance Use Topics    Alcohol use: Yes     Comment: social [facility] " [facility]  8/3/2025

## 2025-08-04 LAB
ANION GAP SERPL CALCULATED.3IONS-SCNC: 16 MMOL/L (ref 7–15)
ATRIAL RATE - MUSE: 74 BPM
ATRIAL RATE - MUSE: NORMAL BPM
BASOPHILS # BLD AUTO: 0.1 10E3/UL (ref 0–0.2)
BASOPHILS NFR BLD AUTO: 1 %
BUN SERPL-MCNC: 61.3 MG/DL (ref 8–23)
CALCIUM SERPL-MCNC: 8.5 MG/DL (ref 8.8–10.4)
CHLORIDE SERPL-SCNC: 94 MMOL/L (ref 98–107)
CREAT SERPL-MCNC: 3.67 MG/DL (ref 0.51–0.95)
DIASTOLIC BLOOD PRESSURE - MUSE: NORMAL MMHG
DIASTOLIC BLOOD PRESSURE - MUSE: NORMAL MMHG
EGFRCR SERPLBLD CKD-EPI 2021: 12 ML/MIN/1.73M2
EOSINOPHIL # BLD AUTO: 0.6 10E3/UL (ref 0–0.7)
EOSINOPHIL NFR BLD AUTO: 7 %
ERYTHROCYTE [DISTWIDTH] IN BLOOD BY AUTOMATED COUNT: 15.3 % (ref 10–15)
GLUCOSE SERPL-MCNC: 98 MG/DL (ref 70–99)
HCO3 SERPL-SCNC: 25 MMOL/L (ref 22–29)
HCT VFR BLD AUTO: 26 % (ref 35–47)
HGB BLD-MCNC: 8.5 G/DL (ref 11.7–15.7)
IMM GRANULOCYTES # BLD: 0 10E3/UL
IMM GRANULOCYTES NFR BLD: 0 %
INTERPRETATION ECG - MUSE: NORMAL
INTERPRETATION ECG - MUSE: NORMAL
LYMPHOCYTES # BLD AUTO: 2.2 10E3/UL (ref 0.8–5.3)
LYMPHOCYTES NFR BLD AUTO: 24 %
MAGNESIUM SERPL-MCNC: 2.1 MG/DL (ref 1.7–2.3)
MCH RBC QN AUTO: 34.1 PG (ref 26.5–33)
MCHC RBC AUTO-ENTMCNC: 32.7 G/DL (ref 31.5–36.5)
MCV RBC AUTO: 104 FL (ref 78–100)
MONOCYTES # BLD AUTO: 1.1 10E3/UL (ref 0–1.3)
MONOCYTES NFR BLD AUTO: 12 %
NEUTROPHILS # BLD AUTO: 5.3 10E3/UL (ref 1.6–8.3)
NEUTROPHILS NFR BLD AUTO: 57 %
NRBC # BLD AUTO: 0 10E3/UL
NRBC BLD AUTO-RTO: 0 /100
P AXIS - MUSE: 57 DEGREES
P AXIS - MUSE: NORMAL DEGREES
PLATELET # BLD AUTO: 369 10E3/UL (ref 150–450)
POTASSIUM SERPL-SCNC: 3.7 MMOL/L (ref 3.4–5.3)
PR INTERVAL - MUSE: 182 MS
PR INTERVAL - MUSE: NORMAL MS
QRS DURATION - MUSE: 74 MS
QRS DURATION - MUSE: 76 MS
QT - MUSE: 402 MS
QT - MUSE: 424 MS
QTC - MUSE: 470 MS
QTC - MUSE: 478 MS
R AXIS - MUSE: 34 DEGREES
R AXIS - MUSE: 39 DEGREES
RBC # BLD AUTO: 2.49 10E6/UL (ref 3.8–5.2)
SODIUM SERPL-SCNC: 135 MMOL/L (ref 135–145)
SYSTOLIC BLOOD PRESSURE - MUSE: NORMAL MMHG
SYSTOLIC BLOOD PRESSURE - MUSE: NORMAL MMHG
T AXIS - MUSE: -4 DEGREES
T AXIS - MUSE: 25 DEGREES
VENTRICULAR RATE- MUSE: 74 BPM
VENTRICULAR RATE- MUSE: 85 BPM
WBC # BLD AUTO: 9.2 10E3/UL (ref 4–11)

## 2025-08-04 PROCEDURE — 999N000157 HC STATISTIC RCP TIME EA 10 MIN

## 2025-08-04 PROCEDURE — 200N000001 HC R&B ICU

## 2025-08-04 PROCEDURE — 250N000013 HC RX MED GY IP 250 OP 250 PS 637: Performed by: INTERNAL MEDICINE

## 2025-08-04 PROCEDURE — 99418 PROLNG IP/OBS E/M EA 15 MIN: CPT | Performed by: CLINICAL NURSE SPECIALIST

## 2025-08-04 PROCEDURE — 85004 AUTOMATED DIFF WBC COUNT: CPT | Performed by: STUDENT IN AN ORGANIZED HEALTH CARE EDUCATION/TRAINING PROGRAM

## 2025-08-04 PROCEDURE — 99233 SBSQ HOSP IP/OBS HIGH 50: CPT | Performed by: INTERNAL MEDICINE

## 2025-08-04 PROCEDURE — 250N000011 HC RX IP 250 OP 636: Performed by: INTERNAL MEDICINE

## 2025-08-04 PROCEDURE — 83735 ASSAY OF MAGNESIUM: CPT | Performed by: STUDENT IN AN ORGANIZED HEALTH CARE EDUCATION/TRAINING PROGRAM

## 2025-08-04 PROCEDURE — 250N000013 HC RX MED GY IP 250 OP 250 PS 637: Performed by: STUDENT IN AN ORGANIZED HEALTH CARE EDUCATION/TRAINING PROGRAM

## 2025-08-04 PROCEDURE — 250N000011 HC RX IP 250 OP 636: Performed by: STUDENT IN AN ORGANIZED HEALTH CARE EDUCATION/TRAINING PROGRAM

## 2025-08-04 PROCEDURE — 99233 SBSQ HOSP IP/OBS HIGH 50: CPT | Performed by: STUDENT IN AN ORGANIZED HEALTH CARE EDUCATION/TRAINING PROGRAM

## 2025-08-04 PROCEDURE — 99232 SBSQ HOSP IP/OBS MODERATE 35: CPT | Performed by: INTERNAL MEDICINE

## 2025-08-04 PROCEDURE — 36415 COLL VENOUS BLD VENIPUNCTURE: CPT | Performed by: STUDENT IN AN ORGANIZED HEALTH CARE EDUCATION/TRAINING PROGRAM

## 2025-08-04 PROCEDURE — 84100 ASSAY OF PHOSPHORUS: CPT | Performed by: INTERNAL MEDICINE

## 2025-08-04 PROCEDURE — 80048 BASIC METABOLIC PNL TOTAL CA: CPT | Performed by: STUDENT IN AN ORGANIZED HEALTH CARE EDUCATION/TRAINING PROGRAM

## 2025-08-04 PROCEDURE — 99223 1ST HOSP IP/OBS HIGH 75: CPT | Performed by: CLINICAL NURSE SPECIALIST

## 2025-08-04 PROCEDURE — 94660 CPAP INITIATION&MGMT: CPT

## 2025-08-04 RX ORDER — METOPROLOL TARTRATE 1 MG/ML
5 INJECTION, SOLUTION INTRAVENOUS EVERY 5 MIN PRN
Status: DISCONTINUED | OUTPATIENT
Start: 2025-08-04 | End: 2025-08-15

## 2025-08-04 RX ORDER — FUROSEMIDE 10 MG/ML
60 INJECTION INTRAMUSCULAR; INTRAVENOUS
Status: DISCONTINUED | OUTPATIENT
Start: 2025-08-04 | End: 2025-08-05 | Stop reason: ALTCHOICE

## 2025-08-04 RX ORDER — ATROPINE SULFATE 0.1 MG/ML
INJECTION INTRAVENOUS
Status: COMPLETED
Start: 2025-08-04 | End: 2025-08-04

## 2025-08-04 RX ADMIN — FAMOTIDINE 20 MG: 20 TABLET, FILM COATED ORAL at 08:49

## 2025-08-04 RX ADMIN — CLOPIDOGREL BISULFATE 75 MG: 75 TABLET, FILM COATED ORAL at 08:49

## 2025-08-04 RX ADMIN — MECLIZINE HYDROCHLORIDE 25 MG: 25 TABLET ORAL at 00:22

## 2025-08-04 RX ADMIN — ISOSORBIDE DINITRATE 40 MG: 10 TABLET ORAL at 08:48

## 2025-08-04 RX ADMIN — ISOSORBIDE DINITRATE 40 MG: 10 TABLET ORAL at 18:49

## 2025-08-04 RX ADMIN — ISOSORBIDE DINITRATE 40 MG: 10 TABLET ORAL at 12:28

## 2025-08-04 RX ADMIN — FUROSEMIDE 60 MG: 10 INJECTION, SOLUTION INTRAMUSCULAR; INTRAVENOUS at 16:15

## 2025-08-04 RX ADMIN — AMIODARONE HYDROCHLORIDE 0.5 MG/MIN: 1.8 INJECTION, SOLUTION INTRAVENOUS at 19:55

## 2025-08-04 RX ADMIN — MECLIZINE HYDROCHLORIDE 25 MG: 25 TABLET ORAL at 06:16

## 2025-08-04 RX ADMIN — PAROXETINE HYDROCHLORIDE 40 MG: 10 TABLET, FILM COATED ORAL at 08:49

## 2025-08-04 RX ADMIN — FUROSEMIDE 40 MG: 10 INJECTION, SOLUTION INTRAMUSCULAR; INTRAVENOUS at 08:22

## 2025-08-04 RX ADMIN — NIFEDIPINE 90 MG: 90 TABLET, EXTENDED RELEASE ORAL at 08:49

## 2025-08-04 RX ADMIN — ICOSAPENT ETHYL 2 G: 1 CAPSULE ORAL at 08:49

## 2025-08-04 RX ADMIN — CARVEDILOL 25 MG: 25 TABLET, FILM COATED ORAL at 18:49

## 2025-08-04 RX ADMIN — MECLIZINE HYDROCHLORIDE 25 MG: 25 TABLET ORAL at 18:49

## 2025-08-04 RX ADMIN — MECLIZINE HYDROCHLORIDE 25 MG: 25 TABLET ORAL at 12:28

## 2025-08-04 RX ADMIN — ICOSAPENT ETHYL 2 G: 1 CAPSULE ORAL at 18:49

## 2025-08-04 RX ADMIN — HYDRALAZINE HYDROCHLORIDE 100 MG: 50 TABLET ORAL at 02:11

## 2025-08-04 RX ADMIN — SODIUM BICARBONATE 1300 MG: 650 TABLET ORAL at 21:53

## 2025-08-04 RX ADMIN — HYDRALAZINE HYDROCHLORIDE 100 MG: 50 TABLET ORAL at 18:49

## 2025-08-04 RX ADMIN — AMIODARONE HYDROCHLORIDE 150 MG: 1.5 INJECTION, SOLUTION INTRAVENOUS at 11:00

## 2025-08-04 RX ADMIN — SODIUM BICARBONATE 1300 MG: 650 TABLET ORAL at 08:48

## 2025-08-04 RX ADMIN — CARVEDILOL 25 MG: 25 TABLET, FILM COATED ORAL at 08:48

## 2025-08-04 ASSESSMENT — ACTIVITIES OF DAILY LIVING (ADL)
ADLS_ACUITY_SCORE: 66
ADLS_ACUITY_SCORE: 68
ADLS_ACUITY_SCORE: 65
ADLS_ACUITY_SCORE: 66
ADLS_ACUITY_SCORE: 65
ADLS_ACUITY_SCORE: 66
ADLS_ACUITY_SCORE: 66
ADLS_ACUITY_SCORE: 65
ADLS_ACUITY_SCORE: 68
ADLS_ACUITY_SCORE: 65
ADLS_ACUITY_SCORE: 68
ADLS_ACUITY_SCORE: 65
ADLS_ACUITY_SCORE: 69
ADLS_ACUITY_SCORE: 64
ADLS_ACUITY_SCORE: 66
ADLS_ACUITY_SCORE: 66
ADLS_ACUITY_SCORE: 69
ADLS_ACUITY_SCORE: 66
ADLS_ACUITY_SCORE: 65

## 2025-08-04 NOTE — PROGRESS NOTES
CLINICAL NUTRITION SERVICES - REASSESSMENT NOTE     RECOMMENDATIONS FOR MDs/PROVIDERS TO ORDER:  Would recommend liberalization of diet once pt off BIPAP to remove barriers to PO intake    Registered Dietitian Interventions:  Patient discussed during IDT rounds. Per RN, pt is unable to come off BIPAP for meaningful periods of time and unable to take PO for this reason - pt essentially NPO.    Malnutrition present on admission and pt is now day 7 w/ 0-25% intakes. Notified provider of need to have a plan for nutrition support if pt unable to come off BIPAP within next 1-2 days.    Will continue to monitor pt's clinical course and initiate nutrition interventions as appropriate.    Will continue oral supplements for now in case pt able to come off BIPAP.        INFORMATION OBTAINED  Patient not available for interview due to need for BIPAP and very difficult for her to speak.    CURRENT NUTRITION ORDERS  Diet: 2 Gram Sodium  Snacks/Supplements: Nepro BID    CURRENT INTAKE/TOLERANCE  Per RN notes, pt continues to have poor appetite. Has been intermittently reliant on BIPAP which is an additional barrier to adequate intakes.   Noted that SOB increases w/ meals.    Per RN flowsheet, pt eating 25% intakes w/ poor-fair appetite.  Per [facility] flowsheet, patient is generally ordering 1 meal per day. She did order 2 meals on 8/3.    Patient transferred to 3rd floor on 7/31. RRT called on 8/2 for increased WOB. Transferred back to ICU for closer monitoring and nitroglycerin gtt.      NEW FINDINGS  GI symptoms: Reviewed    - Stools: LBM 7/29  - Emesis: 25 ml emesis on 8/3    Skin/wounds: Reviewed    - Edema:    1+ (trace) - L arm, R hand, b/l ankles   2+ (mild) - L wrist and hand  - Pressure Injuries/Nonhealing Wounds: None currently documented    Nutrition-relevant labs: Reviewed  Noted: BUN 61.3(H), Cr 3.67(H), BG 98  - BUN and Cr trending up over the last several days    Nutrition-relevant medications: Reviewed  Noted:  40 mg Lasix BID  - Nitroglycerin gtt stopped    Weight:   Vitals:    07/28/25 1400 07/30/25 0600   Weight: 54.8 kg (120 lb 13 oz) 53.8 kg (118 lb 9.7 oz)     Weight trending down since admission. Ordered updated weight.     ASSESSED NUTRITION NEEDS  Dosing Weight: 54.8 kg, based on actual wt  Estimated Energy Needs: 5406-1828 kcals/day (25 - 30 kcals/kg)  Justification: Repletion and Underweight for age  Estimated Protein Needs: >/= 54.8 grams protein/day (>/=1 gram of pro/kg)  Justification: Preservation of LBM w/ consideration of CKD  Estimated Fluid Needs: 1 ml/kcal or per provider pending fluid status    MALNUTRITION  % Intake: </= 50% for >/= 5 days (severe) and </=75% for >/= 1 month (severe)  % Weight Loss: Weight loss does not meet criteria   Subcutaneous Fat Loss: Orbital: Mild, Buccal: Mild, and Fat overlying the ribs: Mild  Muscle Loss: Temples (temporalis muscle): Mild, Clavicles (pectoralis and deltoids): Mild, Shoulders (deltoids): Moderate, Interosseous muscles: Moderate, and Scapula (latissimus dorsi, trapezious, deltoids): Moderate  Fluid Accumulation/Edema: Mild, 1+  Malnutrition Diagnosis: Severe malnutrition in the context of acute illness or injury  Malnutrition Present on Admission: Yes    EVALUATION OF THE PROGRESS TOWARD GOALS   Previous Goals  Patient to consume % of nutritionally adequate meal trays TID, or the equivalent with supplements/snacks.   Evaluation: Not progressing    Previous Nutrition Diagnosis  Inadequate oral intake related to lack of appetite, dislike of food at transitional care facility as evidenced by reported intakes, fat losses, muscle losses, and meets malnutrition criteria.   Evaluation: Declining    NUTRITION DIAGNOSIS  Inadequate oral intake related to flash pulmonary edema leading to SOB requiring BIPAP as evidenced by BIPAP use over the last several days, PO intake of 0-25% of meals/supplements, documented poor appetite, malnutrition documented, and review  "of [facility].    INTERVENTIONS  See nutrition interventions above    GOALS  Pt to consume >/= 50% of nutritionally adequate meals or supplements TID vs initiation of nutrition support within 1-2 days.     MONITORING/EVALUATION  Progress toward goals will be monitored and evaluated per policy.      Mary Rider RD, LD  Clinical Dietitian  [facility] Message Group: \"Dietitian [[place]]\"  Office Phone: [phone]    "

## 2025-08-04 NOTE — CONSULTS
[facility]    CARDIOLOGY CONSULT    Date of Admission:  7/28/2025  Date of Consult: August 4, 2025    ASSESSMENT:  75-year-old female seen for paroxysmal A-fib in the context of pulmonary edema and hypertensive urgency.  She had about 1 hour of A-fib this morning.  Fortunately she converted back to sinus rhythm.  She is definitely high risk for A-fib with her pulmonary and hypertension issues.  She does have some mitral mitral valve regurgitation and stenosis which also places her at a little higher risk.  For now will give 1 dose of amiodarone to hopefully reduce the risk of recurrent A-fib.  Would not anticoagulate with only 1 hour of A-fib, but would need to consider if she has paroxysmal A-fib.  Otherwise agree with internist's medical management of her hypertension and pulmonary edema.    RECOMMENDATIONS:  1.  Single episode paroxysmal A-fib  - Amiodarone 150 mg IV bolus  - If recurrent A-fib, would recommend starting amiodarone drip and consider anticoagulation    Will follow-up on telemetry today and tomorrow, call if needed.    Pernell Amezcua MD  Cardiology - [facility]  Pager:  [phone]  Text Page  August 4, 2025    CODE STATUS:  Full Code    REASON FOR CONSULT: Atrial fibrillation    PRIMARY CARE PHYSICIAN:  Mckenna Wilkinson    HISTORY OF PRESENT ILLNESS:  75 year old female seen for atrial fibrillation.  She has CAD, CKD stage IV, EVAR, diastolic dysfunction, CVA, history of right lung adenocarcinoma, cognitive impairment.  She had recent pubic body fracture.  July 28 she presented with dyspnea and hypertensive urgency requiring BiPAP and nitroglycerin drip.  August 2 she had rapid response for flash pulmonary edema and hypertensive emergency.    History is limited, she is currently on BiPAP and it is difficult to speak.  She feels a little better this morning, but still dyspneic.  She denies any history of heart issues or palpitations recently at home.  She denies any chest  pain.    Around 9 AM this morning she went into A-fib with heart rates in the low 100s.  She converted after about 1 hour.  She had no significant symptoms during this.  She remained hemodynamically stable.    PAST MEDICAL HISTORY:  I have reviewed this patient's medical history and updated it with pertinent information if needed.   Past Medical History:   Diagnosis Date    Aneurysm of renal artery     left    Atrial fibrillation (H)     COPD (chronic obstructive pulmonary disease) (H)     High cholesterol     Hypertension     Mixed hyperlipidemia     PVD (peripheral vascular disease)     Stenosis of left carotid artery     Tobacco use disorder        PAST SURGICAL HISTORY:  I have reviewed this patient's surgical history and updated it with pertinent information if needed.  Past Surgical History:   Procedure Laterality Date    ABDOMEN SURGERY      aneurism      left femoral, mesh    ENDARTERECTOMY CAROTID Left 7/17/2018    Procedure: ENDARTERECTOMY CAROTID;  LEFT CAROTID ENDARTERECTOMY WITH EEG;  Surgeon: Jorge Posada MD;  Location: [facility] OR    GENITOURINARY SURGERY      stent rt kidney    IR RENAL BIOPSY LEFT  6/16/2025       HOME MEDICATIONS:  Prior to Admission Medications   Prescriptions Last Dose Informant Patient Reported? Taking?   HYDROmorphone (DILAUDID) 2 MG tablet 7/27/2025 Morning  No Yes   Sig: Take 1 tablet (2 mg) by mouth daily (with breakfast). May also take 0.5-1 tablets (1-2 mg) 4 times daily as needed (pain 4-6/10 = 1 mg and pain 7-10/10 = 2 mg).   Lidocaine (LIDOCARE) 4 % Patch 7/27/2025 Evening  No Yes   Sig: Place 2 patches over 12 hours onto the skin every 24 hours. To prevent lidocaine toxicity, patient should be patch free for 12 hrs daily.  Apply to affected area.   Multiple Vitamins-Minerals (MULTIVITAMIN WOMEN PO) 7/27/2025 Morning  Yes Yes   Sig: Take 1 tablet by mouth daily.   NIFEdipine ER OSMOTIC (ADALAT CC) 90 MG 24 hr tablet 7/27/2025 Morning  No Yes   Sig: Take 1 tablet (90  mg) by mouth daily.   PARoxetine (PAXIL) 40 MG tablet 7/27/2025 Morning Self Yes Yes   Sig: Take 40 mg by mouth daily.   albuterol (PROVENTIL) (2.5 MG/3ML) 0.083% neb solution   Yes Yes   Sig: Take 2.5 mg by nebulization every 6 hours as needed for shortness of breath, wheezing or cough.   carvedilol (COREG) 25 MG tablet 7/27/2025 Evening  Yes Yes   Sig: Take 12.5 mg by mouth 2 times daily (with meals).   cloNIDine (CATAPRES-TTS3) 0.3 MG/24HR WK patch 7/24/2025 Morning  No Yes   Sig: Place 1 patch over 168 hours onto the skin once a week.   clopidogrel (PLAVIX) 75 MG tablet 7/27/2025 Morning  Yes Yes   Sig: Take 75 mg by mouth daily.   famotidine (PEPCID) 40 MG tablet 7/27/2025 Morning  Yes Yes   Sig: Take 40 mg by mouth daily.   hydrALAZINE (APRESOLINE) 50 MG tablet 7/27/2025 Evening  No Yes   Sig: Take 1 tablet (50 mg) by mouth 3 times daily.   icosapent ethyl (VASCEPA) 1 g CAPS capsule 7/27/2025 Evening  Yes Yes   Sig: Take 2 g by mouth 2 times daily (with meals).   isosorbide dinitrate (ISORDIL) 20 MG tablet 7/27/2025 Evening  Yes Yes   Sig: Take 20 mg by mouth 3 times daily.   meclizine (ANTIVERT) 25 MG tablet 7/27/2025 Evening  No Yes   Sig: Take 1 tablet (25 mg) by mouth every 6 hours.   methocarbamol (ROBAXIN) 500 MG tablet   No Yes   Sig: Take 1 tablet (500 mg) by mouth 3 times daily as needed for muscle spasms.   ondansetron (ZOFRAN ODT) 4 MG ODT tab   No Yes   Sig: Take 1 tablet (4 mg) by mouth every 6 hours as needed.   senna-docusate (SENOKOT-S/PERICOLACE) 8.6-50 MG tablet   No Yes   Sig: Take 1 tablet by mouth 2 times daily as needed for constipation.   sodium bicarbonate 650 MG tablet 7/27/2025 Evening  No Yes   Sig: Take 2 tablets (1,300 mg) by mouth 2 times daily.      Facility-Administered Medications: None       ALLERGIES:  Allergies   Allergen Reactions    Iodine Hives       SOCIAL HISTORY:  I have reviewed this patient's social history and updated it with pertinent information if needed. Maria Alejandra ROWE  Heber  reports that she has been smoking. She has a 22.5 pack-year smoking history. She has never used smokeless tobacco. She reports current alcohol use. She reports that she does not use drugs.    FAMILY HISTORY:  I have reviewed this patient's family history and updated it with pertinent information if needed.   Family History   Problem Relation Age of Onset    Alzheimer Disease Mother     Cerebrovascular Disease Father     Dementia Maternal Grandmother     Diabetes Brother     Alzheimer Disease Brother     Parkinsonism Brother     Cerebrovascular Disease Brother     Breast Cancer Sister     Heart Disease Sister        REVIEW OF SYSTEMS:  Constitutional:  No weight loss, fever, chills  HEENT:  Eyes:  No visual loss, blurred vision, double vision or yellow sclerae. No hearing loss, sneezing, congestion, runny nose or sore throat.  Skin:  No rash or itching.  Cardiovascular: per HPI  Respiratory: per HPI  GI:  No anorexia, nausea, vomiting or diarrhea. No abdominal pain or blood.  :  No dysurea, hematuria  Neurologic:  No headache, paralysis, ataxia, numbness or tingling in the extremities. No change in bowel or bladder control.  Musculoskeletal:  No muscle pain  Hematologic:  No bleeding or bruising.  Lymphatics:  No enlarged nodes. No history of splenectomy.  Endocrine:  No reports of sweating, cold or heat intolerance. No polyuria or polydipsia.  Allergies:  No history of asthma, hives, eczema or rhinitis.    PHYSICAL EXAM:  Temp: 97.8  F (36.6  C) Temp src: Oral BP: (!) 153/100 Pulse: 109   Resp: 21 SpO2: 94 % O2 Device: BiPAP/CPAP Oxygen Delivery: 9 LPM  Vital Signs with Ranges  Temp:  [97.8  F (36.6  C)-98.5  F (36.9  C)] 97.8  F (36.6  C)  Pulse:  [] 109  Resp:  [10-23] 21  BP: (103-168)/() 153/100  FiO2 (%):  [45 %-55 %] 55 %  SpO2:  [89 %-98 %] 94 %  123 lbs 14.38 oz    Constitutional: awake, alert, no distress  Eyes: PERRL, sclera nonicteric  ENT: trachea midline  Respiratory: Diffusely  coarse   Cardiovascular: regular rate and rhythm, no murmurs  GI: nondistended, nontender, bowel sounds present  Lymph/Hematologic: no lymphadenopathy  Skin: dry, no rash  Musculoskeletal: good muscle tone, strength 5/5 in upper and lower extremities  Neurologic: no focal deficits  Neuropsychiatric: appropriate affact      Intake/Output Summary (Last 24 hours) at 8/4/2025 0948  Last data filed at 8/4/2025 0822  Gross per 24 hour   Intake 188.7 ml   Output 950 ml   Net -761.3 ml       Clinically Significant Risk Factors          # Hypochloremia: Lowest Cl = 94 mmol/L in last 2 days, will monitor as appropriate          # Hypertension: Noted on problem list             # Moderate Malnutrition: based on nutrition assessment and treatment provided per dietitian's recommendations.    # Financial/Environmental Concerns: none      Cardiac Arrhythmia: Atrial fibrillation: Paroxysmal    Not present on admission    Not present on admission    Not present on admission    Not present on admission    Not present on admission    Not present on admission    DATA:  Labs: Potassium 3.7, BUN 61, creatinine 3.6, WBC 9, hemoglobin 8.5, platelets 369  Recent Labs   Lab Test 07/17/18  0630   CHOL 178   HDL 33*   LDL 72   TRIG 364*     EKG: July 28: Sinus rhythm, rate 78    Tele: Sinus rhythm, rate 80s to 90s, around 9 AM on August 4 rhythm changed to A-fib, rate 90s to low 100s, converted back to sinus around 10 AM, rate 70s    Echo: July 29: EF 60%, normal RV, 2+ MR, mitral valve mean 9 mmHg

## 2025-08-04 NOTE — PLAN OF CARE
"ICU End of Shift Summary.  For vital signs and complete assessments, please see documentation flowsheets.     Pertinent assessments: Pt alert and oriented but forgetful. Poor urine output-200 through purewick. No BM.   Major Shift Events: on and off bipap, went into a.fib for 1 hour- flipped back into NSR without intervention , IV lasix increased, amio bolus given for a.fib  Plan (Upcoming Events): Cardiology and neph following, potassium/mag protocol   Discharge/Transfer Needs: n/a    Bedside Shift Report Completed : y   Bedside Safety Check Completed: y      Problem: Adult Inpatient Plan of Care  Goal: Plan of Care Review  Description: The Plan of Care Review/Shift note should be completed every shift.  The Outcome Evaluation is a brief statement about your assessment that the patient is improving, declining, or no change.  This information will be displayed automatically on your shift  note.  Outcome: Progressing  Flowsheets (Taken 8/4/2025 4021)  Outcome Evaluation: Pt on and off bipap for SOB. Denies pain. Poor PO intake.  Plan of Care Reviewed With: patient  Overall Patient Progress: no change  Goal: Patient-Specific Goal (Individualized)  Description: You can add care plan individualizations to a care plan. Examples of Individualization might be:  \"Parent requests to be called daily at 9am for status\", \"I have a hard time hearing out of my right ear\", or \"Do not touch me to wake me up as it startles  me\".  Outcome: Progressing  Goal: Absence of Hospital-Acquired Illness or Injury  Outcome: Progressing  Intervention: Identify and Manage Fall Risk  Recent Flowsheet Documentation  Taken 8/4/2025 1628 by Mile Lynch RN  Safety Promotion/Fall Prevention:   activity supervised   assistive device/personal items within reach   clutter free environment maintained   increased rounding and observation   increase visualization of patient   mobility aid in reach   nonskid shoes/slippers when out of bed   patient " and family education   room near nurse's station   room organization consistent   safety round/check completed   supervised activity   treat reversible contributory factors   treat underlying cause  Taken 8/4/2025 1300 by Mile Lynch RN  Safety Promotion/Fall Prevention:   activity supervised   assistive device/personal items within reach   clutter free environment maintained   increased rounding and observation   increase visualization of patient   mobility aid in reach   nonskid shoes/slippers when out of bed   patient and family education   room near nurse's station   room organization consistent   safety round/check completed   supervised activity   treat reversible contributory factors   treat underlying cause  Taken 8/4/2025 0800 by Mile Lynch RN  Safety Promotion/Fall Prevention:   activity supervised   assistive device/personal items within reach   clutter free environment maintained   increased rounding and observation   increase visualization of patient   mobility aid in reach   nonskid shoes/slippers when out of bed   patient and family education   room near nurse's station   room organization consistent   safety round/check completed   supervised activity   treat reversible contributory factors   treat underlying cause  Intervention: Prevent Skin Injury  Recent Flowsheet Documentation  Taken 8/4/2025 1628 by Mile Lynch RN  Skin Protection:   adhesive use limited   incontinence pads utilized   tubing/devices free from skin contact   transparent dressing maintained   silicone foam dressing in place   skin to device areas padded   skin to skin areas padded  Taken 8/4/2025 1600 by Mile Lynch RN  Body Position:   turned   supine, head elevated   supine, legs elevated  Taken 8/4/2025 1400 by Mile Lynch RN  Body Position:   turned   left  Taken 8/4/2025 1300 by Mile Lynch RN  Skin Protection:   adhesive use limited   incontinence pads utilized    tubing/devices free from skin contact   transparent dressing maintained   silicone foam dressing in place   skin to device areas padded   skin to skin areas padded  Taken 8/4/2025 1200 by Mile Lynch RN  Body Position:   turned   right  Taken 8/4/2025 1000 by Mile Lynch RN  Body Position:   turned   supine, legs elevated   supine, head elevated  Taken 8/4/2025 0800 by Mile Lynch RN  Body Position:   turned   left  Skin Protection:   adhesive use limited   incontinence pads utilized   tubing/devices free from skin contact   transparent dressing maintained   silicone foam dressing in place   skin to device areas padded   skin to skin areas padded  Intervention: Prevent and Manage VTE (Venous Thromboembolism) Risk  Recent Flowsheet Documentation  Taken 8/4/2025 1628 by Mile Lynch RN  VTE Prevention/Management: (on plavix) SCDs off (sequential compression devices)  Taken 8/4/2025 1300 by Mile Lynch RN  VTE Prevention/Management: (on plavix) SCDs off (sequential compression devices)  Taken 8/4/2025 0800 by Mile Lynch RN  VTE Prevention/Management: (on plavix) SCDs off (sequential compression devices)  Intervention: Prevent Infection  Recent Flowsheet Documentation  Taken 8/4/2025 1628 by Mile Lynch RN  Infection Prevention:   equipment surfaces disinfected   hand hygiene promoted   rest/sleep promoted   single patient room provided  Taken 8/4/2025 1300 by Mile Lynch RN  Infection Prevention:   equipment surfaces disinfected   hand hygiene promoted   rest/sleep promoted   single patient room provided  Taken 8/4/2025 0800 by Mile Lynch RN  Infection Prevention:   equipment surfaces disinfected   hand hygiene promoted   rest/sleep promoted   single patient room provided  Goal: Optimal Comfort and Wellbeing  Outcome: Progressing  Intervention: Provide Person-Centered Care  Recent Flowsheet Documentation  Taken 8/4/2025 1628 by Syed  Mile HEMPHILL RN  Trust Relationship/Rapport:   care explained   choices provided   emotional support provided   empathic listening provided   questions answered   questions encouraged   reassurance provided   thoughts/feelings acknowledged  Taken 8/4/2025 1300 by iMle Lynch RN  Trust Relationship/Rapport:   care explained   choices provided   emotional support provided   empathic listening provided   questions answered   questions encouraged   reassurance provided   thoughts/feelings acknowledged  Taken 8/4/2025 0800 by Mile Lynch RN  Trust Relationship/Rapport:   care explained   choices provided   emotional support provided   empathic listening provided   questions answered   questions encouraged   reassurance provided   thoughts/feelings acknowledged  Goal: Readiness for Transition of Care  Outcome: Progressing   Goal Outcome Evaluation:      Plan of Care Reviewed With: patient    Overall Patient Progress: no changeOverall Patient Progress: no change    Outcome Evaluation: Pt on and off bipap for SOB. Denies pain. Poor PO intake.

## 2025-08-04 NOTE — PROGRESS NOTES
[facility]    Medicine Progress Note - Hospitalist Service    Date of Admission:  7/28/2025    Assessment & Plan   75-year-old female with history of f CAD, CKD stage IV, hypertension, hyperlipidemia, intra-abdominal aortic aneurysm with prior stenting, chronic vertigo, recent hospitalization for fall with pelvic fracture who presents the ED for shortness of breath.      Had elevated D-dimer but VQ scan negative for PE, CT not done due to CKD.  proBNP more than 25,000.  Chest x-ray showed bilateral perihilar interstitial/airspace opacities, left greater than right, are nonspecific for postradiation change versus pneumonia. A small nodule in the peripheral right upper lobe measures 9 mm.     Found to be in hypertensive emergency, admitted to ICU with BiPAP and nitroglycerin drip.  She was weaned off nitroglycerin drip and BiPAP but has had repeated episodes of rising blood pressure and shortness of breath needing her to go back on BiPAP intermittently.  She was transferred to floor on 7/31 but came back to ICU on 8/2 after rapid response for flash pulmonary edema and hypertensive emergency.    On the morning of 8/14, patient went into A-fib with initial rates of 130s and then decreased to 40s without intervention.  She converted back to sinus rhythm and about half an hour.    Acute hypoxic respiratory failure:  Diastolic CHF exacerbation:  Recurrent flash pulmonary edema associated with episodes of high blood pressure.  Hypertensive emergency:  - Echo with EF of 55 to 60%.  Unclear what is causing recurrent sudden episodes of elevated blood pressure and flash pulmonary edema.  She has history of renal artery stenting and on renal arterial ultrasound done on 7/31 there was no sonographic evidence of renal artery stenosis.  Pending metanephrines.  - Increase carvedilol to 25 mg twice daily, hydralazine 200 mg 3 times daily and Isordil increased to 40 mg 3 times daily.  Continue clonidine patch  0.3 mg weekly and nifedipine 90 mg daily.  Also started on the Lasix 40 mg IV twice daily.  -Was empirically started on ceftriaxone on presentation.  Patient had no fever or leukocytosis.  Mildly elevated procalcitonin of 0.67 which is difficult to interpret in ROMI/CKD and improved to 0.49 on recheck.  Finished 5-day course of ceftriaxone.      Atrial fibrillation:  - On 8/4, patient had a self-limiting 30 minutes episode of atrial fibrillation initially with RVR to 130 and then bradycardia to 40.  - Cardiology was reconsulted, advised 150 mg amiodarone IV push, but no further amiodarone unless patient goes into A-fib again.  Recommended against anticoagulation for single episode of A-fib.    Malnutrition:  - Patient has been taking about 25% of her meals, if she continues to be on BiPAP and have poor p.o. intake, she will need discussion regarding tube feeding.  - Consult palliative for goals of care.  Patient wants to be full code as of now.    ROMI on CKD stage IV:  Metabolic acidosis:  - Previous baseline of 1.3-1.5 which worsened in May/June 2025 and new baseline appears to be 3 with nephrotic range proteinuria.  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  - Creatinine on admission was 4.25, now stable in 3.4 range defer further diuretic management to nephrology.    - Continue prior to admission dose of sodium bicarbonate.  - Nephrology to consider hemodialysis.    Chronic medical conditions:  - AAA.  Endoleak status post recent endovascular aneurysm repair on 4/25.  Continue Plavix and statin.  - History of CVA.  Chronic infarct in the right basal ganglia noted on CT.  - Right lung adenocarcinoma.  Status post stereotactic body radiation therapy in 2021, follows up in [place].  His lung nodule on x-ray, can follow-up with her primary oncologist.  - Chronic vertigo.  Follows up with ENT in [place] and on as needed meclizine.  MRI brain on 5/22 was negative  for acute pathology.  - Depression pleasant lady.  Continue paroxetine.  - Hypertriglyceridemia.  On Vascepa.            Diet: Snacks/Supplements Adult: Nepro Oral Supplement; Between Meals  2 Gram Sodium Diet    DVT Prophylaxis: Pneumatic Compression Devices  Rao Catheter: Not present  Lines: None     Cardiac Monitoring: ACTIVE order. Indication: Acute decompensated heart failure (48 hours)  Code Status: Full Code      Clinically Significant Risk Factors          # Hypochloremia: Lowest Cl = 94 mmol/L in last 2 days, will monitor as appropriate            # Hypertension: Noted on problem list             # Severe Malnutrition: based on nutrition assessment and treatment provided per dietitian's recommendations.    # Financial/Environmental Concerns: none         Social Drivers of Health    Depression: At risk (6/5/2025)    Received from [facility]    PHQ-2     Depression Risk: 3   Tobacco Use: High Risk (7/31/2025)    Received from [facility]    Patient History     Smoking Tobacco Use: Every Day     Smokeless Tobacco Use: Never     Passive Exposure: Current   Interpersonal Safety: Unknown (7/28/2025)    Interpersonal Safety     Do you feel physically and emotionally safe where you currently live?: Patient unable to answer     Within the past 12 months, have you been hit, slapped, kicked or otherwise physically hurt by someone?: Patient unable to answer     Within the past 12 months, have you been humiliated or emotionally abused in other ways by your partner or ex-partner?: Patient unable to answer          Disposition Plan     Medically Ready for Discharge: Anticipated in 2-4 Days will keep in ICU overnight to make sure she does not have another flash pulmonary edema.             Jonathan Light MD  Hospitalist Service  [facility]  Securely message with Emulate (more info)  Text page via Beam. Paging/Directory    ______________________________________________________________________    Interval History   Back on BiPAP today due to tachypnea and is breathing comfortably on BiPAP.  Had a self-limiting episode of A-fib.  Family members at bedside.    Physical Exam   Vital Signs: Temp: 97.8  F (36.6  C) Temp src: Oral BP: 104/56 Pulse: 75   Resp: 11 SpO2: 98 % O2 Device: BiPAP/CPAP Oxygen Delivery: 9 LPM  Weight: 123 lbs 14.38 oz    General Appearance: Alert awake and oriented x 3.  On BiPAP.  Respiratory: Basilar crackles.  Cardiovascular: S1-S2 normal  GI: Soft and nontender  Skin: No rash  Other: Trace edema.      Medical Decision Making       60 MINUTES SPENT BY ME on the date of service doing chart review, history, exam, documentation & further activities per the note.      Data     I have personally reviewed the following data over the past 24 hrs:    9.2  \   8.5 (L)   / 369     135 94 (L) 61.3 (H) /  98   3.7 25 3.67 (H) \       Imaging results reviewed over the past 24 hrs:   No results found for this or any previous visit (from the past 24 hours).

## 2025-08-04 NOTE — PROGRESS NOTES
Renal Medicine Progress Note            Assessment/Plan:     Assessment:    Hypertensive emergency   Acute hypoxic respiratory failure   Acute on chronic diastolic HF   History of R renal artery stenting  Labile HTN  Bipap dependent hypoxia, pulmonary edema on CXR. . BNP 25K. TTE with EF 55-60%, elevated L sided pressures and pulmonary HTN, but RA pressure estimated low.    - Labile BP, history of renal artery stenosis status post stenting-> Doppler on 7/31-no evidence of significant JACY    - continue IV lasix 40 mg BID  - continue PTA antihypertensives, increase hydralazine further to 100 mg TID  - renal US with duplex- no JACY noted       ROMI on CKD IV   Previously  baseline 1.3-1.5, worsening in May/June 2025, new baseline appears to be ~3 with nephrotic range proteinuria. 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 This is most commonly seen in DM2, however can also be seen with smoking and HTN. Bx also showed cholesterol emboli (likely due to endograft repair).  Cr on admission 4.25, worse than recent baseline. In setting of hypertensive emergency, pulmonary edema, LE edema. Bx results:       Metabolic acidosis, resolved    Continue PTA sodium bicarb.   Improved    R lung adenocarcinoma   Received radiation in 2021.     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  Has bibasilar crackles.  Increase Lasix to 60 mg twice daily.  If kidney function continues to worsen, consider right heart cath to better define volume status.  -Low-sodium diet.  P.o. fluid restriction of 1500 cc/day              Interval History:     No acute events overnight   Breathing comfortably but still dependent on BiPAP.  Noted episode of atrial fibrillation this morning with heart rate in 130s ->40s, with spontaneous conversion to sinus rhythm  Bed scale weights are inaccurate and higher.  950 cc urine output recorded.             Medications and Allergies:  "    Current Facility-Administered Medications   Medication Dose Route Frequency Provider Last Rate Last Admin    carvedilol (COREG) tablet 25 mg  25 mg Oral BID w/meals Josemanuel Cohen MD   25 mg at 08/04/25 0848    cloNIDine (CATAPRES-TTS3) 0.3 MG/24HR WK patch 1 patch  1 patch Transdermal Weekly Virgilio Patel MD   1 patch at 07/31/25 1432    And    cloNIDine (CATAPRES-TTS1) Patch in Place   Transdermal Q8H [facility] Virgilio Patel MD        clopidogrel (PLAVIX) tablet 75 mg  75 mg Oral Daily Virgilio Patel MD   75 mg at 08/04/25 0849    famotidine (PEPCID) tablet 20 mg  20 mg Oral Q48H Jonathan Light MD   20 mg at 08/04/25 0849    furosemide (LASIX) injection 40 mg  40 mg Intravenous BID Vee Encinas MD   40 mg at 08/04/25 0822    [Held by provider] furosemide (LASIX) tablet 40 mg  40 mg Oral BID Vee Encinas MD   40 mg at 07/30/25 1534    hydrALAZINE (APRESOLINE) tablet 100 mg  100 mg Oral TID Vee Encinas MD   100 mg at 08/04/25 0211    icosapent ethyl (VASCEPA) capsule 2 g  2 g Oral BID w/meals Virgilio Patel MD   2 g at 08/04/25 0849    isosorbide dinitrate (ISORDIL) tablet 40 mg  40 mg Oral TID Jonathan Light MD   40 mg at 08/04/25 1228    meclizine (ANTIVERT) tablet 25 mg  25 mg Oral Q6H Virgilio Patel MD   25 mg at 08/04/25 1228    NIFEdipine ER OSMOTIC (PROCARDIA XL) 24 hr tablet 90 mg  90 mg Oral Daily Virgilio Patel MD   90 mg at 08/04/25 0849    PARoxetine (PAXIL) tablet 40 mg  40 mg Oral Daily Virgilio Patel MD   40 mg at 08/04/25 0849    sodium bicarbonate tablet 1,300 mg  1,300 mg Oral BID Virgilio Patel MD   1,300 mg at 08/04/25 0848    sodium chloride (PF) 0.9% PF flush 3 mL  3 mL Intracatheter Q8H Gilmer Dozier MD   3 mL at 08/04/25 0615        Allergies   Allergen Reactions    Iodine Hives            Physical Exam:   Vitals were reviewed  /57   Pulse 75   Temp 98.3  F (36.8  C) (Axillary)   Resp 20   Ht 1.626 m (5' 4\")   Wt 56.2 kg (123 lb 14.4 oz)  " " SpO2 96%   BMI 21.27 kg/m      Wt Readings from Last 3 Encounters:   08/04/25 56.2 kg (123 lb 14.4 oz)   07/23/25 54.4 kg (120 lb)   07/21/25 54.1 kg (119 lb 3.2 oz)           GENERAL APPEARANCE: NAD, frail appearing  HEENT: normocephalic, dry MM   RESP: coarse, basal crackles noted  CV: RRR   EXTREMITIES/SKIN:1+ edema  NEURO:  sleeping            Data:     BMP  Recent Labs   Lab 08/04/25  0528 08/03/25  2007 08/03/25  1553 08/03/25  1158 08/03/25  0750 08/03/25  0546 08/02/25  0942 08/02/25 0357 08/01/25 2028 08/01/25  0802     --   --   --   --  137  --  135  --  137   POTASSIUM 3.7  --   --   --   --  3.7  --  3.7  3.7 3.3* 3.2*   CHLORIDE 94*  --   --   --   --  96*  --  96*  --  96*   BRUCE 8.5*  --   --   --   --  8.9  --  8.8  --  8.5*   CO2 25  --   --   --   --  26  --  25  --  25   BUN 61.3*  --   --   --   --  59.7*  --  56.0*  --  55.5*   CR 3.67*  --   --   --   --  3.41*  --  3.40*  --  3.15*   GLC 98 105* 123* 143*   < > 127*   < > 114*  --  123*    < > = values in this interval not displayed.     CBC  Recent Labs   Lab 08/04/25 0528 08/03/25  0546 08/02/25  0357 08/01/25  0802   WBC 9.2 8.8 9.3 9.2   HGB 8.5* 9.0* 9.1* 9.2*   HCT 26.0* 27.0* 27.3* 27.5*   * 102* 101* 102*    462* 390 397     Lab Results   Component Value Date    AST 22 07/17/2025    ALT 11 07/17/2025    ALKPHOS 61 07/17/2025    BILITOTAL 0.3 07/17/2025     No results found for: \"INR\"  Color Urine (no units)   Date Value   09/09/2016 Light Yellow     Appearance Urine (no units)   Date Value   09/09/2016 Clear     Glucose Urine (mg/dL)   Date Value   09/09/2016 Negative     Bilirubin Urine (no units)   Date Value   09/09/2016 Negative     Ketones Urine (mg/dL)   Date Value   09/09/2016 Negative     Specific Gravity Urine (no units)   Date Value   09/09/2016 1.010     pH Urine (pH)   Date Value   09/09/2016 5.5     Protein Albumin Urine (mg/dL)   Date Value   09/09/2016 Negative     Nitrite Urine (no units) "   Date Value   09/09/2016 Negative     Leukocyte Esterase Urine (no units)   Date Value   09/09/2016 Negative         Attestation:  I have reviewed today's vital signs, notes, medications, labs and imaging.    Jamie Fragoso MD  [facility] - Nephrology  Office: [phone]

## 2025-08-04 NOTE — CONSULTS
"  Palliative Care Consultation Note  [facility]      Patient: Maria Alejandra Buck  Date of Admission:  7/28/2025    Requesting Clinician / Team: Hospitalist Jonathan Light MD   Reason for consult: Goals of care       Recommendations & Counseling     GOALS OF CARE:   Life-prolonging without limits  -  is hopeful Maria Alejandra will be able to assist in decision making in the coming days, but in the mean time would like all restorative efforts.     indicates Maria Alejandra would want feeding tube if medically needed. \"I think she wants everything done.\"     ADVANCE CARE PLANNING:  No health care directive on file. Per system policy, Surrogate Decision-makers for Patients With Diminished Decision-making Capacity offers guidance on possible decision-makers.  Shandra Buck has been identified as a surrogate decision maker.   There is no POLST form on file, defer to patient and/or next of kin for decisions   Code status: Full Code    MEDICAL MANAGEMENT:   We are not actively managing symptoms at this time.    PSYCHOSOCIAL/SPIRITUAL SUPPORT:  Family - , daughter and two granddaughters (age 21 & 23 years). 2 sisters one in [place] and the other in [place].   [place] community: Zoroastrianism   Declined Spiritual Health Services (patient was seen by  TREVON Amaral with recent hospitalization in July)    Palliative Care will continue to follow. Thank you for the consult and allowing us to aid in the care of Maria Alejandra Buck.    These recommendations have been discussed with ICU Rounding Team including Hospitalist Jonathan Light MD, bedside nurse BOWEN Treadwell and dietitikrupa Anderson RD.    FERNANDO Alvarez CNS  [facility], Palliative Care  Securely message with the Vocera Web Console (learn more here) or  Text page via [facility] Paging/Directory         Assessment      Maria Alejandra Buck is a 75 year old female admitted 7/28/2025 from [facility] with shortness of breath. Her a past medical history is " "right lung cancer (biopsy confirmed 3/22/2021 with stereotactic body radiation therapy without reoccurrence,  significant for CAD, diastolic dysfunction, CKD stage IV, hypertension, hyperlipidemia, intra-abdominal aortic aneurysm with prior stenting, SAMANTHA, COPD, chronic vertigo, GERD, Bull's esophagus,  and cognitive impairment. She had been visiting her daughter from [place] with her recent hospitalization at [facility] July 15 through 19 which she had been admitted following a fall with right pubic body fracture and dismissed to TCU. Prior to that she had been hospitalized at [facility] in [place], [place] June 5 through 17 for acute renal failure and a similar hospitalization May 20 through 24.        Today, the patient was seen for:  #Goals of care  #Malnutrition  #Hypertensive emergency      History of Present Illness   Met with Maria Alejandra as she was resting in bed. She acknowledged her fatigued and requested I contact her .  I phoned the patient's , Shandra Buck at [phone].   I introduced our role as an extra layer of support and how we help patients and families dealing with serious, potentially life-limiting illnesses. I explained the composition of the palliative care team.  Palliative care helps patients and families navigate their care while focusing on the whole person; providing emotional, social and spiritual support  Palliative care often assists with symptom management, information sharing about what to expect from the illness, available treatment options and what effect those options may have on the disease course, and provide effective communication and caring support. Shandra acknowledged speaking with Dr. Light this morning about Maria Alejandra's care. Shandra offered \"I feel sorry for her. She's frustrated.\" Shandra explained that Maria Alejandra has been in the hospital 3 times for the same problem. Shandra acknowledged Maria Alejandra's poor nutritional status: \"I ordered lunch at 11 AM, potato soup. It's her " "favorite.\" We discussed the possibility of tube feedings, and Shandra surmised  \"I think she wants everything done.\" He explained having friends and family who have passed. \"My friend down in [facility]. He was from [place], [place]. Younger than me. He  of cancer, so she's happy they aren't talking about cancer with all of this.\" Shandra explained \"I had triple bypass and they gave me oxycodone. I didn't like it so I quit taking it.\" He explained their daughter Yesenia returned to work last week after taking 38 days of leave to be with them in [place]. Yesenia's  is [place] the cabin with their daughters age 23 and 21.      Prognosis, Goals, & Planning:   Functional Status just prior to this current hospitalization:  Not assessed    Code Status was addressed today:   Yes, We discussed potential risks and rationale of attempting cardiac resuscitation, intubation, and mechanical ventilation.  We also discussed probability of survival as well as quality of life implications.  Based on this discussion, patient or surrogate response/decision: Confirmed FULL CODE      Patient's decision making preferences: not assessed        Patient has decision-making capacity today for complex decisions: Questionable as patient too fatigued to provide insight.             Coping, Meaning, & Spirituality:   Mood, coping, and/or meaning in the context of serious illness were addressed today: Yes    Social:   Living situation: lives with significant other/spouse, but [facility] following [facility] fall     Medications:  Reviewed this patient's medication profile and medications from this hospitalization. [place] Board of Pharmacy Data Base Reviewed: Yes:   reviewed - Hydromorphone 2 mg tablets #36 since hospital dismissal .    ROS:  Comprehensive ROS is reviewed and is negative except as here & per HPI:     Physical Exam   Vital Signs with Ranges  Temp:  " [97.8  F (36.6  C)-98.5  F (36.9  C)] 98.3  F (36.8  C)  Pulse:  [] 75  Resp:  [10-39] 20  BP: ()/() 102/57  FiO2 (%):  [45 %-55 %] 55 %  SpO2:  [89 %-99 %] 96 %  Wt Readings from Last 10 Encounters:   08/04/25 56.2 kg (123 lb 14.4 oz)   07/23/25 54.4 kg (120 lb)   07/21/25 54.1 kg (119 lb 3.2 oz)   07/17/25 53.5 kg (117 lb 14.4 oz)   09/13/18 65.8 kg (145 lb)   07/18/18 65.9 kg (145 lb 3.2 oz)   06/27/18 64.4 kg (142 lb)   08/20/15 64.8 kg (142 lb 14.4 oz)   08/17/15 68 kg (150 lb)     123 lbs 14.38 oz    PHYSICAL EXAM:  GEN:  Fatigued elderly female who requested to sleep. Alert, oriented self and situation. .  HEENT:  Normocephalic/atraumatic, no scleral icterus, no nasal discharge, mouth moist.  CV:  RRR, S1, S2; no murmurs or other irregularities noted.  +3 DP/PT pulses bilatererally; trace pedal edema BLE.  RESP:  Bilateral faint crackles. Symmetric chest rise on inhalation noted.  Normal respiratory effort.  ABD:  Rounded, soft, non-tender/non-distended.  +BS  EXT:  Edema & pulses as noted above.  CMS intact x 4.     M/S:  Denies pain/discomfort with palpation of extremities.    SKIN:  Dry to touch, no exanthems noted in the visualized areas.    PAIN BEHAVIOR: Cooperative  Psych:  Normal affect.  Calm, cooperative, conversant appropriately.    Data reviewed:  Results for orders placed or performed during the hospital encounter of 07/28/25   XR Chest 2 Views     Status: None    Narrative    EXAM: XR CHEST 2 VIEWS  LOCATION: [facility]  DATE: 7/28/2025    INDICATION: Shortness of breath. Known lung cancer.  COMPARISON: 10/01/2013      Impression    IMPRESSION: Bilateral perihilar interstitial/airspace opacities, left greater than right, are nonspecific for postradiation change versus pneumonia. A small nodule in the peripheral right upper lobe measures 9 mm. Heart size and pulmonary vascularity are   normal. No pleural effusion or pneumothorax. Endoluminal stent graft  seen in the abdominal aorta.   US Lower Extremity Venous Duplex Bilateral     Status: None    Narrative    EXAM: US LOWER EXTREMITY VENOUS DUPLEX BILATERAL  LOCATION: [facility]  DATE: 7/28/2025    INDICATION: sob, elevated d dimer, CKD, ?DVT PE  COMPARISON: None.  TECHNIQUE: Venous Duplex ultrasound of bilateral lower extremities with and without compression, augmentation and duplex. Color flow and spectral Doppler with waveform analysis performed.    FINDINGS: Exam includes the common femoral, femoral, popliteal veins as well as segmentally visualized deep calf veins and greater saphenous vein.     RIGHT: No deep vein thrombosis. No superficial thrombophlebitis. Right popliteal cyst measuring 3.1 x 1.9 x 0.8 cm.    LEFT: No deep vein thrombosis. No superficial thrombophlebitis. No popliteal cyst.      Impression    IMPRESSION:  1.  No deep venous thrombosis in the bilateral lower extremities.   POC US ECHO LIMITED     Status: None    Impression      Cardiac Ultrasound    Procedure Name: POC Ultrasound Cardiac Exam    Indication: Shortness of breath    Views: Parasternal long axis view  and pulmonary windows    Findings: B-lines in bilateral pulmonary windows, no pericardial effusion, LV>RV    Impression: Suggestion of pulmonary edema, no RV strain    Study performed by: Nhan Wilkerson MD     Images archived: Yes    NM Lung Scan Perfusion Particulate     Status: None    Narrative    EXAM: NM LUNG SCAN PERFUSION PARTICULATE  LOCATION: [facility]  DATE: 7/28/2025    INDICATION: Shortness of breath  COMPARISON: Chest x-ray dated 7/28/2025  TECHNIQUE: 6.0 mCi technetium-99m MAA, IV. Standard lung perfusion imaging.    FINDINGS: Normal pulmonary perfusion without segmental defect.      Impression    IMPRESSION:     No evidence of pulmonary embolism.   XR Chest Port 1 View     Status: None    Narrative    EXAM: XR CHEST PORT 1 VIEW  LOCATION: [facility]  [facility]OSP[facility]  DATE: 7/29/2025    INDICATION: acute hypoxia  COMPARISON: 7/28/2025      Impression    IMPRESSION:     Marked worsening of central airspace opacities, suspicious for multifocal pneumonia, pulmonary edema, or ARDS.    Unchanged right pleural thickening. No new pleural effusion. No pneumothorax.    Stable cardiomediastinal silhouette. Aortic calcifications.    Partially visualized abdominal aortic endograft.   XR Chest Port 1 View     Status: None    Narrative    EXAM: XR CHEST PORT 1 VIEW  LOCATION: [facility]  DATE: 7/30/2025    INDICATION: Pulmonary edema versus pneumonia.  COMPARISON: 7/29/2025.      Impression    IMPRESSION:   Ill-defined airspace opacities in the perihilar regions have overall improved slightly. Findings suggest pulmonary edema, although an infectious process cannot be excluded. Heart size is stable. Pulmonary vascularity is largely obscured. Aortic   calcification. No pneumothorax. Partially visualized abdominal aortic endograft.   US Renal Complete w Arterial Duplex     Status: None    Narrative    EXAM:  1. RENAL ULTRASOUND   2. RENAL DUPLEX  LOCATION: [facility]  DATE: 7/31/2025    INDICATION: history of R renal artery stent, please evaluate for re stenosis given labile HTN (or new stenosis on L artery)  COMPARISON: None.  TECHNIQUE: Duplex imaging is performed utilizing gray-scale, two-dimensional images, and color-flow imaging. Doppler waveform analysis and spectral Doppler imaging is also performed.    FINDINGS:     RIGHT KIDNEY: 8.6 x 4.8 x 3.5 cm. Normal without hydronephrosis or masses. There is a 2.1 x 1.8 x 1.2 cm cyst at the lateral interpolar region of the right kidney.    LEFT KIDNEY 9.7 x 5.2 x 4.2 cm. Normal without hydronephrosis or masses..     BLADDER: Normal.    RENAL DUPLEX:  Aortic PSV: 56 cm/s, multiphasic  Right Renal Artery PSV: Normal, less than 200 cm/s (Normal considered less than 200 cm/s.)  Right Intrarenal  Resistive Index: Normal, 0.7 or less  Left Renal Artery PSV Normal, less than 200 cm/s (Normal considered less than 200 cm/s.)  Left Intrarenal Resistive Index: Normal, 0.7 or less      Impression    IMPRESSION:   1.  Per sonographic criteria, no evidence for significant renal artery stenosis.   XR Chest Port 1 View     Status: None    Narrative    EXAM: XR CHEST PORT 1 VIEW  LOCATION: [facility]  DATE: 7/31/2025    INDICATION: CHF  COMPARISON: 7/30/2025      Impression    IMPRESSION: Extensive bilateral perihilar opacities minimally increased, may represent pulmonary edema or pneumonia. No pleural effusion. Normal heart size. Obscured pulmonary vascularity. Aortic calcifications..   [place] Draw *Canceled*     Status: None ()    Narrative    The following orders were created for panel order [place] Draw.  Procedure                               Abnormality         Status                     ---------                               -----------         ------                       Please view results for these tests on the individual orders.   Basic Metabolic Panel (Limited Occurrences)     Status: Abnormal   Result Value Ref Range    Sodium 136 135 - 145 mmol/L    Potassium 3.2 (L) 3.4 - 5.3 mmol/L    Chloride 95 (L) 98 - 107 mmol/L    Carbon Dioxide (CO2) 23 22 - 29 mmol/L    Anion Gap 18 (H) 7 - 15 mmol/L    Urea Nitrogen 58.1 (H) 8.0 - 23.0 mg/dL    Creatinine 4.25 (H) 0.51 - 0.95 mg/dL    GFR Estimate 10 (L) >60 mL/min/1.73m2    Calcium 8.0 (L) 8.8 - 10.4 mg/dL    Glucose 101 (H) 70 - 99 mg/dL   Procalcitonin     Status: Abnormal   Result Value Ref Range    Procalcitonin 0.67 (H) <0.50 ng/mL   Blood gas venous     Status: Abnormal   Result Value Ref Range    pH Venous 7.46 (H) 7.32 - 7.43    pCO2 Venous 39 (L) 40 - 50 mm Hg    pO2 Venous 50 (H) 25 - 47 mm Hg    Bicarbonate Venous 28 21 - 28 mmol/L    Base Excess/Deficit Venous 3.6 (H) -3.0 - 3.0 mmol/L    FIO2 3     Oxyhemoglobin Venous 85  (H) 70 - 75 %    O2 Sat, Venous 86.8 (H) 70.0 - 75.0 %    Narrative    In healthy individuals, oxyhemoglobin (O2Hb) and oxygen saturation (SO2) are approximately equal. In the presence of dyshemoglobins, oxyhemoglobin can be considerably lower than oxygen saturation.   NT-proBNP     Status: Abnormal   Result Value Ref Range    NT-proBNP 25,554 (H) 0 - 624 pg/mL   Troponin T, High Sensitivity     Status: Abnormal   Result Value Ref Range    Troponin T, High Sensitivity 65 (H) <=14 ng/L   [place] Draw     Status: None    Narrative    The following orders were created for panel order [place] Draw.  Procedure                               Abnormality         Status                     ---------                               -----------         ------                     Extra Blue Top Tube[[id number]]                             Final result               Extra Red Top Tube[[id number]]                              Final result                 Please view results for these tests on the individual orders.   CBC with platelets and differential     Status: Abnormal   Result Value Ref Range    WBC Count 7.7 4.0 - 11.0 10e3/uL    RBC Count 2.66 (L) 3.80 - 5.20 10e6/uL    Hemoglobin 9.1 (L) 11.7 - 15.7 g/dL    Hematocrit 26.7 (L) 35.0 - 47.0 %     78 - 100 fL    MCH 34.2 (H) 26.5 - 33.0 pg    MCHC 34.1 31.5 - 36.5 g/dL    RDW 15.6 (H) 10.0 - 15.0 %    Platelet Count 349 150 - 450 10e3/uL    % Neutrophils 48 %    % Lymphocytes 31 %    % Monocytes 11 %    % Eosinophils 10 %    % Basophils 1 %    % Immature Granulocytes 0 %    NRBCs per 100 WBC 0 <1 /100    Absolute Neutrophils 3.7 1.6 - 8.3 10e3/uL    Absolute Lymphocytes 2.4 0.8 - 5.3 10e3/uL    Absolute Monocytes 0.9 0.0 - 1.3 10e3/uL    Absolute Eosinophils 0.7 0.0 - 0.7 10e3/uL    Absolute Basophils 0.1 0.0 - 0.2 10e3/uL    Absolute Immature Granulocytes 0.0 <=0.4 10e3/uL    Absolute NRBCs 0.0 10e3/uL   Extra Blue Top Tube     Status: None   Result Value Ref Range     Hold Specimen JIC    Extra Red Top Tube     Status: None   Result Value Ref Range    Hold Specimen JIC    D dimer quantitative     Status: Abnormal   Result Value Ref Range    D-Dimer Quantitative 3.15 (H) 0.00 - 0.50 ug/mL FEU    Narrative    This D-dimer assay is intended for use in conjunction with a clinical pretest probability assessment model to exclude pulmonary embolism (PE) and deep venous thrombosis (DVT) in outpatients suspected of PE or DVT. The cut-off value is 0.50 ug/mL FEU.    For patients 50 years of age or older, the application of age-adjusted cut-off values for D-Dimer may increase the specificity without significant effect on sensitivity. The literature suggested calculation age adjusted cut-off in ug/L = age in years x 10 ug/L. The results in this laboratory are reported as ug/mL rather than ug/L. The calculation for age adjusted cut off in ug/mL= age in years x 0.01 ug/mL. For example, the cut off for a 76 year old male is 76 x 0.01 ug/mL = 0.76 ug/mL (760 ug/L).    M Elizabeth et al. Age adjusted D-dimer cut-off levels to rule out pulmonary embolism: The ADJUST-PE Study. AIMEE 2014;311:6578-8210.; HJ Mayela et al. Diagnostic accuracy of conventional or age adjusted D-dimer cutoff values in older patients with suspected venous thromboembolism. Systemic review and meta-analysis. BMJ 2013:346:f2492.   Magnesium (Limited Occurrences)     Status: Normal   Result Value Ref Range    Magnesium 2.2 1.7 - 2.3 mg/dL   Lactic Acid Whole Blood with 1X Repeat in 2 HR when >2     Status: Normal   Result Value Ref Range    Lactic Acid, Initial 1.0 0.7 - 2.0 mmol/L   Influenza A/B, RSV and SARS-CoV2 PCR (COVID-19) Nose     Status: Normal    Specimen: Nose; Swab   Result Value Ref Range    Influenza A PCR Negative Negative    Influenza B PCR Negative Negative    RSV PCR Negative Negative    SARS CoV2 PCR Negative Negative    Narrative    Testing was performed using the Xpert Xpress CoV2/Flu/RSV Assay on the  [employer] GeneXpert Instrument. This test should be ordered for the detection of SARS-CoV2, influenza, and RSV viruses in individuals with signs and symptoms of respiratory tract infection. This test is for in vitro diagnostic use under the [place] FDA for laboratories certified under CLIA to perform high or moderate complexity testing. This test has been [place] FDA cleared. A negative result does not rule out the presence of PCR inhibitors in the specimen or target RNA in concentration below the limit of detection for the assay. If only one viral target is positive but coinfection with multiple targets is suspected, the sample should be re-tested with another FDA cleared, approved, or authorized test, if coninfection would change clinical management. This test was validated by the [facility]. These laboratories are certified under the Clinical Laboratory Improvement Amendments of 1988 (CLIA-88) as qualified to perfom high complexity laboratory testing.   Troponin T, High Sensitivity     Status: Abnormal   Result Value Ref Range    Troponin T, High Sensitivity 58 (H) <=14 ng/L   Blood gas venous     Status: Abnormal   Result Value Ref Range    pH Venous 7.41 7.32 - 7.43    pCO2 Venous 41 40 - 50 mm Hg    pO2 Venous 58 (H) 25 - 47 mm Hg    Bicarbonate Venous 26 21 - 28 mmol/L    Base Excess/Deficit Venous 1.0 -3.0 - 3.0 mmol/L    FIO2 5     Oxyhemoglobin Venous 88 (H) 70 - 75 %    O2 Sat, Venous 89.6 (H) 70.0 - 75.0 %    Narrative    In healthy individuals, oxyhemoglobin (O2Hb) and oxygen saturation (SO2) are approximately equal. In the presence of dyshemoglobins, oxyhemoglobin can be considerably lower than oxygen saturation.   Magnesium (Limited Occurrences)     Status: Normal   Result Value Ref Range    Magnesium 2.1 1.7 - 2.3 mg/dL   Troponin T, High Sensitivity     Status: Abnormal   Result Value Ref Range    Troponin T, High Sensitivity 52 (H) <=14 ng/L   Glucose by meter     Status: Abnormal   Result  Value Ref Range    GLUCOSE BY METER POCT 137 (H) 70 - 99 mg/dL   Glucose by meter     Status: Abnormal   Result Value Ref Range    GLUCOSE BY METER POCT 139 (H) 70 - 99 mg/dL   Glucose by meter     Status: Abnormal   Result Value Ref Range    GLUCOSE BY METER POCT 129 (H) 70 - 99 mg/dL   Basic Metabolic Panel (Limited Occurrences)     Status: Abnormal   Result Value Ref Range    Sodium 137 135 - 145 mmol/L    Potassium 3.4 3.4 - 5.3 mmol/L    Chloride 98 98 - 107 mmol/L    Carbon Dioxide (CO2) 22 22 - 29 mmol/L    Anion Gap 17 (H) 7 - 15 mmol/L    Urea Nitrogen 59.0 (H) 8.0 - 23.0 mg/dL    Creatinine 3.98 (H) 0.51 - 0.95 mg/dL    GFR Estimate 11 (L) >60 mL/min/1.73m2    Calcium 8.2 (L) 8.8 - 10.4 mg/dL    Glucose 101 (H) 70 - 99 mg/dL   CBC with Platelets (Limited Occurrences)     Status: Abnormal   Result Value Ref Range    WBC Count 7.6 4.0 - 11.0 10e3/uL    RBC Count 2.68 (L) 3.80 - 5.20 10e6/uL    Hemoglobin 9.1 (L) 11.7 - 15.7 g/dL    Hematocrit 26.6 (L) 35.0 - 47.0 %    MCV 99 78 - 100 fL    MCH 34.0 (H) 26.5 - 33.0 pg    MCHC 34.2 31.5 - 36.5 g/dL    RDW 15.7 (H) 10.0 - 15.0 %    Platelet Count 385 150 - 450 10e3/uL   Glucose by meter     Status: Abnormal   Result Value Ref Range    GLUCOSE BY METER POCT 116 (H) 70 - 99 mg/dL   Glucose by meter     Status: Abnormal   Result Value Ref Range    GLUCOSE BY METER POCT 103 (H) 70 - 99 mg/dL   Glucose by meter     Status: Abnormal   Result Value Ref Range    GLUCOSE BY METER POCT 100 (H) 70 - 99 mg/dL   Glucose by meter     Status: Abnormal   Result Value Ref Range    GLUCOSE BY METER POCT 139 (H) 70 - 99 mg/dL   Procalcitonin     Status: Normal   Result Value Ref Range    Procalcitonin 0.49 <0.50 ng/mL   Glucose by meter     Status: Abnormal   Result Value Ref Range    GLUCOSE BY METER POCT 112 (H) 70 - 99 mg/dL   Glucose by meter     Status: Abnormal   Result Value Ref Range    GLUCOSE BY METER POCT 120 (H) 70 - 99 mg/dL   Blood gas arterial     Status: Abnormal    Result Value Ref Range    pH Arterial 7.41 7.35 - 7.45    pCO2 Arterial 42 35 - 45 mm Hg    pO2 Arterial 110 (H) 80 - 105 mm Hg    FIO2 50     Bicarbonate Arterial 26 21 - 28 mmol/L    Base Excess/Deficit Arterial 1.4 -3.0 - 3.0 mmol/L    Sal's Test Yes     Oxyhemoglobin Arterial 97 92 - 100 %    O2 Sat, Arterial 98.7 (H) 95.0 - 96.0 %    Narrative    In healthy individuals, oxyhemoglobin (O2Hb) and oxygen saturation (SO2) are approximately equal. In the presence of dyshemoglobins, oxyhemoglobin can be considerably lower than oxygen saturation.   Basic metabolic panel     Status: Abnormal   Result Value Ref Range    Sodium 137 135 - 145 mmol/L    Potassium 3.3 (L) 3.4 - 5.3 mmol/L    Chloride 99 98 - 107 mmol/L    Carbon Dioxide (CO2) 24 22 - 29 mmol/L    Anion Gap 14 7 - 15 mmol/L    Urea Nitrogen 62.8 (H) 8.0 - 23.0 mg/dL    Creatinine 3.75 (H) 0.51 - 0.95 mg/dL    GFR Estimate 12 (L) >60 mL/min/1.73m2    Calcium 8.0 (L) 8.8 - 10.4 mg/dL    Glucose 101 (H) 70 - 99 mg/dL   Glucose by meter     Status: Abnormal   Result Value Ref Range    GLUCOSE BY METER POCT 126 (H) 70 - 99 mg/dL   Glucose by meter     Status: Abnormal   Result Value Ref Range    GLUCOSE BY METER POCT 114 (H) 70 - 99 mg/dL   CBC with platelets and differential     Status: Abnormal   Result Value Ref Range    WBC Count 7.8 4.0 - 11.0 10e3/uL    RBC Count 2.76 (L) 3.80 - 5.20 10e6/uL    Hemoglobin 9.3 (L) 11.7 - 15.7 g/dL    Hematocrit 28.0 (L) 35.0 - 47.0 %     (H) 78 - 100 fL    MCH 33.7 (H) 26.5 - 33.0 pg    MCHC 33.2 31.5 - 36.5 g/dL    RDW 15.7 (H) 10.0 - 15.0 %    Platelet Count 377 150 - 450 10e3/uL    % Neutrophils 58 %    % Lymphocytes 29 %    % Monocytes 9 %    % Eosinophils 3 %    % Basophils 1 %    % Immature Granulocytes 1 %    NRBCs per 100 WBC 0 <1 /100    Absolute Neutrophils 4.5 1.6 - 8.3 10e3/uL    Absolute Lymphocytes 2.2 0.8 - 5.3 10e3/uL    Absolute Monocytes 0.7 0.0 - 1.3 10e3/uL    Absolute Eosinophils 0.2 0.0 - 0.7  10e3/uL    Absolute Basophils 0.1 0.0 - 0.2 10e3/uL    Absolute Immature Granulocytes 0.0 <=0.4 10e3/uL    Absolute NRBCs 0.0 10e3/uL   Glucose by meter     Status: Abnormal   Result Value Ref Range    GLUCOSE BY METER POCT 104 (H) 70 - 99 mg/dL   Glucose by meter     Status: Abnormal   Result Value Ref Range    GLUCOSE BY METER POCT 144 (H) 70 - 99 mg/dL   Glucose by meter     Status: Abnormal   Result Value Ref Range    GLUCOSE BY METER POCT 143 (H) 70 - 99 mg/dL   Glucose by meter     Status: Abnormal   Result Value Ref Range    GLUCOSE BY METER POCT 114 (H) 70 - 99 mg/dL   CBC with Platelets (Limited Occurrences)     Status: Abnormal   Result Value Ref Range    WBC Count 8.5 4.0 - 11.0 10e3/uL    RBC Count 2.68 (L) 3.80 - 5.20 10e6/uL    Hemoglobin 9.1 (L) 11.7 - 15.7 g/dL    Hematocrit 27.1 (L) 35.0 - 47.0 %     (H) 78 - 100 fL    MCH 34.0 (H) 26.5 - 33.0 pg    MCHC 33.6 31.5 - 36.5 g/dL    RDW 15.5 (H) 10.0 - 15.0 %    Platelet Count 365 150 - 450 10e3/uL   Basic Metabolic Panel (Limited Occurrences)     Status: Abnormal   Result Value Ref Range    Sodium 135 135 - 145 mmol/L    Potassium 3.2 (L) 3.4 - 5.3 mmol/L    Chloride 97 (L) 98 - 107 mmol/L    Carbon Dioxide (CO2) 24 22 - 29 mmol/L    Anion Gap 14 7 - 15 mmol/L    Urea Nitrogen 56.6 (H) 8.0 - 23.0 mg/dL    Creatinine 3.37 (H) 0.51 - 0.95 mg/dL    GFR Estimate 14 (L) >60 mL/min/1.73m2    Calcium 8.1 (L) 8.8 - 10.4 mg/dL    Glucose 115 (H) 70 - 99 mg/dL   Iron and iron binding capacity     Status: Abnormal   Result Value Ref Range    Iron 31 (L) 37 - 145 ug/dL    Iron Binding Capacity 151 (L) 240 - 430 ug/dL    Iron Sat Index 21 15 - 46 %   Ferritin     Status: Normal   Result Value Ref Range    Ferritin 156 11 - 328 ng/mL   CBC with Platelets (Limited Occurrences)     Status: Abnormal   Result Value Ref Range    WBC Count 9.2 4.0 - 11.0 10e3/uL    RBC Count 2.70 (L) 3.80 - 5.20 10e6/uL    Hemoglobin 9.2 (L) 11.7 - 15.7 g/dL    Hematocrit 27.5 (L)  35.0 - 47.0 %     (H) 78 - 100 fL    MCH 34.1 (H) 26.5 - 33.0 pg    MCHC 33.5 31.5 - 36.5 g/dL    RDW 15.2 (H) 10.0 - 15.0 %    Platelet Count 397 150 - 450 10e3/uL   Basic Metabolic Panel (Limited Occurrences)     Status: Abnormal   Result Value Ref Range    Sodium 137 135 - 145 mmol/L    Potassium 3.2 (L) 3.4 - 5.3 mmol/L    Chloride 96 (L) 98 - 107 mmol/L    Carbon Dioxide (CO2) 25 22 - 29 mmol/L    Anion Gap 16 (H) 7 - 15 mmol/L    Urea Nitrogen 55.5 (H) 8.0 - 23.0 mg/dL    Creatinine 3.15 (H) 0.51 - 0.95 mg/dL    GFR Estimate 15 (L) >60 mL/min/1.73m2    Calcium 8.5 (L) 8.8 - 10.4 mg/dL    Glucose 123 (H) 70 - 99 mg/dL   Magnesium     Status: Normal   Result Value Ref Range    Magnesium 1.9 1.7 - 2.3 mg/dL   Potassium     Status: Abnormal   Result Value Ref Range    Potassium 3.3 (L) 3.4 - 5.3 mmol/L   Potassium     Status: Normal   Result Value Ref Range    Potassium 3.7 3.4 - 5.3 mmol/L   CBC with Platelets (Limited Occurrences)     Status: Abnormal   Result Value Ref Range    WBC Count 9.3 4.0 - 11.0 10e3/uL    RBC Count 2.70 (L) 3.80 - 5.20 10e6/uL    Hemoglobin 9.1 (L) 11.7 - 15.7 g/dL    Hematocrit 27.3 (L) 35.0 - 47.0 %     (H) 78 - 100 fL    MCH 33.7 (H) 26.5 - 33.0 pg    MCHC 33.3 31.5 - 36.5 g/dL    RDW 15.2 (H) 10.0 - 15.0 %    Platelet Count 390 150 - 450 10e3/uL   Basic Metabolic Panel (Limited Occurrences)     Status: Abnormal   Result Value Ref Range    Sodium 135 135 - 145 mmol/L    Potassium 3.7 3.4 - 5.3 mmol/L    Chloride 96 (L) 98 - 107 mmol/L    Carbon Dioxide (CO2) 25 22 - 29 mmol/L    Anion Gap 14 7 - 15 mmol/L    Urea Nitrogen 56.0 (H) 8.0 - 23.0 mg/dL    Creatinine 3.40 (H) 0.51 - 0.95 mg/dL    GFR Estimate 13 (L) >60 mL/min/1.73m2    Calcium 8.8 8.8 - 10.4 mg/dL    Glucose 114 (H) 70 - 99 mg/dL   Magnesium     Status: Normal   Result Value Ref Range    Magnesium 1.9 1.7 - 2.3 mg/dL   Blood gas venous     Status: Abnormal   Result Value Ref Range    pH Venous 7.40 7.32 -  7.43    pCO2 Venous 44 40 - 50 mm Hg    pO2 Venous 72 (H) 25 - 47 mm Hg    Bicarbonate Venous 27 21 - 28 mmol/L    Base Excess/Deficit Venous 2.1 -3.0 - 3.0 mmol/L    FIO2 55     Oxyhemoglobin Venous 93 (H) 70 - 75 %    O2 Sat, Venous 94.3 (H) 70.0 - 75.0 %    Narrative    In healthy individuals, oxyhemoglobin (O2Hb) and oxygen saturation (SO2) are approximately equal. In the presence of dyshemoglobins, oxyhemoglobin can be considerably lower than oxygen saturation.   Glucose by meter     Status: Abnormal   Result Value Ref Range    GLUCOSE BY METER POCT 133 (H) 70 - 99 mg/dL   Glucose by meter     Status: Abnormal   Result Value Ref Range    GLUCOSE BY METER POCT 111 (H) 70 - 99 mg/dL   Glucose by meter     Status: Abnormal   Result Value Ref Range    GLUCOSE BY METER POCT 113 (H) 70 - 99 mg/dL   Magnesium     Status: Normal   Result Value Ref Range    Magnesium 2.1 1.7 - 2.3 mg/dL   CBC with Platelets (Limited Occurrences)     Status: Abnormal   Result Value Ref Range    WBC Count 8.8 4.0 - 11.0 10e3/uL    RBC Count 2.65 (L) 3.80 - 5.20 10e6/uL    Hemoglobin 9.0 (L) 11.7 - 15.7 g/dL    Hematocrit 27.0 (L) 35.0 - 47.0 %     (H) 78 - 100 fL    MCH 34.0 (H) 26.5 - 33.0 pg    MCHC 33.3 31.5 - 36.5 g/dL    RDW 15.3 (H) 10.0 - 15.0 %    Platelet Count 462 (H) 150 - 450 10e3/uL   Basic Metabolic Panel (Limited Occurrences)     Status: Abnormal   Result Value Ref Range    Sodium 137 135 - 145 mmol/L    Potassium 3.7 3.4 - 5.3 mmol/L    Chloride 96 (L) 98 - 107 mmol/L    Carbon Dioxide (CO2) 26 22 - 29 mmol/L    Anion Gap 15 7 - 15 mmol/L    Urea Nitrogen 59.7 (H) 8.0 - 23.0 mg/dL    Creatinine 3.41 (H) 0.51 - 0.95 mg/dL    GFR Estimate 13 (L) >60 mL/min/1.73m2    Calcium 8.9 8.8 - 10.4 mg/dL    Glucose 127 (H) 70 - 99 mg/dL   Glucose by meter     Status: Abnormal   Result Value Ref Range    GLUCOSE BY METER POCT 117 (H) 70 - 99 mg/dL   Glucose by meter     Status: Abnormal   Result Value Ref Range    GLUCOSE BY  METER POCT 123 (H) 70 - 99 mg/dL   Glucose by meter     Status: Abnormal   Result Value Ref Range    GLUCOSE BY METER POCT 125 (H) 70 - 99 mg/dL   Glucose by meter     Status: Abnormal   Result Value Ref Range    GLUCOSE BY METER POCT 143 (H) 70 - 99 mg/dL   Glucose by meter     Status: Abnormal   Result Value Ref Range    GLUCOSE BY METER POCT 123 (H) 70 - 99 mg/dL   Glucose by meter     Status: Abnormal   Result Value Ref Range    GLUCOSE BY METER POCT 105 (H) 70 - 99 mg/dL   Magnesium     Status: Normal   Result Value Ref Range    Magnesium 2.1 1.7 - 2.3 mg/dL   Basic metabolic panel     Status: Abnormal   Result Value Ref Range    Sodium 135 135 - 145 mmol/L    Potassium 3.7 3.4 - 5.3 mmol/L    Chloride 94 (L) 98 - 107 mmol/L    Carbon Dioxide (CO2) 25 22 - 29 mmol/L    Anion Gap 16 (H) 7 - 15 mmol/L    Urea Nitrogen 61.3 (H) 8.0 - 23.0 mg/dL    Creatinine 3.67 (H) 0.51 - 0.95 mg/dL    GFR Estimate 12 (L) >60 mL/min/1.73m2    Calcium 8.5 (L) 8.8 - 10.4 mg/dL    Glucose 98 70 - 99 mg/dL   CBC with platelets and differential     Status: Abnormal   Result Value Ref Range    WBC Count 9.2 4.0 - 11.0 10e3/uL    RBC Count 2.49 (L) 3.80 - 5.20 10e6/uL    Hemoglobin 8.5 (L) 11.7 - 15.7 g/dL    Hematocrit 26.0 (L) 35.0 - 47.0 %     (H) 78 - 100 fL    MCH 34.1 (H) 26.5 - 33.0 pg    MCHC 32.7 31.5 - 36.5 g/dL    RDW 15.3 (H) 10.0 - 15.0 %    Platelet Count 369 150 - 450 10e3/uL    % Neutrophils 57 %    % Lymphocytes 24 %    % Monocytes 12 %    % Eosinophils 7 %    % Basophils 1 %    % Immature Granulocytes 0 %    NRBCs per 100 WBC 0 <1 /100    Absolute Neutrophils 5.3 1.6 - 8.3 10e3/uL    Absolute Lymphocytes 2.2 0.8 - 5.3 10e3/uL    Absolute Monocytes 1.1 0.0 - 1.3 10e3/uL    Absolute Eosinophils 0.6 0.0 - 0.7 10e3/uL    Absolute Basophils 0.1 0.0 - 0.2 10e3/uL    Absolute Immature Granulocytes 0.0 <=0.4 10e3/uL    Absolute NRBCs 0.0 10e3/uL   EKG 12-lead, tracing only     Status: None   Result Value Ref Range     Systolic Blood Pressure  mmHg    Diastolic Blood Pressure  mmHg    Ventricular Rate 77 BPM    Atrial Rate 77 BPM    DC Interval 172 ms    QRS Duration 82 ms     ms    QTc 545 ms    P Axis 28 degrees    R AXIS 18 degrees    T Axis -12 degrees    Interpretation ECG       Sinus rhythm with Premature atrial complexes  Nonspecific ST and T wave abnormality  Abnormal ECG  When compared with ECG of 15-Jul-2025 12:47,  Premature atrial complexes are now Present  Questionable change in QRS duration  Unconfirmed report - interpretation of this ECG is computer generated - see medical record for final interpretation  Confirmed by - EMERGENCY ROOM, PHYSICIAN ([id number]),  Rafi Queen ([id number]) on 7/28/2025 7:33:43 AM     EKG 12 lead     Status: None   Result Value Ref Range    Systolic Blood Pressure  mmHg    Diastolic Blood Pressure  mmHg    Ventricular Rate 78 BPM    Atrial Rate 78 BPM    DC Interval 166 ms    QRS Duration 74 ms     ms    QTc 519 ms    P Axis 54 degrees    R AXIS 23 degrees    T Axis -18 degrees    Interpretation ECG       Sinus rhythm  Nonspecific ST and T wave abnormality  Prolonged QT  Abnormal ECG  When compared with ECG of 28-Jul-2025 05:57,  Premature atrial complexes are no longer Present  Nonspecific T wave abnormality, improved in Anterolateral leads  Confirmed by SEE ED PROVIDER NOTE FOR, ECG INTERPRETATION ([id number]),  JOAN HAQUE ([id number]) on 7/28/2025 9:24:27 AM     EKG 12-lead, tracing only     Status: None   Result Value Ref Range    Systolic Blood Pressure  mmHg    Diastolic Blood Pressure  mmHg    Ventricular Rate 85 BPM    Atrial Rate  BPM    DC Interval  ms    QRS Duration 74 ms     ms    QTc 478 ms    P Axis  degrees    R AXIS 39 degrees    T Axis -4 degrees    Interpretation ECG       Atrial fibrillation  Nonspecific ST abnormality  Abnormal ECG  When compared with ECG of 28-Jul-2025 09:17,  Atrial fibrillation has replaced Sinus rhythm  Confirmed by MD ANISH,  SHANTE ([id number]) on 2025 1:30:41 PM     EKG 12-lead, tracing only     Status: None   Result Value Ref Range    Systolic Blood Pressure  mmHg    Diastolic Blood Pressure  mmHg    Ventricular Rate 74 BPM    Atrial Rate 74 BPM    AK Interval 182 ms    QRS Duration 76 ms     ms    QTc 470 ms    P Axis 57 degrees    R AXIS 34 degrees    T Axis 25 degrees    Interpretation ECG       Sinus rhythm  Nonspecific ST abnormality  Abnormal ECG  When compared with ECG of 04-Aug-2025 09:57, (unconfirmed)  Sinus rhythm has replaced Atrial fibrillation  Confirmed by MD OCONNELL BENJAMIN ([id number]) on 2025 1:30:03 PM     Echocardiogram Complete     Status: None   Result Value Ref Range    LVEF  55-60%     Narrative    [id number]  [facility]  [id number]  [id number]^[email or web address]^BEAU^     [facility]  Echocardiography Laboratory  [address] [place] [address]     Name: ADRIANA CARUSO  MRN: [id number]  : 1950  Study Date: 2025 07:49 AM  Age: 75 yrs  Gender: Female  Patient Location: [facility]  Reason For Study: Heart Failure  Ordering Physician: NORA BEACH  Performed By: Alcira Mitchell     BSA: 1.6 m2  Height: 64 in  Weight: 120 lb  HR: 79  BP: 148/81 mmHg  ______________________________________________________________________________  Procedure  Echocardiogram with two-dimensional, color and spectral Doppler. Optison (NDC  #[id number]) given intravenously.  ______________________________________________________________________________  Interpretation Summary     The left ventricle visual ejection fraction is 55-60%.In some views distal  lateral and distal inferolateral wallhypokinesia  Diastolic Doppler findings (E/E' ratio and/or other parameters) suggest left  ventricular filling pressures are increased.  The right ventricular systolic function is normal.  The left atrium is severely dilated.  Restricted posterior mitral valve leaflet noted.There is moderate mitral  stenosis- Mean gradients 9 mm  hg.There is moderate (2+) mitral regurgitation.  There is mild (1+) tricuspid regurgitation.  Pulmonary hypertension- RVSP 37 mm hg +RA.  IVC diameter <2.1 cm collapsing >50% with sniff suggests a normal RA pressure  of 3 mmHg.  ______________________________________________________________________________  Left Ventricle  The left ventricle is normal in size. There is normal left ventricular wall  thickness. The visual ejection fraction is 55-60%. Diastolic Doppler findings  (E/E' ratio and/or other parameters) suggest left ventricular filling  pressures are increased. In some views distal lateral and inferolateral  hypokinesia.     Right Ventricle  The right ventricle is normal size. The right ventricular systolic function is  normal.     Atria  The left atrium is severely dilated. Right atrial size is normal. There is no  color Doppler evidence of an atrial shunt.     Mitral Valve  Restricted posterior mitral valve leaflet noted. There is moderate (2+) mitral  regurgitation. There is moderate mitral stenosis. The mean mitral valve  gradient is 9.2 mmHg.     Tricuspid Valve  There is mild (1+) tricuspid regurgitation. The right ventricular systolic  pressure is approximated at 37.2 mmHg plus the right atrial pressure.  Pulmonary hypertension.     Aortic Valve  The aortic valve is trileaflet. No aortic regurgitation is present. Mild  valvular aortic stenosis. The mean AoV pressure gradient is 10.0 mmHg.     Pulmonic Valve  There is trace pulmonic valvular regurgitation. There is no pulmonic valvular  stenosis.     Vessels  The aortic root is normal size. Normal size ascending aorta. IVC diameter <2.1  cm collapsing >50% with sniff suggests a normal RA pressure of 3 mmHg.     Pericardium  There is no pericardial effusion.     Rhythm  Sinus rhythm was noted.  ______________________________________________________________________________  MMode/2D Measurements & Calculations     IVSd: 0.89 cm  LVIDd: 4.8 cm  LVIDs:  3.0 cm  LVPWd: 1.00 cm  IVC diam: 2.0 cm  FS: 37.1 %  LV mass(C)d: 160.1 grams  LV mass(C)dI: 101.7 grams/m2  Ao root diam: 2.8 cm  asc Aorta Diam: 2.8 cm  LVOT diam: 1.8 cm  LVOT area: 2.5 cm2  Ao root diam index Ht(cm/m): 1.7  Ao root diam index BSA (cm/m2): 1.8  Asc Ao diam index BSA (cm/m2): 1.8  Asc Ao diam index Ht(cm/m): 1.7  LA Volume (BP): 89.3 ml     LA Volume Index (BP): 56.9 ml/m2  RV Base: 2.8 cm  RWT: 0.41  TAPSE: 2.2 cm     Doppler Measurements & Calculations  MV E max jose francisco: 204.0 cm/sec  MV A max jose francisco: 122.0 cm/sec  MV E/A: 1.7  MV max P.2 mmHg  MV mean P.2 mmHg  MV V2 VTI: 54.3 cm  MVA(VTI): 1.4 cm2  MV P1/2t max jose francisco: 218.2 cm/sec  MV P1/2t: 69.7 msec  MVA(P1/2t): 3.2 cm2  MV dec slope: 916.7 cm/sec2  MV dec time: 0.20 sec  Ao V2 max: 220.0 cm/sec  Ao max P.0 mmHg  Ao V2 mean: 148.0 cm/sec  Ao mean PG: 10.0 mmHg  Ao V2 VTI: 40.5 cm  JAE(I,D): 1.9 cm2  JAE(V,D): 1.6 cm2  LV V1 max P.0 mmHg  LV V1 max: 141.0 cm/sec  LV V1 VTI: 30.6 cm  MR PISA: 2.0 cm2  MR ERO: 0.11 cm2  MR volume: 21.3 ml  SV(LVOT): 77.7 ml  SI(LVOT): 49.4 ml/m2  PA acc time: 0.12 sec     TR max jose francisco: 304.9 cm/sec  TR max P.2 mmHg  AV Jose Francisco Ratio (DI): 0.64  JAE Index (cm2/m2): 1.2  E/E' av.7  Lateral E/e': 24.7  Medial E/e': 34.7  RV S Jose Francisco: 13.9 cm/sec     ______________________________________________________________________________  Report approved by: Demarco Niño MD on 2025 11:01 AM         CBC with Platelets and Differential (Limited Occurrences)     Status: Abnormal    Narrative    The following orders were created for panel order CBC with Platelets and Differential (Limited Occurrences).  Procedure                               Abnormality         Status                     ---------                               -----------         ------                     CBC with platelets and ...[[id number]]  Abnormal            Final result                 Please view results for these tests on the  individual orders.   CBC with Platelets & Differential     Status: Abnormal    Narrative    The following orders were created for panel order CBC with Platelets & Differential.  Procedure                               Abnormality         Status                     ---------                               -----------         ------                     CBC with platelets and ...[[id number]]  Abnormal            Final result                 Please view results for these tests on the individual orders.   CBC with Platelets & Differential     Status: Abnormal    Narrative    The following orders were created for panel order CBC with Platelets & Differential.  Procedure                               Abnormality         Status                     ---------                               -----------         ------                     CBC with platelets and ...[[id number]]  Abnormal            Final result                 Please view results for these tests on the individual orders.       MANAGEMENT DISCUSSED with the following over the past 24 hours: Hospitalist Jonathan Light MD, bedside nurse BOWEN Treadwell and Registered Dietitian KAYCEE Anderson.   95 MINUTES SPENT BY ME on the date of service doing chart review, history, exam, documentation & further activities per the note.

## 2025-08-05 ENCOUNTER — APPOINTMENT (OUTPATIENT)
Dept: GENERAL RADIOLOGY | Facility: CLINIC | Age: 75
DRG: 291 | End: 2025-08-05
Attending: INTERNAL MEDICINE
Payer: MEDICARE

## 2025-08-05 LAB
ANION GAP SERPL CALCULATED.3IONS-SCNC: 16 MMOL/L (ref 7–15)
BASE EXCESS BLDV CALC-SCNC: -0.7 MMOL/L (ref -3–3)
BASE EXCESS BLDV CALC-SCNC: 3.6 MMOL/L (ref -3–3)
BUN SERPL-MCNC: 60.3 MG/DL (ref 8–23)
CALCIUM SERPL-MCNC: 8.9 MG/DL (ref 8.8–10.4)
CHLORIDE SERPL-SCNC: 92 MMOL/L (ref 98–107)
CREAT SERPL-MCNC: 3.56 MG/DL (ref 0.51–0.95)
EGFRCR SERPLBLD CKD-EPI 2021: 13 ML/MIN/1.73M2
ERYTHROCYTE [DISTWIDTH] IN BLOOD BY AUTOMATED COUNT: 15 % (ref 10–15)
GLUCOSE SERPL-MCNC: 154 MG/DL (ref 70–99)
HBV SURFACE AB SERPL IA-ACNC: <3.5 M[IU]/ML
HBV SURFACE AB SERPL IA-ACNC: NONREACTIVE M[IU]/ML
HBV SURFACE AG SERPL QL IA: NONREACTIVE
HCO3 BLDV-SCNC: 29 MMOL/L (ref 21–28)
HCO3 BLDV-SCNC: 29 MMOL/L (ref 21–28)
HCO3 SERPL-SCNC: 25 MMOL/L (ref 22–29)
HCT VFR BLD AUTO: 29.5 % (ref 35–47)
HGB BLD-MCNC: 9.5 G/DL (ref 11.7–15.7)
HOLD SPECIMEN: NORMAL
MAGNESIUM SERPL-MCNC: 2.1 MG/DL (ref 1.7–2.3)
MCH RBC QN AUTO: 33.9 PG (ref 26.5–33)
MCHC RBC AUTO-ENTMCNC: 32.2 G/DL (ref 31.5–36.5)
MCV RBC AUTO: 105 FL (ref 78–100)
O2/TOTAL GAS SETTING VFR VENT: 70 %
O2/TOTAL GAS SETTING VFR VENT: 90 %
OXYHGB MFR BLDV: 92 % (ref 70–75)
OXYHGB MFR BLDV: 93 % (ref 70–75)
PCO2 BLDV: 50 MM HG (ref 40–50)
PCO2 BLDV: 69 MM HG (ref 40–50)
PH BLDV: 7.23 [PH] (ref 7.32–7.43)
PH BLDV: 7.37 [PH] (ref 7.32–7.43)
PHOSPHATE SERPL-MCNC: 5.7 MG/DL (ref 2.5–4.5)
PHOSPHATE SERPL-MCNC: 5.8 MG/DL (ref 2.5–4.5)
PLATELET # BLD AUTO: 423 10E3/UL (ref 150–450)
PO2 BLDV: 68 MM HG (ref 25–47)
PO2 BLDV: 78 MM HG (ref 25–47)
POTASSIUM SERPL-SCNC: 4.1 MMOL/L (ref 3.4–5.3)
RBC # BLD AUTO: 2.8 10E6/UL (ref 3.8–5.2)
SAO2 % BLDV: 92.5 % (ref 70–75)
SAO2 % BLDV: 94.2 % (ref 70–75)
SODIUM SERPL-SCNC: 133 MMOL/L (ref 135–145)
TRIGL SERPL-MCNC: 174 MG/DL
WBC # BLD AUTO: 9.9 10E3/UL (ref 4–11)

## 2025-08-05 PROCEDURE — 84478 ASSAY OF TRIGLYCERIDES: CPT | Performed by: INTERNAL MEDICINE

## 2025-08-05 PROCEDURE — 250N000011 HC RX IP 250 OP 636: Performed by: INTERNAL MEDICINE

## 2025-08-05 PROCEDURE — 83735 ASSAY OF MAGNESIUM: CPT | Performed by: STUDENT IN AN ORGANIZED HEALTH CARE EDUCATION/TRAINING PROGRAM

## 2025-08-05 PROCEDURE — 250N000013 HC RX MED GY IP 250 OP 250 PS 637: Performed by: STUDENT IN AN ORGANIZED HEALTH CARE EDUCATION/TRAINING PROGRAM

## 2025-08-05 PROCEDURE — 250N000009 HC RX 250: Performed by: INTERNAL MEDICINE

## 2025-08-05 PROCEDURE — 99233 SBSQ HOSP IP/OBS HIGH 50: CPT | Performed by: INTERNAL MEDICINE

## 2025-08-05 PROCEDURE — 250N000013 HC RX MED GY IP 250 OP 250 PS 637: Performed by: INTERNAL MEDICINE

## 2025-08-05 PROCEDURE — 999N000127 HC STATISTIC PERIPHERAL IV START W US GUIDANCE

## 2025-08-05 PROCEDURE — 86704 HEP B CORE ANTIBODY TOTAL: CPT | Performed by: INTERNAL MEDICINE

## 2025-08-05 PROCEDURE — 87340 HEPATITIS B SURFACE AG IA: CPT | Performed by: INTERNAL MEDICINE

## 2025-08-05 PROCEDURE — 82805 BLOOD GASES W/O2 SATURATION: CPT | Performed by: INTERNAL MEDICINE

## 2025-08-05 PROCEDURE — 71045 X-RAY EXAM CHEST 1 VIEW: CPT

## 2025-08-05 PROCEDURE — 94660 CPAP INITIATION&MGMT: CPT

## 2025-08-05 PROCEDURE — 200N000001 HC R&B ICU

## 2025-08-05 PROCEDURE — 36415 COLL VENOUS BLD VENIPUNCTURE: CPT | Performed by: INTERNAL MEDICINE

## 2025-08-05 PROCEDURE — 272N000272 HC CONTINUOUS NEBULIZER MICRO PUMP

## 2025-08-05 PROCEDURE — 94640 AIRWAY INHALATION TREATMENT: CPT

## 2025-08-05 PROCEDURE — 99232 SBSQ HOSP IP/OBS MODERATE 35: CPT | Performed by: INTERNAL MEDICINE

## 2025-08-05 PROCEDURE — 250N000011 HC RX IP 250 OP 636: Performed by: STUDENT IN AN ORGANIZED HEALTH CARE EDUCATION/TRAINING PROGRAM

## 2025-08-05 PROCEDURE — 86706 HEP B SURFACE ANTIBODY: CPT | Performed by: INTERNAL MEDICINE

## 2025-08-05 PROCEDURE — 999N000157 HC STATISTIC RCP TIME EA 10 MIN

## 2025-08-05 PROCEDURE — 84100 ASSAY OF PHOSPHORUS: CPT | Performed by: INTERNAL MEDICINE

## 2025-08-05 PROCEDURE — 85018 HEMOGLOBIN: CPT | Performed by: INTERNAL MEDICINE

## 2025-08-05 PROCEDURE — 250N000009 HC RX 250: Performed by: STUDENT IN AN ORGANIZED HEALTH CARE EDUCATION/TRAINING PROGRAM

## 2025-08-05 PROCEDURE — 80048 BASIC METABOLIC PNL TOTAL CA: CPT | Performed by: INTERNAL MEDICINE

## 2025-08-05 RX ORDER — LORAZEPAM 2 MG/ML
0.5 INJECTION INTRAMUSCULAR ONCE
Status: COMPLETED | OUTPATIENT
Start: 2025-08-05 | End: 2025-08-05

## 2025-08-05 RX ORDER — LEVALBUTEROL INHALATION SOLUTION 0.63 MG/3ML
0.63 SOLUTION RESPIRATORY (INHALATION) EVERY 4 HOURS PRN
Status: DISCONTINUED | OUTPATIENT
Start: 2025-08-05 | End: 2025-08-20 | Stop reason: HOSPADM

## 2025-08-05 RX ORDER — BUMETANIDE 0.25 MG/ML
2 INJECTION, SOLUTION INTRAMUSCULAR; INTRAVENOUS ONCE
Status: COMPLETED | OUTPATIENT
Start: 2025-08-05 | End: 2025-08-05

## 2025-08-05 RX ORDER — DEXTROSE MONOHYDRATE 100 MG/ML
INJECTION, SOLUTION INTRAVENOUS CONTINUOUS PRN
Status: DISCONTINUED | OUTPATIENT
Start: 2025-08-05 | End: 2025-08-05

## 2025-08-05 RX ORDER — LEVALBUTEROL INHALATION SOLUTION 0.63 MG/3ML
0.63 SOLUTION RESPIRATORY (INHALATION) EVERY 4 HOURS PRN
Status: DISCONTINUED | OUTPATIENT
Start: 2025-08-05 | End: 2025-08-05

## 2025-08-05 RX ADMIN — PAROXETINE HYDROCHLORIDE 40 MG: 10 TABLET, FILM COATED ORAL at 10:00

## 2025-08-05 RX ADMIN — HYDRALAZINE HYDROCHLORIDE 100 MG: 50 TABLET ORAL at 03:51

## 2025-08-05 RX ADMIN — MECLIZINE HYDROCHLORIDE 25 MG: 25 TABLET ORAL at 00:12

## 2025-08-05 RX ADMIN — CARVEDILOL 25 MG: 25 TABLET, FILM COATED ORAL at 10:00

## 2025-08-05 RX ADMIN — ISOSORBIDE DINITRATE 40 MG: 10 TABLET ORAL at 13:10

## 2025-08-05 RX ADMIN — ISOSORBIDE DINITRATE 40 MG: 10 TABLET ORAL at 10:00

## 2025-08-05 RX ADMIN — BUMETANIDE 2 MG: 0.25 INJECTION INTRAMUSCULAR; INTRAVENOUS at 12:46

## 2025-08-05 RX ADMIN — LEVALBUTEROL HYDROCHLORIDE 0.63 MG: 0.63 SOLUTION RESPIRATORY (INHALATION) at 05:47

## 2025-08-05 RX ADMIN — CLOPIDOGREL BISULFATE 75 MG: 75 TABLET, FILM COATED ORAL at 10:00

## 2025-08-05 RX ADMIN — AMIODARONE HYDROCHLORIDE 0.5 MG/MIN: 1.8 INJECTION, SOLUTION INTRAVENOUS at 06:34

## 2025-08-05 RX ADMIN — BUMETANIDE 0.5 MG/HR: 0.25 INJECTION INTRAMUSCULAR; INTRAVENOUS at 13:24

## 2025-08-05 RX ADMIN — CARVEDILOL 25 MG: 25 TABLET, FILM COATED ORAL at 18:28

## 2025-08-05 RX ADMIN — AMIODARONE HYDROCHLORIDE 0.5 MG/MIN: 1.8 INJECTION, SOLUTION INTRAVENOUS at 18:52

## 2025-08-05 RX ADMIN — HYDROMORPHONE HYDROCHLORIDE 0.2 MG: 0.2 INJECTION, SOLUTION INTRAMUSCULAR; INTRAVENOUS; SUBCUTANEOUS at 22:45

## 2025-08-05 RX ADMIN — ICOSAPENT ETHYL 2 G: 1 CAPSULE ORAL at 10:01

## 2025-08-05 RX ADMIN — LEVALBUTEROL HYDROCHLORIDE 0.63 MG: 0.63 SOLUTION RESPIRATORY (INHALATION) at 13:55

## 2025-08-05 RX ADMIN — SODIUM BICARBONATE 1300 MG: 650 TABLET ORAL at 21:19

## 2025-08-05 RX ADMIN — FUROSEMIDE 60 MG: 10 INJECTION, SOLUTION INTRAMUSCULAR; INTRAVENOUS at 08:35

## 2025-08-05 RX ADMIN — MECLIZINE HYDROCHLORIDE 25 MG: 25 TABLET ORAL at 13:10

## 2025-08-05 RX ADMIN — NIFEDIPINE 90 MG: 90 TABLET, EXTENDED RELEASE ORAL at 10:00

## 2025-08-05 RX ADMIN — ICOSAPENT ETHYL 2 G: 1 CAPSULE ORAL at 18:29

## 2025-08-05 RX ADMIN — MECLIZINE HYDROCHLORIDE 25 MG: 25 TABLET ORAL at 18:28

## 2025-08-05 RX ADMIN — LORAZEPAM 0.5 MG: 2 INJECTION INTRAMUSCULAR; INTRAVENOUS at 04:47

## 2025-08-05 RX ADMIN — LORAZEPAM 0.5 MG: 2 INJECTION INTRAMUSCULAR; INTRAVENOUS at 05:30

## 2025-08-05 RX ADMIN — SODIUM BICARBONATE 1300 MG: 650 TABLET ORAL at 10:00

## 2025-08-05 RX ADMIN — METOPROLOL TARTRATE 5 MG: 5 INJECTION INTRAVENOUS at 05:18

## 2025-08-05 ASSESSMENT — ACTIVITIES OF DAILY LIVING (ADL)
ADLS_ACUITY_SCORE: 62
ADLS_ACUITY_SCORE: 68
ADLS_ACUITY_SCORE: 70
ADLS_ACUITY_SCORE: 62
ADLS_ACUITY_SCORE: 66
ADLS_ACUITY_SCORE: 62
ADLS_ACUITY_SCORE: 66
ADLS_ACUITY_SCORE: 66
ADLS_ACUITY_SCORE: 62
ADLS_ACUITY_SCORE: 66
ADLS_ACUITY_SCORE: 64
ADLS_ACUITY_SCORE: 64
ADLS_ACUITY_SCORE: 72
ADLS_ACUITY_SCORE: 62
ADLS_ACUITY_SCORE: 62
ADLS_ACUITY_SCORE: 66
ADLS_ACUITY_SCORE: 68
ADLS_ACUITY_SCORE: 68
ADLS_ACUITY_SCORE: 66
ADLS_ACUITY_SCORE: 66
ADLS_ACUITY_SCORE: 70
ADLS_ACUITY_SCORE: 62
ADLS_ACUITY_SCORE: 62

## 2025-08-05 NOTE — CONSULTS
"CLINICAL NUTRITION SERVICES - BRIEF NOTE    Received Provider Order - \"Pharmacy/Nutrition to start & manage TPN\"    RD team already following. Please see my note from 8/4 for most recent full assessment.  Pt w/ negligible intakes since admission and no intakes for multiple days to full BIPAP dependence. Patient is at significant risk of refeeding syndrome.    Patient also on a fluid restriction of 1500 ml per day. Per provider, would like to stay within this fluid restriction for total volume of TPN.   Hopeful that TPN will serve as a bridge to either PO or enteral nutrition (if indicated) if pt able to come off BIPAP long enough for small bore feeding tube placement.     Labs Reviewed  Noted: Na 133(L), BUN 60.3(H), Cr 3.56(H),     Meds Reviewed  Noted: 60 mg Lasix BID, Nexterone gtt, Nitroglycerin gtt    ASSESSED NUTRITION NEEDS  Dosing Weight: 54.8 kg, based on admission wt  Estimated Energy Needs: 5430-5852 kcals/day (25 - 30 kcals/kg)  Justification: Repletion and Underweight for age  Estimated Protein Needs: >/= 54.8 grams protein/day (>/=1 gram of pro/kg)  Justification: Preservation of LBM w/ consideration of CKD  Estimated Fluid Needs: per provider while pt remains ICU status    Access: PICC (once placed)  Dosing weight: 54.8 kg  Total fluids (TPN + IVF) = 50 mL per hour or per MD order  Daily electrolyte check until goal rate reached - electrolyte replacement protocol    Recommend initiation and advancement as follows:  Day 1 (8/5): Clinimix D15 AA5 @ 20 ml/hr (480 ml/day) provides 341 kcal, 24 g AA, 72 g dextrose (GIR 0.9 mg/kg/min)  Day 2 (8/6): Clinimix D15 AA5 @ 35 ml/hr (840 ml/day) + 250 ml 20% lipids 4x/week provides 882 kcal, 42 g AA, 126 g dextrose (GIR=1.8 mg/kg/min), 32% kcal from fat  Day 3 (8/7) - GOAL- Clinimix D15 AA5 at 50 mL/hr (1200 mL total) + 250 mL 20% lipids 4x/week provides 1138 kcal/day (21 kcal per kg, meets 100% of MSJ using AF of 1.0-1.2), 60 g amino acids (1.1 g protein " "per kg), 180 g dextrose (GIR  2.3 mg/kg/min) and 25% kcal from fat    Ordered baseline Phos and TG labs (if TG comes back high, will change lipids to SMOF)  Please do not start TPN tonight until Phos lab has come back and is WNL  Would recommend addition of Phos replacement protocol (K and Mag already ordered) d/t severe refeeding risk - messaged MD.  Total volume per MD  Discussed pt in IDT rounds  Will monitor lytes, meds, and POC daily. Changes to PN will be made as appropriate.      Mary Rider RD, LD  Clinical Dietitian  [facility] Message Group: \"Dietitian [[place]]\"  Office Phone: [phone]    "

## 2025-08-05 NOTE — PROGRESS NOTES
A BiPAP of  10/5 @ 40% was applied to the pt via the mask for an increase in WOB and SOB. The skin in contact with the mask and straps looks good and intact. Pt currently tolerating BiPAP well. RT will continue to follow and assess the pt's respiratory status and needs.   RT/RN huddle to discuss contraindications prior to placement? Y/N? Placed by RN    Magaly Hoang, RT

## 2025-08-05 NOTE — PROGRESS NOTES
Cross cover was paged to evaluate the patient at the bedside for respiratory distress.  When I arrived her heart rate was in the 140s in A-fib.  She was on BiPAP and was tachypneic.  She was reaching for her BiPAP mask trying to pull it off and was confused.  She had bilateral rhonchi and expiratory wheeze.  She seemed to have difficulty getting air out.  She just received 0.5 mg IV lorazepam and IV metoprolol 5 mg was being administered when I arrived.  Her systolic blood pressure 200.    While metoprolol was being given I also ordered additional 0.5 mg IV lorazepam and a Xopenex neb as she does have COPD per history tab.  Nitroglycerin infusion was already in the room and we started this as well at 30 mcg/min for hypertensive emergency and flash pulmonary edema.  After metoprolol was given heart rates did go down into the 80s and she may have actually converted to NSR.  She is already on amiodarone infusion.    Reviewing chart further she continues to have recurrent episodes of sudden onset hypertensive emergency and flash pulmonary edema.  This was yet again a similar episode and seems to be slowly improving with the above treatment.    - Will continue nitroglycerin infusion and titrate up until blood pressure is improved.  I discussed watching her blood pressure very closely over the next 30 minutes as her blood pressure yesterday morning was 104/56 so when whatever is driving this acute hypertensive crisis abates that she may have significant drop in blood pressure we will have to hold the nitroglycerin drip then.  - Ordered CBC, BMP, VBG labs

## 2025-08-05 NOTE — PROGRESS NOTES
Medicine Progress Note - Hospitalist Service    Date of Admission:  7/28/2025    Assessment & Plan     75-year-old female with history of f CAD, CKD stage IV, hypertension, hyperlipidemia, intra-abdominal aortic aneurysm with prior stenting, chronic vertigo, recent hospitalization for fall with pelvic fracture who presents the ED for shortness of breath.      Had elevated D-dimer but VQ scan negative for PE, CT not done due to CKD.  proBNP more than 25,000.  Chest x-ray showed bilateral perihilar interstitial/airspace opacities, left greater than right, are nonspecific for postradiation change versus pneumonia. A small nodule in the peripheral right upper lobe measures 9 mm.     Found to be in hypertensive emergency, admitted to ICU with BiPAP and nitroglycerin drip.  She was weaned off nitroglycerin drip and BiPAP but has had repeated episodes of rising blood pressure and shortness of breath needing her to go back on BiPAP intermittently.  She was transferred to floor on 7/31 but came back to ICU on 8/2 after rapid response for flash pulmonary edema and hypertensive emergency.    On the morning of 8/14, patient went into A-fib with initial rates of 130s and then decreased to 40s without intervention.  She converted back to sinus rhythm and about half an hour.    Acute hypoxic respiratory failure: Multifactorial  Diastolic CHF exacerbation:  Recurrent flash pulmonary edema associated with episodes of high blood pressure.  Hypertensive emergency:  - Echo with EF of 55 to 60%.  Unclear what is causing recurrent sudden episodes of elevated blood pressure and flash pulmonary edema.  She has history of renal artery stenting and on renal arterial ultrasound done on 7/31 there was no sonographic evidence of renal artery stenosis.  Pending metanephrines.  - Increased carvedilol to 25 mg twice daily, hydralazine 200 mg 3 times daily and Isordil increased to 40 mg 3 times daily.  Continued  clonidine patch 0.3 mg weekly and nifedipine 90 mg daily.  Also started on the Lasix 40 mg IV twice daily.  -Was empirically started on ceftriaxone on presentation.  Patient had no fever or leukocytosis.  Mildly elevated procalcitonin of 0.67 which is difficult to interpret in ROMI/CKD and improved to 0.49 on recheck.  Finished 5-day course of ceftriaxone.    - Currently patient is BiPAP dependent and unable to tolerate brief episode of being off BiPAP earlier in the morning.  Plan to continue BiPAP, care plan discussed with nephrologist and recommendation obtained.  IV Bumex was recommended.  She has been on nitroglycerin drip as well.  Later in the day, patient became hypotensive.  Will hold Bumex, nitroglycerin drip and monitor progress.  Will try to avoid a lot of IV fluid due to kidney failure.  Care plan discussed with nephrologist    Atrial fibrillation: Paroxysmal atrial fibrillation  - On 8/4, patient had a self-limiting 30 minutes episode of atrial fibrillation initially with RVR to 130 and then bradycardia to 40.  - Cardiology was reconsulted, advised 150 mg amiodarone IV push, and the plan was no further amiodarone if patient remains sinus.    -- However, patient developed intermittent episodes of atrial fibrillation with RVR.  Cardiology recommended amiodarone drip and planning to transition to oral amiodarone.  Continue Coreg  -- Currently patient is in sinus rhythm.  Continue amiodarone as tolerated.  Will consider anticoagulation in the next few days.        Malnutrition: Moderate nutrition in the context of acute on chronic medical illness.  - Patient was unable to take adequate oral intake due to dependence on BiPAP.  -Registered dietitian is following and recommendation appreciated  -Tube feeding was considered but due to her respiratory status there is concern about placement of Keofeed.  TPN was considered but can wait until tomorrow and reevaluate for need.  Care plan discussed with  dietitian.      ROMI on CKD stage IV:  Metabolic acidosis:  - Previous baseline of 1.3-1.5 which worsened in May/June 2025 and new baseline appears to be 3 with nephrotic range proteinuria.  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  - Creatinine on admission was 4.25, now stable in 3.41 on August 4.    -- Nephrology was consulted and recommendation obtained.  -- Currently patient respiratory status is very tenuous and she is dependent on BiPAP.  Concern for end-stage renal disease and requirement of dialysis.  Care plan discussed with nephrology.  May try Bumex if blood pressure tolerates.  Continue to monitor intake and output and kidney function.  Further care plan per nephrology.  Plan is to avoid IV fluid bolus if blood pressure improves after discontinuation of nitroglycerin.        Chronic medical conditions:  - AAA.  Endoleak status post recent endovascular aneurysm repair on 4/25.  Continue Plavix and statin.  - History of CVA.  Chronic infarct in the right basal ganglia noted on CT.  - Right lung adenocarcinoma.  Status post stereotactic body radiation therapy in 2021, follows up in [place].  His lung nodule on x-ray, can follow-up with her primary oncologist.  - Chronic vertigo.  Follows up with ENT in [place] and on as needed meclizine.  MRI brain on 5/22 was negative for acute pathology.  - Depression pleasant lady.  Continue paroxetine.  - Hypertriglyceridemia.  On Vascepa.            Diet: Snacks/Supplements Adult: Nepro Oral Supplement; Between Meals  2 Gram Sodium Diet  Fluid restriction 1500 ML FLUID  NPO for Medical/Clinical Reasons Except for: Meds, Ice Chips    DVT Prophylaxis: Pneumatic Compression Devices  Rao Catheter: Not present  Lines: None     Cardiac Monitoring: ACTIVE order. Indication: Acute decompensated heart failure (48 hours)  Code Status: Full Code      Clinically Significant Risk Factors         # Hyponatremia: Lowest Na  = 133 mmol/L in last 2 days, will monitor as appropriate  # Hypochloremia: Lowest Cl = 92 mmol/L in last 2 days, will monitor as appropriate            # Hypertension: Noted on problem list             # Severe Malnutrition: based on nutrition assessment and treatment provided per dietitian's recommendations.    # Financial/Environmental Concerns: none         Social Drivers of Health    Depression: At risk (6/5/2025)    Received from [facility]    PHQ-2     Depression Risk: 3   Tobacco Use: High Risk (7/31/2025)    Received from [facility]    Patient History     Smoking Tobacco Use: Every Day     Smokeless Tobacco Use: Never     Passive Exposure: Current   Interpersonal Safety: Unknown (7/28/2025)    Interpersonal Safety     Do you feel physically and emotionally safe where you currently live?: Patient unable to answer     Within the past 12 months, have you been hit, slapped, kicked or otherwise physically hurt by someone?: Patient unable to answer     Within the past 12 months, have you been humiliated or emotionally abused in other ways by your partner or ex-partner?: Patient unable to answer          Disposition Plan       Anticipate discharge disposition: To be determined  Medically Ready for Discharge: Anticipated in 2-4 Days we will keep in ICU for today          Mariano Tran MD  Hospitalist Service  [facility]  Securely message with [employer] (more info)  Text page via [employer] Paging/Directory   ______________________________________________________________________    Interval History Patient is seen and examined by me today and medical record reviewed.Overnight events noted and care discussed with nursing staff.        Patient care assumed by this morning.  Patient remains on BiPAP.  Care plan discussed with bedside nurse, care team, nephrology team and  at bedside.    Physical Exam   Vital Signs: Temp: 97.7  F (36.5  C) Temp src: Axillary BP: (!) 84/45  Pulse: 66   Resp: 15 SpO2: 96 % O2 Device: Oxymask Oxygen Delivery: 6 LPM  Weight: 123 lbs 14.38 oz    General Appearance: Alert awake and oriented x 3.  On BiPAP.  Respiratory: Basilar crackles.  Cardiovascular: S1-S2 normal  GI: Soft and nontender  Skin: No rash  Other: Trace edema.      Medical Decision Making       60 MINUTES SPENT BY ME on the date of service doing chart review, history, exam, documentation & further activities per the note.      Data     I have personally reviewed the following data over the past 24 hrs:    9.9  \   9.5 (L)   / 423     133 (L) 92 (L) 60.3 (H) /  154 (H)   4.1 25 3.56 (H) \       Imaging results reviewed over the past 24 hrs:   Recent Results (from the past 24 hours)   XR Chest Port 1 View    Narrative    EXAM: XR CHEST PORT 1 VIEW  LOCATION: [facility]  DATE: 8/5/2025    INDICATION: Shortness of breath  COMPARISON: 7/31/2025      Impression    IMPRESSION: Extensive infiltrates and areas of consolidation with a somewhat patchy distribution are again noted. These are progressed in the left upper lobe and mildly progressed in the perihilar right lung. Pulmonary vascularity is obscured. Heart size   is unchanged. No significant effusions are identified on this semiupright portable film.

## 2025-08-05 NOTE — PROGRESS NOTES
"         [facility]  Respiratory Care Note      A BiPAP of  10/5 @ 55% continues to be applied to the pt via the full face and under the nose mask for an increase in WOB and SOB.  The skin in contact with the mask and straps looks good and intact. Pt is tolerating the mask changes well. RT will continue to monitor and assess the pt's respiratory status and needs.       Vital signs:  Temp: 97.7  F (36.5  C) Temp src: Axillary BP: 123/63 Pulse: 64   Resp: 13 SpO2: 96 % O2 Device: BiPAP/CPAP Oxygen Delivery: 6 LPM Height: 162.6 cm (5' 4\") Weight: 56.2 kg (123 lb 14.4 oz)  Estimated body mass index is 21.27 kg/m  as calculated from the following:    Height as of this encounter: 1.626 m (5' 4\").    Weight as of this encounter: 56.2 kg (123 lb 14.4 oz).      Past Medical History:   Diagnosis Date    Aneurysm of renal artery     left    Atrial fibrillation (H)     COPD (chronic obstructive pulmonary disease) (H)     High cholesterol     Hypertension     Mixed hyperlipidemia     PVD (peripheral vascular disease)     Stenosis of left carotid artery     Tobacco use disorder        Past Surgical History:   Procedure Laterality Date    ABDOMEN SURGERY      aneurism      left femoral, mesh    ENDARTERECTOMY CAROTID Left 7/17/2018    Procedure: ENDARTERECTOMY CAROTID;  LEFT CAROTID ENDARTERECTOMY WITH EEG;  Surgeon: Jorge Posada MD;  Location:  OR    GENITOURINARY SURGERY      stent rt kidney    IR RENAL BIOPSY LEFT  6/16/2025       Family History   Problem Relation Age of Onset    Alzheimer Disease Mother     Cerebrovascular Disease Father     Dementia Maternal Grandmother     Diabetes Brother     Alzheimer Disease Brother     Parkinsonism Brother     Cerebrovascular Disease Brother     Breast Cancer Sister     Heart Disease Sister        Social History     Tobacco Use    Smoking status: Every Day     Current packs/day: 0.50     Average packs/day: 0.5 packs/day for 45.0 years (22.5 ttl pk-yrs) "     Types: Cigarettes    Smokeless tobacco: Never   Substance Use Topics    Alcohol use: Yes     Comment: social Gilmer Prince CRT  [facility]  8/5/2025

## 2025-08-05 NOTE — PROGRESS NOTES
[facility]    Cardiology Progress Note    ASSESSMENT:  Expand All Collapse All    [facility]     CARDIOLOGY CONSULT     Date of Admission:  7/28/2025  Date of Consult: August 4, 2025     ASSESSMENT:  75-year-old female seen for paroxysmal A-fib in the context of pulmonary edema and hypertensive urgency.  She has had intermittent A-fib over the past 24 hours, now started on amiodarone drip.  Hopefully this will keep her in sinus rhythm, as she will not tolerate A-fib very well.  Blood pressure still intermittently high.     RECOMMENDATIONS:  1.  Paroxysmal A-fib  - Continue 24-hour amiodarone load, then transition to 200 mg daily  - Ideally should be on anticoagulation if no contraindication    2.  Hypertensive urgency  - Medical management per medicine and nephrology     Pernell Amezcua MD  Cardiology - [facility]  Pager:  [phone]  Text Page    SUBJECTIVE: Paroxysmal A-fib over the past 24 hours.  Amiodarone drip was started.  Still intermittent hypertension with intermittent use of BiPAP.  Drowsy this morning from overnight Ativan.    MEDICATIONS:  Current Facility-Administered Medications   Medication Dose Route Frequency Provider Last Rate Last Admin    acetaminophen (TYLENOL) tablet 650 mg  650 mg Oral Q4H PRN Virgilio Patel MD        Or    acetaminophen (TYLENOL) Suppository 650 mg  650 mg Rectal Q4H PRN Virgilio Patel MD        albuterol (PROVENTIL) neb solution 2.5 mg  2.5 mg Nebulization Q6H PRN Virgilio Patel MD   2.5 mg at 07/31/25 2104    amiodarone (NEXTERONE) 1.8 mg/mL in dextrose 5% 200 mL ADULT STANDARD infusion  0.5 mg/min Intravenous Continuous Fran Pierson MD 16.7 mL/hr at 08/05/25 0819 0.5 mg/min at 08/05/25 0819    carboxymethylcellulose PF (REFRESH PLUS) 0.5 % ophthalmic solution 1 drop  1 drop Both Eyes Q1H PRN Gilmer Swain MD        carvedilol (COREG) tablet 25 mg  25 mg Oral BID w/meals HoJosemanuel MD   25 mg at  08/04/25 1849    cloNIDine (CATAPRES-TTS3) 0.3 MG/24HR WK patch 1 patch  1 patch Transdermal Weekly Virgilio Patel MD   1 patch at 07/31/25 1432    And    cloNIDine (CATAPRES-TTS1) Patch in Place   Transdermal Q8H [facility] Virgilio Patel MD        clopidogrel (PLAVIX) tablet 75 mg  75 mg Oral Daily Virgilio Patel MD   75 mg at 08/04/25 0849    glucose gel 15-30 g  15-30 g Oral Q15 Min PRN Virgilio Patel MD        Or    dextrose 50 % injection 25-50 mL  25-50 mL Intravenous Q15 Min PRN Virgilio Patel MD        Or    glucagon injection 1 mg  1 mg Subcutaneous Q15 Min PRN Virgilio Patel MD        diazepam (VALIUM) injection 2.5 mg  2.5 mg Intravenous Q6H PRN Fran Pierson MD   2.5 mg at 08/02/25 1155    diphenhydrAMINE (BENADRYL) 25 mg, acetaminophen (TYLENOL) 500 mg alternative for Tylenol PM   Oral At Bedtime PRN Jonathan Light MD   Given at 08/01/25 2053    famotidine (PEPCID) tablet 20 mg  20 mg Oral Q48H Jonathan Light MD   20 mg at 08/04/25 0849    furosemide (LASIX) injection 60 mg  60 mg Intravenous BID Jamie Fragoso MD   60 mg at 08/05/25 0835    [Held by provider] furosemide (LASIX) tablet 40 mg  40 mg Oral BID Vee Encinas MD   40 mg at 07/30/25 1534    HOLD: All Oral Medications   Does not apply HOLD Nhan Wilkerson MD        hydrALAZINE (APRESOLINE) tablet 100 mg  100 mg Oral TID Vee Encinas MD   100 mg at 08/05/25 0351    HYDROmorphone (DILAUDID) injection 0.2 mg  0.2 mg Intravenous Q2H PRN Fran Pierson MD   0.2 mg at 08/03/25 1619    icosapent ethyl (VASCEPA) capsule 2 g  2 g Oral BID w/meals Virgilio Patel MD   2 g at 08/04/25 1849    isosorbide dinitrate (ISORDIL) tablet 40 mg  40 mg Oral TID Jonathan Light MD   40 mg at 08/04/25 1849    levalbuterol (XOPENEX) neb solution 0.63 mg  0.63 mg Nebulization Q4H PRN Kulwinder Garsia MD   0.63 mg at 08/05/25 0547    lidocaine (LMX4) cream   Topical Q1H PRN Gilmer Swain MD        lidocaine 1 % 0.1-1  mL  0.1-1 mL Other Q1H PRN Gilmer Swain MD        LORazepam (ATIVAN) injection 0.5 mg  0.5 mg Intravenous Q4H PRN Jonathan Light MD   0.5 mg at 08/05/25 0447    meclizine (ANTIVERT) tablet 25 mg  25 mg Oral Q6H Virgilio Patel MD   25 mg at 08/05/25 0012    methocarbamol (ROBAXIN) tablet 500 mg  500 mg Oral TID PRN Virgilio Patel MD        metoprolol (LOPRESSOR) injection 5 mg  5 mg Intravenous Q5 Min PRN Jonathan Light MD   5 mg at 08/05/25 0518    naloxone (NARCAN) injection 0.2 mg  0.2 mg Intravenous Q2 Min PRN Fran Pierson MD        Or    naloxone (NARCAN) injection 0.4 mg  0.4 mg Intravenous Q2 Min PRN Fran Pierson MD        Or    naloxone (NARCAN) injection 0.2 mg  0.2 mg Intramuscular Q2 Min PRN Fran Pierson MD        Or    naloxone (NARCAN) injection 0.4 mg  0.4 mg Intramuscular Q2 Min PRN Fran Pierson MD        NIFEdipine ER OSMOTIC (PROCARDIA XL) 24 hr tablet 90 mg  90 mg Oral Daily Virgilio Patel MD   90 mg at 08/04/25 0849    nitroGLYcerin 50 mg in D5W 250 mL (adult std) infusion   mcg/min Intravenous Continuous Fran Pierson MD 3 mL/hr at 08/05/25 0818 10 mcg/min at 08/05/25 0818    No lozenges or gum should be given while patient on BIPAP/AVAPS/AVAPS AE   Does not apply Continuous PRN Gilmer Swain MD        ondansetron (ZOFRAN ODT) ODT tab 4 mg  4 mg Oral Q6H PRN Virgilio Patel MD   4 mg at 08/03/25 0823    PARoxetine (PAXIL) tablet 40 mg  40 mg Oral Daily Virgilio Patel MD   40 mg at 08/04/25 0849    Patient may continue current oral medications   Does not apply Continuous PRN Gilmer Swain MD        sodium bicarbonate tablet 1,300 mg  1,300 mg Oral BID RigstVirgilio wiggins MD   1,300 mg at 08/04/25 2153    sodium chloride (PF) 0.9% PF flush 3 mL  3 mL Intracatheter Q8H [facility]Gilmer Vigil MD   3 mL at 08/05/25 0555    sodium chloride (PF) 0.9% PF flush 3 mL  3 mL Intracatheter q1 min  cee Swain, Gilmer SAN MD   3 mL at 08/03/25 0823     ALLERGIES:  Allergies   Allergen Reactions    Iodine Hives     REVIEW OF SYSTEMS:  Unable to obtain, patient is very drowsy from Ativan.    PHYSICAL EXAM:  Temp: 97.7  F (36.5  C) Temp src: Axillary BP: 129/72 Pulse: 74   Resp: 19 SpO2: 95 % O2 Device: BiPAP/CPAP Oxygen Delivery: 6 LPM  Vital Signs with Ranges  Temp:  [97.6  F (36.4  C)-98.3  F (36.8  C)] 97.7  F (36.5  C)  Pulse:  [] 74  Resp:  [10-44] 19  BP: ()/() 129/72  FiO2 (%):  [40 %-70 %] 50 %  SpO2:  [90 %-100 %] 95 %  123 lbs 14.38 oz    Constitutional: Very drowsy, on BiPAP  Eyes: PERRL, sclera nonicteric  ENT: trachea midline  Respiratory: Few coarse sounds at the bases  Cardiovascular: RRR, no murmurs  GI: nondistended, nontender, bowel sounds present  Lymph/Hematologic: no lymphadenopathy  Skin: dry, no rash  Musculoskeletal: good muscle tone, strength 5/5 in upper and lower extremities  Neurologic: no focal deficits  Neuropsychiatric: appropriate affact    Intake/Output Summary (Last 24 hours) at 8/5/2025 0909  Last data filed at 8/5/2025 0835  Gross per 24 hour   Intake 585.89 ml   Output 650 ml   Net -64.11 ml     DATA:  Labs: Potassium 4.1, BUN 60, creatinine 3.5, hemoglobin 9.5, platelets 423    Tele: Paroxysmal A-fib over the past 24 hours, sinus rate 70s, A-fib rate 100-140    Echo: July 29: EF 60%, normal RV, 2+ MR, mitral valve mean 9 mmHg

## 2025-08-05 NOTE — PHARMACY
TPN formula evaluated based on today s labs results.      Per RD:  Access: PICC (once placed)   Dosing weight: 54.8 kg   Total fluids (TPN + IVF) = 50 mL per hour or per MD order   Daily electrolyte check until goal rate reached - electrolyte replacement protocol    Recommend initiation and advancement as follows:   Day 1 (8/5): Custom @ 50 ml/hr (1200 ml/day) provides 465 kcal, 55 g AA, 72 g dextrose (GIR 0.9 mg/kg/min)     Per Pharmacy:  Sodium: 50meq.  Potassium: 36meq .  Calcium:  5.4me .  Magnesium: 6meq.  Phosphorus: NONE (removed).  Chloride:Acetate ratio: 1:1  Trace elements:1ml.  Multivitamins 10ml.    Pharmacy will continue to follow and adjust as appropriate.

## 2025-08-05 NOTE — PLAN OF CARE
"RN end of shift summary     Pt used call light at 0445 and stated, \"emergency\" and \"not getting enough air\" while on continuous BiPap. O2 saturations were 96% at that time, pt was anxious appearing and writer gave PRN dose of IV ativan, 0.5 mg. Pt appeared to be doing better for about 5-10 minutes until she started to pull at her mask and alarming the BiPap machine. HR noted to be 140s. Hypoxic to 70% O2 sats, increased BiPap to 100% FiO2 with O2 sat improvement to low 90%. Paged Cross Cover, Dr. Tipton, to come to bedside.     Writer administered PRN dose of 5mg IV push metoprolol with good effect, HR lowered to 90s. Dr. Tipton then at bedside and ordered additional dose of IV ativan, directed to restart continuous IV nitroglycerin to lower blood pressure, and nebulizer. See MD note for full plan of care.    Verbal direction received to continue to monitor BP closely and titrate nitroglycerin gtt down as BP starts to improve, see flowsheets for BP and titration down.        Neuro: A&Ox4. Forgetful at times. CMS and PERRLA intact. Anxious r/t dyspnea as outline above. PRN IVP ativan x2 this shift.     Cardiac: Flipped between NSR and atrial fibrillation this shift, highest noted heart rate was 120s until episode at 0530 to which HR was 142 max. Hypertension this shift, titrating nitroglycerin gtt until BP improves. BUE edema.      Respiratory: Up until acute hypoxia and respiratory distress outlined above pt was tolerating BiPap well with 40% FiO2. LS with crackles, shallow at times and increase SOB when on NC for med admin. Pt does not like being off BiPap though saturations are WNL on 6LPM via NC while taking PO meds. Attempted personal fan use to assist with air flow and pt did not like it, preferred to stay on BiPap.    BiPap now at 70% FiO2.       GI/: LBM 7/31. Active BS. Low sodium diet with 1.5 L fluid restriction though now enforcing NPO d/t respiratory status. External purewick in place with low UOP, " "200ml since midnight; site intact.     Skin: Intact with slight facial ecchymosis. Sacral mepilex in place; site intact.     Mobility: Remained in bed this shift, A1 with GBW when out of bed. Able to shift own weight.     IV access: PIV x2; with current infusions:  Amiodarone @ 0.5 mg/min  Nitroglycerin @ 20 mcg/min     K/Mg protocols with results WNL. Recheck 8/6 with AM labs.      Goal Outcome Evaluation:      Plan of Care Reviewed With: patient    Overall Patient Progress: no changeOverall Patient Progress: no change    Outcome Evaluation: See above.          Problem: Adult Inpatient Plan of Care  Goal: Plan of Care Review  Description: The Plan of Care Review/Shift note should be completed every shift.  The Outcome Evaluation is a brief statement about your assessment that the patient is improving, declining, or no change.  This information will be displayed automatically on your shift  note.  Outcome: Progressing  Flowsheets (Taken 8/5/2025 0432)  Outcome Evaluation: See above.  Plan of Care Reviewed With: patient  Overall Patient Progress: no change  Goal: Patient-Specific Goal (Individualized)  Description: You can add care plan individualizations to a care plan. Examples of Individualization might be:  \"Parent requests to be called daily at 9am for status\", \"I have a hard time hearing out of my right ear\", or \"Do not touch me to wake me up as it startles  me\".  Outcome: Progressing  Goal: Absence of Hospital-Acquired Illness or Injury  Outcome: Progressing  Intervention: Identify and Manage Fall Risk  Recent Flowsheet Documentation  Taken 8/5/2025 0420 by Santana Mares, RN  Safety Promotion/Fall Prevention:   activity supervised   assistive device/personal items within reach   clutter free environment maintained   increased rounding and observation   increase visualization of patient   patient and family education   room organization consistent   safety round/check completed  Taken 8/5/2025 0005 by " Mares, Allora V, RN  Safety Promotion/Fall Prevention:   activity supervised   assistive device/personal items within reach   clutter free environment maintained   increased rounding and observation   increase visualization of patient   patient and family education   room organization consistent   safety round/check completed  Intervention: Prevent Skin Injury  Recent Flowsheet Documentation  Taken 8/5/2025 0420 by Santana Mares RN  Body Position:   turned   weight shifting   heels elevated   upper extremity elevated  Skin Protection:   adhesive use limited   incontinence pads utilized   skin to device areas padded   skin to skin areas padded   transparent dressing maintained   tubing/devices free from skin contact  Taken 8/5/2025 0005 by Santana Mares RN  Body Position:   turned   weight shifting   heels elevated   upper extremity elevated  Skin Protection:   adhesive use limited   incontinence pads utilized   skin to device areas padded   skin to skin areas padded   transparent dressing maintained   tubing/devices free from skin contact  Intervention: Prevent and Manage VTE (Venous Thromboembolism) Risk  Recent Flowsheet Documentation  Taken 8/5/2025 0420 by Santana Mares RN  VTE Prevention/Management:   SCDs off (sequential compression devices)   patient refused intervention  Taken 8/5/2025 0005 by Santana Mares RN  VTE Prevention/Management:   SCDs off (sequential compression devices)   patient refused intervention  Intervention: Prevent Infection  Recent Flowsheet Documentation  Taken 8/5/2025 0420 by Santana Mares RN  Infection Prevention:   equipment surfaces disinfected   hand hygiene promoted   personal protective equipment utilized   rest/sleep promoted   single patient room provided  Taken 8/5/2025 0005 by Santana Mares RN  Infection Prevention:   equipment surfaces disinfected   hand hygiene promoted   personal protective equipment utilized   rest/sleep promoted   single patient  room provided  Goal: Optimal Comfort and Wellbeing  Outcome: Progressing  Intervention: Provide Person-Centered Care  Recent Flowsheet Documentation  Taken 8/5/2025 0420 by Santana Mares RN  Trust Relationship/Rapport:   care explained   choices provided   emotional support provided   empathic listening provided   questions answered   questions encouraged   reassurance provided   thoughts/feelings acknowledged  Taken 8/5/2025 0005 by Santana Mares RN  Trust Relationship/Rapport:   care explained   choices provided   emotional support provided   empathic listening provided   questions answered   questions encouraged   reassurance provided   thoughts/feelings acknowledged  Goal: Readiness for Transition of Care  Outcome: Progressing     Problem: Delirium  Goal: Optimal Coping  Outcome: Progressing  Intervention: Optimize Psychosocial Adjustment to Delirium  Recent Flowsheet Documentation  Taken 8/5/2025 0420 by Santana Mares RN  Supportive Measures:   active listening utilized   decision-making supported   positive reinforcement provided   relaxation techniques promoted   verbalization of feelings encouraged  Taken 8/5/2025 0005 by Santana Mares RN  Supportive Measures:   active listening utilized   decision-making supported   positive reinforcement provided   relaxation techniques promoted   verbalization of feelings encouraged  Goal: Improved Behavioral Control  Outcome: Progressing  Intervention: Prevent and Manage Agitation  Recent Flowsheet Documentation  Taken 8/5/2025 0420 by Santana Mares RN  Environment Familiarity/Consistency: daily routine followed  Taken 8/5/2025 0005 by Santana Mares RN  Environment Familiarity/Consistency: daily routine followed  Intervention: Minimize Safety Risk  Recent Flowsheet Documentation  Taken 8/5/2025 0420 by Santana Mares RN  Enhanced Safety Measures:   patient/family teach back on injury risk   review medications for side effects with  activity  Trust Relationship/Rapport:   care explained   choices provided   emotional support provided   empathic listening provided   questions answered   questions encouraged   reassurance provided   thoughts/feelings acknowledged  Taken 8/5/2025 0005 by Santana Mares RN  Enhanced Safety Measures:   patient/family teach back on injury risk   review medications for side effects with activity  Trust Relationship/Rapport:   care explained   choices provided   emotional support provided   empathic listening provided   questions answered   questions encouraged   reassurance provided   thoughts/feelings acknowledged  Goal: Improved Attention and Thought Clarity  Outcome: Progressing  Intervention: Maximize Cognitive Function  Recent Flowsheet Documentation  Taken 8/5/2025 0420 by Santana Mares RN  Sensory Stimulation Regulation:   auditory stimulation minimized   care clustered   lighting decreased   quiet environment promoted   visual stimulation minimized  Reorientation Measures:   calendar in view   clock in view  Taken 8/5/2025 0005 by Santana Mares RN  Sensory Stimulation Regulation:   auditory stimulation minimized   care clustered   lighting decreased   quiet environment promoted   visual stimulation minimized  Reorientation Measures:   calendar in view   clock in view  Goal: Improved Sleep  Outcome: Progressing  Intervention: Promote Sleep  Recent Flowsheet Documentation  Taken 8/5/2025 0420 by Snatana Mares RN  Sleep/Rest Enhancement:   awakenings minimized   consistent schedule promoted   noise level reduced   regular sleep/rest pattern promoted   relaxation techniques promoted   room darkened   therapeutic touch utilized  Taken 8/5/2025 0005 by Santana Mares RN  Sleep/Rest Enhancement:   awakenings minimized   consistent schedule promoted   noise level reduced   regular sleep/rest pattern promoted   relaxation techniques promoted   room darkened   therapeutic touch utilized     Problem:  Skin Injury Risk Increased  Goal: Skin Health and Integrity  Outcome: Progressing  Intervention: Plan: Nurse Driven Intervention: Moisture Management  Recent Flowsheet Documentation  Taken 8/5/2025 0420 by Santana Mares RN  Moisture Interventions:   No brief in bed   Incontinence pad   Urinary collection device  Taken 8/5/2025 0005 by Santana Mares RN  Moisture Interventions:   No brief in bed   Incontinence pad   Urinary collection device  Intervention: Plan: Nurse Driven Intervention: Friction and Shear  Recent Flowsheet Documentation  Taken 8/5/2025 0420 by Santana Mares RN  Friction/Shear Interventions:   HOB 30 degrees or less   Silicone foam sacral dressing   Assistive lifting device (portable/ceiling lift, etc.)   Repositioning device (TAP system, etc.)  Taken 8/5/2025 0005 by Santana Mares RN  Friction/Shear Interventions:   HOB 30 degrees or less   Silicone foam sacral dressing   Assistive lifting device (portable/ceiling lift, etc.)   Repositioning device (TAP system, etc.)  Intervention: Optimize Skin Protection  Recent Flowsheet Documentation  Taken 8/5/2025 0420 by Santana Mares RN  Skin Protection:   adhesive use limited   incontinence pads utilized   skin to device areas padded   skin to skin areas padded   transparent dressing maintained   tubing/devices free from skin contact  Activity Management: activity adjusted per tolerance  Head of Bed (HOB) Positioning: HOB at 20 degrees  Taken 8/5/2025 0005 by Santana Mares RN  Skin Protection:   adhesive use limited   incontinence pads utilized   skin to device areas padded   skin to skin areas padded   transparent dressing maintained   tubing/devices free from skin contact  Activity Management: activity adjusted per tolerance  Head of Bed (HOB) Positioning: HOB at 20 degrees  Intervention: Promote and Optimize Oral Intake  Recent Flowsheet Documentation  Taken 8/5/2025 0420 by Santana Mares RN  Oral Nutrition Promotion: rest  periods promoted  Taken 8/5/2025 0005 by Santana Mares RN  Oral Nutrition Promotion: rest periods promoted     Problem: Breathing Pattern Ineffective  Goal: Effective Breathing Pattern  Outcome: Progressing  Intervention: Promote Improved Breathing Pattern  Recent Flowsheet Documentation  Taken 8/5/2025 0420 by Santana Mares RN  Breathing Techniques/Airway Clearance: deep/controlled cough encouraged  Supportive Measures:   active listening utilized   decision-making supported   positive reinforcement provided   relaxation techniques promoted   verbalization of feelings encouraged  Airway/Ventilation Management:   airway patency maintained   calming measures promoted   pulmonary hygiene promoted   oxygen therapy provided  Head of Bed (HOB) Positioning: HOB at 20 degrees  Taken 8/5/2025 0005 by Santana Mares RN  Breathing Techniques/Airway Clearance: deep/controlled cough encouraged  Supportive Measures:   active listening utilized   decision-making supported   positive reinforcement provided   relaxation techniques promoted   verbalization of feelings encouraged  Airway/Ventilation Management:   airway patency maintained   calming measures promoted   pulmonary hygiene promoted   oxygen therapy provided  Head of Bed (HOB) Positioning: HOB at 20 degrees     Problem: Comorbidity Management  Goal: Maintenance of COPD Symptom Control  Outcome: Progressing  Intervention: Maintain COPD Symptom Control  Recent Flowsheet Documentation  Taken 8/5/2025 0420 by Santana Mares RN  Breathing Techniques/Airway Clearance: deep/controlled cough encouraged  Medication Review/Management: medications reviewed  Taken 8/5/2025 0005 by Santana Mares RN  Breathing Techniques/Airway Clearance: deep/controlled cough encouraged  Medication Review/Management: medications reviewed  Goal: Blood Pressure in Desired Range  Outcome: Progressing  Intervention: Maintain Blood Pressure Management  Recent Flowsheet  Documentation  Taken 8/5/2025 0420 by Santana Mares, RN  Medication Review/Management: medications reviewed  Taken 8/5/2025 0005 by Santana Mares, RN  Medication Review/Management: medications reviewed

## 2025-08-05 NOTE — PROGRESS NOTES
"CLINICAL NUTRITION SERVICES - BRIEF NOTE    Phos lab came back at 5.8(H). For this reason, will need to adjust PN to custom regimen.  Discussed with PharmD    PN orders adjusted as follows:  Access: PICC (once placed)  Dosing weight: 54.8 kg  Total fluids (TPN + IVF) = 50 mL per hour or per MD order  Daily electrolyte check until goal rate reached - electrolyte replacement protocol     Recommend initiation and advancement as follows:  Day 1 (8/5): Custom @ 50 ml/hr (1200 ml/day) provides 465 kcal, 55 g AA, 72 g dextrose (GIR 0.9 mg/kg/min)  Day 2 (8/6): Custom @ 50 ml/hr (1200 ml/day) + 250 ml 20% lipids 4x/week provides 965 kcal, 55 g AA, 135 g dextrose (GIR=1.7 mg/kg/min), 31% kcal from fat  Day 3 (8/7) - GOAL- Custom @ 50 ml/hr (1200 ml/day) + 250 mL 20% lipids 4x/week provides 1169 kcal/day (21 kcal per kg, meets 100% of MSJ using AF of 1.0-1.2), 55 g amino acids (1.0 g protein per kg), 195 g dextrose (GIR  2.5 mg/kg/min) and 25% kcal from fat        Mary Rider RD, LD  Clinical Dietitian  [facility] [employer]: \"Dietitian [[facility]]\"  Office Phone: [phone]    "

## 2025-08-05 NOTE — PROGRESS NOTES
"CLINICAL NUTRITION SERVICES - BRIEF NOTE    Made aware that Nephrology recommending not to place PICC at this point as pt will likely be starting HD in the next day or 2 and they will need to preserve arm access for fistula placement.  Per VATs note, they recommended PPN as an alternative.  Due to pt's fluid status and fluid restriction, PPN not a viable option. At maximum rate, pt would be receiving <50% of estimated needs. Discussed with Dr. Tran. Per Dr. Tran, will hold off on nutrition support for today and re-assess tomorrow pending HD plans.     Provider and team aware that patient is now day 8 w/ 0-25% intakes and in need of nutrition support as soon as possible.     Will continue to follow.    Mary Rider RD, LD  Clinical Dietitian  [facility] [employer]: \"Dietitian [[facility]]\"  Office Phone: [phone]    "

## 2025-08-05 NOTE — PROGRESS NOTES
VAT received consult for PICC placement to start TPN today.  VAT consulted with Nephrology (Dr. Fragoso) due to patient possibly requiring dialysis.  Per Dr. Fragoso, recommends not placing PICC line as patient will likely be starting dialysis in the next day or two and will need to preserve arm access for Fistula placement.  VAT suggested PPN as an alternative, [place] was in agreement to this recommendation.  VAT assessed patient's vasculature and find very healthy PIV options on both lower arms and can place a good US guided PIV for dedicated to PPN.  Updated bedside RN as well. Sent page out to Dr. Quintana, ordering provider for PICC line to update.  Waiting to hear back.

## 2025-08-05 NOTE — PROGRESS NOTES
Renal Medicine Progress Note            Assessment/Plan:     Assessment:    Hypertensive emergency   Acute hypoxic respiratory failure   Acute on chronic diastolic HF   History of R renal artery stenting  Labile HTN  Bipap dependent hypoxia, pulmonary edema on CXR. . BNP 25K. TTE with EF 55-60%, elevated L sided pressures and pulmonary HTN, but RA pressure estimated low.    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 This is most commonly seen in DM2, however can also be seen with smoking and HTN. Bx also showed cholesterol emboli (likely due to endograft repair).  Cr on admission 4.25, worse than recent baseline. In setting of hypertensive emergency, pulmonary edema, LE edema. Bx results:       Metabolic acidosis, resolved    Continue PTA sodium bicarb.   Improved    R lung adenocarcinoma   Received radiation in 2021.     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  Has bibasilar crackles.  Minimal response to Lasix yesterday.  Right heart catheter would better define volume status but will likely be difficult with current respiratory status/BiPAP dependence.  Will give Bumex 2 mg IV followed by Bumex drip at 0.5 mg an hour.  Discussed with patient and  at bedside.  If remains diuretic unresponsive, neck step would be to proceed with hemodialysis.  They are agreeable if needed.  Keep n.p.o. at midnight.  -Low-sodium diet.  P.o. fluid restriction of 1500 cc/day    Discussed with ICU team in person          Interval History:     Serum is BiPAP dependent.  Overnight events noted.  RRT this  morning with respiratory compromise, A-fib with RVR with heart rate in 140s, significant high blood pressure with systolic blood pressure in 200s and possible flash pulm edema.  Treated with IV lorazepam, metoprolol, nitroglycerin drip and now on amiodarone infusion.  Blood pressure is currently better.  Unable to tolerate  off BiPAP.  Only 650 cc of urine with Lasix 60 mg twice daily yesterday.  Creatinine stable.  Sodium down to 133.             Medications and Allergies:     Current Facility-Administered Medications   Medication Dose Route Frequency Provider Last Rate Last Admin    carvedilol (COREG) tablet 25 mg  25 mg Oral BID w/meals Josemanuel Cohen MD   25 mg at 08/05/25 1000    cloNIDine (CATAPRES-TTS3) 0.3 MG/24HR WK patch 1 patch  1 patch Transdermal Weekly Virgilio Patel MD   1 patch at 07/31/25 1432    And    cloNIDine (CATAPRES-TTS1) Patch in Place   Transdermal Q8H CHIDI Virgilio Patel MD        clopidogrel (PLAVIX) tablet 75 mg  75 mg Oral Daily Virgilio Patel MD   75 mg at 08/05/25 1000    famotidine (PEPCID) tablet 20 mg  20 mg Oral Q48H Jonathan Light MD   20 mg at 08/04/25 0849    furosemide (LASIX) injection 60 mg  60 mg Intravenous BID Jamie Fragoso MD   60 mg at 08/05/25 0835    [Held by provider] furosemide (LASIX) tablet 40 mg  40 mg Oral BID Vee Encinas MD   40 mg at 07/30/25 1534    hydrALAZINE (APRESOLINE) tablet 100 mg  100 mg Oral TID Vee Encinas MD   100 mg at 08/05/25 0351    icosapent ethyl (VASCEPA) capsule 2 g  2 g Oral BID w/meals Virgilio Patel MD   2 g at 08/05/25 1001    isosorbide dinitrate (ISORDIL) tablet 40 mg  40 mg Oral TID Jonathan Light MD   40 mg at 08/05/25 1000    meclizine (ANTIVERT) tablet 25 mg  25 mg Oral Q6H Virgilio Patel MD   25 mg at 08/05/25 0012    NIFEdipine ER OSMOTIC (PROCARDIA XL) 24 hr tablet 90 mg  90 mg Oral Daily Rigstad, Beau, MD   90 mg at 08/05/25 1000    PARoxetine (PAXIL) tablet 40 mg  40 mg Oral Daily Virgilio Patel MD   40 mg at  "08/05/25 1000    sodium bicarbonate tablet 1,300 mg  1,300 mg Oral BID Virgilio Patle MD   1,300 mg at 08/05/25 1000    sodium chloride (PF) 0.9% PF flush 3 mL  3 mL Intracatheter Q8H [facility] Swain MD   3 mL at 08/05/25 0555        Allergies   Allergen Reactions    Iodine Hives            Physical Exam:   Vitals were reviewed  /72 (BP Location: Right arm)   Pulse 74   Temp 97.7  F (36.5  C) (Axillary)   Resp 19   Ht 1.626 m (5' 4\")   Wt 56.2 kg (123 lb 14.4 oz)   SpO2 95%   BMI 21.27 kg/m      Wt Readings from Last 3 Encounters:   08/04/25 56.2 kg (123 lb 14.4 oz)   07/23/25 54.4 kg (120 lb)   07/21/25 54.1 kg (119 lb 3.2 oz)       GENERAL APPEARANCE: NAD, frail appearing  HEENT: normocephalic, dry MM   RESP: coarse, basal crackles noted.  Bilateral rhonchi  CV: irregular   EXTREMITIES/SKIN:1+ edema around thighs   NEURO:  sleeping            Data:     BMP  Recent Labs   Lab 08/05/25  0539 08/04/25  0528 08/03/25 2007 08/03/25  1553 08/03/25  0750 08/03/25  0546 08/02/25  0942 08/02/25  0357   * 135  --   --   --  137  --  135   POTASSIUM 4.1 3.7  --   --   --  3.7  --  3.7  3.7   CHLORIDE 92* 94*  --   --   --  96*  --  96*   BRUCE 8.9 8.5*  --   --   --  8.9  --  8.8   CO2 25 25  --   --   --  26  --  25   BUN 60.3* 61.3*  --   --   --  59.7*  --  56.0*   CR 3.56* 3.67*  --   --   --  3.41*  --  3.40*   * 98 105* 123*   < > 127*   < > 114*    < > = values in this interval not displayed.     CBC  Recent Labs   Lab 08/05/25  0539 08/04/25  0528 08/03/25  0546 08/02/25  0357   WBC 9.9 9.2 8.8 9.3   HGB 9.5* 8.5* 9.0* 9.1*   HCT 29.5* 26.0* 27.0* 27.3*   * 104* 102* 101*    369 462* 390     Lab Results   Component Value Date    AST 22 07/17/2025    ALT 11 07/17/2025    ALKPHOS 61 07/17/2025    BILITOTAL 0.3 07/17/2025     No results found for: \"INR\"  Color Urine (no units)   Date Value   09/09/2016 Light Yellow     Appearance Urine (no units)   Date Value "   09/09/2016 Clear     Glucose Urine (mg/dL)   Date Value   09/09/2016 Negative     Bilirubin Urine (no units)   Date Value   09/09/2016 Negative     Ketones Urine (mg/dL)   Date Value   09/09/2016 Negative     Specific Gravity Urine (no units)   Date Value   09/09/2016 1.010     pH Urine (pH)   Date Value   09/09/2016 5.5     Protein Albumin Urine (mg/dL)   Date Value   09/09/2016 Negative     Nitrite Urine (no units)   Date Value   09/09/2016 Negative     Leukocyte Esterase Urine (no units)   Date Value   09/09/2016 Negative         Attestation:  I have reviewed today's vital signs, notes, medications, labs and imaging.    Jamie Fragoso MD  [facility] - Nephrology  Office: [phone]

## 2025-08-05 NOTE — PLAN OF CARE
"[id number]    ICU End of Shift Summary.  For vital signs and complete assessments, please see documentation flowsheets.      Pertinent assessments:   Neuro: A&Ox4, AGRAWAL, follows commands  Cardiac: MAPs>65. SBPs 130s-140s. HR 80s-one teens. Tele: A fib CVR.   Resp: on Bipap, settings of 10/5 and 40% FiO2. LS dim with crackles.   GI: BS+. No Bm this shift.   : Purewick in place, 250cc out this shift.   Skin: See flowsheets.   Lines: PIVx2  Drips: Amiodarone     Major Shift Events:     Amio gtt started @ 1955.    Plan (Upcoming Events): Wean from Bipap as able, continue amiodarone gtt. Cards, neph, and palliative following.   Discharge/Transfer Needs: TBD     Bedside Shift Report Completed : Yes   Bedside Safety Check Completed: Yes             Goal Outcome Evaluation:      Plan of Care Reviewed With: patient    Overall Patient Progress: no changeOverall Patient Progress: no change    Outcome Evaluation: See note.      Problem: Adult Inpatient Plan of Care  Goal: Plan of Care Review  Description: The Plan of Care Review/Shift note should be completed every shift.  The Outcome Evaluation is a brief statement about your assessment that the patient is improving, declining, or no change.  This information will be displayed automatically on your shift  note.  Outcome: Not Progressing  Flowsheets (Taken 8/4/2025 2232)  Outcome Evaluation: See note.  Plan of Care Reviewed With: patient  Overall Patient Progress: no change  Goal: Patient-Specific Goal (Individualized)  Description: You can add care plan individualizations to a care plan. Examples of Individualization might be:  \"Parent requests to be called daily at 9am for status\", \"I have a hard time hearing out of my right ear\", or \"Do not touch me to wake me up as it startles  me\".  Outcome: Not Progressing  Goal: Absence of Hospital-Acquired Illness or Injury  Outcome: Not Progressing  Intervention: Identify and Manage Fall Risk  Recent Flowsheet Documentation  Taken " 8/4/2025 2001 by Eunice Bhagat, RN  Safety Promotion/Fall Prevention:   activity supervised   assistive device/personal items within reach   clutter free environment maintained   increased rounding and observation   increase visualization of patient   lighting adjusted   room near nurse's station   safety round/check completed  Intervention: Prevent Skin Injury  Recent Flowsheet Documentation  Taken 8/4/2025 2200 by Eunice Bhagat RN  Body Position:   turned   supine  Taken 8/4/2025 2001 by Eunice Bhagat RN  Body Position:   turned   right  Skin Protection:   adhesive use limited   incontinence pads utilized   tubing/devices free from skin contact   transparent dressing maintained   silicone foam dressing in place   skin to device areas padded   skin to skin areas padded  Intervention: Prevent and Manage VTE (Venous Thromboembolism) Risk  Recent Flowsheet Documentation  Taken 8/4/2025 2001 by Eunice Bhagat RN  VTE Prevention/Management: (on Plavix) SCDs off (sequential compression devices)  Intervention: Prevent Infection  Recent Flowsheet Documentation  Taken 8/4/2025 2001 by Eunice Bhagat RN  Infection Prevention:   equipment surfaces disinfected   hand hygiene promoted   rest/sleep promoted   single patient room provided  Goal: Optimal Comfort and Wellbeing  Outcome: Not Progressing  Intervention: Provide Person-Centered Care  Recent Flowsheet Documentation  Taken 8/4/2025 2001 by Eunice Bhagat RN  Trust Relationship/Rapport:   care explained   choices provided   emotional support provided   empathic listening provided   questions answered   questions encouraged   reassurance provided   thoughts/feelings acknowledged  Goal: Readiness for Transition of Care  Outcome: Not Progressing     Problem: Delirium  Goal: Optimal Coping  Outcome: Not Progressing  Intervention: Optimize Psychosocial Adjustment to Delirium  Recent Flowsheet Documentation  Taken 8/4/2025 2001 by Eunice Bhagat RN  Supportive Measures:    active listening utilized   decision-making supported   positive reinforcement provided   problem-solving facilitated   relaxation techniques promoted   self-care encouraged   self-responsibility promoted   verbalization of feelings encouraged  Goal: Improved Behavioral Control  Outcome: Not Progressing  Intervention: Prevent and Manage Agitation  Recent Flowsheet Documentation  Taken 8/4/2025 2001 by Eunice Bhagat, RN  Environment Familiarity/Consistency: daily routine followed  Intervention: Minimize Safety Risk  Recent Flowsheet Documentation  Taken 8/4/2025 2001 by Eunice Bhagat, RN  Enhanced Safety Measures:   assistive devices when indicated   patient/family teach back on injury risk   pain management   monitor patients coagulation values   review medications for side effects with activity   room near unit station  Trust Relationship/Rapport:   care explained   choices provided   emotional support provided   empathic listening provided   questions answered   questions encouraged   reassurance provided   thoughts/feelings acknowledged  Goal: Improved Attention and Thought Clarity  Outcome: Not Progressing  Intervention: Maximize Cognitive Function  Recent Flowsheet Documentation  Taken 8/4/2025 2001 by Eunice Bhagat, RN  Sensory Stimulation Regulation:   auditory stimulation provided   care clustered   lighting decreased  Reorientation Measures:   calendar in view   clock in view  Goal: Improved Sleep  Outcome: Not Progressing  Intervention: Promote Sleep  Recent Flowsheet Documentation  Taken 8/4/2025 2001 by Eunice Bhagat, RN  Sleep/Rest Enhancement:   comfort measures   consistent schedule promoted   family presence promoted   noise level reduced   regular sleep/rest pattern promoted   relaxation techniques promoted   room darkened     Problem: Skin Injury Risk Increased  Goal: Skin Health and Integrity  Outcome: Not Progressing  Intervention: Plan: Nurse Driven Intervention: Moisture Management  Recent  Flowsheet Documentation  Taken 8/4/2025 2001 by Eunice Bhagat RN  Moisture Interventions:   No brief in bed   Incontinence pad   Urinary collection device  Intervention: Plan: Nurse Driven Intervention: Friction and Shear  Recent Flowsheet Documentation  Taken 8/4/2025 2001 by Eunice Bhagat RN  Friction/Shear Interventions:   HOB 30 degrees or less   Silicone foam sacral dressing   Assistive lifting device (portable/ceiling lift, etc.)   Lateral transfer device (hovermat, etc.)   Repositioning device (TAP system, etc.)  Intervention: Optimize Skin Protection  Recent Flowsheet Documentation  Taken 8/4/2025 2200 by Eunice Bhagat RN  Head of Bed (HOB) Positioning: HOB at 20-30 degrees  Taken 8/4/2025 2001 by Eunice Bhagat RN  Skin Protection:   adhesive use limited   incontinence pads utilized   tubing/devices free from skin contact   transparent dressing maintained   silicone foam dressing in place   skin to device areas padded   skin to skin areas padded  Activity Management: bedrest  Head of Bed (HOB) Positioning: HOB at 20-30 degrees  Intervention: Promote and Optimize Oral Intake  Recent Flowsheet Documentation  Taken 8/4/2025 2001 by Eunice Bhagat RN  Oral Nutrition Promotion: rest periods promoted     Problem: Breathing Pattern Ineffective  Goal: Effective Breathing Pattern  Outcome: Not Progressing  Intervention: Promote Improved Breathing Pattern  Recent Flowsheet Documentation  Taken 8/4/2025 2200 by Eunice Bhagat RN  Head of Bed (HOB) Positioning: HOB at 20-30 degrees  Taken 8/4/2025 2001 by Eunice Bhagat RN  Breathing Techniques/Airway Clearance: deep/controlled cough encouraged  Supportive Measures:   active listening utilized   decision-making supported   positive reinforcement provided   problem-solving facilitated   relaxation techniques promoted   self-care encouraged   self-responsibility promoted   verbalization of feelings encouraged  Airway/Ventilation Management:   airway patency  maintained   calming measures promoted   pulmonary hygiene promoted   oxygen therapy provided  Head of Bed (HOB) Positioning: HOB at 20-30 degrees     Problem: Comorbidity Management  Goal: Maintenance of COPD Symptom Control  Outcome: Not Progressing  Intervention: Maintain COPD Symptom Control  Recent Flowsheet Documentation  Taken 8/4/2025 2001 by Eunice Bhagat, RN  Breathing Techniques/Airway Clearance: deep/controlled cough encouraged  Medication Review/Management: medications reviewed  Goal: Blood Pressure in Desired Range  Outcome: Not Progressing  Intervention: Maintain Blood Pressure Management  Recent Flowsheet Documentation  Taken 8/4/2025 2001 by Eunice Bhagat, RN  Medication Review/Management: medications reviewed

## 2025-08-05 NOTE — PLAN OF CARE
"ICU End of Shift Summary.  For vital signs and complete assessments, please see documentation flowsheets.     Pertinent assessments: Pt became more alert as day went on. Very lethargic at beginning of shift and now alert. Disoriented to time as day went on. Able to come off bipap and tolerated being on oxymask/nasal canula. Poor PO intake. On Low salt diet with fluid restriction. Pt in and out of a.fib but mainly SR.   Major Shift Events: able to come off bipap for awhile, bumex added in, lasix stopped, amio stopped due to hold parameters   Plan (Upcoming Events): wean 02, nutrition/palliative/cardiology/neph following, NPO midnight  Discharge/Transfer Needs: n/a at this time     Bedside Shift Report Completed : y  Bedside Safety Check Completed: y      Problem: Adult Inpatient Plan of Care  Goal: Plan of Care Review  Description: The Plan of Care Review/Shift note should be completed every shift.  The Outcome Evaluation is a brief statement about your assessment that the patient is improving, declining, or no change.  This information will be displayed automatically on your shift  note.  Outcome: Progressing  Flowsheets (Taken 8/5/2025 1814)  Outcome Evaluation: Pt able to be off bipap for awhile today. In and out of a.fib. On 4L NC while off bipap  Plan of Care Reviewed With: patient  Overall Patient Progress: no change  Goal: Patient-Specific Goal (Individualized)  Description: You can add care plan individualizations to a care plan. Examples of Individualization might be:  \"Parent requests to be called daily at 9am for status\", \"I have a hard time hearing out of my right ear\", or \"Do not touch me to wake me up as it startles  me\".  Outcome: Progressing  Goal: Absence of Hospital-Acquired Illness or Injury  Outcome: Progressing  Intervention: Identify and Manage Fall Risk  Recent Flowsheet Documentation  Taken 8/5/2025 1632 by Mile Lynch RN  Safety Promotion/Fall Prevention:   activity supervised   " assistive device/personal items within reach   clutter free environment maintained   increased rounding and observation   increase visualization of patient   mobility aid in reach   nonskid shoes/slippers when out of bed   patient and family education   room near nurse's station   room organization consistent   safety round/check completed   supervised activity   treat reversible contributory factors   treat underlying cause  Taken 8/5/2025 1253 by Mile Lynch RN  Safety Promotion/Fall Prevention:   activity supervised   assistive device/personal items within reach   clutter free environment maintained   increased rounding and observation   increase visualization of patient   mobility aid in reach   nonskid shoes/slippers when out of bed   patient and family education   room near nurse's station   room organization consistent   safety round/check completed   supervised activity   treat reversible contributory factors   treat underlying cause  Taken 8/5/2025 0854 by Mile Lynch RN  Safety Promotion/Fall Prevention:   activity supervised   assistive device/personal items within reach   clutter free environment maintained   increased rounding and observation   increase visualization of patient   mobility aid in reach   nonskid shoes/slippers when out of bed   patient and family education   room near nurse's station   room organization consistent   safety round/check completed   supervised activity   treat reversible contributory factors   treat underlying cause  Intervention: Prevent Skin Injury  Recent Flowsheet Documentation  Taken 8/5/2025 1632 by Mile Lynch RN  Skin Protection:   adhesive use limited   incontinence pads utilized   tubing/devices free from skin contact   transparent dressing maintained   silicone foam dressing in place   skin to device areas padded   skin to skin areas padded  Taken 8/5/2025 1600 by Mile Lynch RN  Body Position:   turned   left  Taken 8/5/2025  1400 by Mile Lynch RN  Body Position:   turned   right  Taken 8/5/2025 1253 by Mile Lynch RN  Skin Protection:   adhesive use limited   incontinence pads utilized   tubing/devices free from skin contact   transparent dressing maintained   silicone foam dressing in place   skin to device areas padded   skin to skin areas padded  Taken 8/5/2025 1200 by Mile Lynch RN  Body Position:   turned   supine, head elevated   supine, legs elevated  Taken 8/5/2025 1000 by Mile Lynch RN  Body Position:   turned   left  Taken 8/5/2025 0854 by Mile Lynch RN  Skin Protection:   adhesive use limited   incontinence pads utilized   tubing/devices free from skin contact   transparent dressing maintained   silicone foam dressing in place   skin to device areas padded   skin to skin areas padded  Taken 8/5/2025 0800 by Mile Lynch RN  Body Position:   turned   right  Intervention: Prevent and Manage VTE (Venous Thromboembolism) Risk  Recent Flowsheet Documentation  Taken 8/5/2025 1632 by Mile Lynch RN  VTE Prevention/Management: (on plavix) SCDs off (sequential compression devices)  Taken 8/5/2025 1253 by Mile Lynch RN  VTE Prevention/Management: (on plavix) SCDs off (sequential compression devices)  Taken 8/5/2025 0854 by Mile Lynch RN  VTE Prevention/Management: (on plavix) SCDs off (sequential compression devices)  Intervention: Prevent Infection  Recent Flowsheet Documentation  Taken 8/5/2025 1632 by Mile Lynch RN  Infection Prevention:   equipment surfaces disinfected   hand hygiene promoted   rest/sleep promoted   single patient room provided  Taken 8/5/2025 1253 by Mile Lynch RN  Infection Prevention:   equipment surfaces disinfected   hand hygiene promoted   rest/sleep promoted   single patient room provided  Taken 8/5/2025 0854 by Mile Lynch RN  Infection Prevention:   equipment surfaces disinfected   hand  hygiene promoted   rest/sleep promoted   single patient room provided  Goal: Optimal Comfort and Wellbeing  Outcome: Progressing  Intervention: Provide Person-Centered Care  Recent Flowsheet Documentation  Taken 8/5/2025 1632 by Mile Lynch RN  Trust Relationship/Rapport:   care explained   choices provided   emotional support provided   empathic listening provided   questions answered   questions encouraged   reassurance provided   thoughts/feelings acknowledged  Taken 8/5/2025 1253 by Mile Lynch RN  Trust Relationship/Rapport:   care explained   choices provided   emotional support provided   empathic listening provided   questions answered   questions encouraged   reassurance provided   thoughts/feelings acknowledged  Taken 8/5/2025 0854 by Mile Lynch RN  Trust Relationship/Rapport:   care explained   choices provided   emotional support provided   empathic listening provided   questions answered   questions encouraged   reassurance provided   thoughts/feelings acknowledged  Goal: Readiness for Transition of Care  Outcome: Progressing   Goal Outcome Evaluation:      Plan of Care Reviewed With: patient    Overall Patient Progress: no changeOverall Patient Progress: no change    Outcome Evaluation: Pt able to be off bipap for awhile today. In and out of a.fib. On 4L NC while off bipap

## 2025-08-06 ENCOUNTER — APPOINTMENT (OUTPATIENT)
Dept: INTERVENTIONAL RADIOLOGY/VASCULAR | Facility: CLINIC | Age: 75
End: 2025-08-06
Attending: INTERNAL MEDICINE
Payer: MEDICARE

## 2025-08-06 LAB
ANION GAP SERPL CALCULATED.3IONS-SCNC: 18 MMOL/L (ref 7–15)
BUN SERPL-MCNC: 66 MG/DL (ref 8–23)
CALCIUM SERPL-MCNC: 8.7 MG/DL (ref 8.8–10.4)
CHLORIDE SERPL-SCNC: 92 MMOL/L (ref 98–107)
CREAT SERPL-MCNC: 3.56 MG/DL (ref 0.51–0.95)
EGFRCR SERPLBLD CKD-EPI 2021: 13 ML/MIN/1.73M2
ERYTHROCYTE [DISTWIDTH] IN BLOOD BY AUTOMATED COUNT: 14.8 % (ref 10–15)
GLUCOSE BLDC GLUCOMTR-MCNC: 115 MG/DL (ref 70–99)
GLUCOSE BLDC GLUCOMTR-MCNC: 117 MG/DL (ref 70–99)
GLUCOSE BLDC GLUCOMTR-MCNC: 122 MG/DL (ref 70–99)
GLUCOSE SERPL-MCNC: 128 MG/DL (ref 70–99)
HBV CORE AB SERPL QL IA: NONREACTIVE
HCO3 SERPL-SCNC: 25 MMOL/L (ref 22–29)
HCT VFR BLD AUTO: 27.2 % (ref 35–47)
HGB BLD-MCNC: 9.1 G/DL (ref 11.7–15.7)
MAGNESIUM SERPL-MCNC: 2.1 MG/DL (ref 1.7–2.3)
MCH RBC QN AUTO: 34.1 PG (ref 26.5–33)
MCHC RBC AUTO-ENTMCNC: 33.5 G/DL (ref 31.5–36.5)
MCV RBC AUTO: 102 FL (ref 78–100)
PHOSPHATE SERPL-MCNC: 4.9 MG/DL (ref 2.5–4.5)
PLATELET # BLD AUTO: 378 10E3/UL (ref 150–450)
POTASSIUM SERPL-SCNC: 3.6 MMOL/L (ref 3.4–5.3)
RBC # BLD AUTO: 2.67 10E6/UL (ref 3.8–5.2)
SODIUM SERPL-SCNC: 135 MMOL/L (ref 135–145)
WBC # BLD AUTO: 9.4 10E3/UL (ref 4–11)

## 2025-08-06 PROCEDURE — C1750 CATH, HEMODIALYSIS,LONG-TERM: HCPCS

## 2025-08-06 PROCEDURE — 250N000013 HC RX MED GY IP 250 OP 250 PS 637: Performed by: INTERNAL MEDICINE

## 2025-08-06 PROCEDURE — 90937 HEMODIALYSIS REPEATED EVAL: CPT

## 2025-08-06 PROCEDURE — 999N000157 HC STATISTIC RCP TIME EA 10 MIN

## 2025-08-06 PROCEDURE — 84100 ASSAY OF PHOSPHORUS: CPT | Performed by: INTERNAL MEDICINE

## 2025-08-06 PROCEDURE — 250N000011 HC RX IP 250 OP 636: Performed by: INTERNAL MEDICINE

## 2025-08-06 PROCEDURE — 36558 INSERT TUNNELED CV CATH: CPT

## 2025-08-06 PROCEDURE — 02H633Z INSERTION OF INFUSION DEVICE INTO RIGHT ATRIUM, PERCUTANEOUS APPROACH: ICD-10-PCS | Performed by: RADIOLOGY

## 2025-08-06 PROCEDURE — 94660 CPAP INITIATION&MGMT: CPT

## 2025-08-06 PROCEDURE — 0JH63XZ INSERTION OF TUNNELED VASCULAR ACCESS DEVICE INTO CHEST SUBCUTANEOUS TISSUE AND FASCIA, PERCUTANEOUS APPROACH: ICD-10-PCS | Performed by: RADIOLOGY

## 2025-08-06 PROCEDURE — 200N000001 HC R&B ICU

## 2025-08-06 PROCEDURE — 250N000013 HC RX MED GY IP 250 OP 250 PS 637: Performed by: STUDENT IN AN ORGANIZED HEALTH CARE EDUCATION/TRAINING PROGRAM

## 2025-08-06 PROCEDURE — 36415 COLL VENOUS BLD VENIPUNCTURE: CPT | Performed by: INTERNAL MEDICINE

## 2025-08-06 PROCEDURE — C1894 INTRO/SHEATH, NON-LASER: HCPCS

## 2025-08-06 PROCEDURE — 999N000185 HC STATISTIC TRANSPORT TIME EA 15 MIN

## 2025-08-06 PROCEDURE — 85014 HEMATOCRIT: CPT | Performed by: INTERNAL MEDICINE

## 2025-08-06 PROCEDURE — 99233 SBSQ HOSP IP/OBS HIGH 50: CPT | Performed by: INTERNAL MEDICINE

## 2025-08-06 PROCEDURE — C1769 GUIDE WIRE: HCPCS

## 2025-08-06 PROCEDURE — 250N000011 HC RX IP 250 OP 636: Performed by: PHYSICIAN ASSISTANT

## 2025-08-06 PROCEDURE — 250N000011 HC RX IP 250 OP 636: Performed by: RADIOLOGY

## 2025-08-06 PROCEDURE — 80048 BASIC METABOLIC PNL TOTAL CA: CPT | Performed by: INTERNAL MEDICINE

## 2025-08-06 PROCEDURE — 258N000003 HC RX IP 258 OP 636: Performed by: INTERNAL MEDICINE

## 2025-08-06 PROCEDURE — 250N000011 HC RX IP 250 OP 636: Performed by: STUDENT IN AN ORGANIZED HEALTH CARE EDUCATION/TRAINING PROGRAM

## 2025-08-06 PROCEDURE — 83735 ASSAY OF MAGNESIUM: CPT | Performed by: INTERNAL MEDICINE

## 2025-08-06 RX ORDER — AMIODARONE HYDROCHLORIDE 200 MG/1
200 TABLET ORAL DAILY
Status: DISCONTINUED | OUTPATIENT
Start: 2025-08-07 | End: 2025-08-20 | Stop reason: HOSPADM

## 2025-08-06 RX ORDER — DEXTROSE MONOHYDRATE 25 G/50ML
25-50 INJECTION, SOLUTION INTRAVENOUS
Status: DISCONTINUED | OUTPATIENT
Start: 2025-08-06 | End: 2025-08-06

## 2025-08-06 RX ORDER — CEFAZOLIN SODIUM 2 G/50ML
2 SOLUTION INTRAVENOUS
Status: COMPLETED | OUTPATIENT
Start: 2025-08-06 | End: 2025-08-06

## 2025-08-06 RX ORDER — NALOXONE HYDROCHLORIDE 0.4 MG/ML
0.2 INJECTION, SOLUTION INTRAMUSCULAR; INTRAVENOUS; SUBCUTANEOUS
Status: DISCONTINUED | OUTPATIENT
Start: 2025-08-06 | End: 2025-08-06

## 2025-08-06 RX ORDER — ACETAMINOPHEN 500 MG
500 TABLET ORAL
Status: DISCONTINUED | OUTPATIENT
Start: 2025-08-06 | End: 2025-08-15 | Stop reason: ALTCHOICE

## 2025-08-06 RX ORDER — DEXTROSE MONOHYDRATE 100 MG/ML
INJECTION, SOLUTION INTRAVENOUS CONTINUOUS PRN
Status: DISCONTINUED | OUTPATIENT
Start: 2025-08-06 | End: 2025-08-20 | Stop reason: HOSPADM

## 2025-08-06 RX ORDER — ACETAMINOPHEN 650 MG/1
650 SUPPOSITORY RECTAL EVERY 4 HOURS PRN
Status: DISCONTINUED | OUTPATIENT
Start: 2025-08-06 | End: 2025-08-20 | Stop reason: HOSPADM

## 2025-08-06 RX ORDER — HEPARIN SODIUM 1000 [USP'U]/ML
0-10 INJECTION, SOLUTION INTRAVENOUS; SUBCUTANEOUS ONCE
Status: COMPLETED | OUTPATIENT
Start: 2025-08-06 | End: 2025-08-06

## 2025-08-06 RX ORDER — NALOXONE HYDROCHLORIDE 0.4 MG/ML
0.4 INJECTION, SOLUTION INTRAMUSCULAR; INTRAVENOUS; SUBCUTANEOUS
Status: DISCONTINUED | OUTPATIENT
Start: 2025-08-06 | End: 2025-08-06

## 2025-08-06 RX ORDER — FENTANYL CITRATE 50 UG/ML
25-50 INJECTION, SOLUTION INTRAMUSCULAR; INTRAVENOUS EVERY 5 MIN PRN
Status: DISCONTINUED | OUTPATIENT
Start: 2025-08-06 | End: 2025-08-07

## 2025-08-06 RX ORDER — FAMOTIDINE 20 MG/1
20 TABLET, FILM COATED ORAL
Status: DISCONTINUED | OUTPATIENT
Start: 2025-08-08 | End: 2025-08-15 | Stop reason: ALTCHOICE

## 2025-08-06 RX ORDER — MECLIZINE HYDROCHLORIDE 25 MG/1
25 TABLET ORAL EVERY 6 HOURS
Status: DISCONTINUED | OUTPATIENT
Start: 2025-08-07 | End: 2025-08-20 | Stop reason: HOSPADM

## 2025-08-06 RX ORDER — B COMPLEX C NO.10/FOLIC ACID 900MCG/5ML
5 LIQUID (ML) ORAL DAILY
Status: DISCONTINUED | OUTPATIENT
Start: 2025-08-06 | End: 2025-08-19

## 2025-08-06 RX ORDER — SODIUM BICARBONATE 650 MG/1
1300 TABLET ORAL 2 TIMES DAILY
Status: DISCONTINUED | OUTPATIENT
Start: 2025-08-06 | End: 2025-08-07 | Stop reason: ALTCHOICE

## 2025-08-06 RX ORDER — ISOSORBIDE DINITRATE 10 MG/1
40 TABLET ORAL
Status: DISCONTINUED | OUTPATIENT
Start: 2025-08-07 | End: 2025-08-15

## 2025-08-06 RX ORDER — FLUMAZENIL 0.1 MG/ML
0.2 INJECTION, SOLUTION INTRAVENOUS
Status: DISCONTINUED | OUTPATIENT
Start: 2025-08-06 | End: 2025-08-07

## 2025-08-06 RX ORDER — LIDOCAINE HYDROCHLORIDE AND EPINEPHRINE 10; 10 MG/ML; UG/ML
0-20 INJECTION, SOLUTION INFILTRATION; PERINEURAL ONCE
Status: COMPLETED | OUTPATIENT
Start: 2025-08-06 | End: 2025-08-06

## 2025-08-06 RX ORDER — HYDRALAZINE HYDROCHLORIDE 50 MG/1
100 TABLET, FILM COATED ORAL 3 TIMES DAILY
Status: DISCONTINUED | OUTPATIENT
Start: 2025-08-07 | End: 2025-08-08

## 2025-08-06 RX ORDER — CARVEDILOL 25 MG/1
25 TABLET ORAL 2 TIMES DAILY WITH MEALS
Status: DISCONTINUED | OUTPATIENT
Start: 2025-08-07 | End: 2025-08-20 | Stop reason: HOSPADM

## 2025-08-06 RX ORDER — NICOTINE POLACRILEX 4 MG
15-30 LOZENGE BUCCAL
Status: DISCONTINUED | OUTPATIENT
Start: 2025-08-06 | End: 2025-08-06

## 2025-08-06 RX ORDER — ACETAMINOPHEN 325 MG/1
650 TABLET ORAL EVERY 4 HOURS PRN
Status: DISCONTINUED | OUTPATIENT
Start: 2025-08-06 | End: 2025-08-15 | Stop reason: ALTCHOICE

## 2025-08-06 RX ORDER — CLOPIDOGREL BISULFATE 75 MG/1
75 TABLET ORAL DAILY
Status: DISCONTINUED | OUTPATIENT
Start: 2025-08-07 | End: 2025-08-20 | Stop reason: HOSPADM

## 2025-08-06 RX ORDER — DIPHENHYDRAMINE HCL 12.5 MG/5ML
25 SOLUTION ORAL
Status: DISCONTINUED | OUTPATIENT
Start: 2025-08-06 | End: 2025-08-20 | Stop reason: HOSPADM

## 2025-08-06 RX ORDER — METHOCARBAMOL 500 MG/1
500 TABLET, FILM COATED ORAL 3 TIMES DAILY PRN
Status: DISCONTINUED | OUTPATIENT
Start: 2025-08-06 | End: 2025-08-20 | Stop reason: HOSPADM

## 2025-08-06 RX ORDER — PAROXETINE 20 MG/1
40 TABLET, FILM COATED ORAL DAILY
Status: DISCONTINUED | OUTPATIENT
Start: 2025-08-07 | End: 2025-08-15 | Stop reason: ALTCHOICE

## 2025-08-06 RX ORDER — AMIODARONE HYDROCHLORIDE 200 MG/1
200 TABLET ORAL DAILY
Status: DISCONTINUED | OUTPATIENT
Start: 2025-08-06 | End: 2025-08-06

## 2025-08-06 RX ADMIN — HEPARIN SODIUM 5000 UNITS: 1000 INJECTION, SOLUTION INTRAVENOUS; SUBCUTANEOUS at 11:00

## 2025-08-06 RX ADMIN — HYDROMORPHONE HYDROCHLORIDE 0.2 MG: 0.2 INJECTION, SOLUTION INTRAMUSCULAR; INTRAVENOUS; SUBCUTANEOUS at 05:34

## 2025-08-06 RX ADMIN — FENTANYL CITRATE 50 MCG: 50 INJECTION INTRAMUSCULAR; INTRAVENOUS at 10:48

## 2025-08-06 RX ADMIN — AMIODARONE HYDROCHLORIDE 200 MG: 200 TABLET ORAL at 17:39

## 2025-08-06 RX ADMIN — SODIUM CHLORIDE 500 ML: 0.9 INJECTION, SOLUTION INTRAVENOUS at 17:37

## 2025-08-06 RX ADMIN — PAROXETINE HYDROCHLORIDE 40 MG: 10 TABLET, FILM COATED ORAL at 17:38

## 2025-08-06 RX ADMIN — ICOSAPENT ETHYL 2 G: 1 CAPSULE ORAL at 17:42

## 2025-08-06 RX ADMIN — AMIODARONE HYDROCHLORIDE 0.5 MG/MIN: 1.8 INJECTION, SOLUTION INTRAVENOUS at 05:57

## 2025-08-06 RX ADMIN — FAMOTIDINE 20 MG: 20 TABLET, FILM COATED ORAL at 17:38

## 2025-08-06 RX ADMIN — CEFAZOLIN SODIUM 2 G: 2 SOLUTION INTRAVENOUS at 10:37

## 2025-08-06 RX ADMIN — MECLIZINE HYDROCHLORIDE 25 MG: 25 TABLET ORAL at 17:37

## 2025-08-06 RX ADMIN — SODIUM BICARBONATE 1300 MG: 650 TABLET ORAL at 20:07

## 2025-08-06 RX ADMIN — CLOPIDOGREL BISULFATE 75 MG: 75 TABLET, FILM COATED ORAL at 17:38

## 2025-08-06 RX ADMIN — ISOSORBIDE DINITRATE 40 MG: 10 TABLET ORAL at 17:37

## 2025-08-06 RX ADMIN — LORAZEPAM 0.5 MG: 2 INJECTION INTRAMUSCULAR; INTRAVENOUS at 21:56

## 2025-08-06 RX ADMIN — HYDRALAZINE HYDROCHLORIDE 100 MG: 50 TABLET ORAL at 17:38

## 2025-08-06 RX ADMIN — SODIUM CHLORIDE 250 ML: 0.9 INJECTION, SOLUTION INTRAVENOUS at 15:04

## 2025-08-06 RX ADMIN — LORAZEPAM 0.5 MG: 2 INJECTION INTRAMUSCULAR; INTRAVENOUS at 05:42

## 2025-08-06 RX ADMIN — SODIUM CHLORIDE 200 ML: 0.9 INJECTION, SOLUTION INTRAVENOUS at 15:00

## 2025-08-06 RX ADMIN — HEPARIN SODIUM 1900 UNITS: 1000 INJECTION, SOLUTION INTRAVENOUS; SUBCUTANEOUS at 17:40

## 2025-08-06 RX ADMIN — Medication: at 15:08

## 2025-08-06 RX ADMIN — CARVEDILOL 25 MG: 25 TABLET, FILM COATED ORAL at 17:37

## 2025-08-06 RX ADMIN — MIDAZOLAM 2 MG: 1 INJECTION INTRAMUSCULAR; INTRAVENOUS at 10:48

## 2025-08-06 RX ADMIN — LIDOCAINE HYDROCHLORIDE,EPINEPHRINE BITARTRATE 15 ML: 10; .01 INJECTION, SOLUTION INFILTRATION; PERINEURAL at 10:56

## 2025-08-06 ASSESSMENT — ACTIVITIES OF DAILY LIVING (ADL)
ADLS_ACUITY_SCORE: 72
ADLS_ACUITY_SCORE: 74
ADLS_ACUITY_SCORE: 74
ADLS_ACUITY_SCORE: 64
ADLS_ACUITY_SCORE: 78
ADLS_ACUITY_SCORE: 74
ADLS_ACUITY_SCORE: 78
ADLS_ACUITY_SCORE: 74
ADLS_ACUITY_SCORE: 68
ADLS_ACUITY_SCORE: 64
ADLS_ACUITY_SCORE: 78
ADLS_ACUITY_SCORE: 72
ADLS_ACUITY_SCORE: 72
ADLS_ACUITY_SCORE: 74
ADLS_ACUITY_SCORE: 78
ADLS_ACUITY_SCORE: 74
ADLS_ACUITY_SCORE: 78
ADLS_ACUITY_SCORE: 78
ADLS_ACUITY_SCORE: 64
ADLS_ACUITY_SCORE: 64
ADLS_ACUITY_SCORE: 68
ADLS_ACUITY_SCORE: 74
ADLS_ACUITY_SCORE: 74

## 2025-08-06 NOTE — CARE PLAN
Tunneled CVC placement performed without complication.  Pt tolerated procedure well.  Pt returned to PCU.  Report given to receiving RN.

## 2025-08-06 NOTE — PROGRESS NOTES
Renal Medicine Progress Note            Assessment/Plan:     Assessment:    Hypertensive emergency   Acute hypoxic respiratory failure   Acute on chronic diastolic HF   History of R renal artery stenting  Labile HTN  Bipap dependent hypoxia, pulmonary edema on CXR. . BNP 25K. TTE with EF 55-60%, elevated L sided pressures and pulmonary HTN, but RA pressure estimated low.    - Labile BP, history of renal artery stenosis status post stenting-> Doppler on 7/31-no evidence of significant JACY  - Metanephrines pending        ROMI on CKD IV   Previously  baseline 1.3-1.5, worsening in May/June 2025, new baseline appears to be ~3 with nephrotic range proteinuria. 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 This is most commonly seen in DM2, however can also be seen with smoking and HTN. Bx also showed cholesterol emboli (likely due to endograft repair).  Cr on admission 4.25, worse than recent baseline. In setting of hypertensive emergency, pulmonary edema, LE edema. Bx results:       Metabolic acidosis-  Stop sodium bicarb on Dialysis    R lung adenocarcinoma   Received radiation in 2021.     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  Has bibasilar crackles.  Minimal response to high dose Bumex drip.   HIgh risk of requiring intubation / vent . Will start on dialysis today . 2-2.5 L UF and see if resp status improves.   Discontinue Bumex drip, oral sodium bicarb .   Repeat HD tomorrow  Discussed with  and daughter at bedside.   -Low-sodium diet.  P.o. fluid restriction of 1500 cc/day    Discussed with ICU team in person          Interval History:     REmains on BiPap.   Failed high dose Bumex drip   In sinus rhythm  High risk of progressing to intubation / mechanical vent.   Had TDc placed by IR today .              Medications and Allergies:     Current  Facility-Administered Medications   Medication Dose Route Frequency Provider Last Rate Last Admin    carvedilol (COREG) tablet 25 mg  25 mg Oral BID w/meals Josemanuel Cohen MD   25 mg at 08/05/25 1828    cloNIDine (CATAPRES-TTS3) 0.3 MG/24HR WK patch 1 patch  1 patch Transdermal Weekly Virgilio Patel MD   1 patch at 07/31/25 1432    And    cloNIDine (CATAPRES-TTS1) Patch in Place   Transdermal Q8H [facility] Virgilio Patel MD        clopidogrel (PLAVIX) tablet 75 mg  75 mg Oral Daily Virgilio Patel MD   75 mg at 08/05/25 1000    famotidine (PEPCID) tablet 20 mg  20 mg Oral Q48H Jonathan Light MD   20 mg at 08/04/25 0849    [Held by provider] furosemide (LASIX) tablet 40 mg  40 mg Oral BID Vee Encinas MD   40 mg at 07/30/25 1534    sodium chloride 0.9% DIALYSIS Cath LOCK - BLUE Lumen  1.3-2.6 mL Intracatheter Once Isreal Dunn PA-C        Followed by    heparin 1000 unit/mL DIALYSIS Cath LOCK - BLUE Lumen  3 mL Intracatheter Once Isreal Dunn PA-C        sodium chloride 0.9% DIALYSIS Cath LOCK - RED Lumen  1.3-2.6 mL Intracatheter Once Isreal Dunn PA-C        Followed by    heparin 1000 unit/mL DIALYSIS Cath LOCK - RED Lumen  3 mL Intracatheter Once Isreal Dunn PA-C        sodium chloride 0.9% DIALYSIS Cath LOCK - RED Lumen  10 mL Intracatheter Once in dialysis/CRRT Jamie Fragoso MD        Followed by    heparin 1000 unit/mL DIALYSIS Cath LOCK - RED Lumen  1.3-2.6 mL Intracatheter Once in dialysis/CRRT Jamie Fragoso MD        sodium chloride 0.9% DIALYSIS Cath LOCK - BLUE Lumen  10 mL Intracatheter Once in dialysis/CRRT Jamie Fragoso MD        Followed by    heparin 1000 unit/mL DIALYSIS Cath LOCK -BLUE Lumen  1.3-2.6 mL Intracatheter Once in dialysis/CRRT Jamie Fragoso MD        hydrALAZINE (APRESOLINE) tablet 100 mg  100 mg Oral TID Mariano Tran MD   100 mg at 08/05/25 0351    icosapent ethyl (VASCEPA) capsule 2 g  2 g Oral BID w/meals Virgilio Patel MD   2  "g at 08/05/25 1829    isosorbide dinitrate (ISORDIL) tablet 40 mg  40 mg Oral TID Mariano Tran MD   40 mg at 08/05/25 1310    meclizine (ANTIVERT) tablet 25 mg  25 mg Oral Q6H Virgilio Patel MD   25 mg at 08/05/25 1828    [Held by provider] NIFEdipine ER OSMOTIC (PROCARDIA XL) 24 hr tablet 90 mg  90 mg Oral Daily Virgilio Patel MD   90 mg at 08/05/25 1000    No heparin via hemodialysis machine   Does not apply Once Jamie Fragoso MD        PARoxetine (PAXIL) tablet 40 mg  40 mg Oral Daily Virgilio Patel MD   40 mg at 08/05/25 1000    sodium bicarbonate tablet 1,300 mg  1,300 mg Oral BID Virgilio Patel MD   1,300 mg at 08/05/25 2119    sodium chloride (PF) 0.9% PF flush 3 mL  3 mL Intracatheter Q8H [facility] Gilmer Swain MD   3 mL at 08/06/25 0545    sodium chloride 0.9% BOLUS 200 mL  200 mL Hemodialysis Machine Once Jamie Fragoso MD        sodium chloride 0.9% BOLUS 250 mL  250 mL Intravenous Once in dialysis/CRRT Jamie Fragoso MD        sodium chloride 0.9% BOLUS 500 mL  500 mL Hemodialysis Machine Once Jamie Fragoso MD            Allergies   Allergen Reactions    Iodine Hives            Physical Exam:   Vitals were reviewed  /63 (BP Location: Right arm)   Pulse 60   Temp 96.8  F (36  C) (Axillary)   Resp 16   Ht 1.626 m (5' 4\")   Wt 56.2 kg (123 lb 14.4 oz)   SpO2 93%   BMI 21.27 kg/m      Wt Readings from Last 3 Encounters:   08/04/25 56.2 kg (123 lb 14.4 oz)   07/23/25 54.4 kg (120 lb)   07/21/25 54.1 kg (119 lb 3.2 oz)       GENERAL APPEARANCE: on Bipap   HEENT: normocephalic, dry MM   RESP: coarse, basal crackles noted.  Bilateral rhonchi  CV: irregular   EXTREMITIES/SKIN:1+ edema around thighs   NEURO:  sleeping   Rt internal jugular TDC            Data:     BMP  Recent Labs   Lab 08/06/25  0557 08/06/25  0342 08/05/25  2354 08/05/25  0539 08/04/25  0528 08/03/25  0750 08/03/25  0546     --   --  133* 135  --  137   POTASSIUM 3.6  --   --  4.1 3.7  --  3.7   CHLORIDE 92*  --  " " --  92* 94*  --  96*   BRUCE 8.7*  --   --  8.9 8.5*  --  8.9   CO2 25  --   --  25 25  --  26   BUN 66.0*  --   --  60.3* 61.3*  --  59.7*   CR 3.56*  --   --  3.56* 3.67*  --  3.41*   * 122* 115* 154* 98   < > 127*    < > = values in this interval not displayed.     CBC  Recent Labs   Lab 08/06/25  0557 08/05/25  0539 08/04/25  0528 08/03/25  0546   WBC 9.4 9.9 9.2 8.8   HGB 9.1* 9.5* 8.5* 9.0*   HCT 27.2* 29.5* 26.0* 27.0*   * 105* 104* 102*    423 369 462*     Lab Results   Component Value Date    AST 22 07/17/2025    ALT 11 07/17/2025    ALKPHOS 61 07/17/2025    BILITOTAL 0.3 07/17/2025     No results found for: \"INR\"  Color Urine (no units)   Date Value   09/09/2016 Light Yellow     Appearance Urine (no units)   Date Value   09/09/2016 Clear     Glucose Urine (mg/dL)   Date Value   09/09/2016 Negative     Bilirubin Urine (no units)   Date Value   09/09/2016 Negative     Ketones Urine (mg/dL)   Date Value   09/09/2016 Negative     Specific Gravity Urine (no units)   Date Value   09/09/2016 1.010     pH Urine (pH)   Date Value   09/09/2016 5.5     Protein Albumin Urine (mg/dL)   Date Value   09/09/2016 Negative     Nitrite Urine (no units)   Date Value   09/09/2016 Negative     Leukocyte Esterase Urine (no units)   Date Value   09/09/2016 Negative         Attestation:  I have reviewed today's vital signs, notes, medications, labs and imaging.    Jamie Fragoso MD  [facility] - Nephrology  Office: [phone]    "

## 2025-08-06 NOTE — PROGRESS NOTES
Brief cardiology note    Chart reviewed, rhythm has been sinus in the past 24 hours.  She has been started on amiodarone, recommend continuing this.  Also discussed with nephrology and dialysis will be started for fluid removal as she has failed aggressive diuretic therapy.  Please call with further questions if recurrent issues with A-fib.    Pernell Amezcua MD  Cardiology - [facility]  Pager: [phone]  Text Page  August 6, 2025

## 2025-08-06 NOTE — PRE-PROCEDURE
GENERAL PRE-PROCEDURE:   Date/Time:  8/6/2025 10:45 AM    Verbal consent obtained?: Yes    Risks and benefits: Risks, benefits and alternatives were discussed    Consent given by:  Patient  Patient states understanding of procedure being performed: Yes    Patient's understanding of procedure matches consent: Yes    Procedure consent matches procedure scheduled: Yes    Appropriately NPO:  Yes  ASA Class:  2  Lungs:  Lungs clear with good breath sounds bilaterally  Heart:  Normal heart sounds and rate  History & Physical reviewed:  History and physical reviewed and no updates needed  Statement of review:  I have reviewed the lab findings, diagnostic data, medications, and the plan for sedation

## 2025-08-06 NOTE — PROGRESS NOTES
Procedure Tunneled CVC placement 14.5 Fr x 23 cm   Versed 2 mg  Fentanyl 50 mcg   Sedation time 10 min  1% Lido with Epi 15 ml  Fluoro time 0.3 min  AK 1 mGy  2 gm Ancef IV

## 2025-08-06 NOTE — PLAN OF CARE
ICU End of Shift Summary. For vital signs, labs, and complete assessment please see Documentation Flowsheet    Pertinent Assessment:  A/O x4  Tele: SR w/  borderline 1st degree AVB  Lungs coarse w/ inspiratory and expiratory wheezes  Abd soft, unable to pass stool burden  Voiding via ex cath  Trace edema  Encouraged to take PO intake  Major Shift Events:  IR placed tunneled cath  Off amio, bumex, and nitroglycerin  Off bipap at 1530, on 6LPM NC  Dialysis removed 2.5 L  Enema given, hard stool passed  TF/ [place] feed not placed- pt tolerating off bipap and willing to receive orally ingest food  Discharge/Transfer needs: TBD    Bedside shift Report Completed: Y  Bedside Safety Check Completed: Y    Problem: Adult Inpatient Plan of Care  Goal: Plan of Care Review  Description: The Plan of Care Review/Shift note should be completed every shift.  The Outcome Evaluation is a brief statement about your assessment that the patient is improving, declining, or no change.  This information will be displayed automatically on your shift  note.  Outcome: Progressing  Flowsheets (Taken 8/6/2025 1453)  Outcome Evaluation: see note  Plan of Care Reviewed With: patient  Overall Patient Progress: no change  Goal: Absence of Hospital-Acquired Illness or Injury  Intervention: Identify and Manage Fall Risk  Recent Flowsheet Documentation  Taken 8/6/2025 1313 by Magaly Mooney, RN  Safety Promotion/Fall Prevention:   activity supervised   assistive device/personal items within reach   clutter free environment maintained   increased rounding and observation   increase visualization of patient   patient and family education   room organization consistent   safety round/check completed  Taken 8/6/2025 0900 by Magaly Mooney, RN  Safety Promotion/Fall Prevention:   activity supervised   assistive device/personal items within reach   clutter free environment maintained   increased rounding and observation   increase visualization of  patient   patient and family education   room organization consistent   safety round/check completed  Intervention: Prevent Skin Injury  Recent Flowsheet Documentation  Taken 8/6/2025 1400 by Magaly Mooney RN  Body Position:   turned   left   side-lying  Taken 8/6/2025 1313 by Magaly Mooney RN  Body Position:   refuses positioning   position maintained  Skin Protection:   adhesive use limited   incontinence pads utilized   skin to device areas padded   skin to skin areas padded   transparent dressing maintained   tubing/devices free from skin contact  Taken 8/6/2025 1128 by Magaly Mooney RN  Body Position:   turned   right   side-lying  Taken 8/6/2025 0900 by Magaly Mooney RN  Body Position:   turned   weight shifting   heels elevated   upper extremity elevated  Skin Protection:   adhesive use limited   incontinence pads utilized   skin to device areas padded   skin to skin areas padded   transparent dressing maintained   tubing/devices free from skin contact  Intervention: Prevent and Manage VTE (Venous Thromboembolism) Risk  Recent Flowsheet Documentation  Taken 8/6/2025 1313 by Magaly Mooney RN  VTE Prevention/Management: SCDs on (sequential compression devices)  Intervention: Prevent Infection  Recent Flowsheet Documentation  Taken 8/6/2025 1313 by Magaly Mooney RN  Infection Prevention:   equipment surfaces disinfected   hand hygiene promoted   personal protective equipment utilized   rest/sleep promoted   single patient room provided  Taken 8/6/2025 0900 by Magaly Mooney RN  Infection Prevention:   equipment surfaces disinfected   hand hygiene promoted   personal protective equipment utilized   rest/sleep promoted   single patient room provided  Goal: Optimal Comfort and Wellbeing  Intervention: Provide Person-Centered Care  Recent Flowsheet Documentation  Taken 8/6/2025 1313 by Magaly Mooney RN  Trust Relationship/Rapport:   care explained    emotional support provided   questions answered  Taken 8/6/2025 0900 by Magaly Mooney RN  Trust Relationship/Rapport:   care explained   emotional support provided   questions answered     Problem: Delirium  Goal: Optimal Coping  Intervention: Optimize Psychosocial Adjustment to Delirium  Recent Flowsheet Documentation  Taken 8/6/2025 1313 by Magaly Mooney RN  Supportive Measures:   active listening utilized   relaxation techniques promoted   positive reinforcement provided  Family/Support System Care: self-care encouraged  Taken 8/6/2025 0900 by Magaly Mooney RN  Supportive Measures:   active listening utilized   relaxation techniques promoted   positive reinforcement provided  Family/Support System Care: self-care encouraged  Goal: Improved Behavioral Control  Intervention: Prevent and Manage Agitation  Recent Flowsheet Documentation  Taken 8/6/2025 1313 by Magaly Mooney RN  Environment Familiarity/Consistency: familiar objects from home provided  Taken 8/6/2025 0900 by Magaly Mooney RN  Environment Familiarity/Consistency: familiar objects from home provided  Intervention: Minimize Safety Risk  Recent Flowsheet Documentation  Taken 8/6/2025 1313 by Magaly Mooney RN  Enhanced Safety Measures:   patient/family teach back on injury risk   review medications for side effects with activity  Trust Relationship/Rapport:   care explained   emotional support provided   questions answered  Taken 8/6/2025 0900 by Magaly Mooney RN  Enhanced Safety Measures:   patient/family teach back on injury risk   review medications for side effects with activity  Trust Relationship/Rapport:   care explained   emotional support provided   questions answered  Goal: Improved Attention and Thought Clarity  Intervention: Maximize Cognitive Function  Recent Flowsheet Documentation  Taken 8/6/2025 1313 by Magaly Mooney RN  Sensory Stimulation Regulation: care  clustered  Reorientation Measures: clock in view  Taken 8/6/2025 0900 by Magaly Mooney RN  Sensory Stimulation Regulation: care clustered  Reorientation Measures: clock in view  Goal: Improved Sleep  Intervention: Promote Sleep  Recent Flowsheet Documentation  Taken 8/6/2025 1313 by Magaly Mooney RN  Sleep/Rest Enhancement: natural light exposure provided  Taken 8/6/2025 0900 by Magaly Mooney RN  Sleep/Rest Enhancement: natural light exposure provided     Problem: Skin Injury Risk Increased  Goal: Skin Health and Integrity  Intervention: Plan: Nurse Driven Intervention: Positioning  Recent Flowsheet Documentation  Taken 8/6/2025 1313 by Magaly Mooney RN  Plan: Positioning Interventions:   REPOSITION Left/Right (No supine) q2h   HOB 30 degrees or less   OFF-LOAD HEELS with pillows  Taken 8/6/2025 0900 by Magaly Mooney RN  Plan: Positioning Interventions:   REPOSITION Left/Right (No supine) q2h   HOB 30 degrees or less   OFF-LOAD HEELS with pillows  Intervention: Plan: Nurse Driven Intervention: Moisture Management  Recent Flowsheet Documentation  Taken 8/6/2025 1313 by Magaly Mooney RN  Moisture Interventions:   No brief in bed   Incontinence pad   Urinary collection device  Taken 8/6/2025 0900 by Magaly Mooney RN  Moisture Interventions:   No brief in bed   Incontinence pad   Urinary collection device  Bathing/Skin Care:   bath, complete   electrode patches/site rotation   incontinence care   linen changed  Intervention: Plan: Nurse Driven Intervention: Friction and Shear  Recent Flowsheet Documentation  Taken 8/6/2025 1313 by Magaly Mooney RN  Friction/Shear Interventions:   HOB 30 degrees or less   Silicone foam sacral dressing   Assistive lifting device (portable/ceiling lift, etc.)   Repositioning device (TAP system, etc.)  Taken 8/6/2025 0900 by Magaly Mooney RN  Friction/Shear Interventions:   HOB 30 degrees or less   Silicone foam sacral  dressing   Assistive lifting device (portable/ceiling lift, etc.)   Repositioning device (TAP system, etc.)  Intervention: Optimize Skin Protection  Recent Flowsheet Documentation  Taken 8/6/2025 1400 by Magaly Mooney RN  Head of Bed ([facility]) Positioning: HOB at 30 degrees  Taken 8/6/2025 1313 by Magaly Mooney RN  Skin Protection:   adhesive use limited   incontinence pads utilized   skin to device areas padded   skin to skin areas padded   transparent dressing maintained   tubing/devices free from skin contact  Activity Management: up in chair  Taken 8/6/2025 1128 by Magaly Mooney RN  Head of Bed ([facility]) Positioning: HOB at 30 degrees  Taken 8/6/2025 0900 by Magaly Mooney RN  Skin Protection:   adhesive use limited   incontinence pads utilized   skin to device areas padded   skin to skin areas padded   transparent dressing maintained   tubing/devices free from skin contact  Activity Management: up in chair  Intervention: Promote and Optimize Oral Intake  Recent Flowsheet Documentation  Taken 8/6/2025 1313 by Magaly Mooney RN  Oral Nutrition Promotion: rest periods promoted  Taken 8/6/2025 0900 by Magaly Mooney RN  Oral Nutrition Promotion: rest periods promoted     Problem: Breathing Pattern Ineffective  Goal: Effective Breathing Pattern  Intervention: Promote Improved Breathing Pattern  Recent Flowsheet Documentation  Taken 8/6/2025 1400 by Magaly Mooney RN  Head of Bed ([facility]) Positioning: HOB at 30 degrees  Taken 8/6/2025 1313 by Magaly Mooney RN  Breathing Techniques/Airway Clearance: deep/controlled cough encouraged  Supportive Measures:   active listening utilized   relaxation techniques promoted   positive reinforcement provided  Airway/Ventilation Management:   airway patency maintained   calming measures promoted   pulmonary hygiene promoted   positive pressure ventilation provided  Taken 8/6/2025 1128 by Magaly Mooney RN  Head of Bed ([facility])  Positioning: HOB at 30 degrees  Taken 8/6/2025 0900 by Magaly Mooney RN  Breathing Techniques/Airway Clearance: deep/controlled cough encouraged  Supportive Measures:   active listening utilized   relaxation techniques promoted   positive reinforcement provided  Airway/Ventilation Management:   airway patency maintained   calming measures promoted   pulmonary hygiene promoted   positive pressure ventilation provided     Problem: Comorbidity Management  Goal: Maintenance of COPD Symptom Control  Intervention: Maintain COPD Symptom Control  Recent Flowsheet Documentation  Taken 8/6/2025 1313 by Magaly Mooney RN  Breathing Techniques/Airway Clearance: deep/controlled cough encouraged  Medication Review/Management: medications reviewed  Taken 8/6/2025 0900 by Magaly Mooney RN  Breathing Techniques/Airway Clearance: deep/controlled cough encouraged  Medication Review/Management: medications reviewed  Goal: Blood Pressure in Desired Range  Intervention: Maintain Blood Pressure Management  Recent Flowsheet Documentation  Taken 8/6/2025 1313 by Magaly Mooney RN  Medication Review/Management: medications reviewed  Taken 8/6/2025 0900 by Magaly Mooney RN  Medication Review/Management: medications reviewed     Problem: Hypertension Acute  Goal: Blood Pressure Within Desired Range  Intervention: Normalize Blood Pressure  Recent Flowsheet Documentation  Taken 8/6/2025 1313 by Magaly Mooney RN  Sensory Stimulation Regulation: care clustered  Medication Review/Management: medications reviewed  Taken 8/6/2025 0900 by Magaly Mooney RN  Sensory Stimulation Regulation: care clustered  Medication Review/Management: medications reviewed   Goal Outcome Evaluation:      Plan of Care Reviewed With: patient    Overall Patient Progress: no changeOverall Patient Progress: no change    Outcome Evaluation: see note

## 2025-08-06 NOTE — PROGRESS NOTES
Potassium   Date Value Ref Range Status   08/06/2025 3.6 3.4 - 5.3 mmol/L Final   07/17/2018 4.1 3.4 - 5.3 mmol/L Final     Magnesium   Date Value Ref Range Status   08/06/2025 2.1 1.7 - 2.3 mg/dL Final     Phosphorus   Date Value Ref Range Status   08/06/2025 4.9 (H) 2.5 - 4.5 mg/dL Final     Hemoglobin   Date Value Ref Range Status   08/06/2025 9.1 (L) 11.7 - 15.7 g/dL Final   09/09/2016 12.8 11.7 - 15.7 g/dL Final     Creatinine   Date Value Ref Range Status   08/06/2025 3.56 (H) 0.51 - 0.95 mg/dL Final   07/17/2018 1.49 (H) 0.52 - 1.04 mg/dL Final     Urea Nitrogen   Date Value Ref Range Status   08/06/2025 66.0 (H) 8.0 - 23.0 mg/dL Final   07/17/2018 31 (H) 7 - 30 mg/dL Final     DIALYSIS PROCEDURE NOTE  Hepatitis status of previous patient on machine log was checked and verified ok to use with this patients hepatitis status.  Patient dialyzed for 2.5 hrs. on a K3 bath with a net fluid removal of  2.5 L.  A BFR of 200 ml/min was obtained via a R tunneled CVC.      The treatment plan was discussed with Dr. Fragoso during the treatment.    Total heparin received during the treatment: 0 units.   Line flushed, clamped and capped with heparin 1:1000 1.9 mL (1900 units) per lumen    Meds  given: none   Complications: Arterial spasm, lines reversed, and fluctuations manageable.    Person educated: patient and family. Knowledge base limited. Barriers to learning: pt lethargic, none with family. Educated on procedure via verbal mode. Patient and family verbalized understanding.   ICEBOAT? Timeout performed pre-treatment  I: Patient was identified using 2 identifiers  C:  Consent Signed Yes  E: Equipment preventative maintenance is current and dialysis delivery system OK to use  B:    Latest Reference Range & Units 08/05/25 12:23   Hepatitis B Core Patricia Nonreactive  Nonreactive   Hepatitis B Surface Antibody Instrument Value <8.5 m[IU]/mL <3.50   Hepatitis B Surface Antibody  Nonreactive     O: Dialysis orders present and  complete prior to treatment  A: Vascular access verified and assessed prior to treatment  T: Treatment was performed at a clinically appropriate time  ?: Patient was allowed to ask questions and address concerns prior to treatment  See Adult Hemodialysis flowsheet in EPIC for further details and post assessment.  Machine water alarm in place and functioning. Transducer pods intact and checked every 15min.   Pt assisted with repositioning throughout dialysis treatment.  Chlorine/Chloramine water system checked every 4 hours.  Outpatient Dialysis at TBD    Post treatment report given to BOWEN [place] regarding 2.5 L of fluid removed, last /99.    Toyin Rao RN

## 2025-08-06 NOTE — CONSULTS
CLINICAL NUTRITION SERVICES - REASSESSMENT NOTE     RECOMMENDATIONS FOR MDs/PROVIDERS TO ORDER:  Diet order per provider  -- Consider liberalizing diet as appropriate to allow for more meal ordering options and increased oral acceptance     Registered Dietitian Interventions:  Confirm tube tip before starting feeds.   - Starting with a renal friendly formula given high baseline phosphorus value     Nutrition Support Enteral:  Type of Feeding Tube: NGT/NDT? To be placed  Enteral Frequency:  Continuous  Enteral Regimen:  Nepro @ goal 35 ml/hr (840 ml/day) to provide 1512 kcals (27 kcal/kg/day), 68 g PRO (1.2 g/kg/day), 613 ml free H2O, 135 g CHO and 21 g Fiber daily.   +Renal MVI to replace vitamins lost with dialysis    Free Water Flush: 30 mL q 4 for tube patency - total FW per Nephrology   Total FW from TF + FWF = 793 mL (within fluid restriction)     Advancement: Start TF at 15 mL/hr x 12 hours. If tolerating and labs are WNL, okay to advance by 15 mL/hr every 10 hours until goal rate is reached.     High risk of refeeding syndrome - please monitor lytes closely and replace as indicated    Continue with Nepro oral nutrition supplement offerings - Encourage oral intake as tolerated        INFORMATION OBTAINED  Via chart review. Patient discussed during IDT rounds this morning.     Reviewed Provider Order for - Registered Dietitian to order TF per Medical Nutrition Therapy Guidelines   - Nutrition team has been following patient since 7/29, please review previous notes for comprehensive admission details    CURRENT NUTRITION ORDERS  Diet: Renal (dialysis)   Snacks/Supplements: Nepro BID      CURRENT INTAKE/TOLERANCE  Patient is now day 9 of 0-25% documented intakes. Poor intakes related BiPAP/respiratory needs. Currently tolerating NC and able to place a keofeed?      NEW FINDINGS  GI: unknown last BM; Emesis noted 8/3    Skin/wounds: trace edema    Nutrition-relevant labs: BUN 66, Cr 3.56, Phos  4.9    Nutrition-relevant medications: bumex drip 8/5-8/6 - now discontinued    Weight: reviewed  Vitals:    07/28/25 1400 07/30/25 0600 08/04/25 0800   Weight: 54.8 kg (120 lb 13 oz) 53.8 kg (118 lb 9.7 oz) 56.2 kg (123 lb 14.4 oz)        ASSESSED NUTRITION NEEDS  Dosing Weight: 54.8 kg, based on admission wt  Estimated Energy Needs: 0728-2978 kcals/day (25 - 35 kcals/kg)  Justification: Repletion and Underweight for age  Estimated Protein Needs: >/= 55-66 grams protein/day (>/=1-1.2 gram of pro/kg)  Justification: Preservation of LBM, HD  Estimated Fluid Needs: per provider while pt remains ICU status    MALNUTRITION  % Intake: </= 50% for >/= 5 days (severe) and </=75% for >/= 1 month (severe)  % Weight Loss: Weight loss does not meet criteria   Subcutaneous Fat Loss: Orbital: Mild, Buccal: Mild, and Fat overlying the ribs: Mild  Muscle Loss: Temples (temporalis muscle): Mild, Clavicles (pectoralis and deltoids): Mild, Shoulders (deltoids): Moderate, Interosseous muscles: Moderate, and Scapula (latissimus dorsi, trapezious, deltoids): Moderate  Fluid Accumulation/Edema: Mild, 1+  Malnutrition Diagnosis: Severe malnutrition in the context of acute illness or injury  Malnutrition Present on Admission: Yes    EVALUATION OF THE PROGRESS TOWARD GOALS   Previous Goals  Pt to consume >/= 50% of nutritionally adequate meals or supplements TID vs initiation of nutrition support within 1-2 days.  Evaluation: Not progressing    Previous Nutrition Diagnosis  Inadequate oral intake related to flash pulmonary edema leading to SOB requiring BIPAP as evidenced by BIPAP use over the last several days, PO intake of 0-25% of meals/supplements, documented poor appetite, malnutrition documented, and review of [facility].  Evaluation: No change    NUTRITION DIAGNOSIS  Inadequate oral intake related to respiratory status as evidenced by inability to tolerate adequate PO during admission, need for EN to meet nutrition needs.  "    INTERVENTIONS  Collaboration by nutrition professional with other providers  Enteral nutrition management    GOALS  Tolerate EN  EN to meet % of estimated needs  PO intake as tolerate      MONITORING/EVALUATION  Progress toward goals will be monitored and evaluated per policy.      Ruth Cortes MS, RD, LD  [facility] [employer]: \"Dietitian [[place]]\"  Office Phone: [phone]  "

## 2025-08-06 NOTE — PROGRESS NOTES
[facility]    Medicine Progress Note - Hospitalist Service    Date of Admission:  7/28/2025    Assessment & Plan     75-year-old female with history of f CAD, CKD stage IV, hypertension, hyperlipidemia, intra-abdominal aortic aneurysm with prior stenting, chronic vertigo, recent hospitalization for fall with pelvic fracture who presents the ED for shortness of breath.      Had elevated D-dimer but VQ scan negative for PE, CT not done due to CKD.  proBNP more than 25,000.  Chest x-ray showed bilateral perihilar interstitial/airspace opacities, left greater than right, are nonspecific for postradiation change versus pneumonia. A small nodule in the peripheral right upper lobe measures 9 mm.     Found to be in hypertensive emergency, admitted to ICU with BiPAP and nitroglycerin drip.  She was weaned off nitroglycerin drip and BiPAP but has had repeated episodes of rising blood pressure and shortness of breath needing her to go back on BiPAP intermittently.  She was transferred to floor on 7/31 but came back to ICU on 8/2 after rapid response for flash pulmonary edema and hypertensive emergency.    On the morning of 8/14, patient went into A-fib with initial rates of 130s and then decreased to 40s without intervention.  She converted back to sinus rhythm and about half an hour.    Acute hypoxic respiratory failure: Multifactorial  Diastolic CHF exacerbation  Recurrent flash pulmonary edema associated with episodes of high blood pressure.  Hypertensive emergency  Chronic hypertension on multiple medications  End-stage renal disease:  - Echo with EF of 55 to 60%.  Unclear what is causing recurrent sudden episodes of elevated blood pressure and flash pulmonary edema.  She has history of renal artery stenting and on renal arterial ultrasound done on 7/31 there was no sonographic evidence of renal artery stenosis.  Pending metanephrines.  - Increased carvedilol to 25 mg twice daily, hydralazine 200 mg  3 times daily and Isordil increased to 40 mg 3 times daily.  Continued clonidine patch 0.3 mg weekly and nifedipine 90 mg daily.  Also started on the Lasix 40 mg IV twice daily.  -Was empirically started on ceftriaxone on presentation.  Patient had no fever or leukocytosis.  Mildly elevated procalcitonin of 0.67 which is difficult to interpret in ROMI/CKD and improved to 0.49 on recheck.  Finished 5-day course of ceftriaxone.    - Patient continues to have second respiratory distress and requiring BiPAP.  Able to wean off to facemask briefly last night for few hours at 6 L/min.  She subsequently developed severe distress and required application of BiPAP.  Currently patient is on BiPAP 10/5, rate of 14, FiO2 50.  Dialysis planned today after dialysis catheter is placed.  She may hopefully improve in terms of her breathing so that she will be able to take oral nutrition otherwise she needs to have Keofeed tube placement.        ROMI on CKD stage IV:  Metabolic acidosis:  - Previous baseline of 1.3-1.5 which worsened in May/June 2025 and new baseline appears to be 3 with nephrotic range proteinuria.  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  - Creatinine on admission was 4.25, improved but remained high  3.41 on August 4.    -- Nephrology was consulted and recommendation obtained to treat with IV Bumex and eventually decided to place dialysis catheter for initiating hemodialysis.  -- Currently the plan is to initiate dialysis after placement of dialysis catheter by interventional radiology.  Dr. Fragoso graciously assisting patient's care and following her progress.  He recommended to discontinue Bumex drip, start oral sodium bicarb, low-sodium diet, 1500 cc fluid restriction.      Atrial fibrillation: Paroxysmal atrial fibrillation  - On 8/4, patient had a self-limiting 30 minutes episode of atrial fibrillation initially with RVR to 130 and then bradycardia to  40.  - Cardiology was reconsulted, advised 150 mg amiodarone IV push, and the plan was no further amiodarone if patient remains sinus.    -- However, patient developed intermittent episodes of atrial fibrillation with RVR.  Cardiology recommended amiodarone drip and planning to transition to oral amiodarone 200 mg once a day for about a month.  Continue Coreg.  Continue to monitor on telemetry monitoring.  In terms of anticoagulation.  Cardiology recommended to hold it for now and may consider starting anticoagulation for a few weeks or a month on discharge.  Patient is already on Plavix.        Malnutrition: Moderate nutrition in the context of acute on chronic medical illness.  - Patient was unable to take adequate oral intake due to dependence on BiPAP.  -Registered dietitian is following and recommendation appreciated  - Initially the plan was to start her on TPN but her condition slightly improved and she was able to tolerate of BiPAP for few hours last night.  Will continue to monitor her and may need a Keofeed placement for nutrition support.  If patient is able to take oral nutrition, no urgent need for tube feed     Constipation: Stool impaction, manual disimpaction if she allows otherwise tapwater enema..    Chronic medical conditions:  - AAA.  Endoleak status post recent endovascular aneurysm repair on 4/25.  Continue Plavix and statin.  - History of CVA.  Chronic infarct in the right basal ganglia noted on CT.  - Right lung adenocarcinoma.  Status post stereotactic body radiation therapy in 2021, follows up in [place].  His lung nodule on x-ray, can follow-up with her primary oncologist.  - Chronic vertigo.  Follows up with ENT in [place] and on as needed meclizine.  MRI brain on 5/22 was negative for acute pathology.  - Depression pleasant lady.  Continue paroxetine.  - Hypertriglyceridemia.  On Vascepa.      Goal of care: Restorative        Diet: Snacks/Supplements Adult: Nepro Oral Supplement; Between  Meals  Fluid restriction 1500 ML FLUID  Combination Diet Renal Diet (dialysis), Renal Diet (non-dialysis)    DVT Prophylaxis: Pneumatic Compression Devices  Rao Catheter: Not present  Lines: PRESENT      CVC Double Lumen Right Subclavian Tunneled;Hemodialysis/CRRT-Site Assessment: WDL except;Ecchymotic (just above the dressing)    Cardiac Monitoring: ACTIVE order. Indication: Acute decompensated heart failure (48 hours)  Code Status: Full Code      Clinically Significant Risk Factors         # Hyponatremia: Lowest Na = 133 mmol/L in last 2 days, will monitor as appropriate  # Hypochloremia: Lowest Cl = 92 mmol/L in last 2 days, will monitor as appropriate            # Hypertension: Noted on problem list             # Severe Malnutrition: based on nutrition assessment and treatment provided per dietitian's recommendations.    # Financial/Environmental Concerns: none         Social Drivers of Health    Depression: At risk (6/5/2025)    Received from [employer]    PHQ-2     Depression Risk: 3   Tobacco Use: High Risk (7/31/2025)    Received from [employer]    Patient History     Smoking Tobacco Use: Every Day     Smokeless Tobacco Use: Never     Passive Exposure: Current   Interpersonal Safety: Unknown (7/28/2025)    Interpersonal Safety     Do you feel physically and emotionally safe where you currently live?: Patient unable to answer     Within the past 12 months, have you been hit, slapped, kicked or otherwise physically hurt by someone?: Patient unable to answer     Within the past 12 months, have you been humiliated or emotionally abused in other ways by your partner or ex-partner?: Patient unable to answer          Disposition Plan     Anticipate discharge disposition: To be determined, likely TCU    Medically Ready for Discharge:  Anticipated in 2-4 Days pending improvement in respiratory status.  She probably needs several days of hospitalization given initiation of dialysis and respiratory  "compromise.          Mariano Tran MD  Hospitalist Service  [facility]  Securely message with [employer] (more info)  Text page via [employer] Paging/Directory   ______________________________________________________________________    Interval History     Patient is seen and examined by me today and medical record reviewed.Overnight events noted and care discussed with nursing staff.  Patient continues to be very weak and dependent on BiPAP.  She was able to tolerate off BiPAP for few hours last night.  However she eventually needed BiPAP therapy.  She denies any chest pain.  Constipation noted.  Plan discussed with treatment team.  I also spoke with patient's  at bedside.  I spoke with nephrology regarding care plan.            Physical Exam   Vital Signs: Temp: 96.9  F (36.1  C) Temp src: Temporal BP: (!) 157/101 Pulse: 64   Resp: 18 SpO2: 99 % O2 Device: BiPAP/CPAP Oxygen Delivery: 6 LPM  Weight: 123 lbs 14.38 oz    General Appearance: Alert awake and oriented x 3.  On BiPAP.  Respiratory: Basilar crackles.  Cardiovascular: S1-S2 normal  GI: Soft and nontender  Skin: No rash  Other: Trace edema.      Medical Decision Making       60 MINUTES SPENT BY ME on the date of service doing chart review, history, exam, documentation & further activities per the note.      Data     All laboratory and imaging data in the past 24 hours reviewed     Recent Labs   Lab 08/06/25 0557 08/05/25 0539 08/04/25  0528   WBC 9.4 9.9 9.2   HGB 9.1* 9.5* 8.5*   HCT 27.2* 29.5* 26.0*   * 105* 104*    423 369     No results for input(s): \"CULT\" in the last 168 hours.  Recent Labs   Lab 08/06/25  0557 08/06/25  0342 08/05/25  2354 08/05/25  0539 08/04/25  0528     --   --  133* 135   POTASSIUM 3.6  --   --  4.1 3.7   CHLORIDE 92*  --   --  92* 94*   CO2 25  --   --  25 25   ANIONGAP 18*  --   --  16* 16*   * 122* 115* 154* 98   BUN 66.0*  --   --  60.3* 61.3*   CR 3.56*  --   --  3.56* " "3.67*   GFRESTIMATED 13*  --   --  13* 12*   BRUCE 8.7*  --   --  8.9 8.5*   MAG 2.1  --   --  2.1 2.1   PHOS 4.9*  --   --  5.8* 5.7*       Recent Labs   Lab 08/06/25  0557 08/06/25  0342 08/05/25  2354 08/05/25  0539 08/04/25  0528   * 122* 115* 154* 98           No results for input(s): \"INR\" in the last 168 hours.        No results for input(s): \"TROPONIN\", \"TROPI\", \"TROPR\" in the last 168 hours.    Invalid input(s): \"TROP\", \"TROPONINIES\"    Recent Results (from the past 48 hours)   XR Chest Port 1 View    Narrative    EXAM: XR CHEST PORT 1 VIEW  LOCATION: [facility]  DATE: 8/5/2025    INDICATION: Shortness of breath  COMPARISON: 7/31/2025      Impression    IMPRESSION: Extensive infiltrates and areas of consolidation with a somewhat patchy distribution are again noted. These are progressed in the left upper lobe and mildly progressed in the perihilar right lung. Pulmonary vascularity is obscured. Heart size   is unchanged. No significant effusions are identified on this semiupright portable film.   IR CVC Tunnel Placement > 5 Yrs of Age    Narrative    [facility]    EXAM: TUNNELED CENTRAL VENOUS CATHETER PLACEMENT    LOCATION: [facility]    CLINICAL HISTORY: The patient has a history of renal failure and requires dialysis.    PROCEDURES PERFORMED:  1. Ultrasound-guided puncture of jugular vein  2. Creation of subcutaneous tunnel in chest wall.  3. Placement of dialysis catheter through subcutaneous tunnel, into peel away sheath, and into right atrium     MODERATE SEDATION: 2 mg Versed and 50 mcg Fentanyl were administered intravenously for moderate sedation. Pulse oximetry, heart rate and blood pressure were continuously monitored by an independent trained observer. The physician spent 10 minutes of   face-to-face moderate sedation time with the patient.    ADDITIONAL MEDICATIONS: see EMR    CONTRAST: none    FLUOROSCOPIC TIME: 0.3 minutes  CUMULATIVE AIR " KERMA/DOSE: 1 mGy    STERILE BARRIER TECHNIQUE: Maximal Sterile Barrier Technique Utilized: Cap AND mask AND sterile gown AND sterile gloves AND sterile full body drape AND hand hygiene AND skin preparation 2% chlorhexidine for cutaneous antisepsis (or acceptable alternative   antiseptics).   Sterile Ultrasound Technique Utilized ?Sterile gel AND sterile probe covers.    UNIVERSAL PROTOCOL: Standard universal protocol per facility guidelines was followed. See EMR for documentation.     TECHNIQUE:   Risks, benefits and alternatives were explained to the patient and written, informed consent was obtained. The patient was placed in the supine position on the angiography table. The neck and chest were prepped and draped in the usual fashion and   anesthetized with 1% lidocaine. Using ultrasound guidance, the internal jugular vein was accessed with a micropuncture system..  A 0.35 wire was advanced through the sheath and into the IVC. A subcutaneous tunnel was then created in the chest wall using   blunt dissection. The catheter was then attached to a tunneling device and brought through the tunnel. The venostomy site was sequentially dilated. The catheter was then placed through a peel away sheath, and into the right atrium. The puncture site was   closed using surgical glue  The catheter was secured to the skin using a Prolene stitch. Each port was dwelled with heparin (1000 units per cc) solution, and the site was dressed in a sterile fashion.  The patient tolerated the procedure well without   immediate complications.    FINDINGS:   The right internal jugular vein is anechoic, compressible and patent by ultrasound, and ultrasound images obtained during access show the needle within the vein. Ultrasound images have been permanently captured for documentation.  Fluoroscopic images obtained following placement of the catheter, show that the catheter terminates near the cavoatrial junction..  The catheter has a smooth  course in the chest wall. The catheter functions well and is available for immediate use.      Impression    IMPRESSION:  1. Ultrasound and fluoroscopic guided placement of tunneled 23 cm tip to cuff, right internal jugular dialysis catheter.          CPT Codes:

## 2025-08-06 NOTE — PLAN OF CARE
"5:45 AM    Pt had repeat episode of HTN, severe SOB with increased O2 needs, and restlessness and anxiety. Remained in SR though HR did increase from 70s to 90s. Increased FiO2 to 100%, gave PRN IV dilaudid for SOB and paged Dr. Tipton to un-hold nitroglycerin orders as SBP was now 170s. Gave PRN IV ativan and restarted nitroglycerin gtt, titrating up to achieve pt comfort and relieve SOB. Pt has been holding and slightly tugging at BiPap mask, needs many reminders and education to not grab it.    Able to start titrating FiO2 down slowly.      RN end of shift summary    Neuro: A&Ox4. Forgetful at times. CMS and PERRLA intact. Anxious and restless r/t dyspnea.      Cardiac: Remained NSR this shift. BP remained above parameters to keep amiodarone and bumex infusions running.  BUE edema, +1.      Respiratory: Remained on 6L nasal cannula until 2240 when pt had an episode of mild SOB. Gave PRN IV dilaudid x1 and place pt on BiPap at 55% FiO2. Remained slightly anxious and \"fidgety\" needed much reassurance that BiPap was working well and this was the max amount of \"air\" I could provide to pt. Pt started to calm down and remained comfortable on repeat severe SOB episode outlined above.      BiPap now at 85% FiO2 and 10/5.        GI/: LBM 7/31. Active BS. Placed on bedpan x1 with no results. NPO for placement of hemodialysis site 8/6. External purewick in place with low UOP, 375 ml this shift; site intact. Remains on bumex gtt.      Skin: Intact with slight facial ecchymosis. Sacral mepilex in place; site intact.     Mobility: Remained in bed this shift, bedrest d/t respiratory status. Able to shift own weight.      IV access: PIV x3; with current infusions:  Amiodarone @ 0.5 mg/min  Bumex @ 0.5 mg/hr  Nitroglycerin @ 60 mcg/min     K/Mg/Phos protocols; labs still pending at this time.        Goal Outcome Evaluation:           Overall Patient Progress: no changeOverall Patient Progress: no change    Outcome Evaluation: " "See above.          Problem: Adult Inpatient Plan of Care  Goal: Plan of Care Review  Description: The Plan of Care Review/Shift note should be completed every shift.  The Outcome Evaluation is a brief statement about your assessment that the patient is improving, declining, or no change.  This information will be displayed automatically on your shift  note.  Outcome: Progressing  Flowsheets (Taken 8/6/2025 0445)  Outcome Evaluation: See above.  Overall Patient Progress: no change  Goal: Patient-Specific Goal (Individualized)  Description: You can add care plan individualizations to a care plan. Examples of Individualization might be:  \"Parent requests to be called daily at 9am for status\", \"I have a hard time hearing out of my right ear\", or \"Do not touch me to wake me up as it startles  me\".  Outcome: Progressing  Goal: Absence of Hospital-Acquired Illness or Injury  Outcome: Progressing  Intervention: Identify and Manage Fall Risk  Recent Flowsheet Documentation  Taken 8/6/2025 0430 by Santana Mares, RN  Safety Promotion/Fall Prevention:   activity supervised   assistive device/personal items within reach   clutter free environment maintained   increased rounding and observation   increase visualization of patient   patient and family education   room organization consistent   safety round/check completed  Taken 8/6/2025 0000 by Santana Mares, RN  Safety Promotion/Fall Prevention:   activity supervised   assistive device/personal items within reach   clutter free environment maintained   increased rounding and observation   increase visualization of patient   patient and family education   room organization consistent   safety round/check completed  Taken 8/5/2025 2030 by Santana Mares, RN  Safety Promotion/Fall Prevention:   activity supervised   assistive device/personal items within reach   clutter free environment maintained   increased rounding and observation   increase visualization of patient   " patient and family education   room organization consistent   safety round/check completed  Intervention: Prevent Skin Injury  Recent Flowsheet Documentation  Taken 8/6/2025 0430 by Santana Mares RN  Body Position:   turned   weight shifting   heels elevated   upper extremity elevated  Skin Protection:   adhesive use limited   incontinence pads utilized   skin to device areas padded   skin to skin areas padded   transparent dressing maintained   tubing/devices free from skin contact  Taken 8/6/2025 0000 by Santana Mares RN  Body Position:   turned   weight shifting   other (see comments)   heels elevated  Skin Protection:   adhesive use limited   incontinence pads utilized   skin to device areas padded   skin to skin areas padded   transparent dressing maintained   tubing/devices free from skin contact  Taken 8/5/2025 2030 by Santana Mares RN  Body Position: (Boosted up)   turned   weight shifting   other (see comments)   heels elevated  Skin Protection:   adhesive use limited   incontinence pads utilized   skin to device areas padded   skin to skin areas padded   transparent dressing maintained   tubing/devices free from skin contact  Intervention: Prevent and Manage VTE (Venous Thromboembolism) Risk  Recent Flowsheet Documentation  Taken 8/6/2025 0430 by Santana Mares RN  VTE Prevention/Management:   SCDs off (sequential compression devices)   patient refused intervention  Taken 8/6/2025 0000 by Santana Mares RN  VTE Prevention/Management:   SCDs off (sequential compression devices)   patient refused intervention  Taken 8/5/2025 2030 by Santana Mares RN  VTE Prevention/Management:   SCDs off (sequential compression devices)   patient refused intervention  Intervention: Prevent Infection  Recent Flowsheet Documentation  Taken 8/6/2025 0430 by Santana Mares RN  Infection Prevention:   equipment surfaces disinfected   hand hygiene promoted   personal protective equipment utilized    rest/sleep promoted   single patient room provided  Taken 8/6/2025 0000 by Santana Mares RN  Infection Prevention:   equipment surfaces disinfected   hand hygiene promoted   personal protective equipment utilized   rest/sleep promoted   single patient room provided  Taken 8/5/2025 2030 by Santana Mares RN  Infection Prevention:   equipment surfaces disinfected   hand hygiene promoted   personal protective equipment utilized   rest/sleep promoted   single patient room provided  Goal: Optimal Comfort and Wellbeing  Outcome: Progressing  Intervention: Provide Person-Centered Care  Recent Flowsheet Documentation  Taken 8/6/2025 0430 by Santana Mares RN  Trust Relationship/Rapport:   care explained   choices provided   emotional support provided   empathic listening provided   questions answered   questions encouraged   reassurance provided   thoughts/feelings acknowledged  Taken 8/6/2025 0000 by Santana Mares RN  Trust Relationship/Rapport:   care explained   choices provided   emotional support provided   empathic listening provided   questions answered   questions encouraged   reassurance provided   thoughts/feelings acknowledged  Taken 8/5/2025 2030 by Santana Mares RN  Trust Relationship/Rapport:   care explained   choices provided   emotional support provided   empathic listening provided   questions answered   questions encouraged   reassurance provided   thoughts/feelings acknowledged  Goal: Readiness for Transition of Care  Outcome: Progressing     Problem: Delirium  Goal: Optimal Coping  Outcome: Progressing  Intervention: Optimize Psychosocial Adjustment to Delirium  Recent Flowsheet Documentation  Taken 8/6/2025 0430 by Santana Mares RN  Supportive Measures:   active listening utilized   decision-making supported   positive reinforcement provided   relaxation techniques promoted   verbalization of feelings encouraged  Taken 8/6/2025 0000 by Santana Mares RN  Supportive Measures:    active listening utilized   decision-making supported   positive reinforcement provided   relaxation techniques promoted   verbalization of feelings encouraged  Taken 8/5/2025 2030 by Santana Mares RN  Supportive Measures:   active listening utilized   decision-making supported   positive reinforcement provided   relaxation techniques promoted   verbalization of feelings encouraged  Goal: Improved Behavioral Control  Outcome: Progressing  Intervention: Prevent and Manage Agitation  Recent Flowsheet Documentation  Taken 8/6/2025 0430 by Santana Mares RN  Environment Familiarity/Consistency: daily routine followed  Taken 8/6/2025 0000 by Santana Mares RN  Environment Familiarity/Consistency: daily routine followed  Taken 8/5/2025 2030 by Santana Mares RN  Environment Familiarity/Consistency: daily routine followed  Intervention: Minimize Safety Risk  Recent Flowsheet Documentation  Taken 8/6/2025 0430 by Santana Mares RN  Enhanced Safety Measures:   patient/family teach back on injury risk   review medications for side effects with activity  Trust Relationship/Rapport:   care explained   choices provided   emotional support provided   empathic listening provided   questions answered   questions encouraged   reassurance provided   thoughts/feelings acknowledged  Taken 8/6/2025 0000 by Santana Mares RN  Enhanced Safety Measures:   patient/family teach back on injury risk   review medications for side effects with activity  Trust Relationship/Rapport:   care explained   choices provided   emotional support provided   empathic listening provided   questions answered   questions encouraged   reassurance provided   thoughts/feelings acknowledged  Taken 8/5/2025 2030 by Santana Marse RN  Enhanced Safety Measures:   patient/family teach back on injury risk   review medications for side effects with activity  Trust Relationship/Rapport:   care explained   choices provided   emotional support  provided   empathic listening provided   questions answered   questions encouraged   reassurance provided   thoughts/feelings acknowledged  Goal: Improved Attention and Thought Clarity  Outcome: Progressing  Intervention: Maximize Cognitive Function  Recent Flowsheet Documentation  Taken 8/6/2025 0430 by Santana Mares RN  Sensory Stimulation Regulation:   auditory stimulation minimized   care clustered   lighting decreased   quiet environment promoted   visual stimulation minimized  Reorientation Measures:   calendar in view   clock in view  Taken 8/6/2025 0000 by Santana Mares RN  Sensory Stimulation Regulation:   auditory stimulation minimized   care clustered   lighting decreased   quiet environment promoted   visual stimulation minimized  Reorientation Measures:   calendar in view   clock in view  Taken 8/5/2025 2030 by Santana Mares RN  Sensory Stimulation Regulation:   auditory stimulation minimized   care clustered   lighting decreased   quiet environment promoted   visual stimulation minimized  Reorientation Measures:   calendar in view   clock in view  Goal: Improved Sleep  Outcome: Progressing  Intervention: Promote Sleep  Recent Flowsheet Documentation  Taken 8/6/2025 0430 by Santana Mares RN  Sleep/Rest Enhancement:   awakenings minimized   consistent schedule promoted   noise level reduced   regular sleep/rest pattern promoted   relaxation techniques promoted   room darkened   therapeutic touch utilized  Taken 8/6/2025 0000 by Santana Mares RN  Sleep/Rest Enhancement:   awakenings minimized   consistent schedule promoted   noise level reduced   regular sleep/rest pattern promoted   relaxation techniques promoted   room darkened   therapeutic touch utilized  Taken 8/5/2025 2030 by Santana Mares RN  Sleep/Rest Enhancement:   awakenings minimized   consistent schedule promoted   noise level reduced   regular sleep/rest pattern promoted   relaxation techniques promoted   room  darkened   therapeutic touch utilized     Problem: Skin Injury Risk Increased  Goal: Skin Health and Integrity  Outcome: Progressing  Intervention: Plan: Nurse Driven Intervention: Positioning  Recent Flowsheet Documentation  Taken 8/6/2025 0430 by Santana Mares RN  Plan: Positioning Interventions:   REPOSITION Left/Right (No supine) q2h   HOB 30 degrees or less   OFF-LOAD HEELS with pillows  Taken 8/6/2025 0000 by Santana Mares RN  Plan: Positioning Interventions:   REPOSITION Left/Right (No supine) q2h   HOB 30 degrees or less   OFF-LOAD HEELS with pillows  Taken 8/5/2025 2030 by Santana Mares RN  Plan: Positioning Interventions:   REPOSITION Left/Right (No supine) q2h   HOB 30 degrees or less   OFF-LOAD HEELS with pillows  Intervention: Plan: Nurse Driven Intervention: Moisture Management  Recent Flowsheet Documentation  Taken 8/6/2025 0430 by Santana Mares RN  Moisture Interventions:   No brief in bed   Incontinence pad   Urinary collection device  Taken 8/6/2025 0000 by Santana Mares RN  Moisture Interventions:   No brief in bed   Incontinence pad   Urinary collection device  Taken 8/5/2025 2030 by Santana Mares RN  Moisture Interventions:   No brief in bed   Incontinence pad   Urinary collection device  Intervention: Plan: Nurse Driven Intervention: Friction and Shear  Recent Flowsheet Documentation  Taken 8/6/2025 0430 by Santana Mares RN  Friction/Shear Interventions:   HOB 30 degrees or less   Silicone foam sacral dressing   Assistive lifting device (portable/ceiling lift, etc.)   Repositioning device (TAP system, etc.)  Taken 8/6/2025 0000 by Santana Mares RN  Friction/Shear Interventions:   HOB 30 degrees or less   Silicone foam sacral dressing   Assistive lifting device (portable/ceiling lift, etc.)   Repositioning device (TAP system, etc.)  Taken 8/5/2025 2030 by Santana Mares RN  Friction/Shear Interventions:   HOB 30 degrees or less   Silicone foam sacral dressing    Assistive lifting device (portable/ceiling lift, etc.)   Repositioning device (TAP system, etc.)  Intervention: Optimize Skin Protection  Recent Flowsheet Documentation  Taken 8/6/2025 0430 by Santana Mares RN  Skin Protection:   adhesive use limited   incontinence pads utilized   skin to device areas padded   skin to skin areas padded   transparent dressing maintained   tubing/devices free from skin contact  Activity Management: bedrest  Head of Bed (HOB) Positioning: HOB at 20 degrees  Taken 8/6/2025 0245 by Santana Mares RN  Activity Management: bedrest  Taken 8/6/2025 0000 by Santana Mares RN  Skin Protection:   adhesive use limited   incontinence pads utilized   skin to device areas padded   skin to skin areas padded   transparent dressing maintained   tubing/devices free from skin contact  Activity Management: bedrest  Head of Bed (HOB) Positioning: HOB at 20 degrees  Taken 8/5/2025 2030 by Santana Mares RN  Skin Protection:   adhesive use limited   incontinence pads utilized   skin to device areas padded   skin to skin areas padded   transparent dressing maintained   tubing/devices free from skin contact  Activity Management: bedrest  Head of Bed (HOB) Positioning: HOB at 20 degrees  Intervention: Promote and Optimize Oral Intake  Recent Flowsheet Documentation  Taken 8/6/2025 0430 by Santana Mares RN  Oral Nutrition Promotion: rest periods promoted  Taken 8/6/2025 0000 by Santana Mares RN  Oral Nutrition Promotion: rest periods promoted  Taken 8/5/2025 2030 by Santana Mares RN  Oral Nutrition Promotion: rest periods promoted     Problem: Breathing Pattern Ineffective  Goal: Effective Breathing Pattern  Outcome: Progressing  Intervention: Promote Improved Breathing Pattern  Recent Flowsheet Documentation  Taken 8/6/2025 0430 by Santana Mares RN  Breathing Techniques/Airway Clearance: deep/controlled cough encouraged  Supportive Measures:   active listening utilized    decision-making supported   positive reinforcement provided   relaxation techniques promoted   verbalization of feelings encouraged  Airway/Ventilation Management:   airway patency maintained   calming measures promoted   pulmonary hygiene promoted   oxygen therapy provided  Head of Bed (HOB) Positioning: HOB at 20 degrees  Taken 8/6/2025 0000 by Santana Mares RN  Breathing Techniques/Airway Clearance: deep/controlled cough encouraged  Supportive Measures:   active listening utilized   decision-making supported   positive reinforcement provided   relaxation techniques promoted   verbalization of feelings encouraged  Airway/Ventilation Management:   airway patency maintained   calming measures promoted   pulmonary hygiene promoted   oxygen therapy provided  Head of Bed (HOB) Positioning: HOB at 20 degrees  Taken 8/5/2025 2030 by Santana Mares RN  Breathing Techniques/Airway Clearance: deep/controlled cough encouraged  Supportive Measures:   active listening utilized   decision-making supported   positive reinforcement provided   relaxation techniques promoted   verbalization of feelings encouraged  Airway/Ventilation Management:   airway patency maintained   calming measures promoted   pulmonary hygiene promoted   oxygen therapy provided  Head of Bed (HOB) Positioning: HOB at 20 degrees     Problem: Comorbidity Management  Goal: Maintenance of COPD Symptom Control  Outcome: Progressing  Intervention: Maintain COPD Symptom Control  Recent Flowsheet Documentation  Taken 8/6/2025 0430 by Santana Mares RN  Breathing Techniques/Airway Clearance: deep/controlled cough encouraged  Medication Review/Management: medications reviewed  Taken 8/6/2025 0000 by Santana Mares RN  Breathing Techniques/Airway Clearance: deep/controlled cough encouraged  Medication Review/Management: medications reviewed  Taken 8/5/2025 2030 by Santana Mares RN  Breathing Techniques/Airway Clearance: deep/controlled cough  encouraged  Medication Review/Management: medications reviewed  Goal: Blood Pressure in Desired Range  Outcome: Progressing  Intervention: Maintain Blood Pressure Management  Recent Flowsheet Documentation  Taken 8/6/2025 0430 by Santana Mares RN  Medication Review/Management: medications reviewed  Taken 8/6/2025 0000 by Santana Mares RN  Medication Review/Management: medications reviewed  Taken 8/5/2025 2030 by Santana Mares, RN  Medication Review/Management: medications reviewed

## 2025-08-06 NOTE — PHARMACY-CONSULT NOTE
Pharmacy Tube Feeding Consult    Medication reviewed for administration by feeding tube and for potential food/drug interactions.    Recommendation:   - Switch nifedipine ER (cannot be crushed or else will cause dose dumping and hypotension) to an alternative such as amlodipine.  - Hold icosapent ethyl (Vascepa) capsules. May consider initiating fenofibrate for hypertriglyceridemia if deemed clinically appropriate.    Pharmacy will continue to follow as new medications are ordered.

## 2025-08-06 NOTE — CONSULTS
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 Labs and medications reviewed, appropriate for procedure. Will schedule procedure today in IR. Dr Christianson aware.    Isreal Dunn PA-C  Interventional Radiology  [phone] (IR)  *[phone] ([facility] Office)

## 2025-08-06 NOTE — PROGRESS NOTES
RT note:    BIPAP on and off this shift. Transferred down to IR for dialysis cath. Transferred on 6L nc and placed on BIPAP for the procedure. Post procedure left on bipap for a few hrs till pt was more awake. Taken off this afternoon and put back on 6L nc once patient was more awake. RT will continue to follow

## 2025-08-07 ENCOUNTER — APPOINTMENT (OUTPATIENT)
Dept: GENERAL RADIOLOGY | Facility: CLINIC | Age: 75
DRG: 291 | End: 2025-08-07
Attending: INTERNAL MEDICINE
Payer: MEDICARE

## 2025-08-07 ENCOUNTER — APPOINTMENT (OUTPATIENT)
Dept: GENERAL RADIOLOGY | Facility: CLINIC | Age: 75
End: 2025-08-07
Attending: INTERNAL MEDICINE
Payer: MEDICARE

## 2025-08-07 VITALS
WEIGHT: 119.49 LBS | RESPIRATION RATE: 12 BRPM | BODY MASS INDEX: 20.4 KG/M2 | HEIGHT: 64 IN | DIASTOLIC BLOOD PRESSURE: 62 MMHG | OXYGEN SATURATION: 100 % | SYSTOLIC BLOOD PRESSURE: 115 MMHG | HEART RATE: 73 BPM | TEMPERATURE: 98 F

## 2025-08-07 LAB
ANION GAP SERPL CALCULATED.3IONS-SCNC: 13 MMOL/L (ref 7–15)
ANNOTATION COMMENT IMP: ABNORMAL
BUN SERPL-MCNC: 37.3 MG/DL (ref 8–23)
CALCIUM SERPL-MCNC: 8.4 MG/DL (ref 8.8–10.4)
CHLORIDE SERPL-SCNC: 95 MMOL/L (ref 98–107)
CREAT SERPL-MCNC: 2.37 MG/DL (ref 0.51–0.95)
EGFRCR SERPLBLD CKD-EPI 2021: 21 ML/MIN/1.73M2
ERYTHROCYTE [DISTWIDTH] IN BLOOD BY AUTOMATED COUNT: 14.8 % (ref 10–15)
GLUCOSE BLDC GLUCOMTR-MCNC: 108 MG/DL (ref 70–99)
GLUCOSE BLDC GLUCOMTR-MCNC: 110 MG/DL (ref 70–99)
GLUCOSE BLDC GLUCOMTR-MCNC: 114 MG/DL (ref 70–99)
GLUCOSE BLDC GLUCOMTR-MCNC: 114 MG/DL (ref 70–99)
GLUCOSE BLDC GLUCOMTR-MCNC: 115 MG/DL (ref 70–99)
GLUCOSE BLDC GLUCOMTR-MCNC: 134 MG/DL (ref 70–99)
GLUCOSE SERPL-MCNC: 112 MG/DL (ref 70–99)
HCO3 SERPL-SCNC: 27 MMOL/L (ref 22–29)
HCT VFR BLD AUTO: 26.2 % (ref 35–47)
HGB BLD-MCNC: 8.7 G/DL (ref 11.7–15.7)
MAGNESIUM SERPL-MCNC: 1.9 MG/DL (ref 1.7–2.3)
MCH RBC QN AUTO: 33.5 PG (ref 26.5–33)
MCHC RBC AUTO-ENTMCNC: 33.2 G/DL (ref 31.5–36.5)
MCV RBC AUTO: 101 FL (ref 78–100)
METANEPHS SERPL-SCNC: 0.25 NMOL/L
NORMETANEPHRINE SERPL-SCNC: 4.69 NMOL/L
PHOSPHATE SERPL-MCNC: 2.9 MG/DL (ref 2.5–4.5)
PLATELET # BLD AUTO: 280 10E3/UL (ref 150–450)
POTASSIUM SERPL-SCNC: 3.2 MMOL/L (ref 3.4–5.3)
POTASSIUM SERPL-SCNC: 3.5 MMOL/L (ref 3.4–5.3)
RBC # BLD AUTO: 2.6 10E6/UL (ref 3.8–5.2)
SODIUM SERPL-SCNC: 135 MMOL/L (ref 135–145)
WBC # BLD AUTO: 8.4 10E3/UL (ref 4–11)

## 2025-08-07 PROCEDURE — 90935 HEMODIALYSIS ONE EVALUATION: CPT | Performed by: INTERNAL MEDICINE

## 2025-08-07 PROCEDURE — 86481 TB AG RESPONSE T-CELL SUSP: CPT | Performed by: INTERNAL MEDICINE

## 2025-08-07 PROCEDURE — 99233 SBSQ HOSP IP/OBS HIGH 50: CPT | Performed by: INTERNAL MEDICINE

## 2025-08-07 PROCEDURE — 200N000001 HC R&B ICU

## 2025-08-07 PROCEDURE — 71045 X-RAY EXAM CHEST 1 VIEW: CPT

## 2025-08-07 PROCEDURE — 94660 CPAP INITIATION&MGMT: CPT

## 2025-08-07 PROCEDURE — 85018 HEMOGLOBIN: CPT | Performed by: INTERNAL MEDICINE

## 2025-08-07 PROCEDURE — 250N000011 HC RX IP 250 OP 636: Performed by: STUDENT IN AN ORGANIZED HEALTH CARE EDUCATION/TRAINING PROGRAM

## 2025-08-07 PROCEDURE — 83735 ASSAY OF MAGNESIUM: CPT | Performed by: INTERNAL MEDICINE

## 2025-08-07 PROCEDURE — 84100 ASSAY OF PHOSPHORUS: CPT | Performed by: INTERNAL MEDICINE

## 2025-08-07 PROCEDURE — 250N000011 HC RX IP 250 OP 636: Performed by: INTERNAL MEDICINE

## 2025-08-07 PROCEDURE — 258N000003 HC RX IP 258 OP 636: Performed by: INTERNAL MEDICINE

## 2025-08-07 PROCEDURE — 36415 COLL VENOUS BLD VENIPUNCTURE: CPT | Performed by: STUDENT IN AN ORGANIZED HEALTH CARE EDUCATION/TRAINING PROGRAM

## 2025-08-07 PROCEDURE — 74018 RADEX ABDOMEN 1 VIEW: CPT

## 2025-08-07 PROCEDURE — 80048 BASIC METABOLIC PNL TOTAL CA: CPT | Performed by: INTERNAL MEDICINE

## 2025-08-07 PROCEDURE — 90937 HEMODIALYSIS REPEATED EVAL: CPT

## 2025-08-07 PROCEDURE — 36415 COLL VENOUS BLD VENIPUNCTURE: CPT | Performed by: INTERNAL MEDICINE

## 2025-08-07 PROCEDURE — 250N000013 HC RX MED GY IP 250 OP 250 PS 637: Performed by: STUDENT IN AN ORGANIZED HEALTH CARE EDUCATION/TRAINING PROGRAM

## 2025-08-07 PROCEDURE — 250N000013 HC RX MED GY IP 250 OP 250 PS 637: Performed by: INTERNAL MEDICINE

## 2025-08-07 PROCEDURE — 84132 ASSAY OF SERUM POTASSIUM: CPT | Performed by: STUDENT IN AN ORGANIZED HEALTH CARE EDUCATION/TRAINING PROGRAM

## 2025-08-07 PROCEDURE — 999N000157 HC STATISTIC RCP TIME EA 10 MIN

## 2025-08-07 RX ORDER — POLYETHYLENE GLYCOL 3350 17 G/17G
17 POWDER, FOR SOLUTION ORAL 2 TIMES DAILY PRN
Status: DISCONTINUED | OUTPATIENT
Start: 2025-08-07 | End: 2025-08-20 | Stop reason: HOSPADM

## 2025-08-07 RX ORDER — NITROGLYCERIN 20 MG/100ML
10-200 INJECTION INTRAVENOUS CONTINUOUS
Status: DISCONTINUED | OUTPATIENT
Start: 2025-08-07 | End: 2025-08-08

## 2025-08-07 RX ORDER — SENNOSIDES 8.6 MG
2 TABLET ORAL 2 TIMES DAILY
Status: DISCONTINUED | OUTPATIENT
Start: 2025-08-07 | End: 2025-08-12

## 2025-08-07 RX ADMIN — PAROXETINE HYDROCHLORIDE 40 MG: 20 TABLET, FILM COATED ORAL at 15:31

## 2025-08-07 RX ADMIN — SODIUM CHLORIDE 200 ML: 0.9 INJECTION, SOLUTION INTRAVENOUS at 07:54

## 2025-08-07 RX ADMIN — HEPARIN SODIUM 1900 UNITS: 1000 INJECTION, SOLUTION INTRAVENOUS; SUBCUTANEOUS at 10:57

## 2025-08-07 RX ADMIN — HYDRALAZINE HYDROCHLORIDE 100 MG: 50 TABLET ORAL at 00:13

## 2025-08-07 RX ADMIN — CARVEDILOL 25 MG: 12.5 TABLET, FILM COATED ORAL at 09:28

## 2025-08-07 RX ADMIN — NITROGLYCERIN 90 MCG/MIN: 20 INJECTION INTRAVENOUS at 10:29

## 2025-08-07 RX ADMIN — HYDRALAZINE HYDROCHLORIDE 100 MG: 50 TABLET ORAL at 17:55

## 2025-08-07 RX ADMIN — ONDANSETRON 4 MG: 4 TABLET, ORALLY DISINTEGRATING ORAL at 11:22

## 2025-08-07 RX ADMIN — HYDROMORPHONE HYDROCHLORIDE 0.2 MG: 0.2 INJECTION, SOLUTION INTRAMUSCULAR; INTRAVENOUS; SUBCUTANEOUS at 00:58

## 2025-08-07 RX ADMIN — MECLIZINE HYDROCHLORIDE 25 MG: 25 TABLET ORAL at 17:55

## 2025-08-07 RX ADMIN — SODIUM CHLORIDE 250 ML: 0.9 INJECTION, SOLUTION INTRAVENOUS at 07:55

## 2025-08-07 RX ADMIN — HYDRALAZINE HYDROCHLORIDE 100 MG: 50 TABLET ORAL at 09:36

## 2025-08-07 RX ADMIN — DIAZEPAM 2.5 MG: 10 INJECTION, SOLUTION INTRAMUSCULAR; INTRAVENOUS at 02:17

## 2025-08-07 RX ADMIN — NIFEDIPINE 90 MG: 90 TABLET, EXTENDED RELEASE ORAL at 09:36

## 2025-08-07 RX ADMIN — NITROGLYCERIN 10 MCG/MIN: 20 INJECTION INTRAVENOUS at 01:01

## 2025-08-07 RX ADMIN — SODIUM CHLORIDE 500 ML: 0.9 INJECTION, SOLUTION INTRAVENOUS at 07:55

## 2025-08-07 RX ADMIN — CARVEDILOL 25 MG: 12.5 TABLET, FILM COATED ORAL at 17:54

## 2025-08-07 RX ADMIN — ICOSAPENT ETHYL 2 G: 1 CAPSULE ORAL at 17:55

## 2025-08-07 RX ADMIN — SENNOSIDES 2 TABLET: 8.6 TABLET, FILM COATED ORAL at 20:46

## 2025-08-07 RX ADMIN — CLOPIDOGREL BISULFATE 75 MG: 75 TABLET, FILM COATED ORAL at 17:55

## 2025-08-07 RX ADMIN — MECLIZINE HYDROCHLORIDE 25 MG: 25 TABLET ORAL at 00:06

## 2025-08-07 RX ADMIN — AMIODARONE HYDROCHLORIDE 200 MG: 200 TABLET ORAL at 15:31

## 2025-08-07 ASSESSMENT — ACTIVITIES OF DAILY LIVING (ADL)
ADLS_ACUITY_SCORE: 74

## 2025-08-07 NOTE — PROGRESS NOTES
Patient is persistently hypertensive to 190/99 despite receiving hydralazine 100mg 45 min ago which was given early.  Received coreg earlier tonight.  Started HD today w/ 2.5 L off.  Only on 6L, but has been having frequent acute respiratory decompensation with flash pulmonary edema.  Received lorazepam already.  -start nitroglycerin drip, target SBP < 160

## 2025-08-07 NOTE — PROGRESS NOTES
[facility]    Medicine Progress Note - Hospitalist Service    Date of Admission:  7/28/2025    Assessment & Plan     75-year-old female with history of f CAD, CKD stage IV, hypertension, hyperlipidemia, intra-abdominal aortic aneurysm with prior stenting, chronic vertigo, recent hospitalization for fall with pelvic fracture who presents the ED for shortness of breath.      Had elevated D-dimer but VQ scan negative for PE, CT not done due to CKD.  proBNP more than 25,000.  Chest x-ray showed bilateral perihilar interstitial/airspace opacities, left greater than right, are nonspecific for postradiation change versus pneumonia. A small nodule in the peripheral right upper lobe measures 9 mm.     Found to be in hypertensive emergency, admitted to ICU with BiPAP and nitroglycerin drip.  She was weaned off nitroglycerin drip and BiPAP but has had repeated episodes of rising blood pressure and shortness of breath needing her to go back on BiPAP intermittently.  She was transferred to floor on 7/31 but came back to ICU on 8/2 after rapid response for flash pulmonary edema and hypertensive emergency.    On the morning of 8/14, patient went into A-fib with initial rates of 130s and then decreased to 40s without intervention.  She converted back to sinus rhythm and about half an hour.    Acute hypoxic respiratory failure: Multifactorial  Diastolic CHF exacerbation  Recurrent flash pulmonary edema associated with episodes of high blood pressure.  Hypertensive emergency  Chronic hypertension on multiple medications  End-stage renal disease:  - Echo with EF of 55 to 60%.  Unclear what is causing recurrent sudden episodes of elevated blood pressure and flash pulmonary edema.  She has history of renal artery stenting and on renal arterial ultrasound done on 7/31 there was no sonographic evidence of renal artery stenosis.  Pending metanephrines.  - Increased carvedilol to 25 mg twice daily, hydralazine 200 mg  3 times daily and Isordil increased to 40 mg 3 times daily.  Continued clonidine patch 0.3 mg weekly and nifedipine 90 mg daily.  Also started on the Lasix 40 mg IV twice daily.  -Was empirically started on ceftriaxone on presentation.  Patient had no fever or leukocytosis.  Mildly elevated procalcitonin of 0.67 which is difficult to interpret in ROMI/CKD and improved to 0.49 on recheck.  Finished 5-day course of ceftriaxone.    - Patient has been improving.  She was able to wean off BiPAP.  Currently on 6 L of supplemental nasal oxygen.  Patient is getting second round of dialysis run.  Total of 5.5 L removed in the last 2 days.  Care plan discussed with nephrology team.  Blood pressure remained elevated and she required nitroglycerin drip.  Will titrate off nitroglycerin.  Adjust blood pressure medication as appropriate.  Plan to keep oral medication same for now.  Discontinue nitroglycerin      ROMI on CKD stage IV:  Metabolic acidosis:  - Previous baseline of 1.3-1.5 which worsened in May/June 2025 and new baseline appears to be 3 with nephrotic range proteinuria.  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  - Creatinine on admission was 4.25, improved but remained high  3.41 on August 4.    -- Nephrology was consulted and recommendation obtained to treat with IV Bumex and eventually decided to place dialysis catheter for initiating hemodialysis.  -- Patient was started on hemodialysis August 6.  -- Another dialysis run on August 7, 2025.  -- Further dialysis deferred to nephrology team.        Atrial fibrillation: Paroxysmal atrial fibrillation  - On 8/4, patient had a self-limiting 30 minutes episode of atrial fibrillation initially with RVR to 130 and then bradycardia to 40.  - Cardiology was reconsulted, advised 150 mg amiodarone IV push, and the plan was no further amiodarone if patient remains sinus.    -- However, patient developed intermittent  episodes of atrial fibrillation with RVR.  Cardiology recommended amiodarone drip and planning to transition to oral amiodarone 200 mg once a day for about a month.  Continue Coreg.    -- Currently remains in sinus rhythm.  Will continue amiodarone, Coreg, hold anticoagulation for now.   Cardiology recommended to hold it for now and may consider starting anticoagulation for a few weeks or a month on discharge.  Patient is already on Plavix.        Malnutrition: Moderate nutrition in the context of acute on chronic medical illness.  - Patient was unable to take adequate oral intake due to dependence on BiPAP.  -Registered dietitian is following and recommendation appreciated  - Initially the plan was to start her on TPN but her condition slightly improved and she was able to tolerate of BiPAP for few hours last night.   - will place  a feeding tube and started tube feeding per nutrition team recommendations.       Constipation: Stool impaction, manual disimpaction if she allows otherwise tapwater enema..  Continue MiraLAX    Chronic medical conditions:  - AAA.  Endoleak status post recent endovascular aneurysm repair on 4/25.  Continue Plavix and statin.  - History of CVA.  Chronic infarct in the right basal ganglia noted on CT.  - Right lung adenocarcinoma.  Status post stereotactic body radiation therapy in 2021, follows up in [place].  His lung nodule on x-ray, can follow-up with her primary oncologist.  - Chronic vertigo.  Follows up with ENT in [place] and on as needed meclizine.  MRI brain on 5/22 was negative for acute pathology.  - Depression pleasant lady.  Continue paroxetine.  - Hypertriglyceridemia.  On Vascepa.      Goal of care: Restorative            Diet: Fluid restriction 1500 ML FLUID  Combination Diet Renal Diet (dialysis), Renal Diet (non-dialysis)  Adult Formula Drip Feeding: Continuous Nepro with Carbsteady; Nasogastric tube; Goal Rate: 35; mL/hr; Confirm tube tip before starting. Begin TF at 15  mL/hr x 12 hours. If tolerating and labs are WNL, okay to advance by 15 mL/hr every 10 hours unt...  Snacks/Supplements Adult: Nepro Oral Supplement; Between Meals    DVT Prophylaxis: Pneumatic Compression Devices  Rao Catheter: Not present  Lines: PRESENT      CVC Double Lumen Right Subclavian Tunneled;Hemodialysis/CRRT-Site Assessment: WDL    Cardiac Monitoring: ACTIVE order. Indication: Acute decompensated heart failure (48 hours)  Code Status: Full Code      Clinically Significant Risk Factors        # Hypokalemia: Lowest K = 3.2 mmol/L in last 2 days, will replace as needed   # Hypochloremia: Lowest Cl = 92 mmol/L in last 2 days, will monitor as appropriate            # Hypertension: Noted on problem list             # Severe Malnutrition: based on nutrition assessment and treatment provided per dietitian's recommendations.    # Financial/Environmental Concerns: none         Social Drivers of Health    Depression: At risk (6/5/2025)    Received from [facility]    PHQ-2     Depression Risk: 3   Tobacco Use: High Risk (7/31/2025)    Received from [employer]    Patient History     Smoking Tobacco Use: Every Day     Smokeless Tobacco Use: Never     Passive Exposure: Current   Interpersonal Safety: Unknown (7/28/2025)    Interpersonal Safety     Do you feel physically and emotionally safe where you currently live?: Patient unable to answer     Within the past 12 months, have you been hit, slapped, kicked or otherwise physically hurt by someone?: Patient unable to answer     Within the past 12 months, have you been humiliated or emotionally abused in other ways by your partner or ex-partner?: Patient unable to answer          Disposition Plan     Anticipate discharge disposition: Keep in ICU for now    Medically Ready for Discharge:    Anticipated in 2-4 Days pending improvement in respiratory status.  She probably needs several days of hospitalization given initiation of dialysis and respiratory  "compromise.          Mariano Tran MD  Hospitalist Service  [facility]  Securely message with [employer] Voiceboard (more info)  Text page via [employer] Paging/Directory   ______________________________________________________________________    Interval History     Patient is seen and examined by me today and medical record reviewed.Overnight events noted and care discussed with nursing staff.  Patient is feeling better today.  Able to wean off BiPAP.  She remains very weak and frail.  May need BiPAP as needed.  Dialysis significantly improved her breathing but blood pressure has been variable and intermittently requiring nitroglycerin.  Blood pressure medications and care plan discussed with nephrology.    Physical Exam   Vital Signs: Temp: 97.6  F (36.4  C) Temp src: Oral BP: 136/78 Pulse: 66   Resp: 13 SpO2: 96 % O2 Device: Nasal cannula Oxygen Delivery: 6 LPM  Weight: 119 lbs 7.83 oz    General Appearance: Alert awake and oriented x 3.  Off BiPAP.  Respiratory: Basilar crackles.  Cardiovascular: S1-S2 normal  GI: Soft and nontender  Skin: No rash  Other: Trace edema.      Medical Decision Making       60 MINUTES SPENT BY ME on the date of service doing chart review, history, exam, documentation & further activities per the note.      Data     All laboratory and imaging data in the past 24 hours reviewed     Recent Labs   Lab 08/07/25  0606 08/06/25  0557 08/05/25  0539   WBC 8.4 9.4 9.9   HGB 8.7* 9.1* 9.5*   HCT 26.2* 27.2* 29.5*   * 102* 105*    378 423     No results for input(s): \"CULT\" in the last 168 hours.  Recent Labs   Lab 08/07/25  1159 08/07/25  0800 08/07/25  0606 08/06/25  2020 08/06/25  0557 08/05/25  2354 08/05/25  0539   NA  --   --  135  --  135  --  133*   POTASSIUM  --   --  3.2*  --  3.6  --  4.1   CHLORIDE  --   --  95*  --  92*  --  92*   CO2  --   --  27  --  25  --  25   ANIONGAP  --   --  13  --  18*  --  16*   * 134* 112*   < > 128*   < > 154*   BUN  --   " "--  37.3*  --  66.0*  --  60.3*   CR  --   --  2.37*  --  3.56*  --  3.56*   GFRESTIMATED  --   --  21*  --  13*  --  13*   BRUCE  --   --  8.4*  --  8.7*  --  8.9   MAG  --   --  1.9  --  2.1  --  2.1   PHOS  --   --  2.9  --  4.9*  --  5.8*    < > = values in this interval not displayed.       Recent Labs   Lab 08/07/25  1159 08/07/25  0800 08/07/25  0606 08/07/25  0357 08/07/25  0014   * 134* 112* 114* 108*           No results for input(s): \"INR\" in the last 168 hours.        No results for input(s): \"TROPONIN\", \"TROPI\", \"TROPR\" in the last 168 hours.    Invalid input(s): \"TROP\", \"TROPONINIES\"    Recent Results (from the past 48 hours)   XR Chest Port 1 View    Narrative    EXAM: XR CHEST PORT 1 VIEW  LOCATION: [facility]  DATE: 8/5/2025    INDICATION: Shortness of breath  COMPARISON: 7/31/2025      Impression    IMPRESSION: Extensive infiltrates and areas of consolidation with a somewhat patchy distribution are again noted. These are progressed in the left upper lobe and mildly progressed in the perihilar right lung. Pulmonary vascularity is obscured. Heart size   is unchanged. No significant effusions are identified on this semiupright portable film.   IR CVC Tunnel Placement > 5 Yrs of Age    Narrative    [facility]    EXAM: TUNNELED CENTRAL VENOUS CATHETER PLACEMENT    LOCATION: [facility]    CLINICAL HISTORY: The patient has a history of renal failure and requires dialysis.    PROCEDURES PERFORMED:  1. Ultrasound-guided puncture of jugular vein  2. Creation of subcutaneous tunnel in chest wall.  3. Placement of dialysis catheter through subcutaneous tunnel, into peel away sheath, and into right atrium     MODERATE SEDATION: 2 mg Versed and 50 mcg Fentanyl were administered intravenously for moderate sedation. Pulse oximetry, heart rate and blood pressure were continuously monitored by an independent trained observer. The physician spent 10 minutes of "   face-to-face moderate sedation time with the patient.    ADDITIONAL MEDICATIONS: see EMR    CONTRAST: none    FLUOROSCOPIC TIME: 0.3 minutes  CUMULATIVE AIR KERMA/DOSE: 1 mGy    STERILE BARRIER TECHNIQUE: Maximal Sterile Barrier Technique Utilized: Cap AND mask AND sterile gown AND sterile gloves AND sterile full body drape AND hand hygiene AND skin preparation 2% chlorhexidine for cutaneous antisepsis (or acceptable alternative   antiseptics).   Sterile Ultrasound Technique Utilized ?Sterile gel AND sterile probe covers.    UNIVERSAL PROTOCOL: Standard universal protocol per facility guidelines was followed. See EMR for documentation.     TECHNIQUE:   Risks, benefits and alternatives were explained to the patient and written, informed consent was obtained. The patient was placed in the supine position on the angiography table. The neck and chest were prepped and draped in the usual fashion and   anesthetized with 1% lidocaine. Using ultrasound guidance, the internal jugular vein was accessed with a micropuncture system..  A 0.35 wire was advanced through the sheath and into the IVC. A subcutaneous tunnel was then created in the chest wall using   blunt dissection. The catheter was then attached to a tunneling device and brought through the tunnel. The venostomy site was sequentially dilated. The catheter was then placed through a peel away sheath, and into the right atrium. The puncture site was   closed using surgical glue  The catheter was secured to the skin using a Prolene stitch. Each port was dwelled with heparin (1000 units per cc) solution, and the site was dressed in a sterile fashion.  The patient tolerated the procedure well without   immediate complications.    FINDINGS:   The right internal jugular vein is anechoic, compressible and patent by ultrasound, and ultrasound images obtained during access show the needle within the vein. Ultrasound images have been permanently captured for  documentation.  Fluoroscopic images obtained following placement of the catheter, show that the catheter terminates near the cavoatrial junction..  The catheter has a smooth course in the chest wall. The catheter functions well and is available for immediate use.      Impression    IMPRESSION:  1. Ultrasound and fluoroscopic guided placement of tunneled 23 cm tip to cuff, right internal jugular dialysis catheter.          CPT Codes:

## 2025-08-07 NOTE — PROGRESS NOTES
"Potassium   Date Value Ref Range Status   08/07/2025 3.2 (L) 3.4 - 5.3 mmol/L Final   07/17/2018 4.1 3.4 - 5.3 mmol/L Final     Hemoglobin   Date Value Ref Range Status   08/07/2025 8.7 (L) 11.7 - 15.7 g/dL Final   09/09/2016 12.8 11.7 - 15.7 g/dL Final     Creatinine   Date Value Ref Range Status   08/07/2025 2.37 (H) 0.51 - 0.95 mg/dL Final   07/17/2018 1.49 (H) 0.52 - 1.04 mg/dL Final     Urea Nitrogen   Date Value Ref Range Status   08/07/2025 37.3 (H) 8.0 - 23.0 mg/dL Final   07/17/2018 31 (H) 7 - 30 mg/dL Final     Sodium   Date Value Ref Range Status   08/07/2025 135 135 - 145 mmol/L Final   07/17/2018 141 133 - 144 mmol/L Final     No results found for: \"INR\"    DIALYSIS PROCEDURE NOTE  Hepatitis status of previous patient on machine log was checked and verified ok to use with this patients hepatitis status.  Patient dialyzed for 3 hrs. on a K4 bath with a net fluid removal of  3.0L.  A BFR of 300 ml/min was obtained via a RIJ catheter.      The treatment plan was discussed with Dr. Fragoso during the treatment.    Total heparin received during the treatment: 0 units.     Line flushed, clamped and capped with heparin 1:1000 1.9 mL (1900 units) per lumen    Meds  given: none   Complications: HYPERTENSION supported with nitroglycerin IV and po medications during the treatment. Dr Fragoso aware and ordered 3.5kg but 20 min til the end of tx pt was cramping and hypotensive and 200ml NS IV bolus given and pt stabilized with  /67    Person educated: pt. Knowledge base limited. Barriers to learning: pt is lethargic and was on bipap. Educated on procedure via verbal mode. patient verbalized understanding but needs more reinforcement.. Pt prefers verbal education style.     ICEBOAT? Timeout performed pre-treatment  I: Patient was identified using 2 identifiers  C:  Consent Signed Yes  E: Equipment preventative maintenance is current and dialysis delivery system OK to use  B: Hepatitis B Surface Antigen: neg; Draw " Date: 8/5/2025      Hepatitis B Surface Antibody: Succept; Draw Date: 8/5/2025  O: Dialysis orders present and complete prior to treatment  A: Vascular access verified and assessed prior to treatment  T: Treatment was performed at a clinically appropriate time  ?: Patient was allowed to ask questions and address concerns prior to treatment  See Adult Hemodialysis flowsheet in [facility] for further details and post assessment.  Machine water alarm in place and functioning. Transducer pods intact and checked every 15min.   Pt dialyzed in the icu on portable R/O.  Chlorine/Chloramine water system checked every 4 hours.  Outpatient Dialysis at D    Patient repositioned every 2 hours during the treatment.  Post treatment report given to RICKIE Young RN regarding 3.5L of fluid removed, last BP of 132/86, and patient pain rating of 0/10.

## 2025-08-07 NOTE — PROVIDER NOTIFICATION
2358: MD paged for elevated BP and Dr. Garsia responded. Made him aware that I gave the hydralazine early that supposedly  due @ 0200 and replied its ok.     0040> patient is persistently hypertensive MD paged and placed on Nitroglycerin drip with target SBP <160

## 2025-08-07 NOTE — PLAN OF CARE
ICU End of Shift Summary.  For vital signs and complete assessments, please see documentation flowsheets.      Pertinent assessments:   Neuro: A&O, forgetful at times  Cardiac: NSR  Resp: LS coarse and diminished on 6 L NC  GI: 3 small BMs, BS active, Bowel meds initiated  : Purewick in place, decent UOP  Skin: Bruising to face and redness to heels, mepilex in place.  Lines: PIV x 2, Tunneled dialysis line- heparin locked, deric feed at 91 cm. Currently 15 ml/hr with FWF 30 ml Q 4 hrs.  Drips: None    Major Shift Events:   Nitroglycerin drip stopped  Weaned off Bipap to 6 L NC.  TF started at 15 ml/hr for 12 hrs with FWF 30 ml Q 4 hrs.  Plan (Upcoming Events): Continue TF until goal of 35 ml/hr. Monitor BP and fluid in lungs. Dialysis tomorrow.  Discharge/Transfer Needs: TBD     Bedside Shift Report Completed : Y  Bedside Safety Check Completed: Y      Problem: Adult Inpatient Plan of Care  Goal: Absence of Hospital-Acquired Illness or Injury  Intervention: Identify and Manage Fall Risk  Recent Flowsheet Documentation  Taken 8/7/2025 1200 by Timur Young RN  Safety Promotion/Fall Prevention: assistive device/personal items within reach  Taken 8/7/2025 0800 by Timur Young RN  Safety Promotion/Fall Prevention: assistive device/personal items within reach  Intervention: Prevent Skin Injury  Recent Flowsheet Documentation  Taken 8/7/2025 1200 by Timur Young, RN  Body Position:   turned   lower extremity elevated   upper extremity elevated   right  Taken 8/7/2025 0800 by Timur Young RN  Body Position:   turned   left   lower extremity elevated   upper extremity elevated  Intervention: Prevent and Manage VTE (Venous Thromboembolism) Risk  Recent Flowsheet Documentation  Taken 8/7/2025 1200 by Timur Young RN  VTE Prevention/Management: SCDs on (sequential compression devices)  Taken 8/7/2025 0800 by Timur Young RN  VTE Prevention/Management: SCDs on (sequential compression  devices)  Intervention: Prevent Infection  Recent Flowsheet Documentation  Taken 8/7/2025 1200 by Timur Young, RN  Infection Prevention:   equipment surfaces disinfected   hand hygiene promoted   personal protective equipment utilized   rest/sleep promoted   single patient room provided  Taken 8/7/2025 0800 by Timur Young RN  Infection Prevention:   equipment surfaces disinfected   hand hygiene promoted   personal protective equipment utilized   rest/sleep promoted   single patient room provided  Goal: Optimal Comfort and Wellbeing  Intervention: Provide Person-Centered Care  Recent Flowsheet Documentation  Taken 8/7/2025 1200 by Timur Young RN  Trust Relationship/Rapport:   care explained   emotional support provided   questions answered  Taken 8/7/2025 0800 by Timur Young RN  Trust Relationship/Rapport:   care explained   emotional support provided   questions answered   Goal Outcome Evaluation:

## 2025-08-07 NOTE — PLAN OF CARE
Goal Outcome Evaluation:           Overall Patient Progress: no changeOverall Patient Progress: no change    Outcome Evaluation: See notes    ICU End of Shift Summary.  For vital signs and complete assessments, please see documentation flowsheets.     Pertinent assessments:  Patient Aox4 drowsy after valium,  easy to arouse with voice, open eyes and following commands. Was on 6L NC then BIPAP for increase WOB. Fio2: 60% 10/5. LS coarse with wheezes Tele SR, Blood pressure elevated up to 200's SBP, initiated nitroglycerin drip. Renal diet but kept  NPO during BIPAP. BS present. No BM in this shift. PW in place. 3 PIV's    Major Shift Events:   > Placed on BIPAP RT aware  > nitroglycerin drip initiated for elevated BP  > [id number], Ohylur3m and Valium 1x for anxiety  Plan (Upcoming Events): For another HD session today, Continue ongoing ICU care  Discharge/Transfer Needs: TBD    Bedside Shift Report Completed : Y  Bedside Safety Check Completed: y        Problem: Adult Inpatient Plan of Care  Goal: Plan of Care Review  Description: The Plan of Care Review/Shift note should be completed every shift.  The Outcome Evaluation is a brief statement about your assessment that the patient is improving, declining, or no change.  This information will be displayed automatically on your shift  note.  8/7/2025 0445 by Rachele Montano RN  Flowsheets (Taken 8/7/2025 0445)  Plan of Care Reviewed With: patient  8/7/2025 0445 by Rachele Montano RN  Flowsheets (Taken 8/7/2025 0445)  Outcome Evaluation: See notes  Plan of Care Reviewed With: patient  Overall Patient Progress: no change  Goal: Absence of Hospital-Acquired Illness or Injury  Intervention: Identify and Manage Fall Risk  Recent Flowsheet Documentation  Taken 8/7/2025 0400 by Rachele Montano RN  Safety Promotion/Fall Prevention:   activity supervised   assistive device/personal items within reach   clutter free environment maintained   increased rounding and  observation   increase visualization of patient   patient and family education   room organization consistent   safety round/check completed  Taken 8/7/2025 0000 by Rachele Montano RN  Safety Promotion/Fall Prevention:   activity supervised   assistive device/personal items within reach   clutter free environment maintained   increased rounding and observation   increase visualization of patient   patient and family education   room organization consistent   safety round/check completed  Taken 8/6/2025 2018 by Rachele Montano RN  Safety Promotion/Fall Prevention:   activity supervised   assistive device/personal items within reach   clutter free environment maintained   increased rounding and observation   increase visualization of patient   patient and family education   room organization consistent   safety round/check completed  Intervention: Prevent Skin Injury  Recent Flowsheet Documentation  Taken 8/7/2025 0400 by Rachele Montano RN  Body Position:   turned   weight shifting  Skin Protection:   adhesive use limited   incontinence pads utilized   skin to device areas padded   skin to skin areas padded   transparent dressing maintained   tubing/devices free from skin contact  Taken 8/7/2025 0015 by Rachele Montano RN  Body Position:   turned   left   legs elevated   upper extremity elevated  Taken 8/7/2025 0000 by Rachele Montano RN  Body Position:   turned   weight shifting  Skin Protection:   adhesive use limited   incontinence pads utilized   skin to device areas padded   skin to skin areas padded   transparent dressing maintained   tubing/devices free from skin contact  Taken 8/6/2025 2200 by Rachele Montano RN  Body Position:   weight shifting   turned   legs elevated   upper extremity elevated  Taken 8/6/2025 2018 by Rachele Montano RN  Body Position:   turned   weight shifting  Skin Protection:   adhesive use limited   incontinence pads utilized   skin to device  areas padded   skin to skin areas padded   transparent dressing maintained   tubing/devices free from skin contact  Intervention: Prevent and Manage VTE (Venous Thromboembolism) Risk  Recent Flowsheet Documentation  Taken 8/7/2025 0400 by Rachele Montano RN  VTE Prevention/Management: SCDs on (sequential compression devices)  Taken 8/7/2025 0000 by Rachele Montano RN  VTE Prevention/Management: SCDs on (sequential compression devices)  Taken 8/6/2025 2018 by Rachele Montano RN  VTE Prevention/Management: SCDs on (sequential compression devices)  Intervention: Prevent Infection  Recent Flowsheet Documentation  Taken 8/7/2025 0400 by Rachele Montano RN  Infection Prevention:   equipment surfaces disinfected   hand hygiene promoted   personal protective equipment utilized   rest/sleep promoted   single patient room provided  Taken 8/7/2025 0000 by Rachele Montano RN  Infection Prevention:   equipment surfaces disinfected   hand hygiene promoted   personal protective equipment utilized   rest/sleep promoted   single patient room provided  Taken 8/6/2025 2018 by Rachele Montano RN  Infection Prevention:   equipment surfaces disinfected   hand hygiene promoted   personal protective equipment utilized   rest/sleep promoted   single patient room provided  Goal: Optimal Comfort and Wellbeing  Intervention: Provide Person-Centered Care  Recent Flowsheet Documentation  Taken 8/7/2025 0400 by Rachele Montano RN  Trust Relationship/Rapport:   care explained   emotional support provided   questions answered  Taken 8/7/2025 0000 by Rachele Montano RN  Trust Relationship/Rapport:   care explained   emotional support provided   questions answered  Taken 8/6/2025 2018 by Rachele Montano RN  Trust Relationship/Rapport:   care explained   emotional support provided   questions answered

## 2025-08-07 NOTE — PROGRESS NOTES
Renal Medicine Progress Note            Assessment/Plan:     Assessment:    Hypertensive emergency   Acute hypoxic respiratory failure   Acute on chronic diastolic HF   History of R renal artery stenting  Labile HTN  Bipap dependent hypoxia, pulmonary edema on CXR. . BNP 25K. TTE with EF 55-60%, elevated L sided pressures and pulmonary HTN, but RA pressure estimated low.    - Labile BP, history of renal artery stenosis status post stenting-> Doppler on 7/31-no evidence of significant JACY  - Metanephrines pending        ROMI on CKD IV   Previously  baseline 1.3-1.5, worsening in May/June 2025, new baseline appears to be ~3 with nephrotic range proteinuria. 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 This is most commonly seen in DM2, however can also be seen with smoking and HTN. Bx also showed cholesterol emboli (likely due to endograft repair).  Cr on admission 4.25, worse than recent baseline. In setting of hypertensive emergency, pulmonary edema, LE edema. Bx results:       Metabolic acidosis-  Stop sodium bicarb on Dialysis    R lung adenocarcinoma   Received radiation in 2021.     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  Total 5.5 L removed in last 2 days.  Edema has completely resolved.  She had some cramping towards the end of dialysis today and blood pressure dropped.  I suspect she is close to euvolemia now.  -Monitor blood pressure  - Tentatively plan for next dialysis on Saturday.  Will reassess tomorrow for dialysis needs.  Discussed with   at bedside.   -Low-sodium diet.  P.o. fluid restriction of 1500 cc/day    Discussed with ICU team in person          Interval History:     Seen/examined in dialysis.  Dialyzed yesterday with 2.5 L removed.  Dialysis today with 3 L removed.  She has transient drop in her blood pressure at end of dialysis  while she was straining to micturate. ?  Vasovagal.  Blood pressure quickly recovered.  200 cc saline given back.  Total 3 L removed.  She is off BiPAP.  On 6 L nasal cannula oxygen.   in sinus rhythm.  More awake but still confused.             Medications and Allergies:     Current Facility-Administered Medications   Medication Dose Route Frequency Provider Last Rate Last Admin    - MEDICATION INSTRUCTIONS for Dialysis Patients -   Does not apply See Admin Instructions Mariano Tran MD        amiodarone (PACERONE) tablet 200 mg  200 mg Oral or Feeding Tube Daily Mariano Tran MD        B and C vitamin Complex with folic acid (NEPHRONEX) liquid 5 mL  5 mL Per Feeding Tube Daily Mariano Tran MD        carvedilol (COREG) tablet 25 mg  25 mg Oral or Feeding Tube BID w/meals Mariano Tran MD   25 mg at 08/07/25 0928    clopidogrel (PLAVIX) tablet 75 mg  75 mg Oral or Feeding Tube Daily Mariano Tran MD        [START ON 8/8/2025] famotidine (PEPCID) tablet 20 mg  20 mg Oral or Feeding Tube Q48H Virgilio Patel MD        hydrALAZINE (APRESOLINE) tablet 100 mg  100 mg Oral or Feeding Tube TID Mariano Tran MD   100 mg at 08/07/25 0936    icosapent ethyl (VASCEPA) capsule 2 g  2 g Oral BID w/meals Virgilio Patel MD   2 g at 08/06/25 1742    isosorbide dinitrate (ISORDIL) tablet 40 mg  40 mg Oral or Feeding Tube TID Virgilio Patel MD        meclizine (ANTIVERT) tablet 25 mg  25 mg Oral or Feeding Tube Q6H Virgilio Patel MD   25 mg at 08/07/25 0006    NIFEdipine ER OSMOTIC (PROCARDIA XL) 24 hr tablet 90 mg  90 mg Oral Daily Mariano Tran MD   90 mg at 08/07/25 0936    PARoxetine (PAXIL) tablet 40 mg  40 mg Oral or Feeding Tube Daily Virgilio Patel MD        sodium bicarbonate tablet 1,300 mg  1,300 mg Oral or Feeding Tube BID Virgilio Patel MD   1,300 mg at 08/06/25 2007    sodium chloride (PF) 0.9% PF flush 3 mL  3 mL Intracatheter Q8H [facility] Gilmer Swain MD   3 mL at 08/06/25  "2156        Allergies   Allergen Reactions    Iodine Hives            Physical Exam:   Vitals were reviewed  /78   Pulse 66   Temp 97.4  F (36.3  C) (Oral)   Resp 13   Ht 1.626 m (5' 4\")   Wt 54.2 kg (119 lb 7.8 oz)   SpO2 96%   BMI 20.51 kg/m      Wt Readings from Last 3 Encounters:   08/07/25 54.2 kg (119 lb 7.8 oz)   07/23/25 54.4 kg (120 lb)   07/21/25 54.1 kg (119 lb 3.2 oz)       GENERAL APPEARANCE: awake, anxious   HEENT: normocephalic, dry MM   RESP: coarse, basal crackles noted.  Bilateral rhonchi  CV: irregular   EXTREMITIES/SKIN: Edema around thighs have improved significantly.`  NEURO: Grossly nonfocal  Rt internal jugular TDC            Data:     BMP  Recent Labs   Lab 08/07/25  1159 08/07/25  0800 08/07/25  0606 08/07/25  0357 08/06/25 2020 08/06/25 0557 08/05/25 2354 08/05/25  0539 08/04/25  0528   NA  --   --  135  --   --  135  --  133* 135   POTASSIUM  --   --  3.2*  --   --  3.6  --  4.1 3.7   CHLORIDE  --   --  95*  --   --  92*  --  92* 94*   BRUCE  --   --  8.4*  --   --  8.7*  --  8.9 8.5*   CO2  --   --  27  --   --  25 -- 25 25   BUN  --   --  37.3*  --   --  66.0*  --  60.3* 61.3*   CR  --   --  2.37*  --   --  3.56*  --  3.56* 3.67*   * 134* 112* 114*   < > 128*   < > 154* 98    < > = values in this interval not displayed.     CBC  Recent Labs   Lab 08/07/25  0606 08/06/25  0557 08/05/25  0539 08/04/25  0528   WBC 8.4 9.4 9.9 9.2   HGB 8.7* 9.1* 9.5* 8.5*   HCT 26.2* 27.2* 29.5* 26.0*   * 102* 105* 104*    378 423 369     Lab Results   Component Value Date    AST 22 07/17/2025    ALT 11 07/17/2025    ALKPHOS 61 07/17/2025    BILITOTAL 0.3 07/17/2025     No results found for: \"INR\"  Color Urine (no units)   Date Value   09/09/2016 Light Yellow     Appearance Urine (no units)   Date Value   09/09/2016 Clear     Glucose Urine (mg/dL)   Date Value   09/09/2016 Negative     Bilirubin Urine (no units)   Date Value   09/09/2016 Negative     Ketones Urine " (mg/dL)   Date Value   09/09/2016 Negative     Specific Gravity Urine (no units)   Date Value   09/09/2016 1.010     pH Urine (pH)   Date Value   09/09/2016 5.5     Protein Albumin Urine (mg/dL)   Date Value   09/09/2016 Negative     Nitrite Urine (no units)   Date Value   09/09/2016 Negative     Leukocyte Esterase Urine (no units)   Date Value   09/09/2016 Negative         Attestation:  I have reviewed today's vital signs, notes, medications, labs and imaging.    Jamie Fragoso MD  [facility]  Office: [phone]     (3) adequate

## 2025-08-08 ENCOUNTER — APPOINTMENT (OUTPATIENT)
Dept: CT IMAGING | Facility: CLINIC | Age: 75
End: 2025-08-08
Attending: INTERNAL MEDICINE
Payer: MEDICARE

## 2025-08-08 LAB
ANION GAP SERPL CALCULATED.3IONS-SCNC: 13 MMOL/L (ref 7–15)
BUN SERPL-MCNC: 24.3 MG/DL (ref 8–23)
CALCIUM SERPL-MCNC: 8.6 MG/DL (ref 8.8–10.4)
CHLORIDE SERPL-SCNC: 94 MMOL/L (ref 98–107)
CREAT SERPL-MCNC: 2.36 MG/DL (ref 0.51–0.95)
EGFRCR SERPLBLD CKD-EPI 2021: 21 ML/MIN/1.73M2
ERYTHROCYTE [DISTWIDTH] IN BLOOD BY AUTOMATED COUNT: 14.8 % (ref 10–15)
GAMMA INTERFERON BACKGROUND BLD IA-ACNC: 0.03 IU/ML
GLUCOSE BLDC GLUCOMTR-MCNC: 117 MG/DL (ref 70–99)
GLUCOSE BLDC GLUCOMTR-MCNC: 120 MG/DL (ref 70–99)
GLUCOSE BLDC GLUCOMTR-MCNC: 121 MG/DL (ref 70–99)
GLUCOSE BLDC GLUCOMTR-MCNC: 125 MG/DL (ref 70–99)
GLUCOSE BLDC GLUCOMTR-MCNC: 125 MG/DL (ref 70–99)
GLUCOSE BLDC GLUCOMTR-MCNC: 133 MG/DL (ref 70–99)
GLUCOSE SERPL-MCNC: 133 MG/DL (ref 70–99)
HCO3 SERPL-SCNC: 26 MMOL/L (ref 22–29)
HCT VFR BLD AUTO: 27.7 % (ref 35–47)
HGB BLD-MCNC: 9.1 G/DL (ref 11.7–15.7)
M TB IFN-G BLD-IMP: NEGATIVE
M TB IFN-G CD4+ BCKGRND COR BLD-ACNC: 6.07 IU/ML
MAGNESIUM SERPL-MCNC: 1.9 MG/DL (ref 1.7–2.3)
MCH RBC QN AUTO: 33.5 PG (ref 26.5–33)
MCHC RBC AUTO-ENTMCNC: 32.9 G/DL (ref 31.5–36.5)
MCV RBC AUTO: 102 FL (ref 78–100)
MITOGEN IGNF BCKGRD COR BLD-ACNC: 0 IU/ML
MITOGEN IGNF BCKGRD COR BLD-ACNC: 0.01 IU/ML
PHOSPHATE SERPL-MCNC: 2.8 MG/DL (ref 2.5–4.5)
PLATELET # BLD AUTO: 299 10E3/UL (ref 150–450)
POTASSIUM SERPL-SCNC: 3.3 MMOL/L (ref 3.4–5.3)
POTASSIUM SERPL-SCNC: 3.7 MMOL/L (ref 3.4–5.3)
QUANTIFERON MITOGEN: 6.1 IU/ML
QUANTIFERON NIL TUBE: 0.03 IU/ML
QUANTIFERON TB1 TUBE: 0.03 IU/ML
QUANTIFERON TB2 TUBE: 0.04
RBC # BLD AUTO: 2.72 10E6/UL (ref 3.8–5.2)
SODIUM SERPL-SCNC: 133 MMOL/L (ref 135–145)
WBC # BLD AUTO: 9.5 10E3/UL (ref 4–11)

## 2025-08-08 PROCEDURE — 250N000013 HC RX MED GY IP 250 OP 250 PS 637: Performed by: INTERNAL MEDICINE

## 2025-08-08 PROCEDURE — 80048 BASIC METABOLIC PNL TOTAL CA: CPT | Performed by: INTERNAL MEDICINE

## 2025-08-08 PROCEDURE — 99232 SBSQ HOSP IP/OBS MODERATE 35: CPT | Performed by: INTERNAL MEDICINE

## 2025-08-08 PROCEDURE — 84100 ASSAY OF PHOSPHORUS: CPT | Performed by: INTERNAL MEDICINE

## 2025-08-08 PROCEDURE — 83735 ASSAY OF MAGNESIUM: CPT | Performed by: INTERNAL MEDICINE

## 2025-08-08 PROCEDURE — 200N000001 HC R&B ICU

## 2025-08-08 PROCEDURE — 84132 ASSAY OF SERUM POTASSIUM: CPT | Performed by: INTERNAL MEDICINE

## 2025-08-08 PROCEDURE — 36415 COLL VENOUS BLD VENIPUNCTURE: CPT | Performed by: INTERNAL MEDICINE

## 2025-08-08 PROCEDURE — 85018 HEMOGLOBIN: CPT | Performed by: INTERNAL MEDICINE

## 2025-08-08 PROCEDURE — 250N000013 HC RX MED GY IP 250 OP 250 PS 637: Performed by: STUDENT IN AN ORGANIZED HEALTH CARE EDUCATION/TRAINING PROGRAM

## 2025-08-08 PROCEDURE — 74150 CT ABDOMEN W/O CONTRAST: CPT

## 2025-08-08 RX ORDER — POTASSIUM CHLORIDE 1.5 G/1.58G
20 POWDER, FOR SOLUTION ORAL ONCE
Status: COMPLETED | OUTPATIENT
Start: 2025-08-08 | End: 2025-08-08

## 2025-08-08 RX ORDER — HYDRALAZINE HYDROCHLORIDE 50 MG/1
50 TABLET, FILM COATED ORAL 3 TIMES DAILY
Status: DISCONTINUED | OUTPATIENT
Start: 2025-08-08 | End: 2025-08-20 | Stop reason: HOSPADM

## 2025-08-08 RX ORDER — POTASSIUM CHLORIDE 1500 MG/1
20 TABLET, EXTENDED RELEASE ORAL ONCE
Status: DISCONTINUED | OUTPATIENT
Start: 2025-08-08 | End: 2025-08-08

## 2025-08-08 RX ADMIN — HYDRALAZINE HYDROCHLORIDE 100 MG: 50 TABLET ORAL at 02:08

## 2025-08-08 RX ADMIN — MECLIZINE HYDROCHLORIDE 25 MG: 25 TABLET ORAL at 05:48

## 2025-08-08 RX ADMIN — ICOSAPENT ETHYL 2 G: 1 CAPSULE ORAL at 18:17

## 2025-08-08 RX ADMIN — PAROXETINE HYDROCHLORIDE 40 MG: 20 TABLET, FILM COATED ORAL at 08:48

## 2025-08-08 RX ADMIN — AMIODARONE HYDROCHLORIDE 200 MG: 200 TABLET ORAL at 08:48

## 2025-08-08 RX ADMIN — HYDRALAZINE HYDROCHLORIDE 100 MG: 50 TABLET ORAL at 11:11

## 2025-08-08 RX ADMIN — POTASSIUM CHLORIDE 20 MEQ: 1.5 POWDER, FOR SOLUTION ORAL at 06:40

## 2025-08-08 RX ADMIN — FAMOTIDINE 20 MG: 20 TABLET, FILM COATED ORAL at 08:48

## 2025-08-08 RX ADMIN — ISOSORBIDE DINITRATE 40 MG: 20 TABLET ORAL at 08:48

## 2025-08-08 RX ADMIN — SENNOSIDES 2 TABLET: 8.6 TABLET, FILM COATED ORAL at 08:48

## 2025-08-08 RX ADMIN — POTASSIUM CHLORIDE 20 MEQ: 1.5 POWDER, FOR SOLUTION ORAL at 11:10

## 2025-08-08 RX ADMIN — ICOSAPENT ETHYL 2 G: 1 CAPSULE ORAL at 08:53

## 2025-08-08 RX ADMIN — SENNOSIDES 2 TABLET: 8.6 TABLET, FILM COATED ORAL at 22:43

## 2025-08-08 RX ADMIN — MECLIZINE HYDROCHLORIDE 25 MG: 25 TABLET ORAL at 00:19

## 2025-08-08 RX ADMIN — CARVEDILOL 25 MG: 12.5 TABLET, FILM COATED ORAL at 08:47

## 2025-08-08 RX ADMIN — CLOPIDOGREL BISULFATE 75 MG: 75 TABLET, FILM COATED ORAL at 18:16

## 2025-08-08 RX ADMIN — ISOSORBIDE DINITRATE 40 MG: 20 TABLET ORAL at 18:16

## 2025-08-08 RX ADMIN — MECLIZINE HYDROCHLORIDE 25 MG: 25 TABLET ORAL at 18:17

## 2025-08-08 RX ADMIN — HYDRALAZINE HYDROCHLORIDE 50 MG: 50 TABLET, FILM COATED ORAL at 18:17

## 2025-08-08 RX ADMIN — MECLIZINE HYDROCHLORIDE 25 MG: 25 TABLET ORAL at 12:23

## 2025-08-08 RX ADMIN — Medication 5 ML: at 08:53

## 2025-08-08 RX ADMIN — CARVEDILOL 25 MG: 12.5 TABLET, FILM COATED ORAL at 18:16

## 2025-08-08 ASSESSMENT — ACTIVITIES OF DAILY LIVING (ADL)
ADLS_ACUITY_SCORE: 74
ADLS_ACUITY_SCORE: 76
ADLS_ACUITY_SCORE: 73
ADLS_ACUITY_SCORE: 72
ADLS_ACUITY_SCORE: 76
ADLS_ACUITY_SCORE: 76
ADLS_ACUITY_SCORE: 74
ADLS_ACUITY_SCORE: 74
ADLS_ACUITY_SCORE: 73
ADLS_ACUITY_SCORE: 74
ADLS_ACUITY_SCORE: 76
ADLS_ACUITY_SCORE: 72
ADLS_ACUITY_SCORE: 74
ADLS_ACUITY_SCORE: 76
ADLS_ACUITY_SCORE: 73
ADLS_ACUITY_SCORE: 72
ADLS_ACUITY_SCORE: 76
ADLS_ACUITY_SCORE: 72
ADLS_ACUITY_SCORE: 76

## 2025-08-08 NOTE — PROGRESS NOTES
Renal Medicine Progress Note            Assessment/Plan:     Assessment:    Hypertensive emergency   Acute hypoxic respiratory failure   Acute on chronic diastolic HF   History of R renal artery stenting  Labile HTN  Bipap dependent hypoxia, pulmonary edema on CXR. . BNP 25K. TTE with EF 55-60%, elevated L sided pressures and pulmonary HTN, but RA pressure estimated low.    - Labile BP, history of renal artery stenosis status post stenting-> Doppler on 7/31-no evidence of significant JACY  - Metanephrines  -> normetanephrine came back quite high at 4.6 with upper limit of 0.8.  Metanephrine was normal.  Bump in normetanephrine is likely related to advanced CKD.  Cannot get 24 urine given patient's dialysis status.   - Will get CT abdomen without contrast to look at adrenal glands.       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 This is most commonly seen in DM2, however can also be seen with smoking and HTN. Bx also showed cholesterol emboli (likely due to endograft repair).  Cr on admission 4.25, worse than recent baseline. In setting of hypertensive emergency, pulmonary edema, LE edema. Bx results:         R lung adenocarcinoma   Received radiation in 2021.     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  Respiratory status improved.  Suspect she will remain dialysis dependent moving forward given advanced CKD, biopsy findings as well as issues with volume.   Discussed with care coordinator-Will need outpatient placement for hemodialysis.  Plan for dialysis tomorrow  Cut back on hydralazine to 50 mg 3 times daily.  Blood pressure improved with ultrafiltration.  CT scan of abdomen/adrenal gland  Discussed with   and daughter at bedside.   -Low-sodium diet.  P.o. fluid restriction of 1500 cc/day    Discussed with ICU team in person          Interval History:     Seen/examined   Dialyzed 2 days in a row with 5.5 L removed with significant improvement in respiratory status.  Now on 2 L nasal cannula.  More awake.  Eating today.  Remains in sinus rhythm.               Medications and Allergies:     Current Facility-Administered Medications   Medication Dose Route Frequency Provider Last Rate Last Admin    - MEDICATION INSTRUCTIONS for Dialysis Patients -   Does not apply See Admin Instructions Mariano Tran MD        amiodarone (PACERONE) tablet 200 mg  200 mg Oral or Feeding Tube Daily Mariano Tran MD   200 mg at 08/08/25 0848    B and C vitamin Complex with folic acid (NEPHRONEX) liquid 5 mL  5 mL Per Feeding Tube Daily Mariano Tran MD   5 mL at 08/08/25 0853    carvedilol (COREG) tablet 25 mg  25 mg Oral or Feeding Tube BID w/meals Mariano Tran MD   25 mg at 08/08/25 0847    clopidogrel (PLAVIX) tablet 75 mg  75 mg Oral or Feeding Tube Daily Mariano Tran MD   75 mg at 08/07/25 1755    famotidine (PEPCID) tablet 20 mg  20 mg Oral or Feeding Tube Q48H Virgilio Patel MD   20 mg at 08/08/25 0848    hydrALAZINE (APRESOLINE) tablet 100 mg  100 mg Oral or Feeding Tube TID Mariano Tran MD   100 mg at 08/08/25 1111    icosapent ethyl (VASCEPA) capsule 2 g  2 g Oral BID w/meals Virgilio Patel MD   2 g at 08/08/25 0853    isosorbide dinitrate (ISORDIL) tablet 40 mg  40 mg Oral or Feeding Tube TID Virgilio Patel MD   40 mg at 08/08/25 0848    meclizine (ANTIVERT) tablet 25 mg  25 mg Oral or Feeding Tube Q6H Virgilio Patel MD   25 mg at 08/08/25 1223    NIFEdipine ER OSMOTIC (PROCARDIA XL) 24 hr tablet 90 mg  90 mg Oral Daily Mariano Tran MD   90 mg at 08/07/25 0936    PARoxetine (PAXIL) tablet 40 mg  40 mg Oral or Feeding Tube Daily Virgilio Patel MD   40 mg at 08/08/25 0853    sennosides  "(SENOKOT) tablet 2 tablet  2 tablet Oral BID Mariano Tran MD   2 tablet at 08/08/25 0848    sodium chloride (PF) 0.9% PF flush 3 mL  3 mL Intracatheter Q8H [facility] Gilmer Swain MD   3 mL at 08/08/25 0548        Allergies   Allergen Reactions    Iodine Hives            Physical Exam:   Vitals were reviewed  /56   Pulse 73   Temp 97.2  F (36.2  C) (Temporal)   Resp 10   Ht 1.626 m (5' 4\")   Wt 55.3 kg (121 lb 14.6 oz)   SpO2 97%   BMI 20.93 kg/m      Wt Readings from Last 3 Encounters:   08/08/25 55.3 kg (121 lb 14.6 oz)   07/23/25 54.4 kg (120 lb)   07/21/25 54.1 kg (119 lb 3.2 oz)       GENERAL APPEARANCE: awake, alert.  HEENT: normocephalic, dry MM   RESP: coarse, basal crackles noted.  Bilateral rhonchi  CV: irregular   EXTREMITIES/SKIN: Edema around thighs have improved significantly.`  NEURO: Grossly nonfocal  Rt internal jugular TDC            Data:     BMP  Recent Labs   Lab 08/08/25  1209 08/08/25  0834 08/08/25  0555 08/08/25  0434 08/07/25  2356 08/07/25  2000 08/07/25  0800 08/07/25  0606 08/06/25  2020 08/06/25  0557 08/05/25  2354 08/05/25  0539   NA  --   --  133*  --   --   --   --  135  --  135  --  133*   POTASSIUM  --   --  3.3*  --   --  3.5  --  3.2*  --  3.6  --  4.1   CHLORIDE  --   --  94*  --   --   --   --  95*  --  92*  --  92*   BRUCE  --   --  8.6*  --   --   --   --  8.4*  --  8.7*  --  8.9   CO2  --   --  26  --   --   --   --  27  --  25  --  25   BUN  --   --  24.3*  --   --   --   --  37.3*  --  66.0*  --  60.3*   CR  --   --  2.36*  --   --   --   --  2.37*  --  3.56*  --  3.56*   * 125* 133* 133*   < > 114*   < > 112*   < > 128*   < > 154*    < > = values in this interval not displayed.     CBC  Recent Labs   Lab 08/08/25  0555 08/07/25  0606 08/06/25  0557 08/05/25  0539   WBC 9.5 8.4 9.4 9.9   HGB 9.1* 8.7* 9.1* 9.5*   HCT 27.7* 26.2* 27.2* 29.5*   * 101* 102* 105*    280 378 423     Lab Results   Component Value Date    AST 22 " "07/17/2025    ALT 11 07/17/2025    ALKPHOS 61 07/17/2025    BILITOTAL 0.3 07/17/2025     No results found for: \"INR\"  Color Urine (no units)   Date Value   09/09/2016 Light Yellow     Appearance Urine (no units)   Date Value   09/09/2016 Clear     Glucose Urine (mg/dL)   Date Value   09/09/2016 Negative     Bilirubin Urine (no units)   Date Value   09/09/2016 Negative     Ketones Urine (mg/dL)   Date Value   09/09/2016 Negative     Specific Gravity Urine (no units)   Date Value   09/09/2016 1.010     pH Urine (pH)   Date Value   09/09/2016 5.5     Protein Albumin Urine (mg/dL)   Date Value   09/09/2016 Negative     Nitrite Urine (no units)   Date Value   09/09/2016 Negative     Leukocyte Esterase Urine (no units)   Date Value   09/09/2016 Negative         Attestation:  I have reviewed today's vital signs, notes, medications, labs and imaging.    Jamie Fragoso MD  [facility]  Office: [phone]    "

## 2025-08-08 NOTE — PROGRESS NOTES
Care Management Follow Up    Length of Stay (days): 11    Expected Discharge Date: 08/03/2025     Concerns to be Addressed: discharge planning     Patient plan of care discussed at interdisciplinary rounds: Yes    Anticipated Discharge Disposition: Transitional Care     Anticipated Discharge Services:    Anticipated Discharge DME:      Education Provided on the Discharge Plan:    Patient/Family in Agreement with the Plan: yes    Referrals Placed by CM/SW: Post Acute Facilities/ Dialysis  Private pay costs discussed: Not applicable    Discussed  Partnership in Safe Discharge Planning  document with patient/family: No     Handoff Completed: No, handoff not indicated or clinically appropriate    Additional Information:  Discussed patient with Nephrology and they would like CM to begin referral process for outpatient HD. Intake lab work ordered and pending.   Attempted to meet with patient and family to discuss dialysis location and days of week preference but patient was in CT and family not present.  Will attempt to connect with patient tomorrow and obtain choices. Patient will need TCU placement on discharge. Currently PT evaluation is pending but patient has a bed hold at [facility]. Will need to discuss transportation options to dialysis when mobility status better known as well.    CM will continue to follow for discharge planning.    Shara Duran RN BSN OCN  Care Coordinator  [facility]  [phone]         warm

## 2025-08-08 NOTE — PLAN OF CARE
"ICU End of Shift Summary.  For vital signs and complete assessments, please see documentation flowsheets.      Pertinent assessments: Sinus rhythm. Lung sounds coarse and diminished. Intermittent confusion, otherwise oriented but forgetful. Denies pain, numbness, and tingling. Urine output okay via external cath. One medium BM this shift. Scattered bruising/scabs.     Major Shift Events:   -Keofeed increased to 30mL/hour.   -Continuing to wean oxygen, down to 2L via NC.   -Replaced potassium this morning.     Plan (Upcoming Events): TBD  Discharge/Transfer Needs: TBD     Bedside Shift Report Completed : y  Bedside Safety Check Completed:y      Problem: Adult Inpatient Plan of Care  Goal: Plan of Care Review  Description: The Plan of Care Review/Shift note should be completed every shift.  The Outcome Evaluation is a brief statement about your assessment that the patient is improving, declining, or no change.  This information will be displayed automatically on your shift  note.  Outcome: Progressing  Goal: Patient-Specific Goal (Individualized)  Description: You can add care plan individualizations to a care plan. Examples of Individualization might be:  \"Parent requests to be called daily at 9am for status\", \"I have a hard time hearing out of my right ear\", or \"Do not touch me to wake me up as it startles  me\".  Outcome: Progressing  Goal: Absence of Hospital-Acquired Illness or Injury  Outcome: Progressing  Intervention: Identify and Manage Fall Risk  Recent Flowsheet Documentation  Taken 8/8/2025 0000 by Dianne Fishman, RN  Safety Promotion/Fall Prevention: assistive device/personal items within reach  Taken 8/7/2025 2051 by Dianne Fishman, RN  Safety Promotion/Fall Prevention: assistive device/personal items within reach  Intervention: Prevent Skin Injury  Recent Flowsheet Documentation  Taken 8/8/2025 0400 by Dianen Fishman, RN  Body Position:   weight shifting   position changed independently  Taken " 8/8/2025 0200 by Dianne Fishman RN  Body Position:   weight shifting   position changed independently  Taken 8/8/2025 0000 by Dianne Fishman RN  Body Position: weight shifting  Skin Protection:   adhesive use limited   incontinence pads utilized   skin to device areas padded   skin to skin areas padded   transparent dressing maintained   tubing/devices free from skin contact  Taken 8/7/2025 2200 by Dianne Fishman RN  Body Position:   turned   right  Taken 8/7/2025 2051 by Dianne Fishman RN  Skin Protection:   adhesive use limited   incontinence pads utilized   skin to device areas padded   skin to skin areas padded   transparent dressing maintained   tubing/devices free from skin contact  Taken 8/7/2025 2000 by Dianne Fishman RN  Body Position:   turned   left  Intervention: Prevent and Manage VTE (Venous Thromboembolism) Risk  Recent Flowsheet Documentation  Taken 8/8/2025 0000 by Dianne Fishman RN  VTE Prevention/Management:   SCDs off (sequential compression devices)   patient refused intervention  Taken 8/7/2025 2051 by Dianne Fishman RN  VTE Prevention/Management:   SCDs off (sequential compression devices)   patient refused intervention  Intervention: Prevent Infection  Recent Flowsheet Documentation  Taken 8/8/2025 0000 by Dianne Fishman RN  Infection Prevention:   equipment surfaces disinfected   hand hygiene promoted   personal protective equipment utilized   rest/sleep promoted   single patient room provided  Taken 8/7/2025 2051 by Dianne Fishman RN  Infection Prevention:   equipment surfaces disinfected   hand hygiene promoted   personal protective equipment utilized   rest/sleep promoted   single patient room provided  Goal: Optimal Comfort and Wellbeing  Outcome: Progressing  Intervention: Provide Person-Centered Care  Recent Flowsheet Documentation  Taken 8/8/2025 0000 by Dianne Fishman RN  Trust Relationship/Rapport:   care explained   emotional support provided   questions  answered  Taken 8/7/2025 2051 by Dianne Fishman, RN  Trust Relationship/Rapport:   care explained   emotional support provided   questions answered  Goal: Readiness for Transition of Care  Outcome: Progressing

## 2025-08-08 NOTE — PLAN OF CARE
"Goal Outcome Evaluation:      Plan of Care Reviewed With: patient, spouse, child    Overall Patient Progress: improvingOverall Patient Progress: improving    Outcome Evaluation: VSS. A&OX4, weaning O2 down.    ICU End of Shift Summary.  For vital signs and complete assessments, please see documentation flowsheets.      Pertinent assessments:VSS, A&OX4 forgetful at times, weaned oxygen to 0.5 LPM NC, LS: coarse/dim, denies pain, tele: SR, purewick in place with 100 ml out on this shift, BS: active X1 soft BM on this shift.    Major Shift Events: K+ replaced recheck 3.8 next draw in am, Pt up in chair for 4 hours with jesse steady.    Plan (Upcoming Events):wean oxygen as able, monitor electrolytes and replace per protocol, nephrology following, PT following, dialysis scheduled for tomorrow.     Discharge/Transfer Needs: TBD     Bedside Shift Report Completed : Y  Bedside Safety Check Completed:Y    Problem: Adult Inpatient Plan of Care  Goal: Plan of Care Review  Description: The Plan of Care Review/Shift note should be completed every shift.  The Outcome Evaluation is a brief statement about your assessment that the patient is improving, declining, or no change.  This information will be displayed automatically on your shift  note.  Outcome: Progressing  Flowsheets (Taken 8/8/2025 1823)  Outcome Evaluation: VSS. A&OX4, weaning O2 down.  Plan of Care Reviewed With:   patient   spouse   child  Overall Patient Progress: improving  Goal: Patient-Specific Goal (Individualized)  Description: You can add care plan individualizations to a care plan. Examples of Individualization might be:  \"Parent requests to be called daily at 9am for status\", \"I have a hard time hearing out of my right ear\", or \"Do not touch me to wake me up as it startles  me\".  Outcome: Progressing  Goal: Absence of Hospital-Acquired Illness or Injury  Outcome: Progressing  Intervention: Identify and Manage Fall Risk  Recent Flowsheet " Documentation  Taken 8/8/2025 1600 by Mohamud Reynolds RN  Safety Promotion/Fall Prevention: assistive device/personal items within reach  Taken 8/8/2025 1200 by Mohamud Reynolds RN  Safety Promotion/Fall Prevention: assistive device/personal items within reach  Taken 8/8/2025 0830 by Mohamud Reynolds RN  Safety Promotion/Fall Prevention: assistive device/personal items within reach  Intervention: Prevent Skin Injury  Recent Flowsheet Documentation  Taken 8/8/2025 1600 by Mohamud Reynolds RN  Body Position:   upper extremity elevated   supine, legs elevated  Skin Protection:   adhesive use limited   incontinence pads utilized   skin to device areas padded   skin to skin areas padded   transparent dressing maintained   tubing/devices free from skin contact  Taken 8/8/2025 1400 by Mohamud Reynolds RN  Body Position:   weight shifting   upper extremity elevated   lower extremity elevated  Taken 8/8/2025 1200 by Mohamud Reynolds RN  Body Position:   right   turned  Skin Protection:   adhesive use limited   incontinence pads utilized   skin to device areas padded   skin to skin areas padded   transparent dressing maintained   tubing/devices free from skin contact  Taken 8/8/2025 0830 by Mohamud Reynolds RN  Body Position:   right   turned  Skin Protection:   adhesive use limited   incontinence pads utilized   skin to device areas padded   skin to skin areas padded   transparent dressing maintained   tubing/devices free from skin contact  Intervention: Prevent and Manage VTE (Venous Thromboembolism) Risk  Recent Flowsheet Documentation  Taken 8/8/2025 0830 by Mohamud Reynolds RN  VTE Prevention/Management: (anticoagulation therapy)   SCDs off (sequential compression devices)   patient refused intervention   other (see comments)  Intervention: Prevent Infection  Recent Flowsheet Documentation  Taken 8/8/2025 1600 by Mohamud Reynolds RN  Infection Prevention:   equipment surfaces disinfected   hand hygiene  promoted   personal protective equipment utilized   rest/sleep promoted   single patient room provided  Taken 8/8/2025 1200 by Mohamud Reynolds RN  Infection Prevention:   equipment surfaces disinfected   hand hygiene promoted   personal protective equipment utilized   rest/sleep promoted   single patient room provided  Taken 8/8/2025 0830 by Mohamud Reynolds RN  Infection Prevention:   equipment surfaces disinfected   hand hygiene promoted   personal protective equipment utilized   rest/sleep promoted   single patient room provided  Goal: Optimal Comfort and Wellbeing  Outcome: Progressing  Intervention: Provide Person-Centered Care  Recent Flowsheet Documentation  Taken 8/8/2025 1600 by Mohamud Reynolds RN  Trust Relationship/Rapport:   care explained   emotional support provided   questions answered  Taken 8/8/2025 1200 by Mohamud Reynolds RN  Trust Relationship/Rapport:   care explained   emotional support provided   questions answered  Taken 8/8/2025 0830 by Mohamud Reynolds RN  Trust Relationship/Rapport:   care explained   emotional support provided   questions answered  Goal: Readiness for Transition of Care  Outcome: Progressing       Problem: Delirium  Goal: Optimal Coping  Outcome: Progressing  Intervention: Optimize Psychosocial Adjustment to Delirium  Recent Flowsheet Documentation  Taken 8/8/2025 1600 by Mohamud Reynolds RN  Supportive Measures:   active listening utilized   relaxation techniques promoted   positive reinforcement provided   self-care encouraged  Family/Support System Care: self-care encouraged  Taken 8/8/2025 1200 by Mohamud Reynolds RN  Supportive Measures:   active listening utilized   relaxation techniques promoted   positive reinforcement provided   self-care encouraged  Family/Support System Care: self-care encouraged  Taken 8/8/2025 0830 by Mohamud Reynolds RN  Supportive Measures:   active listening utilized   relaxation techniques promoted   positive reinforcement  provided   self-care encouraged  Family/Support System Care: self-care encouraged  Goal: Improved Behavioral Control  Outcome: Progressing  Intervention: Prevent and Manage Agitation  Recent Flowsheet Documentation  Taken 8/8/2025 1600 by Mohamud Reynolds RN  Environment Familiarity/Consistency: familiar objects from home provided  Taken 8/8/2025 1200 by Mohamud Reynolds RN  Complementary Therapy: essential oils utilized  Environment Familiarity/Consistency: familiar objects from home provided  Taken 8/8/2025 0830 by Mohamud Reynolds RN  Environment Familiarity/Consistency: familiar objects from home provided  Intervention: Minimize Safety Risk  Recent Flowsheet Documentation  Taken 8/8/2025 1600 by Mohamud Reynolds RN  Enhanced Safety Measures:   patient/family teach back on injury risk   review medications for side effects with activity  Trust Relationship/Rapport:   care explained   emotional support provided   questions answered  Taken 8/8/2025 1200 by Mohamud Reynolds RN  Enhanced Safety Measures:   patient/family teach back on injury risk   review medications for side effects with activity  Trust Relationship/Rapport:   care explained   emotional support provided   questions answered  Taken 8/8/2025 0830 by Mohamud Reynolds RN  Enhanced Safety Measures:   patient/family teach back on injury risk   review medications for side effects with activity  Trust Relationship/Rapport:   care explained   emotional support provided   questions answered  Goal: Improved Attention and Thought Clarity  Outcome: Progressing  Intervention: Maximize Cognitive Function  Recent Flowsheet Documentation  Taken 8/8/2025 1600 by Mohamud Reynolds RN  Sensory Stimulation Regulation: care clustered  Reorientation Measures: clock in view  Taken 8/8/2025 1200 by Mohamud Reynolds RN  Sensory Stimulation Regulation: care clustered  Reorientation Measures: clock in view  Taken 8/8/2025 0830 by Mohamud Reynolds RN  Sensory Stimulation  Regulation: care clustered  Reorientation Measures: clock in view  Goal: Improved Sleep  Outcome: Progressing     Problem: Skin Injury Risk Increased  Goal: Skin Health and Integrity  Outcome: Progressing  Intervention: Plan: Nurse Driven Intervention: Positioning  Recent Flowsheet Documentation  Taken 8/8/2025 1600 by Mohamud Reynolds RN  Plan: Positioning Interventions: REPOSITION Left/Right (No supine) q2h  Taken 8/8/2025 1200 by Mohamud Reynolds RN  Plan: Positioning Interventions: REPOSITION Left/Right (No supine) q2h  Taken 8/8/2025 0830 by Mohamud Reynolds RN  Plan: Positioning Interventions: REPOSITION Left/Right (No supine) q2h  Intervention: Plan: Nurse Driven Intervention: Moisture Management  Recent Flowsheet Documentation  Taken 8/8/2025 1600 by Mohamud Reynolds RN  Moisture Interventions: Incontinence pad  Bathing/Skin Care: incontinence care  Taken 8/8/2025 1200 by Mohamud Reynolds RN  Moisture Interventions: Incontinence pad  Bathing/Skin Care: incontinence care  Taken 8/8/2025 0830 by Mohamud Reynolds RN  Moisture Interventions: Incontinence pad  Taken 8/8/2025 0800 by Mohamud Reynolds RN  Moisture Interventions: Incontinence pad  Bathing/Skin Care: incontinence care  Intervention: Plan: Nurse Driven Intervention: Friction and Shear  Recent Flowsheet Documentation  Taken 8/8/2025 1600 by Mohamud Reynolds RN  Friction/Shear Interventions:   HOB 30 degrees or less   Silicone foam sacral dressing   Assistive lifting device (portable/ceiling lift, etc.)   Repositioning device (TAP system, etc.)  Taken 8/8/2025 1200 by Mohamud Reynolds RN  Friction/Shear Interventions:   HOB 30 degrees or less   Silicone foam sacral dressing   Assistive lifting device (portable/ceiling lift, etc.)   Repositioning device (TAP system, etc.)  Taken 8/8/2025 0830 by Mohamud Reynolds RN  Friction/Shear Interventions:   HOB 30 degrees or less   Silicone foam sacral dressing   Assistive lifting device  (portable/ceiling lift, etc.)   Repositioning device (TAP system, etc.)  Intervention: Optimize Skin Protection  Recent Flowsheet Documentation  Taken 8/8/2025 1600 by Mohamud Reynolds RN  Skin Protection:   adhesive use limited   incontinence pads utilized   skin to device areas padded   skin to skin areas padded   transparent dressing maintained   tubing/devices free from skin contact  Activity Management: activity adjusted per tolerance  Head of Bed (HOB) Positioning: HOB at 20-30 degrees  Taken 8/8/2025 1400 by Mohamud Reynolds RN  Head of Bed (HOB) Positioning: HOB at 60-90 degrees  Taken 8/8/2025 1200 by Mohamud Reynolds RN  Skin Protection:   adhesive use limited   incontinence pads utilized   skin to device areas padded   skin to skin areas padded   transparent dressing maintained   tubing/devices free from skin contact  Activity Management:   activity adjusted per tolerance   bedrest  Head of Bed (HOB) Positioning: HOB at 30 degrees  Taken 8/8/2025 0830 by Mohamud Reynolds RN  Skin Protection:   adhesive use limited   incontinence pads utilized   skin to device areas padded   skin to skin areas padded   transparent dressing maintained   tubing/devices free from skin contact  Activity Management:   activity adjusted per tolerance   bedrest  Head of Bed (HOB) Positioning: HOB at 30 degrees  Intervention: Promote and Optimize Oral Intake  Recent Flowsheet Documentation  Taken 8/8/2025 1600 by Mohamud Reynolds RN  Oral Nutrition Promotion: rest periods promoted  Taken 8/8/2025 1200 by Mohamud Reynolds RN  Oral Nutrition Promotion: rest periods promoted  Taken 8/8/2025 0830 by Mohamud Reynolds RN  Oral Nutrition Promotion: rest periods promoted     Problem: Breathing Pattern Ineffective  Goal: Effective Breathing Pattern  Outcome: Progressing  Intervention: Promote Improved Breathing Pattern  Recent Flowsheet Documentation  Taken 8/8/2025 1600 by Mohamud Reynolds RN  Breathing Techniques/Airway  Clearance: deep/controlled cough encouraged  Supportive Measures:   active listening utilized   relaxation techniques promoted   positive reinforcement provided   self-care encouraged  Airway/Ventilation Management:   airway patency maintained   calming measures promoted   pulmonary hygiene promoted   positive pressure ventilation provided  Head of Bed (HOB) Positioning: HOB at 20-30 degrees  Taken 8/8/2025 1400 by Mohamud Reynolds RN  Head of Bed (HOB) Positioning: HOB at 60-90 degrees  Taken 8/8/2025 1200 by Mohamud Reynolds RN  Breathing Techniques/Airway Clearance: deep/controlled cough encouraged  Supportive Measures:   active listening utilized   relaxation techniques promoted   positive reinforcement provided   self-care encouraged  Airway/Ventilation Management:   airway patency maintained   calming measures promoted   pulmonary hygiene promoted   positive pressure ventilation provided  Head of Bed (HOB) Positioning: HOB at 30 degrees  Taken 8/8/2025 0830 by Mohamud Reynolds RN  Breathing Techniques/Airway Clearance: deep/controlled cough encouraged  Supportive Measures:   active listening utilized   relaxation techniques promoted   positive reinforcement provided   self-care encouraged  Airway/Ventilation Management:   airway patency maintained   calming measures promoted   pulmonary hygiene promoted   positive pressure ventilation provided  Head of Bed (HOB) Positioning: HOB at 30 degrees     Problem: Comorbidity Management  Goal: Maintenance of COPD Symptom Control  Outcome: Progressing  Intervention: Maintain COPD Symptom Control  Recent Flowsheet Documentation  Taken 8/8/2025 1600 by Mohamud Reynolds RN  Breathing Techniques/Airway Clearance: deep/controlled cough encouraged  Medication Review/Management: medications reviewed  Taken 8/8/2025 1200 by Mohamud Reynolds RN  Breathing Techniques/Airway Clearance: deep/controlled cough encouraged  Medication Review/Management: medications reviewed  Taken  8/8/2025 0830 by Mohamud Reynolds RN  Breathing Techniques/Airway Clearance: deep/controlled cough encouraged  Medication Review/Management: medications reviewed  Goal: Blood Pressure in Desired Range  Outcome: Progressing  Intervention: Maintain Blood Pressure Management  Recent Flowsheet Documentation  Taken 8/8/2025 1600 by Mohamud Reynolds RN  Medication Review/Management: medications reviewed  Taken 8/8/2025 1200 by Mohamud Reynolds RN  Medication Review/Management: medications reviewed  Taken 8/8/2025 0830 by Mohamud Reynolds RN  Medication Review/Management: medications reviewed     Problem: Hypertension Acute  Goal: Blood Pressure Within Desired Range  Outcome: Progressing  Intervention: Normalize Blood Pressure  Recent Flowsheet Documentation  Taken 8/8/2025 1600 by Mohamud Reynolds RN  Sensory Stimulation Regulation: care clustered  Medication Review/Management: medications reviewed  Taken 8/8/2025 1200 by Mohamud Reynolds RN  Sensory Stimulation Regulation: care clustered  Medication Review/Management: medications reviewed  Taken 8/8/2025 0830 by Mohamud Reynolds RN  Sensory Stimulation Regulation: care clustered  Medication Review/Management: medications reviewed

## 2025-08-08 NOTE — PROGRESS NOTES
SPIRITUAL HEALTH SERVICES - Progress Note  [facility] ICU    Referral Source: Assess pt's emotional/spiritual resources and needs per her length of stay.     Attempted to see pt Maria Alejandra this morning, but she was sleeping.  Her daughter Yesenia was present.  Introduced her to [facility].  She acknowledged that Maria Alejandra's medical challenges are emotional hard for her but expressed gratitude for the care she is receiving here.  Yesenia named her spouse and friends as being part of her support network.  Her employers have been flexible and supportive.  Yesenia is not putting too many expectations on herself at this time but focusing on work and caring for her mother.    When I returned later in the day, Yesenia and Maria Alejandra's spouse Shandra were present.  Oriented Maria Alejandra to [facility] (Shandra was on the phone).  Maria Alejandra denied having any needs today.  She reported that Rastafarian or spirituality does not play a significant role in her life.    Plan: Informed pt and her family how they can request further  support.  This author and other chaplains remain available per pt/family request.     Armand Monk M.Div., [facility]  Staff     [facility] available 24/7 for emergent requests/referrals, either by paging the on-call  or by entering an ASAP/STAT consult in [facility], which will also page the on-call .

## 2025-08-08 NOTE — PROGRESS NOTES
[facility]    Medicine Progress Note - Hospitalist Service    Date of Admission:  7/28/2025    Assessment & Plan     75-year-old female with history of f CAD, CKD stage IV, hypertension, hyperlipidemia, intra-abdominal aortic aneurysm with prior stenting, chronic vertigo, recent hospitalization for fall with pelvic fracture who presents the ED for shortness of breath.      Had elevated D-dimer but VQ scan negative for PE, CT not done due to CKD.  proBNP more than 25,000.  Chest x-ray showed bilateral perihilar interstitial/airspace opacities, left greater than right, are nonspecific for postradiation change versus pneumonia. A small nodule in the peripheral right upper lobe measures 9 mm.     Found to be in hypertensive emergency, admitted to ICU with BiPAP and nitroglycerin drip.  She was weaned off nitroglycerin drip and BiPAP but has had repeated episodes of rising blood pressure and shortness of breath needing her to go back on BiPAP intermittently.  She was transferred to floor on 7/31 but came back to ICU on 8/2 after rapid response for flash pulmonary edema and hypertensive emergency.    On the morning of 8/14, patient went into A-fib with initial rates of 130s and then decreased to 40s without intervention.  She converted back to sinus rhythm and about half an hour.    Acute hypoxic respiratory failure: Multifactorial-initially BiPAP dependent, improved  Diastolic CHF exacerbation,Recurrent flash pulmonary edema associated with episodes of high blood pressure-improved with initiation of hemodialysis  Hypertensive emergency-improved, required multiple medications including nitroglycerin drip  Chronic hypertension on multiple medications  End-stage renal disease:  - Echo with EF of 55 to 60%.  Unclear what is causing recurrent sudden episodes of elevated blood pressure and flash pulmonary edema.  She has history of renal artery stenting and on renal arterial ultrasound done on 7/31 there  was no sonographic evidence of renal artery stenosis.  Pending metanephrines.  - Increased carvedilol to 25 mg twice daily, hydralazine 200 mg 3 times daily and Isordil increased to 40 mg 3 times daily.  Continued clonidine patch 0.3 mg weekly and nifedipine 90 mg daily.  Also started on the Lasix 40 mg IV twice daily.  -Was empirically started on ceftriaxone on presentation.  Patient had no fever or leukocytosis.  Mildly elevated procalcitonin of 0.67 which is difficult to interpret in ROMI/CKD and improved to 0.49 on recheck.  Finished 5-day course of ceftriaxone.    - Patient has been improving.  She was able to wean off BiPAP.  Currently on 2 L of supplemental nasal oxygen.  Patient had second dialysis run on August 7.  Nephrology has been following.  Patient has been off nitroglycerin now.  Blood pressure is much better.  Continue oral medications.  Further titration needs deferred to nephrology team.        ROMI on CKD stage IV:  Metabolic acidosis:  - Previous baseline of 1.3-1.5 which worsened in May/June 2025 and new baseline appears to be 3 with nephrotic range proteinuria.  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  - Creatinine on admission was 4.25, improved but remained high  3.41 on August 4.    -- Nephrology was consulted and recommendation obtained to treat with IV Bumex and eventually decided to place dialysis catheter for initiating hemodialysis.  -- Patient was started on hemodialysis August 6.  -- Second dialysis run on August 7, 2025.  -- Further dialysis plan deferred to nephrology team.        Atrial fibrillation: Paroxysmal atrial fibrillation  - On 8/4, patient had a self-limiting 30 minutes episode of atrial fibrillation initially with RVR to 130 and then bradycardia to 40.  - Cardiology was reconsulted, advised 150 mg amiodarone IV push, and the plan was no further amiodarone if patient remains sinus.    -- However, patient developed  intermittent episodes of atrial fibrillation with RVR.  Cardiology recommended amiodarone drip and planning to transition to oral amiodarone 200 mg once a day for about a month.  Continue Coreg.    -- Currently remains in sinus rhythm.  Will continue amiodarone, Coreg, hold anticoagulation for now.   Cardiology recommended to hold it for now and may consider starting anticoagulation for a few weeks or a month on discharge.  Patient is already on Plavix.        Malnutrition: Moderate nutrition in the context of acute on chronic medical illness.  - Patient was unable to take adequate oral intake due to dependence on BiPAP.  -Registered dietitian is following and recommendation appreciated  - Feeding tube placed and tube feeding started.  Continue to monitor dietary intake.  Registered dietitian following.       Constipation: On bowel regimen     Chronic medical conditions:  - AAA.  Endoleak status post recent endovascular aneurysm repair on 4/25.  Continue Plavix and statin.  - History of CVA.  Chronic infarct in the right basal ganglia noted on CT.  - Right lung adenocarcinoma.  Status post stereotactic body radiation therapy in 2021, follows up in [place].  His lung nodule on x-ray, can follow-up with her primary oncologist.  - Chronic vertigo.  Follows up with ENT in [place] and on as needed meclizine.  MRI brain on 5/22 was negative for acute pathology.  - Depression pleasant lady.  Continue paroxetine.  - Hypertriglyceridemia.  On Vascepa.      Generalized weakness  Physical deconditioning  --PT consulted to assist with discharge planning.  Of note the patient and her  live in [place] half the time and they are currently staying at extended stay.  Patient's daughter is supportive.   consulted.      Goal of care: Restorative            Diet: Fluid restriction 1500 ML FLUID  Combination Diet Renal Diet (dialysis), Renal Diet (non-dialysis)  Adult Formula Drip Feeding: Continuous Nepro with  Carbsteady; Nasogastric tube; Goal Rate: 35; mL/hr; Confirm tube tip before starting. Begin TF at 15 mL/hr x 12 hours. If tolerating and labs are WNL, okay to advance by 15 mL/hr every 10 hours unt...  Snacks/Supplements Adult: Nepro Oral Supplement; Between Meals    DVT Prophylaxis: Pneumatic Compression Devices  Rao Catheter: Not present  Lines: PRESENT      CVC Double Lumen Right Subclavian Tunneled;Hemodialysis/CRRT-Site Assessment: WDL    Cardiac Monitoring: ACTIVE order. Indication: Acute decompensated heart failure (48 hours)  Code Status: Full Code      Clinically Significant Risk Factors        # Hypokalemia: Lowest K = 3.2 mmol/L in last 2 days, will replace as needed  # Hyponatremia: Lowest Na = 133 mmol/L in last 2 days, will monitor as appropriate  # Hypochloremia: Lowest Cl = 94 mmol/L in last 2 days, will monitor as appropriate            # Hypertension: Noted on problem list             # Severe Malnutrition: based on nutrition assessment and treatment provided per dietitian's recommendations.    # Financial/Environmental Concerns: none         Social Drivers of Health    Depression: At risk (6/5/2025)    Received from [employer]    PHQ-2     Depression Risk: 3   Tobacco Use: High Risk (7/31/2025)    Received from [facility]    Patient History     Smoking Tobacco Use: Every Day     Smokeless Tobacco Use: Never     Passive Exposure: Current   Interpersonal Safety: Unknown (7/28/2025)    Interpersonal Safety     Do you feel physically and emotionally safe where you currently live?: Patient unable to answer     Within the past 12 months, have you been hit, slapped, kicked or otherwise physically hurt by someone?: Patient unable to answer     Within the past 12 months, have you been humiliated or emotionally abused in other ways by your partner or ex-partner?: Patient unable to answer          Disposition Plan     Anticipate discharge disposition: May transfer out of ICU.    Medically  "Ready for Discharge:    Anticipated in 2-4 Days pending improvement in respiratory status.  May need additional few days in the hospital for discharge planning as well as nephrology follow-up arrangements.        Mariano Tran MD  Hospitalist Service  [facility]  Securely message with [employer] (more info)  Text page via [facility] Paging/Directory   ______________________________________________________________________    Interval History     Patient is seen and examined by me today and medical record reviewed.Overnight events noted and care discussed with nursing staff.  Patient is very comfortable and denies any chest pain or shortness of breath.  Denies any chest pain.  She remains very weak.  Her daughter was at bedside and we had extensive discussion about patient's care plan.  She is currently on 2 L of oxygen.  Nephrology is following.  Care plan discussed with bedside nurse.  Physical Exam   Vital Signs: Temp: 97.2  F (36.2  C) Temp src: Oral BP: 106/73 Pulse: 89   Resp: 10 SpO2: 97 % O2 Device: Nasal cannula Oxygen Delivery: 2 LPM  Weight: 121 lbs 14.63 oz    General Appearance: Alert awake and oriented x 3.  Off BiPAP.  Respiratory: Basilar crackles.  Cardiovascular: S1-S2 normal  GI: Soft and nontender  Skin: No rash  Other: Trace edema.      Medical Decision Making       60 MINUTES SPENT BY ME on the date of service doing chart review, history, exam, documentation & further activities per the note.      Data     All laboratory and imaging data in the past 24 hours reviewed     Recent Labs   Lab 08/08/25  0555 08/07/25  0606 08/06/25  0557   WBC 9.5 8.4 9.4   HGB 9.1* 8.7* 9.1*   HCT 27.7* 26.2* 27.2*   * 101* 102*    280 378     No results for input(s): \"CULT\" in the last 168 hours.  Recent Labs   Lab 08/08/25  0834 08/08/25  0555 08/08/25  0434 08/07/25  2356 08/07/25 2000 08/07/25 0800 08/07/25  0606 08/06/25 2020 08/06/25  0557   NA  --  133*  --   --   --   --  " "135  --  135   POTASSIUM  --  3.3*  --   --  3.5  --  3.2*  --  3.6   CHLORIDE  --  94*  --   --   --   --  95*  --  92*   CO2  --  26  --   --   --   --  27  --  25   ANIONGAP  --  13  --   --   --   --  13  --  18*   * 133* 133*   < > 114*   < > 112*   < > 128*   BUN  --  24.3*  --   --   --   --  37.3*  --  66.0*   CR  --  2.36*  --   --   --   --  2.37*  --  3.56*   GFRESTIMATED  --  21*  --   --   --   --  21*  --  13*   BRUCE  --  8.6*  --   --   --   --  8.4*  --  8.7*   MAG  --  1.9  --   --   --   --  1.9  --  2.1   PHOS  --  2.8  --   --   --   --  2.9  --  4.9*    < > = values in this interval not displayed.       Recent Labs   Lab 08/08/25  0834 08/08/25  0555 08/08/25  0434 08/07/25  2356 08/07/25 2000   * 133* 133* 117* 114*           No results for input(s): \"INR\" in the last 168 hours.        No results for input(s): \"TROPONIN\", \"TROPI\", \"TROPR\" in the last 168 hours.    Invalid input(s): \"TROP\", \"TROPONINIES\"    Recent Results (from the past 48 hours)   XR Chest Port 1 View    Narrative    EXAM: XR CHEST PORT 1 VIEW  LOCATION: [facility]  DATE: 8/5/2025    INDICATION: Shortness of breath  COMPARISON: 7/31/2025      Impression    IMPRESSION: Extensive infiltrates and areas of consolidation with a somewhat patchy distribution are again noted. These are progressed in the left upper lobe and mildly progressed in the perihilar right lung. Pulmonary vascularity is obscured. Heart size   is unchanged. No significant effusions are identified on this semiupright portable film.   IR CVC Tunnel Placement > 5 Yrs of Age    Narrative    [facility]    EXAM: TUNNELED CENTRAL VENOUS CATHETER PLACEMENT    LOCATION: [facility]    CLINICAL HISTORY: The patient has a history of renal failure and requires dialysis.    PROCEDURES PERFORMED:  1. Ultrasound-guided puncture of jugular vein  2. Creation of subcutaneous tunnel in chest wall.  3. Placement of " dialysis catheter through subcutaneous tunnel, into peel away sheath, and into right atrium     MODERATE SEDATION: 2 mg Versed and 50 mcg Fentanyl were administered intravenously for moderate sedation. Pulse oximetry, heart rate and blood pressure were continuously monitored by an independent trained observer. The physician spent 10 minutes of   face-to-face moderate sedation time with the patient.    ADDITIONAL MEDICATIONS: see EMR    CONTRAST: none    FLUOROSCOPIC TIME: 0.3 minutes  CUMULATIVE AIR KERMA/DOSE: 1 mGy    STERILE BARRIER TECHNIQUE: Maximal Sterile Barrier Technique Utilized: Cap AND mask AND sterile gown AND sterile gloves AND sterile full body drape AND hand hygiene AND skin preparation 2% chlorhexidine for cutaneous antisepsis (or acceptable alternative   antiseptics).   Sterile Ultrasound Technique Utilized ?Sterile gel AND sterile probe covers.    UNIVERSAL PROTOCOL: Standard universal protocol per facility guidelines was followed. See EMR for documentation.     TECHNIQUE:   Risks, benefits and alternatives were explained to the patient and written, informed consent was obtained. The patient was placed in the supine position on the angiography table. The neck and chest were prepped and draped in the usual fashion and   anesthetized with 1% lidocaine. Using ultrasound guidance, the internal jugular vein was accessed with a micropuncture system..  A 0.35 wire was advanced through the sheath and into the IVC. A subcutaneous tunnel was then created in the chest wall using   blunt dissection. The catheter was then attached to a tunneling device and brought through the tunnel. The venostomy site was sequentially dilated. The catheter was then placed through a peel away sheath, and into the right atrium. The puncture site was   closed using surgical glue  The catheter was secured to the skin using a Prolene stitch. Each port was dwelled with heparin (1000 units per cc) solution, and the site was dressed  in a sterile fashion.  The patient tolerated the procedure well without   immediate complications.    FINDINGS:   The right internal jugular vein is anechoic, compressible and patent by ultrasound, and ultrasound images obtained during access show the needle within the vein. Ultrasound images have been permanently captured for documentation.  Fluoroscopic images obtained following placement of the catheter, show that the catheter terminates near the cavoatrial junction..  The catheter has a smooth course in the chest wall. The catheter functions well and is available for immediate use.      Impression    IMPRESSION:  1. Ultrasound and fluoroscopic guided placement of tunneled 23 cm tip to cuff, right internal jugular dialysis catheter.          CPT Codes:

## 2025-08-09 LAB
ANION GAP SERPL CALCULATED.3IONS-SCNC: 14 MMOL/L (ref 7–15)
BUN SERPL-MCNC: 40.1 MG/DL (ref 8–23)
CALCIUM SERPL-MCNC: 8.7 MG/DL (ref 8.8–10.4)
CHLORIDE SERPL-SCNC: 96 MMOL/L (ref 98–107)
CREAT SERPL-MCNC: 3.03 MG/DL (ref 0.51–0.95)
EGFRCR SERPLBLD CKD-EPI 2021: 15 ML/MIN/1.73M2
ERYTHROCYTE [DISTWIDTH] IN BLOOD BY AUTOMATED COUNT: 14.8 % (ref 10–15)
GLUCOSE BLDC GLUCOMTR-MCNC: 100 MG/DL (ref 70–99)
GLUCOSE BLDC GLUCOMTR-MCNC: 110 MG/DL (ref 70–99)
GLUCOSE BLDC GLUCOMTR-MCNC: 111 MG/DL (ref 70–99)
GLUCOSE BLDC GLUCOMTR-MCNC: 116 MG/DL (ref 70–99)
GLUCOSE BLDC GLUCOMTR-MCNC: 120 MG/DL (ref 70–99)
GLUCOSE BLDC GLUCOMTR-MCNC: 127 MG/DL (ref 70–99)
GLUCOSE SERPL-MCNC: 121 MG/DL (ref 70–99)
HCO3 SERPL-SCNC: 25 MMOL/L (ref 22–29)
HCT VFR BLD AUTO: 27 % (ref 35–47)
HGB BLD-MCNC: 8.8 G/DL (ref 11.7–15.7)
MAGNESIUM SERPL-MCNC: 1.9 MG/DL (ref 1.7–2.3)
MCH RBC QN AUTO: 33.5 PG (ref 26.5–33)
MCHC RBC AUTO-ENTMCNC: 32.6 G/DL (ref 31.5–36.5)
MCV RBC AUTO: 103 FL (ref 78–100)
PHOSPHATE SERPL-MCNC: 2.2 MG/DL (ref 2.5–4.5)
PLATELET # BLD AUTO: 272 10E3/UL (ref 150–450)
POTASSIUM SERPL-SCNC: 3.6 MMOL/L (ref 3.4–5.3)
RBC # BLD AUTO: 2.63 10E6/UL (ref 3.8–5.2)
SODIUM SERPL-SCNC: 135 MMOL/L (ref 135–145)
WBC # BLD AUTO: 9 10E3/UL (ref 4–11)

## 2025-08-09 PROCEDURE — 84100 ASSAY OF PHOSPHORUS: CPT | Performed by: STUDENT IN AN ORGANIZED HEALTH CARE EDUCATION/TRAINING PROGRAM

## 2025-08-09 PROCEDURE — 80048 BASIC METABOLIC PNL TOTAL CA: CPT | Performed by: INTERNAL MEDICINE

## 2025-08-09 PROCEDURE — 85014 HEMATOCRIT: CPT | Performed by: INTERNAL MEDICINE

## 2025-08-09 PROCEDURE — 250N000013 HC RX MED GY IP 250 OP 250 PS 637: Performed by: INTERNAL MEDICINE

## 2025-08-09 PROCEDURE — 99232 SBSQ HOSP IP/OBS MODERATE 35: CPT | Performed by: INTERNAL MEDICINE

## 2025-08-09 PROCEDURE — 250N000011 HC RX IP 250 OP 636: Performed by: INTERNAL MEDICINE

## 2025-08-09 PROCEDURE — 258N000003 HC RX IP 258 OP 636: Performed by: INTERNAL MEDICINE

## 2025-08-09 PROCEDURE — 200N000001 HC R&B ICU

## 2025-08-09 PROCEDURE — 83735 ASSAY OF MAGNESIUM: CPT | Performed by: STUDENT IN AN ORGANIZED HEALTH CARE EDUCATION/TRAINING PROGRAM

## 2025-08-09 PROCEDURE — 36415 COLL VENOUS BLD VENIPUNCTURE: CPT | Performed by: INTERNAL MEDICINE

## 2025-08-09 PROCEDURE — 250N000013 HC RX MED GY IP 250 OP 250 PS 637: Performed by: STUDENT IN AN ORGANIZED HEALTH CARE EDUCATION/TRAINING PROGRAM

## 2025-08-09 PROCEDURE — 90935 HEMODIALYSIS ONE EVALUATION: CPT

## 2025-08-09 RX ADMIN — MECLIZINE HYDROCHLORIDE 25 MG: 25 TABLET ORAL at 00:55

## 2025-08-09 RX ADMIN — CLOPIDOGREL BISULFATE 75 MG: 75 TABLET, FILM COATED ORAL at 17:08

## 2025-08-09 RX ADMIN — ICOSAPENT ETHYL 2 G: 1 CAPSULE ORAL at 17:09

## 2025-08-09 RX ADMIN — POTASSIUM & SODIUM PHOSPHATES POWDER PACK 280-160-250 MG 1 PACKET: 280-160-250 PACK at 11:28

## 2025-08-09 RX ADMIN — HEPARIN SODIUM 1900 UNITS: 1000 INJECTION, SOLUTION INTRAVENOUS; SUBCUTANEOUS at 13:14

## 2025-08-09 RX ADMIN — CARVEDILOL 25 MG: 12.5 TABLET, FILM COATED ORAL at 17:08

## 2025-08-09 RX ADMIN — MECLIZINE HYDROCHLORIDE 25 MG: 25 TABLET ORAL at 06:07

## 2025-08-09 RX ADMIN — Medication 5 ML: at 08:30

## 2025-08-09 RX ADMIN — HYDRALAZINE HYDROCHLORIDE 50 MG: 50 TABLET, FILM COATED ORAL at 23:06

## 2025-08-09 RX ADMIN — HYDRALAZINE HYDROCHLORIDE 50 MG: 50 TABLET, FILM COATED ORAL at 15:22

## 2025-08-09 RX ADMIN — HYDRALAZINE HYDROCHLORIDE 50 MG: 50 TABLET, FILM COATED ORAL at 00:55

## 2025-08-09 RX ADMIN — MECLIZINE HYDROCHLORIDE 25 MG: 25 TABLET ORAL at 18:55

## 2025-08-09 RX ADMIN — AMIODARONE HYDROCHLORIDE 200 MG: 200 TABLET ORAL at 08:29

## 2025-08-09 RX ADMIN — PAROXETINE HYDROCHLORIDE 40 MG: 20 TABLET, FILM COATED ORAL at 08:29

## 2025-08-09 RX ADMIN — POTASSIUM & SODIUM PHOSPHATES POWDER PACK 280-160-250 MG 1 PACKET: 280-160-250 PACK at 15:23

## 2025-08-09 RX ADMIN — ISOSORBIDE DINITRATE 40 MG: 20 TABLET ORAL at 17:07

## 2025-08-09 RX ADMIN — SODIUM CHLORIDE 250 ML: 0.9 INJECTION, SOLUTION INTRAVENOUS at 13:12

## 2025-08-09 RX ADMIN — MECLIZINE HYDROCHLORIDE 25 MG: 25 TABLET ORAL at 23:06

## 2025-08-09 RX ADMIN — Medication: at 13:13

## 2025-08-09 RX ADMIN — MECLIZINE HYDROCHLORIDE 25 MG: 25 TABLET ORAL at 14:09

## 2025-08-09 RX ADMIN — SODIUM CHLORIDE 200 ML: 0.9 INJECTION, SOLUTION INTRAVENOUS at 13:11

## 2025-08-09 RX ADMIN — ICOSAPENT ETHYL 2 G: 1 CAPSULE ORAL at 08:32

## 2025-08-09 ASSESSMENT — ACTIVITIES OF DAILY LIVING (ADL)
ADLS_ACUITY_SCORE: 69
ADLS_ACUITY_SCORE: 73
ADLS_ACUITY_SCORE: 66
ADLS_ACUITY_SCORE: 69
ADLS_ACUITY_SCORE: 73
ADLS_ACUITY_SCORE: 66
ADLS_ACUITY_SCORE: 66
ADLS_ACUITY_SCORE: 69
ADLS_ACUITY_SCORE: 69
ADLS_ACUITY_SCORE: 73
ADLS_ACUITY_SCORE: 69
ADLS_ACUITY_SCORE: 66
ADLS_ACUITY_SCORE: 73
ADLS_ACUITY_SCORE: 73
ADLS_ACUITY_SCORE: 69
ADLS_ACUITY_SCORE: 66
ADLS_ACUITY_SCORE: 73
ADLS_ACUITY_SCORE: 73
ADLS_ACUITY_SCORE: 66

## 2025-08-09 NOTE — PROGRESS NOTES
"Potassium   Date Value Ref Range Status   08/09/2025 3.6 3.4 - 5.3 mmol/L Final   07/17/2018 4.1 3.4 - 5.3 mmol/L Final     Hemoglobin   Date Value Ref Range Status   08/09/2025 8.8 (L) 11.7 - 15.7 g/dL Final   09/09/2016 12.8 11.7 - 15.7 g/dL Final     Creatinine   Date Value Ref Range Status   08/09/2025 3.03 (H) 0.51 - 0.95 mg/dL Final   07/17/2018 1.49 (H) 0.52 - 1.04 mg/dL Final     Urea Nitrogen   Date Value Ref Range Status   08/09/2025 40.1 (H) 8.0 - 23.0 mg/dL Final   07/17/2018 31 (H) 7 - 30 mg/dL Final     Sodium   Date Value Ref Range Status   08/09/2025 135 135 - 145 mmol/L Final   07/17/2018 141 133 - 144 mmol/L Final     No results found for: \"INR\"    DIALYSIS PROCEDURE NOTE  Hepatitis status of previous patient on machine log was checked and verified ok to use with this patients hepatitis status.  Patient dialyzed for 3 hrs. on a K3 bath with a net fluid removal of  3L.  A BFR of 400 ml/min was obtained via a RCVC.      The treatment plan was discussed with Dr. Fragoso during the treatment.    Total heparin received during the treatment: 0 units.      Line flushed, clamped and capped with heparin 1:1000 1.9 mL (1900 units) per lumen    Meds given: None   Complications: None; pt tolerated procedure really well without any complications. VSS throughout.      Person educated: Pt/family. Knowledge base minimal. Barriers to learning: lethargic. Educated on procedure/access care via verbal mode. The patient/family verbalized understanding.   ICEBOAT? Timeout performed pre-treatment  I: Patient was identified using 2 identifiers  C:  Consent Signed Yes  E: Equipment preventative maintenance is current and dialysis delivery system OK to use  B: Hepatitis B Surface Antigen: Non-reactive; Draw Date: 8/5/25      Hepatitis B Surface Antibody: Susceptible; Draw Date: 8/5/25  O: Dialysis orders present and complete prior to treatment  A: Vascular access verified and assessed prior to treatment  T: Treatment was " performed at a clinically appropriate time  ?: Patient was allowed to ask questions and address concerns prior to treatment  See Adult Hemodialysis flowsheet in EPIC for further details and post assessment.  Machine water alarm in place and functioning. Transducer pods intact and checked every 15min.   Pt assisted with repositioning throughout dialysis treatment.  Pt dialyzed at bedside in ICU.  Chlorine/Chloramine water system checked every 4 hours.  Outpatient Dialysis TBD.      Post treatment report given to bedside RN (see flowsheet) regarding 3L of fluid removed, last /86.    Alex [facility] RN

## 2025-08-09 NOTE — PROGRESS NOTES
[facility]    Medicine Progress Note - Hospitalist Service    Date of Admission:  7/28/2025    Assessment & Plan     75-year-old female with history of f CAD, CKD stage IV, hypertension, hyperlipidemia, intra-abdominal aortic aneurysm with prior stenting, chronic vertigo, recent hospitalization for fall with pelvic fracture who presents the ED for shortness of breath.      Had elevated D-dimer but VQ scan negative for PE, CT not done due to CKD.  proBNP more than 25,000.  Chest x-ray showed bilateral perihilar interstitial/airspace opacities, left greater than right, are nonspecific for postradiation change versus pneumonia. A small nodule in the peripheral right upper lobe measures 9 mm.     Found to be in hypertensive emergency, admitted to ICU with BiPAP and nitroglycerin drip.  She was weaned off nitroglycerin drip and BiPAP but has had repeated episodes of rising blood pressure and shortness of breath needing her to go back on BiPAP intermittently.  She was transferred to floor on 7/31 but came back to ICU on 8/2 after rapid response for flash pulmonary edema and hypertensive emergency.    On the morning of 8/14, patient went into A-fib with initial rates of 130s and then decreased to 40s without intervention.  She converted back to sinus rhythm and about half an hour.    Acute hypoxic respiratory failure: Multifactorial-initially BiPAP dependent, improved  Diastolic CHF exacerbation,Recurrent flash pulmonary edema associated with episodes of high blood pressure-improved with initiation of hemodialysis  Hypertensive emergency-improved, required multiple medications including nitroglycerin drip  Chronic hypertension on multiple medications  End-stage renal disease:  - Echo with EF of 55 to 60%.  Unclear what is causing recurrent sudden episodes of elevated blood pressure and flash pulmonary edema.  She has history of renal artery stenting and on renal arterial ultrasound done on 7/31 there  was no sonographic evidence of renal artery stenosis.  Pending metanephrines.  - Increased carvedilol to 25 mg twice daily, hydralazine 200 mg 3 times daily and Isordil increased to 40 mg 3 times daily.  Continued clonidine patch 0.3 mg weekly and nifedipine 90 mg daily.  Also started on the Lasix 40 mg IV twice daily.  -Was empirically started on ceftriaxone on presentation.  Patient had no fever or leukocytosis.  Mildly elevated procalcitonin of 0.67 which is difficult to interpret in ROMI/CKD and improved to 0.49 on recheck.  Finished 5-day course of ceftriaxone.    - Patient has been improving.  She was able to wean off BiPAP.  Currently on 2 L of supplemental nasal oxygen.  Patient has been having dialysis runs per nephrology team.  She has been progressively improving but remains very weak.  Fluids as dialysis plan per nephrology.  Plan to continue low-sodium diet, p.o. fluid restriction 1500 cc/day            ROMI on CKD stage IV:  Metabolic acidosis:  - Previous baseline of 1.3-1.5 which worsened in May/June 2025 and new baseline appears to be 3 with nephrotic range proteinuria.  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  - Creatinine on admission was 4.25, improved but remained high  3.41 on August 4.    -- Nephrology was consulted and recommendation obtained to treat with IV Bumex and eventually decided to place dialysis catheter for initiating hemodialysis.  -- Patient was started on hemodialysis August 6.  -- Further dialysis plan deferred to nephrology team.        Atrial fibrillation: Paroxysmal atrial fibrillation  - On 8/4, patient had a self-limiting 30 minutes episode of atrial fibrillation initially with RVR to 130 and then bradycardia to 40.  - Cardiology was reconsulted, advised 150 mg amiodarone IV push, and the plan was no further amiodarone if patient remains sinus.    -- However, patient developed intermittent episodes of atrial  fibrillation with RVR.  Cardiology recommended amiodarone drip and planning to transition to oral amiodarone 200 mg once a day for about a month.  Continue Coreg.    -- Currently remains in sinus rhythm.  Will continue amiodarone, Coreg, hold anticoagulation for now.   Cardiology recommended to hold it for now and may consider starting anticoagulation for a few weeks or a month on discharge.  Patient is already on Plavix.        Malnutrition: Moderate nutrition in the context of acute on chronic medical illness.  - Patient was unable to take adequate oral intake due to dependence on BiPAP.  -Registered dietitian is following and recommendation appreciated  - Feeding tube placed and tube feeding started.  Continue to monitor dietary intake.  Registered dietitian following.       Constipation: On bowel regimen     Chronic medical conditions:  - AAA.  Endoleak status post recent endovascular aneurysm repair on 4/25.  Continue Plavix and statin.  - History of CVA.  Chronic infarct in the right basal ganglia noted on CT.  - Right lung adenocarcinoma.  Status post stereotactic body radiation therapy in 2021, follows up in [place].  His lung nodule on x-ray, can follow-up with her primary oncologist.  - Chronic vertigo.  Follows up with ENT in [place] and on as needed meclizine.  MRI brain on 5/22 was negative for acute pathology.  - Depression pleasant lady.  Continue paroxetine.  - Hypertriglyceridemia.  On Vascepa.      Generalized weakness  Physical deconditioning  --PT consulted to assist with discharge planning.  Of note the patient and her  live in [place] half the time and they are currently staying at extended stay.  Patient's daughter is supportive.   consulted.      Goal of care: Restorative      Diet: Fluid restriction 1500 ML FLUID  Combination Diet Renal Diet (dialysis), Renal Diet (non-dialysis)  Adult Formula Drip Feeding: Continuous Nepro with Carbsteady; Nasogastric tube; Goal Rate:  35; mL/hr; Confirm tube tip before starting. Begin TF at 15 mL/hr x 12 hours. If tolerating and labs are WNL, okay to advance by 15 mL/hr every 10 hours unt...  Snacks/Supplements Adult: Nepro Oral Supplement; Between Meals    DVT Prophylaxis: Pneumatic Compression Devices  Rao Catheter: Not present  Lines: PRESENT      CVC Double Lumen Right Subclavian Tunneled;Hemodialysis/CRRT-Site Assessment: WDL    Cardiac Monitoring: ACTIVE order. Indication: Acute decompensated heart failure (48 hours)  Code Status: Full Code      Clinically Significant Risk Factors        # Hypokalemia: Lowest K = 3.3 mmol/L in last 2 days, will replace as needed  # Hyponatremia: Lowest Na = 133 mmol/L in last 2 days, will monitor as appropriate  # Hypochloremia: Lowest Cl = 94 mmol/L in last 2 days, will monitor as appropriate            # Hypertension: Noted on problem list             # Severe Malnutrition: based on nutrition assessment and treatment provided per dietitian's recommendations.    # Financial/Environmental Concerns: none         Social Drivers of Health    Depression: At risk (6/5/2025)    Received from [facility]    PHQ-2     Depression Risk: 3   Tobacco Use: High Risk (7/31/2025)    Received from [facility] [employer]    Patient History     Smoking Tobacco Use: Every Day     Smokeless Tobacco Use: Never     Passive Exposure: Current   Interpersonal Safety: Unknown (7/28/2025)    Interpersonal Safety     Do you feel physically and emotionally safe where you currently live?: Patient unable to answer     Within the past 12 months, have you been hit, slapped, kicked or otherwise physically hurt by someone?: Patient unable to answer     Within the past 12 months, have you been humiliated or emotionally abused in other ways by your partner or ex-partner?: Patient unable to answer          Disposition Plan     Anticipate discharge disposition: May transfer out of ICU.    Medically Ready for Discharge:    Anticipated in  "2-4 Days pending improvement in respiratory status.  May need additional few days in the hospital for discharge planning as well as nephrology follow-up arrangements.        Mariano Tran MD  Hospitalist Service  [facility]  Securely message with [employer] (more info)  Text page via [facility] Paging/Directory   ______________________________________________________________________    Interval History     Patient is seen and examined by me today and medical record reviewed.Overnight events noted and care discussed with nursing staff.  She is slightly improving.  Tolerating tube feeding.  Able to take some oral nutrition as well.  Denies any fever.  No significant shortness of breath at rest.  She needs 2 L of supplemental oxygen.  Dialysis is in progress.  I spoke with patient's spouse at bedside.    Physical Exam   Vital Signs: Temp: 97.2  F (36.2  C) Temp src: Temporal BP: (!) 159/87 Pulse: 68   Resp: (!) 8 SpO2: 96 % O2 Device: Nasal cannula Oxygen Delivery: 2 LPM  Weight: 114 lbs 10.23 oz    General Appearance: Alert awake and oriented x 3.  Off BiPAP.  Respiratory: Basilar crackles.  Cardiovascular: S1-S2 normal  GI: Soft and nontender  Skin: No rash  Other: Trace edema.      Medical Decision Making       60 MINUTES SPENT BY ME on the date of service doing chart review, history, exam, documentation & further activities per the note.      Data     All laboratory and imaging data in the past 24 hours reviewed     Recent Labs   Lab 08/09/25  0708 08/08/25  0555 08/07/25  0606   WBC 9.0 9.5 8.4   HGB 8.8* 9.1* 8.7*   HCT 27.0* 27.7* 26.2*   * 102* 101*    299 280     No results for input(s): \"CULT\" in the last 168 hours.  Recent Labs   Lab 08/09/25  1200 08/09/25  0830 08/09/25  0708 08/08/25  2003 08/08/25  1635 08/08/25  0834 08/08/25  0555 08/07/25  0800 08/07/25  0606   NA  --   --  135  --   --   --  133*  --  135   POTASSIUM  --   --  3.6  --  3.7  --  3.3*   < > 3.2* " "  CHLORIDE  --   --  96*  --   --   --  94*  --  95*   CO2  --   --  25  --   --   --  26  --  27   ANIONGAP  --   --  14  --   --   --  13  --  13   * 120* 121*   < >  --    < > 133*   < > 112*   BUN  --   --  40.1*  --   --   --  24.3*  --  37.3*   CR  --   --  3.03*  --   --   --  2.36*  --  2.37*   GFRESTIMATED  --   --  15*  --   --   --  21*  --  21*   BRUCE  --   --  8.7*  --   --   --  8.6*  --  8.4*   MAG  --   --  1.9  --   --   --  1.9  --  1.9   PHOS  --   --  2.2*  --   --   --  2.8  --  2.9    < > = values in this interval not displayed.       Recent Labs   Lab 08/09/25  1200 08/09/25  0830 08/09/25  0708 08/09/25  0342 08/09/25  0031   * 120* 121* 110* 111*           No results for input(s): \"INR\" in the last 168 hours.        No results for input(s): \"TROPONIN\", \"TROPI\", \"TROPR\" in the last 168 hours.    Invalid input(s): \"TROP\", \"TROPONINIES\"    Recent Results (from the past 48 hours)   XR Chest Port 1 View    Narrative    EXAM: XR CHEST PORT 1 VIEW  LOCATION: [facility]  DATE: 8/5/2025    INDICATION: Shortness of breath  COMPARISON: 7/31/2025      Impression    IMPRESSION: Extensive infiltrates and areas of consolidation with a somewhat patchy distribution are again noted. These are progressed in the left upper lobe and mildly progressed in the perihilar right lung. Pulmonary vascularity is obscured. Heart size   is unchanged. No significant effusions are identified on this semiupright portable film.   IR CVC Tunnel Placement > 5 Yrs of Age    Narrative    [facility]    EXAM: TUNNELED CENTRAL VENOUS CATHETER PLACEMENT    LOCATION: [facility]    CLINICAL HISTORY: The patient has a history of renal failure and requires dialysis.    PROCEDURES PERFORMED:  1. Ultrasound-guided puncture of jugular vein  2. Creation of subcutaneous tunnel in chest wall.  3. Placement of dialysis catheter through subcutaneous tunnel, into peel away sheath, and " into right atrium     MODERATE SEDATION: 2 mg Versed and 50 mcg Fentanyl were administered intravenously for moderate sedation. Pulse oximetry, heart rate and blood pressure were continuously monitored by an independent trained observer. The physician spent 10 minutes of   face-to-face moderate sedation time with the patient.    ADDITIONAL MEDICATIONS: see EMR    CONTRAST: none    FLUOROSCOPIC TIME: 0.3 minutes  CUMULATIVE AIR KERMA/DOSE: 1 mGy    STERILE BARRIER TECHNIQUE: Maximal Sterile Barrier Technique Utilized: Cap AND mask AND sterile gown AND sterile gloves AND sterile full body drape AND hand hygiene AND skin preparation 2% chlorhexidine for cutaneous antisepsis (or acceptable alternative   antiseptics).   Sterile Ultrasound Technique Utilized ?Sterile gel AND sterile probe covers.    UNIVERSAL PROTOCOL: Standard universal protocol per facility guidelines was followed. See EMR for documentation.     TECHNIQUE:   Risks, benefits and alternatives were explained to the patient and written, informed consent was obtained. The patient was placed in the supine position on the angiography table. The neck and chest were prepped and draped in the usual fashion and   anesthetized with 1% lidocaine. Using ultrasound guidance, the internal jugular vein was accessed with a micropuncture system..  A 0.35 wire was advanced through the sheath and into the IVC. A subcutaneous tunnel was then created in the chest wall using   blunt dissection. The catheter was then attached to a tunneling device and brought through the tunnel. The venostomy site was sequentially dilated. The catheter was then placed through a peel away sheath, and into the right atrium. The puncture site was   closed using surgical glue  The catheter was secured to the skin using a Prolene stitch. Each port was dwelled with heparin (1000 units per cc) solution, and the site was dressed in a sterile fashion.  The patient tolerated the procedure well without    immediate complications.    FINDINGS:   The right internal jugular vein is anechoic, compressible and patent by ultrasound, and ultrasound images obtained during access show the needle within the vein. Ultrasound images have been permanently captured for documentation.  Fluoroscopic images obtained following placement of the catheter, show that the catheter terminates near the cavoatrial junction..  The catheter has a smooth course in the chest wall. The catheter functions well and is available for immediate use.      Impression    IMPRESSION:  1. Ultrasound and fluoroscopic guided placement of tunneled 23 cm tip to cuff, right internal jugular dialysis catheter.          CPT Codes:

## 2025-08-09 NOTE — PROGRESS NOTES
Renal Medicine Progress Note            Assessment/Plan:     Assessment:    Hypertensive emergency   Acute hypoxic respiratory failure   Acute on chronic diastolic HF   History of R renal artery stenting  Labile HTN  Bipap dependent hypoxia, pulmonary edema on CXR. . BNP 25K. TTE with EF 55-60%, elevated L sided pressures and pulmonary HTN, but RA pressure estimated low.    - Labile BP, history of renal artery stenosis status post stenting-> Doppler on 7/31-no evidence of significant JACY  - Metanephrines  -> normetanephrine came back quite high at 4.6 with upper limit of 0.8.  Metanephrine was normal.  Bump in normetanephrine is likely related to advanced CKD.  Cannot get 24 urine given patient's dialysis status.   - CT abdomen-normal-looking adrenals       ROMI on CKD IV   Previously  baseline 1.3-1.5, worsening in May/June 2025, new baseline appears to be ~3 with nephrotic range proteinuria. 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 This is most commonly seen in DM2, however can also be seen with smoking and HTN. Bx also showed cholesterol emboli (likely due to endograft repair).  Cr on admission 4.25, worse than recent baseline. In setting of hypertensive emergency, pulmonary edema, LE edema. Bx results:         R lung adenocarcinoma   Received radiation in 2021.     AAA s/p endograft   S/p endovascular aneurysm/endograft repair (4/2025)    Atrial fibrillation-on amiodarone infusion  - in sinusrythm     Discussion-  Dialysis today.  2-3 L ultrafiltration as tolerated.  Suspect she will remain dialysis dependent moving forward given advanced CKD, biopsy findings as well as issues with volume.   Discussed with care coordinator-Will need outpatient placement for hemodialysis.  Next dialysis on Monday  Monitor blood pressure postdialysis.  Hydralazine dose was reduced yesterday.  Discussed with  daughter at bedside.   -Low-sodium diet.  P.o. fluid restriction of 1500 cc/day            Interval History:      Seen/examined   Feels much better this morning.  Stable on nasal cannula.  Better appetite.  Better energy.  Awaiting dialysis today.               Medications and Allergies:     Current Facility-Administered Medications   Medication Dose Route Frequency Provider Last Rate Last Admin    - MEDICATION INSTRUCTIONS for Dialysis Patients -   Does not apply See Admin Instructions Mariano Tran MD        amiodarone (PACERONE) tablet 200 mg  200 mg Oral or Feeding Tube Daily Mariano Tran MD   200 mg at 08/09/25 0829    B and C vitamin Complex with folic acid (NEPHRONEX) liquid 5 mL  5 mL Per Feeding Tube Daily Mariano Tran MD   5 mL at 08/09/25 0830    carvedilol (COREG) tablet 25 mg  25 mg Oral or Feeding Tube BID w/meals Mariano Tran MD   25 mg at 08/08/25 1816    clopidogrel (PLAVIX) tablet 75 mg  75 mg Oral or Feeding Tube Daily Mariano Tran MD   75 mg at 08/08/25 1816    famotidine (PEPCID) tablet 20 mg  20 mg Oral or Feeding Tube Q48H Virgilio Patel MD   20 mg at 08/08/25 0848    sodium chloride 0.9% DIALYSIS Cath LOCK - RED Lumen  10 mL Intracatheter Once in dialysis/CRRT Jamie Fragoso MD        Followed by    heparin 1000 unit/mL DIALYSIS Cath LOCK - RED Lumen  1.3-2.6 mL Intracatheter Once in dialysis/CRRT Jamie Fragoso MD        sodium chloride 0.9% DIALYSIS Cath LOCK - BLUE Lumen  10 mL Intracatheter Once in dialysis/CRRT Jamie Fragoso MD        Followed by    heparin 1000 unit/mL DIALYSIS Cath LOCK -BLUE Lumen  1.3-2.6 mL Intracatheter Once in dialysis/CRRT Jamie Fragoso MD        hydrALAZINE (APRESOLINE) tablet 50 mg  50 mg Oral or Feeding Tube TID Jamie Fragoso MD   50 mg at 08/09/25 0055    icosapent ethyl (VASCEPA) capsule 2 g  2 g Oral BID w/meals Virgilio Patel MD   2 g at 08/09/25 0832    isosorbide dinitrate (ISORDIL) tablet 40 mg  40 mg Oral or Feeding Tube TID Virgilio Patel MD   40 mg at 08/08/25 1816    meclizine (ANTIVERT) tablet 25 mg  25 mg Oral or Feeding Tube  "Q6H Virgilio Patel MD   25 mg at 08/09/25 0607    NIFEdipine ER OSMOTIC (PROCARDIA XL) 24 hr tablet 90 mg  90 mg Oral Daily Mariano Tran MD   90 mg at 08/07/25 0936    No heparin via hemodialysis machine   Does not apply Once Jamie Fragoso MD        PARoxetine (PAXIL) tablet 40 mg  40 mg Oral or Feeding Tube Daily Virgilio Patel MD   40 mg at 08/09/25 0829    potassium & sodium phosphates (NEUTRA-PHOS) Packet 1 packet  1 packet Oral or Feeding Tube Q4H Mariano Tran MD        sennosides (SENOKOT) tablet 2 tablet  2 tablet Oral BID Mariano Tran MD   2 tablet at 08/08/25 2243    sodium chloride (PF) 0.9% PF flush 3 mL  3 mL Intracatheter Q8H [facility] Gilmer Swain MD   3 mL at 08/09/25 0609    sodium chloride 0.9% BOLUS 200 mL  200 mL Hemodialysis Machine Once Jamie Fragoso MD        sodium chloride 0.9% BOLUS 250 mL  250 mL Intravenous Once in dialysis/CRRT Jamie Fragoso MD        sodium chloride 0.9% BOLUS 500 mL  500 mL Hemodialysis Machine Once Jamie Fragoso MD            Allergies   Allergen Reactions    Iodine Hives            Physical Exam:   Vitals were reviewed  BP (!) 165/95   Pulse 77   Temp 97  F (36.1  C) (Temporal)   Resp 21   Ht 1.626 m (5' 4\")   Wt 52 kg (114 lb 10.2 oz)   SpO2 95%   BMI 19.68 kg/m      Wt Readings from Last 3 Encounters:   08/09/25 52 kg (114 lb 10.2 oz)   07/23/25 54.4 kg (120 lb)   07/21/25 54.1 kg (119 lb 3.2 oz)       GENERAL APPEARANCE: awake, alert.  HEENT: normocephalic, dry MM   RESP: coarse, basal crackles noted.  Bilateral rhonchi  CV: irregular   EXTREMITIES/SKIN: Edema around thighs have improved significantly.`  NEURO: Grossly nonfocal  Rt internal jugular TDC            Data:     BMP  Recent Labs   Lab 08/09/25  0830 08/09/25  0708 08/09/25  0342 08/09/25  0031 08/08/25  2003 08/08/25  1635 08/08/25  0834 08/08/25  0555 08/07/25  2356 08/07/25 2000 08/07/25  0800 08/07/25  0606 08/06/25  2020 08/06/25  0557   NA  --  135  --   --   --   " "--   --  133*  --   --   --  135  --  135   POTASSIUM  --  3.6  --   --   --  3.7  --  3.3*  --  3.5  --  3.2*  --  3.6   CHLORIDE  --  96*  --   --   --   --   --  94*  --   --   --  95*  --  92*   BRUCE  --  8.7*  --   --   --   --   --  8.6*  --   --   --  8.4*  --  8.7*   CO2  --  25  --   --   --   --   --  26  --   --   --  27  --  25   BUN  --  40.1*  --   --   --   --   --  24.3*  --   --   --  37.3*  --  66.0*   CR  --  3.03*  --   --   --   --   --  2.36*  --   --   --  2.37*  --  3.56*   * 121* 110* 111*   < >  --    < > 133*   < > 114*   < > 112*   < > 128*    < > = values in this interval not displayed.     CBC  Recent Labs   Lab 08/09/25  0708 08/08/25  0555 08/07/25  0606 08/06/25  0557   WBC 9.0 9.5 8.4 9.4   HGB 8.8* 9.1* 8.7* 9.1*   HCT 27.0* 27.7* 26.2* 27.2*   * 102* 101* 102*    299 280 378     Lab Results   Component Value Date    AST 22 07/17/2025    ALT 11 07/17/2025    ALKPHOS 61 07/17/2025    BILITOTAL 0.3 07/17/2025     No results found for: \"INR\"  Color Urine (no units)   Date Value   09/09/2016 Light Yellow     Appearance Urine (no units)   Date Value   09/09/2016 Clear     Glucose Urine (mg/dL)   Date Value   09/09/2016 Negative     Bilirubin Urine (no units)   Date Value   09/09/2016 Negative     Ketones Urine (mg/dL)   Date Value   09/09/2016 Negative     Specific Gravity Urine (no units)   Date Value   09/09/2016 1.010     pH Urine (pH)   Date Value   09/09/2016 5.5     Protein Albumin Urine (mg/dL)   Date Value   09/09/2016 Negative     Nitrite Urine (no units)   Date Value   09/09/2016 Negative     Leukocyte Esterase Urine (no units)   Date Value   09/09/2016 Negative         Attestation:  I have reviewed today's vital signs, notes, medications, labs and imaging.    Jamie Fragoso MD  [facility] - Nephrology  Office: [phone]    "

## 2025-08-09 NOTE — PLAN OF CARE
"Goal Outcome Evaluation:      Plan of Care Reviewed With: patient    Overall Patient Progress: improvingOverall Patient Progress: improving    Outcome Evaluation: Alert and oriented, no c/o pain. VSS overnight. O2 slightly increased with sleep overnight. Repositioned self in bed. Foam mepilex dressing in place on coccyx, minimal pinkness noted. Minimal urine overnight, on hemodialysis. Plan for run today.      Problem: Adult Inpatient Plan of Care  Goal: Plan of Care Review  Description: The Plan of Care Review/Shift note should be completed every shift.  The Outcome Evaluation is a brief statement about your assessment that the patient is improving, declining, or no change.  This information will be displayed automatically on your shift  note.  Outcome: Progressing  Flowsheets (Taken 8/9/2025 06)  Outcome Evaluation: Alert and oriented, no c/o pain. VSS overnight. O2 slightly increased with sleep overnight. Repositioned self in bed. Foam mepilex dressing in place on coccyx, minimal pinkness noted. Minimal urine overnight, on hemodialysis. Plan for run today.  Plan of Care Reviewed With: patient  Overall Patient Progress: improving  Goal: Patient-Specific Goal (Individualized)  Description: You can add care plan individualizations to a care plan. Examples of Individualization might be:  \"Parent requests to be called daily at 9am for status\", \"I have a hard time hearing out of my right ear\", or \"Do not touch me to wake me up as it startles  me\".  Outcome: Progressing  Goal: Absence of Hospital-Acquired Illness or Injury  Outcome: Progressing  Intervention: Identify and Manage Fall Risk  Recent Flowsheet Documentation  Taken 8/9/2025 3172 by Cristina Akers RN  Safety Promotion/Fall Prevention:   assistive device/personal items within reach   increased rounding and observation   increase visualization of patient   lighting adjusted   nonskid shoes/slippers when out of bed   room near nurse's station  Taken " 8/9/2025 0030 by Cristina Akers RN  Safety Promotion/Fall Prevention:   assistive device/personal items within reach   increased rounding and observation   increase visualization of patient   lighting adjusted   nonskid shoes/slippers when out of bed   room near nurse's station  Taken 8/8/2025 2009 by Cristina Akers RN  Safety Promotion/Fall Prevention:   assistive device/personal items within reach   increased rounding and observation   increase visualization of patient   lighting adjusted   nonskid shoes/slippers when out of bed   room near nurse's station  Intervention: Prevent Skin Injury  Recent Flowsheet Documentation  Taken 8/9/2025 0335 by Cristina Akers RN  Body Position: position changed independently  Skin Protection:   adhesive use limited   incontinence pads utilized   tubing/devices free from skin contact  Taken 8/9/2025 0030 by Cristina Akers RN  Body Position: position changed independently  Skin Protection:   adhesive use limited   incontinence pads utilized   tubing/devices free from skin contact  Taken 8/8/2025 2238 by Cristina Akers RN  Body Position:   position changed independently   left   side-lying 30 degrees  Taken 8/8/2025 2009 by Cristina Akers RN  Body Position:   turned   right   side-lying 30 degrees  Skin Protection:   adhesive use limited   incontinence pads utilized   tubing/devices free from skin contact  Intervention: Prevent and Manage VTE (Venous Thromboembolism) Risk  Recent Flowsheet Documentation  Taken 8/9/2025 0335 by Cristina Akers RN  VTE Prevention/Management: SCDs off (sequential compression devices)  Taken 8/9/2025 0030 by Cristina Akers RN  VTE Prevention/Management: SCDs off (sequential compression devices)  Taken 8/8/2025 2009 by Cristina Akers RN  VTE Prevention/Management: SCDs off (sequential compression devices)  Intervention: Prevent Infection  Recent Flowsheet Documentation  Taken 8/9/2025  0335 by Cristina Akers RN  Infection Prevention:   rest/sleep promoted   single patient room provided  Taken 8/9/2025 0030 by Cristina Akers RN  Infection Prevention:   rest/sleep promoted   single patient room provided  Taken 8/8/2025 2009 by Cristina Akers RN  Infection Prevention:   rest/sleep promoted   single patient room provided  Goal: Optimal Comfort and Wellbeing  Outcome: Progressing  Intervention: Provide Person-Centered Care  Recent Flowsheet Documentation  Taken 8/9/2025 0335 by Cristina Akers RN  Trust Relationship/Rapport:   care explained   choices provided   questions answered   questions encouraged   reassurance provided   thoughts/feelings acknowledged  Taken 8/9/2025 0030 by Cristina Akers RN  Trust Relationship/Rapport:   care explained   choices provided   questions answered   questions encouraged   reassurance provided   thoughts/feelings acknowledged  Taken 8/8/2025 2009 by Cristina Akers RN  Trust Relationship/Rapport:   care explained   choices provided   questions answered   questions encouraged   reassurance provided   thoughts/feelings acknowledged  Goal: Readiness for Transition of Care  Outcome: Progressing     Problem: Delirium  Goal: Optimal Coping  Outcome: Progressing  Intervention: Optimize Psychosocial Adjustment to Delirium  Recent Flowsheet Documentation  Taken 8/9/2025 0335 by Cristina Akers RN  Supportive Measures:   verbalization of feelings encouraged   positive reinforcement provided   active listening utilized   decision-making supported  Taken 8/9/2025 0030 by Cristina Akers RN  Supportive Measures:   verbalization of feelings encouraged   positive reinforcement provided   active listening utilized   decision-making supported  Taken 8/8/2025 2009 by Cristina Akers RN  Supportive Measures:   verbalization of feelings encouraged   positive reinforcement provided   active listening utilized    decision-making supported  Goal: Improved Behavioral Control  Outcome: Progressing  Intervention: Prevent and Manage Agitation  Recent Flowsheet Documentation  Taken 8/9/2025 0335 by Cristina Akers RN  Environment Familiarity/Consistency: daily routine followed  Taken 8/9/2025 0030 by Cristina Akers RN  Environment Familiarity/Consistency: daily routine followed  Taken 8/8/2025 2009 by Cristina Akers RN  Environment Familiarity/Consistency: daily routine followed  Intervention: Minimize Safety Risk  Recent Flowsheet Documentation  Taken 8/9/2025 0335 by Cristina Akers RN  Enhanced Safety Measures:   room near unit station   review medications for side effects with activity  Trust Relationship/Rapport:   care explained   choices provided   questions answered   questions encouraged   reassurance provided   thoughts/feelings acknowledged  Taken 8/9/2025 0030 by Cristina Akers RN  Enhanced Safety Measures:   room near unit station   review medications for side effects with activity  Trust Relationship/Rapport:   care explained   choices provided   questions answered   questions encouraged   reassurance provided   thoughts/feelings acknowledged  Taken 8/8/2025 2009 by Cristina Akers RN  Enhanced Safety Measures:   room near unit station   review medications for side effects with activity  Trust Relationship/Rapport:   care explained   choices provided   questions answered   questions encouraged   reassurance provided   thoughts/feelings acknowledged  Goal: Improved Attention and Thought Clarity  Outcome: Progressing  Intervention: Maximize Cognitive Function  Recent Flowsheet Documentation  Taken 8/9/2025 0335 by Cristina Akers RN  Sensory Stimulation Regulation:   care clustered   lighting decreased  Reorientation Measures: clock in view  Taken 8/9/2025 0030 by Cristina Akers RN  Sensory Stimulation Regulation:   care clustered   lighting  decreased  Reorientation Measures: clock in view  Taken 8/8/2025 2009 by Cristina Akers RN  Sensory Stimulation Regulation:   care clustered   lighting decreased  Reorientation Measures: clock in view  Goal: Improved Sleep  Outcome: Progressing     Problem: Skin Injury Risk Increased  Goal: Skin Health and Integrity  Outcome: Progressing  Intervention: Plan: Nurse Driven Intervention: Positioning  Recent Flowsheet Documentation  Taken 8/9/2025 0335 by Cristina Akers RN  Plan: Positioning Interventions:   REPOSITION Left/Right (No supine) q2h   HOB 30 degrees or less   OFF-LOAD HEELS with pillows  Taken 8/9/2025 0030 by Cristina Akers RN  Plan: Positioning Interventions:   REPOSITION Left/Right (No supine) q2h   HOB 30 degrees or less   OFF-LOAD HEELS with pillows  Taken 8/8/2025 2009 by Cristina Akers RN  Plan: Positioning Interventions:   REPOSITION Left/Right (No supine) q2h   HOB 30 degrees or less   OFF-LOAD HEELS with pillows  Intervention: Plan: Nurse Driven Intervention: Moisture Management  Recent Flowsheet Documentation  Taken 8/9/2025 0335 by Cristina Akers RN  Moisture Interventions: Urinary collection device  Taken 8/9/2025 0030 by Cristina Akers RN  Moisture Interventions: Urinary collection device  Taken 8/8/2025 2009 by Cristina Akers RN  Moisture Interventions: Urinary collection device  Intervention: Plan: Nurse Driven Intervention: Friction and Shear  Recent Flowsheet Documentation  Taken 8/9/2025 0335 by Cristina Akers RN  Friction/Shear Interventions:   HOB 30 degrees or less   Lateral transfer device (hovermat, etc.)  Taken 8/9/2025 0030 by Cristina Akers RN  Friction/Shear Interventions:   HOB 30 degrees or less   Lateral transfer device (hovermat, etc.)  Taken 8/8/2025 2009 by Cristina Akers, RN  Friction/Shear Interventions:   HOB 30 degrees or less   Lateral transfer device (hovermat, etc.)  Intervention: Optimize  Skin Protection  Recent Flowsheet Documentation  Taken 8/9/2025 0335 by Cristina Akers RN  Skin Protection:   adhesive use limited   incontinence pads utilized   tubing/devices free from skin contact  Activity Management: activity adjusted per tolerance  Head of Bed (HOB) Positioning: HOB at 30 degrees  Taken 8/9/2025 0030 by Cristina Akers RN  Skin Protection:   adhesive use limited   incontinence pads utilized   tubing/devices free from skin contact  Activity Management: activity adjusted per tolerance  Head of Bed (HOB) Positioning: HOB at 30 degrees  Taken 8/8/2025 2009 by Cristina Akers RN  Skin Protection:   adhesive use limited   incontinence pads utilized   tubing/devices free from skin contact  Activity Management: activity adjusted per tolerance  Head of Bed (HOB) Positioning: HOB at 30 degrees  Intervention: Promote and Optimize Oral Intake  Recent Flowsheet Documentation  Taken 8/9/2025 0335 by Cristina Akers RN  Oral Nutrition Promotion: rest periods promoted  Taken 8/9/2025 0030 by Cristina Akers RN  Oral Nutrition Promotion: rest periods promoted  Taken 8/8/2025 2009 by Cristina Akers RN  Oral Nutrition Promotion: rest periods promoted     Problem: Breathing Pattern Ineffective  Goal: Effective Breathing Pattern  Outcome: Progressing  Intervention: Promote Improved Breathing Pattern  Recent Flowsheet Documentation  Taken 8/9/2025 0335 by Cristina Akers RN  Breathing Techniques/Airway Clearance: deep/controlled cough encouraged  Supportive Measures:   verbalization of feelings encouraged   positive reinforcement provided   active listening utilized   decision-making supported  Airway/Ventilation Management:   airway patency maintained   pulmonary hygiene promoted  Head of Bed (HOB) Positioning: HOB at 30 degrees  Taken 8/9/2025 0030 by Cristina Akers RN  Breathing Techniques/Airway Clearance: deep/controlled cough encouraged  Supportive  Measures:   verbalization of feelings encouraged   positive reinforcement provided   active listening utilized   decision-making supported  Airway/Ventilation Management:   airway patency maintained   pulmonary hygiene promoted  Head of Bed (HOB) Positioning: HOB at 30 degrees  Taken 8/8/2025 2009 by Cristina Akers RN  Breathing Techniques/Airway Clearance: deep/controlled cough encouraged  Supportive Measures:   verbalization of feelings encouraged   positive reinforcement provided   active listening utilized   decision-making supported  Airway/Ventilation Management:   airway patency maintained   pulmonary hygiene promoted  Head of Bed (HOB) Positioning: HOB at 30 degrees     Problem: Comorbidity Management  Goal: Maintenance of COPD Symptom Control  Outcome: Progressing  Intervention: Maintain COPD Symptom Control  Recent Flowsheet Documentation  Taken 8/9/2025 0335 by Cristina Akers RN  Breathing Techniques/Airway Clearance: deep/controlled cough encouraged  Medication Review/Management: medications reviewed  Taken 8/9/2025 0030 by Cristina Akers RN  Breathing Techniques/Airway Clearance: deep/controlled cough encouraged  Medication Review/Management: medications reviewed  Taken 8/8/2025 2009 by Cristina Akers RN  Breathing Techniques/Airway Clearance: deep/controlled cough encouraged  Medication Review/Management: medications reviewed  Goal: Blood Pressure in Desired Range  Outcome: Progressing  Intervention: Maintain Blood Pressure Management  Recent Flowsheet Documentation  Taken 8/9/2025 0335 by Cristina Akers, RN  Medication Review/Management: medications reviewed  Taken 8/9/2025 0030 by Cristina Akers, RN  Medication Review/Management: medications reviewed  Taken 8/8/2025 2009 by Cristina Akers, RN  Medication Review/Management: medications reviewed     Problem: Hypertension Acute  Goal: Blood Pressure Within Desired Range  Outcome:  Progressing  Intervention: Normalize Blood Pressure  Recent Flowsheet Documentation  Taken 8/9/2025 0335 by Cristina Akers, RN  Sensory Stimulation Regulation:   care clustered   lighting decreased  Medication Review/Management: medications reviewed  Taken 8/9/2025 0030 by Cristina Akers, RN  Sensory Stimulation Regulation:   care clustered   lighting decreased  Medication Review/Management: medications reviewed  Taken 8/8/2025 2009 by Cristina Akers, RN  Sensory Stimulation Regulation:   care clustered   lighting decreased  Medication Review/Management: medications reviewed

## 2025-08-09 NOTE — PLAN OF CARE
"Goal Outcome Evaluation:      Plan of Care Reviewed With: patient, spouse, child          Outcome Evaluation: BP elevated, BP medications held because of hemodialysis, evening doses given with BP improvement.    ICU End of Shift Summary.  For vital signs and complete assessments, please see documentation flowsheets.      Pertinent assessments:VSS, A&OX4, tele:SR, infrequent productive cough, 1 LPM NC, LS: coarse/dim, BS active X3 loose stools on this shift, bowel regimen held, better appetite today with breakfast and lunch but refused dinner. Continues on TF.    Major Shift Events:Dialyzed today for 3 hours and 3 L fluid removed.    Plan (Upcoming Events):monitor BP, monitor electrolytes and replace per protocol, PT/OT following, nephrology following, next dialysis scheduled for Monday.    Discharge/Transfer Needs: TBD     Bedside Shift Report Completed : Y  Bedside Safety Check Completed:Y    Problem: Adult Inpatient Plan of Care  Goal: Plan of Care Review  Description: The Plan of Care Review/Shift note should be completed every shift.  The Outcome Evaluation is a brief statement about your assessment that the patient is improving, declining, or no change.  This information will be displayed automatically on your shift  note.  8/9/2025 1826 by Mohamud Reynolds, RN  Outcome: Progressing  Flowsheets (Taken 8/9/2025 1826)  Plan of Care Reviewed With:   patient   spouse   child  Overall Patient Progress: improving  8/9/2025 1821 by Mohamud Reynolds, RN  Flowsheets (Taken 8/9/2025 1821)  Outcome Evaluation: BP elevated, BP medications held because of hemodialysis, evening doses given with BP improvement.  Plan of Care Reviewed With:   patient   spouse   child  Goal: Patient-Specific Goal (Individualized)  Description: You can add care plan individualizations to a care plan. Examples of Individualization might be:  \"Parent requests to be called daily at 9am for status\", \"I have a hard time hearing out of my right ear\", " "or \"Do not touch me to wake me up as it startles  me\".  Outcome: Progressing  Goal: Absence of Hospital-Acquired Illness or Injury  Outcome: Progressing  Intervention: Identify and Manage Fall Risk  Recent Flowsheet Documentation  Taken 8/9/2025 1600 by Mohamud Reynolds RN  Safety Promotion/Fall Prevention:   assistive device/personal items within reach   increased rounding and observation   increase visualization of patient   lighting adjusted   nonskid shoes/slippers when out of bed   room near nurse's station  Taken 8/9/2025 1200 by Mohamud Reynolds RN  Safety Promotion/Fall Prevention:   assistive device/personal items within reach   increased rounding and observation   increase visualization of patient   lighting adjusted   nonskid shoes/slippers when out of bed   room near nurse's station  Taken 8/9/2025 0800 by Mohamud Reynolds RN  Safety Promotion/Fall Prevention:   assistive device/personal items within reach   increased rounding and observation   increase visualization of patient   lighting adjusted   nonskid shoes/slippers when out of bed   room near nurse's station  Intervention: Prevent Skin Injury  Recent Flowsheet Documentation  Taken 8/9/2025 1600 by Mohamud Reynolds RN  Body Position: position changed independently  Taken 8/9/2025 1200 by Mohamud Reynolds RN  Body Position: position changed independently  Skin Protection:   adhesive use limited   incontinence pads utilized   tubing/devices free from skin contact  Taken 8/9/2025 0800 by Mohamud Reynolds RN  Body Position: position changed independently  Skin Protection:   adhesive use limited   incontinence pads utilized   tubing/devices free from skin contact  Intervention: Prevent and Manage VTE (Venous Thromboembolism) Risk  Recent Flowsheet Documentation  Taken 8/9/2025 1600 by Mohamud Reynolds RN  VTE Prevention/Management: SCDs off (sequential compression devices)  Taken 8/9/2025 1200 by Mohamud Reynolds RN  VTE Prevention/Management: " SCDs off (sequential compression devices)  Taken 8/9/2025 0800 by Mohamud Reynolds RN  VTE Prevention/Management: SCDs off (sequential compression devices)  Intervention: Prevent Infection  Recent Flowsheet Documentation  Taken 8/9/2025 1600 by Mohamud Reynolds RN  Infection Prevention:   rest/sleep promoted   single patient room provided  Taken 8/9/2025 1200 by Mohamud Reynolds RN  Infection Prevention:   rest/sleep promoted   single patient room provided  Taken 8/9/2025 0800 by Mohamud Reynolds RN  Infection Prevention:   rest/sleep promoted   single patient room provided  Goal: Optimal Comfort and Wellbeing  Outcome: Progressing  Intervention: Provide Person-Centered Care  Recent Flowsheet Documentation  Taken 8/9/2025 1600 by Mohamud Reynolds RN  Trust Relationship/Rapport:   care explained   choices provided   questions answered   questions encouraged   reassurance provided   thoughts/feelings acknowledged  Taken 8/9/2025 1200 by Mohamud Reynolds RN  Trust Relationship/Rapport:   care explained   choices provided   questions answered   questions encouraged   reassurance provided   thoughts/feelings acknowledged  Taken 8/9/2025 0800 by Mohamud Reynolds RN  Trust Relationship/Rapport:   care explained   choices provided   questions answered   questions encouraged   reassurance provided   thoughts/feelings acknowledged  Goal: Readiness for Transition of Care  Outcome: Progressing     Problem: Delirium  Goal: Optimal Coping  Outcome: Progressing  Intervention: Optimize Psychosocial Adjustment to Delirium  Recent Flowsheet Documentation  Taken 8/9/2025 1600 by Mohamud Reynolds RN  Supportive Measures:   verbalization of feelings encouraged   positive reinforcement provided   active listening utilized   decision-making supported  Family/Support System Care: self-care encouraged  Taken 8/9/2025 1200 by Mohamud Reynolds RN  Supportive Measures:   verbalization of feelings encouraged   positive reinforcement  provided   active listening utilized   decision-making supported  Family/Support System Care: self-care encouraged  Taken 8/9/2025 0800 by Mohamud Reynolds RN  Supportive Measures:   verbalization of feelings encouraged   positive reinforcement provided   active listening utilized   decision-making supported  Family/Support System Care: self-care encouraged  Goal: Improved Behavioral Control  Outcome: Progressing  Intervention: Prevent and Manage Agitation  Recent Flowsheet Documentation  Taken 8/9/2025 1600 by Mohamud Reynolds RN  Environment Familiarity/Consistency: daily routine followed  Taken 8/9/2025 1200 by Mohamud Reynolds RN  Environment Familiarity/Consistency: daily routine followed  Taken 8/9/2025 0800 by Mohamud Reynolds RN  Environment Familiarity/Consistency: daily routine followed  Intervention: Minimize Safety Risk  Recent Flowsheet Documentation  Taken 8/9/2025 1600 by Mohamud Reynolds RN  Enhanced Safety Measures:   room near unit station   review medications for side effects with activity  Trust Relationship/Rapport:   care explained   choices provided   questions answered   questions encouraged   reassurance provided   thoughts/feelings acknowledged  Taken 8/9/2025 1200 by Mohamud Reynolds RN  Enhanced Safety Measures:   room near unit station   review medications for side effects with activity  Trust Relationship/Rapport:   care explained   choices provided   questions answered   questions encouraged   reassurance provided   thoughts/feelings acknowledged  Taken 8/9/2025 0800 by Mohamud Reynolds RN  Enhanced Safety Measures:   room near unit station   review medications for side effects with activity  Trust Relationship/Rapport:   care explained   choices provided   questions answered   questions encouraged   reassurance provided   thoughts/feelings acknowledged  Goal: Improved Attention and Thought Clarity  Outcome: Progressing  Intervention: Maximize Cognitive Function  Recent  Flowsheet Documentation  Taken 8/9/2025 1600 by Mohamud Reynolds RN  Sensory Stimulation Regulation:   care clustered   lighting decreased  Reorientation Measures: clock in view  Taken 8/9/2025 1200 by Mohamud Reynolds RN  Sensory Stimulation Regulation:   care clustered   lighting decreased  Reorientation Measures: clock in view  Taken 8/9/2025 0800 by Mohamud Reynolds RN  Sensory Stimulation Regulation:   care clustered   lighting decreased  Reorientation Measures: clock in view  Goal: Improved Sleep  Outcome: Progressing     Problem: Skin Injury Risk Increased  Goal: Skin Health and Integrity  Outcome: Progressing  Intervention: Plan: Nurse Driven Intervention: Positioning  Recent Flowsheet Documentation  Taken 8/9/2025 1200 by Mohamud Reynolds RN  Plan: Positioning Interventions:   REPOSITION Left/Right (No supine) q2h   HOB 30 degrees or less   OFF-LOAD HEELS with pillows  Taken 8/9/2025 0800 by Mohamud Reynolds RN  Plan: Positioning Interventions:   REPOSITION Left/Right (No supine) q2h   HOB 30 degrees or less   OFF-LOAD HEELS with pillows  Intervention: Plan: Nurse Driven Intervention: Moisture Management  Recent Flowsheet Documentation  Taken 8/9/2025 1600 by Mohamud Reynolds RN  Moisture Interventions: Urinary collection device  Bathing/Skin Care: incontinence care  Taken 8/9/2025 1200 by Mohamud Reynolds RN  Moisture Interventions: Urinary collection device  Taken 8/9/2025 0800 by Mohamud Reynolds RN  Moisture Interventions: Urinary collection device  Intervention: Plan: Nurse Driven Intervention: Friction and Shear  Recent Flowsheet Documentation  Taken 8/9/2025 1600 by Mohamud Reynolds RN  Friction/Shear Interventions:   HOB 30 degrees or less   Lateral transfer device (hovermat, etc.)  Taken 8/9/2025 1200 by Mohamud Reynolds RN  Friction/Shear Interventions:   HOB 30 degrees or less   Lateral transfer device (hovermat, etc.)  Taken 8/9/2025 0800 by Mohamud Reynolds RN  Friction/Shear  Interventions:   HOB 30 degrees or less   Lateral transfer device (hovermat, etc.)  Intervention: Optimize Skin Protection  Recent Flowsheet Documentation  Taken 8/9/2025 1600 by Mohamud Reynolds RN  Activity Management: activity adjusted per tolerance  Head of Bed (HOB) Positioning: HOB at 30 degrees  Taken 8/9/2025 1200 by Mohamud Reynolds RN  Skin Protection:   adhesive use limited   incontinence pads utilized   tubing/devices free from skin contact  Activity Management: activity adjusted per tolerance  Head of Bed (HOB) Positioning: HOB at 30 degrees  Taken 8/9/2025 0800 by Mohamud Reynolds RN  Skin Protection:   adhesive use limited   incontinence pads utilized   tubing/devices free from skin contact  Activity Management: activity adjusted per tolerance  Head of Bed (HOB) Positioning: HOB at 30 degrees  Intervention: Promote and Optimize Oral Intake  Recent Flowsheet Documentation  Taken 8/9/2025 1600 by Mohamud Reynolds RN  Oral Nutrition Promotion: rest periods promoted  Taken 8/9/2025 0800 by Mohamud Reynolds RN  Oral Nutrition Promotion: rest periods promoted     Problem: Breathing Pattern Ineffective  Goal: Effective Breathing Pattern  Outcome: Progressing  Intervention: Promote Improved Breathing Pattern  Recent Flowsheet Documentation  Taken 8/9/2025 1600 by Mohamud Reynolds RN  Breathing Techniques/Airway Clearance: deep/controlled cough encouraged  Supportive Measures:   verbalization of feelings encouraged   positive reinforcement provided   active listening utilized   decision-making supported  Airway/Ventilation Management:   airway patency maintained   pulmonary hygiene promoted  Head of Bed (HOB) Positioning: HOB at 30 degrees  Taken 8/9/2025 1200 by Mohamud Reynolds RN  Supportive Measures:   verbalization of feelings encouraged   positive reinforcement provided   active listening utilized   decision-making supported  Head of Bed (HOB) Positioning: HOB at 30 degrees  Taken 8/9/2025 0800 by  Mohamud Reynolds, RN  Supportive Measures:   verbalization of feelings encouraged   positive reinforcement provided   active listening utilized   decision-making supported  Head of Bed (HOB) Positioning: HOB at 30 degrees     Problem: Hypertension Acute  Goal: Blood Pressure Within Desired Range  Outcome: Progressing  Intervention: Normalize Blood Pressure  Recent Flowsheet Documentation  Taken 8/9/2025 1600 by Mohamud Reynolds, RN  Sensory Stimulation Regulation:   care clustered   lighting decreased  Medication Review/Management: medications reviewed  Taken 8/9/2025 1200 by Mohamud Reynolds RN  Sensory Stimulation Regulation:   care clustered   lighting decreased  Medication Review/Management: medications reviewed  Taken 8/9/2025 0800 by Mohamud Reynolds, RN  Sensory Stimulation Regulation:   care clustered   lighting decreased  Medication Review/Management: medications reviewed

## 2025-08-10 LAB
ANION GAP SERPL CALCULATED.3IONS-SCNC: 13 MMOL/L (ref 7–15)
BUN SERPL-MCNC: 24.9 MG/DL (ref 8–23)
CALCIUM SERPL-MCNC: 8.5 MG/DL (ref 8.8–10.4)
CHLORIDE SERPL-SCNC: 94 MMOL/L (ref 98–107)
CREAT SERPL-MCNC: 2.05 MG/DL (ref 0.51–0.95)
EGFRCR SERPLBLD CKD-EPI 2021: 25 ML/MIN/1.73M2
ERYTHROCYTE [DISTWIDTH] IN BLOOD BY AUTOMATED COUNT: 14.8 % (ref 10–15)
GLUCOSE BLDC GLUCOMTR-MCNC: 101 MG/DL (ref 70–99)
GLUCOSE BLDC GLUCOMTR-MCNC: 102 MG/DL (ref 70–99)
GLUCOSE BLDC GLUCOMTR-MCNC: 110 MG/DL (ref 70–99)
GLUCOSE BLDC GLUCOMTR-MCNC: 116 MG/DL (ref 70–99)
GLUCOSE BLDC GLUCOMTR-MCNC: 292 MG/DL (ref 70–99)
GLUCOSE BLDC GLUCOMTR-MCNC: 53 MG/DL (ref 70–99)
GLUCOSE BLDC GLUCOMTR-MCNC: 64 MG/DL (ref 70–99)
GLUCOSE BLDC GLUCOMTR-MCNC: 76 MG/DL (ref 70–99)
GLUCOSE SERPL-MCNC: 109 MG/DL (ref 70–99)
HCO3 SERPL-SCNC: 26 MMOL/L (ref 22–29)
HCT VFR BLD AUTO: 27.6 % (ref 35–47)
HGB BLD-MCNC: 9 G/DL (ref 11.7–15.7)
HOLD SPECIMEN: NORMAL
MAGNESIUM SERPL-MCNC: 1.7 MG/DL (ref 1.7–2.3)
MCH RBC QN AUTO: 33.6 PG (ref 26.5–33)
MCHC RBC AUTO-ENTMCNC: 32.6 G/DL (ref 31.5–36.5)
MCV RBC AUTO: 103 FL (ref 78–100)
PHOSPHATE SERPL-MCNC: 1.4 MG/DL (ref 2.5–4.5)
PHOSPHATE SERPL-MCNC: 2.6 MG/DL (ref 2.5–4.5)
PLATELET # BLD AUTO: 276 10E3/UL (ref 150–450)
POTASSIUM SERPL-SCNC: 3.6 MMOL/L (ref 3.4–5.3)
POTASSIUM SERPL-SCNC: 3.6 MMOL/L (ref 3.4–5.3)
RBC # BLD AUTO: 2.68 10E6/UL (ref 3.8–5.2)
SODIUM SERPL-SCNC: 133 MMOL/L (ref 135–145)
TROPONIN T SERPL HS-MCNC: 54 NG/L
TROPONIN T SERPL HS-MCNC: 59 NG/L
WBC # BLD AUTO: 8 10E3/UL (ref 4–11)

## 2025-08-10 PROCEDURE — 99233 SBSQ HOSP IP/OBS HIGH 50: CPT | Performed by: INTERNAL MEDICINE

## 2025-08-10 PROCEDURE — 250N000013 HC RX MED GY IP 250 OP 250 PS 637: Performed by: INTERNAL MEDICINE

## 2025-08-10 PROCEDURE — 200N000001 HC R&B ICU

## 2025-08-10 PROCEDURE — 84100 ASSAY OF PHOSPHORUS: CPT | Performed by: STUDENT IN AN ORGANIZED HEALTH CARE EDUCATION/TRAINING PROGRAM

## 2025-08-10 PROCEDURE — 84484 ASSAY OF TROPONIN QUANT: CPT | Performed by: INTERNAL MEDICINE

## 2025-08-10 PROCEDURE — 99232 SBSQ HOSP IP/OBS MODERATE 35: CPT | Performed by: INTERNAL MEDICINE

## 2025-08-10 PROCEDURE — P9045 ALBUMIN (HUMAN), 5%, 250 ML: HCPCS | Mod: JZ | Performed by: INTERNAL MEDICINE

## 2025-08-10 PROCEDURE — 84100 ASSAY OF PHOSPHORUS: CPT | Performed by: INTERNAL MEDICINE

## 2025-08-10 PROCEDURE — 258N000003 HC RX IP 258 OP 636: Performed by: INTERNAL MEDICINE

## 2025-08-10 PROCEDURE — 250N000011 HC RX IP 250 OP 636: Mod: JZ | Performed by: INTERNAL MEDICINE

## 2025-08-10 PROCEDURE — 85014 HEMATOCRIT: CPT | Performed by: INTERNAL MEDICINE

## 2025-08-10 PROCEDURE — 258N000003 HC RX IP 258 OP 636: Performed by: STUDENT IN AN ORGANIZED HEALTH CARE EDUCATION/TRAINING PROGRAM

## 2025-08-10 PROCEDURE — 83735 ASSAY OF MAGNESIUM: CPT | Performed by: INTERNAL MEDICINE

## 2025-08-10 PROCEDURE — 250N000009 HC RX 250: Performed by: INTERNAL MEDICINE

## 2025-08-10 PROCEDURE — 3E043XZ INTRODUCTION OF VASOPRESSOR INTO CENTRAL VEIN, PERCUTANEOUS APPROACH: ICD-10-PCS | Performed by: INTERNAL MEDICINE

## 2025-08-10 PROCEDURE — 250N000009 HC RX 250: Performed by: STUDENT IN AN ORGANIZED HEALTH CARE EDUCATION/TRAINING PROGRAM

## 2025-08-10 PROCEDURE — 80048 BASIC METABOLIC PNL TOTAL CA: CPT | Performed by: INTERNAL MEDICINE

## 2025-08-10 PROCEDURE — 250N000011 HC RX IP 250 OP 636: Performed by: INTERNAL MEDICINE

## 2025-08-10 PROCEDURE — 250N000013 HC RX MED GY IP 250 OP 250 PS 637: Performed by: STUDENT IN AN ORGANIZED HEALTH CARE EDUCATION/TRAINING PROGRAM

## 2025-08-10 PROCEDURE — 36415 COLL VENOUS BLD VENIPUNCTURE: CPT | Performed by: INTERNAL MEDICINE

## 2025-08-10 PROCEDURE — 84132 ASSAY OF SERUM POTASSIUM: CPT | Performed by: INTERNAL MEDICINE

## 2025-08-10 RX ORDER — HEPARIN SODIUM 5000 [USP'U]/.5ML
5000 INJECTION, SOLUTION INTRAVENOUS; SUBCUTANEOUS EVERY 8 HOURS
Status: DISCONTINUED | OUTPATIENT
Start: 2025-08-10 | End: 2025-08-11 | Stop reason: ALTCHOICE

## 2025-08-10 RX ORDER — ROPIVACAINE IN 0.9% SOD CHL/PF 0.1 %
.01-.2 PLASTIC BAG, INJECTION (ML) EPIDURAL CONTINUOUS
Status: DISCONTINUED | OUTPATIENT
Start: 2025-08-10 | End: 2025-08-10

## 2025-08-10 RX ORDER — CALCIUM GLUCONATE 20 MG/ML
2 INJECTION, SOLUTION INTRAVENOUS ONCE
Status: COMPLETED | OUTPATIENT
Start: 2025-08-10 | End: 2025-08-10

## 2025-08-10 RX ORDER — NOREPINEPHRINE BITARTRATE 0.02 MG/ML
.01-.6 INJECTION, SOLUTION INTRAVENOUS CONTINUOUS
Status: DISCONTINUED | OUTPATIENT
Start: 2025-08-10 | End: 2025-08-13

## 2025-08-10 RX ADMIN — Medication 5 ML: at 08:32

## 2025-08-10 RX ADMIN — ICOSAPENT ETHYL 2 G: 1 CAPSULE ORAL at 08:35

## 2025-08-10 RX ADMIN — FAMOTIDINE 20 MG: 20 TABLET, FILM COATED ORAL at 08:32

## 2025-08-10 RX ADMIN — DEXTROSE 15 G: 15 GEL ORAL at 16:23

## 2025-08-10 RX ADMIN — MECLIZINE HYDROCHLORIDE 25 MG: 25 TABLET ORAL at 18:00

## 2025-08-10 RX ADMIN — AMIODARONE HYDROCHLORIDE 200 MG: 200 TABLET ORAL at 08:34

## 2025-08-10 RX ADMIN — MECLIZINE HYDROCHLORIDE 25 MG: 25 TABLET ORAL at 05:50

## 2025-08-10 RX ADMIN — HEPARIN SODIUM 5000 UNITS: 5000 INJECTION, SOLUTION INTRAVENOUS; SUBCUTANEOUS at 11:41

## 2025-08-10 RX ADMIN — HYDRALAZINE HYDROCHLORIDE 50 MG: 50 TABLET, FILM COATED ORAL at 05:52

## 2025-08-10 RX ADMIN — NIFEDIPINE 90 MG: 90 TABLET, EXTENDED RELEASE ORAL at 08:32

## 2025-08-10 RX ADMIN — CLOPIDOGREL BISULFATE 75 MG: 75 TABLET, FILM COATED ORAL at 18:00

## 2025-08-10 RX ADMIN — CARVEDILOL 25 MG: 12.5 TABLET, FILM COATED ORAL at 08:31

## 2025-08-10 RX ADMIN — GLUCAGON 5 MG: 1 INJECTION, POWDER, LYOPHILIZED, FOR SOLUTION INTRAMUSCULAR; INTRAVENOUS at 10:20

## 2025-08-10 RX ADMIN — MECLIZINE HYDROCHLORIDE 25 MG: 25 TABLET ORAL at 11:43

## 2025-08-10 RX ADMIN — NOREPINEPHRINE BITARTRATE 0.25 MCG/KG/MIN: 0.02 INJECTION, SOLUTION INTRAVENOUS at 11:13

## 2025-08-10 RX ADMIN — DIPHENHYDRAMINE HYDROCHLORIDE 25 MG: 25 SOLUTION ORAL at 23:34

## 2025-08-10 RX ADMIN — ISOSORBIDE DINITRATE 40 MG: 20 TABLET ORAL at 05:52

## 2025-08-10 RX ADMIN — DIPHENHYDRAMINE HYDROCHLORIDE 25 MG: 25 SOLUTION ORAL at 01:34

## 2025-08-10 RX ADMIN — ICOSAPENT ETHYL 2 G: 1 CAPSULE ORAL at 18:00

## 2025-08-10 RX ADMIN — PAROXETINE HYDROCHLORIDE 40 MG: 20 TABLET, FILM COATED ORAL at 08:31

## 2025-08-10 RX ADMIN — MECLIZINE HYDROCHLORIDE 25 MG: 25 TABLET ORAL at 23:34

## 2025-08-10 RX ADMIN — SODIUM PHOSPHATE, MONOBASIC, MONOHYDRATE AND SODIUM PHOSPHATE, DIBASIC, ANHYDROUS 9 MMOL: 142; 276 INJECTION, SOLUTION INTRAVENOUS at 07:26

## 2025-08-10 RX ADMIN — HEPARIN SODIUM 5000 UNITS: 5000 INJECTION, SOLUTION INTRAVENOUS; SUBCUTANEOUS at 19:54

## 2025-08-10 RX ADMIN — ALBUMIN HUMAN 25 G: 0.05 INJECTION, SOLUTION INTRAVENOUS at 09:25

## 2025-08-10 RX ADMIN — SODIUM CHLORIDE, SODIUM LACTATE, POTASSIUM CHLORIDE, AND CALCIUM CHLORIDE 250 ML: .6; .31; .03; .02 INJECTION, SOLUTION INTRAVENOUS at 11:25

## 2025-08-10 RX ADMIN — NOREPINEPHRINE BITARTRATE 0.03 MCG/KG/MIN: 0.02 INJECTION, SOLUTION INTRAVENOUS at 09:42

## 2025-08-10 RX ADMIN — CALCIUM GLUCONATE 2 G: 20 INJECTION, SOLUTION INTRAVENOUS at 10:37

## 2025-08-10 ASSESSMENT — ACTIVITIES OF DAILY LIVING (ADL)
ADLS_ACUITY_SCORE: 66
ADLS_ACUITY_SCORE: 68
ADLS_ACUITY_SCORE: 66
ADLS_ACUITY_SCORE: 68
ADLS_ACUITY_SCORE: 66
ADLS_ACUITY_SCORE: 68
ADLS_ACUITY_SCORE: 66
ADLS_ACUITY_SCORE: 68
ADLS_ACUITY_SCORE: 68
ADLS_ACUITY_SCORE: 66
ADLS_ACUITY_SCORE: 68
ADLS_ACUITY_SCORE: 66

## 2025-08-10 NOTE — PLAN OF CARE
"Goal Outcome Evaluation:      Plan of Care Reviewed With: patient    Overall Patient Progress: no changeOverall Patient Progress: no change    Outcome Evaluation: see note    ICU End of Shift Summary.  For vital signs and complete assessments, please see documentation flowsheets.      Pertinent assessments: A&Ox4, PERRLA, intermittent episodes of forgetfulness but easily reoriented; dry oral mucosa; coarse lung sounds, 1L NC, denies chest pain/sob; SR, prolonged QT, HTN, cap refill <3, pulses 2+ in all extremities; Keofeed patent/infusing at goal, normoactive bowel sounds, soft/nontender abdomen; poor urine output, on hemodialysis; skin clean/dry/intact, scattered bruising.    Major Shift Events: Patient's systolic blood pressure elevated to 180's and 190's. Provider notified. Ordered 0800 hydralazine and isosorbide be given at 0600.   Patient was having difficulties falling asleep. As needed benadryl given with little improvement.     Plan (Upcoming Events): continue to coordinate care to transfer patient to TCU/home    Discharge/Transfer Needs: tbd     Bedside Shift Report Completed : yes    Bedside Safety Check Completed: yes      Problem: Adult Inpatient Plan of Care  Goal: Plan of Care Review  Description: The Plan of Care Review/Shift note should be completed every shift.  The Outcome Evaluation is a brief statement about your assessment that the patient is improving, declining, or no change.  This information will be displayed automatically on your shift  note.  Outcome: Progressing  Flowsheets (Taken 8/10/2025 0027)  Outcome Evaluation: see note  Plan of Care Reviewed With: patient  Overall Patient Progress: no change  Goal: Patient-Specific Goal (Individualized)  Description: You can add care plan individualizations to a care plan. Examples of Individualization might be:  \"Parent requests to be called daily at 9am for status\", \"I have a hard time hearing out of my right ear\", or \"Do not touch me to wake me " "up as it startles  me\".  Outcome: Progressing  Goal: Absence of Hospital-Acquired Illness or Injury  Outcome: Progressing  Intervention: Identify and Manage Fall Risk  Recent Flowsheet Documentation  Taken 8/9/2025 2306 by Lyssa Parker RN  Safety Promotion/Fall Prevention:   activity supervised   clutter free environment maintained   increased rounding and observation   increase visualization of patient   lighting adjusted   nonskid shoes/slippers when out of bed   patient and family education   room near nurse's station   room organization consistent   safety round/check completed  Taken 8/9/2025 2023 by Lyssa Parker RN  Safety Promotion/Fall Prevention:   activity supervised   clutter free environment maintained   increased rounding and observation   increase visualization of patient   lighting adjusted   nonskid shoes/slippers when out of bed   patient and family education   room near nurse's station   room organization consistent   safety round/check completed  Intervention: Prevent Skin Injury  Recent Flowsheet Documentation  Taken 8/9/2025 2306 by Lyssa Parker RN  Body Position:   position changed independently   weight shifting   heels elevated  Skin Protection:   adhesive use limited   incontinence pads utilized   transparent dressing maintained   tubing/devices free from skin contact  Taken 8/9/2025 2147 by Lyssa Parker RN  Body Position:   position changed independently   weight shifting   heels elevated  Taken 8/9/2025 2023 by Lyssa Parker RN  Body Position:   position changed independently   weight shifting  Skin Protection:   adhesive use limited   incontinence pads utilized   transparent dressing maintained   tubing/devices free from skin contact  Intervention: Prevent and Manage VTE (Venous Thromboembolism) Risk  Recent Flowsheet Documentation  Taken 8/9/2025 2306 by Lyssa Parker RN  VTE Prevention/Management: (Education Provided) patient refused " intervention  Taken 8/9/2025 2023 by Lyssa Parker RN  VTE Prevention/Management: (Education Provided) patient refused intervention  Intervention: Prevent Infection  Recent Flowsheet Documentation  Taken 8/9/2025 2306 by Lyssa Parker RN  Infection Prevention:   environmental surveillance performed   hand hygiene promoted   rest/sleep promoted   single patient room provided  Taken 8/9/2025 2023 by Lyssa Parker RN  Infection Prevention:   environmental surveillance performed   hand hygiene promoted   rest/sleep promoted   single patient room provided  Goal: Optimal Comfort and Wellbeing  Outcome: Progressing  Intervention: Monitor Pain and Promote Comfort  Recent Flowsheet Documentation  Taken 8/9/2025 2306 by Lyssa Parker RN  Pain Management Interventions:   repositioned   care clustered  Taken 8/9/2025 2023 by Lyssa Parker RN  Pain Management Interventions:   repositioned   care clustered  Intervention: Provide Person-Centered Care  Recent Flowsheet Documentation  Taken 8/9/2025 2306 by Lyssa Parker RN  Trust Relationship/Rapport:   care explained   choices provided   emotional support provided   empathic listening provided   questions answered   questions encouraged   reassurance provided   thoughts/feelings acknowledged  Taken 8/9/2025 2023 by Lyssa Parker RN  Trust Relationship/Rapport:   care explained   choices provided   emotional support provided   empathic listening provided   questions answered   questions encouraged   reassurance provided   thoughts/feelings acknowledged  Goal: Readiness for Transition of Care  Outcome: Progressing     Problem: Delirium  Goal: Optimal Coping  Outcome: Progressing  Intervention: Optimize Psychosocial Adjustment to Delirium  Recent Flowsheet Documentation  Taken 8/9/2025 2306 by Lyssa Parker RN  Supportive Measures:   active listening utilized   decision-making supported   goal-setting facilitated   positive  reinforcement provided   self-care encouraged   self-reflection promoted   verbalization of feelings encouraged  Taken 8/9/2025 2023 by Lyssa Parker RN  Supportive Measures:   active listening utilized   decision-making supported   goal-setting facilitated   positive reinforcement provided   self-care encouraged   self-reflection promoted   verbalization of feelings encouraged  Goal: Improved Behavioral Control  Outcome: Progressing  Intervention: Prevent and Manage Agitation  Recent Flowsheet Documentation  Taken 8/9/2025 2306 by Lyssa Parker RN  Environment Familiarity/Consistency: daily routine followed  Taken 8/9/2025 2023 by Lyssa Parker RN  Environment Familiarity/Consistency: daily routine followed  Intervention: Minimize Safety Risk  Recent Flowsheet Documentation  Taken 8/9/2025 2306 by Lsysa Parker RN  Enhanced Safety Measures: room near unit station  Trust Relationship/Rapport:   care explained   choices provided   emotional support provided   empathic listening provided   questions answered   questions encouraged   reassurance provided   thoughts/feelings acknowledged  Taken 8/9/2025 2023 by Lyssa Parker RN  Enhanced Safety Measures: room near unit station  Trust Relationship/Rapport:   care explained   choices provided   emotional support provided   empathic listening provided   questions answered   questions encouraged   reassurance provided   thoughts/feelings acknowledged  Goal: Improved Attention and Thought Clarity  Outcome: Progressing  Intervention: Maximize Cognitive Function  Recent Flowsheet Documentation  Taken 8/9/2025 2306 by Lyssa Parker RN  Sensory Stimulation Regulation:   care clustered   lighting decreased   quiet environment promoted  Reorientation Measures: calendar in view  Taken 8/9/2025 2023 by Lyssa Parker RN  Sensory Stimulation Regulation:   care clustered   lighting decreased   quiet environment promoted  Reorientation Measures:  calendar in view  Goal: Improved Sleep  Outcome: Progressing     Problem: Skin Injury Risk Increased  Goal: Skin Health and Integrity  Outcome: Progressing  Intervention: Plan: Nurse Driven Intervention: Positioning  Recent Flowsheet Documentation  Taken 8/9/2025 2306 by Lyssa Parker RN  Plan: Positioning Interventions:   REPOSITION Left/Right (No supine) q2h   HOB 30 degrees or less   OFF-LOAD HEELS with pillows  Taken 8/9/2025 2023 by Lyssa Parker RN  Plan: Positioning Interventions:   REPOSITION Left/Right (No supine) q2h   HOB 30 degrees or less   OFF-LOAD HEELS with pillows  Intervention: Plan: Nurse Driven Intervention: Moisture Management  Recent Flowsheet Documentation  Taken 8/10/2025 0000 by Lyssa Parker RN  Moisture Interventions:   No brief in bed   Incontinence pad   Urinary collection device   Low Air Loss pump  Taken 8/9/2025 2306 by Lyssa Parker RN  Moisture Interventions:   No brief in bed   Incontinence pad   Urinary collection device   Low Air Loss pump  Taken 8/9/2025 2023 by Lyssa Parker RN  Moisture Interventions:   No brief in bed   Incontinence pad   Urinary collection device   Low Air Loss pump  Intervention: Plan: Nurse Driven Intervention: Friction and Shear  Recent Flowsheet Documentation  Taken 8/10/2025 0000 by Lyssa Parker RN  Friction/Shear Interventions:   HOB 30 degrees or less   Pad bony prominence (elbow pads, heel pads, ear protectors)  Taken 8/9/2025 2306 by Lyssa Parker RN  Friction/Shear Interventions:   HOB 30 degrees or less   Pad bony prominence (elbow pads, heel pads, ear protectors)  Taken 8/9/2025 2023 by Lyssa Parker RN  Friction/Shear Interventions:   HOB 30 degrees or less   Pad bony prominence (elbow pads, heel pads, ear protectors)  Intervention: Optimize Skin Protection  Recent Flowsheet Documentation  Taken 8/9/2025 2306 by Lyssa Parker RN  Skin Protection:   adhesive use limited   incontinence pads  utilized   transparent dressing maintained   tubing/devices free from skin contact  Activity Management: activity adjusted per tolerance  Head of Bed (HOB) Positioning: HOB at 20-30 degrees  Taken 8/9/2025 2147 by Lyssa Parker RN  Head of Bed (HOB) Positioning: HOB at 20-30 degrees  Taken 8/9/2025 2023 by Lyssa Parker RN  Skin Protection:   adhesive use limited   incontinence pads utilized   transparent dressing maintained   tubing/devices free from skin contact  Activity Management: activity adjusted per tolerance  Head of Bed (HOB) Positioning: HOB at 30 degrees  Intervention: Promote and Optimize Oral Intake  Recent Flowsheet Documentation  Taken 8/9/2025 2306 by Lyssa Parker RN  Oral Nutrition Promotion: rest periods promoted  Taken 8/9/2025 2023 by Lyssa Parker RN  Oral Nutrition Promotion: rest periods promoted     Problem: Breathing Pattern Ineffective  Goal: Effective Breathing Pattern  Outcome: Progressing  Intervention: Promote Improved Breathing Pattern  Recent Flowsheet Documentation  Taken 8/9/2025 2306 by Lyssa Parker RN  Breathing Techniques/Airway Clearance: deep/controlled cough encouraged  Supportive Measures:   active listening utilized   decision-making supported   goal-setting facilitated   positive reinforcement provided   self-care encouraged   self-reflection promoted   verbalization of feelings encouraged  Airway/Ventilation Management:   airway patency maintained   position adjusted   pulmonary hygiene promoted  Head of Bed (HOB) Positioning: HOB at 20-30 degrees  Taken 8/9/2025 2147 by Lyssa Parker RN  Head of Bed (HOB) Positioning: HOB at 20-30 degrees  Taken 8/9/2025 2023 by Lyssa Parker RN  Breathing Techniques/Airway Clearance: deep/controlled cough encouraged  Supportive Measures:   active listening utilized   decision-making supported   goal-setting facilitated   positive reinforcement provided   self-care encouraged   self-reflection  promoted   verbalization of feelings encouraged  Airway/Ventilation Management:   airway patency maintained   position adjusted   pulmonary hygiene promoted  Head of Bed (HOB) Positioning: HOB at 30 degrees     Problem: Comorbidity Management  Goal: Maintenance of COPD Symptom Control  Outcome: Progressing  Intervention: Maintain COPD Symptom Control  Recent Flowsheet Documentation  Taken 8/9/2025 2306 by Lyssa Parker RN  Breathing Techniques/Airway Clearance: deep/controlled cough encouraged  Medication Review/Management: medications reviewed  Taken 8/9/2025 2023 by Lyssa Parker RN  Breathing Techniques/Airway Clearance: deep/controlled cough encouraged  Medication Review/Management: medications reviewed  Goal: Blood Pressure in Desired Range  Outcome: Progressing  Intervention: Maintain Blood Pressure Management  Recent Flowsheet Documentation  Taken 8/9/2025 2306 by Lyssa Parker RN  Medication Review/Management: medications reviewed  Taken 8/9/2025 2023 by Lyssa Parkre RN  Medication Review/Management: medications reviewed     Problem: Hypertension Acute  Goal: Blood Pressure Within Desired Range  Outcome: Progressing  Intervention: Normalize Blood Pressure  Recent Flowsheet Documentation  Taken 8/9/2025 2306 by Lyssa Parker RN  Sensory Stimulation Regulation:   care clustered   lighting decreased   quiet environment promoted  Medication Review/Management: medications reviewed  Taken 8/9/2025 2023 by Lyssa Parker RN  Sensory Stimulation Regulation:   care clustered   lighting decreased   quiet environment promoted  Medication Review/Management: medications reviewed

## 2025-08-10 NOTE — PLAN OF CARE
Goal Outcome Evaluation:      Plan of Care Reviewed With: patient, spouse, child    Overall Patient Progress: no changeOverall Patient Progress: no change    Outcome Evaluation: Bp dropped this am after BP medication given, Pt started on levo, BP gradually improved.    ICU End of Shift Summary.  For vital signs and complete assessments, please see documentation flowsheets.      Pertinent assessments:A&Ox4 forgetful at times, oxygen increased to 4 LPM NC during treatment of hypotension later weaned to 1 LPM NC, LS: coarse, denies pain, afebrile, Tele: SR/SB, active BS X1 stool this shift, minimal urine output.    Major Shift Events:Hypotensive this am around after 0900, scheduled BP medications had been given,Pt drowsy but responding, MD notified for SBP 50's and bradycardic in low 50's, Albumin dose given X1, calcium gluconate X1, 250 fluid bolus X1,  glucagon 5mg IV given to reverse betablocker, Levo PIV initiated later changed to Central line, nephrology ok to use HD cath for infusions, levo up to 0.25 before BP MAP >65, titrated and weaned off at 1540. Blood glucose up to 290 at noon, TF feeding held for 2 hours during BP resuscitation, restarted at 1300, Blood glucose at 1540 64, pudding given to Pt, Pt refused juice, recheck BG down to 53, Pt asymptomatic, glucagon gel given and dinner ordered for Pt, recheck BG 76.    Plan (Upcoming Events):Monitor BP, electrolytes and replace per protocol, nephrology, PT/OT following. Pt to dialyze tomorrow.    Discharge/Transfer Needs: TBD       Bedside Shift Report Completed : Y  Bedside Safety Check Completed:Y    Problem: Adult Inpatient Plan of Care  Goal: Plan of Care Review  Description: The Plan of Care Review/Shift note should be completed every shift.  The Outcome Evaluation is a brief statement about your assessment that the patient is improving, declining, or no change.  This information will be displayed automatically on your shift  note.  Outcome: Not  "Progressing  Flowsheets (Taken 8/10/2025 1716)  Outcome Evaluation: Bp dropped this am after BP medication given, Pt started on levo, BP gradually improved.  Plan of Care Reviewed With:   patient   spouse   child  Overall Patient Progress: no change  Goal: Patient-Specific Goal (Individualized)  Description: You can add care plan individualizations to a care plan. Examples of Individualization might be:  \"Parent requests to be called daily at 9am for status\", \"I have a hard time hearing out of my right ear\", or \"Do not touch me to wake me up as it startles  me\".  Outcome: Progressing  Goal: Absence of Hospital-Acquired Illness or Injury  Outcome: Not Progressing  Intervention: Identify and Manage Fall Risk  Recent Flowsheet Documentation  Taken 8/10/2025 1550 by Mohamud Reynolds RN  Safety Promotion/Fall Prevention:   activity supervised   clutter free environment maintained   increased rounding and observation   increase visualization of patient   lighting adjusted   nonskid shoes/slippers when out of bed   patient and family education   room near nurse's station   room organization consistent   safety round/check completed  Taken 8/10/2025 1130 by Mohamud Reynolds, RN  Safety Promotion/Fall Prevention:   activity supervised   clutter free environment maintained   increased rounding and observation   increase visualization of patient   lighting adjusted   nonskid shoes/slippers when out of bed   patient and family education   room near nurse's station   room organization consistent   safety round/check completed  Taken 8/10/2025 0830 by Mohamud Reynolds, RN  Safety Promotion/Fall Prevention:   activity supervised   clutter free environment maintained   increased rounding and observation   increase visualization of patient   lighting adjusted   nonskid shoes/slippers when out of bed   patient and family education   room near nurse's station   room organization consistent   safety round/check " completed  Intervention: Prevent Skin Injury  Recent Flowsheet Documentation  Taken 8/10/2025 1550 by Mohamud Reynolds RN  Body Position: position changed independently  Skin Protection:   adhesive use limited   incontinence pads utilized   transparent dressing maintained   tubing/devices free from skin contact  Taken 8/10/2025 1130 by Mohamud Reynolds RN  Body Position:   weight shifting   upper extremity elevated  Skin Protection:   adhesive use limited   incontinence pads utilized   transparent dressing maintained   tubing/devices free from skin contact  Taken 8/10/2025 0830 by Mohamud Reynolds RN  Body Position:   position changed independently   weight shifting   heels elevated  Intervention: Prevent and Manage VTE (Venous Thromboembolism) Risk  Recent Flowsheet Documentation  Taken 8/10/2025 0830 by Mohamud Reynolds RN  VTE Prevention/Management: (anticoagulation therapy)   SCDs off (sequential compression devices)   other (see comments)  Intervention: Prevent Infection  Recent Flowsheet Documentation  Taken 8/10/2025 1550 by Mohamud Reynolds RN  Infection Prevention:   environmental surveillance performed   hand hygiene promoted   rest/sleep promoted   single patient room provided  Taken 8/10/2025 1130 by Mohamud Reynolds RN  Infection Prevention:   environmental surveillance performed   hand hygiene promoted   rest/sleep promoted   single patient room provided  Taken 8/10/2025 0830 by Mohamud Reynolds RN  Infection Prevention:   environmental surveillance performed   hand hygiene promoted   rest/sleep promoted   single patient room provided  Goal: Optimal Comfort and Wellbeing  Outcome: Not Progressing  Intervention: Provide Person-Centered Care  Recent Flowsheet Documentation  Taken 8/10/2025 1550 by Mohamud Reynolds RN  Trust Relationship/Rapport:   care explained   choices provided   emotional support provided   empathic listening provided   questions answered   questions encouraged   reassurance  "provided   thoughts/feelings acknowledged  Taken 8/10/2025 1130 by Mohamud Reynolds RN  Trust Relationship/Rapport:   care explained   choices provided   emotional support provided   empathic listening provided   questions answered   questions encouraged   reassurance provided   thoughts/feelings acknowledged  Taken 8/10/2025 0830 by Mohamud Reynolds RN  Trust Relationship/Rapport:   care explained   choices provided   emotional support provided   empathic listening provided   questions answered   questions encouraged   reassurance provided   thoughts/feelings acknowledged  Goal: Readiness for Transition of Care  Outcome: Not Progressing     Problem: Adult Inpatient Plan of Care  Goal: Plan of Care Review  Description: The Plan of Care Review/Shift note should be completed every shift.  The Outcome Evaluation is a brief statement about your assessment that the patient is improving, declining, or no change.  This information will be displayed automatically on your shift  note.  Outcome: Not Progressing  Flowsheets (Taken 8/10/2025 1716)  Outcome Evaluation: Bp dropped this am after BP medication given, Pt started on levo, BP gradually improved.  Plan of Care Reviewed With:   patient   spouse   child  Overall Patient Progress: no change  Goal: Patient-Specific Goal (Individualized)  Description: You can add care plan individualizations to a care plan. Examples of Individualization might be:  \"Parent requests to be called daily at 9am for status\", \"I have a hard time hearing out of my right ear\", or \"Do not touch me to wake me up as it startles  me\".  Outcome: Progressing  Goal: Absence of Hospital-Acquired Illness or Injury  Outcome: Not Progressing  Intervention: Identify and Manage Fall Risk  Recent Flowsheet Documentation  Taken 8/10/2025 1550 by Mohamud Reynolds RN  Safety Promotion/Fall Prevention:   activity supervised   clutter free environment maintained   increased rounding and observation   increase " visualization of patient   lighting adjusted   nonskid shoes/slippers when out of bed   patient and family education   room near nurse's station   room organization consistent   safety round/check completed  Taken 8/10/2025 1130 by Mohamud Reynolds RN  Safety Promotion/Fall Prevention:   activity supervised   clutter free environment maintained   increased rounding and observation   increase visualization of patient   lighting adjusted   nonskid shoes/slippers when out of bed   patient and family education   room near nurse's station   room organization consistent   safety round/check completed  Taken 8/10/2025 0830 by Mohamud Reynolds RN  Safety Promotion/Fall Prevention:   activity supervised   clutter free environment maintained   increased rounding and observation   increase visualization of patient   lighting adjusted   nonskid shoes/slippers when out of bed   patient and family education   room near nurse's station   room organization consistent   safety round/check completed  Intervention: Prevent Skin Injury  Recent Flowsheet Documentation  Taken 8/10/2025 1550 by Mohamud Reynolds RN  Body Position: position changed independently  Skin Protection:   adhesive use limited   incontinence pads utilized   transparent dressing maintained   tubing/devices free from skin contact  Taken 8/10/2025 1130 by Mohamud Reynolds RN  Body Position:   weight shifting   upper extremity elevated  Skin Protection:   adhesive use limited   incontinence pads utilized   transparent dressing maintained   tubing/devices free from skin contact  Taken 8/10/2025 0830 by Mohamud Reynolds RN  Body Position:   position changed independently   weight shifting   heels elevated  Intervention: Prevent and Manage VTE (Venous Thromboembolism) Risk  Recent Flowsheet Documentation  Taken 8/10/2025 0830 by Mohamud Reynolds RN  VTE Prevention/Management: (anticoagulation therapy)   SCDs off (sequential compression devices)   other (see  comments)  Intervention: Prevent Infection  Recent Flowsheet Documentation  Taken 8/10/2025 1550 by Mohamud Reynolds RN  Infection Prevention:   environmental surveillance performed   hand hygiene promoted   rest/sleep promoted   single patient room provided  Taken 8/10/2025 1130 by Mohamud Reynolds RN  Infection Prevention:   environmental surveillance performed   hand hygiene promoted   rest/sleep promoted   single patient room provided  Taken 8/10/2025 0830 by Mohamud Reynolds RN  Infection Prevention:   environmental surveillance performed   hand hygiene promoted   rest/sleep promoted   single patient room provided  Goal: Optimal Comfort and Wellbeing  Outcome: Not Progressing  Intervention: Provide Person-Centered Care  Recent Flowsheet Documentation  Taken 8/10/2025 1550 by Mohamud Reynolds RN  Trust Relationship/Rapport:   care explained   choices provided   emotional support provided   empathic listening provided   questions answered   questions encouraged   reassurance provided   thoughts/feelings acknowledged  Taken 8/10/2025 1130 by Mohamud Reynolds RN  Trust Relationship/Rapport:   care explained   choices provided   emotional support provided   empathic listening provided   questions answered   questions encouraged   reassurance provided   thoughts/feelings acknowledged  Taken 8/10/2025 0830 by Mohamud Reynolds RN  Trust Relationship/Rapport:   care explained   choices provided   emotional support provided   empathic listening provided   questions answered   questions encouraged   reassurance provided   thoughts/feelings acknowledged  Goal: Readiness for Transition of Care  Outcome: Not Progressing       Problem: Adult Inpatient Plan of Care  Goal: Plan of Care Review  Description: The Plan of Care Review/Shift note should be completed every shift.  The Outcome Evaluation is a brief statement about your assessment that the patient is improving, declining, or no change.  This information will be  "displayed automatically on your shift  note.  Outcome: Not Progressing  Flowsheets (Taken 8/10/2025 1716)  Outcome Evaluation: Bp dropped this am after BP medication given, Pt started on levo, BP gradually improved.  Plan of Care Reviewed With:   patient   spouse   child  Overall Patient Progress: no change  Goal: Patient-Specific Goal (Individualized)  Description: You can add care plan individualizations to a care plan. Examples of Individualization might be:  \"Parent requests to be called daily at 9am for status\", \"I have a hard time hearing out of my right ear\", or \"Do not touch me to wake me up as it startles  me\".  Outcome: Progressing  Goal: Absence of Hospital-Acquired Illness or Injury  Outcome: Not Progressing  Intervention: Identify and Manage Fall Risk  Recent Flowsheet Documentation  Taken 8/10/2025 1550 by Mohamud Reynolds, RN  Safety Promotion/Fall Prevention:   activity supervised   clutter free environment maintained   increased rounding and observation   increase visualization of patient   lighting adjusted   nonskid shoes/slippers when out of bed   patient and family education   room near nurse's station   room organization consistent   safety round/check completed  Taken 8/10/2025 1130 by Mohamud Reynolds, RN  Safety Promotion/Fall Prevention:   activity supervised   clutter free environment maintained   increased rounding and observation   increase visualization of patient   lighting adjusted   nonskid shoes/slippers when out of bed   patient and family education   room near nurse's station   room organization consistent   safety round/check completed  Taken 8/10/2025 0830 by Mohamud Reynolds, RN  Safety Promotion/Fall Prevention:   activity supervised   clutter free environment maintained   increased rounding and observation   increase visualization of patient   lighting adjusted   nonskid shoes/slippers when out of bed   patient and family education   room near nurse's station   room " organization consistent   safety round/check completed  Intervention: Prevent Skin Injury  Recent Flowsheet Documentation  Taken 8/10/2025 1550 by Mohamud Reynolds RN  Body Position: position changed independently  Skin Protection:   adhesive use limited   incontinence pads utilized   transparent dressing maintained   tubing/devices free from skin contact  Taken 8/10/2025 1130 by Mohamud Reynolds RN  Body Position:   weight shifting   upper extremity elevated  Skin Protection:   adhesive use limited   incontinence pads utilized   transparent dressing maintained   tubing/devices free from skin contact  Taken 8/10/2025 0830 by Mohamud Reynolds RN  Body Position:   position changed independently   weight shifting   heels elevated  Intervention: Prevent and Manage VTE (Venous Thromboembolism) Risk  Recent Flowsheet Documentation  Taken 8/10/2025 0830 by Mohamud Reynolds RN  VTE Prevention/Management: (anticoagulation therapy)   SCDs off (sequential compression devices)   other (see comments)  Intervention: Prevent Infection  Recent Flowsheet Documentation  Taken 8/10/2025 1550 by Mohamud Reynolds RN  Infection Prevention:   environmental surveillance performed   hand hygiene promoted   rest/sleep promoted   single patient room provided  Taken 8/10/2025 1130 by Mohamud Reynolds RN  Infection Prevention:   environmental surveillance performed   hand hygiene promoted   rest/sleep promoted   single patient room provided  Taken 8/10/2025 0830 by Mohamud Reynolds RN  Infection Prevention:   environmental surveillance performed   hand hygiene promoted   rest/sleep promoted   single patient room provided  Goal: Optimal Comfort and Wellbeing  Outcome: Not Progressing  Intervention: Provide Person-Centered Care  Recent Flowsheet Documentation  Taken 8/10/2025 1550 by Mohamud Reynolds RN  Trust Relationship/Rapport:   care explained   choices provided   emotional support provided   empathic listening provided   questions  answered   questions encouraged   reassurance provided   thoughts/feelings acknowledged  Taken 8/10/2025 1130 by Mohamud Reynolds RN  Trust Relationship/Rapport:   care explained   choices provided   emotional support provided   empathic listening provided   questions answered   questions encouraged   reassurance provided   thoughts/feelings acknowledged  Taken 8/10/2025 0830 by Mohamud Reynolds RN  Trust Relationship/Rapport:   care explained   choices provided   emotional support provided   empathic listening provided   questions answered   questions encouraged   reassurance provided   thoughts/feelings acknowledged  Goal: Readiness for Transition of Care  Outcome: Not Progressing     Problem: Delirium  Goal: Optimal Coping  Outcome: Progressing  Intervention: Optimize Psychosocial Adjustment to Delirium  Recent Flowsheet Documentation  Taken 8/10/2025 1550 by Mohamud Reynolds RN  Supportive Measures:   active listening utilized   decision-making supported   goal-setting facilitated   positive reinforcement provided   self-care encouraged   self-reflection promoted   verbalization of feelings encouraged  Family/Support System Care: self-care encouraged  Taken 8/10/2025 1130 by Mohamud Reynolds RN  Supportive Measures:   active listening utilized   decision-making supported   goal-setting facilitated   positive reinforcement provided   self-care encouraged   self-reflection promoted   verbalization of feelings encouraged  Family/Support System Care: self-care encouraged  Taken 8/10/2025 0830 by Mohamud Reynolds RN  Supportive Measures:   active listening utilized   decision-making supported   goal-setting facilitated   positive reinforcement provided   self-care encouraged   self-reflection promoted   verbalization of feelings encouraged  Family/Support System Care: self-care encouraged  Goal: Improved Behavioral Control  Outcome: Progressing  Intervention: Prevent and Manage Agitation  Recent Flowsheet  Documentation  Taken 8/10/2025 1550 by Mohamud Reynolds RN  Environment Familiarity/Consistency: daily routine followed  Taken 8/10/2025 1130 by Mohamud Reynolds RN  Environment Familiarity/Consistency: daily routine followed  Taken 8/10/2025 0830 by Mohamud Reynolds RN  Environment Familiarity/Consistency: daily routine followed  Intervention: Minimize Safety Risk  Recent Flowsheet Documentation  Taken 8/10/2025 1550 by Mohamud Reynolds RN  Enhanced Safety Measures: room near unit station  Trust Relationship/Rapport:   care explained   choices provided   emotional support provided   empathic listening provided   questions answered   questions encouraged   reassurance provided   thoughts/feelings acknowledged  Taken 8/10/2025 1130 by Mohamud Reynolds RN  Enhanced Safety Measures: room near unit station  Trust Relationship/Rapport:   care explained   choices provided   emotional support provided   empathic listening provided   questions answered   questions encouraged   reassurance provided   thoughts/feelings acknowledged  Taken 8/10/2025 0830 by Mohamud Reynolds RN  Enhanced Safety Measures: room near unit station  Trust Relationship/Rapport:   care explained   choices provided   emotional support provided   empathic listening provided   questions answered   questions encouraged   reassurance provided   thoughts/feelings acknowledged  Goal: Improved Attention and Thought Clarity  Outcome: Progressing  Intervention: Maximize Cognitive Function  Recent Flowsheet Documentation  Taken 8/10/2025 1550 by Mohamud Reynolds RN  Sensory Stimulation Regulation:   care clustered   lighting decreased   quiet environment promoted  Reorientation Measures: calendar in view  Taken 8/10/2025 1130 by Mohamud Reynolds RN  Sensory Stimulation Regulation:   care clustered   lighting decreased   quiet environment promoted  Reorientation Measures: calendar in view  Taken 8/10/2025 0830 by Mohamud Reynolds RN  Sensory Stimulation  Regulation:   care clustered   lighting decreased   quiet environment promoted  Reorientation Measures: calendar in view  Goal: Improved Sleep  Outcome: Not Progressing     Problem: Skin Injury Risk Increased  Goal: Skin Health and Integrity  Outcome: Progressing  Intervention: Plan: Nurse Driven Intervention: Positioning  Recent Flowsheet Documentation  Taken 8/10/2025 1550 by Mohamud Reynolds RN  Plan: Positioning Interventions:   REPOSITION Left/Right (No supine) q2h   HOB 30 degrees or less   OFF-LOAD HEELS with pillows  Taken 8/10/2025 1130 by Mohamud Reynolds RN  Plan: Positioning Interventions:   REPOSITION Left/Right (No supine) q2h   HOB 30 degrees or less   OFF-LOAD HEELS with pillows  Intervention: Plan: Nurse Driven Intervention: Moisture Management  Recent Flowsheet Documentation  Taken 8/10/2025 1550 by Mohamud Reynolds RN  Moisture Interventions:   No brief in bed   Incontinence pad   Urinary collection device   Low Air Loss pump  Bathing/Skin Care: incontinence care  Taken 8/10/2025 1130 by Mohamud Reynolds RN  Moisture Interventions:   No brief in bed   Incontinence pad   Urinary collection device   Low Air Loss pump  Bathing/Skin Care: incontinence care  Taken 8/10/2025 0830 by Mohamud Reynolds RN  Moisture Interventions:   No brief in bed   Incontinence pad   Urinary collection device   Low Air Loss pump  Bathing/Skin Care: incontinence care  Intervention: Plan: Nurse Driven Intervention: Friction and Shear  Recent Flowsheet Documentation  Taken 8/10/2025 1550 by Mohamud Reynolds RN  Friction/Shear Interventions:   HOB 30 degrees or less   Pad bony prominence (elbow pads, heel pads, ear protectors)  Taken 8/10/2025 1130 by Mohamud Reynolds RN  Friction/Shear Interventions:   HOB 30 degrees or less   Pad bony prominence (elbow pads, heel pads, ear protectors)  Taken 8/10/2025 0830 by Mohamud Reynolds RN  Friction/Shear Interventions:   HOB 30 degrees or less   Pad bony prominence (elbow  pads, heel pads, ear protectors)  Intervention: Optimize Skin Protection  Recent Flowsheet Documentation  Taken 8/10/2025 1550 by Mohamud Reynolds RN  Skin Protection:   adhesive use limited   incontinence pads utilized   transparent dressing maintained   tubing/devices free from skin contact  Activity Management: activity adjusted per tolerance  Head of Bed (HOB) Positioning: HOB at 30 degrees  Taken 8/10/2025 1130 by Mohamud Reynolds RN  Skin Protection:   adhesive use limited   incontinence pads utilized   transparent dressing maintained   tubing/devices free from skin contact  Activity Management: activity adjusted per tolerance  Head of Bed (HOB) Positioning: HOB at 20-30 degrees  Taken 8/10/2025 0830 by Mohamud Reynolds RN  Activity Management: activity adjusted per tolerance  Head of Bed (HOB) Positioning: HOB at 20-30 degrees  Intervention: Promote and Optimize Oral Intake  Recent Flowsheet Documentation  Taken 8/10/2025 1550 by Mohamud Reynolds RN  Oral Nutrition Promotion: rest periods promoted  Taken 8/10/2025 1130 by Mohamud Reynolds RN  Oral Nutrition Promotion: rest periods promoted  Taken 8/10/2025 0830 by Mohamud Reynolds RN  Oral Nutrition Promotion: rest periods promoted     Problem: Breathing Pattern Ineffective  Goal: Effective Breathing Pattern  Outcome: Progressing  Intervention: Promote Improved Breathing Pattern  Recent Flowsheet Documentation  Taken 8/10/2025 1550 by Mohamud Reynolds RN  Breathing Techniques/Airway Clearance: deep/controlled cough encouraged  Supportive Measures:   active listening utilized   decision-making supported   goal-setting facilitated   positive reinforcement provided   self-care encouraged   self-reflection promoted   verbalization of feelings encouraged  Airway/Ventilation Management:   airway patency maintained   position adjusted   pulmonary hygiene promoted  Head of Bed (HOB) Positioning: HOB at 30 degrees  Taken 8/10/2025 1130 by Mohamud Reynolds  RN  Breathing Techniques/Airway Clearance: deep/controlled cough encouraged  Supportive Measures:   active listening utilized   decision-making supported   goal-setting facilitated   positive reinforcement provided   self-care encouraged   self-reflection promoted   verbalization of feelings encouraged  Airway/Ventilation Management:   airway patency maintained   position adjusted   pulmonary hygiene promoted  Head of Bed (HOB) Positioning: HOB at 20-30 degrees  Taken 8/10/2025 0830 by Mohamud Reynolds RN  Breathing Techniques/Airway Clearance: deep/controlled cough encouraged  Supportive Measures:   active listening utilized   decision-making supported   goal-setting facilitated   positive reinforcement provided   self-care encouraged   self-reflection promoted   verbalization of feelings encouraged  Airway/Ventilation Management:   airway patency maintained   position adjusted   pulmonary hygiene promoted  Head of Bed (HOB) Positioning: HOB at 20-30 degrees     Problem: Comorbidity Management  Goal: Maintenance of COPD Symptom Control  Outcome: Progressing  Intervention: Maintain COPD Symptom Control  Recent Flowsheet Documentation  Taken 8/10/2025 1550 by Mohamud Reynolds RN  Breathing Techniques/Airway Clearance: deep/controlled cough encouraged  Medication Review/Management: medications reviewed  Taken 8/10/2025 1130 by Mohamud Reynolds RN  Breathing Techniques/Airway Clearance: deep/controlled cough encouraged  Medication Review/Management: medications reviewed  Taken 8/10/2025 0830 by Mohamud Reynolds RN  Breathing Techniques/Airway Clearance: deep/controlled cough encouraged  Medication Review/Management: medications reviewed  Goal: Blood Pressure in Desired Range  Outcome: Not Progressing  Intervention: Maintain Blood Pressure Management  Recent Flowsheet Documentation  Taken 8/10/2025 1550 by Mohamud Reynolds RN  Medication Review/Management: medications reviewed  Taken 8/10/2025 1130 by Gail  Mohamud MCCOLLUM, RN  Medication Review/Management: medications reviewed  Taken 8/10/2025 0830 by Mohamud Reynolds, RN  Medication Review/Management: medications reviewed

## 2025-08-10 NOTE — PROGRESS NOTES
Renal Medicine Progress Note            Assessment/Plan:     Assessment:    Hypertensive emergency   Acute hypoxic respiratory failure   Acute on chronic diastolic HF   History of R renal artery stenting  Labile HTN  Bipap dependent hypoxia, pulmonary edema on CXR. . BNP 25K. TTE with EF 55-60%, elevated L sided pressures and pulmonary HTN, but RA pressure estimated low.    - Labile BP, history of renal artery stenosis status post stenting-> Doppler on 7/31-no evidence of significant JACY  - Metanephrines  -> normetanephrine came back quite high at 4.6 with upper limit of 0.8.  Metanephrine was normal.  Bump in normetanephrine is likely related to advanced CKD.  Cannot get 24 urine given patient's dialysis status.   - CT abdomen-normal-looking adrenals    SHOCK -  Sudden drop in blood pressure this morning likely related to her blood pressure medications and improved volume status after dialysis.  Of note, blood pressure extremely difficult to control prior to dialysis and was high earlier in the morning.  - Agree with albumin bolus.  Give  cc now.  - Currently on Levophed.  - Workup for sepsis per ICU team  - Getting IV calcium gluconate and received glucagon to reverse beta-blocker with mild bradycardia with heart rate in 50s  - Can repeat IV LR bolus as needed.  Respiratory status is significantly better  - Hold blood pressure medications.  Will need to reintroduce 1 medication at a time once blood pressure recovers and starts to rise.  Would start with lower dose nifedipine 30 mg XL and slowly build up based on response  - Dialysis catheter used for emergent need for Levophed.  Okay with it for now.  If pressures are required for more than 24 hours, patient will need a separate central catheter       ROMI on CKD IV   Previously  baseline 1.3-1.5, worsening in May/June 2025, new baseline appears to be ~3 with nephrotic range proteinuria. 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 This is most  commonly seen in DM2, however can also be seen with smoking and HTN. Bx also showed cholesterol emboli (likely due to endograft repair).  Cr on admission 4.25, worse than recent baseline. In setting of hypertensive emergency, pulmonary edema, LE edema. Bx results:         R lung adenocarcinoma   Received radiation in 2021.     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  No indication for dialysis today.  Reassess tomorrow.  Last dialysis yesterday with 3 L ultrafiltration.  Currently in shock  Suspect she will remain dialysis dependent moving forward given advanced CKD, biopsy findings as well as issues with volume.   Discussed with care coordinator-Will need outpatient placement for hemodialysis.  Discussed with  daughter at bedside.             Interval History:     Seen/examined   Dialyzed yesterday without issue.  3 L ultrafiltration done and tolerated okay.  Blood pressure high until this morning when she received her antihypertensive medications including carvedilol, hydralazine, Imdur, nifedipine  Subsequently blood pressure dropped significantly in 70s.  Received 500 mL of albumin.  Now on Levophed.  No fever.  White count is okay.  Hemoglobin is normal.  She is awake and alert.               Medications and Allergies:     Current Facility-Administered Medications   Medication Dose Route Frequency Provider Last Rate Last Admin    - MEDICATION INSTRUCTIONS for Dialysis Patients -   Does not apply See Admin Instructions Mariano Tran MD        amiodarone (PACERONE) tablet 200 mg  200 mg Oral or Feeding Tube Daily Mariano Tran MD   200 mg at 08/10/25 0834    B and C vitamin Complex with folic acid (NEPHRONEX) liquid 5 mL  5 mL Per Feeding Tube Daily Mariano Tran MD   5 mL at 08/10/25 0832    calcium gluconate 2 g in  mL intermittent infusion  2 g Intravenous Once Mattie,  "MD Lamin   2 g at 08/10/25 1037    [Held by provider] carvedilol (COREG) tablet 25 mg  25 mg Oral or Feeding Tube BID w/meals Mariano Tran MD   25 mg at 08/10/25 0831    clopidogrel (PLAVIX) tablet 75 mg  75 mg Oral or Feeding Tube Daily Mariano Tran MD   75 mg at 08/09/25 1708    famotidine (PEPCID) tablet 20 mg  20 mg Oral or Feeding Tube Q48H Virgilio Patel MD   20 mg at 08/10/25 0832    heparin ANTICOAGULANT injection 5,000 Units  5,000 Units Subcutaneous Q8H Lamin Phelps MD        [Held by provider] hydrALAZINE (APRESOLINE) tablet 50 mg  50 mg Oral or Feeding Tube TID Jamie Fragoso MD   50 mg at 08/10/25 0552    icosapent ethyl (VASCEPA) capsule 2 g  2 g Oral BID w/meals Virgilio Patel MD   2 g at 08/10/25 0835    [Held by provider] isosorbide dinitrate (ISORDIL) tablet 40 mg  40 mg Oral or Feeding Tube TID Virgilio Patel MD   40 mg at 08/10/25 0552    lactated ringers BOLUS 250 mL  250 mL Intravenous Once Jamie Fragoso MD        meclizine (ANTIVERT) tablet 25 mg  25 mg Oral or Feeding Tube Q6H Virgilio Patel MD   25 mg at 08/10/25 0550    [Held by provider] NIFEdipine ER OSMOTIC (PROCARDIA XL) 24 hr tablet 90 mg  90 mg Oral Daily Mariano Tran MD   90 mg at 08/10/25 0832    PARoxetine (PAXIL) tablet 40 mg  40 mg Oral or Feeding Tube Daily Virgilio Patel MD   40 mg at 08/10/25 0831    sennosides (SENOKOT) tablet 2 tablet  2 tablet Oral BID Mariano Trna MD   2 tablet at 08/08/25 2243    sodium chloride (PF) 0.9% PF flush 3 mL  3 mL Intracatheter Q8H Gilmer Dozier MD   3 mL at 08/10/25 0553    sodium phosphate 9 mmol in 250 mL NS intermittent infusion  9 mmol Intravenous Once Virgilio Patel MD   9 mmol at 08/10/25 0726        Allergies   Allergen Reactions    Iodine Hives            Physical Exam:   Vitals were reviewed  BP (!) 63/42   Pulse 52   Temp 96.9  F (36.1  C) (Temporal)   Resp 17   Ht 1.626 m (5' 4\")   Wt 50.4 kg (111 lb 3.2 oz)   SpO2 94%   BMI " "19.09 kg/m      Wt Readings from Last 3 Encounters:   08/10/25 50.4 kg (111 lb 3.2 oz)   07/23/25 54.4 kg (120 lb)   07/21/25 54.1 kg (119 lb 3.2 oz)       GENERAL APPEARANCE: awake, alert.  HEENT: normocephalic, dry MM   RESP: coarse, basal crackles noted.  Bilateral rhonchi  CV: irregular   EXTREMITIES/SKIN: Edema around thighs have improved significantly.`  NEURO: Grossly nonfocal  Rt internal jugular TDC            Data:     BMP  Recent Labs   Lab 08/10/25  0755 08/10/25  0614 08/10/25  0326 08/09/25 2019 08/09/25  0830 08/09/25  0708 08/08/25 2003 08/08/25  1635 08/08/25  0834 08/08/25  0555 08/07/25  0800 08/07/25  0606   NA  --  133*  --   --   --  135  --   --   --  133*  --  135   POTASSIUM  --  3.6  3.6  --   --   --  3.6  --  3.7  --  3.3*   < > 3.2*   CHLORIDE  --  94*  --   --   --  96*  --   --   --  94*  --  95*   BRUCE  --  8.5*  --   --   --  8.7*  --   --   --  8.6*  --  8.4*   CO2  --  26  --   --   --  25  --   --   --  26  --  27   BUN  --  24.9*  --   --   --  40.1*  --   --   --  24.3*  --  37.3*   CR  --  2.05*  --   --   --  3.03*  --   --   --  2.36*  --  2.37*   * 109* 101* 116*   < > 121*   < >  --    < > 133*   < > 112*    < > = values in this interval not displayed.     CBC  Recent Labs   Lab 08/10/25  0614 08/09/25  0708 08/08/25  0555 08/07/25  0606   WBC 8.0 9.0 9.5 8.4   HGB 9.0* 8.8* 9.1* 8.7*   HCT 27.6* 27.0* 27.7* 26.2*   * 103* 102* 101*    272 299 280     Lab Results   Component Value Date    AST 22 07/17/2025    ALT 11 07/17/2025    ALKPHOS 61 07/17/2025    BILITOTAL 0.3 07/17/2025     No results found for: \"INR\"  Color Urine (no units)   Date Value   09/09/2016 Light Yellow     Appearance Urine (no units)   Date Value   09/09/2016 Clear     Glucose Urine (mg/dL)   Date Value   09/09/2016 Negative     Bilirubin Urine (no units)   Date Value   09/09/2016 Negative     Ketones Urine (mg/dL)   Date Value   09/09/2016 Negative     Specific Gravity Urine (no " units)   Date Value   09/09/2016 1.010     pH Urine (pH)   Date Value   09/09/2016 5.5     Protein Albumin Urine (mg/dL)   Date Value   09/09/2016 Negative     Nitrite Urine (no units)   Date Value   09/09/2016 Negative     Leukocyte Esterase Urine (no units)   Date Value   09/09/2016 Negative         Attestation:  I have reviewed today's vital signs, notes, medications, labs and imaging.    Jamie Fragoso MD  [facility]  Office: [phone]

## 2025-08-11 ENCOUNTER — APPOINTMENT (OUTPATIENT)
Dept: ULTRASOUND IMAGING | Facility: CLINIC | Age: 75
DRG: 291 | End: 2025-08-11
Attending: INTERNAL MEDICINE
Payer: MEDICARE

## 2025-08-11 LAB
ANION GAP SERPL CALCULATED.3IONS-SCNC: 12 MMOL/L (ref 7–15)
APPEARANCE FLD: ABNORMAL
ATRIAL RATE - MUSE: NORMAL BPM
ATRIAL RATE - MUSE: NORMAL BPM
BUN SERPL-MCNC: 43 MG/DL (ref 8–23)
CALCIUM SERPL-MCNC: 9.1 MG/DL (ref 8.8–10.4)
CHLORIDE SERPL-SCNC: 93 MMOL/L (ref 98–107)
COLOR FLD: YELLOW
CREAT SERPL-MCNC: 2.56 MG/DL (ref 0.51–0.95)
DIASTOLIC BLOOD PRESSURE - MUSE: NORMAL MMHG
DIASTOLIC BLOOD PRESSURE - MUSE: NORMAL MMHG
EGFRCR SERPLBLD CKD-EPI 2021: 19 ML/MIN/1.73M2
ERYTHROCYTE [DISTWIDTH] IN BLOOD BY AUTOMATED COUNT: 14.6 % (ref 10–15)
GLUCOSE BLDC GLUCOMTR-MCNC: 108 MG/DL (ref 70–99)
GLUCOSE BLDC GLUCOMTR-MCNC: 109 MG/DL (ref 70–99)
GLUCOSE BLDC GLUCOMTR-MCNC: 111 MG/DL (ref 70–99)
GLUCOSE BLDC GLUCOMTR-MCNC: 115 MG/DL (ref 70–99)
GLUCOSE BLDC GLUCOMTR-MCNC: 118 MG/DL (ref 70–99)
GLUCOSE BLDC GLUCOMTR-MCNC: 97 MG/DL (ref 70–99)
GLUCOSE BODY FLUID SOURCE: NORMAL
GLUCOSE FLD-MCNC: 116 MG/DL
GLUCOSE SERPL-MCNC: 120 MG/DL (ref 70–99)
HCO3 SERPL-SCNC: 26 MMOL/L (ref 22–29)
HCT VFR BLD AUTO: 26.2 % (ref 35–47)
HGB BLD-MCNC: 8.8 G/DL (ref 11.7–15.7)
INTERPRETATION ECG - MUSE: NORMAL
INTERPRETATION ECG - MUSE: NORMAL
LD BODY BODY FLUID SOURCE: NORMAL
LDH FLD L TO P-CCNC: 88 U/L
LDH SERPL L TO P-CCNC: 258 U/L (ref 0–250)
LYMPHOCYTES NFR FLD MANUAL: NORMAL %
MAGNESIUM SERPL-MCNC: 1.8 MG/DL (ref 1.7–2.3)
MCH RBC QN AUTO: 34 PG (ref 26.5–33)
MCHC RBC AUTO-ENTMCNC: 33.6 G/DL (ref 31.5–36.5)
MCV RBC AUTO: 101 FL (ref 78–100)
MONOS+MACROS NFR FLD MANUAL: NORMAL %
NEUTS BAND NFR FLD MANUAL: NORMAL %
P AXIS - MUSE: NORMAL DEGREES
P AXIS - MUSE: NORMAL DEGREES
PHOSPHATE SERPL-MCNC: 2 MG/DL (ref 2.5–4.5)
PLATELET # BLD AUTO: 261 10E3/UL (ref 150–450)
POTASSIUM SERPL-SCNC: 3.6 MMOL/L (ref 3.4–5.3)
PR INTERVAL - MUSE: NORMAL MS
PR INTERVAL - MUSE: NORMAL MS
PROT FLD-MCNC: 1.7 G/DL
PROT SERPL-MCNC: 6.8 G/DL (ref 6.4–8.3)
PROTEIN BODY FLUID SOURCE: NORMAL
QRS DURATION - MUSE: 74 MS
QRS DURATION - MUSE: 78 MS
QT - MUSE: 402 MS
QT - MUSE: 416 MS
QTC - MUSE: 390 MS
QTC - MUSE: 510 MS
R AXIS - MUSE: 31 DEGREES
R AXIS - MUSE: 32 DEGREES
RBC # BLD AUTO: 2.59 10E6/UL (ref 3.8–5.2)
SODIUM SERPL-SCNC: 131 MMOL/L (ref 135–145)
SPECIMEN SOURCE FLD: ABNORMAL
SYSTOLIC BLOOD PRESSURE - MUSE: NORMAL MMHG
SYSTOLIC BLOOD PRESSURE - MUSE: NORMAL MMHG
T AXIS - MUSE: -12 DEGREES
T AXIS - MUSE: 176 DEGREES
VENTRICULAR RATE- MUSE: 53 BPM
VENTRICULAR RATE- MUSE: 97 BPM
WBC # BLD AUTO: 9.4 10E3/UL (ref 4–11)
WBC # FLD AUTO: ABNORMAL 10*3/UL

## 2025-08-11 PROCEDURE — 84100 ASSAY OF PHOSPHORUS: CPT | Performed by: INTERNAL MEDICINE

## 2025-08-11 PROCEDURE — 94660 CPAP INITIATION&MGMT: CPT

## 2025-08-11 PROCEDURE — 83615 LACTATE (LD) (LDH) ENZYME: CPT | Performed by: INTERNAL MEDICINE

## 2025-08-11 PROCEDURE — 250N000013 HC RX MED GY IP 250 OP 250 PS 637: Performed by: INTERNAL MEDICINE

## 2025-08-11 PROCEDURE — 83735 ASSAY OF MAGNESIUM: CPT | Performed by: INTERNAL MEDICINE

## 2025-08-11 PROCEDURE — 84157 ASSAY OF PROTEIN OTHER: CPT | Performed by: INTERNAL MEDICINE

## 2025-08-11 PROCEDURE — 0W9B3ZZ DRAINAGE OF LEFT PLEURAL CAVITY, PERCUTANEOUS APPROACH: ICD-10-PCS | Performed by: RADIOLOGY

## 2025-08-11 PROCEDURE — 87070 CULTURE OTHR SPECIMN AEROBIC: CPT | Performed by: INTERNAL MEDICINE

## 2025-08-11 PROCEDURE — 80048 BASIC METABOLIC PNL TOTAL CA: CPT | Performed by: INTERNAL MEDICINE

## 2025-08-11 PROCEDURE — 250N000011 HC RX IP 250 OP 636: Performed by: INTERNAL MEDICINE

## 2025-08-11 PROCEDURE — 999N000157 HC STATISTIC RCP TIME EA 10 MIN

## 2025-08-11 PROCEDURE — 82945 GLUCOSE OTHER FLUID: CPT | Performed by: INTERNAL MEDICINE

## 2025-08-11 PROCEDURE — 250N000013 HC RX MED GY IP 250 OP 250 PS 637: Performed by: STUDENT IN AN ORGANIZED HEALTH CARE EDUCATION/TRAINING PROGRAM

## 2025-08-11 PROCEDURE — 89050 BODY FLUID CELL COUNT: CPT | Performed by: INTERNAL MEDICINE

## 2025-08-11 PROCEDURE — 85018 HEMOGLOBIN: CPT | Performed by: INTERNAL MEDICINE

## 2025-08-11 PROCEDURE — 250N000009 HC RX 250: Performed by: RADIOLOGY

## 2025-08-11 PROCEDURE — 99233 SBSQ HOSP IP/OBS HIGH 50: CPT | Performed by: INTERNAL MEDICINE

## 2025-08-11 PROCEDURE — 0W993ZZ DRAINAGE OF RIGHT PLEURAL CAVITY, PERCUTANEOUS APPROACH: ICD-10-PCS | Performed by: RADIOLOGY

## 2025-08-11 PROCEDURE — 32555 ASPIRATE PLEURA W/ IMAGING: CPT

## 2025-08-11 PROCEDURE — 200N000001 HC R&B ICU

## 2025-08-11 PROCEDURE — 84155 ASSAY OF PROTEIN SERUM: CPT | Performed by: INTERNAL MEDICINE

## 2025-08-11 PROCEDURE — 36415 COLL VENOUS BLD VENIPUNCTURE: CPT | Performed by: INTERNAL MEDICINE

## 2025-08-11 PROCEDURE — 99232 SBSQ HOSP IP/OBS MODERATE 35: CPT | Performed by: INTERNAL MEDICINE

## 2025-08-11 RX ORDER — HYDRALAZINE HYDROCHLORIDE 20 MG/ML
10 INJECTION INTRAMUSCULAR; INTRAVENOUS EVERY 4 HOURS PRN
Status: DISCONTINUED | OUTPATIENT
Start: 2025-08-11 | End: 2025-08-20 | Stop reason: HOSPADM

## 2025-08-11 RX ORDER — LIDOCAINE HYDROCHLORIDE 10 MG/ML
20 INJECTION, SOLUTION EPIDURAL; INFILTRATION; INTRACAUDAL; PERINEURAL ONCE
Status: COMPLETED | OUTPATIENT
Start: 2025-08-11 | End: 2025-08-11

## 2025-08-11 RX ORDER — MEGESTROL ACETATE 40 MG/ML
200 SUSPENSION ORAL DAILY
Status: DISCONTINUED | OUTPATIENT
Start: 2025-08-11 | End: 2025-08-20 | Stop reason: HOSPADM

## 2025-08-11 RX ORDER — DIAZEPAM 2 MG/1
2 TABLET ORAL EVERY 6 HOURS PRN
Status: DISCONTINUED | OUTPATIENT
Start: 2025-08-11 | End: 2025-08-12

## 2025-08-11 RX ORDER — DIAZEPAM 5 MG/1
5 TABLET ORAL EVERY 6 HOURS PRN
Status: DISCONTINUED | OUTPATIENT
Start: 2025-08-11 | End: 2025-08-20 | Stop reason: HOSPADM

## 2025-08-11 RX ADMIN — HYDRALAZINE HYDROCHLORIDE 50 MG: 50 TABLET, FILM COATED ORAL at 15:44

## 2025-08-11 RX ADMIN — HYDRALAZINE HYDROCHLORIDE 10 MG: 20 INJECTION INTRAMUSCULAR; INTRAVENOUS at 14:15

## 2025-08-11 RX ADMIN — ACETAMINOPHEN 650 MG: 325 TABLET ORAL at 15:44

## 2025-08-11 RX ADMIN — DIAZEPAM 5 MG: 5 TABLET ORAL at 14:37

## 2025-08-11 RX ADMIN — MECLIZINE HYDROCHLORIDE 25 MG: 25 TABLET ORAL at 18:19

## 2025-08-11 RX ADMIN — Medication 5 ML: at 08:33

## 2025-08-11 RX ADMIN — CARVEDILOL 25 MG: 12.5 TABLET, FILM COATED ORAL at 18:19

## 2025-08-11 RX ADMIN — APIXABAN 2.5 MG: 2.5 TABLET, FILM COATED ORAL at 10:50

## 2025-08-11 RX ADMIN — HYDRALAZINE HYDROCHLORIDE 50 MG: 50 TABLET, FILM COATED ORAL at 09:27

## 2025-08-11 RX ADMIN — CARVEDILOL 25 MG: 12.5 TABLET, FILM COATED ORAL at 09:26

## 2025-08-11 RX ADMIN — MECLIZINE HYDROCHLORIDE 25 MG: 25 TABLET ORAL at 12:28

## 2025-08-11 RX ADMIN — POTASSIUM & SODIUM PHOSPHATES POWDER PACK 280-160-250 MG 1 PACKET: 280-160-250 PACK at 06:38

## 2025-08-11 RX ADMIN — PAROXETINE HYDROCHLORIDE 40 MG: 20 TABLET, FILM COATED ORAL at 08:33

## 2025-08-11 RX ADMIN — APIXABAN 2.5 MG: 2.5 TABLET, FILM COATED ORAL at 20:22

## 2025-08-11 RX ADMIN — ICOSAPENT ETHYL 2 G: 1 CAPSULE ORAL at 08:32

## 2025-08-11 RX ADMIN — HEPARIN SODIUM 5000 UNITS: 5000 INJECTION, SOLUTION INTRAVENOUS; SUBCUTANEOUS at 03:50

## 2025-08-11 RX ADMIN — AMIODARONE HYDROCHLORIDE 200 MG: 200 TABLET ORAL at 08:33

## 2025-08-11 RX ADMIN — CLOPIDOGREL BISULFATE 75 MG: 75 TABLET, FILM COATED ORAL at 18:19

## 2025-08-11 RX ADMIN — POTASSIUM & SODIUM PHOSPHATES POWDER PACK 280-160-250 MG 1 PACKET: 280-160-250 PACK at 10:50

## 2025-08-11 RX ADMIN — MECLIZINE HYDROCHLORIDE 25 MG: 25 TABLET ORAL at 05:48

## 2025-08-11 RX ADMIN — LIDOCAINE HYDROCHLORIDE ANHYDROUS 20 ML: 10 INJECTION, SOLUTION INFILTRATION at 15:05

## 2025-08-11 ASSESSMENT — ACTIVITIES OF DAILY LIVING (ADL)
ADLS_ACUITY_SCORE: 68
ADLS_ACUITY_SCORE: 69
ADLS_ACUITY_SCORE: 75
ADLS_ACUITY_SCORE: 75
ADLS_ACUITY_SCORE: 68
ADLS_ACUITY_SCORE: 69
ADLS_ACUITY_SCORE: 68
ADLS_ACUITY_SCORE: 75
ADLS_ACUITY_SCORE: 68
ADLS_ACUITY_SCORE: 69
ADLS_ACUITY_SCORE: 68
ADLS_ACUITY_SCORE: 69
ADLS_ACUITY_SCORE: 68
ADLS_ACUITY_SCORE: 69

## 2025-08-11 NOTE — PROGRESS NOTES
Radiology came up to pt room today for a bilateral thoracentesis. Procedure performed by Dr Amezcua . There were no complications during procedure and vitals remained stable throughout. Pt tolerated procedure well, 1000 cc's of clear yellow colored fluid was removed from right pleural space. 800 cc's of clear yellow colored fluid was removed from left  pleural space.Pt and family were given verbal instructions.

## 2025-08-11 NOTE — PROGRESS NOTES
[facility]    Nephrology Progress Note     Assessment & Plan     Hypertensive emergency   Acute hypoxic respiratory failure   Acute on chronic diastolic HF   History of R renal artery stenting  Labile HTN  Bipap dependent hypoxia, pulmonary edema on CXR. . BNP 25K. TTE with EF 55-60%, elevated L sided pressures and pulmonary HTN, but RA pressure estimated low.    - Labile BP, history of renal artery stenosis status post stenting-> Doppler on 7/31-no evidence of significant JACY  - Metanephrines  -> normetanephrine came back quite high at 4.6 with upper limit of 0.8.  Metanephrine was normal.  Bump in normetanephrine is likely related to advanced CKD.  Cannot get 24 urine given patient's dialysis status.   - CT abdomen-normal-looking adrenals.    Arteries are very calcified and thus likely non compliant (stiff), making labile BP.         SHOCK -  Sudden drop in blood pressure yesterday likely related to her blood pressure medications and improved volume status after dialysis.  Of note, blood pressure extremely difficult to control prior to dialysis and was high earlier in the morning prior to HD.      BP better now.  We will need to reintroduce antihypertensives carefully.  Better BP a little high than too low.       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 This is most commonly seen in DM2, however can also be seen with smoking and HTN. Bx also showed cholesterol emboli (likely due to endograft repair).  Cr on admission 4.25, worse than recent baseline. In setting of hypertensive emergency, pulmonary edema, LE edema. Bx results:           R lung adenocarcinoma   Received radiation in 2021.      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  No indication for dialysis today.  Likely dialysis tomorrow.    Suspect she will remain dialysis dependent moving forward given advanced CKD, biopsy findings as well as issues with volume.   Discussed with  daughter at bedside.        Luis Eduardo Lucas MD  [facility]  [phone]    Interval History     BP recovered.  Off pressors.    Now back on some antihypertensives.  /86.    Carvedilol 25 mg BID  Hydralazine 50 mg TID  Isordil held  Nifedipine XL held.        Physical Exam   Temp: 98  F (36.7  C) Temp src: Oral BP: (!) 172/86 Pulse: 73   Resp: (!) 8 SpO2: 95 % O2 Device: (S) Nasal cannula Oxygen Delivery: (S) 2 LPM  Vitals:    08/09/25 0653 08/10/25 0300 08/11/25 0353   Weight: 52 kg (114 lb 10.2 oz) 50.4 kg (111 lb 3.2 oz) 52.1 kg (114 lb 12.8 oz)     Vital Signs with Ranges  Temp:  [97  F (36.1  C)-98  F (36.7  C)] 98  F (36.7  C)  Pulse:  [] 73  Resp:  [8-30] 8  BP: ()/() 172/86  FiO2 (%):  [35 %-45 %] 35 %  SpO2:  [87 %-100 %] 95 %  I/O last 3 completed shifts:  In: 1611.41 [P.O.:330; I.V.:211.41; NG/GT:440]  Out: 540 [Urine:540]    GENERAL APPEARANCE: pleasant, NAD, a & o  HEENT:  Eyes/ears/nose/neck grossly normal  RESP: lungs cta b c good efforts, no crackles, rhonchi or wheezes  CV: RRR, nl S1/S2, no m/r/g   ABDOMEN: o/s/nt/nd, bs present  EXTREMITIES/SKIN: no rashes/lesions; no edema    Medications   Current Facility-Administered Medications   Medication Dose Route Frequency Provider Last Rate Last Admin    dextrose 10% infusion   Intravenous Continuous PRN Mariano Tran MD        No lozenges or gum should be given while patient on BIPAP/AVAPS/AVAPS AE   Does not apply Continuous PRN Gilmer Swain, MD        norepinephrine (LEVOPHED) 4 mg in  mL infusion PREMIX  0.01-0.6 mcg/kg/min (Dosing Weight) Intravenous Continuous Lamin Phelps MD   Stopped at 08/10/25 8405    Patient may continue current oral  medications   Does not apply Continuous PRN Gilmer Swain MD         Current Facility-Administered Medications   Medication Dose Route Frequency Provider Last Rate Last Admin    - MEDICATION INSTRUCTIONS for Dialysis Patients -   Does not apply See Admin Instructions Mariano Tran MD        amiodarone (PACERONE) tablet 200 mg  200 mg Oral or Feeding Tube Daily Mariano Tran MD   200 mg at 08/11/25 0833    [Held by provider] apixaban ANTICOAGULANT (ELIQUIS) tablet 2.5 mg  2.5 mg Oral BID Mariano Tran MD   2.5 mg at 08/11/25 1050    B and C vitamin Complex with folic acid (NEPHRONEX) liquid 5 mL  5 mL Per Feeding Tube Daily Mariano Tran MD   5 mL at 08/11/25 0833    carvedilol (COREG) tablet 25 mg  25 mg Oral or Feeding Tube BID w/meals Mariano Tran MD   25 mg at 08/11/25 0926    clopidogrel (PLAVIX) tablet 75 mg  75 mg Oral or Feeding Tube Daily Mariano Tran MD   75 mg at 08/10/25 1800    famotidine (PEPCID) tablet 20 mg  20 mg Oral or Feeding Tube Q48H Virgilio Patel MD   20 mg at 08/10/25 0832    hydrALAZINE (APRESOLINE) tablet 50 mg  50 mg Oral or Feeding Tube TID Mariano Tran MD   50 mg at 08/11/25 0927    icosapent ethyl (VASCEPA) capsule 2 g  2 g Oral BID w/meals Virgilio Patel MD   2 g at 08/11/25 0832    [Held by provider] isosorbide dinitrate (ISORDIL) tablet 40 mg  40 mg Oral or Feeding Tube TID Virgilio Patel MD   40 mg at 08/10/25 0552    meclizine (ANTIVERT) tablet 25 mg  25 mg Oral or Feeding Tube Q6H Virgilio Patel MD   25 mg at 08/11/25 0548    megestrol (MEGACE) suspension 200 mg  200 mg Oral or Feeding Tube Daily Mariano Tran MD        [Held by provider] NIFEdipine ER OSMOTIC (PROCARDIA XL) 24 hr tablet 90 mg  90 mg Oral Daily Mariano Tran MD   90 mg at 08/10/25 0832    PARoxetine (PAXIL) tablet 40 mg  40 mg Oral or Feeding Tube Daily Virgilio Patel MD   40 mg at 08/11/25 0833    sennosides (SENOKOT) tablet 2 tablet  2 tablet Oral  BID Mariano Tran MD   2 tablet at 08/08/25 2243    sodium chloride (PF) 0.9% PF flush 3 mL  3 mL Intracatheter Q8H [facility] Gilmer Swain MD   3 mL at 08/11/25 0549       Data   BMP  Recent Labs   Lab 08/11/25  0758 08/11/25  0557 08/11/25  0348 08/10/25  2359 08/10/25  0755 08/10/25  0614 08/09/25  0830 08/09/25  0708 08/08/25 2003 08/08/25  1635 08/08/25  0834 08/08/25  0555   NA  --  131*  --   --   --  133*  --  135  --   --   --  133*   POTASSIUM  --  3.6  --   --   --  3.6  3.6  --  3.6  --  3.7  --  3.3*   CHLORIDE  --  93*  --   --   --  94*  --  96*  --   --   --  94*   BRUCE  --  9.1  --   --   --  8.5*  --  8.7*  --   --   --  8.6*   CO2  --  26  --   --   --  26  --  25  --   --   --  26   BUN  --  43.0*  --   --   --  24.9*  --  40.1*  --   --   --  24.3*   CR  --  2.56*  --   --   --  2.05*  --  3.03*  --   --   --  2.36*   * 120* 111* 115*   < > 109*   < > 121*   < >  --    < > 133*    < > = values in this interval not displayed.     [id number](phos:4)  CBC)  Recent Labs   Lab 08/11/25  0557 08/10/25  0614 08/09/25  0708 08/08/25  0555   WBC 9.4 8.0 9.0 9.5   HGB 8.8* 9.0* 8.8* 9.1*   HCT 26.2* 27.6* 27.0* 27.7*   * 103* 103* 102*    276 272 299             Attestation:   I have reviewed today's relevant vital signs, notes, medications, labs and imaging.

## 2025-08-11 NOTE — PROGRESS NOTES
[facility]    Medicine Progress Note - Hospitalist Service    Date of Admission:  7/28/2025    Assessment & Plan       75-year-old female with history of f CAD, CKD stage IV, hypertension, hyperlipidemia, intra-abdominal aortic aneurysm with prior stenting, chronic vertigo, recent hospitalization for fall with pelvic fracture who presents the ED for shortness of breath.      Had elevated D-dimer but VQ scan negative for PE, CT not done due to CKD.  proBNP more than 25,000.  Chest x-ray showed bilateral perihilar interstitial/airspace opacities, left greater than right, are nonspecific for postradiation change versus pneumonia. A small nodule in the peripheral right upper lobe measures 9 mm.     Found to be in hypertensive emergency, admitted to ICU with BiPAP and nitroglycerin drip.  She was weaned off nitroglycerin drip and BiPAP but has had repeated episodes of rising blood pressure and shortness of breath needing her to go back on BiPAP intermittently.  She was transferred to floor on 7/31 but came back to ICU on 8/2 after rapid response for flash pulmonary edema and hypertensive emergency.    On the morning of 8/14, patient went into A-fib with initial rates of 130s and then decreased to 40s without intervention.  She converted back to sinus rhythm and about half an hour.    Acute hypoxic respiratory failure: Multifactorial-initially BiPAP dependent, improved  Diastolic CHF exacerbation,Recurrent flash pulmonary edema associated with episodes of high blood pressure-improved with initiation of hemodialysis  Hypertensive emergency-improved, required multiple medications including nitroglycerin drip  Chronic hypertension on multiple medications  End-stage renal disease: Initiated hemodialysis this admission  - Echo with EF of 55 to 60%.  Unclear what is causing recurrent sudden episodes of elevated blood pressure and flash pulmonary edema.  She has history of renal artery stenting and on  renal arterial ultrasound done on 7/31 there was no sonographic evidence of renal artery stenosis.  Pending metanephrines.    Hypertension  - Increased carvedilol to 25 mg twice daily, hydralazine 200 mg 3 times daily and Isordil increased to 40 mg 3 times daily.  Continued clonidine patch 0.3 mg weekly and nifedipine 90 mg daily.  Also started on the Lasix 40 mg IV twice daily.    Blood pressure has been variable ranging from hypotensive range requiring pressors to hypertensive range requiring IV nitroglycerin and hydralazine.  This was discussed with nephrology regarding further review of her medications and adjust as appropriate including hemodialysis plans.  Currently her blood pressure is elevated and she is off Levophed.  I will resume only Coreg and oral hydralazine.  Will continue to hold isosorbide dinitrate and nifedipine.  Of note there is concern about secondary hypertension but several tests  been unremarkable    Infectious disease  -Was empirically started on ceftriaxone on presentation.  Patient had no fever or leukocytosis.  Mildly elevated procalcitonin of 0.67 which is difficult to interpret in ROMI/CKD and improved to 0.49 on recheck.  Finished 5-day course of ceftriaxone.      Pulmonary/renal  -Patient was initially BiPAP dependent until she was started on dialysis which significantly improved her fluid balance and respiratory status.  She was able to wean off BiPAP at rest.  She has been on few liters of supplemental oxygen but intermittently requires BiPAP therapy due to poor respiratory reserve.  She has bilateral moderate pleural effusion and I consulted interventional radiology to assist with bedside thoracentesis today.  She remained very weak and frail.  --morning of Aug 10, she developed hypotension , was given 25 gm 5% albumin and started on levophed. BP med held. Her BP improved.  Currently she is off Levophed.  In fact she is hypertensive and started on blood pressure as above.  I spoke  with Dr. Lucas of nephrology about dialysis plan.  No dialysis planned today.  Further dialysis plan and removal of fluid deferred to nephrology.    Addendum  -- Dr. Amezcua of interventional radiology graciously came to bedside today  to do therapeutic thoracentesis and 1000 clear liquid was removed from the right pleural space as well as 800 cc removed from the left today.  Dr. Amezcua input is appreciated.  Will continue to monitor.      ROMI on CKD stage IV:  Metabolic acidosis:  - Previous baseline of 1.3-1.5 which worsened in May/June 2025 and new baseline appears to be 3 with nephrotic range proteinuria.  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  - Creatinine on admission was 4.25, improved but remained high  3.41 on August 4.    -- Nephrology was consulted and recommendation obtained to treat with IV Bumex and eventually decided to place dialysis catheter for initiating hemodialysis.  -- Patient was started on hemodialysis August 6.  -- Further dialysis plan deferred to nephrology team.        Atrial fibrillation: Paroxysmal atrial fibrillation  - On 8/4, patient had a self-limiting 30 minutes episode of atrial fibrillation initially with RVR to 130 and then bradycardia to 40.  - Cardiology was reconsulted, advised 150 mg amiodarone IV push, and the plan was no further amiodarone if patient remains sinus.    -- However, patient developed intermittent episodes of atrial fibrillation with RVR.  Cardiology recommended amiodarone drip and planning to transition to oral amiodarone 200 mg once a day for about a month.  Continue Coreg.    -- Patient has been intermittently in atrial fibrillation with controlled ventricular response.  She apparently has paroxysmal atrial fibrillation and has been started on amiodarone, resumed Coreg.  Anticoagulation was recommended by cardiology and Eliquis started today.  Plan to continue Plavix      Malnutrition:  Moderate nutrition in the context of acute on chronic medical illness.  - Patient was unable to take adequate oral intake due to dependence on BiPAP and currently due to decreased appetite.  -Registered dietitian is following and recommendation appreciated  - Feeding tube placed and tube feeding started she has been tolerating feeding tube.  However her oral intake still remains poor.  Appetite stimulation with Megace was started to see if that helps.        Constipation: On bowel regimen     Chronic medical conditions:  - AAA.  Endoleak status post recent endovascular aneurysm repair on 4/25.  Continue Plavix and statin.  - History of CVA.  Chronic infarct in the right basal ganglia noted on CT.  - Right lung adenocarcinoma.  Status post stereotactic body radiation therapy in 2021, follows up in [place].  His lung nodule on x-ray, can follow-up with her primary oncologist.  - Chronic vertigo.  Follows up with ENT in [place] and on as needed meclizine.  MRI brain on 5/22 was negative for acute pathology.  - Depression pleasant lady.  Continue paroxetine.  - Hypertriglyceridemia.  On Vascepa.      Generalized weakness  Physical deconditioning  --PT consulted to assist with discharge planning.  Of note the patient and her  live in [place] half the time and they are currently staying at extended stay.  Patient's daughter is supportive.   consulted.    Concern for cognitive dysfunction:  --Patient has trouble with memory but there is no official diagnosis of dementia.  Will try Valium as needed for anxiety.  May use Seroquel as needed for delirium.    Goal of care: Restorative and patient is full code.      Diet: Fluid restriction 1500 ML FLUID  Adult Formula Drip Feeding: Continuous Nepro with Carbsteady; Nasogastric tube; Goal Rate: 35; mL/hr; Confirm tube tip before starting. Begin TF at 15 mL/hr x 12 hours. If tolerating and labs are WNL, okay to advance by 15 mL/hr every 10 hours  unt...  Snacks/Supplements Adult: Nepro Oral Supplement; Between Meals  NPO for Medical/Clinical Reasons Except for: Meds, Ice Chips    DVT Prophylaxis: Pneumatic Compression Devices  Rao Catheter: Not present  Lines: PRESENT      CVC Double Lumen Right Subclavian Tunneled;Hemodialysis/CRRT-Site Assessment: WDL    Cardiac Monitoring: ACTIVE order. Indication: Acute decompensated heart failure (48 hours)  Code Status: Full Code      Clinically Significant Risk Factors         # Hyponatremia: Lowest Na = 131 mmol/L in last 2 days, will monitor as appropriate  # Hypochloremia: Lowest Cl = 93 mmol/L in last 2 days, will monitor as appropriate            # Hypertension: Noted on problem list             # Severe Malnutrition: based on nutrition assessment and treatment provided per dietitian's recommendations.    # Financial/Environmental Concerns: none         Social Drivers of Health    Depression: At risk (6/5/2025)    Received from [employer] [facility]    PHQ-2     Depression Risk: 3   Tobacco Use: High Risk (7/31/2025)    Received from [employer]    Patient History     Smoking Tobacco Use: Every Day     Smokeless Tobacco Use: Never     Passive Exposure: Current   Interpersonal Safety: Unknown (7/28/2025)    Interpersonal Safety     Do you feel physically and emotionally safe where you currently live?: Patient unable to answer     Within the past 12 months, have you been hit, slapped, kicked or otherwise physically hurt by someone?: Patient unable to answer     Within the past 12 months, have you been humiliated or emotionally abused in other ways by your partner or ex-partner?: Patient unable to answer          Disposition Plan     Anticipate discharge disposition: Unclear at the moment.  Likely TCU when she is more stable    Medically Ready for Discharge: Anticipated in 2-4 Days Pending progress.  Maria Alejandra has been very frail and unable to tolerate any activity desatting and in respiratory distress with  minimal exertion.  If thoracentesis helps her breathing and her blood pressure remained stable, she may be transferred out of ICU in the next 1 day or 2.      Mariano Tran MD  Hospitalist Service  [facility]  Securely message with [employer] (more info)  Text page via [facility] Paging/Directory   ______________________________________________________________________    Interval History     Patient is seen and examined by me today and medical record reviewed.Overnight events noted and care discussed with nursing staff.  Patient was seen several times today.  She has been having some respiratory difficulties requiring BiPAP but when I saw her she was on nasal supplemental oxygen.  She appears to be comfortable.  Denies any chest pain.  Denies any fever or chills.  Care plan discussed with bedside nurse as well as patient's spouse and daughter at bedside.  We reviewed CT imaging study showing bilateral pleural effusions that may cause her respiratory difficulty.  She is agreeable to thoracentesis.  I will contact interventional radiology to assist with thoracentesis.  There is no dialysis plan for today per nephrology.              Physical Exam   Vital Signs: Temp: 98.2  F (36.8  C) Temp src: Oral BP: (!) 180/90 Pulse: 73   Resp: (!) 35 SpO2: 95 % O2 Device: Nasal cannula Oxygen Delivery: 2 LPM  Weight: 114 lbs 12.8 oz    General Appearance: Alert awake and oriented x 3.  Off BiPAP.  Respiratory: No increased work of breathing at rest.  Diminished air entry, no crackles or wheezing.  Cardiovascular: S1-S2 normal  GI: Soft and nontender  Skin: No rash  Other: Trace edema.      Medical Decision Making       60 MINUTES SPENT BY ME on the date of service doing chart review, history, exam, documentation & further activities per the note.      Data     All laboratory and imaging data in the past 24 hours reviewed     Recent Labs   Lab 08/11/25  0557 08/10/25  0614 08/09/25  0708   WBC 9.4 8.0 9.0   HGB  "8.8* 9.0* 8.8*   HCT 26.2* 27.6* 27.0*   * 103* 103*    276 272     No results for input(s): \"CULT\" in the last 168 hours.  Recent Labs   Lab 08/11/25  1154 08/11/25  0758 08/11/25  0557 08/10/25  1208 08/10/25  1151 08/10/25  0755 08/10/25  0614 08/09/25  0830 08/09/25  0708   NA  --   --  131*  --   --   --  133*  --  135   POTASSIUM  --   --  3.6  --   --   --  3.6  3.6  --  3.6   CHLORIDE  --   --  93*  --   --   --  94*  --  96*   CO2  --   --  26  --   --   --  26  --  25   ANIONGAP  --   --  12  --   --   --  13  --  14   * 118* 120*   < >  --    < > 109*   < > 121*   BUN  --   --  43.0*  --   --   --  24.9*  --  40.1*   CR  --   --  2.56*  --   --   --  2.05*  --  3.03*   GFRESTIMATED  --   --  19*  --   --   --  25*  --  15*   BRUCE  --   --  9.1  --   --   --  8.5*  --  8.7*   MAG  --   --  1.8  --   --   --  1.7  --  1.9   PHOS  --   --  2.0*  --  2.6  --  1.4*  --  2.2*   PROTTOTAL  --   --  6.8  --   --   --   --   --   --     < > = values in this interval not displayed.       Recent Labs   Lab 08/11/25  1154 08/11/25  0758 08/11/25  0557 08/11/25  0348 08/10/25  2359   * 118* 120* 111* 115*           No results for input(s): \"INR\" in the last 168 hours.        No results for input(s): \"TROPONIN\", \"TROPI\", \"TROPR\" in the last 168 hours.    Invalid input(s): \"TROP\", \"TROPONINIES\"    Recent Results (from the past 48 hours)   XR Chest Port 1 View    Narrative    EXAM: XR CHEST PORT 1 VIEW  LOCATION: [facility]  DATE: 8/5/2025    INDICATION: Shortness of breath  COMPARISON: 7/31/2025      Impression    IMPRESSION: Extensive infiltrates and areas of consolidation with a somewhat patchy distribution are again noted. These are progressed in the left upper lobe and mildly progressed in the perihilar right lung. Pulmonary vascularity is obscured. Heart size   is unchanged. No significant effusions are identified on this semiupright portable film.   IR CVC Tunnel " Placement > 5 Yrs of Age    [facility] RADIOLOGY    EXAM: TUNNELED CENTRAL VENOUS CATHETER PLACEMENT    LOCATION: [facility]    CLINICAL HISTORY: The patient has a history of renal failure and requires dialysis.    PROCEDURES PERFORMED:  1. Ultrasound-guided puncture of jugular vein  2. Creation of subcutaneous tunnel in chest wall.  3. Placement of dialysis catheter through subcutaneous tunnel, into peel away sheath, and into right atrium     MODERATE SEDATION: 2 mg Versed and 50 mcg Fentanyl were administered intravenously for moderate sedation. Pulse oximetry, heart rate and blood pressure were continuously monitored by an independent trained observer. The physician spent 10 minutes of   face-to-face moderate sedation time with the patient.    ADDITIONAL MEDICATIONS: see EMR    CONTRAST: none    FLUOROSCOPIC TIME: 0.3 minutes  CUMULATIVE AIR KERMA/DOSE: 1 mGy    STERILE BARRIER TECHNIQUE: Maximal Sterile Barrier Technique Utilized: Cap AND mask AND sterile gown AND sterile gloves AND sterile full body drape AND hand hygiene AND skin preparation 2% chlorhexidine for cutaneous antisepsis (or acceptable alternative   antiseptics).   Sterile Ultrasound Technique Utilized ?Sterile gel AND sterile probe covers.    UNIVERSAL PROTOCOL: Standard universal protocol per facility guidelines was followed. See EMR for documentation.     TECHNIQUE:   Risks, benefits and alternatives were explained to the patient and written, informed consent was obtained. The patient was placed in the supine position on the angiography table. The neck and chest were prepped and draped in the usual fashion and   anesthetized with 1% lidocaine. Using ultrasound guidance, the internal jugular vein was accessed with a micropuncture system..  A 0.35 wire was advanced through the sheath and into the IVC. A subcutaneous tunnel was then created in the chest wall using   blunt dissection. The catheter was then attached to a  tunneling device and brought through the tunnel. The venostomy site was sequentially dilated. The catheter was then placed through a peel away sheath, and into the right atrium. The puncture site was   closed using surgical glue  The catheter was secured to the skin using a Prolene stitch. Each port was dwelled with heparin (1000 units per cc) solution, and the site was dressed in a sterile fashion.  The patient tolerated the procedure well without   immediate complications.    FINDINGS:   The right internal jugular vein is anechoic, compressible and patent by ultrasound, and ultrasound images obtained during access show the needle within the vein. Ultrasound images have been permanently captured for documentation.  Fluoroscopic images obtained following placement of the catheter, show that the catheter terminates near the cavoatrial junction..  The catheter has a smooth course in the chest wall. The catheter functions well and is available for immediate use.      Impression    IMPRESSION:  1. Ultrasound and fluoroscopic guided placement of tunneled 23 cm tip to cuff, right internal jugular dialysis catheter.          CPT Codes:

## 2025-08-11 NOTE — PROGRESS NOTES
Respiratory Therapy Note    Patient wore BiPAP for 1 hour today for SOB. Pt on a BiPAP of 10/5 @ 35% via the mask for an increase in WOB and SOB.  The skin in contact with the mask and straps looks good and intact. RT will continue to monitor and assess the pt's respiratory status and needs.  RT/RN huddle to discuss contraindications prior to placement? BILL Garcia, RT  6:06 PM August 11, 2025

## 2025-08-11 NOTE — PLAN OF CARE
ICU End of Shift Summary.  For vital signs and complete assessments, please see documentation flowsheets.     Major Shift Events:   A/Ox4, forgetful at times, afebrile. LS coarse. Anxious and dyspneic with exertion. Up to BSC with A1. Once back in bed, in need of bipap for increased WOB. Pt on bipap for 1 hr, able to return to NC at 2L. SR, HTN. MD unheld coreg and hydralizine, received scheduled BP meds. Thoracentesis scheduled. IR called for pt to go down for procedure around 1315. Pt unable to go down to IR for throacentesis d/t decompensation, placed back on bipap. While waiting for thoracentesis pt SBP elevated >180, gave PRN hydralizine. BP remained high while awaiting thoracentesis, observed pt restless and anxious. Gave pt PRN PO valium via keofeed. Thoracentesis performed, removing 1L from R, 800 mL from L. Pt reports being able to take deeper breath and feeling better. Pt resting after procedure. BP stabilizing. Keofeed restarted, diet restarted per MD.      Plan (Upcoming Events): PT and OT eval.   Discharge/Transfer Needs: TBD     Bedside Shift Report Completed : yes  Bedside Safety Check Completed: yes      Goal Outcome Evaluation:      Plan of Care Reviewed With: patient, spouse, child    Overall Patient Progress: no changeOverall Patient Progress: no change    Outcome Evaluation: see note      Problem: Adult Inpatient Plan of Care  Goal: Plan of Care Review  Description: The Plan of Care Review/Shift note should be completed every shift.  The Outcome Evaluation is a brief statement about your assessment that the patient is improving, declining, or no change.  This information will be displayed automatically on your shift  note.  8/11/2025 1809 by Codie Chandra, RN  Outcome: Progressing  Flowsheets (Taken 8/11/2025 1809)  Outcome Evaluation: see note  Plan of Care Reviewed With:   patient   spouse   child  Overall Patient Progress: no change  8/11/2025 1347 by Codie Chandra, RN  Outcome:  "Progressing  Flowsheets (Taken 8/11/2025 1347)  Outcome Evaluation: see note  Plan of Care Reviewed With:   patient   spouse   child  Overall Patient Progress: no change  Goal: Patient-Specific Goal (Individualized)  Description: You can add care plan individualizations to a care plan. Examples of Individualization might be:  \"Parent requests to be called daily at 9am for status\", \"I have a hard time hearing out of my right ear\", or \"Do not touch me to wake me up as it startles  me\".  8/11/2025 1809 by Codie Chandra RN  Outcome: Progressing  8/11/2025 1347 by Codie Chandra RN  Outcome: Progressing  Goal: Absence of Hospital-Acquired Illness or Injury  8/11/2025 1809 by Codie Chandra RN  Outcome: Progressing  8/11/2025 1347 by Codie Chandra RN  Outcome: Progressing  Intervention: Identify and Manage Fall Risk  Recent Flowsheet Documentation  Taken 8/11/2025 1230 by Codie Chandra RN  Safety Promotion/Fall Prevention:   activity supervised   clutter free environment maintained   increased rounding and observation   increase visualization of patient   lighting adjusted   nonskid shoes/slippers when out of bed   patient and family education   room near nurse's station   room organization consistent   safety round/check completed   assistive device/personal items within reach   supervised activity   treat reversible contributory factors   treat underlying cause  Taken 8/11/2025 0820 by Codie Chandra RN  Safety Promotion/Fall Prevention:   activity supervised   clutter free environment maintained   increased rounding and observation   increase visualization of patient   lighting adjusted   nonskid shoes/slippers when out of bed   patient and family education   room near nurse's station   room organization consistent   safety round/check completed   assistive device/personal items within reach   supervised activity   treat reversible contributory factors   treat underlying cause  Intervention: Prevent Skin Injury  Recent Flowsheet " Documentation  Taken 8/11/2025 1600 by Codie Chandra RN  Body Position:   left   turned   heels elevated   legs elevated   tilted   upper extremity elevated   weight shifting  Taken 8/11/2025 1230 by Codie Chandra RN  Body Position: position changed independently  Skin Protection:   adhesive use limited   incontinence pads utilized   transparent dressing maintained   tubing/devices free from skin contact  Taken 8/11/2025 1200 by Codie Chandra RN  Body Position:   right   turned   position changed independently   heels elevated   legs elevated  Taken 8/11/2025 1100 by Codie Chandra RN  Body Position:   position changed independently   supine   heels elevated   legs elevated   upper extremity elevated   weight shifting  Taken 8/11/2025 1000 by Codie Chandra RN  Body Position: (boosted up in bed)   left   turned   position changed independently   heels elevated   legs elevated   weight shifting   upper extremity elevated   tilted  Taken 8/11/2025 0900 by Codie Chandra RN  Body Position:   position changed independently   supine   heels elevated   legs elevated   upper extremity elevated   weight shifting  Taken 8/11/2025 0820 by Codie Chandra RN  Body Position: (Boosted up in bed)   right   turned   position changed independently   heels elevated   legs elevated  Skin Protection:   adhesive use limited   incontinence pads utilized   transparent dressing maintained   tubing/devices free from skin contact  Intervention: Prevent and Manage VTE (Venous Thromboembolism) Risk  Recent Flowsheet Documentation  Taken 8/11/2025 1230 by Codie Chandra RN  VTE Prevention/Management: (Heparin)   SCDs off (sequential compression devices)   patient refused intervention  Taken 8/11/2025 0820 by Codie Chandra RN  VTE Prevention/Management: (Heparin)   SCDs off (sequential compression devices)   patient refused intervention  Intervention: Prevent Infection  Recent Flowsheet Documentation  Taken 8/11/2025 1230 by Codie Chandra RN  Infection Prevention:    environmental surveillance performed   hand hygiene promoted   rest/sleep promoted   single patient room provided   equipment surfaces disinfected   personal protective equipment utilized  Taken 8/11/2025 0820 by Codie Chandra RN  Infection Prevention:   environmental surveillance performed   hand hygiene promoted   rest/sleep promoted   single patient room provided   equipment surfaces disinfected   personal protective equipment utilized  Goal: Optimal Comfort and Wellbeing  8/11/2025 1809 by Codie Chandra RN  Outcome: Progressing  8/11/2025 1347 by Codie Chandra RN  Outcome: Progressing  Intervention: Provide Person-Centered Care  Recent Flowsheet Documentation  Taken 8/11/2025 1230 by Codie Chandra RN  Trust Relationship/Rapport:   care explained   choices provided   emotional support provided   empathic listening provided   questions answered   questions encouraged   reassurance provided   thoughts/feelings acknowledged  Taken 8/11/2025 0820 by Codie Chandra RN  Trust Relationship/Rapport:   care explained   choices provided   emotional support provided   empathic listening provided   questions answered   questions encouraged   reassurance provided   thoughts/feelings acknowledged  Goal: Readiness for Transition of Care  8/11/2025 1809 by Codie Chandra RN  Outcome: Progressing  Flowsheets (Taken 8/11/2025 1809)  Anticipated Changes Related to Illness: inability to care for self  Transportation Anticipated: family or friend will provide  Concerns to be Addressed: discharge planning  Barriers to Discharge: Intermittent bipap use, keofeed in place. Increase in PO food intake. PT OT evals needed  8/11/2025 1347 by Codie Chandra RN  Outcome: Progressing  Flowsheets (Taken 8/11/2025 1347)  Anticipated Changes Related to Illness: inability to care for self  Transportation Anticipated: family or friend will provide  Concerns to be Addressed: discharge planning  Barriers to Discharge: Intermittent bipap use, keofeed in place. Pt needs  to increase PO intake of food. PT and OT evals needed  Intervention: Mutually Develop Transition Plan  Recent Flowsheet Documentation  Taken 8/11/2025 1809 by Codie Chandra RN  Anticipated Changes Related to Illness: inability to care for self  Transportation Anticipated: family or friend will provide  Concerns to be Addressed: discharge planning  Taken 8/11/2025 1347 by Codie Chandra RN  Anticipated Changes Related to Illness: inability to care for self  Transportation Anticipated: family or friend will provide  Concerns to be Addressed: discharge planning     Problem: Delirium  Goal: Optimal Coping  8/11/2025 1809 by Codie Chandra RN  Outcome: Progressing  8/11/2025 1347 by Codie Chandra RN  Outcome: Progressing  Intervention: Optimize Psychosocial Adjustment to Delirium  Recent Flowsheet Documentation  Taken 8/11/2025 1230 by Codie Chandra RN  Supportive Measures:   active listening utilized   decision-making supported   goal-setting facilitated   positive reinforcement provided   self-care encouraged   self-reflection promoted   verbalization of feelings encouraged   relaxation techniques promoted  Taken 8/11/2025 0820 by Codie Chandra RN  Supportive Measures:   active listening utilized   decision-making supported   goal-setting facilitated   positive reinforcement provided   self-care encouraged   self-reflection promoted   verbalization of feelings encouraged   relaxation techniques promoted  Goal: Improved Behavioral Control  8/11/2025 1809 by Codie Chandra RN  Outcome: Progressing  8/11/2025 1347 by Codie Chandra RN  Outcome: Progressing  Intervention: Prevent and Manage Agitation  Recent Flowsheet Documentation  Taken 8/11/2025 1230 by Codie Chandra RN  Environment Familiarity/Consistency: daily routine followed  Taken 8/11/2025 0820 by Codie Chandra RN  Environment Familiarity/Consistency: daily routine followed  Intervention: Minimize Safety Risk  Recent Flowsheet Documentation  Taken 8/11/2025 1230 by Codie Chandra  RN  Enhanced Safety Measures:   room near unit station   assistive devices when indicated   monitor patients coagulation values   review medications for side effects with activity  Trust Relationship/Rapport:   care explained   choices provided   emotional support provided   empathic listening provided   questions answered   questions encouraged   reassurance provided   thoughts/feelings acknowledged  Taken 8/11/2025 0820 by Codie Chandra RN  Enhanced Safety Measures:   room near unit station   assistive devices when indicated   monitor patients coagulation values   review medications for side effects with activity  Trust Relationship/Rapport:   care explained   choices provided   emotional support provided   empathic listening provided   questions answered   questions encouraged   reassurance provided   thoughts/feelings acknowledged  Goal: Improved Attention and Thought Clarity  8/11/2025 1809 by Codie Chandra RN  Outcome: Progressing  8/11/2025 1347 by Codie Chandra RN  Outcome: Progressing  Intervention: Maximize Cognitive Function  Recent Flowsheet Documentation  Taken 8/11/2025 1230 by Codie Chandra RN  Sensory Stimulation Regulation:   care clustered   quiet environment promoted  Reorientation Measures:   calendar in view   reorientation provided  Taken 8/11/2025 0820 by Codie Chandra RN  Sensory Stimulation Regulation:   care clustered   quiet environment promoted  Reorientation Measures:   calendar in view   reorientation provided  Goal: Improved Sleep  8/11/2025 1809 by Codie Chandra RN  Outcome: Progressing  8/11/2025 1347 by Codie Chandra RN  Outcome: Progressing     Problem: Skin Injury Risk Increased  Goal: Skin Health and Integrity  8/11/2025 1809 by Codie Chandra RN  Outcome: Progressing  8/11/2025 1347 by Codie Chandra RN  Outcome: Progressing  Intervention: Plan: Nurse Driven Intervention: Positioning  Recent Flowsheet Documentation  Taken 8/11/2025 1230 by Codie Chandra RN  Plan: Positioning Interventions:    REPOSITION Left/Right (No supine) q2h   HOB 30 degrees or less   OFF-LOAD HEELS with pillows  Taken 8/11/2025 0820 by Codie Chandra RN  Plan: Positioning Interventions:   REPOSITION Left/Right (No supine) q2h   HOB 30 degrees or less   OFF-LOAD HEELS with pillows  Intervention: Plan: Nurse Driven Intervention: Moisture Management  Recent Flowsheet Documentation  Taken 8/11/2025 1600 by Codie Chandra RN  Bathing/Skin Care:   bath, complete   dressed/undressed   incontinence care   linen changed  Taken 8/11/2025 1230 by Codie Chandra RN  Moisture Interventions:   No brief in bed   Incontinence pad   Urinary collection device   Low Air Loss pump  Taken 8/11/2025 0900 by Codie Chandra RN  Bathing/Skin Care:   incontinence care   linen changed   dressed/undressed  Taken 8/11/2025 0820 by Codie Chandra RN  Moisture Interventions:   No brief in bed   Incontinence pad   Urinary collection device   Low Air Loss pump  Intervention: Plan: Nurse Driven Intervention: Friction and Shear  Recent Flowsheet Documentation  Taken 8/11/2025 1230 by Codie Chandra RN  Friction/Shear Interventions:   HOB 30 degrees or less   Pad bony prominence (elbow pads, heel pads, ear protectors)  Taken 8/11/2025 0820 by Codie Chandra RN  Friction/Shear Interventions:   HOB 30 degrees or less   Pad bony prominence (elbow pads, heel pads, ear protectors)  Intervention: Optimize Skin Protection  Recent Flowsheet Documentation  Taken 8/11/2025 1600 by Codie Chandra RN  Activity Management:   activity adjusted per tolerance   activity encouraged  Head of Bed (HOB) Positioning: HOB at 30 degrees  Taken 8/11/2025 1230 by Codie Chandra RN  Skin Protection:   adhesive use limited   incontinence pads utilized   transparent dressing maintained   tubing/devices free from skin contact  Activity Management:   activity adjusted per tolerance   activity encouraged   up to bedside commode  Head of Bed (HOB) Positioning: HOB at 30 degrees  Taken 8/11/2025 1200 by Codie Chandra RN  Head  of Bed (HOB) Positioning:   HOB at 30 degrees   HOB at 20-30 degrees  Taken 8/11/2025 1100 by Codie Chandra RN  Head of Bed (HOB) Positioning: HOB at 30 degrees  Taken 8/11/2025 1000 by Codie Chandra RN  Head of Bed (HOB) Positioning: HOB at 30-45 degrees  Taken 8/11/2025 0900 by Codie Chandra RN  Activity Management:   activity adjusted per tolerance   activity encouraged   up to bedside commode   back to bed  Head of Bed (HOB) Positioning: HOB at 30 degrees  Taken 8/11/2025 0820 by Codie Chandra RN  Skin Protection:   adhesive use limited   incontinence pads utilized   transparent dressing maintained   tubing/devices free from skin contact  Activity Management:   activity adjusted per tolerance   activity encouraged  Head of Bed (HOB) Positioning: HOB at 30 degrees  Intervention: Promote and Optimize Oral Intake  Recent Flowsheet Documentation  Taken 8/11/2025 1230 by Codie Chandra RN  Oral Nutrition Promotion: rest periods promoted  Taken 8/11/2025 0820 by Codie Chandra RN  Oral Nutrition Promotion: rest periods promoted     Problem: Breathing Pattern Ineffective  Goal: Effective Breathing Pattern  8/11/2025 1809 by Codie Chandra RN  Outcome: Progressing  8/11/2025 1347 by Codie Chandra RN  Outcome: Progressing  Intervention: Promote Improved Breathing Pattern  Recent Flowsheet Documentation  Taken 8/11/2025 1600 by Codie Chandra RN  Head of Bed (HOB) Positioning: HOB at 30 degrees  Taken 8/11/2025 1230 by Codie Chandra RN  Breathing Techniques/Airway Clearance: deep/controlled cough encouraged  Supportive Measures:   active listening utilized   decision-making supported   goal-setting facilitated   positive reinforcement provided   self-care encouraged   self-reflection promoted   verbalization of feelings encouraged   relaxation techniques promoted  Airway/Ventilation Management:   airway patency maintained   position adjusted   pulmonary hygiene promoted  Head of Bed (HOB) Positioning: HOB at 30 degrees  Taken 8/11/2025 1200 by  Codie Chandra RN  Head of Bed (HOB) Positioning:   HOB at 30 degrees   HOB at 20-30 degrees  Taken 8/11/2025 1100 by Codie Chandra RN  Head of Bed (HOB) Positioning: HOB at 30 degrees  Taken 8/11/2025 1000 by Codie Chandra RN  Head of Bed (HOB) Positioning: HOB at 30-45 degrees  Taken 8/11/2025 0900 by Codie Chandra RN  Head of Bed (HOB) Positioning: HOB at 30 degrees  Taken 8/11/2025 0820 by Codie Chandra RN  Breathing Techniques/Airway Clearance: deep/controlled cough encouraged  Supportive Measures:   active listening utilized   decision-making supported   goal-setting facilitated   positive reinforcement provided   self-care encouraged   self-reflection promoted   verbalization of feelings encouraged   relaxation techniques promoted  Airway/Ventilation Management:   airway patency maintained   position adjusted   pulmonary hygiene promoted  Head of Bed (HOB) Positioning: HOB at 30 degrees     Problem: Comorbidity Management  Goal: Maintenance of COPD Symptom Control  8/11/2025 1809 by Codie Chandra RN  Outcome: Progressing  8/11/2025 1347 by Codie Chandra RN  Outcome: Progressing  Intervention: Maintain COPD Symptom Control  Recent Flowsheet Documentation  Taken 8/11/2025 1230 by Codie hCandra RN  Breathing Techniques/Airway Clearance: deep/controlled cough encouraged  Medication Review/Management:   medications reviewed   dosing adjusted   high-risk medications identified  Taken 8/11/2025 0820 by Codie Chandra RN  Breathing Techniques/Airway Clearance: deep/controlled cough encouraged  Medication Review/Management:   medications reviewed   dosing adjusted   high-risk medications identified  Goal: Blood Pressure in Desired Range  8/11/2025 1809 by Codie Chandra RN  Outcome: Progressing  8/11/2025 1347 by Codie Chandra RN  Outcome: Progressing  Intervention: Maintain Blood Pressure Management  Recent Flowsheet Documentation  Taken 8/11/2025 1230 by Codie Chandra RN  Medication Review/Management:   medications reviewed   dosing  adjusted   high-risk medications identified  Taken 8/11/2025 0820 by Codie Chandra RN  Medication Review/Management:   medications reviewed   dosing adjusted   high-risk medications identified     Problem: Hypertension Acute  Goal: Blood Pressure Within Desired Range  8/11/2025 1809 by Codie Chandra RN  Outcome: Progressing  8/11/2025 1347 by Codie Chandra RN  Outcome: Progressing  Intervention: Normalize Blood Pressure  Recent Flowsheet Documentation  Taken 8/11/2025 1230 by Codie Chandra RN  Sensory Stimulation Regulation:   care clustered   quiet environment promoted  Medication Review/Management:   medications reviewed   dosing adjusted   high-risk medications identified  Taken 8/11/2025 0820 by Codie Chandra RN  Sensory Stimulation Regulation:   care clustered   quiet environment promoted  Medication Review/Management:   medications reviewed   dosing adjusted   high-risk medications identified

## 2025-08-11 NOTE — PLAN OF CARE
Goal Outcome Evaluation:      Plan of Care Reviewed With: patient, child    Overall Patient Progress: no changeOverall Patient Progress: no change    Outcome Evaluation: see note    ICU End of Shift Summary.  For vital signs and complete assessments, please see documentation flowsheets.      Pertinent assessments: A&Ox4 with intermittent forgetfulness, easily reoriented, PERRLA, denies pain; dry oral mucosa; O2 nasal cannula, lung sounds clear/diminished, intermittent/dry cough, denies chest pain/sob; NSR with episode of a. Fibb with unsustained RVR, cap refill <3, pulses 2+ in all extremities, htn; normoactive bowel sounds, soft/flat abdomen; improving but still low urine output with external catheter; scattered bruising, blanchable redness on heels and coccyx.    Major Shift Events: patient went into a. Fibb again around 2130 with nonsustained RVR, EKG obtained and provider notified. Continue on current medications. Patient converted herself back to NSR around 0030. Phosphorus replaced.     Plan (Upcoming Events): dialysis today, reassess blood pressure medications, ? Longterm anticoagulation. Multiple episodes of a. Fibb during this hospitalization despite amiodarone. Patient asymptomatic with these.     Discharge/Transfer Needs: tbd     Bedside Shift Report Completed : yes    Bedside Safety Check Completed: yes      Problem: Adult Inpatient Plan of Care  Goal: Plan of Care Review  Description: The Plan of Care Review/Shift note should be completed every shift.  The Outcome Evaluation is a brief statement about your assessment that the patient is improving, declining, or no change.  This information will be displayed automatically on your shift  note.  8/11/2025 0054 by Lyssa Parker, RN  Outcome: Progressing  Flowsheets (Taken 8/11/2025 0054)  Outcome Evaluation: see note  Plan of Care Reviewed With:   patient   child  Overall Patient Progress: no change  8/11/2025 0053 by Lyssa Parker, RN  Outcome:  "Progressing  Flowsheets (Taken 8/11/2025 0053)  Outcome Evaluation: see note  Overall Patient Progress: no change  Goal: Patient-Specific Goal (Individualized)  Description: You can add care plan individualizations to a care plan. Examples of Individualization might be:  \"Parent requests to be called daily at 9am for status\", \"I have a hard time hearing out of my right ear\", or \"Do not touch me to wake me up as it startles  me\".  8/11/2025 0054 by Lyssa Parker, RN  Outcome: Progressing  8/11/2025 0053 by Lyssa Parker, RN  Outcome: Progressing  Goal: Absence of Hospital-Acquired Illness or Injury  8/11/2025 0054 by Lyssa Parker, RN  Outcome: Progressing  8/11/2025 0053 by Lyssa Parker, RN  Outcome: Progressing  Intervention: Identify and Manage Fall Risk  Recent Flowsheet Documentation  Taken 8/10/2025 2330 by Lyssa Parker, RN  Safety Promotion/Fall Prevention:   activity supervised   clutter free environment maintained   increased rounding and observation   increase visualization of patient   lighting adjusted   nonskid shoes/slippers when out of bed   patient and family education   room near nurse's station   room organization consistent   safety round/check completed  Taken 8/10/2025 1945 by Lyssa Parker, RN  Safety Promotion/Fall Prevention:   activity supervised   clutter free environment maintained   increased rounding and observation   increase visualization of patient   lighting adjusted   nonskid shoes/slippers when out of bed   patient and family education   room near nurse's station   room organization consistent   safety round/check completed  Intervention: Prevent Skin Injury  Recent Flowsheet Documentation  Taken 8/10/2025 2330 by Lyssa Parker, RN  Body Position: position changed independently  Skin Protection:   adhesive use limited   incontinence pads utilized   transparent dressing maintained   tubing/devices free from skin contact  Taken 8/10/2025 1945 by " Lyssa Parker RN  Body Position: position changed independently  Skin Protection:   adhesive use limited   incontinence pads utilized   transparent dressing maintained   tubing/devices free from skin contact  Intervention: Prevent and Manage VTE (Venous Thromboembolism) Risk  Recent Flowsheet Documentation  Taken 8/10/2025 2330 by Lyssa Parker RN  VTE Prevention/Management: (Heparin)   SCDs off (sequential compression devices)   patient refused intervention  Taken 8/10/2025 1945 by Lyssa Parker RN  VTE Prevention/Management: (Heparin)   SCDs off (sequential compression devices)   patient refused intervention  Intervention: Prevent Infection  Recent Flowsheet Documentation  Taken 8/10/2025 2330 by Lyssa Parker RN  Infection Prevention:   environmental surveillance performed   hand hygiene promoted   rest/sleep promoted   single patient room provided  Taken 8/10/2025 1945 by Lyssa Parker RN  Infection Prevention:   environmental surveillance performed   hand hygiene promoted   rest/sleep promoted   single patient room provided  Goal: Optimal Comfort and Wellbeing  8/11/2025 0054 by Lyssa Parker RN  Outcome: Progressing  8/11/2025 0053 by Lyssa Parker RN  Outcome: Progressing  Intervention: Provide Person-Centered Care  Recent Flowsheet Documentation  Taken 8/10/2025 2330 by Lyssa Parker RN  Trust Relationship/Rapport:   care explained   choices provided   emotional support provided   empathic listening provided   questions answered   questions encouraged   reassurance provided   thoughts/feelings acknowledged  Taken 8/10/2025 1945 by Lyssa Parker RN  Trust Relationship/Rapport:   care explained   choices provided   emotional support provided   empathic listening provided   questions answered   questions encouraged   reassurance provided   thoughts/feelings acknowledged  Goal: Readiness for Transition of Care  8/11/2025 0054 by Lyssa Parker  RN  Outcome: Progressing  8/11/2025 0053 by Lyssa Parker RN  Outcome: Progressing     Problem: Delirium  Goal: Optimal Coping  8/11/2025 0054 by Lyssa Parker RN  Outcome: Progressing  8/11/2025 0053 by Lyssa Parker RN  Outcome: Progressing  Intervention: Optimize Psychosocial Adjustment to Delirium  Recent Flowsheet Documentation  Taken 8/10/2025 2330 by Lyssa Parker RN  Supportive Measures:   active listening utilized   decision-making supported   goal-setting facilitated   positive reinforcement provided   self-care encouraged   self-reflection promoted   verbalization of feelings encouraged  Taken 8/10/2025 1945 by Lyssa Parker, RN  Supportive Measures:   active listening utilized   decision-making supported   goal-setting facilitated   positive reinforcement provided   self-care encouraged   self-reflection promoted   verbalization of feelings encouraged  Goal: Improved Behavioral Control  8/11/2025 0054 by Lyssa Parker RN  Outcome: Progressing  8/11/2025 0053 by Lyssa Parker RN  Outcome: Progressing  Intervention: Prevent and Manage Agitation  Recent Flowsheet Documentation  Taken 8/10/2025 2330 by Lyssa Parker RN  Environment Familiarity/Consistency: daily routine followed  Taken 8/10/2025 1945 by Lyssa Parker RN  Environment Familiarity/Consistency: daily routine followed  Intervention: Minimize Safety Risk  Recent Flowsheet Documentation  Taken 8/10/2025 2330 by Lyssa Parker RN  Enhanced Safety Measures: room near unit station  Trust Relationship/Rapport:   care explained   choices provided   emotional support provided   empathic listening provided   questions answered   questions encouraged   reassurance provided   thoughts/feelings acknowledged  Taken 8/10/2025 1945 by Lyssa Parker RN  Enhanced Safety Measures: room near unit station  Trust Relationship/Rapport:   care explained   choices provided   emotional support provided    empathic listening provided   questions answered   questions encouraged   reassurance provided   thoughts/feelings acknowledged  Goal: Improved Attention and Thought Clarity  8/11/2025 0054 by Lyssa Parker RN  Outcome: Progressing  8/11/2025 0053 by Lyssa Parker RN  Outcome: Progressing  Intervention: Maximize Cognitive Function  Recent Flowsheet Documentation  Taken 8/10/2025 2330 by Lyssa Parker, RN  Sensory Stimulation Regulation:   care clustered   quiet environment promoted  Reorientation Measures:   calendar in view   reorientation provided  Taken 8/10/2025 1945 by Lyssa Parker, RN  Sensory Stimulation Regulation:   care clustered   quiet environment promoted  Reorientation Measures:   calendar in view   reorientation provided  Goal: Improved Sleep  8/11/2025 0054 by Lyssa Parker RN  Outcome: Progressing  8/11/2025 0053 by Lyssa Parker RN  Outcome: Progressing     Problem: Skin Injury Risk Increased  Goal: Skin Health and Integrity  8/11/2025 0054 by Lyssa Parker RN  Outcome: Progressing  8/11/2025 0053 by Lyssa Parker RN  Outcome: Progressing  Intervention: Plan: Nurse Driven Intervention: Positioning  Recent Flowsheet Documentation  Taken 8/10/2025 2330 by Lyssa Parker, RN  Plan: Positioning Interventions:   REPOSITION Left/Right (No supine) q2h   HOB 30 degrees or less   OFF-LOAD HEELS with pillows  Taken 8/10/2025 1945 by Lyssa Parker, RN  Plan: Positioning Interventions:   REPOSITION Left/Right (No supine) q2h   HOB 30 degrees or less   OFF-LOAD HEELS with pillows  Intervention: Plan: Nurse Driven Intervention: Moisture Management  Recent Flowsheet Documentation  Taken 8/11/2025 0000 by Lyssa Parker, RN  Moisture Interventions:   No brief in bed   Incontinence pad   Urinary collection device   Low Air Loss pump  Taken 8/10/2025 2330 by Lyssa Parker RN  Moisture Interventions:   No brief in bed   Incontinence pad   Urinary  collection device   Low Air Loss pump  Bathing/Skin Care:   bath, complete   dressed/undressed   electrode patches/site rotation   incontinence care   linen changed  Taken 8/10/2025 1945 by Lyssa Parker RN  Moisture Interventions:   No brief in bed   Incontinence pad   Urinary collection device   Low Air Loss pump  Intervention: Plan: Nurse Driven Intervention: Friction and Shear  Recent Flowsheet Documentation  Taken 8/11/2025 0000 by Lyssa Parker RN  Friction/Shear Interventions:   HOB 30 degrees or less   Pad bony prominence (elbow pads, heel pads, ear protectors)  Taken 8/10/2025 2330 by Lyssa Parker RN  Friction/Shear Interventions:   HOB 30 degrees or less   Pad bony prominence (elbow pads, heel pads, ear protectors)  Taken 8/10/2025 1945 by Lyssa Parker RN  Friction/Shear Interventions:   HOB 30 degrees or less   Pad bony prominence (elbow pads, heel pads, ear protectors)  Intervention: Optimize Skin Protection  Recent Flowsheet Documentation  Taken 8/10/2025 2330 by Lyssa Parker RN  Skin Protection:   adhesive use limited   incontinence pads utilized   transparent dressing maintained   tubing/devices free from skin contact  Activity Management: activity adjusted per tolerance  Head of Bed (HOB) Positioning: HOB at 20-30 degrees  Taken 8/10/2025 1945 by Lyssa Parker RN  Skin Protection:   adhesive use limited   incontinence pads utilized   transparent dressing maintained   tubing/devices free from skin contact  Activity Management: activity adjusted per tolerance  Head of Bed (HOB) Positioning: HOB at 20-30 degrees  Intervention: Promote and Optimize Oral Intake  Recent Flowsheet Documentation  Taken 8/10/2025 2330 by Lyssa Parker RN  Oral Nutrition Promotion: rest periods promoted  Taken 8/10/2025 1945 by Lyssa Parker RN  Oral Nutrition Promotion: rest periods promoted     Problem: Breathing Pattern Ineffective  Goal: Effective Breathing  Pattern  8/11/2025 0054 by Lyssa Parker RN  Outcome: Progressing  8/11/2025 0053 by Lyssa Parker RN  Outcome: Progressing  Intervention: Promote Improved Breathing Pattern  Recent Flowsheet Documentation  Taken 8/10/2025 2330 by Lyssa Parker RN  Breathing Techniques/Airway Clearance: deep/controlled cough encouraged  Supportive Measures:   active listening utilized   decision-making supported   goal-setting facilitated   positive reinforcement provided   self-care encouraged   self-reflection promoted   verbalization of feelings encouraged  Airway/Ventilation Management:   airway patency maintained   position adjusted   pulmonary hygiene promoted  Head of Bed (HOB) Positioning: HOB at 20-30 degrees  Taken 8/10/2025 1945 by Lyssa Parker RN  Breathing Techniques/Airway Clearance: deep/controlled cough encouraged  Supportive Measures:   active listening utilized   decision-making supported   goal-setting facilitated   positive reinforcement provided   self-care encouraged   self-reflection promoted   verbalization of feelings encouraged  Airway/Ventilation Management:   airway patency maintained   position adjusted   pulmonary hygiene promoted  Head of Bed (HOB) Positioning: HOB at 20-30 degrees     Problem: Comorbidity Management  Goal: Maintenance of COPD Symptom Control  8/11/2025 0054 by Lyssa Parker RN  Outcome: Progressing  8/11/2025 0053 by Lyssa Parker RN  Outcome: Progressing  Intervention: Maintain COPD Symptom Control  Recent Flowsheet Documentation  Taken 8/10/2025 2330 by Lyssa Parker RN  Breathing Techniques/Airway Clearance: deep/controlled cough encouraged  Medication Review/Management: medications reviewed  Taken 8/10/2025 1945 by Lyssa Parker RN  Breathing Techniques/Airway Clearance: deep/controlled cough encouraged  Medication Review/Management: medications reviewed  Goal: Blood Pressure in Desired Range  8/11/2025 0054 by Lyssa Parker,  RN  Outcome: Progressing  8/11/2025 0053 by Lyssa Parker RN  Outcome: Progressing  Intervention: Maintain Blood Pressure Management  Recent Flowsheet Documentation  Taken 8/10/2025 2330 by Lyssa Parker RN  Medication Review/Management: medications reviewed  Taken 8/10/2025 1945 by Lyssa Parker, RN  Medication Review/Management: medications reviewed     Problem: Hypertension Acute  Goal: Blood Pressure Within Desired Range  8/11/2025 0054 by Lyssa Parker, RN  Outcome: Progressing  8/11/2025 0053 by Lyssa Parker, RN  Outcome: Progressing  Intervention: Normalize Blood Pressure  Recent Flowsheet Documentation  Taken 8/10/2025 2330 by Lyssa Parker, RN  Sensory Stimulation Regulation:   care clustered   quiet environment promoted  Medication Review/Management: medications reviewed  Taken 8/10/2025 1945 by Lyssa Parker, RN  Sensory Stimulation Regulation:   care clustered   quiet environment promoted  Medication Review/Management: medications reviewed

## 2025-08-12 ENCOUNTER — APPOINTMENT (OUTPATIENT)
Dept: PHYSICAL THERAPY | Facility: CLINIC | Age: 75
DRG: 291 | End: 2025-08-12
Attending: INTERNAL MEDICINE
Payer: MEDICARE

## 2025-08-12 LAB
ALBUMIN SERPL BCG-MCNC: 2.8 G/DL (ref 3.5–5.2)
ANION GAP SERPL CALCULATED.3IONS-SCNC: 14 MMOL/L (ref 7–15)
BUN SERPL-MCNC: 51.4 MG/DL (ref 8–23)
CALCIUM SERPL-MCNC: 9 MG/DL (ref 8.8–10.4)
CHLORIDE SERPL-SCNC: 95 MMOL/L (ref 98–107)
CREAT SERPL-MCNC: 2.62 MG/DL (ref 0.51–0.95)
EGFRCR SERPLBLD CKD-EPI 2021: 18 ML/MIN/1.73M2
ERYTHROCYTE [DISTWIDTH] IN BLOOD BY AUTOMATED COUNT: 14.6 % (ref 10–15)
GLUCOSE BLDC GLUCOMTR-MCNC: 103 MG/DL (ref 70–99)
GLUCOSE BLDC GLUCOMTR-MCNC: 114 MG/DL (ref 70–99)
GLUCOSE BLDC GLUCOMTR-MCNC: 118 MG/DL (ref 70–99)
GLUCOSE BLDC GLUCOMTR-MCNC: 119 MG/DL (ref 70–99)
GLUCOSE BLDC GLUCOMTR-MCNC: 126 MG/DL (ref 70–99)
GLUCOSE BLDC GLUCOMTR-MCNC: 134 MG/DL (ref 70–99)
GLUCOSE SERPL-MCNC: 117 MG/DL (ref 70–99)
HCO3 SERPL-SCNC: 26 MMOL/L (ref 22–29)
HCT VFR BLD AUTO: 27 % (ref 35–47)
HGB BLD-MCNC: 9.1 G/DL (ref 11.7–15.7)
MAGNESIUM SERPL-MCNC: 1.7 MG/DL (ref 1.7–2.3)
MCH RBC QN AUTO: 33.7 PG (ref 26.5–33)
MCHC RBC AUTO-ENTMCNC: 33.7 G/DL (ref 31.5–36.5)
MCV RBC AUTO: 100 FL (ref 78–100)
PHOSPHATE SERPL-MCNC: 2.1 MG/DL (ref 2.5–4.5)
PHOSPHATE SERPL-MCNC: 2.1 MG/DL (ref 2.5–4.5)
PLATELET # BLD AUTO: 277 10E3/UL (ref 150–450)
POTASSIUM SERPL-SCNC: 3.5 MMOL/L (ref 3.4–5.3)
RBC # BLD AUTO: 2.7 10E6/UL (ref 3.8–5.2)
SODIUM SERPL-SCNC: 135 MMOL/L (ref 135–145)
WBC # BLD AUTO: 11.1 10E3/UL (ref 4–11)

## 2025-08-12 PROCEDURE — 250N000013 HC RX MED GY IP 250 OP 250 PS 637: Performed by: INTERNAL MEDICINE

## 2025-08-12 PROCEDURE — 85014 HEMATOCRIT: CPT | Performed by: INTERNAL MEDICINE

## 2025-08-12 PROCEDURE — 36415 COLL VENOUS BLD VENIPUNCTURE: CPT | Performed by: INTERNAL MEDICINE

## 2025-08-12 PROCEDURE — 250N000013 HC RX MED GY IP 250 OP 250 PS 637: Performed by: STUDENT IN AN ORGANIZED HEALTH CARE EDUCATION/TRAINING PROGRAM

## 2025-08-12 PROCEDURE — 258N000003 HC RX IP 258 OP 636: Performed by: INTERNAL MEDICINE

## 2025-08-12 PROCEDURE — 97161 PT EVAL LOW COMPLEX 20 MIN: CPT | Mod: GP | Performed by: PHYSICAL THERAPIST

## 2025-08-12 PROCEDURE — 83735 ASSAY OF MAGNESIUM: CPT | Performed by: INTERNAL MEDICINE

## 2025-08-12 PROCEDURE — 90935 HEMODIALYSIS ONE EVALUATION: CPT

## 2025-08-12 PROCEDURE — 250N000011 HC RX IP 250 OP 636: Performed by: INTERNAL MEDICINE

## 2025-08-12 PROCEDURE — 97530 THERAPEUTIC ACTIVITIES: CPT | Mod: GP | Performed by: PHYSICAL THERAPIST

## 2025-08-12 PROCEDURE — 90935 HEMODIALYSIS ONE EVALUATION: CPT | Performed by: INTERNAL MEDICINE

## 2025-08-12 PROCEDURE — 200N000001 HC R&B ICU

## 2025-08-12 PROCEDURE — 99233 SBSQ HOSP IP/OBS HIGH 50: CPT | Performed by: INTERNAL MEDICINE

## 2025-08-12 PROCEDURE — 80069 RENAL FUNCTION PANEL: CPT | Performed by: INTERNAL MEDICINE

## 2025-08-12 RX ORDER — NIFEDIPINE 30 MG/1
30 TABLET, EXTENDED RELEASE ORAL DAILY
Status: DISCONTINUED | OUTPATIENT
Start: 2025-08-12 | End: 2025-08-19

## 2025-08-12 RX ORDER — NIFEDIPINE 30 MG/1
30 TABLET, EXTENDED RELEASE ORAL DAILY
Status: DISCONTINUED | OUTPATIENT
Start: 2025-08-13 | End: 2025-08-12

## 2025-08-12 RX ORDER — SENNOSIDES 8.6 MG
2 TABLET ORAL 2 TIMES DAILY PRN
Status: DISCONTINUED | OUTPATIENT
Start: 2025-08-12 | End: 2025-08-15

## 2025-08-12 RX ADMIN — Medication: at 09:32

## 2025-08-12 RX ADMIN — AMIODARONE HYDROCHLORIDE 200 MG: 200 TABLET ORAL at 08:09

## 2025-08-12 RX ADMIN — MECLIZINE HYDROCHLORIDE 25 MG: 25 TABLET ORAL at 12:43

## 2025-08-12 RX ADMIN — ICOSAPENT ETHYL 2 G: 1 CAPSULE ORAL at 08:12

## 2025-08-12 RX ADMIN — CARVEDILOL 25 MG: 12.5 TABLET, FILM COATED ORAL at 17:25

## 2025-08-12 RX ADMIN — MECLIZINE HYDROCHLORIDE 25 MG: 25 TABLET ORAL at 00:33

## 2025-08-12 RX ADMIN — CLOPIDOGREL BISULFATE 75 MG: 75 TABLET, FILM COATED ORAL at 17:26

## 2025-08-12 RX ADMIN — APIXABAN 2.5 MG: 2.5 TABLET, FILM COATED ORAL at 08:10

## 2025-08-12 RX ADMIN — HYDRALAZINE HYDROCHLORIDE 50 MG: 50 TABLET, FILM COATED ORAL at 16:25

## 2025-08-12 RX ADMIN — ICOSAPENT ETHYL 2 G: 1 CAPSULE ORAL at 17:29

## 2025-08-12 RX ADMIN — Medication 5 ML: at 08:07

## 2025-08-12 RX ADMIN — HYDRALAZINE HYDROCHLORIDE 50 MG: 50 TABLET, FILM COATED ORAL at 07:54

## 2025-08-12 RX ADMIN — HEPARIN SODIUM 1900 UNITS: 1000 INJECTION, SOLUTION INTRAVENOUS; SUBCUTANEOUS at 09:32

## 2025-08-12 RX ADMIN — QUETIAPINE FUMARATE 6.25 MG: 25 TABLET ORAL at 22:01

## 2025-08-12 RX ADMIN — FAMOTIDINE 20 MG: 20 TABLET, FILM COATED ORAL at 07:53

## 2025-08-12 RX ADMIN — ACETAMINOPHEN 650 MG: 325 TABLET ORAL at 02:48

## 2025-08-12 RX ADMIN — HYDRALAZINE HYDROCHLORIDE 10 MG: 20 INJECTION INTRAMUSCULAR; INTRAVENOUS at 02:49

## 2025-08-12 RX ADMIN — SODIUM CHLORIDE 200 ML: 0.9 INJECTION, SOLUTION INTRAVENOUS at 09:31

## 2025-08-12 RX ADMIN — NIFEDIPINE 30 MG: 30 TABLET, FILM COATED, EXTENDED RELEASE ORAL at 15:13

## 2025-08-12 RX ADMIN — CARVEDILOL 25 MG: 12.5 TABLET, FILM COATED ORAL at 08:09

## 2025-08-12 RX ADMIN — MEGESTROL ACETATE 200 MG: 40 SUSPENSION ORAL at 07:56

## 2025-08-12 RX ADMIN — POTASSIUM & SODIUM PHOSPHATES POWDER PACK 280-160-250 MG 1 PACKET: 280-160-250 PACK at 06:22

## 2025-08-12 RX ADMIN — PAROXETINE HYDROCHLORIDE 40 MG: 20 TABLET, FILM COATED ORAL at 08:09

## 2025-08-12 RX ADMIN — APIXABAN 2.5 MG: 2.5 TABLET, FILM COATED ORAL at 22:01

## 2025-08-12 RX ADMIN — HYDRALAZINE HYDROCHLORIDE 50 MG: 50 TABLET, FILM COATED ORAL at 00:33

## 2025-08-12 RX ADMIN — MEGESTROL ACETATE 200 MG: 40 SUSPENSION ORAL at 08:07

## 2025-08-12 RX ADMIN — SODIUM CHLORIDE 250 ML: 0.9 INJECTION, SOLUTION INTRAVENOUS at 09:33

## 2025-08-12 RX ADMIN — MECLIZINE HYDROCHLORIDE 25 MG: 25 TABLET ORAL at 05:09

## 2025-08-12 RX ADMIN — SODIUM CHLORIDE 500 ML: 0.9 INJECTION, SOLUTION INTRAVENOUS at 09:33

## 2025-08-12 RX ADMIN — HYDRALAZINE HYDROCHLORIDE 12.5 MG: 25 TABLET ORAL at 06:21

## 2025-08-12 RX ADMIN — MECLIZINE HYDROCHLORIDE 25 MG: 25 TABLET ORAL at 17:26

## 2025-08-12 RX ADMIN — POTASSIUM & SODIUM PHOSPHATES POWDER PACK 280-160-250 MG 1 PACKET: 280-160-250 PACK at 12:43

## 2025-08-12 ASSESSMENT — ACTIVITIES OF DAILY LIVING (ADL)
ADLS_ACUITY_SCORE: 72
ADLS_ACUITY_SCORE: 70
ADLS_ACUITY_SCORE: 79
ADLS_ACUITY_SCORE: 75
ADLS_ACUITY_SCORE: 79
ADLS_ACUITY_SCORE: 75
ADLS_ACUITY_SCORE: 79
ADLS_ACUITY_SCORE: 72
ADLS_ACUITY_SCORE: 75
ADLS_ACUITY_SCORE: 70
ADLS_ACUITY_SCORE: 70
ADLS_ACUITY_SCORE: 75
ADLS_ACUITY_SCORE: 75
ADLS_ACUITY_SCORE: 79
ADLS_ACUITY_SCORE: 75
ADLS_ACUITY_SCORE: 75
ADLS_ACUITY_SCORE: 70
ADLS_ACUITY_SCORE: 79
ADLS_ACUITY_SCORE: 75
ADLS_ACUITY_SCORE: 75

## 2025-08-12 NOTE — PROGRESS NOTES
Inpatient Dialysis Progress Note            Assessment and Plan:     Hypertensive emergency   Acute hypoxic respiratory failure   Acute on chronic diastolic HF   History of R renal artery stenting  Labile HTN  Hypoxia and resp status better following thoracentesis.      - Labile BP, history of renal artery stenosis status post stenting-> Doppler on 7/31-no evidence of significant JACY  - Metanephrines  -> normetanephrine came back quite high at 4.6 with upper limit of 0.8.  Metanephrine was normal.  Bump in normetanephrine is likely related to advanced CKD.  Cannot get 24 urine given patient's dialysis status.   - CT abdomen-normal-looking adrenals.     Arteries are very calcified and thus likely non compliant (stiff), making labile BP.       Carvedilol 25 mg BID  Hydralazine 50 mg TID  Isordil held  Nifedipine XL held.    I will see how her BP responds post HD today.  If BP rises once again to > 170, I would start nifedipine XL at 30 mg daily.       SHOCK -  Resolved.       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 This is most commonly seen in DM2, however can also be seen with smoking and HTN. Bx also showed cholesterol emboli (likely due to endograft repair).  Cr on admission 4.25, worse than recent baseline. In setting of hypertensive emergency, pulmonary edema, LE edema. Bx results:           R lung adenocarcinoma   Received radiation in 2021.      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          Interval History:     Feeling better after thoracentesis.  L pleural space 800 mL  R pleural space 1000 mL  Breathing better.  Sleeping better.   BP was up to 196/109  overnight.  Now down to 146/80.          Dialysis Parameters:     Wt Readings from Last 4 Encounters:   08/11/25 52.1 kg (114 lb 12.8 oz)   07/23/25 54.4 kg (120 lb)   07/21/25 54.1 kg (119 lb 3.2 oz)   07/17/25 53.5 kg (117 lb 14.4 oz)     I/O last 3 completed shifts:  In: 1305 [P.O.:60; NG/GT:545]  Out: 675 [Urine:675]  BP Readings from Last 3 Encounters:   08/12/25 (!) 146/80   07/23/25 131/74   07/21/25 137/80       Routine, ONE TIME, Starting today For 1 Occurrences  Weight Loss (kg): 1  Dialysis Temp: 36.5  C  Access Device: CVC  Access Site: R   Dialyzer: Xsilon 15H  Dialysis Bath: K 3  Blood Flow Rate (mL/min): 400  Total Treatment Time (hrs): 3  Heparin: no         Medications and Allergies:   Reviewed in EPIC    Current Facility-Administered Medications   Medication Dose Route Frequency Provider Last Rate Last Admin    - MEDICATION INSTRUCTIONS for Dialysis Patients -   Does not apply See Admin Instructions Mariano Tran MD        amiodarone (PACERONE) tablet 200 mg  200 mg Oral or Feeding Tube Daily Mariano Tran MD   200 mg at 08/12/25 0809    apixaban ANTICOAGULANT (ELIQUIS) tablet 2.5 mg  2.5 mg Oral BID Mariano Tran MD   2.5 mg at 08/12/25 0810    B and C vitamin Complex with folic acid (NEPHRONEX) liquid 5 mL  5 mL Per Feeding Tube Daily Mariano Tran MD   5 mL at 08/12/25 0807    carvedilol (COREG) tablet 25 mg  25 mg Oral or Feeding Tube BID w/meals Mariano Tran MD   25 mg at 08/12/25 0809    clopidogrel (PLAVIX) tablet 75 mg  75 mg Oral or Feeding Tube Daily Mariano Tran MD   75 mg at 08/11/25 1819    famotidine (PEPCID) tablet 20 mg  20 mg Oral or Feeding Tube Q48H Virgilio Patel MD   20 mg at 08/12/25 0753    hydrALAZINE (APRESOLINE) tablet 50 mg  50 mg Oral or Feeding Tube TID Mariano Tran MD   50 mg at 08/12/25 0754    icosapent ethyl (VASCEPA) capsule 2 g  2 g Oral BID w/meals Virgilio Patel MD   2 g at 08/12/25 0812    [Held by provider]  isosorbide dinitrate (ISORDIL) tablet 40 mg  40 mg Oral or Feeding Tube TID Virgilio Patel MD   40 mg at 08/10/25 0552    meclizine (ANTIVERT) tablet 25 mg  25 mg Oral or Feeding Tube Q6H Virgilio Patel MD   25 mg at 08/12/25 0509    megestrol (MEGACE) suspension 200 mg  200 mg Oral or Feeding Tube Daily Mariano Tran MD   200 mg at 08/12/25 0807    [Held by provider] NIFEdipine ER OSMOTIC (PROCARDIA XL) 24 hr tablet 90 mg  90 mg Oral Daily Mariano Tran MD   90 mg at 08/10/25 0832    PARoxetine (PAXIL) tablet 40 mg  40 mg Oral or Feeding Tube Daily Virgilio Patel MD   40 mg at 08/12/25 0809    potassium & sodium phosphates (NEUTRA-PHOS) Packet 1 packet  1 packet Oral or Feeding Tube Q4H Virgilio Patel MD   1 packet at 08/12/25 0622    sodium chloride (PF) 0.9% PF flush 3 mL  3 mL Intracatheter Q8H [facility] Gilmer Swain MD   3 mL at 08/12/25 0509    sodium chloride (PF) 0.9% PF flush 9 mL  9 mL Intracatheter During Dialysis/CRRT (from stock) Luis Eduardo Lucas MD        sodium chloride (PF) 0.9% PF flush 9 mL  9 mL Intracatheter During Dialysis/CRRT (from stock) Luis Eduardo Lucas MD         Current Facility-Administered Medications   Medication Dose Route Frequency Provider Last Rate Last Admin    acetaminophen (TYLENOL) tablet 650 mg  650 mg Oral or Feeding Tube Q4H PRN Mariano Tran MD   650 mg at 08/12/25 0248    Or    acetaminophen (TYLENOL) Suppository 650 mg  650 mg Rectal Q4H PRN Mariano Tran MD        diphenhydrAMINE (BENADRYL) liquid 25 mg  25 mg Oral or Feeding Tube At Bedtime PRN Virgilio Patel MD   25 mg at 08/10/25 2334    And    acetaminophen (TYLENOL) tablet 500 mg  500 mg Oral or Feeding Tube At Bedtime PRN Virgilio Patel MD        albuterol (PROVENTIL) neb solution 2.5 mg  2.5 mg Nebulization Q6H PRN Virgilio Patel MD   2.5 mg at 07/31/25 2104    alteplase (CATHFLO ACTIVASE) injection 2 mg  2 mg Intracatheter Q1H PRN Luis Eduardo Lucas MD        alteplase (CATHFLO  ACTIVASE) injection 2 mg  2 mg Intracatheter Q1H PRN Luis Eduardo Lucas MD        carboxymethylcellulose PF (REFRESH PLUS) 0.5 % ophthalmic solution 1 drop  1 drop Both Eyes Q1H PRN Gilmer Swain MD        dextrose 10% infusion   Intravenous Continuous PRN Mariano Tran MD        glucose gel 15-30 g  15-30 g Oral Q15 Min PRN Virgilio Patel MD   15 g at 08/10/25 1623    Or    dextrose 50 % injection 25-50 mL  25-50 mL Intravenous Q15 Min PRN Virgilio Patel MD        Or    glucagon injection 1 mg  1 mg Subcutaneous Q15 Min PRN Virgilio Patel MD        diazepam (VALIUM) tablet 2 mg  2 mg Oral Q6H PRN Mariano Tran MD        diazepam (VALIUM) tablet 5 mg  5 mg Oral Q6H PRN Mariano Tran MD   5 mg at 08/11/25 1437    HOLD: All Oral Medications   Does not apply HOLD Nhan Wilkerson MD        hydrALAZINE (APRESOLINE) injection 10 mg  10 mg Intravenous Q4H PRN Mariano Tran MD   10 mg at 08/12/25 0249    HYDROmorphone (DILAUDID) injection 0.2 mg  0.2 mg Intravenous Q2H PRN Fran Pierson MD   0.2 mg at 08/07/25 0058    levalbuterol (XOPENEX) neb solution 0.63 mg  0.63 mg Nebulization Q4H PRN Mariano Tran MD   0.63 mg at 08/05/25 1355    lidocaine (LMX4) cream   Topical Q1H PRN Gilmer Swain MD        lidocaine 1 % 0.1-1 mL  0.1-1 mL Other Q1H PRN Gilmer Swain MD        LORazepam (ATIVAN) injection 0.5 mg  0.5 mg Intravenous Q4H PRN Jonathan Light MD   0.5 mg at 08/06/25 2156    methocarbamol (ROBAXIN) tablet 500 mg  500 mg Oral or Feeding Tube TID PRN Virgilio Patel MD        metoprolol (LOPRESSOR) injection 5 mg  5 mg Intravenous Q5 Min PRN Jonathan Light MD   5 mg at 08/05/25 0518    naloxone (NARCAN) injection 0.2 mg  0.2 mg Intravenous Q2 Min PRN Fran Pierson MD        Or    naloxone (NARCAN) injection 0.4 mg  0.4 mg Intravenous Q2 Min PRN Fran Pierson MD        Or    naloxone (NARCAN) injection 0.2 mg  0.2 mg  Intramuscular Q2 Min PRN Fran Pierson MD        Or    naloxone (NARCAN) injection 0.4 mg  0.4 mg Intramuscular Q2 Min PRN Fran Pierson MD        No lozenges or gum should be given while patient on BIPAP/AVAPS/AVAPS AE   Does not apply Continuous PRN Gilmer Swain MD        ondansetron (ZOFRAN ODT) ODT tab 4 mg  4 mg Oral Q6H PRN Virgilio Patel MD   4 mg at 08/07/25 1122    Patient may continue current oral medications   Does not apply Continuous PRN Gilmer Swain MD        polyethylene glycol (MIRALAX) Packet 17 g  17 g Oral BID PRN Mariano Tran MD        QUEtiapine (SEROquel) quarter-tab 6.25 mg  6.25 mg Oral TID PRN Mariano Tran MD        sennosides (SENOKOT) tablet 2 tablet  2 tablet Oral BID PRN Destini Washington DO        sodium chloride (PF) 0.9% PF flush 10 mL  10 mL Intracatheter Q15 Min PRN Luis Eduardo Lucas MD        sodium chloride (PF) 0.9% PF flush 10 mL  10 mL Intracatheter Q15 Min PRN Luis Eduardo Lucas MD        sodium chloride (PF) 0.9% PF flush 3 mL  3 mL Intracatheter q1 min prn Gilmer Swain MD   3 mL at 08/03/25 0823    sodium chloride 0.9% BOLUS 100-150 mL  100-150 mL Intravenous Q15 Min PRN Luis Eduardo Lucas MD            Allergies   Allergen Reactions    Iodine Hives              Labs:     [facility]  Recent Labs   Lab 08/12/25  0754 08/12/25  0526 08/12/25  0503 08/11/25  2358 08/11/25  0758 08/11/25  0557 08/10/25  0755 08/10/25  0614 08/09/25  0830 08/09/25  0708   NA  --  135  --   --   --  131*  --  133*  --  135   POTASSIUM  --  3.5  --   --   --  3.6  --  3.6  3.6  --  3.6   CHLORIDE  --  95*  --   --   --  93*  --  94*  --  96*   BRUCE  --  9.0  --   --   --  9.1  --  8.5*  --  8.7*   CO2  --  26  --   --   --  26  --  26  --  25   BUN  --  51.4*  --   --   --  43.0*  --  24.9*  --  40.1*   CR  --  2.62*  --   --   --  2.56*  --  2.05*  --  3.03*   * 117* 103* 114*   < > 120*   < > 109*   < > 121*    <  > = values in this interval not displayed.     CBC  Recent Labs   Lab 08/12/25  0526 08/11/25  0557 08/10/25  0614 08/09/25  0708   WBC 11.1* 9.4 8.0 9.0   HGB 9.1* 8.8* 9.0* 8.8*   HCT 27.0* 26.2* 27.6* 27.0*    101* 103* 103*    261 276 272     Lab Results   Component Value Date    AST 22 07/17/2025    ALT 11 07/17/2025    ALKPHOS 61 07/17/2025    BILITOTAL 0.3 07/17/2025            Physical Exam:   Vitals were reviewed in EPIC    Wt Readings from Last 3 Encounters:   08/11/25 52.1 kg (114 lb 12.8 oz)   07/23/25 54.4 kg (120 lb)   07/21/25 54.1 kg (119 lb 3.2 oz)       Intake/Output Summary (Last 24 hours) at 8/12/2025 1059  Last data filed at 8/12/2025 1000  Gross per 24 hour   Intake 1295 ml   Output 550 ml   Net 745 ml       GENERAL APPEARANCE: pleasant, no distress, a & o  EXTREMITIES/SKIN: no edema, no rashes or lesions     Pt seen on dialysis.  Stable run.  Good BFR.      Attestation:  I have reviewed today's vital signs, notes, medications, labs and imaging.     Luis Eduardo Lucas MD  [facility] - Nephrology  [phone]

## 2025-08-12 NOTE — PROGRESS NOTES
CLINICAL NUTRITION SERVICES - BRIEF NOTE    Chart check for nutrition support patient. Pt also discussed in IDT rounds. See RD note from 8/6 for most recent full assessment.    Pt has remained on Nepro @ 35 ml/hr since 8/6 without documented issue. Initially, pt started on renal formula d/t high phosphorus. Patient also started dialysis on 8/6.  For the last several days, pt has had low phosphorus requiring daily replacement. Discussed with provider during IDT rounds and will change to regular formula in hopes of phosphorus improvement.  Additionally, pt continues to take negligible amounts PO. Discussed need for longer term nutrition plan if pt unable to increase PO intakes. Noted that pt is on a Renal Diet and the foods that she likes are not allowed on this diet. Possible liberalization of diet in order to increase PO intakes.    Labs Reviewed  Noted: BUN 51.4(H), Cr 2.62(H), Phos 2.1(L),     Meds Reviewed  Noted: Nephronex, megace, neutra-phos  - Levo off since 8/10    LBM: This AM (x1)    Pt getting HD today      New Enteral Regimen  Access: NGT  Frequency: Continuous  Formula: Jevity 1.5  Enteral Regimen: Jevity 1.5 Jose (or equivalent) @ goal of  40ml/hr x 24 hrs (960ml/day) + Nephronex   Total enteral provisions: 1440 kcals, 61 g PRO, 729 ml free H20, 207 g CHO, and 20 g fiber daily.   - The above meets 100% of estimated calorie and protein needs and >100% of RDIs  Free Water Flushes: 30 ml q 4h for tube patency (total FWF per nephrology)  - Total water from EN + FWF = 909 ml (within FR)    Advancement Instructions: Initiate new formula at 25 ml/hr x 6 hours. If tolerating, okay to advance to new goal of 40 ml/hr.       Continue to encourage oral intake as able.  Will monitor electrolytes, meds, and clinical course and adjust EN formula/rate as appropriate.   Consider liberalization of diet as strategy to increase PO intakes.  Discussed in IDT rounds today.      Mary Rider, RD, LD  Clinical  "[facility] Message Group: \"Dietitian [[place]]\"  Office Phone: [phone]    "

## 2025-08-12 NOTE — PROGRESS NOTES
[facility]    Medicine Progress Note - Hospitalist Service    Date of Admission:  7/28/2025    Assessment & Plan   75-year-old female with history of CAD, CKD stage IV, hypertension, hyperlipidemia, intra-abdominal aortic aneurysm with prior stenting, chronic vertigo, recent hospitalization for fall with pelvic fracture who presents the ED for shortness of breath.       Had elevated D-dimer but VQ scan negative for PE, CT not done due to CKD.  proBNP more than 25,000.  Chest x-ray showed bilateral perihilar interstitial/airspace opacities, left greater than right, are nonspecific for postradiation change versus pneumonia. A small nodule in the peripheral right upper lobe measures 9 mm.      Found to be in hypertensive emergency, admitted to ICU with BiPAP and nitroglycerin drip.  She was weaned off nitroglycerin drip and BiPAP but has had repeated episodes of rising blood pressure and shortness of breath needing her to go back on BiPAP intermittently.  She was transferred to floor on 7/31 but came back to ICU on 8/2 after rapid response for flash pulmonary edema and hypertensive emergency.     On the morning of 8/14, patient went into A-fib with initial rates of 130s and then decreased to 40s without intervention.  She converted back to sinus rhythm and about half an hour.     Acute hypoxic respiratory failure: Multifactorial-initially BiPAP dependent, improved  Diastolic CHF exacerbation,Recurrent flash pulmonary edema associated with episodes of high blood pressure-improved with initiation of hemodialysis    - Echo with EF of 55 to 60%.  Unclear what is causing recurrent sudden episodes of elevated blood pressure and flash pulmonary edema.  She has history of renal artery stenting and on renal arterial ultrasound done on 7/31 there was no sonographic evidence of renal artery stenosis.       End-stage renal disease  Metabolic acidosis     Initiated hemodialysis this admission  Nephrology  following  Plan for hemodialysis later today (8/12/25)       Hypertension  Hypertensive emergency, resolved      PTA med include carvediol, hydralazine, isordil, clonidine patch    Noted to have elevated normetaneprhine level but metanephrine level was normal    Initially had hypertensive urgency requiring nitroglycerin gtt    Has had quite variable BPs in the last several days  Was initially with hypertensive urgency and, over the last few days, has been intermittently hypotensive    Nephrology following BP and providing daily recs for anti-htn meds (appreciated)    Currently on Coreg 25mg po bid, hydralazine 50mg po bid.  PTA Isordil, nifedipine XL and lisinopril held    Plan to see how her BP responds to hemodialysis treatment today  If still elevated post dialysis - plan to restart nifedipine XL 30mg/day    Infectious disease  -Was empirically started on ceftriaxone on presentation.  Patient had no fever or leukocytosis.  Mildly elevated procalcitonin of 0.67 which is difficult to interpret in ROMI/CKD and improved to 0.49 on recheck.  Finished 5-day course of ceftriaxone.      Atrial fibrillation: Paroxysmal atrial fibrillation  - On 8/4, patient had a self-limiting 30 minutes episode of atrial fibrillation initially with RVR to 130 and then bradycardia to 40.  - Cardiology was reconsulted, advised 150 mg amiodarone IV push, and the plan was no further amiodarone if patient remains sinus.    -- However, patient developed intermittent episodes of atrial fibrillation with RVR.  Cardiology recommended amiodarone drip and planning to transition to oral amiodarone 200 mg once a day for about a month.  Continue Coreg.    -- Patient has been intermittently in atrial fibrillation with controlled ventricular response.  She apparently has paroxysmal atrial fibrillation and has been started on amiodarone, resumed Coreg.  Anticoagulation was recommended by cardiology (Eliquis)    Plan to continue Plavix, per  cards      Malnutrition: Moderate nutrition in the context of acute on chronic medical illness.  - Patient was unable to take adequate oral intake due to dependence on BiPAP and currently due to decreased appetite.  -Registered dietitian is following and recommendation appreciated  - keofed feeding tube placed and tube feeding started she has been tolerating feeding tube.  However her oral intake still remains poor.  Appetite stimulation with Megace was started to see if that helps.     8/12/25 - pt asking for hamburger this am. Will liberalize diet and monitor input closely.  Hope to be able to titrate down on tube feeds soon so that her appetite increases        Constipation: On bowel regimen      Chronic medical conditions:  - AAA.  Endoleak status post recent endovascular aneurysm repair on 4/25.  Continue Plavix and statin.  - History of CVA.  Chronic infarct in the right basal ganglia noted on CT.  - Right lung adenocarcinoma.  Status post stereotactic body radiation therapy in 2021, follows up in [place].  His lung nodule on x-ray, can follow-up with her primary oncologist.  - Chronic vertigo.  Follows up with ENT in [place] and on as needed meclizine.  MRI brain on 5/22 was negative for acute pathology.  - Depression pleasant lady.  Continue paroxetine.  - Hypertriglyceridemia.  On Vascepa.        Generalized weakness  Physical deconditioning  --PT consulted to assist with discharge planning.      Of note the patient and her  live in [place] half the time and they are currently staying at extended stay.  Patient's daughter is supportive.   consulted.     Concern for cognitive dysfunction:  --Patient has trouble with memory but there is no official diagnosis of dementia.  May use Seroquel as needed for delirium.  Will try to avoid benzos     Goal of care: Restorative and patient is full code.       PPE Used:  Mask, gloves          Diet: Fluid restriction 1500 ML FLUID  Adult Formula Drip  Feeding: Continuous Nepro with Carbsteady; Nasogastric tube; Goal Rate: 35; mL/hr; Confirm tube tip before starting. Begin TF at 15 mL/hr x 12 hours. If tolerating and labs are WNL, okay to advance by 15 mL/hr every 10 hours unt...  Snacks/Supplements Adult: Nepro Oral Supplement; Between Meals  Renal Diet (dialysis)    DVT Prophylaxis: DOAC  Rao Catheter: Not present  Lines: PRESENT      CVC Double Lumen Right Subclavian Tunneled;Hemodialysis/CRRT-Site Assessment: WDL      Cardiac Monitoring: ACTIVE order. Indication: Acute decompensated heart failure (48 hours)  Code Status: Full Code      Clinically Significant Risk Factors         # Hyponatremia: Lowest Na = 131 mmol/L in last 2 days, will monitor as appropriate  # Hypochloremia: Lowest Cl = 93 mmol/L in last 2 days, will monitor as appropriate      # Hypoalbuminemia: Lowest albumin = 2.8 g/dL at 8/12/2025  5:26 AM, will monitor as appropriate     # Hypertension: Noted on problem list             # Severe Malnutrition: based on nutrition assessment and treatment provided per dietitian's recommendations.    # Financial/Environmental Concerns: none         Disposition Plan     Medically Ready for Discharge: Anticipated in 2-4 Days             Destini Washington DO  Hospitalist Service  [facility]  Securely message with [employer] (more info)  Text page via [employer] Paging/Directory   ______________________________________________________________________    Interval History     Seen with bedside RN and PT.  Slept well last night.  Tired. No N/V.  Asking for hamburger.  No current HA, CP, SOB/cough or recent F/C.  Feeling weak and fatigued    Physical Exam   Vital Signs: Temp: 97.8  F (36.6  C) Temp src: Temporal BP: (!) 186/99 Pulse: 69   Resp: 11 SpO2: 94 % O2 Device: Nasal cannula Oxygen Delivery: 1/2 LPM  Weight: 114 lbs 12.8 oz    GEN:  sleeping comfortably but easily arousable  HEENT:  Normocephalic/atraumatic, no scleral icterus, no  nasal discharge  CV: somewhat distant, irregular rate and rhythm,   LUNGS:  Clear to auscultation ant bilaterally without rales/rhonchi/wheezing/retractions.  Symmetric chest rise on inhalation noted.  EXT:  No significant pretibial edema bilaterally.  No cyanosis.    SKIN:  Dry to touch, no new exanthems noted in the visualized areas.    Medical Decision Making       50 MINUTES SPENT BY ME on the date of service doing chart review, history, exam, documentation & further activities per the note.      Data   Medications   Current Facility-Administered Medications   Medication Dose Route Frequency Provider Last Rate Last Admin    dextrose 10% infusion   Intravenous Continuous PRN Mariano Tran MD        No lozenges or gum should be given while patient on BIPAP/AVAPS/AVAPS AE   Does not apply Continuous PRN Gilmer Swain MD        norepinephrine (LEVOPHED) 4 mg in  mL infusion PREMIX  0.01-0.6 mcg/kg/min (Dosing Weight) Intravenous Continuous Lamin Phelps MD   Stopped at 08/10/25 1548    Patient may continue current oral medications   Does not apply Continuous PRN Gilmer Swain MD         Current Facility-Administered Medications   Medication Dose Route Frequency Provider Last Rate Last Admin    - MEDICATION INSTRUCTIONS for Dialysis Patients -   Does not apply See Admin Instructions Mariano Tran MD        amiodarone (PACERONE) tablet 200 mg  200 mg Oral or Feeding Tube Daily Mariano Tran MD   200 mg at 08/11/25 0833    apixaban ANTICOAGULANT (ELIQUIS) tablet 2.5 mg  2.5 mg Oral BID Mariano Tran MD   2.5 mg at 08/11/25 2022    B and C vitamin Complex with folic acid (NEPHRONEX) liquid 5 mL  5 mL Per Feeding Tube Daily Mariano Tran MD   5 mL at 08/11/25 0833    carvedilol (COREG) tablet 25 mg  25 mg Oral or Feeding Tube BID w/meals Mariano Tran MD   25 mg at 08/11/25 1819    clopidogrel (PLAVIX) tablet 75 mg  75 mg Oral or Feeding Tube Daily  Mariano Tran MD   75 mg at 08/11/25 1819    famotidine (PEPCID) tablet 20 mg  20 mg Oral or Feeding Tube Q48H Virgilio Patel MD   20 mg at 08/10/25 0832    hydrALAZINE (APRESOLINE) tablet 50 mg  50 mg Oral or Feeding Tube TID Mariano Tran MD   50 mg at 08/12/25 0033    icosapent ethyl (VASCEPA) capsule 2 g  2 g Oral BID w/meals Virgilio Patel MD   2 g at 08/11/25 0832    [Held by provider] isosorbide dinitrate (ISORDIL) tablet 40 mg  40 mg Oral or Feeding Tube TID Virgilio Patel MD   40 mg at 08/10/25 0552    meclizine (ANTIVERT) tablet 25 mg  25 mg Oral or Feeding Tube Q6H Virgilio Patel MD   25 mg at 08/12/25 0509    megestrol (MEGACE) suspension 200 mg  200 mg Oral or Feeding Tube Daily Mariano Tran MD        [Held by provider] NIFEdipine ER OSMOTIC (PROCARDIA XL) 24 hr tablet 90 mg  90 mg Oral Daily Mariano Tran MD   90 mg at 08/10/25 0832    PARoxetine (PAXIL) tablet 40 mg  40 mg Oral or Feeding Tube Daily Virgilio Patel MD   40 mg at 08/11/25 0833    potassium & sodium phosphates (NEUTRA-PHOS) Packet 1 packet  1 packet Oral or Feeding Tube Q4H Virgilio Patel MD   1 packet at 08/12/25 0622    sennosides (SENOKOT) tablet 2 tablet  2 tablet Oral BID Mariano Tran MD   2 tablet at 08/08/25 2243    sodium chloride (PF) 0.9% PF flush 3 mL  3 mL Intracatheter Q8H CHIDI Gilmer Swain MD   3 mL at 08/12/25 0509     Labs and Imaging results below reviewed today.  Recent Labs   Lab 08/12/25  0526 08/11/25  0557 08/10/25  0614   WBC 11.1* 9.4 8.0   HGB 9.1* 8.8* 9.0*   HCT 27.0* 26.2* 27.6*    101* 103*    261 276     Recent Labs   Lab 08/12/25  1154 08/12/25  0754 08/12/25  0526 08/11/25  0758 08/11/25  0557 08/10/25  0755 08/10/25  0614   NA  --   --  135  --  131*  --  133*   POTASSIUM  --   --  3.5  --  3.6  --  3.6  3.6   CHLORIDE  --   --  95*  --  93*  --  94*   CO2  --   --  26  --  26  --  26   ANIONGAP  --   --  14  --  12  --  13   * 118* 117*   <  > 120*   < > 109*   BUN  --   --  51.4*  --  43.0*  --  24.9*   CR  --   --  2.62*  --  2.56*  --  2.05*   GFRESTIMATED  --   --  18*  --  19*  --  25*   BRUCE  --   --  9.0  --  9.1  --  8.5*    < > = values in this interval not displayed.     7-Day Micro Results       Collected Updated Procedure Result Status      08/11/2025 1400 08/11/2025 1703 Cell count with differential fluid [[id number]]    (Abnormal)   Pleural fluid from Pleural Cavity    Final result Component Value   No component results            08/11/2025 1400 08/12/2025 1233 Pleural fluid Aerobic Bacterial Culture Routine With Gram Stain [[id number]]    Pleural fluid from Pleural Cavity    Preliminary result Component Value   Culture No growth, less than 1 day  [P]    Gram Stain Result No organisms seen  [P]     1+ WBC seen  [P]                08/11/2025 1400 08/11/2025 1620 Glucose fluid [[id number]]    Pleural fluid from Pleural Cavity    Final result Component Value Units   Glucose Fluid Source Pleural Cavity, Left    Glucose fluid 116 mg/dL            08/11/2025 1400 08/11/2025 1620 Lactate dehydrogenase fluid [[id number]]    Pleural fluid from Pleural Cavity    Final result Component Value Units   LD Fluid Source Pleural Cavity, Left    Lactate dehydrogenase fluid 88 U/L            08/11/2025 1400 08/11/2025 1620 Protein fluid [[id number]]    Pleural fluid from Pleural Cavity    Final result Component Value Units   Protein Fluid Source Pleural Cavity, Left    Protein Total Fluid 1.7 g/dL            08/11/2025 1400 08/11/2025 1702 Cell Count Body Fluid [[id number]]    (Abnormal)   Pleural fluid from Pleural Cavity    Final result Component Value   Color Yellow   Clarity Cloudy   Cell Count Fluid Source Pleural Cavity, Left   Total Nucleated Cells --   Large clot present, no cell count performed.            08/11/2025 1400 08/11/2025 1703 Differential Body Fluid [[id number]]    Pleural fluid from Pleural Cavity    Final result Component  Value   % Neutrophils --   % Lymphocytes --   % Monocyte/Macrophages --            08/07/2025 0606 08/07/2025 1002 Quantiferon TB Gold Plus Collection [[id number]]   Peripheral Blood    Final result Component Value   No component results            08/07/2025 0606 08/07/2025 1304 Quantiferon TB Gold Plus Primary [[id number]]   Peripheral Blood    Final result Component Value   No component results            08/07/2025 0606 08/08/2025 1801 Quantiferon TB Gold Plus Grey Tube [[id number]]   Peripheral Blood    Final result Component Value Units   Quantiferon Nil Tube 0.03 IU/mL            08/07/2025 0606 08/08/2025 1801 Quantiferon TB Gold Plus Green Tube [[id number]]   Peripheral Blood    Final result Component Value Units   Quantiferon TB1 Tube 0.03 IU/mL            08/07/2025 0606 08/08/2025 1800 Quantiferon TB Gold Plus Yellow Tube [[id number]]   Peripheral Blood    Final result Component Value   Quantiferon TB2 Tube 0.04            08/07/2025 0606 08/08/2025 1800 Quantiferon TB Gold Plus Purple Tube [[id number]]   Peripheral Blood    Final result Component Value Units   Quantiferon Mitogen 6.10 IU/mL            08/07/2025 0606 08/08/2025 1815 Quantiferon TB Gold Plus [[id number]]   Peripheral Blood    Final result Component Value Units   Quantiferon-TB Gold Plus Negative    No interferon gamma response to M.tuberculosis antigens was detected. Infection with M.tuberculosis is unlikely, however a single negative result does not exclude infection. In patients at high risk for infection, a second test should be considered in accordance with the 2017 ATS/IDSA/CDC Clinical Pract  ice Guidelines for Diagnosis of Tuberculosis in Adults and Children    TB1 Ag minus Nil Value 0.00 IU/mL   TB2 Ag minus Nil Value 0.01 IU/mL   Mitogen minus Nil Result 6.07 IU/mL   Nil Result 0.03 IU/mL                  Recent Labs   Lab 08/12/25  1154 08/12/25  0754 08/12/25  0526 08/11/25  0758 08/11/25  0557 08/10/25  1208  08/10/25  1151 08/10/25  0755 08/10/25  0614   NA  --   --  135  --  131*  --   --   --  133*   POTASSIUM  --   --  3.5  --  3.6  --   --   --  3.6  3.6   CHLORIDE  --   --  95*  --  93*  --   --   --  94*   CO2  --   --  26  --  26  --   --   --  26   ANIONGAP  --   --  14  --  12  --   --   --  13   * 118* 117*   < > 120*   < >  --    < > 109*   BUN  --   --  51.4*  --  43.0*  --   --   --  24.9*   CR  --   --  2.62*  --  2.56*  --   --   --  2.05*   GFRESTIMATED  --   --  18*  --  19*  --   --   --  25*   BRUCE  --   --  9.0  --  9.1  --   --   --  8.5*   MAG  --   --  1.7  --  1.8  --   --   --  1.7   PHOS  --   --  2.1*  2.1*  --  2.0*  --  2.6  --  1.4*   PROTTOTAL  --   --   --   --  6.8  --   --   --   --    ALBUMIN  --   --  2.8*  --   --   --   --   --   --     < > = values in this interval not displayed.       Recent Results (from the past 24 hours)   US Thoracentesis    Narrative    ULTRASOUND GUIDED THORACENTESIS  8/11/2025 3:43 PM CDT     HISTORY: SOB. Bilateral pleural effusions.    FINDINGS: Limited ultrasound was performed to evaluate for the presence and best approach for drainage of a pleural effusion. An image is archived. Written and oral informed consent was obtained. A pause for the cause procedure to verify the correct   patient and correct procedure.     The skin overlying the right chest posteriorly was prepped and draped in the usual sterile fashion. The subcutaneous tissues were anesthetized with 1% lidocaine. Under direct ultrasound guidance a catheter was advanced into the pleural space and 1000 mL   of  marcia colored fluid was drained. The catheter was removed and a sterile dressing was applied.     The skin overlying the left chest posteriorly was prepped and draped in the usual sterile fashion. The subcutaneous tissues were anesthetized with 1% lidocaine. Under direct ultrasound guidance a catheter was advanced into the pleural space and 800 mL of    marcia colored fluid was  drained. The catheter was removed and a sterile dressing was applied.     Patient was monitored by nurse under my direct supervision throughout the exam. Ultrasound images were permanently stored.  There were no immediate complications. Patient left the ultrasound suite in satisfactory condition.      Impression    IMPRESSION: Technically successful bilateral thoracentesis without immediate complications.

## 2025-08-12 NOTE — PROGRESS NOTES
"   08/12/25 0825   Appointment Info   Signing Clinician's Name / Credentials (PT) Vivi Walker, PT   Living Environment   People in Home spouse   Current Living Arrangements mobile home   Home Accessibility stairs to enter home   Number of Stairs, Main Entrance 3   Stair Railings, Main Entrance railings safe and in good condition   Transportation Anticipated family or friend will provide   Living Environment Comments staying with family in [place]; normally resides in [place] per RN   Self-Care   Usual Activity Tolerance good   Current Activity Tolerance fair   Fall history within last six months yes   Number of times patient has fallen within last six months 1   Activity/Exercise/Self-Care Comment normally reports independence with mobility and cares without an assistive device; had recently been at TCU in July   General Information   Onset of Illness/Injury or Date of Surgery 08/02/25   Referring Physician Mariano Tran MD   Patient/Family Therapy Goals Statement (PT) not stated   Pertinent History of Current Problem (include personal factors and/or comorbidities that impact the POC) per chart; \"75-year-old female with history of f CAD, CKD stage IV, hypertension, hyperlipidemia, intra-abdominal aortic aneurysm with prior stenting, chronic vertigo, recent hospitalization for fall with pelvic fracture who presents the ED for shortness of breath. Had elevated D-dimer but VQ scan negative for PE, CT not done due to CKD.  proBNP more than 25,000.  Chest x-ray showed bilateral perihilar interstitial/airspace opacities, left greater than right, are nonspecific for postradiation change versus pneumonia. A small nodule in the peripheral right upper lobe measures 9 mm;Found to be in hypertensive emergency, admitted to ICU with BiPAP and nitroglycerin drip.  She was weaned off nitroglycerin drip and BiPAP but has had repeated episodes of rising blood pressure and shortness of breath needing her to go back on BiPAP intermittently.  " She was transferred to floor on 7/31 but came back to ICU on 8/2 after rapid response for flash pulmonary edema and hypertensive emergency. End stage renal diseases with need for HD this admission   Existing Precautions/Restrictions fall;oxygen therapy device and L/min  (2L for activity; 0.5 at rest)   General Observations patient dizzy with sitting at EOB   Cognition   Orientation Status (Cognition) oriented x 4   Cognitive Status Comments forgetful during session; stated it was December initially and then quickly corrected to August   Pain Assessment   Patient Currently in Pain No   Integumentary/Edema   Integumentary/Edema Comments see nursing notes for further information   Posture    Posture Comments forward flexed at head and trunk   Range of Motion (ROM)   ROM Comment WFL   Strength (Manual Muscle Testing)   Strength Comments functional weakness noted with mobility attempts   Bed Mobility   Comment, (Bed Mobility) CGA for supine<>sit   Transfers   Comment, (Transfers) unable to tolerate this am secondary to dizziness   Gait/Stairs (Locomotion)   Comment, (Gait/Stairs) unable to tolerate secondary to dizziness   Balance   Balance Comments intact sitting balance at EOB with UE support   Sensory Examination   Sensory Perception Comments denies numbness or tingling   Clinical Impression   Criteria for Skilled Therapeutic Intervention Yes, treatment indicated   PT Diagnosis (PT) impaired functional mobility   Influenced by the following impairments decreased activity tolerance; dizziness; functional weakness; impaired balance; O2 needs; forgetful; currently on dialysis   Functional limitations due to impairments impaired independence with mobility and cares secondary to above deficits   Clinical Presentation (PT Evaluation Complexity) evolving   Clinical Presentation Rationale clinical judgement   Clinical Decision Making (Complexity) low complexity   Planned Therapy Interventions (PT) balance training;bed  mobility training;gait training;strengthening;transfer training;progressive activity/exercise   Risk & Benefits of therapy have been explained evaluation/treatment results reviewed;care plan/treatment goals reviewed;risks/benefits reviewed;current/potential barriers reviewed;participants voiced agreement with care plan;participants included;patient   Clinical Impression Comments below baseline; recently at TCU   PT Total Evaluation Time   PT Eval, Low Complexity Minutes (26342) 12   Physical Therapy Goals   PT Frequency 5x/week   PT Predicted Duration/Target Date for Goal Attainment 08/19/25   PT Goals Bed Mobility;Transfers;Gait   PT: Bed Mobility Modified independent;Supine to/from sit;Rolling   PT: Transfers Minimal assist;Sit to/from stand;Bed to/from chair;Assistive device   PT: Gait Minimal assist;Rolling walker;100 feet   Therapeutic Activity   Therapeutic Activities: dynamic activities to improve functional performance Minutes (42828) 17   Symptoms Noted During/After Treatment Fatigue;Dizziness   Treatment Detail/Skilled Intervention Patient in bed upon therapist arrival; OK to have patient get up to EOB; awaiting dialysis this am upon therapist arrival (so nurse requesting patient not get up to chair at this time); nurse increased O2 from 0.5 to 2L NC during session; O2 sats remained 94-95% at rest and with activity; able to come to EOB with CGA and sequencing cues; use of bedrail; assist to manage lines; reports dizziness upon coming to sit at EOB; lessened in severity with time in sitting but never went away; /86: HR 65 during session; able to actively flex/extend LE's at EOB; encouraged to do ankle pumps in sitting and when she returned to supine; unable to tolerate further mobility this date secondary to dizziness; CGA with return to supine (mostly with line management); positioned with pillows for comfort; bed alarm placed for safety; dialysis nurse present with patient at end of session; updated  nurse at end; nurse reports she will do best in am; has dialysis T,TH,S   Gait Training   Treatment Detail/Skilled Intervention unable to tolerate this date   PT Discharge Planning   PT Plan attempt to progress mobility as able; monitor vitals; strengthening   PT Discharge Recommendation (DC Rec) Transitional Care Facility   PT Rationale for DC Rec At this time the patient is below baseline mobility, requiring Ax1 for sitting at EOB; limited by dizziness; has dialysis;  decreased tolerance to activity. Pt would benefit from TCU stay, and family has significant concerns about pt returning home and falling again. Recommend use of FWW for all mobility when tolerated;  TCU for further strengthening and mobility training once discharged.   PT Brief overview of current status CGA to sit at EOB; VSS; limited by dizziness; mobility further limited by dialysis   PT Total Distance Amb During Session (feet) 0

## 2025-08-12 NOTE — PLAN OF CARE
"ICU End of Shift Summary.  For vital signs and complete assessments, please see documentation flowsheets.     Pertinent assessments:   A&O x4, forgetful at times  Lungs clear on 0.5 LPM NC  Sinus rhythm  BM x1 this shift  Voiding ok in external cath    Major Shift Events: SBP elevated - PRN hydralazine given  Plan (Upcoming Events): Dialysis today  Discharge/Transfer Needs: TBD    Bedside Shift Report Completed : Y  Bedside Safety Check Completed: Y       Goal Outcome Evaluation:      Plan of Care Reviewed With: patient    Overall Patient Progress: improvingOverall Patient Progress: improving    Outcome Evaluation: O2 titrated down to 0.5 LPM        Problem: Adult Inpatient Plan of Care  Goal: Plan of Care Review  Description: The Plan of Care Review/Shift note should be completed every shift.  The Outcome Evaluation is a brief statement about your assessment that the patient is improving, declining, or no change.  This information will be displayed automatically on your shift  note.  Outcome: Progressing  Flowsheets (Taken 8/12/2025 0602)  Outcome Evaluation: O2 titrated down to 0.5 LPM  Plan of Care Reviewed With: patient  Overall Patient Progress: improving  Goal: Patient-Specific Goal (Individualized)  Description: You can add care plan individualizations to a care plan. Examples of Individualization might be:  \"Parent requests to be called daily at 9am for status\", \"I have a hard time hearing out of my right ear\", or \"Do not touch me to wake me up as it startles  me\".  Outcome: Progressing  Goal: Absence of Hospital-Acquired Illness or Injury  Outcome: Progressing  Intervention: Identify and Manage Fall Risk  Recent Flowsheet Documentation  Taken 8/12/2025 0500 by Rox Spann RN  Safety Promotion/Fall Prevention:   activity supervised   clutter free environment maintained   increased rounding and observation   increase visualization of patient   lighting adjusted   nonskid shoes/slippers when out of bed   " patient and family education   room near nurse's station   room organization consistent   safety round/check completed   assistive device/personal items within reach   supervised activity   treat reversible contributory factors   treat underlying cause  Taken 8/12/2025 0000 by Rox Spann RN  Safety Promotion/Fall Prevention:   activity supervised   clutter free environment maintained   increased rounding and observation   increase visualization of patient   lighting adjusted   nonskid shoes/slippers when out of bed   patient and family education   room near nurse's station   room organization consistent   safety round/check completed   assistive device/personal items within reach   supervised activity   treat reversible contributory factors   treat underlying cause  Taken 8/11/2025 2000 by Rox Spann RN  Safety Promotion/Fall Prevention:   activity supervised   clutter free environment maintained   increased rounding and observation   increase visualization of patient   lighting adjusted   nonskid shoes/slippers when out of bed   patient and family education   room near nurse's station   room organization consistent   safety round/check completed   assistive device/personal items within reach   supervised activity   treat reversible contributory factors   treat underlying cause  Intervention: Prevent Skin Injury  Recent Flowsheet Documentation  Taken 8/12/2025 0500 by Rox Spann RN  Body Position:   position changed independently   heels elevated  Skin Protection:   adhesive use limited   incontinence pads utilized   transparent dressing maintained   tubing/devices free from skin contact  Taken 8/12/2025 0000 by Rox Spann RN  Body Position:   position changed independently   heels elevated  Skin Protection:   adhesive use limited   incontinence pads utilized   transparent dressing maintained   tubing/devices free from skin contact  Taken 8/11/2025 2000 by Rox Spann RN  Body Position:   turned   left   heels  elevated  Skin Protection:   adhesive use limited   incontinence pads utilized   transparent dressing maintained   tubing/devices free from skin contact  Intervention: Prevent and Manage VTE (Venous Thromboembolism) Risk  Recent Flowsheet Documentation  Taken 8/12/2025 0500 by Rox Spann RN  VTE Prevention/Management: (Heparin)   SCDs off (sequential compression devices)   patient refused intervention  Taken 8/12/2025 0000 by Rox Spann RN  VTE Prevention/Management: (Heparin)   SCDs off (sequential compression devices)   patient refused intervention  Taken 8/11/2025 2000 by Rox Spann RN  VTE Prevention/Management: (Heparin)   SCDs off (sequential compression devices)   patient refused intervention  Intervention: Prevent Infection  Recent Flowsheet Documentation  Taken 8/12/2025 0500 by Rox Spann RN  Infection Prevention:   environmental surveillance performed   hand hygiene promoted   rest/sleep promoted   single patient room provided   equipment surfaces disinfected   personal protective equipment utilized  Taken 8/12/2025 0000 by Rox Spann RN  Infection Prevention:   environmental surveillance performed   hand hygiene promoted   rest/sleep promoted   single patient room provided   equipment surfaces disinfected   personal protective equipment utilized  Taken 8/11/2025 2000 by Rox Spann RN  Infection Prevention:   environmental surveillance performed   hand hygiene promoted   rest/sleep promoted   single patient room provided   equipment surfaces disinfected   personal protective equipment utilized  Goal: Optimal Comfort and Wellbeing  Outcome: Progressing  Intervention: Provide Person-Centered Care  Recent Flowsheet Documentation  Taken 8/12/2025 0500 by Rox Spann RN  Trust Relationship/Rapport:   care explained   choices provided   emotional support provided   empathic listening provided   questions answered   questions encouraged   reassurance provided   thoughts/feelings acknowledged  Taken  8/12/2025 0000 by Rox Spann RN  Trust Relationship/Rapport:   care explained   choices provided   emotional support provided   empathic listening provided   questions answered   questions encouraged   reassurance provided   thoughts/feelings acknowledged  Taken 8/11/2025 2000 by Rox Spann RN  Trust Relationship/Rapport:   care explained   choices provided   emotional support provided   empathic listening provided   questions answered   questions encouraged   reassurance provided   thoughts/feelings acknowledged  Goal: Readiness for Transition of Care  Outcome: Progressing

## 2025-08-12 NOTE — PROGRESS NOTES
Cross Cover    Called for hypertension.  Here with wildly variable BPs and currently hypertensive.  Pta regimen scaled back due to hypotension necessitating pressor support.     Currently on carvedilol 25 mg bid and hydralazine 50 mg tid scheduled, and prn hydralazine    Has been getting prn IV hydralazine but unable to give now due to it being too early  Give hydralazine 12.5 mg ORAL and see if that brings her BP down a bit.

## 2025-08-12 NOTE — PLAN OF CARE
"ICU End of Shift Summary.  For vital signs and complete assessments, please see documentation flowsheets.      Pertinent assessments:   Neuro: A/O x4 with forgetfulness at times. Afebrile.   Cardiac: SR, HTN throughout shift. Scheduled medications given. Nicardipine restarted by MD.   Resp: LS clear, dim, NC 2L for most of shift.   GI: BS active. BM x3 this shift. TF changed per dietary. Started jevity 1.5 at 1300. Pt has eaten today. Encouraging PO intake of meds and food  : External cath in place. Adequate output  Lines: 3 PIVs, HD  Drips: None    Plan (Upcoming Events): PT and OT.   Discharge/Transfer Needs: TBD     Bedside Shift Report Completed : Yes  Bedside Safety Check Completed: Yes      Goal Outcome Evaluation:      Plan of Care Reviewed With: patient, spouse    Overall Patient Progress: improvingOverall Patient Progress: improving    Outcome Evaluation: PT eval, Dialysis in room, Diet change to regular. Pt ate burger family brought in.    Problem: Adult Inpatient Plan of Care  Goal: Plan of Care Review  Description: The Plan of Care Review/Shift note should be completed every shift.  The Outcome Evaluation is a brief statement about your assessment that the patient is improving, declining, or no change.  This information will be displayed automatically on your shift  note.  Outcome: Progressing  Flowsheets (Taken 8/12/2025 1501)  Outcome Evaluation: PT eval, Dialysis in room, Diet change to regular. Pt ate burger family brought in.  Plan of Care Reviewed With:   patient   spouse  Overall Patient Progress: improving  Goal: Patient-Specific Goal (Individualized)  Description: You can add care plan individualizations to a care plan. Examples of Individualization might be:  \"Parent requests to be called daily at 9am for status\", \"I have a hard time hearing out of my right ear\", or \"Do not touch me to wake me up as it startles  me\".  Outcome: Progressing  Goal: Absence of Hospital-Acquired Illness or " Injury  Outcome: Progressing  Intervention: Identify and Manage Fall Risk  Recent Flowsheet Documentation  Taken 8/12/2025 1236 by Codie Chandra RN  Safety Promotion/Fall Prevention:   activity supervised   clutter free environment maintained   increased rounding and observation   increase visualization of patient   lighting adjusted   nonskid shoes/slippers when out of bed   patient and family education   room near nurse's station   room organization consistent   safety round/check completed   assistive device/personal items within reach   supervised activity   treat reversible contributory factors   treat underlying cause  Taken 8/12/2025 0800 by Codie Chandra RN  Safety Promotion/Fall Prevention:   activity supervised   clutter free environment maintained   increased rounding and observation   increase visualization of patient   lighting adjusted   nonskid shoes/slippers when out of bed   patient and family education   room near nurse's station   room organization consistent   safety round/check completed   assistive device/personal items within reach   supervised activity   treat reversible contributory factors   treat underlying cause  Intervention: Prevent Skin Injury  Recent Flowsheet Documentation  Taken 8/12/2025 1236 by Codie Chandra RN  Body Position:   turned   right   position changed independently   heels elevated   legs elevated   weight shifting   upper extremity elevated   tilted  Skin Protection:   adhesive use limited   incontinence pads utilized   transparent dressing maintained   tubing/devices free from skin contact  Taken 8/12/2025 0800 by Codie Chandra RN  Body Position: (boosted in bed. Per pt supine will be eating)   other (see comments)   weight shifting   position changed independently   heels elevated   legs elevated   supine   upper extremity elevated  Skin Protection:   adhesive use limited   incontinence pads utilized   transparent dressing maintained   tubing/devices free from skin  contact  Intervention: Prevent and Manage VTE (Venous Thromboembolism) Risk  Recent Flowsheet Documentation  Taken 8/12/2025 1236 by Codie Chandra RN  VTE Prevention/Management: (Heparin)   SCDs off (sequential compression devices)   patient refused intervention  Taken 8/12/2025 0800 by Codie Chandra RN  VTE Prevention/Management: (Heparin)   SCDs off (sequential compression devices)   patient refused intervention  Intervention: Prevent Infection  Recent Flowsheet Documentation  Taken 8/12/2025 1236 by Codie Chandra RN  Infection Prevention:   environmental surveillance performed   hand hygiene promoted   rest/sleep promoted   single patient room provided   equipment surfaces disinfected   personal protective equipment utilized  Taken 8/12/2025 0800 by Codie Chandra RN  Infection Prevention:   environmental surveillance performed   hand hygiene promoted   rest/sleep promoted   single patient room provided   equipment surfaces disinfected   personal protective equipment utilized  Goal: Optimal Comfort and Wellbeing  Outcome: Progressing  Intervention: Provide Person-Centered Care  Recent Flowsheet Documentation  Taken 8/12/2025 1236 by Codie Chandra RN  Trust Relationship/Rapport:   care explained   choices provided   emotional support provided   empathic listening provided   questions answered   questions encouraged   reassurance provided   thoughts/feelings acknowledged  Taken 8/12/2025 0800 by Codie Chandra RN  Trust Relationship/Rapport:   care explained   choices provided   emotional support provided   empathic listening provided   questions answered   questions encouraged   reassurance provided   thoughts/feelings acknowledged  Goal: Readiness for Transition of Care  Outcome: Progressing  Flowsheets (Taken 8/12/2025 1501)  Anticipated Changes Related to Illness: inability to care for self  Transportation Anticipated: family or friend will provide  Concerns to be Addressed: discharge planning  Barriers to Discharge: Remains  on low amt of O2, keofeed remains in place.  Intervention: Mutually Develop Transition Plan  Recent Flowsheet Documentation  Taken 8/12/2025 1501 by Codie Chandra RN  Anticipated Changes Related to Illness: inability to care for self  Transportation Anticipated: family or friend will provide  Concerns to be Addressed: discharge planning     Problem: Delirium  Goal: Optimal Coping  Outcome: Progressing  Intervention: Optimize Psychosocial Adjustment to Delirium  Recent Flowsheet Documentation  Taken 8/12/2025 1236 by Codie Chandra, RN  Supportive Measures:   active listening utilized   decision-making supported   goal-setting facilitated   positive reinforcement provided   self-care encouraged   self-reflection promoted   verbalization of feelings encouraged   relaxation techniques promoted  Taken 8/12/2025 0800 by Codie Chandra, BOWEN  Supportive Measures:   active listening utilized   decision-making supported   goal-setting facilitated   positive reinforcement provided   self-care encouraged   self-reflection promoted   verbalization of feelings encouraged   relaxation techniques promoted  Goal: Improved Behavioral Control  Outcome: Progressing  Intervention: Prevent and Manage Agitation  Recent Flowsheet Documentation  Taken 8/12/2025 1236 by Codie Chandra RN  Environment Familiarity/Consistency: daily routine followed  Taken 8/12/2025 0800 by Codie Chandra RN  Environment Familiarity/Consistency: daily routine followed  Intervention: Minimize Safety Risk  Recent Flowsheet Documentation  Taken 8/12/2025 1236 by Codie Chandra RN  Enhanced Safety Measures:   room near unit station   assistive devices when indicated   monitor patients coagulation values   review medications for side effects with activity  Trust Relationship/Rapport:   care explained   choices provided   emotional support provided   empathic listening provided   questions answered   questions encouraged   reassurance provided   thoughts/feelings acknowledged  Taken  8/12/2025 0800 by Codie Chandra RN  Enhanced Safety Measures:   room near unit station   assistive devices when indicated   monitor patients coagulation values   review medications for side effects with activity  Trust Relationship/Rapport:   care explained   choices provided   emotional support provided   empathic listening provided   questions answered   questions encouraged   reassurance provided   thoughts/feelings acknowledged  Goal: Improved Attention and Thought Clarity  Outcome: Progressing  Intervention: Maximize Cognitive Function  Recent Flowsheet Documentation  Taken 8/12/2025 1236 by Codie Chandra RN  Sensory Stimulation Regulation:   care clustered   quiet environment promoted  Reorientation Measures:   calendar in view   reorientation provided  Taken 8/12/2025 0800 by Codie Chandra RN  Sensory Stimulation Regulation:   care clustered   quiet environment promoted  Reorientation Measures:   calendar in view   reorientation provided  Goal: Improved Sleep  Outcome: Progressing     Problem: Skin Injury Risk Increased  Goal: Skin Health and Integrity  Outcome: Progressing  Intervention: Plan: Nurse Driven Intervention: Positioning  Recent Flowsheet Documentation  Taken 8/12/2025 1236 by Codie Chandra RN  Plan: Positioning Interventions:   REPOSITION Left/Right (No supine) q2h   OFF-LOAD HEELS with pillows  Taken 8/12/2025 0800 by Codie Chandra RN  Plan: Positioning Interventions:   REPOSITION Left/Right (No supine) q2h   OFF-LOAD HEELS with pillows  Intervention: Plan: Nurse Driven Intervention: Moisture Management  Recent Flowsheet Documentation  Taken 8/12/2025 1236 by Codie Chandra RN  Moisture Interventions:   No brief in bed   Urinary collection device  Bathing/Skin Care:   bath, complete   dressed/undressed   electrode patches/site rotation   incontinence care   linen changed  Taken 8/12/2025 0800 by Codie Chandra RN  Moisture Interventions:   No brief in bed   Urinary collection device  Bathing/Skin Care:   bath,  complete   dressed/undressed   electrode patches/site rotation   incontinence care   linen changed  Intervention: Plan: Nurse Driven Intervention: Friction and Shear  Recent Flowsheet Documentation  Taken 8/12/2025 1236 by Codie Chandra RN  Friction/Shear Interventions:   Preventative dressing heels   Repositioning device (TAP system, etc.)   Assistive lifting device (portable/ceiling lift, etc.)  Taken 8/12/2025 0800 by Codie Chandra RN  Friction/Shear Interventions:   Preventative dressing heels   Repositioning device (TAP system, etc.)   Assistive lifting device (portable/ceiling lift, etc.)  Intervention: Optimize Skin Protection  Recent Flowsheet Documentation  Taken 8/12/2025 1236 by Codie Chandra RN  Skin Protection:   adhesive use limited   incontinence pads utilized   transparent dressing maintained   tubing/devices free from skin contact  Activity Management:   activity adjusted per tolerance   activity encouraged  Head of Bed (HOB) Positioning: HOB at 20-30 degrees  Taken 8/12/2025 0800 by Codie Chandra RN  Skin Protection:   adhesive use limited   incontinence pads utilized   transparent dressing maintained   tubing/devices free from skin contact  Activity Management:   activity adjusted per tolerance   activity encouraged  Head of Bed (HOB) Positioning: HOB at 45 degrees  Intervention: Promote and Optimize Oral Intake  Recent Flowsheet Documentation  Taken 8/12/2025 1236 by Codie Chandra RN  Oral Nutrition Promotion: rest periods promoted  Taken 8/12/2025 0800 by Codie Chandra RN  Oral Nutrition Promotion: rest periods promoted     Problem: Breathing Pattern Ineffective  Goal: Effective Breathing Pattern  Outcome: Progressing  Intervention: Promote Improved Breathing Pattern  Recent Flowsheet Documentation  Taken 8/12/2025 1236 by Codie Chandra RN  Breathing Techniques/Airway Clearance: deep/controlled cough encouraged  Supportive Measures:   active listening utilized   decision-making supported   goal-setting  facilitated   positive reinforcement provided   self-care encouraged   self-reflection promoted   verbalization of feelings encouraged   relaxation techniques promoted  Airway/Ventilation Management:   airway patency maintained   position adjusted   pulmonary hygiene promoted  Head of Bed (HOB) Positioning: HOB at 20-30 degrees  Taken 8/12/2025 0800 by Codie Chandra RN  Breathing Techniques/Airway Clearance: deep/controlled cough encouraged  Supportive Measures:   active listening utilized   decision-making supported   goal-setting facilitated   positive reinforcement provided   self-care encouraged   self-reflection promoted   verbalization of feelings encouraged   relaxation techniques promoted  Airway/Ventilation Management:   airway patency maintained   position adjusted   pulmonary hygiene promoted  Head of Bed (HOB) Positioning: HOB at 45 degrees     Problem: Comorbidity Management  Goal: Maintenance of COPD Symptom Control  Outcome: Progressing  Intervention: Maintain COPD Symptom Control  Recent Flowsheet Documentation  Taken 8/12/2025 1236 by Codie Chandra RN  Breathing Techniques/Airway Clearance: deep/controlled cough encouraged  Medication Review/Management:   medications reviewed   dosing adjusted   high-risk medications identified  Taken 8/12/2025 0800 by Codie Chandra RN  Breathing Techniques/Airway Clearance: deep/controlled cough encouraged  Medication Review/Management:   medications reviewed   dosing adjusted   high-risk medications identified  Goal: Blood Pressure in Desired Range  Outcome: Progressing  Intervention: Maintain Blood Pressure Management  Recent Flowsheet Documentation  Taken 8/12/2025 1236 by Codie Chandra RN  Medication Review/Management:   medications reviewed   dosing adjusted   high-risk medications identified  Taken 8/12/2025 0800 by Codie Chandra RN  Medication Review/Management:   medications reviewed   dosing adjusted   high-risk medications identified     Problem: Hypertension  Acute  Goal: Blood Pressure Within Desired Range  Outcome: Progressing  Intervention: Normalize Blood Pressure  Recent Flowsheet Documentation  Taken 8/12/2025 1236 by Codie Chandra, RN  Sensory Stimulation Regulation:   care clustered   quiet environment promoted  Medication Review/Management:   medications reviewed   dosing adjusted   high-risk medications identified  Taken 8/12/2025 0800 by Codie Chandra, RN  Sensory Stimulation Regulation:   care clustered   quiet environment promoted  Medication Review/Management:   medications reviewed   dosing adjusted   high-risk medications identified

## 2025-08-12 NOTE — PROGRESS NOTES
Care Management Follow Up    Length of Stay (days): 15    Expected Discharge Date: 08/03/2025     Concerns to be Addressed: discharge planning     Patient plan of care discussed at interdisciplinary rounds: Yes    Anticipated Discharge Disposition: Transitional Care      Anticipated Discharge Services:  Outpatient dialysis  Anticipated Discharge DME:        Education Provided on the Discharge Plan:  yes  Patient/Family in Agreement with the Plan: yes    Referrals Placed by CM/SW: Post Acute Facilities  Private pay costs discussed: Not applicable    Discussed  Partnership in Safe Discharge Planning  document with patient/family: No     Handoff Completed: No, handoff not indicated or clinically appropriate    Additional Information:  Anticipate that patient will require outpatient HD per nephrology last week. Met with patient and spouse regarding discharge planning. Once referral sent for outpatient HD they would prefer the referral be sent for [facility].  Current PT recs are for TCU. The patient continues to have a bed hold at [facility]. They will need to review patients current level of care before return. She would not be able to return with a nasal keofeed. Explained this to family. Will need to assess mobility closer to discharge to establish a transportation plan for getting to and from dialysis as well.    Anticipate patient will remain hospitalized several more days per notes. Will send dialysis referral when discharge date is better known.    Shara Duran RN BSN OCN  Care Coordinator  [facility]  [phone]

## 2025-08-12 NOTE — PROGRESS NOTES
DIALYSIS PROCEDURE NOTE      Patient dialyzed for 3 hrs. on a K3 bath with a net fluid removal of  1L.  A BFR of 400 ml/min was obtained via a CVC   The treatment plan was discussed with Dr. Lucas during the treatment.    Total heparin received during the treatment: 0 units.   Line flushed, clamped and capped with heparin 1:1000 1.9 mL (1900 units) per lumen    Meds  given: none   Complications: none      Person educated: patient. Barriers to learning: lethargic. Educated on procedure via verbal mode. Patient verbalized understanding.     ICEBOAT    I: Patient was identified using 2 identifiers  C:  Consent Signed Yes  E: Equipment preventative maintenance is current and dialysis delivery system OK to use  B: Hepatitis B Surface result    Latest Reference Range & Units 08/05/25 12:23   Hepatitis B Core Patricia Nonreactive  Nonreactive   Hepatitis B Surface Antibody Instrument Value <8.5 m[IU]/mL <3.50   Hepatitis B Surface Antibody  Nonreactive     O: Dialysis orders present and complete prior to treatment  A: Vascular access verified and assessed prior to treatment  T: Treatment was performed at a clinically appropriate time  ?: Patient was allowed to ask questions and address concerns prior to treatment  Machine water alarm in place and functioning. Transducer pods intact and checked every 15min.   Pt returned via bedside.  Chlorine/Chloramine water system checked every 4 hours.  Outpatient Dialysis at D    Patient repositioned every 2 hours during the treatment.  Post treatment report given

## 2025-08-12 NOTE — PROGRESS NOTES
/81 after HD today.  She has had carvedilol 25 mg, hydralazine 50 mg.  Nifedipine XL 30 mg has been ordered.    Following.    Luis Eduardo Lucas MD

## 2025-08-13 ENCOUNTER — APPOINTMENT (OUTPATIENT)
Dept: PHYSICAL THERAPY | Facility: CLINIC | Age: 75
DRG: 291 | End: 2025-08-13
Payer: MEDICARE

## 2025-08-13 ENCOUNTER — APPOINTMENT (OUTPATIENT)
Dept: OCCUPATIONAL THERAPY | Facility: CLINIC | Age: 75
DRG: 291 | End: 2025-08-13
Attending: INTERNAL MEDICINE
Payer: MEDICARE

## 2025-08-13 LAB
ANION GAP SERPL CALCULATED.3IONS-SCNC: 12 MMOL/L (ref 7–15)
BUN SERPL-MCNC: 30.5 MG/DL (ref 8–23)
CALCIUM SERPL-MCNC: 8.4 MG/DL (ref 8.8–10.4)
CHLORIDE SERPL-SCNC: 95 MMOL/L (ref 98–107)
CREAT SERPL-MCNC: 1.85 MG/DL (ref 0.51–0.95)
EGFRCR SERPLBLD CKD-EPI 2021: 28 ML/MIN/1.73M2
GLUCOSE BLDC GLUCOMTR-MCNC: 101 MG/DL (ref 70–99)
GLUCOSE BLDC GLUCOMTR-MCNC: 118 MG/DL (ref 70–99)
GLUCOSE BLDC GLUCOMTR-MCNC: 120 MG/DL (ref 70–99)
GLUCOSE BLDC GLUCOMTR-MCNC: 126 MG/DL (ref 70–99)
GLUCOSE BLDC GLUCOMTR-MCNC: 131 MG/DL (ref 70–99)
GLUCOSE BLDC GLUCOMTR-MCNC: 141 MG/DL (ref 70–99)
GLUCOSE SERPL-MCNC: 107 MG/DL (ref 70–99)
HCO3 SERPL-SCNC: 26 MMOL/L (ref 22–29)
MAGNESIUM SERPL-MCNC: 1.7 MG/DL (ref 1.7–2.3)
MCV RBC AUTO: 100.4 FL (ref 78–100)
PHOSPHATE SERPL-MCNC: 1.7 MG/DL (ref 2.5–4.5)
PHOSPHATE SERPL-MCNC: 1.8 MG/DL (ref 2.5–4.5)
PLATELET # BLD AUTO: 280 10E3/UL (ref 150–450)
POTASSIUM SERPL-SCNC: 4 MMOL/L (ref 3.4–5.3)
POTASSIUM SERPL-SCNC: 4 MMOL/L (ref 3.4–5.3)
SODIUM SERPL-SCNC: 133 MMOL/L (ref 135–145)

## 2025-08-13 PROCEDURE — 250N000013 HC RX MED GY IP 250 OP 250 PS 637: Performed by: INTERNAL MEDICINE

## 2025-08-13 PROCEDURE — 83735 ASSAY OF MAGNESIUM: CPT | Performed by: STUDENT IN AN ORGANIZED HEALTH CARE EDUCATION/TRAINING PROGRAM

## 2025-08-13 PROCEDURE — 97530 THERAPEUTIC ACTIVITIES: CPT | Mod: GP | Performed by: PHYSICAL THERAPIST

## 2025-08-13 PROCEDURE — 84100 ASSAY OF PHOSPHORUS: CPT | Performed by: STUDENT IN AN ORGANIZED HEALTH CARE EDUCATION/TRAINING PROGRAM

## 2025-08-13 PROCEDURE — 97535 SELF CARE MNGMENT TRAINING: CPT | Mod: GO

## 2025-08-13 PROCEDURE — 97165 OT EVAL LOW COMPLEX 30 MIN: CPT | Mod: GO

## 2025-08-13 PROCEDURE — 36415 COLL VENOUS BLD VENIPUNCTURE: CPT | Performed by: INTERNAL MEDICINE

## 2025-08-13 PROCEDURE — 250N000013 HC RX MED GY IP 250 OP 250 PS 637: Performed by: STUDENT IN AN ORGANIZED HEALTH CARE EDUCATION/TRAINING PROGRAM

## 2025-08-13 PROCEDURE — 80048 BASIC METABOLIC PNL TOTAL CA: CPT | Performed by: STUDENT IN AN ORGANIZED HEALTH CARE EDUCATION/TRAINING PROGRAM

## 2025-08-13 PROCEDURE — 85049 AUTOMATED PLATELET COUNT: CPT | Performed by: INTERNAL MEDICINE

## 2025-08-13 PROCEDURE — 99232 SBSQ HOSP IP/OBS MODERATE 35: CPT | Performed by: INTERNAL MEDICINE

## 2025-08-13 PROCEDURE — 80048 BASIC METABOLIC PNL TOTAL CA: CPT | Performed by: INTERNAL MEDICINE

## 2025-08-13 PROCEDURE — 84100 ASSAY OF PHOSPHORUS: CPT | Performed by: INTERNAL MEDICINE

## 2025-08-13 PROCEDURE — 120N000004 HC R&B MS OVERFLOW

## 2025-08-13 RX ADMIN — AMIODARONE HYDROCHLORIDE 200 MG: 200 TABLET ORAL at 08:17

## 2025-08-13 RX ADMIN — MECLIZINE HYDROCHLORIDE 25 MG: 25 TABLET ORAL at 11:00

## 2025-08-13 RX ADMIN — HYDRALAZINE HYDROCHLORIDE 50 MG: 50 TABLET, FILM COATED ORAL at 15:34

## 2025-08-13 RX ADMIN — APIXABAN 2.5 MG: 2.5 TABLET, FILM COATED ORAL at 08:19

## 2025-08-13 RX ADMIN — MECLIZINE HYDROCHLORIDE 25 MG: 25 TABLET ORAL at 06:12

## 2025-08-13 RX ADMIN — CARVEDILOL 25 MG: 12.5 TABLET, FILM COATED ORAL at 08:17

## 2025-08-13 RX ADMIN — POTASSIUM & SODIUM PHOSPHATES POWDER PACK 280-160-250 MG 1 PACKET: 280-160-250 PACK at 06:28

## 2025-08-13 RX ADMIN — MECLIZINE HYDROCHLORIDE 25 MG: 25 TABLET ORAL at 00:17

## 2025-08-13 RX ADMIN — POTASSIUM & SODIUM PHOSPHATES POWDER PACK 280-160-250 MG 1 PACKET: 280-160-250 PACK at 11:00

## 2025-08-13 RX ADMIN — ACETAMINOPHEN 500 MG: 500 TABLET, FILM COATED ORAL at 15:34

## 2025-08-13 RX ADMIN — MEGESTROL ACETATE 200 MG: 40 SUSPENSION ORAL at 08:24

## 2025-08-13 RX ADMIN — HYDRALAZINE HYDROCHLORIDE 50 MG: 50 TABLET, FILM COATED ORAL at 08:18

## 2025-08-13 RX ADMIN — MECLIZINE HYDROCHLORIDE 25 MG: 25 TABLET ORAL at 18:25

## 2025-08-13 RX ADMIN — APIXABAN 2.5 MG: 2.5 TABLET, FILM COATED ORAL at 21:20

## 2025-08-13 RX ADMIN — POTASSIUM & SODIUM PHOSPHATES POWDER PACK 280-160-250 MG 1 PACKET: 280-160-250 PACK at 15:34

## 2025-08-13 RX ADMIN — CLOPIDOGREL BISULFATE 75 MG: 75 TABLET, FILM COATED ORAL at 18:25

## 2025-08-13 RX ADMIN — NIFEDIPINE 30 MG: 30 TABLET, FILM COATED, EXTENDED RELEASE ORAL at 08:22

## 2025-08-13 RX ADMIN — ICOSAPENT ETHYL 2 G: 1 CAPSULE ORAL at 19:25

## 2025-08-13 RX ADMIN — Medication 5 ML: at 08:22

## 2025-08-13 RX ADMIN — CARVEDILOL 25 MG: 12.5 TABLET, FILM COATED ORAL at 18:25

## 2025-08-13 RX ADMIN — ICOSAPENT ETHYL 2 G: 1 CAPSULE ORAL at 08:23

## 2025-08-13 RX ADMIN — POTASSIUM & SODIUM PHOSPHATES POWDER PACK 280-160-250 MG 1 PACKET: 280-160-250 PACK at 21:20

## 2025-08-13 RX ADMIN — PAROXETINE HYDROCHLORIDE 40 MG: 20 TABLET, FILM COATED ORAL at 08:18

## 2025-08-13 RX ADMIN — ACETAMINOPHEN 650 MG: 325 TABLET ORAL at 21:20

## 2025-08-13 RX ADMIN — HYDRALAZINE HYDROCHLORIDE 50 MG: 50 TABLET, FILM COATED ORAL at 00:17

## 2025-08-13 ASSESSMENT — ACTIVITIES OF DAILY LIVING (ADL)
ADLS_ACUITY_SCORE: 71
ADLS_ACUITY_SCORE: 76
ADLS_ACUITY_SCORE: 71
ADLS_ACUITY_SCORE: 79
ADLS_ACUITY_SCORE: 76
ADLS_ACUITY_SCORE: 79
ADLS_ACUITY_SCORE: 75
ADLS_ACUITY_SCORE: 75
ADLS_ACUITY_SCORE: 71
ADLS_ACUITY_SCORE: 71
ADLS_ACUITY_SCORE: 79
ADLS_ACUITY_SCORE: 76
ADLS_ACUITY_SCORE: 71
ADLS_ACUITY_SCORE: 71
ADLS_ACUITY_SCORE: 76

## 2025-08-13 NOTE — PLAN OF CARE
"  Problem: Adult Inpatient Plan of Care  Goal: Plan of Care Review  Description: The Plan of Care Review/Shift note should be completed every shift.  The Outcome Evaluation is a brief statement about your assessment that the patient is improving, declining, or no change.  This information will be displayed automatically on your shift  note.  Outcome: Progressing  Flowsheets (Taken 8/13/2025 1843)  Outcome Evaluation: MOF.  Pain in throat improved after PRN tylenol.  SR.  OOB.  Good effort to eat.  Calorie count and fluid restiction.  Oliguric.  BM.  Cooperative.  Famliy suportive.  Overall Patient Progress: improving  Goal: Patient-Specific Goal (Individualized)  Description: You can add care plan individualizations to a care plan. Examples of Individualization might be:  \"Parent requests to be called daily at 9am for status\", \"I have a hard time hearing out of my right ear\", or \"Do not touch me to wake me up as it startles  me\".  Outcome: Progressing  Goal: Absence of Hospital-Acquired Illness or Injury  Outcome: Progressing  Intervention: Identify and Manage Fall Risk  Recent Flowsheet Documentation  Taken 8/13/2025 0800 by Betty Nuñez RN  Safety Promotion/Fall Prevention:   activity supervised   clutter free environment maintained   increase visualization of patient   increased rounding and observation   nonskid shoes/slippers when out of bed   safety round/check completed   patient and family education  Intervention: Prevent Skin Injury  Recent Flowsheet Documentation  Taken 8/13/2025 1620 by Betty Nuñez RN  Body Position: position changed independently  Taken 8/13/2025 1442 by Betty Nuñez RN  Body Position: position changed independently  Taken 8/13/2025 0900 by Betty Nuñez RN  Body Position: position changed independently  Taken 8/13/2025 0800 by Betty Nuñez RN  Skin Protection:   tubing/devices free from skin contact   pulse oximeter probe site changed   incontinence pads utilized   " adhesive use limited  Intervention: Prevent and Manage VTE (Venous Thromboembolism) Risk  Recent Flowsheet Documentation  Taken 8/13/2025 0800 by Betty Nuñez RN  VTE Prevention/Management: SCDs off (sequential compression devices)  Intervention: Prevent Infection  Recent Flowsheet Documentation  Taken 8/13/2025 0800 by Betty Nuñez RN  Infection Prevention:   environmental surveillance performed   equipment surfaces disinfected   hand hygiene promoted   single patient room provided  Goal: Optimal Comfort and Wellbeing  Outcome: Progressing  Intervention: Monitor Pain and Promote Comfort  Recent Flowsheet Documentation  Taken 8/13/2025 1534 by Betty Nuñez RN  Pain Management Interventions: medication (see MAR)  Intervention: Provide Person-Centered Care  Recent Flowsheet Documentation  Taken 8/13/2025 0800 by Betty Nuñez RN  Trust Relationship/Rapport:   care explained   choices provided   questions answered   questions encouraged   reassurance provided   thoughts/feelings acknowledged  Goal: Readiness for Transition of Care  Outcome: Progressing     Problem: Delirium  Goal: Optimal Coping  Outcome: Progressing  Intervention: Optimize Psychosocial Adjustment to Delirium  Recent Flowsheet Documentation  Taken 8/13/2025 0800 by Betty Nuñez RN  Supportive Measures:   active listening utilized   decision-making supported   positive reinforcement provided   problem-solving facilitated   relaxation techniques promoted   self-care encouraged  Goal: Improved Behavioral Control  Outcome: Progressing  Intervention: Prevent and Manage Agitation  Recent Flowsheet Documentation  Taken 8/13/2025 0800 by Betty Nuñez RN  Complementary Therapy: aromatherapy utilized  Environment Familiarity/Consistency: daily routine followed  Intervention: Minimize Safety Risk  Recent Flowsheet Documentation  Taken 8/13/2025 0800 by Betty Nuñez RN  Enhanced Safety Measures:   pain management   review medications for  side effects with activity   room near unit station  Trust Relationship/Rapport:   care explained   choices provided   questions answered   questions encouraged   reassurance provided   thoughts/feelings acknowledged  Goal: Improved Attention and Thought Clarity  Outcome: Progressing  Goal: Improved Sleep  Outcome: Progressing     Problem: Skin Injury Risk Increased  Goal: Skin Health and Integrity  Outcome: Progressing  Intervention: Plan: Nurse Driven Intervention: Positioning  Recent Flowsheet Documentation  Taken 8/13/2025 0800 by Betty Nuñez RN  Plan: Positioning Interventions:   REPOSITION Left/Right (No supine) q2h   OFF-LOAD HEELS with pillows  Intervention: Plan: Nurse Driven Intervention: Moisture Management  Recent Flowsheet Documentation  Taken 8/13/2025 0800 by Betty Nuñez RN  Moisture Interventions:   No brief in bed   Encourage regular toileting  Intervention: Plan: Nurse Driven Intervention: Friction and Shear  Recent Flowsheet Documentation  Taken 8/13/2025 0800 by Betty Nuñez RN  Friction/Shear Interventions: HOB 30 degrees or less  Intervention: Optimize Skin Protection  Recent Flowsheet Documentation  Taken 8/13/2025 1620 by Betty Nuñez RN  Activity Management: activity encouraged  Taken 8/13/2025 0800 by Betty Nuñez RN  Skin Protection:   tubing/devices free from skin contact   pulse oximeter probe site changed   incontinence pads utilized   adhesive use limited  Intervention: Promote and Optimize Oral Intake  Recent Flowsheet Documentation  Taken 8/13/2025 0800 by Betty Nuñez RN  Oral Nutrition Promotion: nutrition counseling provided     Problem: Breathing Pattern Ineffective  Goal: Effective Breathing Pattern  Outcome: Progressing  Intervention: Promote Improved Breathing Pattern  Recent Flowsheet Documentation  Taken 8/13/2025 0800 by Betty Nuñez RN  Breathing Techniques/Airway Clearance: deep/controlled cough encouraged  Supportive Measures:   active  listening utilized   decision-making supported   positive reinforcement provided   problem-solving facilitated   relaxation techniques promoted   self-care encouraged  Airway/Ventilation Management:   pulmonary hygiene promoted   oxygen therapy provided     Problem: Comorbidity Management  Goal: Maintenance of COPD Symptom Control  Outcome: Progressing  Intervention: Maintain COPD Symptom Control  Recent Flowsheet Documentation  Taken 8/13/2025 0800 by Betty Nuñez RN  Breathing Techniques/Airway Clearance: deep/controlled cough encouraged  Medication Review/Management: medications reviewed  Goal: Blood Pressure in Desired Range  Outcome: Progressing  Intervention: Maintain Blood Pressure Management  Recent Flowsheet Documentation  Taken 8/13/2025 0800 by Betty Nuñez RN  Medication Review/Management: medications reviewed     Problem: Hypertension Acute  Goal: Blood Pressure Within Desired Range  Outcome: Progressing  Intervention: Normalize Blood Pressure  Recent Flowsheet Documentation  Taken 8/13/2025 0800 by Betty Nuñez RN  Medication Review/Management: medications reviewed   Goal Outcome Evaluation:           Overall Patient Progress: improvingOverall Patient Progress: improving    Outcome Evaluation: MOF.  Pain in throat improved after PRN tylenol.  SR.  OOB.  Good effort to eat.  Calorie count and fluid restiction.  Oliguric.  BM.  Cooperative.  Famliy suportive.

## 2025-08-13 NOTE — PROGRESS NOTES
[facility]    Medicine Progress Note - Hospitalist Service    Date of Admission:  7/28/2025    Assessment & Plan   75-year-old female with history of CAD, CKD stage IV, hypertension, hyperlipidemia, intra-abdominal aortic aneurysm with prior stenting, chronic vertigo, recent hospitalization for fall with pelvic fracture who presents the ED for shortness of breath.       Had elevated D-dimer but VQ scan negative for PE, CT not done due to CKD.  proBNP more than 25,000.  Chest x-ray showed bilateral perihilar interstitial/airspace opacities, left greater than right, are nonspecific for postradiation change versus pneumonia. A small nodule in the peripheral right upper lobe measures 9 mm.      Found to be in hypertensive emergency, admitted to ICU with BiPAP and nitroglycerin drip.  She was weaned off nitroglycerin drip and BiPAP but has had repeated episodes of rising blood pressure and shortness of breath needing her to go back on BiPAP intermittently.  She was transferred to floor on 7/31 but came back to ICU on 8/2 after rapid response for flash pulmonary edema and hypertensive emergency.      Started on hemodialysis treatment 8/10/25.     On the morning of 8/14, patient went into A-fib with initial rates of 130s and then decreased to 40s without intervention.  She converted back to sinus rhythm and about half an hour.      Currently medically improved and stable to transfer to the medical floor.     Acute hypoxic respiratory failure: Multifactorial-initially BiPAP dependent, improved  Diastolic CHF exacerbation,Recurrent flash pulmonary edema associated with episodes of high blood pressure-improved with initiation of hemodialysis     Respiratory status improved with dialysis and improved control of BP  On min supplemental oxygen - wean off today    - Echo 7/29/25 - with EF of 55 to 60%, left atrium dilated, right bent sys function normal    Unclear what is causing recurrent sudden episodes  of elevated blood pressure and flash pulmonary edema.  She has history of renal artery stenting and on renal arterial ultrasound done on 7/31 there was no sonographic evidence of renal artery stenosis.       End-stage renal disease  Metabolic acidosis     Initiated hemodialysis this admission  Nephrology continuing to follow (appreciated)     Hypertension  Hypertensive emergency, resolved        PTA med include carvediol, hydralazine, isordil, clonidine patch     Noted to have elevated normetaneprhine level but metanephrine level was normal     nitially had hypertensive urgency requiring nitroglycerin gtt     Has had quite variable BPs in the last several days  was initially with hypertensive urgency and, over the last few days, has been intermittently hypotensive     8/13/25 - Currently on Coreg 25mg po bid, hydralazine 50mg po bid.  Will restart Nifedipine XL as lower dose this am (30mg) and continue to monitor closely    Infectious disease  -Was empirically started on ceftriaxone on presentation.  Patient had no fever or leukocytosis.  Mildly elevated procalcitonin of 0.67 which is difficult to interpret in ROMI/CKD and improved to 0.49 on recheck.  Finished 5-day course of ceftriaxone.       Atrial fibrillation: Paroxysmal atrial fibrillation    - On 8/4, patient had a self-limiting 30 minutes episode of atrial fibrillation initially with RVR to 130 and then bradycardia to 40.  - Cardiology was reconsulted, advised 150 mg amiodarone IV push, and the plan was no further amiodarone if patient remains sinus.    -- However, patient developed intermittent episodes of atrial fibrillation with RVR.  Cardiology recommended amiodarone drip and planning to transition to oral amiodarone 200 mg once a day for about a month.  Continue Coreg at current dose     Anticoagulation was recommended by cardiology (Eliquis)     Malnutrition: Moderate nutrition in the context of acute on chronic medical illness.  - Patient was unable  to take adequate oral intake due to dependence on BiPAP and currently due to decreased appetite.  -Registered dietitian is following and recommendation appreciated     - keofed feeding tube placed and tube feeding started she has been tolerating feeding tube.  Her oral intake still remains poor.    Appetite stimulation with Megace - continues    8/13/25 - discussed with dietician at length this am.  Discussed potential cycling of tube feeds to see if she would be able to eat more during the day     Will start calorie counts  Diet was liberalized 8/12/25 - but still didn't eat much  Concern that she may need a PEG tube     Constipation: On bowel regimen      Generalized weakness  Physical deconditioning  --PT/OT consulted and following     Concern for cognitive dysfunction:  --Patient has trouble with memory but there is no official diagnosis of dementia.  May use Seroquel as needed for delirium.  Will try to avoid benzos    OT consulted and following     Goal of care: Restorative and patient is full code.       PPE Used:  Mask, gloves          Diet: Fluid restriction 1500 ML FLUID  Snacks/Supplements Adult: Nepro Oral Supplement; Between Meals  Adult Formula Drip Feeding: Continuous Jevity 1.5; Nasogastric tube; Goal Rate: 40; mL/hr; nitiate new formula at 25 ml/hr x 6 hours. If tolerating, okay to advance to new goal of 40 ml/hr.; Do not advance tube feeding rate unless K+ is = or > 3.0...  Regular Diet Adult    DVT Prophylaxis: DOAC  Rao Catheter: Not present  Lines: PRESENT      CVC Double Lumen Right Subclavian Tunneled;Hemodialysis/CRRT-Site Assessment: WDL      Cardiac Monitoring: ACTIVE order. Indication: Acute decompensated heart failure (48 hours)  Code Status: Full Code      Clinically Significant Risk Factors          # Hypochloremia: Lowest Cl = 95 mmol/L in last 2 days, will monitor as appropriate      # Hypoalbuminemia: Lowest albumin = 2.8 g/dL at 8/12/2025  5:26 AM, will monitor as appropriate      # Hypertension: Noted on problem list             # Severe Malnutrition: based on nutrition assessment and treatment provided per dietitian's recommendations.    # Financial/Environmental Concerns: none         Disposition Plan     Medically Ready for Discharge: Anticipated in 2-4 Days             Destini Washington DO  Hospitalist Service  [facility]  Securely message with [employer] (more info)  Text page via [employer] Paging/Directory   ______________________________________________________________________    Interval History     Slept well.  Not hungry this am but not nauseated.  Was able to eat 1/2 whopper from [employer] yesterday - not much else.  Currently without HA, N/V, CP, SOB, F/C.  Has not yet been up out of bed this am.     Physical Exam   Vital Signs: Temp: 98.5  F (36.9  C) Temp src: Oral BP: (!) 142/80 Pulse: 74   Resp: 10 SpO2: 100 % O2 Device: Nasal cannula Oxygen Delivery: 2 LPM  Weight: 112 lbs 1.66 oz    GEN:  Alert, appears to be in no acute respiratory distress  HEENT:  Normocephalic/atraumatic, no scleral icterus, no nasal discharge  CV:  Regular rate and rhythm, no loud murmur.  S1 + S2 noted  LUNGS:  Clear to auscultation ant/lat bilaterally without rales/rhonchi/wheezing/retractions.  Symmetric chest rise on inhalation noted.  ABD:  slower but present bowel sounds, soft, non-tender/non-distended.  No clear rebound/guarding/rigidity.  EXT:  trace pretibial edema bilaterally.  No cyanosis.    SKIN:  Dry to touch, no exanthems noted in the visualized areas.    Medical Decision Making       48 MINUTES SPENT BY ME on the date of service doing chart review, history, exam, documentation & further activities per the note.      Data   Medications   Current Facility-Administered Medications   Medication Dose Route Frequency Provider Last Rate Last Admin    dextrose 10% infusion   Intravenous Continuous PRN Mariano Tran MD        No lozenges or gum should be given  while patient on BIPAP/AVAPS/AVAPS AE   Does not apply Continuous PRN Gilmer Swain MD        norepinephrine (LEVOPHED) 4 mg in  mL infusion PREMIX  0.01-0.6 mcg/kg/min (Dosing Weight) Intravenous Continuous Lamin Phelps MD   Stopped at 08/10/25 1548    Patient may continue current oral medications   Does not apply Continuous PRN Gilmer Swain MD         Current Facility-Administered Medications   Medication Dose Route Frequency Provider Last Rate Last Admin    - MEDICATION INSTRUCTIONS for Dialysis Patients -   Does not apply See Admin Instructions Mariano Tran MD        amiodarone (PACERONE) tablet 200 mg  200 mg Oral or Feeding Tube Daily Mariano Tran MD   200 mg at 08/12/25 0809    apixaban ANTICOAGULANT (ELIQUIS) tablet 2.5 mg  2.5 mg Oral BID Mariano Tran MD   2.5 mg at 08/12/25 2201    B and C vitamin Complex with folic acid (NEPHRONEX) liquid 5 mL  5 mL Per Feeding Tube Daily Mariano Tran MD   5 mL at 08/12/25 0807    carvedilol (COREG) tablet 25 mg  25 mg Oral or Feeding Tube BID w/meals Mariano Tran MD   25 mg at 08/12/25 1725    clopidogrel (PLAVIX) tablet 75 mg  75 mg Oral or Feeding Tube Daily Mariano Tran MD   75 mg at 08/12/25 1726    famotidine (PEPCID) tablet 20 mg  20 mg Oral or Feeding Tube Q48H Virgilio Patel MD   20 mg at 08/12/25 0753    hydrALAZINE (APRESOLINE) tablet 50 mg  50 mg Oral or Feeding Tube TID Mariano Tran MD   50 mg at 08/13/25 0017    icosapent ethyl (VASCEPA) capsule 2 g  2 g Oral BID w/meals Virgilio Patel MD   2 g at 08/12/25 1729    [Held by provider] isosorbide dinitrate (ISORDIL) tablet 40 mg  40 mg Oral or Feeding Tube TID Virgilio Patel MD   40 mg at 08/10/25 0552    meclizine (ANTIVERT) tablet 25 mg  25 mg Oral or Feeding Tube Q6H Virgilio Patel MD   25 mg at 08/13/25 0612    megestrol (MEGACE) suspension 200 mg  200 mg Oral or Feeding Tube Daily Mariano Tran MD   200 mg at 08/12/25  "0807    NIFEdipine ER OSMOTIC (PROCARDIA XL) 24 hr tablet 30 mg  30 mg Oral Daily Destini Washington, DO   30 mg at 08/12/25 1513    PARoxetine (PAXIL) tablet 40 mg  40 mg Oral or Feeding Tube Daily Virgilio Patel MD   40 mg at 08/12/25 0809    potassium & sodium phosphates (NEUTRA-PHOS) Packet 1 packet  1 packet Oral or Feeding Tube Q4H Destini Washington DO   1 packet at 08/13/25 0628    sodium chloride (PF) 0.9% PF flush 3 mL  3 mL Intracatheter Q8H [facility] Gilmer Swain MD   3 mL at 08/13/25 0612     Labs and Imaging results below reviewed today.  No results for input(s): \"PH\", \"PHV\", \"PO2\", \"PO2V\", \"SAT\", \"PCO2\", \"PCO2V\", \"HCO3\", \"HCO3V\" in the last 168 hours.  Recent Labs   Lab 08/13/25  0525 08/12/25  0526 08/11/25  0557 08/10/25  0614   WBC  --  11.1* 9.4 8.0   HGB  --  9.1* 8.8* 9.0*   HCT  --  27.0* 26.2* 27.6*   .4* 100 101* 103*    277 261 276     Recent Labs   Lab 08/13/25  1221 08/13/25  0840 08/13/25  0525 08/12/25  0754 08/12/25  0526 08/11/25  0758 08/11/25  0557   NA  --   --  133*  --  135  --  131*   POTASSIUM  --   --  4.0  4.0  --  3.5  --  3.6   CHLORIDE  --   --  95*  --  95*  --  93*   CO2  --   --  26  --  26  --  26   ANIONGAP  --   --  12  --  14  --  12   * 126* 107*   < > 117*   < > 120*   BUN  --   --  30.5*  --  51.4*  --  43.0*   CR  --   --  1.85*  --  2.62*  --  2.56*   GFRESTIMATED  --   --  28*  --  18*  --  19*   BRUCE  --   --  8.4*  --  9.0  --  9.1    < > = values in this interval not displayed.       No results found for this or any previous visit (from the past 24 hours).    "

## 2025-08-13 NOTE — PROGRESS NOTES
Care Management Follow Up    Length of Stay (days): 16    Expected Discharge Date: 08/03/2025     Concerns to be Addressed: discharge planning     Patient plan of care discussed at interdisciplinary rounds: Yes    Anticipated Discharge Disposition: Transitional Care              Anticipated Discharge Services:    Anticipated Discharge DME:      Patient/family educated on Medicare website which has current facility and service quality ratings:    Education Provided on the Discharge Plan:    Patient/Family in Agreement with the Plan: yes    Referrals Placed by CM/SW: Post Acute Facilities  Private pay costs discussed: Not applicable      Handoff Completed: No, handoff not indicated or clinically appropriate    Additional Information:  CM following for discharge planning. Anticipate need for outpatient HD on discharge. See note 8/12.  Dialysis referral sent to [facility] in [place] this afternoon. Will follow up with them on any additional needs or questions once it has been reviewed    Shara Duran RN BSN OCN  Care Coordinator  [facility]  [phone]

## 2025-08-13 NOTE — PROGRESS NOTES
BP control acceptable on current regimen:    Nifedipine XL 30 mg daily  Carvedilol 25 mg BID  Hydralazine 50 mg TID    Planning dialysis tomorrow.      Planning for outpt HD is underway.    Luis Eduardo Lucas MD

## 2025-08-13 NOTE — PROGRESS NOTES
CLINICAL NUTRITION SERVICES - REASSESSMENT NOTE     RECOMMENDATIONS FOR MDs/PROVIDERS TO ORDER:  Please continue Megace    Registered Dietitian Interventions:  Starting calorie counts  Reassess needs and adjusting EN below.    Increase EN to:  Jevity 1.5 Jose (or equivalent) @ goal of  45ml/hr x 24 hrs (1080ml/day) provides: 1620 kcals, 68 g PRO, 820 ml free H20, 232 g CHO, and 22 g fiber daily.   FWF: 30 ml q 4h for tube patency (additional free water per nephrology)  Total fluids provided with EN + FWF: 1000 ml (within fluid restriction)    Advancement Instructions: Once phos replaced, okay to advance to new goal rate of 45 ml/hr    Continue with recommended fluid needs per nephrology  Appreciate continued encouragement with PO intake  Discussed calorie counts with pt at bedside and asked that she notify RN if food being brought in from outside.    Discussed plan of care with bedside RN and provider. There was a consideration of cyclic EN to attempt to increase appetite. At this point, will hold off on cycling as she is needing EN to meet 100% of needs and would result in high fluid volume in fairly short span of time. For the next 1-2 days, we will monitor intakes with calorie counts and assess ability to take PO. Will reassess, daily, ability to transition to cycled TF if pt able to meet at least 50% of her needs through PO.   There are significant barriers to adequate PO intake including poor appetite, mouth dryness, fluid restriction, increased needs with HD, and goal of limiting electrolyte intakes/following a renal friendly diet long-term (pt is willing to eat things like [employer], but this is not a sustainable practice given goal diet order)      Future/Additional Recommendations:  Will need to start to consider long-term nutrition plan if pt still unable to increase PO intakes     INFORMATION OBTAINED  Assessed patient in room.    CURRENT NUTRITION ORDERS  Diet: Regular    Nutrition Support:   Access:  NGT  Frequency: Continuous  Formula: Jevity 1.5  Enteral Regimen: Jevity 1.5 Jose (or equivalent) @ goal of  40ml/hr x 24 hrs (960ml/day) + Nephronex   Total enteral provisions: 1440 kcals, 61 g PRO, 729 ml free H20, 207 g CHO, and 20 g fiber daily.   - The above meets 100% of estimated calorie and protein needs and >100% of RDIs  Free Water Flushes: 30 ml q 4h for tube patency (total FWF per nephrology)  - Total water from EN + FWF = 909 ml (within FR)    CURRENT INTAKE/TOLERANCE  Since last follow-up...  8/7: Minimal intakes w/ continuous need for BIPAP. Small bore placed and tube feeding started. Pt was initially started on Nepro d/t high phosphorus labs.  8/8: TF reached goal on Nepro formula (1512 kcals (27 kcal/kg/day), 68 g PRO (1.2 g/kg/day), 613 ml free H2O, 135 g CHO and 21 g Fiber daily)  8/11: Tube feed help briefly during BP resuscitation. Requiring intermittent BIPAP so low PO intakes. Megace added.  8/12: Pt formula switched to Jevity 1.5 d/t ongoing low Phosphorus. Reached goal 6 hours after initiation (1440 kcals, 61 g PRO, 729 ml free H20, 207 g CHO, and 20 g fiber daily). Patient liberalized to Regular Diet in hopes of providing more options and increasing overall PO intake.       - Pt has continued to have poor intakes since starting on tube feed. Have had various discussions w/ pt regarding need for increased PO intake. She repeatedly states that she is not hungry and not interested in eating. Intakes of 0-25%, generally. Occasionally, % intakes documented but these are usually of meals that are just a bowl of oatmeal or some type of cereal - not a full meal. We have ordered meals together a couple of times, though she is fairly resistant and would prefer to choose smaller items. Have discussed need for increased intakes with family as well who has been at bedside and encouraging PO intake.   - Per [facility] review, pt is usually ordering 0-1 meals per day and the meals she is ordering  "are < 300 kcal and < 10 g protein.  - Pt still receiving Nepro ONS once per day. She tells me that she drinks it when \"someone makes her\".     Patient tells me that she is starting to feel more of an appetite, but that it is still somewhat difficult for her to eat. We discussed, at length, the need for increased intakes and reasoning behind such strong encouragement for PO intake. Pt verbalized understanding.      NEW FINDINGS  8/6: Pt started on HD.   - most recent HD on Tuesday, 8/12 8/12: Thoracentesis. -1000 ml from right pleural space, -800 ml from left pleural space       GI symptoms: Reviewed    - Stools: LBM 8/13 (x3)    Skin/wounds: Reviewed    - Edema: 1+ (trace) generalized and b/l arms, hands, legs, ankles. L wrist.  - Nonhealing Wounds/Pressure Injuries: None currently documented    Nutrition-relevant labs: Reviewed  Noted: Phos 1.7(L)    Nutrition-relevant medications: Reviewed  Noted: Nephronex, Megace, Neutra-Phos  - Levo off since 8/10    Weight:   Vitals:    07/28/25 1400 07/30/25 0600 08/04/25 0800 08/07/25 0602   Weight: 54.8 kg (120 lb 13 oz) 53.8 kg (118 lb 9.7 oz) 56.2 kg (123 lb 14.4 oz) 54.2 kg (119 lb 7.8 oz)    08/08/25 0452 08/09/25 0653 08/10/25 0300 08/11/25 0353   Weight: 55.3 kg (121 lb 14.6 oz) 52 kg (114 lb 10.2 oz) 50.4 kg (111 lb 3.2 oz) 52.1 kg (114 lb 12.8 oz)    08/13/25 0600   Weight: 50.8 kg (112 lb 1.7 oz)     Difficult to assess weight trends d/t fluid shifts with dialysis. Increasing edema. Suspect she is now getting closer to dry weight.  Pt is net -5.7 L since 7/30.    ASSESSED NUTRITION NEEDS  Dosing Weight: 50.4 kg, based on lowest weight this admit  Estimated Energy Needs: 5715-2141 kcals/day (30 - 35 kcals/kg)  Justification: Increased needs w/ HD and Underweight  Estimated Protein Needs: >/= 50-61 grams protein/day (>/= 1 - 1.2 grams of pro/kg)  Justification: Preservation of LBM and Dialysis  Estimated Fluid Needs: Defer to nephrology    MALNUTRITION  % Intake: " "Decreased intake does not meet criteria - EN meeting needs acutely. Poor PO intake long term PTA and during admission prior to start of EN.  % Weight Loss: Unable to assess  (see above)  Subcutaneous Fat Loss: Orbital: Moderate, Buccal: Mild, Triceps: Mild, and Fat overlying the ribs: Mild  Muscle Loss: Temples (temporalis muscle): Mild, Clavicles (pectoralis and deltoids): Moderate, Shoulders (deltoids): Moderate, Interosseous muscles: Moderate, Scapula (latissimus dorsi, trapezious, deltoids): Moderate, Thigh (quadriceps): Severe, and Calf (gastrocnemius): Severe  Fluid Accumulation/Edema: Does not meet criteria  Malnutrition Diagnosis: Moderate malnutrition in the context of chronic illness  Malnutrition Present on Admission: Yes    EVALUATION OF THE PROGRESS TOWARD GOALS   Previous Goals  Tolerate EN   Evaluation: Met    EN to meet % of estimated needs   Evaluation: Met    Previous Nutrition Diagnosis  Inadequate oral intake related to flash pulmonary edema leading to SOB requiring BIPAP as evidenced by BIPAP use over the last several days, PO intake of 0-25% of meals/supplements, documented poor appetite, malnutrition documented, and review of [facility].   Evaluation: Declining    NUTRITION DIAGNOSIS  Inadequate oral intake related to poor appetite, dry mouth, fluid restrictions as evidenced by review of [facility], PO intakes of 0-25% of meals, reported poor appetite, reported refusal of meals, malnutrition documented, and need for EN to meet nutritional needs.     INTERVENTIONS  See nutrition interventions above    GOALS  Patient to consume >/= 50% of nutritionally adequate meal trays or supplements TID to start to show improved trending of intakes vs long-term nutrition support plan      MONITORING/EVALUATION  Progress toward goals will be monitored and evaluated per policy.        Mary Rider RD, LD  Clinical Dietitian  [facility] Message Group: \"Dietitian [[facility]]\"  Office Phone: [phone]    "

## 2025-08-13 NOTE — PLAN OF CARE
"Goal Outcome Evaluation:      Plan of Care Reviewed With: patient    Overall Patient Progress: improvingOverall Patient Progress: improving    Outcome Evaluation: .      ICU End of Shift Summary.  For vital signs and complete assessments, please see documentation flowsheets.     Pertinent assessments: VSS on 2 L NC. LS clear. A/O  x4. Tele: SR, prolong Qtc, PO Eliquis. External cath in place, up to bedside commode BM x1. OLGA @ 91 @ nare, TF running @ goal of 40 ml/hr, with 30 ml q4h fwf. Fluid restriction 1500 mL with a regular diet. PIV x3, HD port. K, mag, phos protocols. Assist x1, GB/W.     Phos replaced per RN managed protocols.     Plan (Upcoming Events): TBD  Discharge/Transfer Needs: TBD    Bedside Shift Report Completed : Yes  Bedside Safety Check Completed: Yes          Problem: Adult Inpatient Plan of Care  Goal: Plan of Care Review  Description: The Plan of Care Review/Shift note should be completed every shift.  The Outcome Evaluation is a brief statement about your assessment that the patient is improving, declining, or no change.  This information will be displayed automatically on your shift  note.  Outcome: Progressing  Flowsheets (Taken 8/13/2025 0305)  Outcome Evaluation: .  Plan of Care Reviewed With: patient  Overall Patient Progress: improving  Goal: Patient-Specific Goal (Individualized)  Description: You can add care plan individualizations to a care plan. Examples of Individualization might be:  \"Parent requests to be called daily at 9am for status\", \"I have a hard time hearing out of my right ear\", or \"Do not touch me to wake me up as it startles  me\".  Outcome: Progressing  Goal: Absence of Hospital-Acquired Illness or Injury  Outcome: Progressing  Intervention: Identify and Manage Fall Risk  Recent Flowsheet Documentation  Taken 8/13/2025 0000 by Wayne Lugo, RN  Safety Promotion/Fall Prevention:   activity supervised   assistive device/personal items within reach   clutter free " environment maintained   increased rounding and observation   increase visualization of patient   lighting adjusted   mobility aid in reach   patient and family education   nonskid shoes/slippers when out of bed   treat underlying cause   treat reversible contributory factors   supervised activity   safety round/check completed   room organization consistent   room near nurse's station  Taken 8/12/2025 2000 by Wayne Lugo RN  Safety Promotion/Fall Prevention:   activity supervised   assistive device/personal items within reach   clutter free environment maintained   increased rounding and observation   increase visualization of patient   lighting adjusted   mobility aid in reach   patient and family education   nonskid shoes/slippers when out of bed   treat underlying cause   treat reversible contributory factors   supervised activity   safety round/check completed   room organization consistent   room near nurse's station  Intervention: Prevent Skin Injury  Recent Flowsheet Documentation  Taken 8/13/2025 0000 by Wayne Lugo RN  Body Position: position changed independently  Skin Protection:   adhesive use limited   incontinence pads utilized   skin to device areas padded   skin to skin areas padded  Taken 8/12/2025 2000 by Wayne Lugo RN  Body Position: position changed independently  Skin Protection:   adhesive use limited   incontinence pads utilized   skin to device areas padded   skin to skin areas padded  Intervention: Prevent and Manage VTE (Venous Thromboembolism) Risk  Recent Flowsheet Documentation  Taken 8/13/2025 0000 by Wayne Lugo RN  VTE Prevention/Management: (PO Eliquis) SCDs off (sequential compression devices)  Taken 8/12/2025 2000 by Wayne Lugo RN  VTE Prevention/Management: (PO Eliquis) SCDs off (sequential compression devices)  Intervention: Prevent Infection  Recent Flowsheet Documentation  Taken 8/13/2025 0000 by Wayne Lugo RN  Infection Prevention:   environmental surveillance  performed   personal protective equipment utilized   rest/sleep promoted   single patient room provided   equipment surfaces disinfected   hand hygiene promoted  Taken 8/12/2025 2000 by Wayne Lugo RN  Infection Prevention:   environmental surveillance performed   personal protective equipment utilized   rest/sleep promoted   single patient room provided   equipment surfaces disinfected   hand hygiene promoted  Goal: Optimal Comfort and Wellbeing  Outcome: Progressing  Intervention: Monitor Pain and Promote Comfort  Recent Flowsheet Documentation  Taken 8/12/2025 2000 by Wayne Lugo RN  Pain Management Interventions:   rest   repositioned  Intervention: Provide Person-Centered Care  Recent Flowsheet Documentation  Taken 8/13/2025 0000 by Wayne Lugo RN  Trust Relationship/Rapport:   care explained   choices provided   emotional support provided   empathic listening provided   questions encouraged   questions answered   reassurance provided   thoughts/feelings acknowledged  Taken 8/12/2025 2000 by Wayne Lugo RN  Trust Relationship/Rapport:   care explained   choices provided   emotional support provided   empathic listening provided   questions encouraged   questions answered   reassurance provided   thoughts/feelings acknowledged  Goal: Readiness for Transition of Care  Outcome: Progressing     Problem: Delirium  Goal: Optimal Coping  Outcome: Progressing  Intervention: Optimize Psychosocial Adjustment to Delirium  Recent Flowsheet Documentation  Taken 8/13/2025 0000 by Wayne Lugo, RN  Supportive Measures:   active listening utilized   decision-making supported   positive reinforcement provided   problem-solving facilitated   relaxation techniques promoted   self-care encouraged  Taken 8/12/2025 2000 by Wayne Lugo RN  Supportive Measures:   active listening utilized   decision-making supported   positive reinforcement provided   problem-solving facilitated   relaxation techniques promoted   self-care  encouraged  Goal: Improved Behavioral Control  Outcome: Progressing  Intervention: Prevent and Manage Agitation  Recent Flowsheet Documentation  Taken 8/13/2025 0000 by Wayne Lugo RN  Environment Familiarity/Consistency: daily routine followed  Taken 8/12/2025 2000 by Wayne Lugo RN  Environment Familiarity/Consistency: daily routine followed  Intervention: Minimize Safety Risk  Recent Flowsheet Documentation  Taken 8/13/2025 0000 by Wayne Lugo RN  Enhanced Safety Measures:   pain management   review medications for side effects with activity   room near unit station  Trust Relationship/Rapport:   care explained   choices provided   emotional support provided   empathic listening provided   questions encouraged   questions answered   reassurance provided   thoughts/feelings acknowledged  Taken 8/12/2025 2000 by Wayne Lugo RN  Enhanced Safety Measures:   pain management   review medications for side effects with activity   room near unit station  Trust Relationship/Rapport:   care explained   choices provided   emotional support provided   empathic listening provided   questions encouraged   questions answered   reassurance provided   thoughts/feelings acknowledged  Goal: Improved Attention and Thought Clarity  Outcome: Progressing  Intervention: Maximize Cognitive Function  Recent Flowsheet Documentation  Taken 8/13/2025 0000 by Wayne Lugo RN  Sensory Stimulation Regulation:   care clustered   lighting decreased   auditory stimulation minimized   quiet environment promoted  Reorientation Measures:   calendar in view   clock in view  Taken 8/12/2025 2000 by Wayne Lugo RN  Sensory Stimulation Regulation:   care clustered   lighting decreased   auditory stimulation minimized   quiet environment promoted  Reorientation Measures:   calendar in view   clock in view  Goal: Improved Sleep  Outcome: Progressing  Intervention: Promote Sleep  Recent Flowsheet Documentation  Taken 8/13/2025 0000 by Brittney  Wayne, RN  Sleep/Rest Enhancement:   awakenings minimized   regular sleep/rest pattern promoted   relaxation techniques promoted  Taken 8/12/2025 2000 by Wayne Lugo RN  Sleep/Rest Enhancement:   awakenings minimized   regular sleep/rest pattern promoted   relaxation techniques promoted     Problem: Skin Injury Risk Increased  Goal: Skin Health and Integrity  Outcome: Progressing  Intervention: Plan: Nurse Driven Intervention: Positioning  Recent Flowsheet Documentation  Taken 8/13/2025 0000 by Wayne Lugo RN  Plan: Positioning Interventions:   REPOSITION Left/Right (No supine) q2h   Use wedge positioners   OFF-LOAD HEELS with pillows  Taken 8/12/2025 2000 by Wayne Lugo RN  Plan: Positioning Interventions:   REPOSITION Left/Right (No supine) q2h   Use wedge positioners   OFF-LOAD HEELS with pillows  Intervention: Plan: Nurse Driven Intervention: Moisture Management  Recent Flowsheet Documentation  Taken 8/13/2025 0000 by Wayne Lugo RN  Moisture Interventions:   No brief in bed   Incontinence pad   Urinary collection device  Taken 8/12/2025 2000 by Wayne Lugo RN  Moisture Interventions:   No brief in bed   Incontinence pad   Urinary collection device  Bathing/Skin Care: linen changed  Intervention: Plan: Nurse Driven Intervention: Friction and Shear  Recent Flowsheet Documentation  Taken 8/13/2025 0000 by Wayne Lugo RN  Friction/Shear Interventions: HOB 30 degrees or less  Taken 8/12/2025 2000 by Wayne Lugo RN  Friction/Shear Interventions: HOB 30 degrees or less  Intervention: Optimize Skin Protection  Recent Flowsheet Documentation  Taken 8/13/2025 0000 by Wayne Lugo RN  Skin Protection:   adhesive use limited   incontinence pads utilized   skin to device areas padded   skin to skin areas padded  Activity Management: activity adjusted per tolerance  Head of Bed (HOB) Positioning: HOB at 30 degrees  Taken 8/12/2025 2000 by Wayne Lugo RN  Skin Protection:   adhesive use limited   incontinence  pads utilized   skin to device areas padded   skin to skin areas padded  Activity Management: activity adjusted per tolerance  Head of Bed (HOB) Positioning: HOB at 30 degrees  Intervention: Promote and Optimize Oral Intake  Recent Flowsheet Documentation  Taken 8/13/2025 0000 by Wayne Lugo RN  Oral Nutrition Promotion: rest periods promoted  Taken 8/12/2025 2000 by Wayne Lugo RN  Oral Nutrition Promotion: rest periods promoted     Problem: Breathing Pattern Ineffective  Goal: Effective Breathing Pattern  Outcome: Progressing  Intervention: Promote Improved Breathing Pattern  Recent Flowsheet Documentation  Taken 8/13/2025 0000 by Wayne Lugo RN  Breathing Techniques/Airway Clearance: deep/controlled cough encouraged  Supportive Measures:   active listening utilized   decision-making supported   positive reinforcement provided   problem-solving facilitated   relaxation techniques promoted   self-care encouraged  Airway/Ventilation Management:   airway patency maintained   calming measures promoted   pulmonary hygiene promoted  Head of Bed (HOB) Positioning: HOB at 30 degrees  Taken 8/12/2025 2000 by Wayne Lugo RN  Breathing Techniques/Airway Clearance: deep/controlled cough encouraged  Supportive Measures:   active listening utilized   decision-making supported   positive reinforcement provided   problem-solving facilitated   relaxation techniques promoted   self-care encouraged  Airway/Ventilation Management:   airway patency maintained   calming measures promoted   pulmonary hygiene promoted  Head of Bed (HOB) Positioning: HOB at 30 degrees     Problem: Comorbidity Management  Goal: Maintenance of COPD Symptom Control  Outcome: Progressing  Intervention: Maintain COPD Symptom Control  Recent Flowsheet Documentation  Taken 8/13/2025 0000 by Wayne Lugo RN  Breathing Techniques/Airway Clearance: deep/controlled cough encouraged  Medication Review/Management: medications reviewed  Taken 8/12/2025 2000 by  Wayne Lugo, RN  Breathing Techniques/Airway Clearance: deep/controlled cough encouraged  Medication Review/Management: medications reviewed  Goal: Blood Pressure in Desired Range  Outcome: Progressing  Intervention: Maintain Blood Pressure Management  Recent Flowsheet Documentation  Taken 8/13/2025 0000 by Wayne Lugo RN  Medication Review/Management: medications reviewed  Taken 8/12/2025 2000 by Wayne Lugo, RN  Medication Review/Management: medications reviewed     Problem: Hypertension Acute  Goal: Blood Pressure Within Desired Range  Outcome: Progressing  Intervention: Normalize Blood Pressure  Recent Flowsheet Documentation  Taken 8/13/2025 0000 by Wayne Lugo, RN  Sensory Stimulation Regulation:   care clustered   lighting decreased   auditory stimulation minimized   quiet environment promoted  Medication Review/Management: medications reviewed  Taken 8/12/2025 2000 by Wayne Lugo RN  Sensory Stimulation Regulation:   care clustered   lighting decreased   auditory stimulation minimized   quiet environment promoted  Medication Review/Management: medications reviewed

## 2025-08-13 NOTE — PROGRESS NOTES
08/13/25 1100   Appointment Info   Signing Clinician's Name / Credentials (OT) Nafisa Alatorre, OTD, OTR/L   Living Environment   People in Home spouse   Current Living Arrangements mobile home   Home Accessibility stairs to enter home   Number of Stairs, Main Entrance 3   Stair Railings, Main Entrance railings safe and in good condition   Transportation Anticipated family or friend will provide   Living Environment Comments Pt resides in [place], but is visiting family in [place] and staying at an extended stay hotel through september. Hotel has elevator access, and hotel room is about 100' from elevator.    Has both walk in and tub shower, no AD at baseline.   Self-Care   Usual Activity Tolerance good   Current Activity Tolerance fair   Regular Exercise No   Equipment Currently Used at Home walker, standard;shower chair;grab bar, toilet  (can have access to shower chair from friend)   Fall history within last six months yes   Number of times patient has fallen within last six months 1   Activity/Exercise/Self-Care Comment normally reports independence with mobility and cares without an assistive device; had recently been at [facility] in July   Instrumental Activities of Daily Living (IADL)   IADL Comments Pt has been at [facility], does have support from spouse at home for taxing IADLs and home mgmt. Pt reports being independent at baseline.   General Information   Onset of Illness/Injury or Date of Surgery 08/02/25   Referring Physician Mariano Tran MD   Patient/Family Therapy Goal Statement (OT) Get stronger to return to home   Additional Occupational Profile Info/Pertinent History of Current Problem 75-year-old female with history of CAD, CKD stage IV, hypertension, hyperlipidemia, intra-abdominal aortic aneurysm with prior stenting, chronic vertigo, recent hospitalization for fall with pelvic fracture who presents the ED for shortness of breath.   Existing Precautions/Restrictions fall   Cognitive Status Examination    Orientation Status orientation to person, place and time   Follows Commands over 90% accuracy;initiation impaired;repetition of directions required   Visual Perception   Visual Impairment/Limitations corrective lenses for reading   Sensory   Sensory Quick Adds sensation intact   Pain Assessment   Patient Currently in Pain Yes, see Vital Sign flowsheet   Posture   Posture forward head position;protracted shoulders;kyphosis   Range of Motion Comprehensive   General Range of Motion no range of motion deficits identified   Strength Comprehensive (MMT)   Comment, General Manual Muscle Testing (MMT) Assessment General weakness and decreased activity tolerance   Bed Mobility   Comment (Bed Mobility) min A   Transfers   Transfer Comments min A   Balance   Balance Comments Generally unsteady with FWW   Activities of Daily Living   BADL Assessment/Intervention bathing;lower body dressing;grooming;toileting   Bathing Assessment/Intervention   Comment, (Bathing) Clinical jdugement max A   Lower Body Dressing Assessment/Training   Comment, (Lower Body Dressing) Clinical jdugement max A   Grooming Assessment/Training   Comment, (Grooming) min A   Toileting   Comment, (Toileting) mod A   Clinical Impression   Criteria for Skilled Therapeutic Interventions Met (OT) Yes, treatment indicated   OT Diagnosis impaired ADLs   Influenced by the following impairments SOB, weakness   OT Problem List-Impairments impacting ADL problems related to;activity tolerance impaired;mobility;cognition;strength;pain   Assessment of Occupational Performance 3-5 Performance Deficits   Identified Performance Deficits bathing, dressing, toielting, home mgmt, activity tolerance   Planned Therapy Interventions (OT) ADL retraining;cognition;strengthening;transfer training;progressive activity/exercise   Clinical Decision Making Complexity (OT) problem focused assessment/low complexity   Risk & Benefits of therapy have been explained evaluation/treatment  results reviewed;care plan/treatment goals reviewed;risks/benefits reviewed;current/potential barriers reviewed;participants voiced agreement with care plan;participants included;patient   OT Total Evaluation Time   OT Eval, Low Complexity Minutes (74670) 7   OT Goals   Therapy Frequency (OT) 5 times/week   OT Predicted Duration/Target Date for Goal Attainment 08/20/25   OT Goals Hygiene/Grooming;Lower Body Dressing;Lower Body Bathing;Toilet Transfer/Toileting;Cognition   OT: Hygiene/Grooming modified independent   OT: Lower Body Dressing Supervision/stand-by assist   OT: Lower Body Bathing Supervision/stand-by assist   OT: Toilet Transfer/Toileting Supervision/stand-by assist   OT: Cognitive Patient/caregiver will verbalize understanding of cognitive assessment results/recommendations as needed for safe discharge planning   Interventions   Interventions Quick Adds Self-Care/Home Management   Self-Care/Home Management   Self-Care/Home Mgmt/ADL, Compensatory, Meal Prep Minutes (16703) 31   Symptoms Noted During/After Treatment (Meal Preparation/Planning Training) fatigue   Treatment Detail/Skilled Intervention Pt greeted supine in bed, RN present and okayed activity. Pt able to tx to EOB with VCs and min A. Pt able to STS from EOB with min A and FWW, VCs for hand placement. Pt able to SPT to BSC with min A and assit for walker navigation. Pt required dep for aba cares and min A with FWW to STS from BSC. Max A to don new brief. Pt then ambualted ~6 ft towards chair with CGA and FWW, cueing for proper body mechancs and reaching back towards chair. Min A for controlled descent. While seated pt particiapted in further g/h cares with set up and min A 2/2 lines and managing cares with tube feeding in way. Increaed time for line mgmgt and vital sign monitoring. VSS throughout session, remains on 1.5 L O2 >93% SpO2. Left with all needs met.   OT Discharge Planning   OT Plan progress toielting, monitor cog, g/h cares  standing   OT Discharge Recommendation (DC Rec) Transitional Care Facility   OT Rationale for DC Rec Pt below baseline in all ADLs and functional mobility, will require TCU prior to returning home to address current deficits. Will follow.   OT Brief overview of current status Ax1   OT Total Distance Amb During Session (feet) 6   Total Session Time   Timed Code Treatment Minutes 31   Total Session Time (sum of timed and untimed services) 38

## 2025-08-14 VITALS
SYSTOLIC BLOOD PRESSURE: 146 MMHG | BODY MASS INDEX: 19 KG/M2 | TEMPERATURE: 97.3 F | RESPIRATION RATE: 25 BRPM | WEIGHT: 111.3 LBS | OXYGEN SATURATION: 92 % | DIASTOLIC BLOOD PRESSURE: 78 MMHG | HEIGHT: 64 IN | HEART RATE: 75 BPM

## 2025-08-14 LAB
ALBUMIN SERPL BCG-MCNC: 3.1 G/DL (ref 3.5–5.2)
ANION GAP SERPL CALCULATED.3IONS-SCNC: 12 MMOL/L (ref 7–15)
BACTERIA PLR CULT: NORMAL
BUN SERPL-MCNC: 50.5 MG/DL (ref 8–23)
CALCIUM SERPL-MCNC: 8.5 MG/DL (ref 8.8–10.4)
CHLORIDE SERPL-SCNC: 93 MMOL/L (ref 98–107)
CREAT SERPL-MCNC: 2.08 MG/DL (ref 0.51–0.95)
EGFRCR SERPLBLD CKD-EPI 2021: 24 ML/MIN/1.73M2
GLUCOSE BLDC GLUCOMTR-MCNC: 109 MG/DL (ref 70–99)
GLUCOSE BLDC GLUCOMTR-MCNC: 113 MG/DL (ref 70–99)
GLUCOSE BLDC GLUCOMTR-MCNC: 118 MG/DL (ref 70–99)
GLUCOSE BLDC GLUCOMTR-MCNC: 118 MG/DL (ref 70–99)
GLUCOSE BLDC GLUCOMTR-MCNC: 138 MG/DL (ref 70–99)
GLUCOSE SERPL-MCNC: 116 MG/DL (ref 70–99)
GRAM STAIN RESULT: NORMAL
GRAM STAIN RESULT: NORMAL
HCO3 SERPL-SCNC: 27 MMOL/L (ref 22–29)
HOLD SPECIMEN: NORMAL
MAGNESIUM SERPL-MCNC: 1.6 MG/DL (ref 1.7–2.3)
PHOSPHATE SERPL-MCNC: 2.3 MG/DL (ref 2.5–4.5)
PHOSPHATE SERPL-MCNC: 2.3 MG/DL (ref 2.5–4.5)
POTASSIUM SERPL-SCNC: 4.3 MMOL/L (ref 3.4–5.3)
SODIUM SERPL-SCNC: 132 MMOL/L (ref 135–145)

## 2025-08-14 PROCEDURE — 250N000013 HC RX MED GY IP 250 OP 250 PS 637: Performed by: INTERNAL MEDICINE

## 2025-08-14 PROCEDURE — 250N000011 HC RX IP 250 OP 636: Performed by: STUDENT IN AN ORGANIZED HEALTH CARE EDUCATION/TRAINING PROGRAM

## 2025-08-14 PROCEDURE — 120N000004 HC R&B MS OVERFLOW

## 2025-08-14 PROCEDURE — 36415 COLL VENOUS BLD VENIPUNCTURE: CPT | Performed by: INTERNAL MEDICINE

## 2025-08-14 PROCEDURE — 83735 ASSAY OF MAGNESIUM: CPT | Performed by: INTERNAL MEDICINE

## 2025-08-14 PROCEDURE — 250N000013 HC RX MED GY IP 250 OP 250 PS 637: Performed by: STUDENT IN AN ORGANIZED HEALTH CARE EDUCATION/TRAINING PROGRAM

## 2025-08-14 PROCEDURE — 634N000001 HC RX 634: Mod: JZ | Performed by: STUDENT IN AN ORGANIZED HEALTH CARE EDUCATION/TRAINING PROGRAM

## 2025-08-14 PROCEDURE — 82310 ASSAY OF CALCIUM: CPT | Performed by: INTERNAL MEDICINE

## 2025-08-14 PROCEDURE — 84100 ASSAY OF PHOSPHORUS: CPT | Performed by: INTERNAL MEDICINE

## 2025-08-14 PROCEDURE — 99233 SBSQ HOSP IP/OBS HIGH 50: CPT | Performed by: INTERNAL MEDICINE

## 2025-08-14 PROCEDURE — 99233 SBSQ HOSP IP/OBS HIGH 50: CPT | Performed by: STUDENT IN AN ORGANIZED HEALTH CARE EDUCATION/TRAINING PROGRAM

## 2025-08-14 PROCEDURE — 258N000003 HC RX IP 258 OP 636: Performed by: STUDENT IN AN ORGANIZED HEALTH CARE EDUCATION/TRAINING PROGRAM

## 2025-08-14 PROCEDURE — 90937 HEMODIALYSIS REPEATED EVAL: CPT

## 2025-08-14 RX ADMIN — QUETIAPINE FUMARATE 6.25 MG: 25 TABLET ORAL at 21:04

## 2025-08-14 RX ADMIN — HYDRALAZINE HYDROCHLORIDE 10 MG: 20 INJECTION INTRAMUSCULAR; INTRAVENOUS at 15:59

## 2025-08-14 RX ADMIN — HYDRALAZINE HYDROCHLORIDE 50 MG: 50 TABLET, FILM COATED ORAL at 00:58

## 2025-08-14 RX ADMIN — FAMOTIDINE 20 MG: 20 TABLET, FILM COATED ORAL at 09:19

## 2025-08-14 RX ADMIN — AMIODARONE HYDROCHLORIDE 200 MG: 200 TABLET ORAL at 09:19

## 2025-08-14 RX ADMIN — PAROXETINE HYDROCHLORIDE 40 MG: 20 TABLET, FILM COATED ORAL at 09:16

## 2025-08-14 RX ADMIN — MECLIZINE HYDROCHLORIDE 25 MG: 25 TABLET ORAL at 14:05

## 2025-08-14 RX ADMIN — CLOPIDOGREL BISULFATE 75 MG: 75 TABLET, FILM COATED ORAL at 17:15

## 2025-08-14 RX ADMIN — POTASSIUM & SODIUM PHOSPHATES POWDER PACK 280-160-250 MG 1 PACKET: 280-160-250 PACK at 00:58

## 2025-08-14 RX ADMIN — HYDRALAZINE HYDROCHLORIDE 50 MG: 50 TABLET, FILM COATED ORAL at 09:18

## 2025-08-14 RX ADMIN — HYDRALAZINE HYDROCHLORIDE 50 MG: 50 TABLET, FILM COATED ORAL at 23:52

## 2025-08-14 RX ADMIN — MECLIZINE HYDROCHLORIDE 25 MG: 25 TABLET ORAL at 06:36

## 2025-08-14 RX ADMIN — CARVEDILOL 25 MG: 12.5 TABLET, FILM COATED ORAL at 17:15

## 2025-08-14 RX ADMIN — NIFEDIPINE 30 MG: 30 TABLET, FILM COATED, EXTENDED RELEASE ORAL at 09:19

## 2025-08-14 RX ADMIN — MECLIZINE HYDROCHLORIDE 25 MG: 25 TABLET ORAL at 23:52

## 2025-08-14 RX ADMIN — POTASSIUM & SODIUM PHOSPHATES POWDER PACK 280-160-250 MG 1 PACKET: 280-160-250 PACK at 14:06

## 2025-08-14 RX ADMIN — HYDRALAZINE HYDROCHLORIDE 50 MG: 50 TABLET, FILM COATED ORAL at 14:16

## 2025-08-14 RX ADMIN — CARVEDILOL 25 MG: 12.5 TABLET, FILM COATED ORAL at 09:18

## 2025-08-14 RX ADMIN — DIAZEPAM 5 MG: 5 TABLET ORAL at 17:16

## 2025-08-14 RX ADMIN — EPOETIN ALFA-EPBX 4000 UNITS: 4000 INJECTION, SOLUTION INTRAVENOUS; SUBCUTANEOUS at 13:00

## 2025-08-14 RX ADMIN — MECLIZINE HYDROCHLORIDE 25 MG: 25 TABLET ORAL at 00:58

## 2025-08-14 RX ADMIN — ICOSAPENT ETHYL 2 G: 1 CAPSULE ORAL at 17:33

## 2025-08-14 RX ADMIN — ACETAMINOPHEN 500 MG: 500 TABLET, FILM COATED ORAL at 01:51

## 2025-08-14 RX ADMIN — ICOSAPENT ETHYL 2 G: 1 CAPSULE ORAL at 09:21

## 2025-08-14 RX ADMIN — MECLIZINE HYDROCHLORIDE 25 MG: 25 TABLET ORAL at 17:16

## 2025-08-14 RX ADMIN — ACETAMINOPHEN 650 MG: 325 TABLET ORAL at 17:16

## 2025-08-14 RX ADMIN — Medication 5 ML: at 09:19

## 2025-08-14 RX ADMIN — MEGESTROL ACETATE 200 MG: 40 SUSPENSION ORAL at 09:21

## 2025-08-14 RX ADMIN — POTASSIUM & SODIUM PHOSPHATES POWDER PACK 280-160-250 MG 1 PACKET: 280-160-250 PACK at 06:36

## 2025-08-14 RX ADMIN — POTASSIUM & SODIUM PHOSPHATES POWDER PACK 280-160-250 MG 1 PACKET: 280-160-250 PACK at 09:18

## 2025-08-14 RX ADMIN — DIPHENHYDRAMINE HYDROCHLORIDE 25 MG: 25 SOLUTION ORAL at 01:51

## 2025-08-14 RX ADMIN — SODIUM CHLORIDE 150 ML: 0.9 INJECTION, SOLUTION INTRAVENOUS at 12:47

## 2025-08-14 RX ADMIN — APIXABAN 2.5 MG: 2.5 TABLET, FILM COATED ORAL at 09:18

## 2025-08-14 RX ADMIN — APIXABAN 2.5 MG: 2.5 TABLET, FILM COATED ORAL at 21:04

## 2025-08-14 ASSESSMENT — ACTIVITIES OF DAILY LIVING (ADL)
ADLS_ACUITY_SCORE: 73
ADLS_ACUITY_SCORE: 79
ADLS_ACUITY_SCORE: 79
ADLS_ACUITY_SCORE: 77
ADLS_ACUITY_SCORE: 77
ADLS_ACUITY_SCORE: 79
ADLS_ACUITY_SCORE: 77
ADLS_ACUITY_SCORE: 73
ADLS_ACUITY_SCORE: 77
ADLS_ACUITY_SCORE: 79
ADLS_ACUITY_SCORE: 77
ADLS_ACUITY_SCORE: 79
ADLS_ACUITY_SCORE: 77
ADLS_ACUITY_SCORE: 79
ADLS_ACUITY_SCORE: 79
ADLS_ACUITY_SCORE: 77

## 2025-08-14 NOTE — PROGRESS NOTES
"CALORIE COUNT      Approximate Oral Intake for:    8/13  Calories: 646 kcal  Protein: 25 g      Intake from TF/PN:       Jevity 1.5 Jose (or equivalent) @ goal of  45ml/hr x 24 hrs (1080ml/day) provides: 1620 kcals, 68 g PRO, 820 ml free H20, 232 g CHO, and 22 g fiber daily.   FWF: 30 ml q 4h for tube patency (additional free water per nephrology)  Total fluids provided with EN + FWF: 1000 ml (within fluid restriction)    Estimated Needs:    Dosing Weight: 50.4 kg, based on lowest weight this admit  Estimated Energy Needs: 5484-4580 kcals/day (30 - 35 kcals/kg)  Justification: Increased needs w/ HD and Underweight  Estimated Protein Needs: >/= 50-61 grams protein/day (>/= 1 - 1.2 grams of pro/kg)  Justification: Preservation of LBM and Dialysis  Estimated Fluid Needs: Defer to nephrology      Summary:   Pt was able to meet ~43% of lower end of estimated energy needs and 50% of lower end of estimated protein needs through PO intake.   RN noted that she had been making good efforts to increase PO intake. Noted that the nasal tube is uncomfortable, but she has not observed it being a significant barrier to intakes.     At this point, intakes not enough to justify removal of TF or cycle TF. If pt able to meet >50% of her needs in the coming days, will consider cycle.    2 more days of calorie counts.    RD team continuing to follow.      Mary Rider, KAYCEE, LD  Clinical Dietitian  [facility] Message Group: \"Dietitian [[place]]\"  Office Phone: [phone]    "

## 2025-08-14 NOTE — PROGRESS NOTES
Care Management Follow Up    Length of Stay (days): 17    Expected Discharge Date: 08/03/2025     Concerns to be Addressed: discharge planning     Patient plan of care discussed at interdisciplinary rounds: Yes    Anticipated Discharge Disposition: Transitional Care              Anticipated Discharge Services:    Anticipated Discharge DME:      Patient/family educated on Medicare website which has current facility and service quality ratings:    Education Provided on the Discharge Plan:    Patient/Family in Agreement with the Plan: yes    Referrals Placed by CM/SW: Post Acute Facilities, Outpatient Hemodialysis  Private pay costs discussed: Not applicable    Discussed  Partnership in Safe Discharge Planning  document with patient/family: No     Handoff Completed: No, handoff not indicated or clinically appropriate    Additional Information:  CM following for discharge planning. Patient with anticipated need for outpatient HD. She has a bedhold at [facility]. CM received call from [facility]. [facility] review is in process for the [facility] location. [facility] reference #[id number]. No additional information requested at this time.     [facility] Coordinator, [place], nay#[phone] ext: [phone]. [facility] will call back when review and placement information completed.    Next Steps: follow up with [facility] admission. Continue to follow for discharge planning.     Herman Casey RN Case Manager  Inpatient Care Coordination   [facility]   [phone]      Herman Casey RN

## 2025-08-14 NOTE — PROGRESS NOTES
Inpatient Dialysis Progress Note            Assessment and Plan:     Hypertensive emergency   Acute hypoxic respiratory failure   Acute on chronic diastolic HF   History of R renal artery stenting  Labile HTN  Hypoxia and resp status better following thoracentesis.      - Labile BP, history of renal artery stenosis status post stenting-> Doppler on 7/31-no evidence of significant JACY  - Metanephrines  -> normetanephrine came back quite high at 4.6 with upper limit of 0.8.  Metanephrine was normal.  Bump in normetanephrine is likely related to advanced CKD.  Cannot get 24 urine given patient's dialysis status.   - CT abdomen-normal-looking adrenals.     Arteries are very calcified and thus likely non compliant (stiff), making labile BP.       Carvedilol 25 mg BID  Hydralazine 50 mg TID  Isordil held  Nifedipine XL 30 mg daily     BP has been better controlled the last 2 days.      SHOCK -  Resolved.       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 This is most commonly seen in DM2, however can also be seen with smoking and HTN. Bx also showed cholesterol emboli (likely due to endograft repair).  Cr on admission 4.25, worse than recent baseline. In setting of hypertensive emergency, pulmonary edema, LE edema. Bx results:        It is uncertain if she can recover any kidney function but I would not expect it.       R lung adenocarcinoma   Received radiation in 2021.      AAA s/p endograft   S/p endovascular aneurysm/endograft repair (4/2025)     Atrial fibrillation-on amiodarone infusion  - in sinusrythm      Discussion-     Dialysis today  Next HD Saturday.         Interval History:     She is sleeping now.  Earlier she was awake and eating breakfast.        Dialysis Parameters:     Wt Readings from Last 4 Encounters:   08/14/25 50.5 kg (111 lb 4.8 oz)   07/23/25 54.4 kg  (120 lb)   07/21/25 54.1 kg (119 lb 3.2 oz)   07/17/25 53.5 kg (117 lb 14.4 oz)     I/O last 3 completed shifts:  In: 2098 [P.O.:788; NG/GT:330]  Out: -   BP Readings from Last 3 Encounters:   08/14/25 (!) 160/82   07/23/25 131/74   07/21/25 137/80       Routine, ONE TIME, Starting today For 1 Occurrences  Weight Loss (kg): 1.5  Dialysis Temp: 36.5  C  Access Device: CVC  Access Site: R IJ  Dialyzer: Nipro Elisio 15H  Dialysis Bath: K 3  Blood Flow Rate (mL/min): 400  Total Treatment Time (hrs): 3  Heparin: no         Medications and Allergies:   Reviewed in EPIC    Current Facility-Administered Medications   Medication Dose Route Frequency Provider Last Rate Last Admin    - MEDICATION INSTRUCTIONS for Dialysis Patients -   Does not apply See Admin Instructions Ulysses Schafer MD        amiodarone (PACERONE) tablet 200 mg  200 mg Oral or Feeding Tube Daily Ulysses Schafer MD   200 mg at 08/14/25 0919    apixaban ANTICOAGULANT (ELIQUIS) tablet 2.5 mg  2.5 mg Oral BID Ulysses Schafer MD   2.5 mg at 08/14/25 0918    B and C vitamin Complex with folic acid (NEPHRONEX) liquid 5 mL  5 mL Per Feeding Tube Daily Ulysses Schafer MD   5 mL at 08/14/25 0919    carvedilol (COREG) tablet 25 mg  25 mg Oral or Feeding Tube BID w/meals Ulysses Schafer MD   25 mg at 08/14/25 0918    clopidogrel (PLAVIX) tablet 75 mg  75 mg Oral or Feeding Tube Daily Ulysses Schafer MD   75 mg at 08/13/25 1825    epoetin jennie-epbx (RETACRIT) injection 4,000 Units  4,000 Units Intravenous Once Ulysses Schafer MD        famotidine (PEPCID) tablet 20 mg  20 mg Oral or Feeding Tube Q48H Ulysses Schafer MD   20 mg at 08/14/25 0919    sodium chloride 0.9% DIALYSIS Cath LOCK - RED Lumen  10 mL Intracatheter Once in dialysis/CRRT Ulysses Schafer MD        Followed by    heparin 1000 unit/mL DIALYSIS Cath LOCK - RED Lumen  1.3-2.6 mL Intracatheter Once in dialysis/CRRT Ulysses Schafer MD        sodium chloride 0.9% DIALYSIS Cath LOCK - BLUE Lumen  10 mL Intracatheter Once in dialysis/CRRT  Ulysses Schafer MD        Followed by    heparin 1000 unit/mL DIALYSIS Cath LOCK -BLUE Lumen  1.3-2.6 mL Intracatheter Once in dialysis/CRRT Ulysses Schafer MD        hydrALAZINE (APRESOLINE) tablet 50 mg  50 mg Oral or Feeding Tube TID Ulysses Schafer MD   50 mg at 08/14/25 0918    icosapent ethyl (VASCEPA) capsule 2 g  2 g Oral BID w/meals Ulysses Schafer MD   2 g at 08/14/25 0921    [Held by provider] isosorbide dinitrate (ISORDIL) tablet 40 mg  40 mg Oral or Feeding Tube TID Ulysses Schafer MD   40 mg at 08/10/25 0552    meclizine (ANTIVERT) tablet 25 mg  25 mg Oral or Feeding Tube Q6H Ulysses Schafer MD   25 mg at 08/14/25 0636    megestrol (MEGACE) suspension 200 mg  200 mg Oral or Feeding Tube Daily Ulysses Schafer MD   200 mg at 08/14/25 0921    NIFEdipine ER OSMOTIC (PROCARDIA XL) 24 hr tablet 30 mg  30 mg Oral Daily Ulysses Schafer MD   30 mg at 08/14/25 0919    No heparin via hemodialysis machine   Does not apply Once Ulysses Schafer MD        PARoxetine (PAXIL) tablet 40 mg  40 mg Oral or Feeding Tube Daily Ulysses Schafer MD   40 mg at 08/14/25 0916    potassium & sodium phosphates (NEUTRA-PHOS) Packet 1 packet  1 packet Oral or Feeding Tube Q4H Ulysses Schafer MD   1 packet at 08/14/25 0918    sodium chloride (PF) 0.9% PF flush 3 mL  3 mL Intracatheter Q8H CHIDI Ulysses Schafer MD   3 mL at 08/14/25 0636    sodium chloride (PF) 0.9% PF flush 9 mL  9 mL Intracatheter During Dialysis/CRRT (from stock) Ulysses Schafer MD        sodium chloride (PF) 0.9% PF flush 9 mL  9 mL Intracatheter During Dialysis/CRRT (from stock) Ulysses Schafer MD        sodium chloride 0.9% BOLUS 200 mL  200 mL Hemodialysis Machine Once Ulysses Schafer MD        sodium chloride 0.9% BOLUS 250 mL  250 mL Intravenous Once in dialysis/CRRT Ulysses Schafer MD        sodium chloride 0.9% BOLUS 500 mL  500 mL Hemodialysis Machine Once Ulysses Schafer MD         Current Facility-Administered Medications   Medication Dose Route Frequency Provider Last Rate Last Admin    acetaminophen  (TYLENOL) tablet 650 mg  650 mg Oral or Feeding Tube Q4H PRN Ulysses Schafer MD   650 mg at 08/13/25 2120    Or    acetaminophen (TYLENOL) Suppository 650 mg  650 mg Rectal Q4H PRN Ulysses Schafer MD        diphenhydrAMINE (BENADRYL) liquid 25 mg  25 mg Oral or Feeding Tube At Bedtime PRN Ulysses Schafer MD   25 mg at 08/14/25 0151    And    acetaminophen (TYLENOL) tablet 500 mg  500 mg Oral or Feeding Tube At Bedtime PRN Ulysses Schafer MD   500 mg at 08/14/25 0151    albuterol (PROVENTIL) neb solution 2.5 mg  2.5 mg Nebulization Q6H PRN Ulysses Schafer MD   2.5 mg at 07/31/25 2104    alteplase (CATHFLO ACTIVASE) injection 2 mg  2 mg Intracatheter Q1H PRN Ulysses Schafer MD        alteplase (CATHFLO ACTIVASE) injection 2 mg  2 mg Intracatheter Q1H PRN Ulysses Schafer MD        carboxymethylcellulose PF (REFRESH PLUS) 0.5 % ophthalmic solution 1 drop  1 drop Both Eyes Q1H PRN Ulysses Schafer MD        dextrose 10% infusion   Intravenous Continuous PRN Ulysses Schafer MD        glucose gel 15-30 g  15-30 g Oral Q15 Min PRN Ulysses Schafer MD   15 g at 08/10/25 1623    Or    dextrose 50 % injection 25-50 mL  25-50 mL Intravenous Q15 Min PRN Ulysses Schafer MD        Or    glucagon injection 1 mg  1 mg Subcutaneous Q15 Min PRN Ulysses Schafer MD        diazepam (VALIUM) tablet 5 mg  5 mg Oral Q6H PRN Ulysses Schafer MD   5 mg at 08/11/25 1437    HOLD: All Oral Medications   Does not apply HOLD Ulysses Schafer MD        hydrALAZINE (APRESOLINE) injection 10 mg  10 mg Intravenous Q4H PRN Ulysses Schafer MD   10 mg at 08/12/25 0249    HYDROmorphone (DILAUDID) injection 0.2 mg  0.2 mg Intravenous Q2H PRN Ulysses Schafer MD   0.2 mg at 08/07/25 0058    levalbuterol (XOPENEX) neb solution 0.63 mg  0.63 mg Nebulization Q4H PRN Ulysses Schafer MD   0.63 mg at 08/05/25 1355    lidocaine (LMX4) cream   Topical Q1H PRN Ulysses Schafer MD        lidocaine 1 % 0.1-1 mL  0.1-1 mL Other Q1H PRN Ulysses Schafer MD        methocarbamol (ROBAXIN) tablet 500 mg  500 mg Oral or Feeding Tube TID  PRN Ulysses Schafer MD        metoprolol (LOPRESSOR) injection 5 mg  5 mg Intravenous Q5 Min PRN Ulysses Schafer MD   5 mg at 08/05/25 0518    naloxone (NARCAN) injection 0.2 mg  0.2 mg Intravenous Q2 Min PRN Ulysses Schafer MD        Or    naloxone (NARCAN) injection 0.4 mg  0.4 mg Intravenous Q2 Min PRN Ulysses Schafer MD        Or    naloxone (NARCAN) injection 0.2 mg  0.2 mg Intramuscular Q2 Min PRN Ulysses Schafre MD        Or    naloxone (NARCAN) injection 0.4 mg  0.4 mg Intramuscular Q2 Min PRN Ulysses Schafer MD        No lozenges or gum should be given while patient on BIPAP/AVAPS/AVAPS AE   Does not apply Continuous PRN Ulysses Schafer MD        ondansetron (ZOFRAN ODT) ODT tab 4 mg  4 mg Oral Q6H PRN Ulysses Schafer MD   4 mg at 08/07/25 1122    Patient may continue current oral medications   Does not apply Continuous PRN Ulysses Schafer MD        polyethylene glycol (MIRALAX) Packet 17 g  17 g Oral BID PRN Ulysses Schafer MD        QUEtiapine (SEROquel) quarter-tab 6.25 mg  6.25 mg Oral TID PRN Ulysses Schafer MD   6.25 mg at 08/12/25 2201    sennosides (SENOKOT) tablet 2 tablet  2 tablet Oral BID PRN Ulysses Schafer MD        sodium chloride (PF) 0.9% PF flush 10 mL  10 mL Intracatheter Q15 Min PRN Ulysses Schafer MD        sodium chloride (PF) 0.9% PF flush 10 mL  10 mL Intracatheter Q15 Min PRN Ulysses Schafer MD        sodium chloride (PF) 0.9% PF flush 3 mL  3 mL Intracatheter q1 min prn Ulysses Schafer MD   3 mL at 08/03/25 0823    sodium chloride 0.9% BOLUS 100-150 mL  100-150 mL Intravenous Q15 Min PRN Ulysses Schafer MD            Allergies   Allergen Reactions    Iodine Hives              Labs:     [facility]  Recent Labs   Lab 08/14/25  0759 08/14/25  0608 08/14/25  0408 08/13/25  0684 08/13/25  0840 08/13/25  0525 08/12/25  0754 08/12/25  0526 08/11/25  0758 08/11/25  0557   NA  --  132*  --   --   --  133*  --  135  --  131*   POTASSIUM  --  4.3  --   --   --  4.0  4.0  --  3.5  --  3.6   CHLORIDE  --  93*  --   --   --  95*  --  95*  --  93*    BRUCE  --  8.5*  --   --   --  8.4*  --  9.0  --  9.1   CO2  --  27  --   --   --  26  --  26  --  26   BUN  --  50.5*  --   --   --  30.5*  --  51.4*  --  43.0*   CR  --  2.08*  --   --   --  1.85*  --  2.62*  --  2.56*   * 116* 118* 109*   < > 107*   < > 117*   < > 120*    < > = values in this interval not displayed.     CBC  Recent Labs   Lab 08/13/25  0525 08/12/25  0526 08/11/25  0557 08/10/25  0614 08/09/25  0708   WBC  --  11.1* 9.4 8.0 9.0   HGB  --  9.1* 8.8* 9.0* 8.8*   HCT  --  27.0* 26.2* 27.6* 27.0*   .4* 100 101* 103* 103*    277 261 276 272     Lab Results   Component Value Date    AST 22 07/17/2025    ALT 11 07/17/2025    ALKPHOS 61 07/17/2025    BILITOTAL 0.3 07/17/2025            Physical Exam:   Vitals were reviewed in [facility]    Wt Readings from Last 3 Encounters:   08/14/25 50.5 kg (111 lb 4.8 oz)   07/23/25 54.4 kg (120 lb)   07/21/25 54.1 kg (119 lb 3.2 oz)       Intake/Output Summary (Last 24 hours) at 8/14/2025 1126  Last data filed at 8/14/2025 1000  Gross per 24 hour   Intake 2309 ml   Output --   Net 2309 ml       GENERAL APPEARANCE: sleeping in dialysis  EXTREMITIES/SKIN: no edema, no rashes or lesions     Pt seen on dialysis.  Stable run.  Good BFR.      Attestation:  I have reviewed today's vital signs, notes, medications, labs and imaging.     Luis Eduardo Lucas MD  [facility] - Nephrology  [phone]

## 2025-08-14 NOTE — PLAN OF CARE
"Goal Outcome Evaluation:      Plan of Care Reviewed With: patient    Overall Patient Progress: improvingOverall Patient Progress: improving    Outcome Evaluation: .    ICU End of Shift Summary.  For vital signs and complete assessments, please see documentation flowsheets.     Pertinent assessments:  VSS on RA, 1 L NC sleeping. LS clear. A/O  x4. Tele: SR, PO Eliquis. External cath in place, up to bedside commode. OLGA @ 91 @ nare, TF running @ goal of 45 ml/hr, with 30 ml q4h fwf. Fluid restriction 1500 mL with a regular diet. PIV x3, HD port. K, mag, phos protocols. Assist x1, GB/W.      Phos replaced per RN managed protocols.      Plan (Upcoming Events): TBD  Discharge/Transfer Needs: TBD     Bedside Shift Report Completed : Yes  Bedside Safety Check Completed: Yes        Problem: Adult Inpatient Plan of Care  Goal: Plan of Care Review  Description: The Plan of Care Review/Shift note should be completed every shift.  The Outcome Evaluation is a brief statement about your assessment that the patient is improving, declining, or no change.  This information will be displayed automatically on your shift  note.  Outcome: Progressing  Flowsheets (Taken 8/14/2025 0432)  Outcome Evaluation: .  Plan of Care Reviewed With: patient  Overall Patient Progress: improving  Goal: Patient-Specific Goal (Individualized)  Description: You can add care plan individualizations to a care plan. Examples of Individualization might be:  \"Parent requests to be called daily at 9am for status\", \"I have a hard time hearing out of my right ear\", or \"Do not touch me to wake me up as it startles  me\".  Outcome: Progressing  Goal: Absence of Hospital-Acquired Illness or Injury  Outcome: Progressing  Intervention: Identify and Manage Fall Risk  Recent Flowsheet Documentation  Taken 8/13/2025 2000 by Wayne Lugo, RN  Safety Promotion/Fall Prevention:   activity supervised   assistive device/personal items within reach   clutter free environment " maintained   increased rounding and observation   increase visualization of patient   lighting adjusted   nonskid shoes/slippers when out of bed   treat underlying cause   treat reversible contributory factors   supervised activity   room organization consistent   safety round/check completed   room near nurse's station   patient and family education   mobility aid in reach  Taken 8/13/2025 1924 by Wayne Lugo RN  Safety Promotion/Fall Prevention:   activity supervised   assistive device/personal items within reach   clutter free environment maintained   increased rounding and observation   increase visualization of patient   lighting adjusted   nonskid shoes/slippers when out of bed   treat underlying cause   treat reversible contributory factors   supervised activity   room organization consistent   safety round/check completed   room near nurse's station   patient and family education   mobility aid in reach  Intervention: Prevent Skin Injury  Recent Flowsheet Documentation  Taken 8/14/2025 0400 by Wayne Lugo RN  Body Position: position changed independently  Taken 8/13/2025 2000 by Wayne Lugo RN  Body Position: position changed independently  Skin Protection:   adhesive use limited   incontinence pads utilized   skin to device areas padded   skin to skin areas padded  Taken 8/13/2025 1924 by Wayne Lugo RN  Body Position: position changed independently  Skin Protection:   adhesive use limited   incontinence pads utilized   skin to device areas padded   skin to skin areas padded  Intervention: Prevent and Manage VTE (Venous Thromboembolism) Risk  Recent Flowsheet Documentation  Taken 8/13/2025 2000 by Wayne Lugo RN  VTE Prevention/Management: (PO Eliquis) SCDs off (sequential compression devices)  Taken 8/13/2025 1924 by Wayne Lugo RN  VTE Prevention/Management: (PO Eliquis) SCDs off (sequential compression devices)  Intervention: Prevent Infection  Recent Flowsheet Documentation  Taken 8/13/2025  2000 by Wayne Lugo, RN  Infection Prevention:   environmental surveillance performed   equipment surfaces disinfected   hand hygiene promoted   personal protective equipment utilized   rest/sleep promoted   single patient room provided  Taken 8/13/2025 1924 by Wayne Lugo RN  Infection Prevention:   environmental surveillance performed   equipment surfaces disinfected   hand hygiene promoted   personal protective equipment utilized   rest/sleep promoted   single patient room provided  Goal: Optimal Comfort and Wellbeing  Outcome: Progressing  Intervention: Monitor Pain and Promote Comfort  Recent Flowsheet Documentation  Taken 8/13/2025 1924 by Wayne Lugo, RN  Pain Management Interventions:   medication (see MAR)   rest   repositioned   relaxation techniques promoted  Intervention: Provide Person-Centered Care  Recent Flowsheet Documentation  Taken 8/13/2025 2000 by Wayne Lugo, RN  Trust Relationship/Rapport:   care explained   choices provided   emotional support provided   empathic listening provided   questions answered   questions encouraged   reassurance provided   thoughts/feelings acknowledged  Taken 8/13/2025 1924 by Wayne Lugo, RN  Trust Relationship/Rapport:   care explained   choices provided   emotional support provided   empathic listening provided   questions answered   questions encouraged   reassurance provided   thoughts/feelings acknowledged  Goal: Readiness for Transition of Care  Outcome: Progressing     Problem: Delirium  Goal: Optimal Coping  Outcome: Progressing  Intervention: Optimize Psychosocial Adjustment to Delirium  Recent Flowsheet Documentation  Taken 8/13/2025 2000 by Wayne Lugo, RN  Supportive Measures:   active listening utilized   decision-making supported   problem-solving facilitated   relaxation techniques promoted  Family/Support System Care:   caregiver stress acknowledged   involvement promoted  Taken 8/13/2025 1924 by Wayne Lugo, RN  Supportive Measures:    active listening utilized   decision-making supported   problem-solving facilitated   relaxation techniques promoted  Family/Support System Care:   caregiver stress acknowledged   involvement promoted  Goal: Improved Behavioral Control  Outcome: Progressing  Intervention: Prevent and Manage Agitation  Recent Flowsheet Documentation  Taken 8/13/2025 2000 by Wayne Lugo RN  Environment Familiarity/Consistency: daily routine followed  Taken 8/13/2025 1924 by Wayne Lugo RN  Environment Familiarity/Consistency: daily routine followed  Intervention: Minimize Safety Risk  Recent Flowsheet Documentation  Taken 8/13/2025 2000 by Wayne Lugo RN  Enhanced Safety Measures:   pain management   review medications for side effects with activity   room near unit station  Trust Relationship/Rapport:   care explained   choices provided   emotional support provided   empathic listening provided   questions answered   questions encouraged   reassurance provided   thoughts/feelings acknowledged  Taken 8/13/2025 1924 by Wayne Lugo RN  Enhanced Safety Measures:   pain management   review medications for side effects with activity   room near unit station  Trust Relationship/Rapport:   care explained   choices provided   emotional support provided   empathic listening provided   questions answered   questions encouraged   reassurance provided   thoughts/feelings acknowledged  Goal: Improved Attention and Thought Clarity  Outcome: Progressing  Intervention: Maximize Cognitive Function  Recent Flowsheet Documentation  Taken 8/13/2025 2000 by Wayne Lugo, RN  Sensory Stimulation Regulation:   care clustered   lighting decreased   auditory stimulation minimized  Reorientation Measures:   calendar in view   clock in view  Taken 8/13/2025 1924 by Wayne Lugo RN  Sensory Stimulation Regulation:   care clustered   lighting decreased   auditory stimulation minimized  Reorientation Measures:   calendar in view   clock in view  Goal:  Improved Sleep  Outcome: Progressing  Intervention: Promote Sleep  Recent Flowsheet Documentation  Taken 8/13/2025 2000 by Wayne Lugo RN  Sleep/Rest Enhancement:   awakenings minimized   consistent schedule promoted   relaxation techniques promoted   regular sleep/rest pattern promoted  Taken 8/13/2025 1924 by Wayne Lugo RN  Sleep/Rest Enhancement:   awakenings minimized   consistent schedule promoted   relaxation techniques promoted   regular sleep/rest pattern promoted     Problem: Skin Injury Risk Increased  Goal: Skin Health and Integrity  Outcome: Progressing  Intervention: Plan: Nurse Driven Intervention: Positioning  Recent Flowsheet Documentation  Taken 8/13/2025 2000 by Wayne Lugo RN  Plan: Positioning Interventions:   REPOSITION Left/Right (No supine) q2h   HOB 30 degrees or less   OFF-LOAD HEELS with pillows  Taken 8/13/2025 1924 by Wayne Lugo RN  Plan: Positioning Interventions:   REPOSITION Left/Right (No supine) q2h   HOB 30 degrees or less   OFF-LOAD HEELS with pillows  Intervention: Plan: Nurse Driven Intervention: Moisture Management  Recent Flowsheet Documentation  Taken 8/13/2025 2000 by Wayne Lugo RN  Moisture Interventions:   No brief in bed   Incontinence pad   Urinary collection device  Taken 8/13/2025 1924 by Wayne Lugo RN  Moisture Interventions:   No brief in bed   Incontinence pad   Urinary collection device  Intervention: Plan: Nurse Driven Intervention: Friction and Shear  Recent Flowsheet Documentation  Taken 8/13/2025 2000 by Wayne Lugo RN  Friction/Shear Interventions: HOB 30 degrees or less  Taken 8/13/2025 1924 by Wayne Lugo RN  Friction/Shear Interventions: HOB 30 degrees or less  Intervention: Optimize Skin Protection  Recent Flowsheet Documentation  Taken 8/14/2025 0400 by Wayne Lugo, RN  Activity Management: up to bedside commode  Taken 8/13/2025 2000 by Wayne Lugo, RN  Skin Protection:   adhesive use limited   incontinence pads utilized   skin to  device areas padded   skin to skin areas padded  Activity Management: activity adjusted per tolerance  Head of Bed (HOB) Positioning: HOB at 30 degrees  Taken 8/13/2025 1924 by Wayne Lugo RN  Skin Protection:   adhesive use limited   incontinence pads utilized   skin to device areas padded   skin to skin areas padded  Activity Management: activity adjusted per tolerance  Head of Bed (HOB) Positioning: HOB at 30 degrees  Intervention: Promote and Optimize Oral Intake  Recent Flowsheet Documentation  Taken 8/13/2025 2000 by Wayne Lugo RN  Oral Nutrition Promotion: rest periods promoted  Taken 8/13/2025 1924 by Wayne Lugo RN  Oral Nutrition Promotion: rest periods promoted     Problem: Breathing Pattern Ineffective  Goal: Effective Breathing Pattern  Outcome: Progressing  Intervention: Promote Improved Breathing Pattern  Recent Flowsheet Documentation  Taken 8/13/2025 2000 by Wayne Lugo RN  Breathing Techniques/Airway Clearance: deep/controlled cough encouraged  Supportive Measures:   active listening utilized   decision-making supported   problem-solving facilitated   relaxation techniques promoted  Airway/Ventilation Management:   airway patency maintained   calming measures promoted   pulmonary hygiene promoted  Head of Bed (HOB) Positioning: HOB at 30 degrees  Taken 8/13/2025 1924 by Wayne Lugo RN  Breathing Techniques/Airway Clearance: deep/controlled cough encouraged  Supportive Measures:   active listening utilized   decision-making supported   problem-solving facilitated   relaxation techniques promoted  Airway/Ventilation Management:   airway patency maintained   calming measures promoted   pulmonary hygiene promoted  Head of Bed (HOB) Positioning: HOB at 30 degrees     Problem: Comorbidity Management  Goal: Maintenance of COPD Symptom Control  Outcome: Progressing  Intervention: Maintain COPD Symptom Control  Recent Flowsheet Documentation  Taken 8/13/2025 2000 by Wayne Lugo RN  Breathing  Techniques/Airway Clearance: deep/controlled cough encouraged  Medication Review/Management: medications reviewed  Taken 8/13/2025 1924 by Wayne Lugo RN  Breathing Techniques/Airway Clearance: deep/controlled cough encouraged  Medication Review/Management: medications reviewed  Goal: Blood Pressure in Desired Range  Outcome: Progressing  Intervention: Maintain Blood Pressure Management  Recent Flowsheet Documentation  Taken 8/13/2025 2000 by Wayne Lugo RN  Medication Review/Management: medications reviewed  Taken 8/13/2025 1924 by Wayne Lugo RN  Medication Review/Management: medications reviewed     Problem: Hypertension Acute  Goal: Blood Pressure Within Desired Range  Outcome: Progressing  Intervention: Normalize Blood Pressure  Recent Flowsheet Documentation  Taken 8/13/2025 2000 by Wayne Lugo RN  Sensory Stimulation Regulation:   care clustered   lighting decreased   auditory stimulation minimized  Medication Review/Management: medications reviewed  Taken 8/13/2025 1924 by Wayne Lugo RN  Sensory Stimulation Regulation:   care clustered   lighting decreased   auditory stimulation minimized  Medication Review/Management: medications reviewed  Intervention: Provide Surveillance and Support  Recent Flowsheet Documentation  Taken 8/13/2025 2000 by Wayne Lugo RN  Stabilization Measures: legs elevated  Taken 8/13/2025 1924 by Wayne Lugo, RN  Stabilization Measures: legs elevated

## 2025-08-14 NOTE — DISCHARGE INSTRUCTIONS
You will get your outpatient dialysis at:  [facility]  [address] [place] [address]  [phone]    Your first outpatient run will be on Tuesday, August 19, 2025.  Please arrive at 5:30am  to complete paperwork.    For subsequent runs, your chair time will be every  T/TH/S at 5:45am.  You should plan to arrive 15 minutes prior to your chair time.    Please bring:   Two forms of ID  Insurance cards  Your CURRENT medication list  Wear a button up shirt  Bring something quiet to do  Bring a pillow or a small blanket because the room can be chilly.

## 2025-08-14 NOTE — PROGRESS NOTES
Care Management Follow Up    Length of Stay (days): 17    Expected Discharge Date: 08/18/2025     Concerns to be Addressed: discharge planning     Patient plan of care discussed at interdisciplinary rounds: Yes    Anticipated Discharge Disposition: Transitional Care  Anticipated Discharge Services:  OP HD  Anticipated Discharge DME:      Patient/family educated on Medicare website which has current facility and service quality ratings:    Education Provided on the Discharge Plan:    Patient/Family in Agreement with the Plan: yes    Referrals Placed by CM/SW: Post Acute Facilities  Private pay costs discussed: Not applicable    Discussed  Partnership in Safe Discharge Planning  document with patient/family: No     Handoff Completed: No, handoff not indicated or clinically appropriate    Additional Information:  RN CC following in collaboration with [facility] for initiation of new OP HD.    CM received a call from [facility] admissions ph:  ext [phone] to confirm pt's first OP HD run, info below.     Anticipate pt will discharge to TCU when medically ready.  Pt will have outpatient dialysis at:  [facility]  First Outpatient run will be on 8/19/2025. Patient should arrive at 0530am for the first run to complete paperwork.  For subsequent runs, chair time will be every T/TH/S at 0545am. Arrival should be 15 min before chair time.  Transportation to and from dialysis will be provided by: TBD    AVS discharge instructions updated w this info.    Next Steps: follow for discharge planning    Ligia Huang RN, BSN  Inpatient Care Coordination  [facility]  [phone]

## 2025-08-14 NOTE — PLAN OF CARE
"Goal Outcome Evaluation:      Plan of Care Reviewed With: patient    Overall Patient Progress: no changeOverall Patient Progress: no change    Outcome Evaluation: .      SR.  C/O of SOB today.  SOB is persistent even after dialysis. Shallow breathing.  LS course.  No increased O2 requirements today.  No physical distress noted.  Is anxious about SOB and expressing fear of another episode of pulmonary edema.  Valium PRN given.  Treating HTN with PRN hydralazine.  Increased intake today.  Not hungary yet eating.  Multiple BM's today.  Voids. Went to dialysis today.    Problem: Adult Inpatient Plan of Care  Goal: Plan of Care Review  Description: The Plan of Care Review/Shift note should be completed every shift.  The Outcome Evaluation is a brief statement about your assessment that the patient is improving, declining, or no change.  This information will be displayed automatically on your shift  note.  Outcome: Not Progressing  Flowsheets (Taken 8/14/2025 1822)  Outcome Evaluation: .  Plan of Care Reviewed With: patient  Overall Patient Progress: no change  Goal: Patient-Specific Goal (Individualized)  Description: You can add care plan individualizations to a care plan. Examples of Individualization might be:  \"Parent requests to be called daily at 9am for status\", \"I have a hard time hearing out of my right ear\", or \"Do not touch me to wake me up as it startles  me\".  Outcome: Not Progressing  Goal: Absence of Hospital-Acquired Illness or Injury  Outcome: Not Progressing  Intervention: Identify and Manage Fall Risk  Recent Flowsheet Documentation  Taken 8/14/2025 0800 by Betty Nuñez RN  Safety Promotion/Fall Prevention:   activity supervised   clutter free environment maintained   increased rounding and observation   increase visualization of patient   nonskid shoes/slippers when out of bed   safety round/check completed   patient and family education  Intervention: Prevent Skin Injury  Recent Flowsheet " Documentation  Taken 8/14/2025 1600 by Betty Nuñez RN  Body Position: position changed independently  Taken 8/14/2025 1415 by Betty Nuñez RN  Body Position: position changed independently  Taken 8/14/2025 0800 by Betty Nuñez RN  Body Position: position changed independently  Skin Protection:   adhesive use limited   incontinence pads utilized   skin to device areas padded   skin to skin areas padded  Intervention: Prevent and Manage VTE (Venous Thromboembolism) Risk  Recent Flowsheet Documentation  Taken 8/14/2025 0800 by Betty Nuñez RN  VTE Prevention/Management: SCDs off (sequential compression devices)  Intervention: Prevent Infection  Recent Flowsheet Documentation  Taken 8/14/2025 0800 by Betty Nuñez RN  Infection Prevention:   environmental surveillance performed   equipment surfaces disinfected   hand hygiene promoted   personal protective equipment utilized   rest/sleep promoted   single patient room provided  Goal: Optimal Comfort and Wellbeing  Outcome: Not Progressing  Intervention: Provide Person-Centered Care  Recent Flowsheet Documentation  Taken 8/14/2025 0800 by Betty Nuñez RN  Trust Relationship/Rapport:   care explained   choices provided   thoughts/feelings acknowledged   reassurance provided   questions encouraged   questions answered  Goal: Readiness for Transition of Care  Outcome: Not Progressing     Problem: Delirium  Goal: Optimal Coping  Outcome: Not Progressing  Intervention: Optimize Psychosocial Adjustment to Delirium  Recent Flowsheet Documentation  Taken 8/14/2025 0800 by Betty Nuñez RN  Supportive Measures:   active listening utilized   decision-making supported   problem-solving facilitated   relaxation techniques promoted  Goal: Improved Behavioral Control  Outcome: Not Progressing  Intervention: Prevent and Manage Agitation  Recent Flowsheet Documentation  Taken 8/14/2025 0800 by Betty Nuñez RN  Environment Familiarity/Consistency: daily  routine followed  Intervention: Minimize Safety Risk  Recent Flowsheet Documentation  Taken 8/14/2025 0800 by Betty Nuñez RN  Enhanced Safety Measures:   pain management   review medications for side effects with activity   room near unit station  Trust Relationship/Rapport:   care explained   choices provided   thoughts/feelings acknowledged   reassurance provided   questions encouraged   questions answered  Goal: Improved Attention and Thought Clarity  Outcome: Not Progressing  Intervention: Maximize Cognitive Function  Recent Flowsheet Documentation  Taken 8/14/2025 0800 by Betty Nuñez RN  Sensory Stimulation Regulation:   care clustered   lighting decreased   auditory stimulation minimized  Reorientation Measures: clock in view  Goal: Improved Sleep  Outcome: Not Progressing     Problem: Skin Injury Risk Increased  Goal: Skin Health and Integrity  Outcome: Not Progressing  Intervention: Plan: Nurse Driven Intervention: Positioning  Recent Flowsheet Documentation  Taken 8/14/2025 0800 by Betty Nuñez RN  Plan: Positioning Interventions:   REPOSITION Left/Right (No supine) q2h   HOB 30 degrees or less   OFF-LOAD HEELS with pillows  Intervention: Plan: Nurse Driven Intervention: Moisture Management  Recent Flowsheet Documentation  Taken 8/14/2025 0800 by Betty Nuñez RN  Moisture Interventions:   No brief in bed   Incontinence pad   Encourage regular toileting  Intervention: Plan: Nurse Driven Intervention: Friction and Shear  Recent Flowsheet Documentation  Taken 8/14/2025 0800 by Betty Nuñez RN  Friction/Shear Interventions: HOB 30 degrees or less  Intervention: Optimize Skin Protection  Recent Flowsheet Documentation  Taken 8/14/2025 0800 by Betty Nuñez RN  Skin Protection:   adhesive use limited   incontinence pads utilized   skin to device areas padded   skin to skin areas padded  Activity Management: up to bedside commode  Intervention: Promote and Optimize Oral Intake  Recent  Flowsheet Documentation  Taken 8/14/2025 0800 by Betty Nuñez RN  Oral Nutrition Promotion: rest periods promoted     Problem: Breathing Pattern Ineffective  Goal: Effective Breathing Pattern  Outcome: Not Progressing  Intervention: Promote Improved Breathing Pattern  Recent Flowsheet Documentation  Taken 8/14/2025 0800 by Betty Nuñez RN  Supportive Measures:   active listening utilized   decision-making supported   problem-solving facilitated   relaxation techniques promoted  Airway/Ventilation Management:   pulmonary hygiene promoted   position adjusted   oxygen therapy provided   humidification applied     Problem: Comorbidity Management  Goal: Maintenance of COPD Symptom Control  Outcome: Not Progressing  Intervention: Maintain COPD Symptom Control  Recent Flowsheet Documentation  Taken 8/14/2025 0800 by Betty Nuñez RN  Medication Review/Management: medications reviewed  Goal: Blood Pressure in Desired Range  Outcome: Not Progressing  Intervention: Maintain Blood Pressure Management  Recent Flowsheet Documentation  Taken 8/14/2025 0800 by Betty Nuñez RN  Medication Review/Management: medications reviewed     Problem: Hypertension Acute  Goal: Blood Pressure Within Desired Range  Outcome: Not Progressing  Intervention: Normalize Blood Pressure  Recent Flowsheet Documentation  Taken 8/14/2025 0800 by Betty Nuñez RN  Sensory Stimulation Regulation:   care clustered   lighting decreased   auditory stimulation minimized  Medication Review/Management: medications reviewed

## 2025-08-14 NOTE — PROGRESS NOTES
[facility]    Medicine Progress Note - Hospitalist Service    Date of Admission:  7/28/2025  Date of Service: 08/14/2025    Assessment & Plan     75-year-old female with history of CAD, CKD stage IV, hypertension, hyperlipidemia, intra-abdominal aortic aneurysm with prior stenting, chronic vertigo, recent hospitalization for fall with pelvic fracture who presents the ED for shortness of breath.       Had elevated D-dimer but VQ scan negative for PE, CT not done due to CKD.  proBNP more than 25,000.  Chest x-ray showed bilateral perihilar interstitial/airspace opacities, left greater than right, are nonspecific for postradiation change versus pneumonia. A small nodule in the peripheral right upper lobe measures 9 mm.      Found to be in hypertensive emergency, admitted to ICU with BiPAP and nitroglycerin drip.  She was weaned off nitroglycerin drip and BiPAP but has had repeated episodes of rising blood pressure and shortness of breath needing her to go back on BiPAP intermittently.  She was transferred to floor on 7/31 but came back to ICU on 8/2 after rapid response for flash pulmonary edema and hypertensive emergency.      Started on hemodialysis treatment 8/10/25.     On the morning of 8/14, patient went into A-fib with initial rates of 130s and then decreased to 40s without intervention.  She converted back to sinus rhythm and about half an hour.      Currently medically improved and stable to transfer to the medical floor.     Acute hypoxic respiratory failure: Multifactorial-initially BiPAP dependent, improved  Diastolic CHF exacerbation,Recurrent flash pulmonary edema associated with episodes of high blood pressure-improved with initiation of hemodialysis     Respiratory status improved with dialysis and improved control of BP  On min supplemental oxygen - stable on 2L    - Echo 7/29/25 - with EF of 55 to 60%, left atrium dilated, right bent sys function normal    Unclear what is  causing recurrent sudden episodes of elevated blood pressure and flash pulmonary edema.  She has history of renal artery stenting and on renal arterial ultrasound done on 7/31 there was no sonographic evidence of renal artery stenosis.       End-stage renal disease  Metabolic acidosis     Initiated hemodialysis this admission  Nephrology continuing to follow (appreciated)     Hypertension  Hypertensive emergency, resolved        PTA med include carvediol, hydralazine, isordil, clonidine patch     Noted to have elevated normetaneprhine level but metanephrine level was normal     nitially had hypertensive urgency requiring nitroglycerin gtt     Has had quite variable BPs in the last several days  was initially with hypertensive urgency and, over the last few days, has been intermittently hypotensive     8/13/25 - Currently on Coreg 25mg po bid, hydralazine 50mg po bid.  Will restart Nifedipine XL as lower dose this am (30mg) and continue to monitor closely    Infectious disease  -Was empirically started on ceftriaxone on presentation.  Patient had no fever or leukocytosis.  Mildly elevated procalcitonin of 0.67 which is difficult to interpret in ROMI/CKD and improved to 0.49 on recheck.  Finished 5-day course of ceftriaxone.       Atrial fibrillation: Paroxysmal atrial fibrillation    - On 8/4, patient had a self-limiting 30 minutes episode of atrial fibrillation initially with RVR to 130 and then bradycardia to 40.  - Cardiology was reconsulted, advised 150 mg amiodarone IV push, and the plan was no further amiodarone if patient remains sinus.    -- However, patient developed intermittent episodes of atrial fibrillation with RVR.  Cardiology recommended amiodarone drip and planning to transition to oral amiodarone 200 mg once a day for about a month.  Continue Coreg at current dose   Anticoagulation was recommended by cardiology (Eliquis)     Malnutrition: Moderate nutrition in the context of acute on chronic medical  illness.  - Patient was unable to take adequate oral intake due to dependence on BiPAP and currently due to decreased appetite.  -Registered dietitian is following and recommendation appreciated     - keofed feeding tube placed and tube feeding started she has been tolerating feeding tube.  Her oral intake still remains poor.    Appetite stimulation with Megace - continues    8/13/25 - discussed with dietician at length this am.  Discussed potential cycling of tube feeds to see if she would be able to eat more during the day     Will start calorie counts  Diet was liberalized 8/12/25 - but still didn't eat much  Concern that she may need a PEG tube     Constipation: On bowel regimen      Generalized weakness  Physical deconditioning  --PT/OT consulted and following     Concern for cognitive dysfunction:  --Patient has trouble with memory but there is no official diagnosis of dementia.  May use Seroquel as needed for delirium.  Will try to avoid benzos    OT consulted and following     Goal of care: Restorative and patient is full code.        Diet: Fluid restriction 1500 ML FLUID  Snacks/Supplements Adult: Nepro Oral Supplement; Between Meals  Regular Diet Adult  Calorie Counts  Adult Formula Drip Feeding: Continuous Jevity 1.5; Nasogastric tube; Goal Rate: 45; mL/hr; Once phos replaced, okay to advance to new goal rate of 45 ml/hr; Do not advance tube feeding rate unless K+ is = or > 3.0, Mg++ is = or > 1.5, and Phos is ...    DVT Prophylaxis: DOAC  Rao Catheter: Not present  Lines: PRESENT      CVC Double Lumen Right Subclavian Tunneled;Hemodialysis/CRRT-Site Assessment: WDL      Cardiac Monitoring: ACTIVE order. Indication: Acute decompensated heart failure (48 hours)  Code Status: Full Code      Clinically Significant Risk Factors         # Hyponatremia: Lowest Na = 132 mmol/L in last 2 days, will monitor as appropriate  # Hypochloremia: Lowest Cl = 93 mmol/L in last 2 days, will monitor as appropriate    #  Hypomagnesemia: Lowest Mg = 1.6 mg/dL in last 2 days, will replace as needed   # Hypoalbuminemia: Lowest albumin = 2.8 g/dL at 8/12/2025  5:26 AM, will monitor as appropriate     # Hypertension: Noted on problem list             # Moderate Malnutrition: based on nutrition assessment and treatment provided per dietitian's recommendations.    # Financial/Environmental Concerns: none         Disposition Plan     Medically Ready for Discharge: Anticipated in 2-4 Days             Ulysses Schafer MD  Hospitalist Service  [facility]  Securely message with Handpressions (more info)  Text page via MyLikes Paging/Directory   ______________________________________________________________________    Interval History     Stable on 2L  Had some dyspnea this AM but better after HD  Currently without HA, N/V, CP, SOB, F/C.   No new complaints    Physical Exam   Vital Signs: Temp: 97.6  F (36.4  C) Temp src: Oral BP: (!) 182/96 Pulse: 81   Resp: 18 SpO2: 95 % O2 Device: Nasal cannula Oxygen Delivery: 2 LPM  Weight: 111 lbs 4.8 oz    GEN:  Alert, appears to be in no acute respiratory distress  HEENT:  Normocephalic/atraumatic, no scleral icterus, no nasal discharge  CV:  Regular rate and rhythm, no loud murmur.  S1 + S2 noted  LUNGS:  Clear to auscultation ant/lat bilaterally without rales/rhonchi/wheezing/retractions.  Symmetric chest rise on inhalation noted.  ABD:  slower but present bowel sounds, soft, non-tender/non-distended.  No clear rebound/guarding/rigidity.  EXT:  trace pretibial edema bilaterally.  No cyanosis.    SKIN:  Dry to touch, no exanthems noted in the visualized areas.    Medical Decision Making       50 MINUTES SPENT BY ME on the date of service doing chart review, history, exam, documentation & further activities per the note.      Data   Medications   Current Facility-Administered Medications   Medication Dose Route Frequency Provider Last Rate Last Admin    dextrose 10% infusion   Intravenous  Continuous PRN Ulysses Schafer MD        No lozenges or gum should be given while patient on BIPAP/AVAPS/AVAPS AE   Does not apply Continuous PRN Ulysses Schafer MD        Patient may continue current oral medications   Does not apply Continuous PRN Ulysses Schafer MD         Current Facility-Administered Medications   Medication Dose Route Frequency Provider Last Rate Last Admin    - MEDICATION INSTRUCTIONS for Dialysis Patients -   Does not apply See Admin Instructions Ulysses Schafer MD        amiodarone (PACERONE) tablet 200 mg  200 mg Oral or Feeding Tube Daily Ulysses Schafer MD   200 mg at 08/14/25 0919    apixaban ANTICOAGULANT (ELIQUIS) tablet 2.5 mg  2.5 mg Oral BID Ulysses Schafer MD   2.5 mg at 08/14/25 0918    B and C vitamin Complex with folic acid (NEPHRONEX) liquid 5 mL  5 mL Per Feeding Tube Daily Ulysses Schafer MD   5 mL at 08/14/25 0919    carvedilol (COREG) tablet 25 mg  25 mg Oral or Feeding Tube BID w/meals Ulysses Schafer MD   25 mg at 08/14/25 0918    clopidogrel (PLAVIX) tablet 75 mg  75 mg Oral or Feeding Tube Daily Ulysses Schafer MD   75 mg at 08/13/25 1825    famotidine (PEPCID) tablet 20 mg  20 mg Oral or Feeding Tube Q48H Ulysses Schafer MD   20 mg at 08/14/25 0919    sodium chloride 0.9% DIALYSIS Cath LOCK - RED Lumen  10 mL Intracatheter Once in dialysis/CRRT Ulysses Schafer MD        Followed by    heparin 1000 unit/mL DIALYSIS Cath LOCK - RED Lumen  1.3-2.6 mL Intracatheter Once in dialysis/CRRT Ulysses Schafer MD        sodium chloride 0.9% DIALYSIS Cath LOCK - BLUE Lumen  10 mL Intracatheter Once in dialysis/CRRT Ulysses Schafer MD        Followed by    heparin 1000 unit/mL DIALYSIS Cath LOCK -BLUE Lumen  1.3-2.6 mL Intracatheter Once in dialysis/CRRT Ulysses Schafer MD        hydrALAZINE (APRESOLINE) tablet 50 mg  50 mg Oral or Feeding Tube TID Ulysses Schafer MD   50 mg at 08/14/25 1416    icosapent ethyl (VASCEPA) capsule 2 g  2 g Oral BID w/meals Ulysses Schafer MD   2 g at 08/14/25 0938    [Held by provider] isosorbide  "dinitrate (ISORDIL) tablet 40 mg  40 mg Oral or Feeding Tube TID Ulysses Schafer MD   40 mg at 08/10/25 0552    meclizine (ANTIVERT) tablet 25 mg  25 mg Oral or Feeding Tube Q6H Ulysses Schafer MD   25 mg at 08/14/25 1405    megestrol (MEGACE) suspension 200 mg  200 mg Oral or Feeding Tube Daily Ulysses Schafer MD   200 mg at 08/14/25 0921    NIFEdipine ER OSMOTIC (PROCARDIA XL) 24 hr tablet 30 mg  30 mg Oral Daily Ulysses Schafer MD   30 mg at 08/14/25 0919    No heparin via hemodialysis machine   Does not apply Once Ulysses Schafer MD        PARoxetine (PAXIL) tablet 40 mg  40 mg Oral or Feeding Tube Daily Ulysses Schafer MD   40 mg at 08/14/25 0916    sodium chloride (PF) 0.9% PF flush 3 mL  3 mL Intracatheter Q8H CHIDI Ulysses Schafer MD   3 mL at 08/14/25 1406    sodium chloride (PF) 0.9% PF flush 9 mL  9 mL Intracatheter During Dialysis/CRRT (from stock) Ulysses Schafer MD        sodium chloride (PF) 0.9% PF flush 9 mL  9 mL Intracatheter During Dialysis/CRRT (from stock) Ulysses Schafer MD        sodium chloride 0.9% BOLUS 200 mL  200 mL Hemodialysis Machine Once Ulysses Schafer MD        sodium chloride 0.9% BOLUS 250 mL  250 mL Intravenous Once in dialysis/CRRT Ulysses Schafer MD        sodium chloride 0.9% BOLUS 500 mL  500 mL Hemodialysis Machine Once Ulysses Schafer MD         Labs and Imaging results below reviewed today.  No results for input(s): \"PH\", \"PHV\", \"PO2\", \"PO2V\", \"SAT\", \"PCO2\", \"PCO2V\", \"HCO3\", \"HCO3V\" in the last 168 hours.  Recent Labs   Lab 08/13/25  0525 08/12/25  0526 08/11/25  0557 08/10/25  0614   WBC  --  11.1* 9.4 8.0   HGB  --  9.1* 8.8* 9.0*   HCT  --  27.0* 26.2* 27.6*   .4* 100 101* 103*    277 261 276     Recent Labs   Lab 08/14/25  1227 08/14/25  0759 08/14/25  0608 08/13/25  0840 08/13/25  0525 08/12/25  0754 08/12/25 0526   NA  --   --  132*  --  133*  --  135   POTASSIUM  --   --  4.3  --  4.0  4.0  --  3.5   CHLORIDE  --   --  93*  --  95*  --  95*   CO2  --   --  27  --  26  --  26   ANIONGAP  " --   --  12  --  12  --  14   * 118* 116*   < > 107*   < > 117*   BUN  --   --  50.5*  --  30.5*  --  51.4*   CR  --   --  2.08*  --  1.85*  --  2.62*   GFRESTIMATED  --   --  24*  --  28*  --  18*   BRUCE  --   --  8.5*  --  8.4*  --  9.0    < > = values in this interval not displayed.       No results found for this or any previous visit (from the past 24 hours).

## 2025-08-15 ENCOUNTER — APPOINTMENT (OUTPATIENT)
Dept: PHYSICAL THERAPY | Facility: CLINIC | Age: 75
DRG: 291 | End: 2025-08-15
Payer: MEDICARE

## 2025-08-15 LAB
ALBUMIN SERPL BCG-MCNC: 3 G/DL (ref 3.5–5.2)
ANION GAP SERPL CALCULATED.3IONS-SCNC: 12 MMOL/L (ref 7–15)
BUN SERPL-MCNC: 31.5 MG/DL (ref 8–23)
CALCIUM SERPL-MCNC: 8.5 MG/DL (ref 8.8–10.4)
CHLORIDE SERPL-SCNC: 95 MMOL/L (ref 98–107)
CREAT SERPL-MCNC: 1.67 MG/DL (ref 0.51–0.95)
EGFRCR SERPLBLD CKD-EPI 2021: 32 ML/MIN/1.73M2
GLUCOSE BLDC GLUCOMTR-MCNC: 111 MG/DL (ref 70–99)
GLUCOSE BLDC GLUCOMTR-MCNC: 124 MG/DL (ref 70–99)
GLUCOSE BLDC GLUCOMTR-MCNC: 125 MG/DL (ref 70–99)
GLUCOSE BLDC GLUCOMTR-MCNC: 127 MG/DL (ref 70–99)
GLUCOSE SERPL-MCNC: 126 MG/DL (ref 70–99)
HCO3 SERPL-SCNC: 26 MMOL/L (ref 22–29)
HOLD SPECIMEN: NORMAL
MAGNESIUM SERPL-MCNC: 1.7 MG/DL (ref 1.7–2.3)
PHOSPHATE SERPL-MCNC: 2.8 MG/DL (ref 2.5–4.5)
PHOSPHATE SERPL-MCNC: 2.8 MG/DL (ref 2.5–4.5)
POTASSIUM SERPL-SCNC: 4.5 MMOL/L (ref 3.4–5.3)
SODIUM SERPL-SCNC: 133 MMOL/L (ref 135–145)

## 2025-08-15 PROCEDURE — 250N000013 HC RX MED GY IP 250 OP 250 PS 637: Performed by: STUDENT IN AN ORGANIZED HEALTH CARE EDUCATION/TRAINING PROGRAM

## 2025-08-15 PROCEDURE — 120N000001 HC R&B MED SURG/OB

## 2025-08-15 PROCEDURE — 36415 COLL VENOUS BLD VENIPUNCTURE: CPT | Performed by: STUDENT IN AN ORGANIZED HEALTH CARE EDUCATION/TRAINING PROGRAM

## 2025-08-15 PROCEDURE — 83735 ASSAY OF MAGNESIUM: CPT | Performed by: STUDENT IN AN ORGANIZED HEALTH CARE EDUCATION/TRAINING PROGRAM

## 2025-08-15 PROCEDURE — 99232 SBSQ HOSP IP/OBS MODERATE 35: CPT | Performed by: STUDENT IN AN ORGANIZED HEALTH CARE EDUCATION/TRAINING PROGRAM

## 2025-08-15 PROCEDURE — 80069 RENAL FUNCTION PANEL: CPT | Performed by: STUDENT IN AN ORGANIZED HEALTH CARE EDUCATION/TRAINING PROGRAM

## 2025-08-15 PROCEDURE — 97530 THERAPEUTIC ACTIVITIES: CPT | Mod: GP

## 2025-08-15 PROCEDURE — 82310 ASSAY OF CALCIUM: CPT | Performed by: INTERNAL MEDICINE

## 2025-08-15 RX ORDER — FAMOTIDINE 40 MG/5ML
20 POWDER, FOR SUSPENSION ORAL
Status: DISCONTINUED | OUTPATIENT
Start: 2025-08-16 | End: 2025-08-19

## 2025-08-15 RX ORDER — MIRTAZAPINE 7.5 MG/1
7.5 TABLET, FILM COATED ORAL AT BEDTIME
Status: DISCONTINUED | OUTPATIENT
Start: 2025-08-15 | End: 2025-08-20 | Stop reason: HOSPADM

## 2025-08-15 RX ORDER — ACETAMINOPHEN 325 MG/10.15ML
500 LIQUID ORAL
Status: DISCONTINUED | OUTPATIENT
Start: 2025-08-15 | End: 2025-08-20 | Stop reason: HOSPADM

## 2025-08-15 RX ORDER — ACETAMINOPHEN 325 MG/10.15ML
650 LIQUID ORAL EVERY 4 HOURS PRN
Status: DISCONTINUED | OUTPATIENT
Start: 2025-08-15 | End: 2025-08-20 | Stop reason: HOSPADM

## 2025-08-15 RX ORDER — PAROXETINE 10 MG/5ML
40 SUSPENSION ORAL DAILY
Status: COMPLETED | OUTPATIENT
Start: 2025-08-16 | End: 2025-08-19

## 2025-08-15 RX ADMIN — PAROXETINE HYDROCHLORIDE 40 MG: 20 TABLET, FILM COATED ORAL at 08:19

## 2025-08-15 RX ADMIN — APIXABAN 2.5 MG: 2.5 TABLET, FILM COATED ORAL at 22:20

## 2025-08-15 RX ADMIN — ICOSAPENT ETHYL 2 G: 1 CAPSULE ORAL at 18:59

## 2025-08-15 RX ADMIN — MECLIZINE HYDROCHLORIDE 25 MG: 25 TABLET ORAL at 12:30

## 2025-08-15 RX ADMIN — NIFEDIPINE 30 MG: 30 TABLET, FILM COATED, EXTENDED RELEASE ORAL at 08:43

## 2025-08-15 RX ADMIN — MECLIZINE HYDROCHLORIDE 25 MG: 25 TABLET ORAL at 06:30

## 2025-08-15 RX ADMIN — CLOPIDOGREL BISULFATE 75 MG: 75 TABLET, FILM COATED ORAL at 19:00

## 2025-08-15 RX ADMIN — APIXABAN 2.5 MG: 2.5 TABLET, FILM COATED ORAL at 08:19

## 2025-08-15 RX ADMIN — MECLIZINE HYDROCHLORIDE 25 MG: 25 TABLET ORAL at 19:00

## 2025-08-15 RX ADMIN — Medication 5 ML: at 08:43

## 2025-08-15 RX ADMIN — AMIODARONE HYDROCHLORIDE 200 MG: 200 TABLET ORAL at 08:18

## 2025-08-15 RX ADMIN — MIRTAZAPINE 7.5 MG: 7.5 TABLET, FILM COATED ORAL at 22:20

## 2025-08-15 RX ADMIN — CARVEDILOL 25 MG: 12.5 TABLET, FILM COATED ORAL at 08:19

## 2025-08-15 RX ADMIN — HYDRALAZINE HYDROCHLORIDE 50 MG: 50 TABLET, FILM COATED ORAL at 08:19

## 2025-08-15 RX ADMIN — MEGESTROL ACETATE 200 MG: 40 SUSPENSION ORAL at 08:22

## 2025-08-15 RX ADMIN — ICOSAPENT ETHYL 2 G: 1 CAPSULE ORAL at 08:24

## 2025-08-15 RX ADMIN — QUETIAPINE FUMARATE 6.25 MG: 25 TABLET ORAL at 22:20

## 2025-08-15 RX ADMIN — CARVEDILOL 25 MG: 12.5 TABLET, FILM COATED ORAL at 19:00

## 2025-08-15 RX ADMIN — HYDRALAZINE HYDROCHLORIDE 50 MG: 50 TABLET, FILM COATED ORAL at 16:27

## 2025-08-15 ASSESSMENT — ACTIVITIES OF DAILY LIVING (ADL)
ADLS_ACUITY_SCORE: 69
ADLS_ACUITY_SCORE: 73
ADLS_ACUITY_SCORE: 71
ADLS_ACUITY_SCORE: 73
ADLS_ACUITY_SCORE: 73
ADLS_ACUITY_SCORE: 69
ADLS_ACUITY_SCORE: 73
ADLS_ACUITY_SCORE: 69
ADLS_ACUITY_SCORE: 69
ADLS_ACUITY_SCORE: 73
ADLS_ACUITY_SCORE: 73
ADLS_ACUITY_SCORE: 69
ADLS_ACUITY_SCORE: 73
ADLS_ACUITY_SCORE: 69
ADLS_ACUITY_SCORE: 73
ADLS_ACUITY_SCORE: 73
ADLS_ACUITY_SCORE: 69
ADLS_ACUITY_SCORE: 69
ADLS_ACUITY_SCORE: 73
ADLS_ACUITY_SCORE: 69

## 2025-08-15 NOTE — PLAN OF CARE
Hours of care: [id number]  ICU End of Shift Summary.  For vital signs and complete assessments, please see documentation flowsheets.     Pertinent assessments: aox4, SR, VSS, RA but desats to high 80s with activity, poor appetite- toni counts continuing until tomorrow, BM x1  Major Shift Events: up in chair, TF changed to cyclic 8p-8a  Plan (Upcoming Events): transfer to floor when bed available  Discharge/Transfer Needs: TBD    Report given to med surg 3 RN, pt transferred with belongings with family present       Problem: Adult Inpatient Plan of Care  Goal: Plan of Care Review  Description: The Plan of Care Review/Shift note should be completed every shift.  The Outcome Evaluation is a brief statement about your assessment that the patient is improving, declining, or no change.  This information will be displayed automatically on your shift  note.  Outcome: Progressing  Flowsheets (Taken 8/15/2025 1311)  Outcome Evaluation: toni counts continuing, cycled tf  Plan of Care Reviewed With: patient  Overall Patient Progress: improving   Goal Outcome Evaluation:      Plan of Care Reviewed With: patient    Overall Patient Progress: improvingOverall Patient Progress: improving    Outcome Evaluation: toni counts continuing, cycled tf

## 2025-08-15 NOTE — PLAN OF CARE
"Goal Outcome Evaluation:      Plan of Care Reviewed With: patient    Overall Patient Progress: improvingOverall Patient Progress: improving    Outcome Evaluation: .    U End of Shift Summary.  For vital signs and complete assessments, please see documentation flowsheets.      Pertinent assessments:  VSS on RA, 1 L NC sleeping. LS clear. A/O  x4. Tele: SR, prolong QTc, PO Eliquis. Up to bedside commode. OLGA @ 91 @ nare, TF running @ goal of 45 ml/hr, with 30 ml q4h fwf. Fluid restriction 1500 mL with a regular diet and calorie count. PIV x3, HD port. K, mag, phos protocols. Assist x1, GB/W.      PRN Seroquel given      Plan (Upcoming Events): TBD  Discharge/Transfer Needs: TBD     Bedside Shift Report Completed : Yes  Bedside Safety Check Completed: Yes      Problem: Adult Inpatient Plan of Care  Goal: Plan of Care Review  Description: The Plan of Care Review/Shift note should be completed every shift.  The Outcome Evaluation is a brief statement about your assessment that the patient is improving, declining, or no change.  This information will be displayed automatically on your shift  note.  Outcome: Progressing  Flowsheets (Taken 8/15/2025 0532)  Outcome Evaluation: .  Plan of Care Reviewed With: patient  Overall Patient Progress: improving  Goal: Patient-Specific Goal (Individualized)  Description: You can add care plan individualizations to a care plan. Examples of Individualization might be:  \"Parent requests to be called daily at 9am for status\", \"I have a hard time hearing out of my right ear\", or \"Do not touch me to wake me up as it startles  me\".  Outcome: Progressing  Goal: Absence of Hospital-Acquired Illness or Injury  Outcome: Progressing  Intervention: Identify and Manage Fall Risk  Recent Flowsheet Documentation  Taken 8/14/2025 2100 by Wayne Lugo, RN  Safety Promotion/Fall Prevention:   activity supervised   assistive device/personal items within reach   clutter free environment maintained   " increase visualization of patient   lighting adjusted   increased rounding and observation   mobility aid in reach   nonskid shoes/slippers when out of bed   patient and family education   treat underlying cause   treat reversible contributory factors   supervised activity   safety round/check completed   room organization consistent   room near nurse's station  Intervention: Prevent Skin Injury  Recent Flowsheet Documentation  Taken 8/14/2025 2100 by Wayne Lugo RN  Body Position: position changed independently  Skin Protection:   adhesive use limited   incontinence pads utilized   skin to device areas padded   skin to skin areas padded  Intervention: Prevent and Manage VTE (Venous Thromboembolism) Risk  Recent Flowsheet Documentation  Taken 8/14/2025 2100 by Wayne Lugo RN  VTE Prevention/Management: (PO Eliquis) SCDs off (sequential compression devices)  Intervention: Prevent Infection  Recent Flowsheet Documentation  Taken 8/14/2025 2100 by Wayne Lugo RN  Infection Prevention:   environmental surveillance performed   equipment surfaces disinfected   hand hygiene promoted   personal protective equipment utilized   rest/sleep promoted   single patient room provided  Goal: Optimal Comfort and Wellbeing  Outcome: Progressing  Intervention: Monitor Pain and Promote Comfort  Recent Flowsheet Documentation  Taken 8/14/2025 2100 by Wayne Lugo RN  Pain Management Interventions:   rest   repositioned   relaxation techniques promoted  Intervention: Provide Person-Centered Care  Recent Flowsheet Documentation  Taken 8/14/2025 2100 by Wayne Lugo RN  Trust Relationship/Rapport:   care explained   emotional support provided   empathic listening provided   choices provided   questions encouraged   questions answered   reassurance provided   thoughts/feelings acknowledged  Goal: Readiness for Transition of Care  Outcome: Progressing     Problem: Delirium  Goal: Optimal Coping  Outcome: Progressing  Intervention:  Optimize Psychosocial Adjustment to Delirium  Recent Flowsheet Documentation  Taken 8/14/2025 2100 by Wayne Lugo RN  Supportive Measures:   active listening utilized   decision-making supported   problem-solving facilitated   relaxation techniques promoted   self-care encouraged   self-reflection promoted  Goal: Improved Behavioral Control  Outcome: Progressing  Intervention: Prevent and Manage Agitation  Recent Flowsheet Documentation  Taken 8/14/2025 2100 by Wayne Lugo RN  Environment Familiarity/Consistency: daily routine followed  Intervention: Minimize Safety Risk  Recent Flowsheet Documentation  Taken 8/14/2025 2100 by Wayne Lugo RN  Enhanced Safety Measures:   pain management   room near unit station   review medications for side effects with activity  Trust Relationship/Rapport:   care explained   emotional support provided   empathic listening provided   choices provided   questions encouraged   questions answered   reassurance provided   thoughts/feelings acknowledged  Goal: Improved Attention and Thought Clarity  Outcome: Progressing  Intervention: Maximize Cognitive Function  Recent Flowsheet Documentation  Taken 8/14/2025 2100 by Wayne Lugo RN  Sensory Stimulation Regulation:   auditory stimulation minimized   care clustered   lighting decreased   quiet environment promoted  Reorientation Measures:   calendar in view   clock in view  Goal: Improved Sleep  Outcome: Progressing  Intervention: Promote Sleep  Recent Flowsheet Documentation  Taken 8/14/2025 2100 by Wayne Lugo RN  Sleep/Rest Enhancement:   awakenings minimized   consistent schedule promoted   regular sleep/rest pattern promoted   relaxation techniques promoted   room darkened     Problem: Skin Injury Risk Increased  Goal: Skin Health and Integrity  Outcome: Progressing  Intervention: Plan: Nurse Driven Intervention: Positioning  Recent Flowsheet Documentation  Taken 8/14/2025 2100 by Wayne Lugo RN  Plan: Positioning  Interventions:   REPOSITION Left/Right (No supine) q2h   Use wedge positioners   OFF-LOAD HEELS with pillows   HOB 30 degrees or less  Intervention: Plan: Nurse Driven Intervention: Moisture Management  Recent Flowsheet Documentation  Taken 8/14/2025 2100 by Wayne Lugo RN  Moisture Interventions:   Incontinence pad   Encourage regular toileting  Intervention: Plan: Nurse Driven Intervention: Friction and Shear  Recent Flowsheet Documentation  Taken 8/14/2025 2100 by Wayne Lugo RN  Friction/Shear Interventions: HOB 30 degrees or less  Intervention: Optimize Skin Protection  Recent Flowsheet Documentation  Taken 8/14/2025 2100 by Wayne Lugo RN  Skin Protection:   adhesive use limited   incontinence pads utilized   skin to device areas padded   skin to skin areas padded  Activity Management: activity adjusted per tolerance  Intervention: Promote and Optimize Oral Intake  Recent Flowsheet Documentation  Taken 8/14/2025 2100 by Wayne Lugo RN  Oral Nutrition Promotion: rest periods promoted     Problem: Breathing Pattern Ineffective  Goal: Effective Breathing Pattern  Outcome: Progressing  Intervention: Promote Improved Breathing Pattern  Recent Flowsheet Documentation  Taken 8/14/2025 2100 by Wayne Lugo RN  Breathing Techniques/Airway Clearance: deep/controlled cough encouraged  Supportive Measures:   active listening utilized   decision-making supported   problem-solving facilitated   relaxation techniques promoted   self-care encouraged   self-reflection promoted  Airway/Ventilation Management:   airway patency maintained   calming measures promoted   pulmonary hygiene promoted     Problem: Comorbidity Management  Goal: Maintenance of COPD Symptom Control  Outcome: Progressing  Intervention: Maintain COPD Symptom Control  Recent Flowsheet Documentation  Taken 8/14/2025 2100 by Wayne Lugo RN  Breathing Techniques/Airway Clearance: deep/controlled cough encouraged  Medication Review/Management:  medications reviewed  Goal: Blood Pressure in Desired Range  Outcome: Progressing  Intervention: Maintain Blood Pressure Management  Recent Flowsheet Documentation  Taken 8/14/2025 2100 by Wayne Lugo, RN  Medication Review/Management: medications reviewed     Problem: Hypertension Acute  Goal: Blood Pressure Within Desired Range  Outcome: Progressing  Intervention: Normalize Blood Pressure  Recent Flowsheet Documentation  Taken 8/14/2025 2100 by Wayne Lugo, RN  Sensory Stimulation Regulation:   auditory stimulation minimized   care clustered   lighting decreased   quiet environment promoted  Medication Review/Management: medications reviewed

## 2025-08-15 NOTE — PHARMACY
Pharmacy Tube Feeding Consult    Medication reviewed for administration by feeding tube and for potential food/drug interactions.    Recommendation: Recommend changing the following medications to a liquid dosage form: senna, famotidine, paroxetine and tylenol     Pharmacy will continue to follow as new medications are ordered.

## 2025-08-15 NOTE — PROGRESS NOTES
CALORIE COUNT      Approximate Oral Intake for:    8/14  Calories: 694 kcal  Protein: 28 gm      Intake from TF/PN:       Jevity 1.5 Jose (or equivalent) @ goal of  45ml/hr x 24 hrs (1080ml/day) provides: 1620 kcals, 68 g PRO, 820 ml free H20, 232 g CHO, and 22 g fiber daily.   FWF: 30 ml q 4h for tube patency (additional free water per nephrology)  Total fluids provided with EN + FWF: 1000 ml (within fluid restriction)    Estimated Needs:    Dosing Weight: 50.4 kg, based on lowest weight this admit  Estimated Energy Needs: 0279-0156 kcals/day (30 - 35 kcals/kg)  Justification: Increased needs w/ HD and Underweight  Estimated Protein Needs: >/= 50-61 grams protein/day (>/= 1 - 1.2 grams of pro/kg)  Justification: Preservation of LBM and Dialysis  Estimated Fluid Needs: Defer to nephrology      Summary:   Pt was able to meet ~46% of lower end of estimated energy needs and 56% of lower end of estimated protein needs through PO intake.  Pt is making significant effort to eat more, though concern for still not being able to meet even half of minimum needs on top of pre-existing malnutrition and the addition of hemodialysis.    Discussed in IDT rounds today. Will cycle patient in an attempt to increase appetite and intake. Additionally, MD ordered Remeron as an additional appetite stimulate.  Team aware that updated EN Regimen will not meet 100% of pt's needs.    Access: NGT  Frequency: 12 Hour Cycle (8 PM-8AM)  Formula: Jevity 1.5  Enteral Regimen: Jevity 1.5 Jose (or equivalent)  @ goal of 25 ml/hr x 12 hrs (600 ml/day)   Total enteral provisions: 900 kcals, 38 g PRO, 456 ml free H20, 129 g CHO, and 13 g fiber daily.  - The above meets 60% of estimated energy needs and 76% of estimated protein needs.  Free Water Flushes: 30 ml q 4h  Total Fluid (EN + FWF) = 636 ml     Advancement Instructions: Okay to start at goal rate at beginning of cycle.     1 more day of calorie counts.  Please document all meals and  "supplements.    RD will continue to follow.    Mary Rider RD, LD  Clinical Dietitian  [facility] [employer]: \"Dietitian [[place]]\"  Office Phone: [phone]      "

## 2025-08-15 NOTE — PROGRESS NOTES
[facility]    Medicine Progress Note - Hospitalist Service    Date of Admission:  7/28/2025  Date of Service: 08/15/2025    Assessment & Plan     75-year-old female with history of CAD, CKD stage IV, hypertension, hyperlipidemia, intra-abdominal aortic aneurysm with prior stenting, chronic vertigo, recent hospitalization for fall with pelvic fracture who presents the ED for shortness of breath.       Had elevated D-dimer but VQ scan negative for PE, CT not done due to CKD.  proBNP more than 25,000.  Chest x-ray showed bilateral perihilar interstitial/airspace opacities, left greater than right, are nonspecific for postradiation change versus pneumonia. A small nodule in the peripheral right upper lobe measures 9 mm.      Found to be in hypertensive emergency, admitted to ICU with BiPAP and nitroglycerin drip.  She was weaned off nitroglycerin drip and BiPAP but has had repeated episodes of rising blood pressure and shortness of breath needing her to go back on BiPAP intermittently.  She was transferred to floor on 7/31 but came back to ICU on 8/2 after rapid response for flash pulmonary edema and hypertensive emergency.      Started on hemodialysis treatment 8/10/25.     On the morning of 8/14, patient went into A-fib with initial rates of 130s and then decreased to 40s without intervention.  She converted back to sinus rhythm and about half an hour.      Currently medically improved and stable to transfer to the medical floor.     Acute hypoxic respiratory failure: Multifactorial-initially BiPAP dependent, improved  Diastolic CHF exacerbation,Recurrent flash pulmonary edema associated with episodes of high blood pressure-improved with initiation of hemodialysis     Respiratory status improved with dialysis and improved control of BP  On min supplemental oxygen - stable on 2L    - Echo 7/29/25 - with EF of 55 to 60%, left atrium dilated, right bent sys function normal    Unclear what is  causing recurrent sudden episodes of elevated blood pressure and flash pulmonary edema.  She has history of renal artery stenting and on renal arterial ultrasound done on 7/31 there was no sonographic evidence of renal artery stenosis.       End-stage renal disease  Metabolic acidosis     Initiated hemodialysis this admission  Nephrology continuing to follow (appreciated)     Hypertension  Hypertensive emergency, resolved        PTA med include carvediol, hydralazine, isordil, clonidine patch     Noted to have elevated normetaneprhine level but metanephrine level was normal     nitially had hypertensive urgency requiring nitroglycerin gtt     Has had quite variable BPs in the last several days  was initially with hypertensive urgency and, over the last few days, has been intermittently hypotensive     8/13/25 - Currently on Coreg 25mg po bid, hydralazine 50mg po bid.  Restart Nifedipine XL at lower dose (30mg) and continue to monitor closely    Infectious disease  -Was empirically started on ceftriaxone on presentation.  Patient had no fever or leukocytosis.  Mildly elevated procalcitonin of 0.67 which is difficult to interpret in ROMI/CKD and improved to 0.49 on recheck.  Finished 5-day course of ceftriaxone.       Atrial fibrillation: Paroxysmal atrial fibrillation    - On 8/4, patient had a self-limiting 30 minutes episode of atrial fibrillation initially with RVR to 130 and then bradycardia to 40.  - Cardiology was reconsulted, advised 150 mg amiodarone IV push, and the plan was no further amiodarone if patient remains sinus.    -- However, patient developed intermittent episodes of atrial fibrillation with RVR.  Cardiology recommended amiodarone drip and now transitioned to oral amiodarone 200 mg once a day for about a month.  Continue Coreg at current dose   Anticoagulation was recommended by cardiology (Eliquis)     Malnutrition: Moderate nutrition in the context of acute on chronic medical illness.  - Patient  was unable to take adequate oral intake due to dependence on BiPAP and currently due to decreased appetite.  -Registered dietitian is following and recommendation appreciated     - keofed feeding tube placed and tube feeding started she has been tolerating feeding tube.  Her oral intake still remains poor.    Appetite stimulation with Megace - continues    8/13/25 - discussed with dietician at length this am.  Discussed potential cycling of tube feeds to see if she would be able to eat more during the day     Will continue calorie counts  Diet was liberalized 8/12/25 - but still didn't eat much  Concern that she may need a PEG tube -> in coming days  Remeron started     Constipation: On bowel regimen      Generalized weakness  Physical deconditioning  --PT/OT consulted and following     Concern for cognitive dysfunction:  --Patient has trouble with memory but there is no official diagnosis of dementia.  May use Seroquel as needed for delirium.  Will try to avoid benzos    OT consulted and following     Goal of care: Restorative and patient is full code.        Diet: Fluid restriction 1500 ML FLUID  Snacks/Supplements Adult: Nepro Oral Supplement; Between Meals  Regular Diet Adult  Calorie Counts  Adult Formula Drip Feeding: Specified Time Jevity 1.5; Nasogastric tube; Goal Rate: 45; mL/hr; From: 8:00 PM; To: 8:00 AM; Okay to start at goal rate at beginning of cycle.; Do not advance tube feeding rate unless K+ is = or > 3.0, Mg++ is = or > ...    DVT Prophylaxis: DOAC  Rao Catheter: Not present  Lines: PRESENT      CVC Double Lumen Right Subclavian Tunneled;Hemodialysis/CRRT-Site Assessment: WDL except;Ecchymotic      Cardiac Monitoring: ACTIVE order. Indication: Acute decompensated heart failure (48 hours)  Code Status: Full Code      Clinically Significant Risk Factors         # Hyponatremia: Lowest Na = 132 mmol/L in last 2 days, will monitor as appropriate  # Hypochloremia: Lowest Cl = 93 mmol/L in last 2 days,  will monitor as appropriate    # Hypomagnesemia: Lowest Mg = 1.6 mg/dL in last 2 days, will replace as needed   # Hypoalbuminemia: Lowest albumin = 2.8 g/dL at 8/12/2025  5:26 AM, will monitor as appropriate     # Hypertension: Noted on problem list             # Moderate Malnutrition: based on nutrition assessment and treatment provided per dietitian's recommendations.    # Financial/Environmental Concerns: none         Disposition Plan     Medically Ready for Discharge: Anticipated in 2-4 Days             Ulysses Schafer MD  Hospitalist Service  [facility]  Securely message with [employer] (more info)  Text page via [facility] Paging/Directory   ______________________________________________________________________    Interval History     Stable on 2L and RA at times  No SOB today  Currently without HA, N/V or abdominal pain, CP, F/C.   No new complaints  Minimal appetite    Physical Exam   Vital Signs: Temp: 99.2  F (37.3  C) Temp src: Oral BP: (!) 150/81 Pulse: 84   Resp: 20 SpO2: (!) 90 % O2 Device: Nasal cannula Oxygen Delivery: 2 LPM  Weight: 120 lbs 6.29 oz    GEN:  Alert, appears to be in no acute respiratory distress  HEENT:  Normocephalic/atraumatic, no scleral icterus, no nasal discharge  CV:  Regular rate and rhythm, no loud murmur.  S1 + S2 noted  LUNGS: CTABL  ABD:  slower but present bowel sounds, soft, non-tender/non-distended.  No clear rebound/guarding/rigidity.  EXT:  trace pretibial edema bilaterally.  No cyanosis.    SKIN:  Dry to touch, no exanthems noted in the visualized areas.    Medical Decision Making       35 MINUTES SPENT BY ME on the date of service doing chart review, history, exam, documentation & further activities per the note.      Data   Medications   Current Facility-Administered Medications   Medication Dose Route Frequency Provider Last Rate Last Admin    dextrose 10% infusion   Intravenous Continuous PRN Ulysses Schafer MD        No lozenges or gum should be given  while patient on BIPAP/AVAPS/AVAPS AE   Does not apply Continuous PRN Ulysses Schafer MD        Patient may continue current oral medications   Does not apply Continuous PRN Ulysses Schafer MD         Current Facility-Administered Medications   Medication Dose Route Frequency Provider Last Rate Last Admin    - MEDICATION INSTRUCTIONS for Dialysis Patients -   Does not apply See Admin Instructions Ulysses Schafer MD        amiodarone (PACERONE) tablet 200 mg  200 mg Oral or Feeding Tube Daily Ulysses Schafer MD   200 mg at 08/15/25 0818    apixaban ANTICOAGULANT (ELIQUIS) tablet 2.5 mg  2.5 mg Oral BID Ulysses Schafer MD   2.5 mg at 08/15/25 0819    B and C vitamin Complex with folic acid (NEPHRONEX) liquid 5 mL  5 mL Per Feeding Tube Daily Ulysses Schafer MD   5 mL at 08/15/25 0843    carvedilol (COREG) tablet 25 mg  25 mg Oral or Feeding Tube BID w/meals Ulysses Schafer MD   25 mg at 08/15/25 0819    clopidogrel (PLAVIX) tablet 75 mg  75 mg Oral or Feeding Tube Daily Ulysses Schafer MD   75 mg at 08/14/25 1715    [START ON 8/16/2025] famotidine (PEPCID) suspension 20 mg  20 mg Oral or Feeding Tube Q48H Ulysses Schafer MD        hydrALAZINE (APRESOLINE) tablet 50 mg  50 mg Oral or Feeding Tube TID Ulysses Schafer MD   50 mg at 08/15/25 0819    icosapent ethyl (VASCEPA) capsule 2 g  2 g Oral BID w/meals Ulysses Schafer MD   2 g at 08/15/25 0824    [Held by provider] isosorbide dinitrate (ISORDIL) tablet 40 mg  40 mg Oral or Feeding Tube TID Ulysses Schafer MD   40 mg at 08/10/25 0552    meclizine (ANTIVERT) tablet 25 mg  25 mg Oral or Feeding Tube Q6H Ulysses Schafer MD   25 mg at 08/15/25 1230    megestrol (MEGACE) suspension 200 mg  200 mg Oral or Feeding Tube Daily Ulysses Schafer MD   200 mg at 08/15/25 0822    mirtazapine (REMERON) tablet TABS 7.5 mg  7.5 mg Oral At Bedtime Ulysses Schafer MD        NIFEdipine ER OSMOTIC (PROCARDIA XL) 24 hr tablet 30 mg  30 mg Oral Daily Ulysses Schafer MD   30 mg at 08/15/25 0843    [START ON 8/16/2025] PARoxetine (PAXIL)  "suspension 40 mg  40 mg Oral or Feeding Tube Daily Ulysses Schafer MD        sodium chloride (PF) 0.9% PF flush 3 mL  3 mL Intracatheter Q8H [facility] Ulysses Schafer MD   3 mL at 08/15/25 1346     Labs and Imaging results below reviewed today.  No results for input(s): \"PH\", \"PHV\", \"PO2\", \"PO2V\", \"SAT\", \"PCO2\", \"PCO2V\", \"HCO3\", \"HCO3V\" in the last 168 hours.  Recent Labs   Lab 08/13/25  0525 08/12/25  0526 08/11/25  0557 08/10/25  0614   WBC  --  11.1* 9.4 8.0   HGB  --  9.1* 8.8* 9.0*   HCT  --  27.0* 26.2* 27.6*   .4* 100 101* 103*    277 261 276     Recent Labs   Lab 08/15/25  1204 08/15/25  0747 08/15/25  0532 08/14/25  0759 08/14/25  0608 08/13/25  0840 08/13/25  0525   NA  --   --  133*  --  132*  --  133*   POTASSIUM  --   --  4.5  --  4.3  --  4.0  4.0   CHLORIDE  --   --  95*  --  93*  --  95*   CO2  --   --  26  --  27  --  26   ANIONGAP  --   --  12  --  12  --  12   * 127* 126*   < > 116*   < > 107*   BUN  --   --  31.5*  --  50.5*  --  30.5*   CR  --   --  1.67*  --  2.08*  --  1.85*   GFRESTIMATED  --   --  32*  --  24*  --  28*   BRUCE  --   --  8.5*  --  8.5*  --  8.4*    < > = values in this interval not displayed.       No results found for this or any previous visit (from the past 24 hours).    "

## 2025-08-15 NOTE — PROGRESS NOTES
Planning dialysis tomorrow.    Note prerun creatinine is falling, so we need to monitor for renal recovery.      Careful I/O would be helpful, though I realize not always easy without a butt.      Luis Eduardo Lucas MD

## 2025-08-15 NOTE — PROVIDER NOTIFICATION
MD notified for patients inability to sleep.     PRN Benadryl + Tylenol tried but ineffective. PRN Valium tried with bits of restlessness during the day which also did not help the pt sleep.   She has now not slept in over 24 hours. Requesting a different PRN to try for effectiveness.     No new orders received, per MD, try existing PRN Seroquel.

## 2025-08-15 NOTE — PROGRESS NOTES
Care Management Follow Up    Length of Stay (days): 18    Expected Discharge Date: 08/18/2025     Concerns to be Addressed: discharge planning     Patient plan of care discussed at interdisciplinary rounds: Yes    Anticipated Discharge Disposition: Transitional Care  Anticipated Discharge Services:    Anticipated Discharge DME:      Education Provided on the Discharge Plan:  yes  Patient/Family in Agreement with the Plan: yes    Referrals Placed by CM/SW: Post Acute Facilities      Discussed  Partnership in Safe Discharge Planning  document with patient/family: No     Handoff Completed: No, handoff not indicated or clinically appropriate    Additional Information:  CM spoke w pt spouse at bedside today and updated w pt's first OP HD chair time, currently scheduled for 8/19. Hospitalist was also updated w this info. Anna Roland admissions coordinator ph: [phone] ext [phone] requested to be notified if this appt needs to be changed.     Pt currently still has a bedhold at [facility]. Per spouse, their goal is still to discharge back to TCU when she is medically ready. Per [facility] admissions they cannot accept pt back over the weekend, and cannot accept back if still needing nasal keofeed.    Next Steps: follow up w [facility] on Monday to determine if pt still has a bedhold, coordinate transport to/from [facility]    Ligia Huang RN, BSN  Inpatient Care Coordination  [facility]  [phone]

## 2025-08-16 LAB
ALBUMIN SERPL BCG-MCNC: 2.8 G/DL (ref 3.5–5.2)
ANION GAP SERPL CALCULATED.3IONS-SCNC: 14 MMOL/L (ref 7–15)
BACTERIA PLR CULT: NO GROWTH
BUN SERPL-MCNC: 44.9 MG/DL (ref 8–23)
CALCIUM SERPL-MCNC: 8.8 MG/DL (ref 8.8–10.4)
CHLORIDE SERPL-SCNC: 95 MMOL/L (ref 98–107)
CREAT SERPL-MCNC: 2.12 MG/DL (ref 0.51–0.95)
EGFRCR SERPLBLD CKD-EPI 2021: 24 ML/MIN/1.73M2
GLUCOSE SERPL-MCNC: 129 MG/DL (ref 70–99)
GRAM STAIN RESULT: NORMAL
GRAM STAIN RESULT: NORMAL
HCO3 SERPL-SCNC: 26 MMOL/L (ref 22–29)
MAGNESIUM SERPL-MCNC: 1.8 MG/DL (ref 1.7–2.3)
MCV RBC AUTO: 100.8 FL (ref 78–100)
PHOSPHATE SERPL-MCNC: 3.9 MG/DL (ref 2.5–4.5)
PHOSPHATE SERPL-MCNC: 3.9 MG/DL (ref 2.5–4.5)
PLATELET # BLD AUTO: 362 10E3/UL (ref 150–450)
POTASSIUM SERPL-SCNC: 4.3 MMOL/L (ref 3.4–5.3)
SODIUM SERPL-SCNC: 135 MMOL/L (ref 135–145)

## 2025-08-16 PROCEDURE — 250N000013 HC RX MED GY IP 250 OP 250 PS 637: Performed by: STUDENT IN AN ORGANIZED HEALTH CARE EDUCATION/TRAINING PROGRAM

## 2025-08-16 PROCEDURE — 85049 AUTOMATED PLATELET COUNT: CPT | Performed by: STUDENT IN AN ORGANIZED HEALTH CARE EDUCATION/TRAINING PROGRAM

## 2025-08-16 PROCEDURE — 258N000003 HC RX IP 258 OP 636: Performed by: INTERNAL MEDICINE

## 2025-08-16 PROCEDURE — 99233 SBSQ HOSP IP/OBS HIGH 50: CPT | Performed by: INTERNAL MEDICINE

## 2025-08-16 PROCEDURE — 99232 SBSQ HOSP IP/OBS MODERATE 35: CPT | Performed by: STUDENT IN AN ORGANIZED HEALTH CARE EDUCATION/TRAINING PROGRAM

## 2025-08-16 PROCEDURE — 90937 HEMODIALYSIS REPEATED EVAL: CPT

## 2025-08-16 PROCEDURE — 634N000001 HC RX 634: Mod: JZ | Performed by: INTERNAL MEDICINE

## 2025-08-16 PROCEDURE — 83735 ASSAY OF MAGNESIUM: CPT | Performed by: STUDENT IN AN ORGANIZED HEALTH CARE EDUCATION/TRAINING PROGRAM

## 2025-08-16 PROCEDURE — 84100 ASSAY OF PHOSPHORUS: CPT | Performed by: STUDENT IN AN ORGANIZED HEALTH CARE EDUCATION/TRAINING PROGRAM

## 2025-08-16 PROCEDURE — 82947 ASSAY GLUCOSE BLOOD QUANT: CPT | Performed by: INTERNAL MEDICINE

## 2025-08-16 PROCEDURE — 36415 COLL VENOUS BLD VENIPUNCTURE: CPT | Performed by: STUDENT IN AN ORGANIZED HEALTH CARE EDUCATION/TRAINING PROGRAM

## 2025-08-16 PROCEDURE — 250N000011 HC RX IP 250 OP 636: Performed by: INTERNAL MEDICINE

## 2025-08-16 PROCEDURE — 120N000001 HC R&B MED SURG/OB

## 2025-08-16 RX ADMIN — HEPARIN SODIUM 1900 UNITS: 1000 INJECTION, SOLUTION INTRAVENOUS; SUBCUTANEOUS at 11:50

## 2025-08-16 RX ADMIN — AMIODARONE HYDROCHLORIDE 200 MG: 200 TABLET ORAL at 13:23

## 2025-08-16 RX ADMIN — APIXABAN 2.5 MG: 2.5 TABLET, FILM COATED ORAL at 08:01

## 2025-08-16 RX ADMIN — MIRTAZAPINE 7.5 MG: 7.5 TABLET, FILM COATED ORAL at 21:20

## 2025-08-16 RX ADMIN — HYDRALAZINE HYDROCHLORIDE 50 MG: 50 TABLET, FILM COATED ORAL at 23:31

## 2025-08-16 RX ADMIN — ICOSAPENT ETHYL 2 G: 1 CAPSULE ORAL at 13:22

## 2025-08-16 RX ADMIN — CLOPIDOGREL BISULFATE 75 MG: 75 TABLET, FILM COATED ORAL at 18:33

## 2025-08-16 RX ADMIN — MECLIZINE HYDROCHLORIDE 25 MG: 25 TABLET ORAL at 18:33

## 2025-08-16 RX ADMIN — CARVEDILOL 25 MG: 12.5 TABLET, FILM COATED ORAL at 13:23

## 2025-08-16 RX ADMIN — NIFEDIPINE 30 MG: 30 TABLET, FILM COATED, EXTENDED RELEASE ORAL at 13:23

## 2025-08-16 RX ADMIN — MECLIZINE HYDROCHLORIDE 25 MG: 25 TABLET ORAL at 13:23

## 2025-08-16 RX ADMIN — SODIUM CHLORIDE 250 ML: 0.9 INJECTION, SOLUTION INTRAVENOUS at 08:45

## 2025-08-16 RX ADMIN — Medication: at 09:08

## 2025-08-16 RX ADMIN — SODIUM CHLORIDE 200 ML: 0.9 INJECTION, SOLUTION INTRAVENOUS at 08:40

## 2025-08-16 RX ADMIN — PAROXETINE 40 MG: 10 SUSPENSION ORAL at 08:00

## 2025-08-16 RX ADMIN — MECLIZINE HYDROCHLORIDE 25 MG: 25 TABLET ORAL at 23:31

## 2025-08-16 RX ADMIN — EPOETIN ALFA-EPBX 4000 UNITS: 4000 INJECTION, SOLUTION INTRAVENOUS; SUBCUTANEOUS at 10:55

## 2025-08-16 RX ADMIN — Medication 5 ML: at 08:00

## 2025-08-16 RX ADMIN — MECLIZINE HYDROCHLORIDE 25 MG: 25 TABLET ORAL at 06:55

## 2025-08-16 RX ADMIN — APIXABAN 2.5 MG: 2.5 TABLET, FILM COATED ORAL at 21:20

## 2025-08-16 RX ADMIN — ICOSAPENT ETHYL 2 G: 1 CAPSULE ORAL at 18:42

## 2025-08-16 RX ADMIN — MECLIZINE HYDROCHLORIDE 25 MG: 25 TABLET ORAL at 00:43

## 2025-08-16 RX ADMIN — HYDRALAZINE HYDROCHLORIDE 50 MG: 50 TABLET, FILM COATED ORAL at 16:08

## 2025-08-16 RX ADMIN — ACETAMINOPHEN 500 MG: 325 SUSPENSION ORAL at 23:30

## 2025-08-16 RX ADMIN — HYDRALAZINE HYDROCHLORIDE 50 MG: 50 TABLET, FILM COATED ORAL at 00:43

## 2025-08-16 RX ADMIN — MEGESTROL ACETATE 200 MG: 40 SUSPENSION ORAL at 08:00

## 2025-08-16 RX ADMIN — FAMOTIDINE 20 MG: 40 POWDER, FOR SUSPENSION ORAL at 08:00

## 2025-08-16 RX ADMIN — SODIUM CHLORIDE 500 ML: 0.9 INJECTION, SOLUTION INTRAVENOUS at 11:48

## 2025-08-16 RX ADMIN — CARVEDILOL 25 MG: 12.5 TABLET, FILM COATED ORAL at 18:33

## 2025-08-16 ASSESSMENT — ACTIVITIES OF DAILY LIVING (ADL)
ADLS_ACUITY_SCORE: 69
ADLS_ACUITY_SCORE: 67
ADLS_ACUITY_SCORE: 67
ADLS_ACUITY_SCORE: 71
ADLS_ACUITY_SCORE: 67
ADLS_ACUITY_SCORE: 69
ADLS_ACUITY_SCORE: 71
ADLS_ACUITY_SCORE: 65
ADLS_ACUITY_SCORE: 71
ADLS_ACUITY_SCORE: 67
ADLS_ACUITY_SCORE: 71
ADLS_ACUITY_SCORE: 67
ADLS_ACUITY_SCORE: 67
ADLS_ACUITY_SCORE: 65
ADLS_ACUITY_SCORE: 65
ADLS_ACUITY_SCORE: 71
ADLS_ACUITY_SCORE: 65
ADLS_ACUITY_SCORE: 71
ADLS_ACUITY_SCORE: 65
ADLS_ACUITY_SCORE: 65
ADLS_ACUITY_SCORE: 71

## 2025-08-16 NOTE — PLAN OF CARE
"Goal Outcome Evaluation:      Plan of Care Reviewed With: patient    Overall Patient Progress: no changeOverall Patient Progress: no change    Outcome Evaluation: Plan for possible d/c Monday    2L O2 via NC, LUIS  PIV SL, CVC dressing intact  Tele SR 77  NGT continues at goal rate 45mL/hr (see flowsheet for intake)  Plan for OT and hemodialysis in AM    Problem: Adult Inpatient Plan of Care  Goal: Plan of Care Review  Description: The Plan of Care Review/Shift note should be completed every shift.  The Outcome Evaluation is a brief statement about your assessment that the patient is improving, declining, or no change.  This information will be displayed automatically on your shift  note.  Outcome: Progressing  Flowsheets (Taken 8/16/2025 0317)  Outcome Evaluation: Plan for possible d/c Monday  Plan of Care Reviewed With: patient  Overall Patient Progress: no change  Goal: Patient-Specific Goal (Individualized)  Description: You can add care plan individualizations to a care plan. Examples of Individualization might be:  \"Parent requests to be called daily at 9am for status\", \"I have a hard time hearing out of my right ear\", or \"Do not touch me to wake me up as it startles  me\".  Outcome: Progressing  Goal: Absence of Hospital-Acquired Illness or Injury  Outcome: Progressing  Intervention: Identify and Manage Fall Risk  Recent Flowsheet Documentation  Taken 8/16/2025 0114 by Dorie Dubose, RN  Safety Promotion/Fall Prevention:   activity supervised   assistive device/personal items within reach   clutter free environment maintained   increased rounding and observation   nonskid shoes/slippers when out of bed   patient and family education   room door open   room near nurse's station   room organization consistent   safety round/check completed   supervised activity  Intervention: Prevent Skin Injury  Recent Flowsheet Documentation  Taken 8/16/2025 0100 by Dorie Dubose, RN  Body Position:   position changed " independently   supine, head elevated  Intervention: Prevent and Manage VTE (Venous Thromboembolism) Risk  Recent Flowsheet Documentation  Taken 8/16/2025 0114 by Dorie Dubose RN  VTE Prevention/Management:   SCDs off (sequential compression devices)   patient refused intervention  Intervention: Prevent Infection  Recent Flowsheet Documentation  Taken 8/16/2025 0118 by Dorie Dubose RN  Infection Prevention:   environmental surveillance performed   rest/sleep promoted   single patient room provided  Goal: Optimal Comfort and Wellbeing  Outcome: Progressing  Intervention: Monitor Pain and Promote Comfort  Recent Flowsheet Documentation  Taken 8/16/2025 0100 by Dorie Dubose RN  Pain Management Interventions:   rest   repositioned   relaxation techniques promoted  Intervention: Provide Person-Centered Care  Recent Flowsheet Documentation  Taken 8/16/2025 0114 by Dorie Dubose RN  Trust Relationship/Rapport:   care explained   reassurance provided   thoughts/feelings acknowledged  Goal: Readiness for Transition of Care  Outcome: Progressing     Problem: Delirium  Goal: Optimal Coping  Outcome: Progressing  Goal: Improved Behavioral Control  Outcome: Progressing  Intervention: Minimize Safety Risk  Recent Flowsheet Documentation  Taken 8/16/2025 0114 by Dorie Dubose RN  Enhanced Safety Measures:   assistive devices when indicated   pain management   review medications for side effects with activity   room near unit station  Trust Relationship/Rapport:   care explained   reassurance provided   thoughts/feelings acknowledged  Goal: Improved Attention and Thought Clarity  Outcome: Progressing  Goal: Improved Sleep  Outcome: Progressing     Problem: Skin Injury Risk Increased  Goal: Skin Health and Integrity  Outcome: Progressing  Intervention: Plan: Nurse Driven Intervention: Moisture Management  Recent Flowsheet Documentation  Taken 8/16/2025 0114 by Dorie Dubose RN  Moisture Interventions:   Encourage  regular toileting   No brief in bed  Intervention: Plan: Nurse Driven Intervention: Friction and Shear  Recent Flowsheet Documentation  Taken 8/16/2025 0114 by Dorie Dubose RN  Friction/Shear Interventions: HOB 30 degrees or less  Intervention: Optimize Skin Protection  Recent Flowsheet Documentation  Taken 8/16/2025 0100 by Dorie Dubose, RN  Activity Management: activity adjusted per tolerance  Head of Bed (HOB) Positioning: HOB at 30 degrees     Problem: Breathing Pattern Ineffective  Goal: Effective Breathing Pattern  Outcome: Progressing  Intervention: Promote Improved Breathing Pattern  Recent Flowsheet Documentation  Taken 8/16/2025 0100 by Dorie Dubose RN  Head of Bed (HOB) Positioning: HOB at 30 degrees     Problem: Comorbidity Management  Goal: Maintenance of COPD Symptom Control  Outcome: Progressing  Intervention: Maintain COPD Symptom Control  Recent Flowsheet Documentation  Taken 8/16/2025 0114 by Dorie Dubose RN  Medication Review/Management: medications reviewed  Goal: Blood Pressure in Desired Range  Outcome: Progressing  Intervention: Maintain Blood Pressure Management  Recent Flowsheet Documentation  Taken 8/16/2025 0114 by oDrie Dubose RN  Medication Review/Management: medications reviewed     Problem: Hypertension Acute  Goal: Blood Pressure Within Desired Range  Outcome: Progressing  Intervention: Normalize Blood Pressure  Recent Flowsheet Documentation  Taken 8/16/2025 0114 by Dorie Dubose RN  Medication Review/Management: medications reviewed

## 2025-08-16 NOTE — PLAN OF CARE
"Shift Summary (1500 - 2330):    A/O x4 but very forgetful, PRN seroquel x1 at bedtime per request. BP improved post hemodialysis, pt on several BP meds (see MAR). Afebrile. O2 stable on 2L NC. LUIS. Denies pain, nausea. Tele: SR. K/Mg/Phos protocols, AM rechecks. Assist of 1 gb wk, very weak/unsteady on feet. CVC intact, pt on hemodialysis. NGT in place, on cycle keofeed at goal rate of 45 ml/hr. Assist of 1 gb wk to bathroom, voiding. Appetite remains poor, calorie count continues. 1500 ml fluid restriction maintained. Plan for discharge TBD, pending clinical improvement and improved appetite.     Goal Outcome Evaluation:      Plan of Care Reviewed With: patient    Overall Patient Progress: improving    Outcome Evaluation: BP improved post hemodialysis. Denies pain. O2 stable on 2L NC. Appetite remains poor, calorie count continues. Cycle keofeed. NG tube intact. Plan for discharge TBD.      Problem: Adult Inpatient Plan of Care  Goal: Plan of Care Review  Description: The Plan of Care Review/Shift note should be completed every shift.  The Outcome Evaluation is a brief statement about your assessment that the patient is improving, declining, or no change.  This information will be displayed automatically on your shift  note.  Outcome: Progressing  Flowsheets (Taken 8/15/2025 2310)  Outcome Evaluation: BP improved post hemodialysis. Denies pain. O2 stable on 2L NC. Appetite remains poor, calorie count continues. Cycle keofeed. NG tube intact. Plan for discharge TBD.  Plan of Care Reviewed With: patient  Overall Patient Progress: improving  Goal: Patient-Specific Goal (Individualized)  Description: You can add care plan individualizations to a care plan. Examples of Individualization might be:  \"Parent requests to be called daily at 9am for status\", \"I have a hard time hearing out of my right ear\", or \"Do not touch me to wake me up as it startles  me\".  Outcome: Progressing  Goal: Absence of Hospital-Acquired Illness " or Injury  Outcome: Progressing  Intervention: Identify and Manage Fall Risk  Recent Flowsheet Documentation  Taken 8/15/2025 1603 by Belle Bourne RN  Safety Promotion/Fall Prevention:   activity supervised   assistive device/personal items within reach   clutter free environment maintained   increased rounding and observation   nonskid shoes/slippers when out of bed   patient and family education   room door open   room near nurse's station   room organization consistent   safety round/check completed   supervised activity  Intervention: Prevent Skin Injury  Recent Flowsheet Documentation  Taken 8/15/2025 1601 by Belle Bourne RN  Body Position: position changed independently  Intervention: Prevent and Manage VTE (Venous Thromboembolism) Risk  Recent Flowsheet Documentation  Taken 8/15/2025 1603 by Belle Bourne RN  VTE Prevention/Management: patient refused intervention  Intervention: Prevent Infection  Recent Flowsheet Documentation  Taken 8/15/2025 1603 by Belle Bourne RN  Infection Prevention:   single patient room provided   rest/sleep promoted   hand hygiene promoted   cohorting utilized  Goal: Optimal Comfort and Wellbeing  Outcome: Progressing  Goal: Readiness for Transition of Care  Outcome: Progressing     Problem: Delirium  Goal: Optimal Coping  Outcome: Progressing  Goal: Improved Behavioral Control  Outcome: Progressing  Intervention: Minimize Safety Risk  Recent Flowsheet Documentation  Taken 8/15/2025 1603 by Belle Bourne RN  Enhanced Safety Measures:   assistive devices when indicated   pain management   review medications for side effects with activity   room near unit station  Goal: Improved Attention and Thought Clarity  Outcome: Progressing  Goal: Improved Sleep  Outcome: Progressing     Problem: Skin Injury Risk Increased  Goal: Skin Health and Integrity  Outcome: Progressing  Intervention: Plan: Nurse Driven Intervention: Moisture Management  Recent Flowsheet Documentation  Taken 8/15/2025 1603  by Ta, Belle P, RN  Moisture Interventions:   Encourage regular toileting   Incontinence pad  Taken 8/15/2025 1601 by Belle Bourne RN  Bathing/Skin Care: patient refused  Intervention: Plan: Nurse Driven Intervention: Friction and Shear  Recent Flowsheet Documentation  Taken 8/15/2025 1603 by Belle Bourne RN  Friction/Shear Interventions: HOB 30 degrees or less  Intervention: Optimize Skin Protection  Recent Flowsheet Documentation  Taken 8/15/2025 1601 by Belle Bourne RN  Activity Management: activity adjusted per tolerance  Head of Bed (HOB) Positioning: HOB at 20-30 degrees     Problem: Breathing Pattern Ineffective  Goal: Effective Breathing Pattern  Outcome: Progressing  Intervention: Promote Improved Breathing Pattern  Recent Flowsheet Documentation  Taken 8/15/2025 1601 by Belle Bourne RN  Head of Bed (HOB) Positioning: HOB at 20-30 degrees     Problem: Comorbidity Management  Goal: Maintenance of COPD Symptom Control  Outcome: Progressing  Intervention: Maintain COPD Symptom Control  Recent Flowsheet Documentation  Taken 8/15/2025 1603 by Belle Bourne RN  Medication Review/Management: medications reviewed  Goal: Blood Pressure in Desired Range  Outcome: Progressing  Intervention: Maintain Blood Pressure Management  Recent Flowsheet Documentation  Taken 8/15/2025 1603 by Belle Bourne RN  Medication Review/Management: medications reviewed     Problem: Hypertension Acute  Goal: Blood Pressure Within Desired Range  Outcome: Progressing  Intervention: Normalize Blood Pressure  Recent Flowsheet Documentation  Taken 8/15/2025 1603 by Belle Bourne RN  Medication Review/Management: medications reviewed

## 2025-08-16 NOTE — PROGRESS NOTES
[facility]    Medicine Progress Note - Hospitalist Service    Date of Admission:  7/28/2025    Assessment & Plan   75-year-old female with history of CAD, CKD stage IV, hypertension, hyperlipidemia, intra-abdominal aortic aneurysm with prior stenting, chronic vertigo, recent hospitalization for fall with pelvic fracture who presents the ED for shortness of breath.       Had elevated D-dimer but VQ scan negative for PE, CT not done due to CKD.  proBNP more than 25,000.  Chest x-ray showed bilateral perihilar interstitial/airspace opacities, left greater than right, are nonspecific for postradiation change versus pneumonia. A small nodule in the peripheral right upper lobe measures 9 mm.      Found to be in hypertensive emergency, admitted to ICU with BiPAP and nitroglycerin drip.  She was weaned off nitroglycerin drip and BiPAP but has had repeated episodes of rising blood pressure and shortness of breath needing her to go back on BiPAP intermittently.  She was transferred to floor on 7/31 but came back to ICU on 8/2 after rapid response for flash pulmonary edema and hypertensive emergency.       Started on hemodialysis treatment 8/10/25.     On the morning of 8/14, patient went into A-fib with initial rates of 130s and then decreased to 40s without intervention.  She converted back to sinus rhythm in about half an hour.       Currently medically improved and stable.  Needs to increase p.o. intake so she is not requiring tube feeds prior to discharge back to TCU.    Acute hypoxic respiratory failure  Diastolic CHF exacerbation,Recurrent flash pulmonary edema associated with episodes of high blood pressure-improved with initiation of hemodialysis  Respiratory status improved with dialysis and improved control of BP. On min supplemental oxygen - stable on 2L.    - Echo 7/29/25 - with EF of 55 to 60%, left atrium dilated, right vent sys function normal  - Unclear what is causing recurrent sudden  episodes of elevated blood pressure and flash pulmonary edema.  She has history of renal artery stenting and on renal arterial ultrasound done on 7/31 there was no sonographic evidence of renal artery stenosis.       End-stage renal disease  Metabolic acidosis  Initiated hemodialysis this admission.  -Nephrology following, appreciate assistance  -Will need set up at outpatient dialysis unit on discharge     Hypertension  Hypertensive emergency-resolved  PTA med include carvediol, hydralazine, isordil, clonidine patch. Noted to have elevated normetaneprhine level but metanephrine level was normal. Initially had hypertensive urgency requiring nitroglycerin gtt. Has had quite variable BPs in the last several days  was initially with hypertensive urgency and, over the last few days, has been intermittently hypotensive.  - Currently on Coreg 25mg po bid, hydralazine 50mg po bid.  - Restart Nifedipine XL at lower dose (30mg) and continue to monitor closely     ?Infectious disease  Was empirically started on ceftriaxone on presentation.  Patient had no fever or leukocytosis.  Mildly elevated procalcitonin of 0.67 which is difficult to interpret in ROMI/CKD and improved to 0.49 on recheck.  Finished 5-day course of ceftriaxone.       Atrial fibrillation: Paroxysmal atrial fibrillation  On 8/4, patient had a self-limiting 30 minutes episode of atrial fibrillation initially with RVR to 130 and then bradycardia to 40.  - Cardiology was reconsulted, advised 150 mg amiodarone IV push, and the plan was no further amiodarone if patient remains sinus.    - However, patient developed intermittent episodes of atrial fibrillation with RVR.  Cardiology recommended amiodarone drip and now transitioned to oral amiodarone 200 mg once a day for about a month.  -Continue Coreg at current dose   -Anticoagulation was recommended by cardiology (Eliquis)     Malnutrition: Moderate nutrition in the context of acute on chronic medical  illness.  Patient was unable to take adequate oral intake due to dependence on BiPAP and currently due to decreased appetite.  -Registered dietitian is following and recommendation appreciated   -Keofed feeding tube placed and tube feeding started she has been tolerating feeding tube.  Her oral intake still remains poor.    -Appetite stimulation with Megace - continues  -Will continue calorie counts  -Concern that she may need a PEG tube in coming days if appetite does not improve  -Remeron started     Constipation: On bowel regimen      Generalized weakness  Physical deconditioning  --PT/OT consulted and following.  Plan is to return to TCU once tolerating more p.o. intake.     Concern for cognitive dysfunction:  Patient has trouble with memory but there is no official diagnosis of dementia.  May use Seroquel as needed for delirium.  Will try to avoid benzodiazepines.          Diet: Fluid restriction 1500 ML FLUID  Snacks/Supplements Adult: Nepro Oral Supplement; Between Meals  Regular Diet Adult  Adult Formula Drip Feeding: Specified Time Jevity 1.5; Nasogastric tube; Goal Rate: 45; mL/hr; From: 8:00 PM; To: 8:00 AM; Okay to start at goal rate at beginning of cycle.; Do not advance tube feeding rate unless K+ is = or > 3.0, Mg++ is = or > ...  Calorie Counts    DVT Prophylaxis: DOAC  Rao Catheter: Not present  Lines: PRESENT      CVC Double Lumen Right Subclavian Tunneled;Hemodialysis/CRRT-Site Assessment: WDL      Cardiac Monitoring: ACTIVE order. Indication: Acute decompensated heart failure (48 hours)  Code Status: Full Code      Clinically Significant Risk Factors         # Hyponatremia: Lowest Na = 133 mmol/L in last 2 days, will monitor as appropriate  # Hypochloremia: Lowest Cl = 95 mmol/L in last 2 days, will monitor as appropriate      # Hypoalbuminemia: Lowest albumin = 2.8 g/dL at 8/16/2025  7:08 AM, will monitor as appropriate     # Hypertension: Noted on problem list             # Moderate  Malnutrition: based on nutrition assessment and treatment provided per dietitian's recommendations.    # Financial/Environmental Concerns: none         Social Drivers of Health    Depression: At risk (6/5/2025)    Received from [facility]    PHQ-2     Depression Risk: 3   Tobacco Use: High Risk (7/31/2025)    Received from [facility]    Patient History     Smoking Tobacco Use: Every Day     Smokeless Tobacco Use: Never     Passive Exposure: Current   Interpersonal Safety: Unknown (7/28/2025)    Interpersonal Safety     Do you feel physically and emotionally safe where you currently live?: Patient unable to answer     Within the past 12 months, have you been hit, slapped, kicked or otherwise physically hurt by someone?: Patient unable to answer     Within the past 12 months, have you been humiliated or emotionally abused in other ways by your partner or ex-partner?: Patient unable to answer          Disposition Plan     Medically Ready for Discharge: Anticipated Tomorrow             Virgilio Patel MD  Hospitalist Service  [facility]  Securely message with [employer] (more info)  Text page via [employer] Paging/Directory   ______________________________________________________________________    Interval History   No acute events overnight.  Vital signs stable, respiratory status feels okay, still on 2 L of oxygen.  Will have dialysis again today.  Discussed need to continue trying to eat more as per CC note cannot go back to facility with Keofeed, would need PEG versus increased p.o. intake prior to discharge.    Physical Exam   Vital Signs: Temp: 98.7  F (37.1  C) Temp src: Axillary BP: (!) 168/94 Pulse: 80   Resp: 18 SpO2: (!) 91 % O2 Device: Nasal cannula Oxygen Delivery: 2 LPM  Weight: 116 lbs 1.6 oz    GEN:  Alert, appears to be in no acute respiratory distress  HEENT:  Normocephalic/atraumatic, no scleral icterus, no nasal discharge  CV:  Regular rate and rhythm, no MRGs.  S1 + S2  noted  LUNGS: CTABL  ABD: Bowel sounds present, soft, non-tender/non-distended.  No clear rebound/guarding/rigidity.  EXT:  Trace pretibial edema bilaterally.  No cyanosis.    SKIN:  Dry to touch, no exanthems noted in the visualized areas.    Medical Decision Making       40 MINUTES SPENT BY ME on the date of service doing chart review, history, exam, documentation & further activities per the note.      Data     I have personally reviewed the following data over the past 24 hrs:    N/A  \   N/A   / 362     135 95 (L) 44.9 (H) /  129 (H)   4.3 26 2.12 (H) \     ALT: N/A AST: N/A AP: N/A TBILI: N/A   ALB: 2.8 (L) TOT PROTEIN: N/A LIPASE: N/A       Imaging results reviewed over the past 24 hrs:   No results found for this or any previous visit (from the past 24 hours).

## 2025-08-16 NOTE — PLAN OF CARE
"A&Ox4. Forgetful. Dialysis completed today. CHG bath done. Stable on 2L NC. Denies pain. Keofeed disconnected at 0800 AM per cycle feed orders. 50% of lunch eaten     Goal Outcome Evaluation:      Plan of Care Reviewed With: patient    Overall Patient Progress: no changeOverall Patient Progress: no change    Outcome Evaluation: Dialysis done today, 3L removed.      Problem: Adult Inpatient Plan of Care  Goal: Plan of Care Review  Description: The Plan of Care Review/Shift note should be completed every shift.  The Outcome Evaluation is a brief statement about your assessment that the patient is improving, declining, or no change.  This information will be displayed automatically on your shift  note.  Outcome: Progressing  Flowsheets (Taken 8/16/2025 1434)  Outcome Evaluation: Dialysis done today, 3L removed.  Plan of Care Reviewed With: patient  Overall Patient Progress: no change  Goal: Patient-Specific Goal (Individualized)  Description: You can add care plan individualizations to a care plan. Examples of Individualization might be:  \"Parent requests to be called daily at 9am for status\", \"I have a hard time hearing out of my right ear\", or \"Do not touch me to wake me up as it startles  me\".  Outcome: Progressing  Goal: Absence of Hospital-Acquired Illness or Injury  Outcome: Progressing  Goal: Optimal Comfort and Wellbeing  Outcome: Progressing  Goal: Readiness for Transition of Care  Outcome: Progressing     Problem: Delirium  Goal: Optimal Coping  Outcome: Progressing  Goal: Improved Behavioral Control  Outcome: Progressing  Goal: Improved Attention and Thought Clarity  Outcome: Progressing  Goal: Improved Sleep  Outcome: Progressing     Problem: Skin Injury Risk Increased  Goal: Skin Health and Integrity  Outcome: Progressing  Intervention: Plan: Nurse Driven Intervention: Moisture Management  Recent Flowsheet Documentation  Taken 8/16/2025 0701 by Maeve Briscoe RN  Moisture Interventions:   Encourage regular " toileting   No brief in bed  Bathing/Skin Care:   linen changed   incontinence care   dressed/undressed  Intervention: Plan: Nurse Driven Intervention: Friction and Shear  Recent Flowsheet Documentation  Taken 8/16/2025 0749 by Maeve Briscoe RN  Friction/Shear Interventions: HOB 30 degrees or less  Intervention: Optimize Skin Protection  Recent Flowsheet Documentation  Taken 8/16/2025 0749 by Maeve Briscoe, RN  Activity Management: activity adjusted per tolerance     Problem: Breathing Pattern Ineffective  Goal: Effective Breathing Pattern  Outcome: Progressing     Problem: Comorbidity Management  Goal: Maintenance of COPD Symptom Control  Outcome: Progressing  Goal: Blood Pressure in Desired Range  Outcome: Progressing     Problem: Hypertension Acute  Goal: Blood Pressure Within Desired Range  Outcome: Progressing

## 2025-08-16 NOTE — PROGRESS NOTES
"Potassium   Date Value Ref Range Status   08/16/2025 4.3 3.4 - 5.3 mmol/L Final   07/17/2018 4.1 3.4 - 5.3 mmol/L Final     Magnesium   Date Value Ref Range Status   08/16/2025 1.8 1.7 - 2.3 mg/dL Final     Phosphorus   Date Value Ref Range Status   08/16/2025 3.9 2.5 - 4.5 mg/dL Final   08/16/2025 3.9 2.5 - 4.5 mg/dL Final     Hemoglobin   Date Value Ref Range Status   08/12/2025 9.1 (L) 11.7 - 15.7 g/dL Final   09/09/2016 12.8 11.7 - 15.7 g/dL Final     Creatinine   Date Value Ref Range Status   08/16/2025 2.12 (H) 0.51 - 0.95 mg/dL Final   07/17/2018 1.49 (H) 0.52 - 1.04 mg/dL Final     Urea Nitrogen   Date Value Ref Range Status   08/16/2025 44.9 (H) 8.0 - 23.0 mg/dL Final   07/17/2018 31 (H) 7 - 30 mg/dL Final     No results found for: \"INR\"    DIALYSIS PROCEDURE NOTE  Hepatitis status of previous patient on machine log was checked and verified ok to use with this patients hepatitis status.  Patient dialyzed for 3 hrs. on a K3 bath with a net fluid removal of  3L.  A BFR of 400 ml/min was obtained via a R tunneled CVC.      The treatment plan was discussed with Dr. Lucas during the treatment.    Total heparin received during the treatment: 0 units.   Line flushed, clamped and capped with heparin 1:1000 1.9 mL (1900 units) per lumen    Meds  given: EPO   Complications: Arterial spasm with access line connected to red port of CVC. Lines switched and no further issues.      Person educated: patient. Knowledge base moderate. Barriers to learning: none. Educated on procedure via verbal mode. Patient verbalized understanding.   ICEBOAT? Timeout performed pre-treatment  I: Patient was identified using 2 identifiers  C:  Consent Signed Yes  E: Equipment preventative maintenance is current and dialysis delivery system OK to use  B:    Latest Reference Range & Units 08/05/25 12:23   Hepatitis B Core Patricia Nonreactive  Nonreactive   Hepatitis B Surface Antibody Instrument Value <8.5 m[IU]/mL <3.50   Hepatitis B Surface " Antibody  Nonreactive     O: Dialysis orders present and complete prior to treatment  A: Vascular access verified and assessed prior to treatment  T: Treatment was performed at a clinically appropriate time  ?: Patient was allowed to ask questions and address concerns prior to treatment  See Adult Hemodialysis flowsheet in EPIC for further details and post assessment.  Machine water alarm in place and functioning. Transducer pods intact and checked every 15min.   Pt assisted with repositioning throughout dialysis treatment.  Pt returned via bed.  Chlorine/Chloramine water system checked every 4 hours.  Outpatient Dialysis at D    Post treatment report given to BOWEN Snow regarding 3L of fluid removed, last /94.    Toyin Rao RN

## 2025-08-16 NOTE — PROGRESS NOTES
"CALORIE COUNT    Approximate Oral Intake for:    8/15  Calories: 263 kcal  Protein: 8 grams  - 2 meals reflected above      Intake from TF/PN:       Access: NGT  Frequency: 12 Hour Cycle (8 PM-8AM)  Formula: Jevity 1.5  Enteral Regimen: Jevity 1.5 Jose (or equivalent) @ goal of 45 ml/hr x 12 hrs (600 ml/day)   Total enteral provisions: 900 kcals, 38 g PRO, 456 ml free H20, 129 g CHO, and 13 g fiber daily.  - The above meets 60% of estimated energy needs and 76% of estimated protein needs.  Free Water Flushes: 30 ml q 4h  Total Fluid (EN + FWF) = 636 ml       Estimated Needs:    DW: 50.4 kg, based on lowest weight this admit  Calories: 9501-9689 kcal/day (30 - 35 kcal/kg), increased needs w/ HD and underweight  Protein: >/= 50-61 grams protein/day (>/= 1.0-1.2 grams of pro/kg, preservation of LBM and dialysis  Fluids: Defer to Nephrology      Summary:   - Current Diet: Regular, 1500 mL fluid restriction  - Supplements: Nepro ONS @ 2pm daily  - Oral intake encouragement appreciated  - PO intake yesterday = met 17% estimated energy needs and 16% estimated protein needs.   - Recommend patient to consume >/= 60% estimated nutrition needs via po intakes alone to wean off nutrition support.     Plan:   Continue cycle TF as ordered.  Will reorder calorie count through the weekend to monitor for adequate improvement in po intakes (8/16-8/17).  Please document all meals and supplements.     RD will continue to follow.     Eunice Lezama, MS, RD, LD  [facility] Message [facility]: \"Dietitian [Soraya]\"  "

## 2025-08-16 NOTE — PROGRESS NOTES
Inpatient Dialysis Progress Note            Assessment and Plan:     Hypertensive emergency   Acute hypoxic respiratory failure   Acute on chronic diastolic HF   History of R renal artery stenting  Labile HTN  Hypoxia and resp status better following thoracentesis.      - Labile BP, history of renal artery stenosis status post stenting-> Doppler on 7/31-no evidence of significant JACY  - Metanephrines  -> normetanephrine came back quite high at 4.6 with upper limit of 0.8.  Metanephrine was normal.  Bump in normetanephrine is likely related to advanced CKD.  Cannot get 24 urine given patient's dialysis status.   - CT abdomen-normal-looking adrenals.     Arteries are very calcified and thus likely non compliant (stiff), making labile BP.       Carvedilol 25 mg BID  Hydralazine 50 mg TID  Nifedipine XL 30 mg daily     BP down to as low as 139/73 yesterday.  Higher this AM, but she has not had AM BNP meds yet.       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 This is most commonly seen in DM2, however can also be seen with smoking and HTN. Bx also showed cholesterol emboli (likely due to endograft repair).  Cr on admission 4.25, worse than recent baseline. In setting of hypertensive emergency, pulmonary edema, LE edema. Bx results:        It is uncertain if she can recover any kidney function but I would not expect it.     Pre run creatinine - it is falling.  But she has such little muscle mass that the cr based GFR formula may over estimate her true GFR.  We can send cystatin C GFR.      R lung adenocarcinoma   Received radiation in 2021.      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                    Interval History:     Resting comfortably in HD.        Dialysis Parameters:     Wt Readings from Last 4 Encounters:   08/16/25 52.7 kg (116 lb 1.6 oz)   07/23/25 54.4 kg (120 lb)   07/21/25 54.1 kg (119 lb 3.2 oz)   07/17/25 53.5 kg (117 lb 14.4 oz)     I/O last 3 completed shifts:  In: 1747 [P.O.:850; NG/GT:717]  Out: -   BP Readings from Last 3 Encounters:   08/16/25 (!) 157/90   07/23/25 131/74   07/21/25 137/80       Routine, ONE TIME, Starting today For 1 Occurrences  Weight Loss (kg): 2-3  Dialysis Temp: 36.5  C  Access Device: CVC  Access Site: R IJ  Dialyzer: Nipro Elisio 15H  Dialysis Bath: K 3  Blood Flow Rate (mL/min): 400  Total Treatment Time (hrs): 3  Heparin: no         Medications and Allergies:   Reviewed in EPIC    Current Facility-Administered Medications   Medication Dose Route Frequency Provider Last Rate Last Admin    - MEDICATION INSTRUCTIONS for Dialysis Patients -   Does not apply See Admin Instructions Ulysses Schafer MD        amiodarone (PACERONE) tablet 200 mg  200 mg Oral or Feeding Tube Daily Ulysses Schafer MD   200 mg at 08/15/25 0818    apixaban ANTICOAGULANT (ELIQUIS) tablet 2.5 mg  2.5 mg Oral BID Ulysses Schafer MD   2.5 mg at 08/16/25 0801    B and C vitamin Complex with folic acid (NEPHRONEX) liquid 5 mL  5 mL Per Feeding Tube Daily Ulysses Schafer MD   5 mL at 08/16/25 0800    carvedilol (COREG) tablet 25 mg  25 mg Oral or Feeding Tube BID w/meals Ulysses Schafer MD   25 mg at 08/15/25 1900    clopidogrel (PLAVIX) tablet 75 mg  75 mg Oral or Feeding Tube Daily Ulysses Schafer MD   75 mg at 08/15/25 1900    epoetin jennie-epbx (RETACRIT) injection 4,000 Units  4,000 Units Intravenous Once Luis Eduardo Lucas MD        famotidine (PEPCID) suspension 20 mg  20 mg Oral or Feeding Tube Q48H Ulysses Schafer MD   20 mg at 08/16/25 0800    sodium chloride 0.9% DIALYSIS Cath LOCK - RED Lumen  10 mL Intracatheter Once in dialysis/CRRT Lance,  Luis Eduardo HEMPHILL MD        Followed by    heparin 1000 unit/mL DIALYSIS Cath LOCK - RED Lumen  1.3-2.6 mL Intracatheter Once in dialysis/CRRT Luis Eduardo Lucas MD        sodium chloride 0.9% DIALYSIS Cath LOCK - BLUE Lumen  10 mL Intracatheter Once in dialysis/CRRT Luis Eduardo Lucas MD        Followed by    heparin 1000 unit/mL DIALYSIS Cath LOCK -BLUE Lumen  1.3-2.6 mL Intracatheter Once in dialysis/CRRT Luis Eduardo Lucas MD        hydrALAZINE (APRESOLINE) tablet 50 mg  50 mg Oral or Feeding Tube TID Ulysses Schafer MD   50 mg at 08/16/25 0043    icosapent ethyl (VASCEPA) capsule 2 g  2 g Oral BID w/meals Ulysses Schafer MD   2 g at 08/15/25 1859    meclizine (ANTIVERT) tablet 25 mg  25 mg Oral or Feeding Tube Q6H Ulysses Schafer MD   25 mg at 08/16/25 0655    megestrol (MEGACE) suspension 200 mg  200 mg Oral or Feeding Tube Daily Ulysses Schafer MD   200 mg at 08/16/25 0800    mirtazapine (REMERON) tablet TABS 7.5 mg  7.5 mg Oral At Bedtime Ulysses Schafer MD   7.5 mg at 08/15/25 2220    NIFEdipine ER OSMOTIC (PROCARDIA XL) 24 hr tablet 30 mg  30 mg Oral Daily Ulysses Schafer MD   30 mg at 08/15/25 0843    PARoxetine (PAXIL) suspension 40 mg  40 mg Oral or Feeding Tube Daily Ulysses Schafer MD   40 mg at 08/16/25 0800    sodium chloride (PF) 0.9% PF flush 3 mL  3 mL Intracatheter Q8H [facility] Ulysses Schafer MD   3 mL at 08/15/25 2224    sodium chloride (PF) 0.9% PF flush 9 mL  9 mL Intracatheter During Dialysis/CRRT (from stock) Luis Eduardo Lucas MD        sodium chloride (PF) 0.9% PF flush 9 mL  9 mL Intracatheter During Dialysis/CRRT (from stock) Luis Eduardo Lucas MD        sodium chloride 0.9% BOLUS 500 mL  500 mL Hemodialysis Machine Once Luis Eduardo Lucas MD         Current Facility-Administered Medications   Medication Dose Route Frequency Provider Last Rate Last Admin    acetaminophen (TYLENOL) oral liquid 500 mg  500 mg Oral or Feeding Tube At Bedtime Ulysses Gonzalez MD        acetaminophen (TYLENOL) oral liquid 650 mg  650 mg Oral or Feeding  Tube Q4H PRN Ulysses Schafer MD        acetaminophen (TYLENOL) Suppository 650 mg  650 mg Rectal Q4H PRN Ulysses Schafer MD        albuterol (PROVENTIL) neb solution 2.5 mg  2.5 mg Nebulization Q6H PRN Ulysses Schafer MD   2.5 mg at 07/31/25 2104    alteplase (CATHFLO ACTIVASE) injection 2 mg  2 mg Intracatheter Q1H PRN Luis Eduardo Lucas MD        alteplase (CATHFLO ACTIVASE) injection 2 mg  2 mg Intracatheter Q1H PRN Luis Eduardo Lucas MD        carboxymethylcellulose PF (REFRESH PLUS) 0.5 % ophthalmic solution 1 drop  1 drop Both Eyes Q1H PRN Ulysses Schafer MD        dextrose 10% infusion   Intravenous Continuous PRN Ulysses Schafer MD        glucose gel 15-30 g  15-30 g Oral Q15 Min PRN Ulysses Schafer MD   15 g at 08/10/25 1623    Or    dextrose 50 % injection 25-50 mL  25-50 mL Intravenous Q15 Min PRN Ulysses Schafer MD        Or    glucagon injection 1 mg  1 mg Subcutaneous Q15 Min PRN Ulysses Schafer MD        diazepam (VALIUM) tablet 5 mg  5 mg Oral Q6H PRN Ulysses Schafer MD   5 mg at 08/14/25 1716    diphenhydrAMINE (BENADRYL) liquid 25 mg  25 mg Oral or Feeding Tube At Bedtime PRN Ulysses Schafer MD   25 mg at 08/14/25 0151    HOLD: All Oral Medications   Does not apply HOLD Ulysses Schafer MD        hydrALAZINE (APRESOLINE) injection 10 mg  10 mg Intravenous Q4H PRN Ulysses Schafer MD   10 mg at 08/14/25 1559    HYDROmorphone (DILAUDID) injection 0.2 mg  0.2 mg Intravenous Q2H PRN Ulysses Schafer MD   0.2 mg at 08/07/25 0058    levalbuterol (XOPENEX) neb solution 0.63 mg  0.63 mg Nebulization Q4H PRN Ulysses Schafer MD   0.63 mg at 08/05/25 1355    lidocaine (LMX4) cream   Topical Q1H PRN Ulysses Schafer MD        lidocaine 1 % 0.1-1 mL  0.1-1 mL Other Q1H PRN Ulysses Schafer MD        methocarbamol (ROBAXIN) tablet 500 mg  500 mg Oral or Feeding Tube TID PRN Ulysses Schafer MD        naloxone (NARCAN) injection 0.2 mg  0.2 mg Intravenous Q2 Min PRN Ulysses Schafer MD        Or    naloxone (NARCAN) injection 0.4 mg  0.4 mg Intravenous Q2 Min PRN Ulysses Schafer  MD        Or    naloxone (NARCAN) injection 0.2 mg  0.2 mg Intramuscular Q2 Min PRN Ulysses Schafer MD        Or    naloxone (NARCAN) injection 0.4 mg  0.4 mg Intramuscular Q2 Min PRN Ulysses Schafer MD        No lozenges or gum should be given while patient on BIPAP/AVAPS/AVAPS AE   Does not apply Continuous PRN Ulysses Schafer MD        ondansetron (ZOFRAN ODT) ODT tab 4 mg  4 mg Oral Q6H PRN Ulysses Schafer MD   4 mg at 08/07/25 1122    Patient may continue current oral medications   Does not apply Continuous PRN Ulysses Schafer MD        polyethylene glycol (MIRALAX) Packet 17 g  17 g Oral BID PRN Ulysses Schafer MD        QUEtiapine (SEROquel) quarter-tab 6.25 mg  6.25 mg Oral TID PRN Ulysses Schafer MD   6.25 mg at 08/15/25 2220    sennosides (SENOKOT) syrup 10 mL  10 mL Oral or Feeding Tube BID PRN Virgilio Patel MD        sodium chloride (PF) 0.9% PF flush 10 mL  10 mL Intracatheter Q15 Min PRN Luis Eduardo Lucas MD        sodium chloride (PF) 0.9% PF flush 10 mL  10 mL Intracatheter Q15 Min PRN Luis Eduardo Lucas MD        sodium chloride (PF) 0.9% PF flush 3 mL  3 mL Intracatheter q1 min prn Ulysses Schafer MD   3 mL at 08/16/25 0042    sodium chloride 0.9% BOLUS 100-150 mL  100-150 mL Intravenous Q15 Min PRN Luis Eduardo Lucas MD            Allergies   Allergen Reactions    Iodine Hives              Labs:     [facility]  Recent Labs   Lab 08/16/25  0708 08/15/25  1204 08/15/25  0747 08/15/25  0532 08/14/25  0759 08/14/25  0608 08/13/25  0840 08/13/25  0525     --   --  133*  --  132*  --  133*   POTASSIUM 4.3  --   --  4.5  --  4.3  --  4.0  4.0   CHLORIDE 95*  --   --  95*  --  93*  --  95*   BRUCE 8.8  --   --  8.5*  --  8.5*  --  8.4*   CO2 26  --   --  26  --  27  --  26   BUN 44.9*  --   --  31.5*  --  50.5*  --  30.5*   CR 2.12*  --   --  1.67*  --  2.08*  --  1.85*   * 111* 127* 126*   < > 116*   < > 107*    < > = values in this interval not displayed.     CBC  Recent Labs   Lab 08/16/25  0708 08/13/25  0525 08/12/25  0526  08/11/25  0557 08/10/25  0614   WBC  --   --  11.1* 9.4 8.0   HGB  --   --  9.1* 8.8* 9.0*   HCT  --   --  27.0* 26.2* 27.6*   .8* 100.4* 100 101* 103*    280 277 261 276     Lab Results   Component Value Date    AST 22 07/17/2025    ALT 11 07/17/2025    ALKPHOS 61 07/17/2025    BILITOTAL 0.3 07/17/2025            Physical Exam:   Vitals were reviewed in [facility]    Wt Readings from Last 3 Encounters:   08/16/25 52.7 kg (116 lb 1.6 oz)   07/23/25 54.4 kg (120 lb)   07/21/25 54.1 kg (119 lb 3.2 oz)       Intake/Output Summary (Last 24 hours) at 8/16/2025 1025  Last data filed at 8/16/2025 0800  Gross per 24 hour   Intake 1527 ml   Output --   Net 1527 ml       GENERAL APPEARANCE: resting comfortably.    EXTREMITIES/SKIN: no edema, no rashes or lesions     Pt seen on dialysis.  Stable run.  Good BFR.      Attestation:  I have reviewed today's vital signs, notes, medications, labs and imaging.     Luis Eduardo Lucas MD  [facility] - Nephrology  [phone]

## 2025-08-17 ENCOUNTER — APPOINTMENT (OUTPATIENT)
Dept: OCCUPATIONAL THERAPY | Facility: CLINIC | Age: 75
DRG: 291 | End: 2025-08-17
Payer: MEDICARE

## 2025-08-17 ENCOUNTER — APPOINTMENT (OUTPATIENT)
Dept: PHYSICAL THERAPY | Facility: CLINIC | Age: 75
DRG: 291 | End: 2025-08-17
Payer: MEDICARE

## 2025-08-17 LAB
ALBUMIN SERPL BCG-MCNC: 2.9 G/DL (ref 3.5–5.2)
ANION GAP SERPL CALCULATED.3IONS-SCNC: 12 MMOL/L (ref 7–15)
BUN SERPL-MCNC: 36.2 MG/DL (ref 8–23)
CALCIUM SERPL-MCNC: 8.8 MG/DL (ref 8.8–10.4)
CHLORIDE SERPL-SCNC: 95 MMOL/L (ref 98–107)
CREAT SERPL-MCNC: 2.09 MG/DL (ref 0.51–0.95)
CYSTATIN C (ROCHE): 4.2 MG/L (ref 0.6–1)
EGFRCR SERPLBLD CKD-EPI 2021: 24 ML/MIN/1.73M2
GFR/BSA.PRED SERPLBLD CYS-BASED-ARV: 10 ML/MIN/1.73M2
GLUCOSE SERPL-MCNC: 102 MG/DL (ref 70–99)
HCO3 SERPL-SCNC: 27 MMOL/L (ref 22–29)
MAGNESIUM SERPL-MCNC: 1.9 MG/DL (ref 1.7–2.3)
PHOSPHATE SERPL-MCNC: 3.2 MG/DL (ref 2.5–4.5)
PHOSPHATE SERPL-MCNC: 3.2 MG/DL (ref 2.5–4.5)
POTASSIUM SERPL-SCNC: 4.6 MMOL/L (ref 3.4–5.3)
SODIUM SERPL-SCNC: 134 MMOL/L (ref 135–145)

## 2025-08-17 PROCEDURE — 250N000013 HC RX MED GY IP 250 OP 250 PS 637: Performed by: STUDENT IN AN ORGANIZED HEALTH CARE EDUCATION/TRAINING PROGRAM

## 2025-08-17 PROCEDURE — 120N000001 HC R&B MED SURG/OB

## 2025-08-17 PROCEDURE — 97530 THERAPEUTIC ACTIVITIES: CPT | Mod: GP | Performed by: PHYSICAL THERAPIST

## 2025-08-17 PROCEDURE — 82610 CYSTATIN C: CPT | Performed by: INTERNAL MEDICINE

## 2025-08-17 PROCEDURE — 36415 COLL VENOUS BLD VENIPUNCTURE: CPT | Performed by: STUDENT IN AN ORGANIZED HEALTH CARE EDUCATION/TRAINING PROGRAM

## 2025-08-17 PROCEDURE — 83735 ASSAY OF MAGNESIUM: CPT | Performed by: STUDENT IN AN ORGANIZED HEALTH CARE EDUCATION/TRAINING PROGRAM

## 2025-08-17 PROCEDURE — 99232 SBSQ HOSP IP/OBS MODERATE 35: CPT | Performed by: STUDENT IN AN ORGANIZED HEALTH CARE EDUCATION/TRAINING PROGRAM

## 2025-08-17 PROCEDURE — 97535 SELF CARE MNGMENT TRAINING: CPT | Mod: GO | Performed by: REHABILITATION PRACTITIONER

## 2025-08-17 PROCEDURE — 80069 RENAL FUNCTION PANEL: CPT | Performed by: INTERNAL MEDICINE

## 2025-08-17 RX ADMIN — ICOSAPENT ETHYL 2 G: 1 CAPSULE ORAL at 12:39

## 2025-08-17 RX ADMIN — HYDRALAZINE HYDROCHLORIDE 50 MG: 50 TABLET, FILM COATED ORAL at 09:09

## 2025-08-17 RX ADMIN — HYDRALAZINE HYDROCHLORIDE 50 MG: 50 TABLET, FILM COATED ORAL at 16:53

## 2025-08-17 RX ADMIN — ACETAMINOPHEN 650 MG: 325 SUSPENSION ORAL at 20:16

## 2025-08-17 RX ADMIN — CLOPIDOGREL BISULFATE 75 MG: 75 TABLET, FILM COATED ORAL at 18:35

## 2025-08-17 RX ADMIN — MIRTAZAPINE 7.5 MG: 7.5 TABLET, FILM COATED ORAL at 21:21

## 2025-08-17 RX ADMIN — Medication 5 ML: at 09:12

## 2025-08-17 RX ADMIN — MECLIZINE HYDROCHLORIDE 25 MG: 25 TABLET ORAL at 06:09

## 2025-08-17 RX ADMIN — CARVEDILOL 25 MG: 12.5 TABLET, FILM COATED ORAL at 18:35

## 2025-08-17 RX ADMIN — ICOSAPENT ETHYL 2 G: 1 CAPSULE ORAL at 18:35

## 2025-08-17 RX ADMIN — MECLIZINE HYDROCHLORIDE 25 MG: 25 TABLET ORAL at 12:39

## 2025-08-17 RX ADMIN — MECLIZINE HYDROCHLORIDE 25 MG: 25 TABLET ORAL at 18:35

## 2025-08-17 RX ADMIN — MEGESTROL ACETATE 200 MG: 40 SUSPENSION ORAL at 09:14

## 2025-08-17 RX ADMIN — NIFEDIPINE 30 MG: 30 TABLET, FILM COATED, EXTENDED RELEASE ORAL at 09:10

## 2025-08-17 RX ADMIN — CARVEDILOL 25 MG: 12.5 TABLET, FILM COATED ORAL at 09:10

## 2025-08-17 RX ADMIN — PAROXETINE 40 MG: 10 SUSPENSION ORAL at 09:13

## 2025-08-17 RX ADMIN — AMIODARONE HYDROCHLORIDE 200 MG: 200 TABLET ORAL at 09:09

## 2025-08-17 RX ADMIN — APIXABAN 2.5 MG: 2.5 TABLET, FILM COATED ORAL at 21:21

## 2025-08-17 RX ADMIN — APIXABAN 2.5 MG: 2.5 TABLET, FILM COATED ORAL at 09:08

## 2025-08-17 ASSESSMENT — ACTIVITIES OF DAILY LIVING (ADL)
ADLS_ACUITY_SCORE: 67
ADLS_ACUITY_SCORE: 64
ADLS_ACUITY_SCORE: 67
ADLS_ACUITY_SCORE: 64
ADLS_ACUITY_SCORE: 67
ADLS_ACUITY_SCORE: 64
ADLS_ACUITY_SCORE: 64
ADLS_ACUITY_SCORE: 67
ADLS_ACUITY_SCORE: 64
ADLS_ACUITY_SCORE: 67
ADLS_ACUITY_SCORE: 64
ADLS_ACUITY_SCORE: 64
ADLS_ACUITY_SCORE: 67
ADLS_ACUITY_SCORE: 67
ADLS_ACUITY_SCORE: 64
ADLS_ACUITY_SCORE: 67
ADLS_ACUITY_SCORE: 64
ADLS_ACUITY_SCORE: 67
ADLS_ACUITY_SCORE: 64

## 2025-08-17 NOTE — PLAN OF CARE
"Shift Summary (2300 - 0700)  Temp: 99  F (37.2  C) Temp src: Oral BP: (!) 143/77 Pulse: 80   Resp: 16 SpO2: 97 % O2 Device: Nasal cannula Oxygen Delivery: 1 LPM    Pt is alert and oriented x4 w some forgetfulness. On 1LPM NC VSS SBP stable overnight. Had tylenol for throat, back and leg pain. Up w 1 assist w GBW. Has an NG tube, Keofeedd on going at goal rate of 45mls/hr w q4h 30mls water flushes from 8p to 8a. On a regular diet w thin liquids, calorie count and nutrition follow up. Tele - SR w Prolonged QTc. On K/Mg and Phos, for recheck this AM. Has a right chest wall CVC for HD - dressing CDI. PT/OT following; recommending TCU on discharge. For TCU once tolerating more PO intake.       Goal Outcome Evaluation:      Plan of Care Reviewed With: patient    Overall Patient Progress: improvingOverall Patient Progress: improving    Outcome Evaluation: AOX4,    Problem: Adult Inpatient Plan of Care  Goal: Plan of Care Review  Description: The Plan of Care Review/Shift note should be completed every shift.  The Outcome Evaluation is a brief statement about your assessment that the patient is improving, declining, or no change.  This information will be displayed automatically on your shift  note.  8/17/2025 0506 by Camille Reynolds RN  Outcome: Progressing  Flowsheets (Taken 8/17/2025 0506)  Outcome Evaluation: AOX4, On 1LPM, VSS SBP stable on meds. Had Tylenol for throat, back and leg pain. TF on going at goal rate of 45mls/hr. On Calorie count.  Plan of Care Reviewed With: patient  Overall Patient Progress: improving  8/17/2025 0505 by Camille Reynolds RN  Outcome: Progressing  Flowsheets (Taken 8/17/2025 0505)  Outcome Evaluation: AOX4,  Plan of Care Reviewed With: patient  Overall Patient Progress: improving  Goal: Patient-Specific Goal (Individualized)  Description: You can add care plan individualizations to a care plan. Examples of Individualization might be:  \"Parent requests to be called daily at 9am for " "status\", \"I have a hard time hearing out of my right ear\", or \"Do not touch me to wake me up as it startles  me\".  8/17/2025 0506 by Camille Reynolds RN  Outcome: Progressing  8/17/2025 0505 by Camille Reynolds RN  Outcome: Progressing  Goal: Absence of Hospital-Acquired Illness or Injury  8/17/2025 0506 by Camille Reynolds RN  Outcome: Progressing  8/17/2025 0505 by Camille Reynolds RN  Outcome: Progressing  Intervention: Identify and Manage Fall Risk  Recent Flowsheet Documentation  Taken 8/16/2025 2330 by Camille Reynolds RN  Safety Promotion/Fall Prevention:   safety round/check completed   supervised activity   nonskid shoes/slippers when out of bed   mobility aid in reach   lighting adjusted   clutter free environment maintained   activity supervised  Intervention: Prevent and Manage VTE (Venous Thromboembolism) Risk  Recent Flowsheet Documentation  Taken 8/16/2025 2330 by Camille Reynolds RN  VTE Prevention/Management: (On Eliquis) patient refused intervention  Intervention: Prevent Infection  Recent Flowsheet Documentation  Taken 8/16/2025 2330 by Camille Reynolds RN  Infection Prevention:   rest/sleep promoted   single patient room provided   hand hygiene promoted  Goal: Optimal Comfort and Wellbeing  8/17/2025 0506 by Camille Reynolds RN  Outcome: Progressing  8/17/2025 0505 by Camille Reynolds RN  Outcome: Progressing  Intervention: Monitor Pain and Promote Comfort  Recent Flowsheet Documentation  Taken 8/16/2025 2330 by Camille Reynolds RN  Pain Management Interventions:   medication (see MAR)   care clustered   pillow support provided   repositioned   rest  Intervention: Provide Person-Centered Care  Recent Flowsheet Documentation  Taken 8/16/2025 2330 by Camille Reynolds RN  Trust Relationship/Rapport:   care explained   choices provided   thoughts/feelings acknowledged  Goal: Readiness for Transition of Care  8/17/2025 0506 by Camille Reynolds RN  Outcome: " Progressing  8/17/2025 0505 by Camille Reynolds RN  Outcome: Progressing     Problem: Delirium  Goal: Optimal Coping  8/17/2025 0506 by Camille Reynolds RN  Outcome: Progressing  8/17/2025 0505 by Camille Reynolds RN  Outcome: Progressing  Intervention: Optimize Psychosocial Adjustment to Delirium  Recent Flowsheet Documentation  Taken 8/16/2025 2330 by Camille Reynolds RN  Supportive Measures:   active listening utilized   self-care encouraged  Family/Support System Care: self-care encouraged  Goal: Improved Behavioral Control  8/17/2025 0506 by Camille Reynolds RN  Outcome: Progressing  8/17/2025 0505 by Camille Reynolds RN  Outcome: Progressing  Intervention: Minimize Safety Risk  Recent Flowsheet Documentation  Taken 8/16/2025 2330 by Camille Reynolds RN  Enhanced Safety Measures:   pain management   patient/family teach back on injury risk   review medications for side effects with activity  Trust Relationship/Rapport:   care explained   choices provided   thoughts/feelings acknowledged  Goal: Improved Attention and Thought Clarity  8/17/2025 0506 by Camille Reynolds RN  Outcome: Progressing  8/17/2025 0505 by Camille Reynolds RN  Outcome: Progressing  Intervention: Maximize Cognitive Function  Recent Flowsheet Documentation  Taken 8/16/2025 2330 by Camille Reynolds RN  Sensory Stimulation Regulation: television on  Reorientation Measures:   calendar in view   clock in view  Goal: Improved Sleep  8/17/2025 0506 by Camille Reynolds RN  Outcome: Progressing  8/17/2025 0505 by Camille Reynolds RN  Outcome: Progressing  Intervention: Promote Sleep  Recent Flowsheet Documentation  Taken 8/16/2025 2330 by Camille Reynolds RN  Sleep/Rest Enhancement:   awakenings minimized   consistent schedule promoted   regular sleep/rest pattern promoted   relaxation techniques promoted   room darkened     Problem: Skin Injury Risk Increased  Goal: Skin Health and Integrity  8/17/2025 0506 by Gail  Camille ROWE RN  Outcome: Progressing  8/17/2025 0505 by Camille Reynolds RN  Outcome: Progressing  Intervention: Plan: Nurse Driven Intervention: Moisture Management  Recent Flowsheet Documentation  Taken 8/16/2025 2330 by Camille Reynolds RN  Moisture Interventions:   No brief in bed   Incontinence pad   Encourage regular toileting  Intervention: Plan: Nurse Driven Intervention: Friction and Shear  Recent Flowsheet Documentation  Taken 8/16/2025 2330 by Camille Reynolds RN  Friction/Shear Interventions: HOB 30 degrees or less  Intervention: Promote and Optimize Oral Intake  Recent Flowsheet Documentation  Taken 8/16/2025 2330 by Camille Reynolds RN  Oral Nutrition Promotion: rest periods promoted     Problem: Breathing Pattern Ineffective  Goal: Effective Breathing Pattern  8/17/2025 0506 by Camille Reynolds RN  Outcome: Progressing  8/17/2025 0505 by Camille Reynolds RN  Outcome: Progressing  Intervention: Promote Improved Breathing Pattern  Recent Flowsheet Documentation  Taken 8/16/2025 2330 by Camille Reynolds RN  Supportive Measures:   active listening utilized   self-care encouraged     Problem: Comorbidity Management  Goal: Maintenance of COPD Symptom Control  8/17/2025 0506 by Camille Reynolds RN  Outcome: Progressing  8/17/2025 0505 by Camille Reynolds RN  Outcome: Progressing  Intervention: Maintain COPD Symptom Control  Recent Flowsheet Documentation  Taken 8/16/2025 2330 by Camille Reynolds RN  Medication Review/Management: medications reviewed  Goal: Blood Pressure in Desired Range  8/17/2025 0506 by Camille Reynolds RN  Outcome: Progressing  8/17/2025 0505 by Camille Reynolds RN  Outcome: Progressing  Intervention: Maintain Blood Pressure Management  Recent Flowsheet Documentation  Taken 8/16/2025 2330 by Camille Reynolds RN  Medication Review/Management: medications reviewed     Problem: Hypertension Acute  Goal: Blood Pressure Within Desired Range  8/17/2025 0506 by Gail  Camille ROWE RN  Outcome: Progressing  8/17/2025 0505 by Camille Reynolds, RN  Outcome: Progressing  Intervention: Normalize Blood Pressure  Recent Flowsheet Documentation  Taken 8/16/2025 2330 by Camille Reynolds, RN  Sensory Stimulation Regulation: television on  Medication Review/Management: medications reviewed

## 2025-08-17 NOTE — PLAN OF CARE
"Shift Summary (1500 - 2330):    A/O x4, forgetful. Hypertensive otherwise VSS, afebrile (see MAR for BP meds). HD this AM. CVC intact. Denies pain, n/v, SOB. O2 stable on 1L NC. Assist of 1 gb wk. Up to chair for dinner. Improved oral intake (see flowsheet for complete documentation). Keofeed continues at goal rate 45ml/hr. NGT in place. Plan for discharge back to TCU TBD pending improvement in PO intake. Calorie counts maintained.     Goal Outcome Evaluation:      Plan of Care Reviewed With: patient    Overall Patient Progress: improving    Outcome Evaluation: O2 stable on 1L NC. Oral intake improved. Up to chair for dinner. Denies pain.      Problem: Adult Inpatient Plan of Care  Goal: Plan of Care Review  Description: The Plan of Care Review/Shift note should be completed every shift.  The Outcome Evaluation is a brief statement about your assessment that the patient is improving, declining, or no change.  This information will be displayed automatically on your shift  note.  Outcome: Progressing  Flowsheets (Taken 8/16/2025 2115)  Outcome Evaluation: O2 stable on 1L NC. Oral intake improved. Up to chair for dinner. Denies pain.  Plan of Care Reviewed With: patient  Overall Patient Progress: improving  Goal: Patient-Specific Goal (Individualized)  Description: You can add care plan individualizations to a care plan. Examples of Individualization might be:  \"Parent requests to be called daily at 9am for status\", \"I have a hard time hearing out of my right ear\", or \"Do not touch me to wake me up as it startles  me\".  Outcome: Progressing  Goal: Absence of Hospital-Acquired Illness or Injury  Outcome: Progressing  Intervention: Identify and Manage Fall Risk  Recent Flowsheet Documentation  Taken 8/16/2025 1539 by Belle Bourne, RN  Safety Promotion/Fall Prevention:   activity supervised   assistive device/personal items within reach   clutter free environment maintained   increase visualization of patient   increased " rounding and observation   nonskid shoes/slippers when out of bed   patient and family education   room door open   room near nurse's station   room organization consistent   safety round/check completed   supervised activity  Intervention: Prevent Skin Injury  Recent Flowsheet Documentation  Taken 8/16/2025 1539 by Belle Bourne RN  Body Position: position changed independently  Intervention: Prevent and Manage VTE (Venous Thromboembolism) Risk  Recent Flowsheet Documentation  Taken 8/16/2025 1539 by Belle Bourne RN  VTE Prevention/Management: (on Eliquis) patient refused intervention  Goal: Optimal Comfort and Wellbeing  Outcome: Progressing  Goal: Readiness for Transition of Care  Outcome: Progressing     Problem: Delirium  Goal: Optimal Coping  Outcome: Progressing  Goal: Improved Behavioral Control  Outcome: Progressing  Intervention: Minimize Safety Risk  Recent Flowsheet Documentation  Taken 8/16/2025 1539 by Belle Bourne RN  Enhanced Safety Measures:   pain management   review medications for side effects with activity   room near unit station  Goal: Improved Attention and Thought Clarity  Outcome: Progressing  Goal: Improved Sleep  Outcome: Progressing     Problem: Skin Injury Risk Increased  Goal: Skin Health and Integrity  Outcome: Progressing  Intervention: Plan: Nurse Driven Intervention: Moisture Management  Recent Flowsheet Documentation  Taken 8/16/2025 1539 by Belle Bourne RN  Moisture Interventions:   Encourage regular toileting   Incontinence pad  Intervention: Plan: Nurse Driven Intervention: Friction and Shear  Recent Flowsheet Documentation  Taken 8/16/2025 1539 by Belle Bourne RN  Friction/Shear Interventions: HOB 30 degrees or less  Intervention: Optimize Skin Protection  Recent Flowsheet Documentation  Taken 8/16/2025 1539 by Belle Bourne RN  Activity Management: activity adjusted per tolerance  Head of Bed (HOB) Positioning: HOB lowered     Problem: Breathing Pattern Ineffective  Goal:  Effective Breathing Pattern  Outcome: Progressing  Intervention: Promote Improved Breathing Pattern  Recent Flowsheet Documentation  Taken 8/16/2025 1539 by Belle Bourne RN  Head of Bed (HOB) Positioning: HOB lowered     Problem: Comorbidity Management  Goal: Maintenance of COPD Symptom Control  Outcome: Progressing  Intervention: Maintain COPD Symptom Control  Recent Flowsheet Documentation  Taken 8/16/2025 1539 by Belle Bourne RN  Medication Review/Management: medications reviewed  Goal: Blood Pressure in Desired Range  Outcome: Progressing  Intervention: Maintain Blood Pressure Management  Recent Flowsheet Documentation  Taken 8/16/2025 1539 by Belle Bourne RN  Medication Review/Management: medications reviewed     Problem: Hypertension Acute  Goal: Blood Pressure Within Desired Range  Outcome: Progressing  Intervention: Normalize Blood Pressure  Recent Flowsheet Documentation  Taken 8/16/2025 1539 by Belle Bourne RN  Medication Review/Management: medications reviewed          details…

## 2025-08-17 NOTE — PROGRESS NOTES
[facility]    Medicine Progress Note - Hospitalist Service    Date of Admission:  7/28/2025    Assessment & Plan   75-year-old female with history of CAD, CKD stage IV, hypertension, hyperlipidemia, intra-abdominal aortic aneurysm with prior stenting, chronic vertigo, recent hospitalization for fall with pelvic fracture who presents the ED for shortness of breath.       Had elevated D-dimer but VQ scan negative for PE, CT not done due to CKD.  proBNP more than 25,000.  Chest x-ray showed bilateral perihilar interstitial/airspace opacities, left greater than right, are nonspecific for postradiation change versus pneumonia. A small nodule in the peripheral right upper lobe measures 9 mm.      Found to be in hypertensive emergency, admitted to ICU with BiPAP and nitroglycerin drip.  She was weaned off nitroglycerin drip and BiPAP but has had repeated episodes of rising blood pressure and shortness of breath needing her to go back on BiPAP intermittently.  She was transferred to floor on 7/31 but came back to ICU on 8/2 after rapid response for flash pulmonary edema and hypertensive emergency.       Started on hemodialysis treatment 8/10/25.     On the morning of 8/14, patient went into A-fib with initial rates of 130s and then decreased to 40s without intervention.  She converted back to sinus rhythm in about half an hour.       Currently medically improved and stable.  Needs to increase p.o. intake so she is not requiring tube feeds prior to discharge back to TCU.    Acute hypoxic respiratory failure  Diastolic CHF exacerbation,Recurrent flash pulmonary edema associated with episodes of high blood pressure-improved with initiation of hemodialysis  Respiratory status improved with dialysis and improved control of BP. On min supplemental oxygen - stable on 2L.    - Echo 7/29/25 - with EF of 55 to 60%, left atrium dilated, right vent sys function normal  - Unclear what is causing recurrent sudden  episodes of elevated blood pressure and flash pulmonary edema.  She has history of renal artery stenting and on renal arterial ultrasound done on 7/31 there was no sonographic evidence of renal artery stenosis.       End-stage renal disease  Metabolic acidosis  Initiated hemodialysis this admission.  -Nephrology following, appreciate assistance  -Will need set up at outpatient dialysis unit on discharge     Hypertension  Hypertensive emergency-resolved  PTA med include carvediol, hydralazine, isordil, clonidine patch. Noted to have elevated normetaneprhine level but metanephrine level was normal. Initially had hypertensive urgency requiring nitroglycerin gtt. Has had quite variable BPs in the last several days  was initially with hypertensive urgency and, over the last few days, has been intermittently hypotensive.  - Currently on Coreg 25mg po bid, hydralazine 50mg po bid.  - Restart Nifedipine XL at lower dose (30mg) and continue to monitor closely     ?Infectious disease  Was empirically started on ceftriaxone on presentation.  Patient had no fever or leukocytosis.  Mildly elevated procalcitonin of 0.67 which is difficult to interpret in ROMI/CKD and improved to 0.49 on recheck.  Finished 5-day course of ceftriaxone.       Atrial fibrillation: Paroxysmal atrial fibrillation  On 8/4, patient had a self-limiting 30 minutes episode of atrial fibrillation initially with RVR to 130 and then bradycardia to 40.  - Cardiology was reconsulted, advised 150 mg amiodarone IV push, and the plan was no further amiodarone if patient remains sinus.    - However, patient developed intermittent episodes of atrial fibrillation with RVR.  Cardiology recommended amiodarone drip and now transitioned to oral amiodarone 200 mg once a day for about a month.  -Continue Coreg at current dose   -Anticoagulation was recommended by cardiology (Eliquis)     Malnutrition: Moderate nutrition in the context of acute on chronic medical  illness.  Patient was unable to take adequate oral intake due to dependence on BiPAP and currently due to decreased appetite.  -Registered dietitian is following and recommendation appreciated   -Keofed feeding tube placed and tube feeding started she has been tolerating feeding tube.  Her oral intake still remains poor.    -Appetite stimulation with Megace - continues  - Will likely stop tube feeding in next 1 to 2 days to see if it would stimulate her appetite.  -Concern that she may need a PEG tube in coming days if appetite does not improve  -Remeron started     Constipation: On bowel regimen      Generalized weakness  Physical deconditioning  --PT/OT consulted and following.  Plan is to return to TCU once tolerating more p.o. intake.     Concern for cognitive dysfunction:  Patient has trouble with memory but there is no official diagnosis of dementia.  May use Seroquel as needed for delirium.  Will try to avoid benzodiazepines.          Diet: Fluid restriction 1500 ML FLUID  Snacks/Supplements Adult: Nepro Oral Supplement; Between Meals  Regular Diet Adult  Adult Formula Drip Feeding: Specified Time Jevity 1.5; Nasogastric tube; Goal Rate: 45; mL/hr; From: 8:00 PM; To: 8:00 AM; Okay to start at goal rate at beginning of cycle.; Do not advance tube feeding rate unless K+ is = or > 3.0, Mg++ is = or > ...  Calorie Counts    DVT Prophylaxis: DOAC  Rao Catheter: Not present  Lines: PRESENT      CVC Double Lumen Right Subclavian Tunneled;Hemodialysis/CRRT-Site Assessment: WDL except;Drainage      Cardiac Monitoring: ACTIVE order. Indication: Acute decompensated heart failure (48 hours)  Code Status: Full Code      Clinically Significant Risk Factors         # Hyponatremia: Lowest Na = 134 mmol/L in last 2 days, will monitor as appropriate  # Hypochloremia: Lowest Cl = 95 mmol/L in last 2 days, will monitor as appropriate      # Hypoalbuminemia: Lowest albumin = 2.8 g/dL at 8/16/2025  7:08 AM, will monitor as  appropriate     # Hypertension: Noted on problem list             # Moderate Malnutrition: based on nutrition assessment and treatment provided per dietitian's recommendations.    # Financial/Environmental Concerns: none         Social Drivers of Health    Depression: At risk (6/5/2025)    Received from [facility]    PHQ-2     Depression Risk: 3   Tobacco Use: High Risk (7/31/2025)    Received from [facility] [employer]    Patient History     Smoking Tobacco Use: Every Day     Smokeless Tobacco Use: Never     Passive Exposure: Current   Interpersonal Safety: Unknown (7/28/2025)    Interpersonal Safety     Do you feel physically and emotionally safe where you currently live?: Patient unable to answer     Within the past 12 months, have you been hit, slapped, kicked or otherwise physically hurt by someone?: Patient unable to answer     Within the past 12 months, have you been humiliated or emotionally abused in other ways by your partner or ex-partner?: Patient unable to answer          Disposition Plan     Medically Ready for Discharge: Anticipated Tomorrow             Jonathan Light MD  Hospitalist Service  [facility]  Securely message with [employer] (more info)  Text page via [employer] Paging/Directory   ______________________________________________________________________    Interval History   No acute events overnight.  Vital signs stable, respiratory status feels okay, still on 2 L of oxygen.  Continues to need tube feeding via Keofeed.    Physical Exam   Vital Signs: Temp: 97.9  F (36.6  C) Temp src: Oral BP: (!) 174/90 Pulse: 80   Resp: 16 SpO2: 94 % O2 Device: Nasal cannula Oxygen Delivery: 1 LPM  Weight: 112 lbs 6.4 oz    GEN:  Alert, appears to be in no acute respiratory distress  HEENT:  Normocephalic/atraumatic, no scleral icterus, no nasal discharge  CV:  Regular rate and rhythm, no MRGs.  S1 + S2 noted.  Right-sided tunneled dialysis catheter.  LUNGS: CTABL  ABD: Bowel sounds  present, soft, non-tender/non-distended.  No clear rebound/guarding/rigidity.  EXT:  Trace pretibial edema bilaterally.  No cyanosis.    SKIN:  Dry to touch, no exanthems noted in the visualized areas.    Medical Decision Making       40 MINUTES SPENT BY ME on the date of service doing chart review, history, exam, documentation & further activities per the note.      Data     I have personally reviewed the following data over the past 24 hrs:    N/A  \   N/A   / N/A     134 (L) 95 (L) 36.2 (H) /  102 (H)   4.6 27 2.09 (H) \     ALT: N/A AST: N/A AP: N/A TBILI: N/A   ALB: 2.9 (L) TOT PROTEIN: N/A LIPASE: N/A       Imaging results reviewed over the past 24 hrs:   No results found for this or any previous visit (from the past 24 hours).

## 2025-08-17 NOTE — PROGRESS NOTES
"CALORIE COUNT    Approximate Oral Intake for:    8/16  Calories: 1043 kcal  Protein: 28 grams  - 2 meals and 1 Nepro reflected above      Intake from TF/PN:       Access: NGT  Frequency: 12 Hour Cycle (8 PM-8AM)  Formula: Jevity 1.5  Enteral Regimen: Jevity 1.5 Jose (or equivalent) @ goal of 45 ml/hr x 12 hrs (600 ml/day)   Total enteral provisions: 900 kcals, 38 g PRO, 456 ml free H20, 129 g CHO, and 13 g fiber daily.  - The above meets 60% of estimated energy needs and 76% of estimated protein needs.  Free Water Flushes: 30 ml q 4h  Total Fluid (EN + FWF) = 636 ml      Estimated Needs:    DW: 50.4 kg, based on lowest weight this admit  Calories: 4532-2540 kcal/day (30 - 35 kcal/kg), increased needs w/ HD and underweight  Protein: >/= 50-61 grams protein/day (>/= 1.0-1.2 grams of pro/kg, preservation of LBM and dialysis  Fluids: Defer to Nephrology      Summary:   - Current Diet: Regular, 1500 mL fluid restriction  - Supplements: Nepro ONS @ 2 pm daily  - Oral intake encouragement appreciated  - PO intake yesterday = met 69% estimated energy needs and 56% estimated protein needs.   - Recommend patient to consume >/= 60% estimated nutrition needs via po intakes alone to wean off nutrition support.     Plan:   Continue cycle TF as ordered.   Continue calorie count as ordered.   Please document all meals and snacks.   Oral intake encouragement appreciated.     RD team will continue to follow and wean TF as appropriate.    Eunice Lezama, MS, RD, LD  [facility] Message Group: \"Dietitian [[place]]\"  "

## 2025-08-17 NOTE — PLAN OF CARE
"Goal Outcome Evaluation:      Plan of Care Reviewed With: patient    Overall Patient Progress: no changeOverall Patient Progress: no change    Outcome Evaluation: Denies pain, SOB. On NG feed from 8pm -8am at goal rate. Monitoring BP, prn hydralazine in place. Started on appetite stimulation today. Plan: Needs to increase PO intake. Discharging back to TCU when ready.       Problem: Adult Inpatient Plan of Care  Goal: Plan of Care Review  Description: The Plan of Care Review/Shift note should be completed every shift.  The Outcome Evaluation is a brief statement about your assessment that the patient is improving, declining, or no change.  This information will be displayed automatically on your shift  note.  Outcome: Progressing  Flowsheets (Taken 8/17/2025 1440)  Outcome Evaluation: Denies pain, SOB. On NG feed from 8pm -8am at goal rate. Monitoring BP, prn hydralzine in place. Started on appetite stimulation today. Plan: Needs to increase PO intake. D/c back to TCU.  Plan of Care Reviewed With: patient  Overall Patient Progress: no change  Goal: Patient-Specific Goal (Individualized)  Description: You can add care plan individualizations to a care plan. Examples of Individualization might be:  \"Parent requests to be called daily at 9am for status\", \"I have a hard time hearing out of my right ear\", or \"Do not touch me to wake me up as it startles  me\".  Outcome: Progressing  Goal: Absence of Hospital-Acquired Illness or Injury  Outcome: Progressing  Intervention: Identify and Manage Fall Risk  Recent Flowsheet Documentation  Taken 8/17/2025 0926 by Diane Alberts RN  Safety Promotion/Fall Prevention:   safety round/check completed   clutter free environment maintained   nonskid shoes/slippers when out of bed  Intervention: Prevent Skin Injury  Recent Flowsheet Documentation  Taken 8/17/2025 0926 by Diane Alberts, RN  Body Position: position changed independently  Intervention: Prevent and Manage VTE (Venous " Thromboembolism) Risk  Recent Flowsheet Documentation  Taken 8/17/2025 0926 by Diane Alberts RN  VTE Prevention/Management: (on eliquis) patient refused intervention  Intervention: Prevent Infection  Recent Flowsheet Documentation  Taken 8/17/2025 0926 by Diane Alberts RN  Infection Prevention:   hand hygiene promoted   single patient room provided   rest/sleep promoted  Goal: Optimal Comfort and Wellbeing  Outcome: Progressing  Goal: Readiness for Transition of Care  Outcome: Progressing     Problem: Delirium  Goal: Optimal Coping  Outcome: Progressing  Goal: Improved Behavioral Control  Outcome: Progressing  Intervention: Minimize Safety Risk  Recent Flowsheet Documentation  Taken 8/17/2025 0926 by Diane Alberts RN  Enhanced Safety Measures: review medications for side effects with activity  Goal: Improved Attention and Thought Clarity  Outcome: Progressing  Goal: Improved Sleep  Outcome: Progressing     Problem: Skin Injury Risk Increased  Goal: Skin Health and Integrity  Outcome: Progressing  Intervention: Plan: Nurse Driven Intervention: Moisture Management  Recent Flowsheet Documentation  Taken 8/17/2025 0926 by Diane Alberts RN  Moisture Interventions:   Encourage regular toileting   Incontinence pad  Intervention: Plan: Nurse Driven Intervention: Friction and Shear  Recent Flowsheet Documentation  Taken 8/17/2025 0926 by Diane Alberts RN  Friction/Shear Interventions: HOB 30 degrees or less  Intervention: Optimize Skin Protection  Recent Flowsheet Documentation  Taken 8/17/2025 0926 by Diane Alberts RN  Activity Management: activity adjusted per tolerance  Head of Bed (HOB) Positioning: HOB at 20 degrees     Problem: Breathing Pattern Ineffective  Goal: Effective Breathing Pattern  Outcome: Progressing  Intervention: Promote Improved Breathing Pattern  Recent Flowsheet Documentation  Taken 8/17/2025 0926 by Diane Alberts RN  Head of Bed (HOB) Positioning: HOB at 20 degrees     Problem: Comorbidity Management  Goal:  Maintenance of COPD Symptom Control  Outcome: Progressing  Intervention: Maintain COPD Symptom Control  Recent Flowsheet Documentation  Taken 8/17/2025 0926 by Diane Alberts RN  Medication Review/Management: medications reviewed  Goal: Blood Pressure in Desired Range  Outcome: Progressing  Intervention: Maintain Blood Pressure Management  Recent Flowsheet Documentation  Taken 8/17/2025 0926 by Diane Alberts RN  Medication Review/Management: medications reviewed     Problem: Hypertension Acute  Goal: Blood Pressure Within Desired Range  Outcome: Progressing  Intervention: Normalize Blood Pressure  Recent Flowsheet Documentation  Taken 8/17/2025 0926 by Diane Alberts RN  Medication Review/Management: medications reviewed

## 2025-08-17 NOTE — PROGRESS NOTES
Nephrology    Discharge may be tomorrow.    She has a chair time at [facility] starting on 8/19.    Orders will need to be called to them tomorrow.    Luis Eduardo Lucas MD

## 2025-08-18 LAB
ANION GAP SERPL CALCULATED.3IONS-SCNC: 17 MMOL/L (ref 7–15)
BUN SERPL-MCNC: 50.4 MG/DL (ref 8–23)
CALCIUM SERPL-MCNC: 9.2 MG/DL (ref 8.8–10.4)
CHLORIDE SERPL-SCNC: 96 MMOL/L (ref 98–107)
CREAT SERPL-MCNC: 2.51 MG/DL (ref 0.51–0.95)
EGFRCR SERPLBLD CKD-EPI 2021: 19 ML/MIN/1.73M2
GLUCOSE SERPL-MCNC: 111 MG/DL (ref 70–99)
HCO3 SERPL-SCNC: 24 MMOL/L (ref 22–29)
MAGNESIUM SERPL-MCNC: 2.1 MG/DL (ref 1.7–2.3)
PHOSPHATE SERPL-MCNC: 4 MG/DL (ref 2.5–4.5)
POTASSIUM SERPL-SCNC: 4.2 MMOL/L (ref 3.4–5.3)
SODIUM SERPL-SCNC: 137 MMOL/L (ref 135–145)

## 2025-08-18 PROCEDURE — 99232 SBSQ HOSP IP/OBS MODERATE 35: CPT | Performed by: STUDENT IN AN ORGANIZED HEALTH CARE EDUCATION/TRAINING PROGRAM

## 2025-08-18 PROCEDURE — 36415 COLL VENOUS BLD VENIPUNCTURE: CPT | Performed by: STUDENT IN AN ORGANIZED HEALTH CARE EDUCATION/TRAINING PROGRAM

## 2025-08-18 PROCEDURE — 83735 ASSAY OF MAGNESIUM: CPT | Performed by: STUDENT IN AN ORGANIZED HEALTH CARE EDUCATION/TRAINING PROGRAM

## 2025-08-18 PROCEDURE — 84100 ASSAY OF PHOSPHORUS: CPT | Performed by: STUDENT IN AN ORGANIZED HEALTH CARE EDUCATION/TRAINING PROGRAM

## 2025-08-18 PROCEDURE — 90935 HEMODIALYSIS ONE EVALUATION: CPT | Performed by: INTERNAL MEDICINE

## 2025-08-18 PROCEDURE — 250N000013 HC RX MED GY IP 250 OP 250 PS 637: Performed by: STUDENT IN AN ORGANIZED HEALTH CARE EDUCATION/TRAINING PROGRAM

## 2025-08-18 PROCEDURE — 120N000001 HC R&B MED SURG/OB

## 2025-08-18 PROCEDURE — 634N000001 HC RX 634: Mod: JZ | Performed by: INTERNAL MEDICINE

## 2025-08-18 PROCEDURE — 80048 BASIC METABOLIC PNL TOTAL CA: CPT | Performed by: STUDENT IN AN ORGANIZED HEALTH CARE EDUCATION/TRAINING PROGRAM

## 2025-08-18 PROCEDURE — 258N000003 HC RX IP 258 OP 636: Performed by: INTERNAL MEDICINE

## 2025-08-18 PROCEDURE — 90937 HEMODIALYSIS REPEATED EVAL: CPT

## 2025-08-18 PROCEDURE — 250N000011 HC RX IP 250 OP 636: Performed by: INTERNAL MEDICINE

## 2025-08-18 RX ORDER — HEPARIN SODIUM 1000 [USP'U]/ML
500 INJECTION, SOLUTION INTRAVENOUS; SUBCUTANEOUS CONTINUOUS
Status: DISCONTINUED | OUTPATIENT
Start: 2025-08-18 | End: 2025-08-20

## 2025-08-18 RX ORDER — DRONABINOL 2.5 MG/1
2.5 CAPSULE ORAL 2 TIMES DAILY
Status: DISCONTINUED | OUTPATIENT
Start: 2025-08-18 | End: 2025-08-20 | Stop reason: HOSPADM

## 2025-08-18 RX ORDER — ALBUMIN (HUMAN) 12.5 G/50ML
50 SOLUTION INTRAVENOUS
Status: DISCONTINUED | OUTPATIENT
Start: 2025-08-18 | End: 2025-08-20

## 2025-08-18 RX ADMIN — MECLIZINE HYDROCHLORIDE 25 MG: 25 TABLET ORAL at 15:53

## 2025-08-18 RX ADMIN — Medication 5 ML: at 08:34

## 2025-08-18 RX ADMIN — AMIODARONE HYDROCHLORIDE 200 MG: 200 TABLET ORAL at 08:32

## 2025-08-18 RX ADMIN — APIXABAN 2.5 MG: 2.5 TABLET, FILM COATED ORAL at 20:49

## 2025-08-18 RX ADMIN — HEPARIN SODIUM 1900 UNITS: 1000 INJECTION, SOLUTION INTRAVENOUS; SUBCUTANEOUS at 12:15

## 2025-08-18 RX ADMIN — MECLIZINE HYDROCHLORIDE 25 MG: 25 TABLET ORAL at 00:10

## 2025-08-18 RX ADMIN — MEGESTROL ACETATE 200 MG: 40 SUSPENSION ORAL at 08:34

## 2025-08-18 RX ADMIN — HYDRALAZINE HYDROCHLORIDE 50 MG: 50 TABLET, FILM COATED ORAL at 18:27

## 2025-08-18 RX ADMIN — CARVEDILOL 25 MG: 12.5 TABLET, FILM COATED ORAL at 18:27

## 2025-08-18 RX ADMIN — HEPARIN SODIUM 500 UNITS/HR: 1000 INJECTION, SOLUTION INTRAVENOUS; SUBCUTANEOUS at 12:15

## 2025-08-18 RX ADMIN — MIRTAZAPINE 7.5 MG: 7.5 TABLET, FILM COATED ORAL at 22:48

## 2025-08-18 RX ADMIN — HYDRALAZINE HYDROCHLORIDE 50 MG: 50 TABLET, FILM COATED ORAL at 08:31

## 2025-08-18 RX ADMIN — FAMOTIDINE 20 MG: 40 POWDER, FOR SUSPENSION ORAL at 08:34

## 2025-08-18 RX ADMIN — MECLIZINE HYDROCHLORIDE 25 MG: 25 TABLET ORAL at 18:28

## 2025-08-18 RX ADMIN — NIFEDIPINE 30 MG: 30 TABLET, FILM COATED, EXTENDED RELEASE ORAL at 08:31

## 2025-08-18 RX ADMIN — ICOSAPENT ETHYL 2 G: 1 CAPSULE ORAL at 08:40

## 2025-08-18 RX ADMIN — PAROXETINE 40 MG: 10 SUSPENSION ORAL at 08:34

## 2025-08-18 RX ADMIN — HEPARIN SODIUM 500 UNITS: 1000 INJECTION, SOLUTION INTRAVENOUS; SUBCUTANEOUS at 12:14

## 2025-08-18 RX ADMIN — IRON SUCROSE 50 MG: 20 INJECTION, SOLUTION INTRAVENOUS at 12:13

## 2025-08-18 RX ADMIN — CLOPIDOGREL BISULFATE 75 MG: 75 TABLET, FILM COATED ORAL at 18:28

## 2025-08-18 RX ADMIN — EPOETIN ALFA-EPBX 4000 UNITS: 4000 INJECTION, SOLUTION INTRAVENOUS; SUBCUTANEOUS at 12:13

## 2025-08-18 RX ADMIN — ACETAMINOPHEN 650 MG: 325 SUSPENSION ORAL at 20:50

## 2025-08-18 RX ADMIN — SODIUM CHLORIDE 500 ML: 0.9 INJECTION, SOLUTION INTRAVENOUS at 12:18

## 2025-08-18 RX ADMIN — HYDRALAZINE HYDROCHLORIDE 50 MG: 50 TABLET, FILM COATED ORAL at 00:10

## 2025-08-18 RX ADMIN — MECLIZINE HYDROCHLORIDE 25 MG: 25 TABLET ORAL at 05:54

## 2025-08-18 RX ADMIN — ICOSAPENT ETHYL 2 G: 1 CAPSULE ORAL at 18:31

## 2025-08-18 RX ADMIN — APIXABAN 2.5 MG: 2.5 TABLET, FILM COATED ORAL at 08:32

## 2025-08-18 RX ADMIN — CARVEDILOL 25 MG: 12.5 TABLET, FILM COATED ORAL at 08:31

## 2025-08-18 RX ADMIN — HEPARIN SODIUM 1900 UNITS: 1000 INJECTION, SOLUTION INTRAVENOUS; SUBCUTANEOUS at 12:16

## 2025-08-18 RX ADMIN — DRONABINOL 2.5 MG: 2.5 CAPSULE ORAL at 20:49

## 2025-08-18 RX ADMIN — DRONABINOL 2.5 MG: 2.5 CAPSULE ORAL at 15:53

## 2025-08-18 ASSESSMENT — ACTIVITIES OF DAILY LIVING (ADL)
ADLS_ACUITY_SCORE: 67

## 2025-08-18 NOTE — PLAN OF CARE
"Shift Summary 2300 - 0730  Temp: 98  F (36.7  C) Temp src: Oral BP: (!) 150/83 Pulse: 70   Resp: 16 SpO2: 93 % O2 Device: None (Room air) Oxygen Delivery: 1 LPM    Pt is alert and oriented, forgetful at times. VSS on RA. Tele - SR. Denies pain. K,Mg and Phos protocols - for AM recheck. NGT bridled, Keofeed on going from 8p - 8a as per diet orders. On a regular diet w thin liquids, followed by nutrition team - on Calorie counts. PO intake getting better. Plan - To stop the TF in the next 1-2 days so as to stimulate her appetite. Up w assist of 1 w GBW. For TCU upon discharge.          Problem: Adult Inpatient Plan of Care  Goal: Plan of Care Review  Description: The Plan of Care Review/Shift note should be completed every shift.  The Outcome Evaluation is a brief statement about your assessment that the patient is improving, declining, or no change.  This information will be displayed automatically on your shift  note.  Outcome: Progressing  Flowsheets (Taken 8/18/2025 0130)  Outcome Evaluation: Caloie counts continued, doing better with PO intake. Keofeed on going per order. Denies pain. VSS on RA.  Plan of Care Reviewed With: patient  Overall Patient Progress: improving  Goal: Patient-Specific Goal (Individualized)  Description: You can add care plan individualizations to a care plan. Examples of Individualization might be:  \"Parent requests to be called daily at 9am for status\", \"I have a hard time hearing out of my right ear\", or \"Do not touch me to wake me up as it startles  me\".  Outcome: Progressing  Goal: Absence of Hospital-Acquired Illness or Injury  Outcome: Progressing  Intervention: Identify and Manage Fall Risk  Recent Flowsheet Documentation  Taken 8/18/2025 0045 by Camille Reynolds RN  Safety Promotion/Fall Prevention:   safety round/check completed   room near nurse's station   nonskid shoes/slippers when out of bed   mobility aid in reach   lighting adjusted   clutter free environment maintained   " activity supervised  Intervention: Prevent Skin Injury  Recent Flowsheet Documentation  Taken 8/18/2025 0045 by Camille Reynolds RN  Body Position: position changed independently  Intervention: Prevent and Manage VTE (Venous Thromboembolism) Risk  Recent Flowsheet Documentation  Taken 8/18/2025 0045 by Camille Reynolds RN  VTE Prevention/Management: (On Eliquis) patient refused intervention  Intervention: Prevent Infection  Recent Flowsheet Documentation  Taken 8/18/2025 0045 by Camille Reynolds RN  Infection Prevention:   hand hygiene promoted   single patient room provided   rest/sleep promoted  Goal: Optimal Comfort and Wellbeing  Outcome: Progressing  Intervention: Provide Person-Centered Care  Recent Flowsheet Documentation  Taken 8/18/2025 0045 by Camille Reynolds RN  Trust Relationship/Rapport:   care explained   choices provided   thoughts/feelings acknowledged  Goal: Readiness for Transition of Care  Outcome: Progressing     Problem: Delirium  Goal: Optimal Coping  Outcome: Progressing  Intervention: Optimize Psychosocial Adjustment to Delirium  Recent Flowsheet Documentation  Taken 8/18/2025 0045 by Camille Reynolds RN  Supportive Measures:   active listening utilized   self-care encouraged  Family/Support System Care: self-care encouraged  Goal: Improved Behavioral Control  Outcome: Progressing  Intervention: Minimize Safety Risk  Recent Flowsheet Documentation  Taken 8/18/2025 0045 by Camille Reynolds RN  Enhanced Safety Measures: review medications for side effects with activity  Trust Relationship/Rapport:   care explained   choices provided   thoughts/feelings acknowledged  Goal: Improved Attention and Thought Clarity  Outcome: Progressing  Intervention: Maximize Cognitive Function  Recent Flowsheet Documentation  Taken 8/18/2025 0045 by Camille Reynolds RN  Reorientation Measures:   clock in view   calendar in view  Goal: Improved Sleep  Outcome: Progressing  Intervention: Promote  Sleep  Recent Flowsheet Documentation  Taken 8/18/2025 0045 by Camille Reynolds RN  Sleep/Rest Enhancement:   awakenings minimized   noise level reduced   regular sleep/rest pattern promoted   room darkened     Problem: Skin Injury Risk Increased  Goal: Skin Health and Integrity  Outcome: Progressing  Intervention: Plan: Nurse Driven Intervention: Moisture Management  Recent Flowsheet Documentation  Taken 8/18/2025 0045 by Camille Reynolds RN  Moisture Interventions: Incontinence pad  Intervention: Optimize Skin Protection  Recent Flowsheet Documentation  Taken 8/18/2025 0045 by Camille Reynolds RN  Head of Bed (HOB) Positioning: HOB at 20-30 degrees  Intervention: Promote and Optimize Oral Intake  Recent Flowsheet Documentation  Taken 8/18/2025 0045 by Camille Reynolds RN  Nutrition Interventions: (Calorie Cont Continued, Dietician following) other (see comments)  Oral Nutrition Promotion: rest periods promoted     Problem: Breathing Pattern Ineffective  Goal: Effective Breathing Pattern  Outcome: Progressing  Intervention: Promote Improved Breathing Pattern  Recent Flowsheet Documentation  Taken 8/18/2025 0045 by Camille Reynolds RN  Supportive Measures:   active listening utilized   self-care encouraged  Head of Bed (HOB) Positioning: HOB at 20-30 degrees     Problem: Comorbidity Management  Goal: Maintenance of COPD Symptom Control  Outcome: Progressing  Intervention: Maintain COPD Symptom Control  Recent Flowsheet Documentation  Taken 8/18/2025 0045 by Camille Reynolds RN  Medication Review/Management: medications reviewed  Goal: Blood Pressure in Desired Range  Outcome: Progressing  Intervention: Maintain Blood Pressure Management  Recent Flowsheet Documentation  Taken 8/18/2025 0045 by Camille Reynolds RN  Medication Review/Management: medications reviewed     Problem: Hypertension Acute  Goal: Blood Pressure Within Desired Range  Outcome: Progressing  Intervention: Normalize Blood  Pressure  Recent Flowsheet Documentation  Taken 8/18/2025 0045 by Camille Reynolds, RN  Medication Review/Management: medications reviewed         Goal Outcome Evaluation:      Plan of Care Reviewed With: patient    Overall Patient Progress: improvingOverall Patient Progress: improving    Outcome Evaluation: Caloie counts continued, doing better with PO intake. Keofeed on going per order. Denies pain. VSS on RA.

## 2025-08-18 NOTE — PROGRESS NOTES
Assessment and Plan:     ROMI in the setting of chronic kidney disease.  Now on dialysis.  She has a dialysis chair set up as an outpatient and [facility] ([place] on 8/19.  Dialysis today, orders have been placed.  Will plan 3 hours right  blood flow rate 2 L ultrafiltration.  3 potassium 35 bicarb 140 sodium bath.  EPO and Venofer on dialysis.  Low-dose heparin protocol.    Dialysis as an outpatient starting tomorrow at [facility] unit.    She is being discharged to a TCU who will presumably arrange transportation to and from dialysis.  Alert and responsive  Frail-appearing  She has an nasoduodenal feeding tube in place.    She will be currently classified as ROMI.  She may in fact have end-stage renal disease but this will be determined in the next few months.            Interval History:   Hypertension:      She is on carvedilol, hydralazine, nifedipine ER.  Her blood pressure remains elevated but we will see how it responds to ultrafiltration on dialysis before adjusting further.    Anemia: She will be getting EPO and Venofer on dialysis.    History of lung cancer status post radiation therapy 4 years ago.    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    Atrial fibrillation on amiodarone and carvedilol.               Review of Systems:   She had a restful night.  She denies respiratory distress chest or abdominal pain.  She has been tolerating dialysis well.          Medications:     Current Facility-Administered Medications   Medication Dose Route Frequency Provider Last Rate Last Admin    - MEDICATION INSTRUCTIONS for Dialysis Patients -   Does not apply See Admin Instructions Ulysses Schafer MD        amiodarone (PACERONE) tablet 200 mg  200 mg Oral or Feeding Tube Daily Ulysses Schafer MD   200 mg at 08/18/25 0832    apixaban ANTICOAGULANT (ELIQUIS) tablet 2.5 mg  2.5 mg Oral BID Ulysses Schafer MD   2.5 mg at 08/18/25 0832    B and C vitamin Complex with  folic acid (NEPHRONEX) liquid 5 mL  5 mL Per Feeding Tube Daily Ulysses Schafer MD   5 mL at 08/18/25 0834    carvedilol (COREG) tablet 25 mg  25 mg Oral or Feeding Tube BID w/meals Ulysses Schafer MD   25 mg at 08/18/25 0831    clopidogrel (PLAVIX) tablet 75 mg  75 mg Oral or Feeding Tube Daily Ulysses Schafer MD   75 mg at 08/17/25 1835    epoetin jennie-epbx (RETACRIT) injection 4,000 Units  4,000 Units Intravenous Once in dialysis/CRRT Andrew Mansfield MD        famotidine (PEPCID) suspension 20 mg  20 mg Oral or Feeding Tube Q48H Ulysses Schafer MD   20 mg at 08/18/25 0834    heparin (porcine) injection  500 Units Hemodialysis Machine OR IV Push Once in dialysis/CRRT Andrew Mansfield MD        sodium chloride 0.9% DIALYSIS Cath LOCK - RED Lumen  10 mL Intracatheter Once in dialysis/CRRT Andrew Mansfield MD        Followed by    heparin 1000 unit/mL DIALYSIS Cath LOCK - RED Lumen  1.3-2.6 mL Intracatheter Once in dialysis/CRRT Andrew Mansfield MD        sodium chloride 0.9% DIALYSIS Cath LOCK - BLUE Lumen  10 mL Intracatheter Once in dialysis/CRRT Andrew Mansfield MD        Followed by    heparin 1000 unit/mL DIALYSIS Cath LOCK -BLUE Lumen  1.3-2.6 mL Intracatheter Once in dialysis/CRRT Andrew Mansfield MD        hydrALAZINE (APRESOLINE) tablet 50 mg  50 mg Oral or Feeding Tube TID Ulysses Schafer MD   50 mg at 08/18/25 0831    icosapent ethyl (VASCEPA) capsule 2 g  2 g Oral BID w/meals Ulysses Schafer MD   2 g at 08/18/25 0840    iron sucrose (VENOFER) injection 50 mg  50 mg Intravenous Once in dialysis/CRRT Andrew Mansfield MD        meclizine (ANTIVERT) tablet 25 mg  25 mg Oral or Feeding Tube Q6H Ulysses Schafer MD   25 mg at 08/18/25 0554    megestrol (MEGACE) suspension 200 mg  200 mg Oral or Feeding Tube Daily Ulysses Schafer MD   200 mg at 08/18/25 0834    mirtazapine (REMERON) tablet TABS 7.5 mg  7.5 mg Oral At Bedtime Ulysses Schafer MD   7.5 mg at 08/17/25 2121    NIFEdipine ER  OSMOTIC (PROCARDIA XL) 24 hr tablet 30 mg  30 mg Oral Daily Ulysses Schafer MD   30 mg at 08/18/25 0831    PARoxetine (PAXIL) suspension 40 mg  40 mg Oral or Feeding Tube Daily Ulysses Schafer MD   40 mg at 08/18/25 0834    sodium chloride (PF) 0.9% PF flush 3 mL  3 mL Intracatheter Q8H CHIDI Ulysses Schafer MD   3 mL at 08/18/25 0554    sodium chloride (PF) 0.9% PF flush 9 mL  9 mL Intracatheter During Dialysis/CRRT (from stock) Andrew Mansfield MD        sodium chloride (PF) 0.9% PF flush 9 mL  9 mL Intracatheter During Dialysis/CRRT (from stock) Andrew Mansfield MD        sodium chloride 0.9% BOLUS 200 mL  200 mL Hemodialysis Machine Once Andrew Mansfield MD        sodium chloride 0.9% BOLUS 250 mL  250 mL Intravenous Once in dialysis/CRRT Andrew Mansfield MD        sodium chloride 0.9% BOLUS 500 mL  500 mL Hemodialysis Machine Once Andrew Mansfield MD         Current Facility-Administered Medications   Medication Dose Route Frequency Provider Last Rate Last Admin    dextrose 10% infusion   Intravenous Continuous PRN Ulysses Schafer MD        heparin 10,000 units/10 mL infusion (DIALYSIS USE)  500 Units/hr Hemodialysis Machine Continuous Andrew Mansfield MD        No lozenges or gum should be given while patient on BIPAP/AVAPS/AVAPS AE   Does not apply Continuous PRN Ulysses Schafer MD        Patient may continue current oral medications   Does not apply Continuous PRN Ulysses Schafer MD         Current active medications and PTA medications reviewed, see medication list for details.            Physical Exam:   Vitals were reviewed  Patient Vitals for the past 24 hrs:   BP Temp Temp src Pulse Resp SpO2 Weight   08/18/25 0843 -- -- -- -- -- 94 % --   08/18/25 0802 (!) 170/86 98.9  F (37.2  C) Oral 76 20 -- --   08/18/25 0602 -- -- -- -- -- -- 48.5 kg (106 lb 14.4 oz)   08/18/25 0119 -- -- -- -- -- 93 % --   08/18/25 0045 -- -- -- -- -- 94 % --   08/17/25 2318 (!) 150/83 98  F (36.7  C) Oral  70 16 94 % --   25 1832 (!) 145/76 -- -- 74 -- 93 % --   25 1655 -- -- -- -- -- 100 % --   25 1548 124/75 97.5  F (36.4  C) Oral 74 16 -- --       Temp:  [97.5  F (36.4  C)-98.9  F (37.2  C)] 98.9  F (37.2  C)  Pulse:  [70-76] 76  Resp:  [16-20] 20  BP: (124-170)/(75-86) 170/86  SpO2:  [93 %-100 %] 94 %    Temperatures:  Current - Temp: 98.9  F (37.2  C); Max - Temp  Av.1  F (36.7  C)  Min: 97.5  F (36.4  C)  Max: 98.9  F (37.2  C)  Respiration range: Resp  Av.3  Min: 16  Max: 20  Pulse range: Pulse  Av.5  Min: 70  Max: 76  Blood pressure range: Systolic (24hrs), Av , Min:124 , Max:170   ; Diastolic (24hrs), Av, Min:75, Max:86    Pulse oximetry range: SpO2  Av.7 %  Min: 93 %  Max: 100 %    I/O last 3 completed shifts:  In: 1016 [P.O.:425; I.V.:15; NG/GT:576]  Out: 600 [Urine:600]      Intake/Output Summary (Last 24 hours) at 2025 0955  Last data filed at 2025 0817  Gross per 24 hour   Intake 1066 ml   Output 400 ml   Net 666 ml     Alert and responsive  Frail  Nasoduodenal feeding tube in place  Right CVC with clean exit site, no redness or tenderness         Wt Readings from Last 4 Encounters:   25 48.5 kg (106 lb 14.4 oz)   25 54.4 kg (120 lb)   25 54.1 kg (119 lb 3.2 oz)   25 53.5 kg (117 lb 14.4 oz)          Data:          Lab Results   Component Value Date     2025     2025     2025     2018     2016     2015    Lab Results   Component Value Date    CHLORIDE 96 2025    CHLORIDE 95 2025    CHLORIDE 95 2025    CHLORIDE 109 2018    CHLORIDE 107 2016    CHLORIDE 107 2015    Lab Results   Component Value Date    BUN 50.4 2025    BUN 36.2 2025    BUN 44.9 2025    BUN 31 2018    BUN 31 2016    BUN 34 2015      Lab Results   Component Value Date    POTASSIUM 4.2 2025    POTASSIUM 4.6  08/17/2025    POTASSIUM 4.3 08/16/2025    POTASSIUM 4.1 07/17/2018    POTASSIUM 4.0 09/09/2016    POTASSIUM 4.6 08/20/2015    Lab Results   Component Value Date    CO2 24 08/18/2025    CO2 27 08/17/2025    CO2 26 08/16/2025    CO2 25 07/17/2018    CO2 24 09/09/2016    CO2 20 08/20/2015    Lab Results   Component Value Date    CR 2.51 08/18/2025    CR 2.09 08/17/2025    CR 2.12 08/16/2025    CR 1.49 07/17/2018    CR 1.13 09/09/2016    CR 1.38 08/20/2015        Recent Labs   Lab Test 08/16/25  0708 08/13/25  0525 08/12/25  0526 08/11/25  0557 08/10/25  0614   WBC  --   --  11.1* 9.4 8.0   HGB  --   --  9.1* 8.8* 9.0*   HCT  --   --  27.0* 26.2* 27.6*   .8* 100.4* 100 101* 103*    280 277 261 276     Recent Labs   Lab Test 07/17/25  0620   AST 22   ALT 11   ALKPHOS 61   BILITOTAL 0.3       Recent Labs   Lab Test 08/18/25  0620 08/17/25  0759 08/16/25  0708   MAG 2.1 1.9 1.8     Recent Labs   Lab Test 08/18/25  0620 08/17/25  0759 08/16/25  0708   PHOS 4.0 3.2  3.2 3.9  3.9     Recent Labs   Lab Test 08/18/25  0620 08/17/25  0759 08/16/25  0708   BRUCE 9.2 8.8 8.8       Lab Results   Component Value Date    BRUCE 9.2 08/18/2025     Lab Results   Component Value Date    WBC 11.1 (H) 08/12/2025    HGB 9.1 (L) 08/12/2025    HCT 27.0 (L) 08/12/2025    .8 (H) 08/16/2025     08/16/2025     Lab Results   Component Value Date     08/18/2025    POTASSIUM 4.2 08/18/2025    CHLORIDE 96 (L) 08/18/2025    CO2 24 08/18/2025     (H) 08/18/2025     Lab Results   Component Value Date    BUN 50.4 (H) 08/18/2025    CR 2.51 (H) 08/18/2025     Lab Results   Component Value Date    MAG 2.1 08/18/2025     Lab Results   Component Value Date    PHOS 4.0 08/18/2025       Creatinine   Date Value Ref Range Status   08/18/2025 2.51 (H) 0.51 - 0.95 mg/dL Final   08/17/2025 2.09 (H) 0.51 - 0.95 mg/dL Final   08/16/2025 2.12 (H) 0.51 - 0.95 mg/dL Final   08/15/2025 1.67 (H) 0.51 - 0.95 mg/dL Final   08/14/2025  2.08 (H) 0.51 - 0.95 mg/dL Final   08/13/2025 1.85 (H) 0.51 - 0.95 mg/dL Final   07/17/2018 1.49 (H) 0.52 - 1.04 mg/dL Final   09/09/2016 1.13 (H) 0.52 - 1.04 mg/dL Final   08/20/2015 1.38 (H) 0.52 - 1.04 mg/dL Final   08/17/2015 1.55 (H) 0.52 - 1.04 mg/dL Final       Attestation:  I have reviewed today's vital signs, notes, medications, labs and imaging.  Seen on dialysis     Andrew Mansfield MD

## 2025-08-18 NOTE — PROGRESS NOTES
Care Management Follow Up    Length of Stay (days): 21    Expected Discharge Date: 08/20/2025     Concerns to be Addressed: discharge planning     Patient plan of care discussed at interdisciplinary rounds: Yes    Anticipated Discharge Disposition: Transitional Care at [facility]  Anticipated Discharge Services:  OP HD  Anticipated Discharge DME:      Patient/family educated on Medicare website which has current facility and service quality ratings:    Education Provided on the Discharge Plan:  yes  Patient/Family in Agreement with the Plan: yes    Referrals Placed by CM/SW: Post Acute Facilities  Private pay costs discussed: Not applicable    Discussed  Partnership in Safe Discharge Planning  document with patient/family: No     Handoff Completed: No, handoff not indicated or clinically appropriate    Additional Information:  Nephrologist Dr. Mansfield was updated that we anticipate pt will not be able to make her first OP HD appt that was scheduled for 8/19 as she will not be discharging to TCU today and that we planned to re-schedule her first OPHD to Thursday 8/21.     CM spoke w [facility] admissions who confirmed pt still has a bedhold. They requested CM send an updated referral as she has been gone for an extended period of time, CM sent. Pt cannot return to [facility] w nasal keofeed, hospitalist aware.     CM updated [facility] admissions [place] ph: [phone] ext [phone] that pt will not be discharging today and her first OP HD appt was re scheduled to 8/21 at [facility] at 0530, appt details updated on AVS. Pt/spouse updated at bedside. They feel family will be able to provide transport to pt's first OP HD appt. Hospitalist also updated that first OP HD was rescheduled to 8/21.     Addendum 1547: Received update phone call from [facility] who stated they would like pt to not need nasal keofeed for 24hrs prior to her return to ensure her PO intake is adequate, hospitalist updated.       Next  Steps: follow up w [facility] to coordinate pt return, set up transport for  OPHD     Ligia Huang, RN, BSN  Inpatient Care Coordination  [facility]  [phone]

## 2025-08-18 NOTE — PROGRESS NOTES
"Potassium   Date Value Ref Range Status   08/18/2025 4.2 3.4 - 5.3 mmol/L Final   07/17/2018 4.1 3.4 - 5.3 mmol/L Final     Hemoglobin   Date Value Ref Range Status   08/12/2025 9.1 (L) 11.7 - 15.7 g/dL Final   09/09/2016 12.8 11.7 - 15.7 g/dL Final     Creatinine   Date Value Ref Range Status   08/18/2025 2.51 (H) 0.51 - 0.95 mg/dL Final   07/17/2018 1.49 (H) 0.52 - 1.04 mg/dL Final     Urea Nitrogen   Date Value Ref Range Status   08/18/2025 50.4 (H) 8.0 - 23.0 mg/dL Final   07/17/2018 31 (H) 7 - 30 mg/dL Final     Sodium   Date Value Ref Range Status   08/18/2025 137 135 - 145 mmol/L Final   07/17/2018 141 133 - 144 mmol/L Final     No results found for: \"INR\"    DIALYSIS PROCEDURE NOTE  Hepatitis status of previous patient on machine log was checked and verified ok to use with this patients hepatitis status.  Patient dialyzed for 3 hrs. on a K3 bath with a net fluid removal of  2L.  A BFR of 400 ml/min was obtained via a right CVC.    The treatment plan was discussed with Dr. Mansfield during the treatment.    Total heparin received during the treatment: 2000 units.   Line flushed, clamped and capped with heparin 1:1000 1.9 mL (1900 units) per lumen    Meds  given: 4000 units of Epogen and 50mg Venofer   Complications: none      Person educated: Patient. Knowledge base substantial. Barriers to learning: none. Educated on procedure and medications via explanation mode. Patient verbalized understanding. Pt prefers oral education style.     ICEBOAT? Timeout performed pre-treatment  I: Patient was identified using 2 identifiers  C:  Consent Signed Yes  E: Equipment preventative maintenance is current and dialysis delivery system OK to use  B: Hepatitis B Surface Antigen: Negative; Draw Date: 08//05//2025      Hepatitis B Surface Antibody: Susceptible; Draw Date: 08/05/2025  O: Dialysis orders present and complete prior to treatment  A: Vascular access verified and assessed prior to treatment  T: Treatment was performed " at a clinically appropriate time  ?: Patient was allowed to ask questions and address concerns prior to treatment  See Adult Hemodialysis flowsheet in [facility] for further details and post assessment.  Machine water alarm in place and functioning. Transducer pods intact and checked every 15min.   Pt returned via Bed.  Chlorine/Chloramine water system checked every 4 hours.  Outpatient Dialysis at [facility] on [facility]    Patient repositioned every 2 hours during the treatment.  Post treatment report given to BOWEN Gusman regarding 2L of fluid removed, last BP of 147/75, and patient pain rating of 0/10.

## 2025-08-18 NOTE — PLAN OF CARE
"Shift Summary (1500 - 6330):    A/O x4, forgetful. VSS. Afebrile. Denies pain, dizziness, nausea. O2 stable on RA. Tele: SR. K/Mg/Phos protocols, AM rechecks. Assist of 1 gb wk. Up to chair for dinner. Appetite continues to improve, pt ate 100% of her dinner. Calorie counts maintained. Keofeed continues at goal rate 45ml/hr per cycle feed orders. NGT in place. CVC intact, CHG bath completed. Plan for discharge back to TCU when medically ready.     Goal Outcome Evaluation:      Plan of Care Reviewed With: patient    Overall Patient Progress: improving    Outcome Evaluation: Appetite continues to improve, pt ate 100% of her dinner. Sating well on RA. Denies pain. VSS.      Problem: Adult Inpatient Plan of Care  Goal: Plan of Care Review  Description: The Plan of Care Review/Shift note should be completed every shift.  The Outcome Evaluation is a brief statement about your assessment that the patient is improving, declining, or no change.  This information will be displayed automatically on your shift  note.  Outcome: Progressing  Flowsheets (Taken 8/17/2025 6070)  Outcome Evaluation: Appetite continues to improve, pt ate 100% of her dinner. Sating well on RA. Denies pain. VSS.  Plan of Care Reviewed With: patient  Overall Patient Progress: improving  Goal: Patient-Specific Goal (Individualized)  Description: You can add care plan individualizations to a care plan. Examples of Individualization might be:  \"Parent requests to be called daily at 9am for status\", \"I have a hard time hearing out of my right ear\", or \"Do not touch me to wake me up as it startles  me\".  Outcome: Progressing  Goal: Absence of Hospital-Acquired Illness or Injury  Outcome: Progressing  Intervention: Identify and Manage Fall Risk  Recent Flowsheet Documentation  Taken 8/17/2025 5456 by Belle Bourne, RN  Safety Promotion/Fall Prevention:   activity supervised   assistive device/personal items within reach   clutter free environment maintained   " increase visualization of patient   nonskid shoes/slippers when out of bed   patient and family education   room door open   room near nurse's station   room organization consistent   safety round/check completed   supervised activity  Intervention: Prevent Skin Injury  Recent Flowsheet Documentation  Taken 8/17/2025 1548 by Belle Bourne RN  Body Position: position changed independently  Intervention: Prevent and Manage VTE (Venous Thromboembolism) Risk  Recent Flowsheet Documentation  Taken 8/17/2025 1548 by Belle Bourne RN  VTE Prevention/Management: (on eliquis) patient refused intervention  Goal: Optimal Comfort and Wellbeing  Outcome: Progressing  Goal: Readiness for Transition of Care  Outcome: Progressing     Problem: Delirium  Goal: Optimal Coping  Outcome: Progressing  Goal: Improved Behavioral Control  Outcome: Progressing  Intervention: Minimize Safety Risk  Recent Flowsheet Documentation  Taken 8/17/2025 1548 by Belle Bourne RN  Enhanced Safety Measures:   assistive devices when indicated   pain management   review medications for side effects with activity   room near unit station  Goal: Improved Attention and Thought Clarity  Outcome: Progressing  Goal: Improved Sleep  Outcome: Progressing     Problem: Skin Injury Risk Increased  Goal: Skin Health and Integrity  Outcome: Progressing  Intervention: Plan: Nurse Driven Intervention: Moisture Management  Recent Flowsheet Documentation  Taken 8/17/2025 1634 by Belle Bourne RN  Bathing/Skin Care:   dressed/undressed   linen changed  Taken 8/17/2025 1548 by Belle Bourne RN  Moisture Interventions:   Encourage regular toileting   Incontinence pad  Intervention: Plan: Nurse Driven Intervention: Friction and Shear  Recent Flowsheet Documentation  Taken 8/17/2025 1548 by Belle Bourne RN  Friction/Shear Interventions: HOB 30 degrees or less  Intervention: Optimize Skin Protection  Recent Flowsheet Documentation  Taken 8/17/2025 1548 by Belle Bourne RN  Activity  Management: activity adjusted per tolerance  Head of Bed (HOB) Positioning: HOB at 20-30 degrees     Problem: Breathing Pattern Ineffective  Goal: Effective Breathing Pattern  Outcome: Progressing  Intervention: Promote Improved Breathing Pattern  Recent Flowsheet Documentation  Taken 8/17/2025 1548 by Belle Bourne RN  Head of Bed (HOB) Positioning: HOB at 20-30 degrees     Problem: Comorbidity Management  Goal: Maintenance of COPD Symptom Control  Outcome: Progressing  Intervention: Maintain COPD Symptom Control  Recent Flowsheet Documentation  Taken 8/17/2025 1548 by Belle Bourne RN  Medication Review/Management: medications reviewed  Goal: Blood Pressure in Desired Range  Outcome: Progressing  Intervention: Maintain Blood Pressure Management  Recent Flowsheet Documentation  Taken 8/17/2025 1548 by Belle Bourne RN  Medication Review/Management: medications reviewed     Problem: Hypertension Acute  Goal: Blood Pressure Within Desired Range  Outcome: Progressing  Intervention: Normalize Blood Pressure  Recent Flowsheet Documentation  Taken 8/17/2025 1548 by Belle Bourne RN  Medication Review/Management: medications reviewed

## 2025-08-18 NOTE — PROGRESS NOTES
CLINICAL NUTRITION SERVICES - REASSESSMENT NOTE     RECOMMENDATIONS FOR MDs/PROVIDERS TO ORDER:  Would recommend continuing appetite stimulant as well as renal MVI on discharge    Registered Dietitian Interventions:  Continue current diet order.    Discussed with provider. Patient is still meeting below her daily needs for both energy and protein w/ PO intake alone. Concern for ability to consistently meet needs especially given pre-existing malnutrition and increased needs for HD. Hesitant to say that pt will be able to meet all of her needs without supplemental nutrition support. Both pt and provider aware. Defer to provider for plan regarding long term nutrition supports vs fully PO reliant.  - Per discussion with provider, will discontinue tube feeding today and observe intakes for the next 1-2 day. If pt eats well, will discharge. If not, will pursue PEG tube.     Continue nutrition supplements as ordered       INFORMATION OBTAINED  Information from chart review. Attempted to meet with pt 3 times today. She was busy upon each attempt. Will try to stop by in the coming days to highlight importance of consistent intake.    CURRENT NUTRITION ORDERS  Diet: Regular, 1500 ml FR  Snacks/Supplements: Nepro daily at 2 pm  Nutrition Support:   Access: NGT  Frequency: 12 Hour Cycle (8 PM-8AM)  Formula: Jevity 1.5  Enteral Regimen: Jevity 1.5 Jose (or equivalent) @ goal of 45 ml/hr x 12 hrs (600 ml/day)   Total enteral provisions: 900 kcals, 38 g PRO, 456 ml free H20, 129 g CHO, and 13 g fiber daily.  - The above meets 60% of estimated energy needs and 76% of estimated protein needs.  Free Water Flushes: 30 ml q 4h  Total Fluid (EN + FWF) = 636 ml     Patient transitioned from continuous feed (Jevity 1.5 @ 45ml/hr x 24 hrs - meeting 100% of energy and protein needs) to 12 hour cycle (Jevity 1.5 @ 45ml/hr x 12 hrs - meeting 60% of estimated energy needs and 76% of estimated protein needs) on 8/14.    CURRENT  INTAKE/TOLERANCE  Calorie counts for the last several days as follows:  8/13: 646 kcal, 25 g protein  8/14: 694 kcal, 28 g protein  8/15: 263 kcal, 8 g protein  8/16: 1043 kcal, 28 g protein  8/17: 944 kcal, 46 g protein    On 8/17, patient able to meet 69% of lower end of estimated energy needs and 84% of lower end of estimated protein needs.  Please see previous RD notes for calorie count information for other days of calorie counts.   Appetite is continuing to improve per RN notes.       NEW FINDINGS  GI symptoms: Reviewed    - Stools: LBM 8/15    Skin/wounds: Reviewed    - Edema:    1+ (trace) - generalized, b/l arms and legs and L knee and ankle.    Nutrition-relevant labs: Reviewed  Noted: BUN 50.4(H), Cr 2.51(H),     Nutrition-relevant medications: Reviewed  Noted: Nephronex, iron sucrose, Megace, Remeron    Weight:   Vitals:    07/28/25 1400 07/30/25 0600 08/04/25 0800 08/07/25 0602   Weight: 54.8 kg (120 lb 13 oz) 53.8 kg (118 lb 9.7 oz) 56.2 kg (123 lb 14.4 oz) 54.2 kg (119 lb 7.8 oz)    08/08/25 0452 08/09/25 0653 08/10/25 0300 08/11/25 0353   Weight: 55.3 kg (121 lb 14.6 oz) 52 kg (114 lb 10.2 oz) 50.4 kg (111 lb 3.2 oz) 52.1 kg (114 lb 12.8 oz)    08/13/25 0600 08/14/25 0400 08/15/25 0600 08/16/25 0627   Weight: 50.8 kg (112 lb 1.7 oz) 50.5 kg (111 lb 4.8 oz) 54.6 kg (120 lb 6.3 oz) 52.7 kg (116 lb 1.6 oz)    08/17/25 0611 08/18/25 0602   Weight: 51 kg (112 lb 6.4 oz) 48.5 kg (106 lb 14.4 oz)     Pt's weight down considerably d/t fluid removal with diuresis. Unable to assess true losses vs fluid losses    ASSESSED NUTRITION NEEDS  Dosing Weight: 48.5 kg, based on lowest weight this admission  Estimated Energy Needs: 9969-9034 kcals/day (30 - 35 kcals/kg)  Justification: Repletion and Underweight  Estimated Protein Needs: >/= 49-58 grams protein/day (>/= 1 - 1.2 grams of pro/kg)  Justification: Preservation of LBM and Dialysis   Estimated Fluid Needs: Defer to nephrology    MALNUTRITION  % Intake:  "Decreased intake does not meet criteria w/ supplemental EN  % Weight Loss: Unable to assess  - see above  Subcutaneous Fat Loss: Orbital: Moderate, Buccal: Mild, Triceps: Mild, and Fat overlying the ribs: Mild   Muscle Loss: Temples (temporalis muscle): Mild, Clavicles (pectoralis and deltoids): Moderate, Shoulders (deltoids): Moderate, Interosseous muscles: Moderate, Scapula (latissimus dorsi, trapezious, deltoids): Moderate, Thigh (quadriceps): Severe, and Calf (gastrocnemius): Severe   Fluid Accumulation/Edema: Does not meet criteria  Malnutrition Diagnosis: Moderate malnutrition in the context of chronic illness  Malnutrition Present on Admission: Yes    EVALUATION OF THE PROGRESS TOWARD GOALS   Previous Goals  Patient to consume >/= 50% of nutritionally adequate meal trays or supplements TID to start to show improved trending of intakes vs long-term nutrition support plan   Evaluation: Progressing      Previous Nutrition Diagnosis  Inadequate oral intake related to poor appetite, dry mouth, fluid restrictions as evidenced by review of [facility], PO intakes of 0-25% of meals, reported poor appetite, reported refusal of meals, malnutrition documented, and need for EN to meet nutritional needs.   Evaluation: Improving    NUTRITION DIAGNOSIS  Inadequate oral intake related to fluctuating appetite, increased needs with HD as evidenced by pt meeting < 75% of overall energy needs with PO, mild-moderate fat losses, moderate-severe muscle losses, and meets malnutrition criteria.    INTERVENTIONS  See nutrition interventions above    GOALS  Patient to consume % of nutritionally adequate meal trays TID, or the equivalent with supplements/snacks.     MONITORING/EVALUATION  Progress toward goals will be monitored and evaluated per policy.      Mary Rider, KAYCEE, LD  Clinical Dietitian  [facility] Message [employer]: \"Dietitian [[facility]]\"  Office Phone: [phone]    "

## 2025-08-18 NOTE — PLAN OF CARE
"Goal Outcome Evaluation:      Plan of Care Reviewed With: patient    Overall Patient Progress: improvingOverall Patient Progress: improving    Outcome Evaluation: NG tube removed today. Patient had dialysis today. Denies pain and SOB. Patient has been eating well. Will continue to monitor intake. Plan: pending discharge back to TCU.       Problem: Adult Inpatient Plan of Care  Goal: Plan of Care Review  Description: The Plan of Care Review/Shift note should be completed every shift.  The Outcome Evaluation is a brief statement about your assessment that the patient is improving, declining, or no change.  This information will be displayed automatically on your shift  note.  Outcome: Progressing  Flowsheets (Taken 8/18/2025 1848)  Outcome Evaluation: NG tube removed today. Patient had dialysis today. Denies pain and SOB.  Plan of Care Reviewed With: patient  Overall Patient Progress: improving  Goal: Patient-Specific Goal (Individualized)  Description: You can add care plan individualizations to a care plan. Examples of Individualization might be:  \"Parent requests to be called daily at 9am for status\", \"I have a hard time hearing out of my right ear\", or \"Do not touch me to wake me up as it startles  me\".  Outcome: Progressing  Goal: Absence of Hospital-Acquired Illness or Injury  Outcome: Progressing  Intervention: Identify and Manage Fall Risk  Recent Flowsheet Documentation  Taken 8/18/2025 0843 by Diane Alberts RN  Safety Promotion/Fall Prevention:   safety round/check completed   clutter free environment maintained   nonskid shoes/slippers when out of bed  Intervention: Prevent Skin Injury  Recent Flowsheet Documentation  Taken 8/18/2025 0843 by Diane Alberts RN  Body Position: position changed independently  Intervention: Prevent and Manage VTE (Venous Thromboembolism) Risk  Recent Flowsheet Documentation  Taken 8/18/2025 0843 by Diane Alberts RN  VTE Prevention/Management: (on eliquis) patient refused " intervention  Intervention: Prevent Infection  Recent Flowsheet Documentation  Taken 8/18/2025 0843 by Diane Alberts RN  Infection Prevention:   hand hygiene promoted   single patient room provided   rest/sleep promoted  Goal: Optimal Comfort and Wellbeing  Outcome: Progressing  Goal: Readiness for Transition of Care  Outcome: Progressing     Problem: Delirium  Goal: Optimal Coping  Outcome: Progressing  Goal: Improved Behavioral Control  Outcome: Progressing  Intervention: Minimize Safety Risk  Recent Flowsheet Documentation  Taken 8/18/2025 0843 by Diane Alberts RN  Enhanced Safety Measures: review medications for side effects with activity  Goal: Improved Attention and Thought Clarity  Outcome: Progressing  Goal: Improved Sleep  Outcome: Progressing     Problem: Skin Injury Risk Increased  Goal: Skin Health and Integrity  Outcome: Progressing  Intervention: Plan: Nurse Driven Intervention: Moisture Management  Recent Flowsheet Documentation  Taken 8/18/2025 0843 by Diane Alberts RN  Moisture Interventions:   Encourage regular toileting   Incontinence pad  Intervention: Plan: Nurse Driven Intervention: Friction and Shear  Recent Flowsheet Documentation  Taken 8/18/2025 0843 by Diane Alberts RN  Friction/Shear Interventions: HOB 30 degrees or less  Intervention: Optimize Skin Protection  Recent Flowsheet Documentation  Taken 8/18/2025 0843 by Diane Alberts RN  Activity Management: activity adjusted per tolerance  Head of Bed (HOB) Positioning: HOB at 20 degrees     Problem: Breathing Pattern Ineffective  Goal: Effective Breathing Pattern  Outcome: Progressing  Intervention: Promote Improved Breathing Pattern  Recent Flowsheet Documentation  Taken 8/18/2025 0843 by Diane Alberts RN  Head of Bed (HOB) Positioning: HOB at 20 degrees     Problem: Comorbidity Management  Goal: Maintenance of COPD Symptom Control  Outcome: Progressing  Intervention: Maintain COPD Symptom Control  Recent Flowsheet Documentation  Taken 8/18/2025  0843 by Ali, Diane, RN  Medication Review/Management: medications reviewed  Goal: Blood Pressure in Desired Range  Outcome: Progressing  Intervention: Maintain Blood Pressure Management  Recent Flowsheet Documentation  Taken 8/18/2025 0843 by Diane Alberts RN  Medication Review/Management: medications reviewed     Problem: Hypertension Acute  Goal: Blood Pressure Within Desired Range  Outcome: Progressing  Intervention: Normalize Blood Pressure  Recent Flowsheet Documentation  Taken 8/18/2025 0843 by Diane Alberts RN  Medication Review/Management: medications reviewed

## 2025-08-18 NOTE — PROGRESS NOTES
[facility]    Medicine Progress Note - Hospitalist Service    Date of Admission:  7/28/2025    Assessment & Plan   75-year-old female with history of CAD, CKD stage IV, hypertension, hyperlipidemia, intra-abdominal aortic aneurysm with prior stenting, chronic vertigo, recent hospitalization for fall with pelvic fracture who presents the ED for shortness of breath.       Had elevated D-dimer but VQ scan negative for PE, CT not done due to CKD.  proBNP more than 25,000.  Chest x-ray showed bilateral perihilar interstitial/airspace opacities, left greater than right, are nonspecific for postradiation change versus pneumonia. A small nodule in the peripheral right upper lobe measures 9 mm.      Found to be in hypertensive emergency, admitted to ICU with BiPAP and nitroglycerin drip.  She was weaned off nitroglycerin drip and BiPAP but has had repeated episodes of rising blood pressure and shortness of breath needing her to go back on BiPAP intermittently.  She was transferred to floor on 7/31 but came back to ICU on 8/2 after rapid response for flash pulmonary edema and hypertensive emergency.       Started on hemodialysis treatment 8/10/25.     On the morning of 8/14, patient went into A-fib with initial rates of 130s and then decreased to 40s without intervention.  She converted back to sinus rhythm in about half an hour.       Currently medically improved and stable.  Needs to increase p.o. intake so she is not requiring tube feeds prior to discharge back to TCU.    Acute hypoxic respiratory failure  Diastolic CHF exacerbation,Recurrent flash pulmonary edema associated with episodes of high blood pressure-improved with initiation of hemodialysis  Respiratory status improved with dialysis and improved control of BP. On min supplemental oxygen - stable on 2L.    - Echo 7/29/25 - with EF of 55 to 60%, left atrium dilated, right vent sys function normal  - Unclear what is causing recurrent sudden  episodes of elevated blood pressure and flash pulmonary edema.  She has history of renal artery stenting and on renal arterial ultrasound done on 7/31 there was no sonographic evidence of renal artery stenosis.       End-stage renal disease  Metabolic acidosis  Initiated hemodialysis this admission.  -Nephrology following, appreciate assistance  -Will need set up at outpatient dialysis unit on discharge     Hypertension  Hypertensive emergency-resolved  PTA med include carvediol, hydralazine, isordil, clonidine patch. Noted to have elevated normetaneprhine level but metanephrine level was normal. Initially had hypertensive urgency requiring nitroglycerin gtt. Has had quite variable BPs in the last several days  was initially with hypertensive urgency and, over the last few days, has been intermittently hypotensive.  - Currently on Coreg 25mg po bid, hydralazine 50mg po bid.  - Restart Nifedipine XL at lower dose (30mg) and continue to monitor closely     ?Infectious disease  Was empirically started on ceftriaxone on presentation.  Patient had no fever or leukocytosis.  Mildly elevated procalcitonin of 0.67 which is difficult to interpret in ROMI/CKD and improved to 0.49 on recheck.  Finished 5-day course of ceftriaxone.       Atrial fibrillation: Paroxysmal atrial fibrillation  On 8/4, patient had a self-limiting 30 minutes episode of atrial fibrillation initially with RVR to 130 and then bradycardia to 40.  - Cardiology was reconsulted, advised 150 mg amiodarone IV push, and the plan was no further amiodarone if patient remains sinus.    - However, patient developed intermittent episodes of atrial fibrillation with RVR.  Cardiology recommended amiodarone drip and now transitioned to oral amiodarone 200 mg once a day for about a month.  -Continue Coreg at current dose   -Anticoagulation was recommended by cardiology (Eliquis)     Malnutrition: Moderate nutrition in the context of acute on chronic medical  illness.  Patient was unable to take adequate oral intake due to dependence on BiPAP and currently due to decreased appetite.  - Was continued on tube feeding tube and is apparently eating better now.  Will discontinue feeding tube today and if she is able to eat her meals, she can discharge to TCU otherwise will need gastrostomy tube before discharge.  - Continue Megace and Remeron.  Will add Marinol.     Constipation: On bowel regimen      Generalized weakness  Physical deconditioning  --PT/OT consulted and following.  Plan is to return to TCU once tolerating more p.o. intake.     Concern for cognitive dysfunction:  Patient has trouble with memory but there is no official diagnosis of dementia.  May use Seroquel as needed for delirium.  Will try to avoid benzodiazepines.          Diet: Fluid restriction 1500 ML FLUID  Snacks/Supplements Adult: Nepro Oral Supplement; Between Meals  Regular Diet Adult  Adult Formula Drip Feeding: Specified Time Jevity 1.5; Nasogastric tube; Goal Rate: 45; mL/hr; From: 8:00 PM; To: 8:00 AM; Okay to start at goal rate at beginning of cycle.; Do not advance tube feeding rate unless K+ is = or > 3.0, Mg++ is = or > ...  Calorie Counts    DVT Prophylaxis: DOAC  Rao Catheter: Not present  Lines: PRESENT      CVC Double Lumen Right Subclavian Tunneled;Hemodialysis/CRRT-Site Assessment: WDL      Cardiac Monitoring: ACTIVE order. Indication: Acute decompensated heart failure (48 hours)  Code Status: Full Code      Clinically Significant Risk Factors         # Hyponatremia: Lowest Na = 134 mmol/L in last 2 days, will monitor as appropriate  # Hypochloremia: Lowest Cl = 95 mmol/L in last 2 days, will monitor as appropriate      # Hypoalbuminemia: Lowest albumin = 2.8 g/dL at 8/16/2025  7:08 AM, will monitor as appropriate     # Hypertension: Noted on problem list             # Moderate Malnutrition: based on nutrition assessment and treatment provided per dietitian's recommendations.    #  Financial/Environmental Concerns: none         Social Drivers of Health    Depression: At risk (6/5/2025)    Received from [facility]    PHQ-2     Depression Risk: 3   Tobacco Use: High Risk (7/31/2025)    Received from [facility]    Patient History     Smoking Tobacco Use: Every Day     Smokeless Tobacco Use: Never     Passive Exposure: Current   Interpersonal Safety: Unknown (7/28/2025)    Interpersonal Safety     Do you feel physically and emotionally safe where you currently live?: Patient unable to answer     Within the past 12 months, have you been hit, slapped, kicked or otherwise physically hurt by someone?: Patient unable to answer     Within the past 12 months, have you been humiliated or emotionally abused in other ways by your partner or ex-partner?: Patient unable to answer          Disposition Plan     Medically Ready for Discharge: Anticipated Tomorrow             Jonathan Light MD  Hospitalist Service  [facility]  Securely message with [employer] (more info)  Text page via [employer] Paging/Directory   ______________________________________________________________________    Interval History   No acute events overnight.  Vital signs stable, off oxygen.  Eating better.  Physical Exam   Vital Signs: Temp: 98.1  F (36.7  C) Temp src: Oral BP: 132/77 Pulse: 67   Resp: 16 SpO2: 98 % O2 Device: None (Room air)    Weight: 106 lbs 14.4 oz    GEN:  Alert, appears to be in no acute respiratory distress  HEENT:  Normocephalic/atraumatic, no scleral icterus, no nasal discharge  CV:  Regular rate and rhythm, no MRGs.  S1 + S2 noted.  Right-sided tunneled dialysis catheter.  LUNGS: CTABL  ABD: Bowel sounds present, soft, non-tender/non-distended.  No clear rebound/guarding/rigidity.  EXT:  Trace pretibial edema bilaterally.  No cyanosis.    SKIN:  Dry to touch, no exanthems noted in the visualized areas.    Medical Decision Making       40 MINUTES SPENT BY ME on the date of service  doing chart review, history, exam, documentation & further activities per the note.  I discussed the plan with RN, registered dietitian and patient's daughter over the phone.    Data     I have personally reviewed the following data over the past 24 hrs:    N/A  \   N/A   / N/A     137 96 (L) 50.4 (H) /  111 (H)   4.2 24 2.51 (H) \       Imaging results reviewed over the past 24 hrs:   No results found for this or any previous visit (from the past 24 hours).

## 2025-08-19 LAB
MAGNESIUM SERPL-MCNC: 2 MG/DL (ref 1.7–2.3)
MCV RBC AUTO: 100.4 FL (ref 78–100)
PHOSPHATE SERPL-MCNC: 4.2 MG/DL (ref 2.5–4.5)
PLATELET # BLD AUTO: 378 10E3/UL (ref 150–450)
POTASSIUM SERPL-SCNC: 4.1 MMOL/L (ref 3.4–5.3)

## 2025-08-19 PROCEDURE — 250N000013 HC RX MED GY IP 250 OP 250 PS 637: Performed by: STUDENT IN AN ORGANIZED HEALTH CARE EDUCATION/TRAINING PROGRAM

## 2025-08-19 PROCEDURE — 120N000001 HC R&B MED SURG/OB

## 2025-08-19 PROCEDURE — 99233 SBSQ HOSP IP/OBS HIGH 50: CPT | Performed by: STUDENT IN AN ORGANIZED HEALTH CARE EDUCATION/TRAINING PROGRAM

## 2025-08-19 PROCEDURE — 83735 ASSAY OF MAGNESIUM: CPT | Performed by: STUDENT IN AN ORGANIZED HEALTH CARE EDUCATION/TRAINING PROGRAM

## 2025-08-19 PROCEDURE — 36415 COLL VENOUS BLD VENIPUNCTURE: CPT | Performed by: STUDENT IN AN ORGANIZED HEALTH CARE EDUCATION/TRAINING PROGRAM

## 2025-08-19 PROCEDURE — 97530 THERAPEUTIC ACTIVITIES: CPT | Mod: GO | Performed by: OCCUPATIONAL THERAPIST

## 2025-08-19 PROCEDURE — 84100 ASSAY OF PHOSPHORUS: CPT | Performed by: STUDENT IN AN ORGANIZED HEALTH CARE EDUCATION/TRAINING PROGRAM

## 2025-08-19 PROCEDURE — 85049 AUTOMATED PLATELET COUNT: CPT | Performed by: STUDENT IN AN ORGANIZED HEALTH CARE EDUCATION/TRAINING PROGRAM

## 2025-08-19 PROCEDURE — 84132 ASSAY OF SERUM POTASSIUM: CPT | Performed by: STUDENT IN AN ORGANIZED HEALTH CARE EDUCATION/TRAINING PROGRAM

## 2025-08-19 PROCEDURE — 250N000011 HC RX IP 250 OP 636: Performed by: STUDENT IN AN ORGANIZED HEALTH CARE EDUCATION/TRAINING PROGRAM

## 2025-08-19 RX ORDER — PAROXETINE 10 MG/1
40 TABLET, FILM COATED ORAL DAILY
Status: DISCONTINUED | OUTPATIENT
Start: 2025-08-20 | End: 2025-08-20 | Stop reason: HOSPADM

## 2025-08-19 RX ORDER — B COMPLEX, C NO.20/FOLIC ACID 1 MG
1 CAPSULE ORAL DAILY
Status: DISCONTINUED | OUTPATIENT
Start: 2025-08-19 | End: 2025-08-20 | Stop reason: HOSPADM

## 2025-08-19 RX ORDER — NIFEDIPINE 30 MG/1
60 TABLET, EXTENDED RELEASE ORAL DAILY
Status: DISCONTINUED | OUTPATIENT
Start: 2025-08-20 | End: 2025-08-20 | Stop reason: HOSPADM

## 2025-08-19 RX ORDER — FAMOTIDINE 20 MG/1
20 TABLET, FILM COATED ORAL
Status: DISCONTINUED | OUTPATIENT
Start: 2025-08-20 | End: 2025-08-20 | Stop reason: HOSPADM

## 2025-08-19 RX ADMIN — MECLIZINE HYDROCHLORIDE 25 MG: 25 TABLET ORAL at 05:37

## 2025-08-19 RX ADMIN — HYDRALAZINE HYDROCHLORIDE 50 MG: 50 TABLET, FILM COATED ORAL at 17:48

## 2025-08-19 RX ADMIN — NIFEDIPINE 30 MG: 30 TABLET, FILM COATED, EXTENDED RELEASE ORAL at 08:18

## 2025-08-19 RX ADMIN — MECLIZINE HYDROCHLORIDE 25 MG: 25 TABLET ORAL at 20:23

## 2025-08-19 RX ADMIN — ICOSAPENT ETHYL 2 G: 1 CAPSULE ORAL at 08:22

## 2025-08-19 RX ADMIN — PAROXETINE 40 MG: 10 SUSPENSION ORAL at 08:21

## 2025-08-19 RX ADMIN — APIXABAN 2.5 MG: 2.5 TABLET, FILM COATED ORAL at 08:17

## 2025-08-19 RX ADMIN — DRONABINOL 2.5 MG: 2.5 CAPSULE ORAL at 08:17

## 2025-08-19 RX ADMIN — Medication 1 CAPSULE: at 09:45

## 2025-08-19 RX ADMIN — AMIODARONE HYDROCHLORIDE 200 MG: 200 TABLET ORAL at 08:17

## 2025-08-19 RX ADMIN — CARVEDILOL 25 MG: 12.5 TABLET, FILM COATED ORAL at 17:48

## 2025-08-19 RX ADMIN — ICOSAPENT ETHYL 2 G: 1 CAPSULE ORAL at 17:47

## 2025-08-19 RX ADMIN — HYDRALAZINE HYDROCHLORIDE 50 MG: 50 TABLET, FILM COATED ORAL at 00:03

## 2025-08-19 RX ADMIN — DRONABINOL 2.5 MG: 2.5 CAPSULE ORAL at 20:23

## 2025-08-19 RX ADMIN — ONDANSETRON 4 MG: 4 TABLET, ORALLY DISINTEGRATING ORAL at 00:10

## 2025-08-19 RX ADMIN — MECLIZINE HYDROCHLORIDE 25 MG: 25 TABLET ORAL at 00:03

## 2025-08-19 RX ADMIN — MEGESTROL ACETATE 200 MG: 40 SUSPENSION ORAL at 08:21

## 2025-08-19 RX ADMIN — CARVEDILOL 25 MG: 12.5 TABLET, FILM COATED ORAL at 08:17

## 2025-08-19 RX ADMIN — MECLIZINE HYDROCHLORIDE 25 MG: 25 TABLET ORAL at 23:00

## 2025-08-19 RX ADMIN — HYDRALAZINE HYDROCHLORIDE 50 MG: 50 TABLET, FILM COATED ORAL at 08:17

## 2025-08-19 RX ADMIN — CLOPIDOGREL BISULFATE 75 MG: 75 TABLET, FILM COATED ORAL at 17:48

## 2025-08-19 RX ADMIN — MECLIZINE HYDROCHLORIDE 25 MG: 25 TABLET ORAL at 13:50

## 2025-08-19 RX ADMIN — MIRTAZAPINE 7.5 MG: 7.5 TABLET, FILM COATED ORAL at 22:59

## 2025-08-19 RX ADMIN — APIXABAN 2.5 MG: 2.5 TABLET, FILM COATED ORAL at 20:23

## 2025-08-19 RX ADMIN — HYDRALAZINE HYDROCHLORIDE 50 MG: 50 TABLET, FILM COATED ORAL at 23:00

## 2025-08-19 ASSESSMENT — ACTIVITIES OF DAILY LIVING (ADL)
ADLS_ACUITY_SCORE: 63
ADLS_ACUITY_SCORE: 63
ADLS_ACUITY_SCORE: 60
ADLS_ACUITY_SCORE: 67
ADLS_ACUITY_SCORE: 67
ADLS_ACUITY_SCORE: 60
ADLS_ACUITY_SCORE: 60
ADLS_ACUITY_SCORE: 63
ADLS_ACUITY_SCORE: 60
ADLS_ACUITY_SCORE: 67
ADLS_ACUITY_SCORE: 60
ADLS_ACUITY_SCORE: 60
ADLS_ACUITY_SCORE: 67
ADLS_ACUITY_SCORE: 60
ADLS_ACUITY_SCORE: 67
ADLS_ACUITY_SCORE: 60
ADLS_ACUITY_SCORE: 67

## 2025-08-19 NOTE — PROGRESS NOTES
SPIRITUAL HEALTH SERVICES - Progress Note  RH Med/Surg 3    Referral Source: Follow-up encounter for an emotional support check-in given her length of stay.    Pt Maria Alejandra's spouse Shandra was present.  Maria Alejandra expressed concern about her weight loss and the need to change her eating habits.  Shandra shared that Maria Alejandra will need to do outpatient dialysis and was concerned about the logistics of getting her to her appointments.  Maria Alejandra gave voice to her gratitude for their daughter Yesenia and her family, who are present and supportive.  She confirmed that Rastafarian or spirituality does not play a significant role in her life.  No other needs were expressed at this time.    Plan: Informed pt how she can request further  support.  This author and other chaplains remain available per pt/family request.     Armand Monk M.Div., [facility]  Staff     SHS available 24/7 for emergent requests/referrals, either by paging the on-call  or by entering an ASAP/STAT consult in [employer], which will also page the on-call .

## 2025-08-19 NOTE — PROGRESS NOTES
[facility]    Medicine Progress Note - Hospitalist Service    Date of Admission:  7/28/2025    Assessment & Plan   75-year-old female with history of CAD, CKD stage IV, hypertension, hyperlipidemia, intra-abdominal aortic aneurysm with prior stenting, chronic vertigo, recent hospitalization for fall with pelvic fracture who presents the ED for shortness of breath.       Had elevated D-dimer but VQ scan negative for PE, CT not done due to CKD.  proBNP more than 25,000.  Chest x-ray showed bilateral perihilar interstitial/airspace opacities, left greater than right, are nonspecific for postradiation change versus pneumonia. A small nodule in the peripheral right upper lobe measures 9 mm.      Found to be in hypertensive emergency, admitted to ICU with BiPAP and nitroglycerin drip.  She was weaned off nitroglycerin drip and BiPAP but has had repeated episodes of rising blood pressure and shortness of breath needing her to go back on BiPAP intermittently.  She was transferred to floor on 7/31 but came back to ICU on 8/2 after rapid response for flash pulmonary edema and hypertensive emergency.       Started on hemodialysis treatment 8/10/25.     On the morning of 8/14, patient went into A-fib with initial rates of 130s and then decreased to 40s without intervention.  She converted back to sinus rhythm in about half an hour.       Currently medically improved and stable.  Needs to increase p.o. intake so she is not requiring tube feeds prior to discharge back to TCU.  If she is not eating better by 8/20, she will need a G-tube.    Acute hypoxic respiratory failure  Diastolic CHF exacerbation,Recurrent flash pulmonary edema associated with episodes of high blood pressure-improved with initiation of hemodialysis  Respiratory status improved with dialysis and improved control of BP.  Off supplemental oxygen now..    - Echo 7/29/25 - with EF of 55 to 60%, left atrium dilated, right vent sys function  normal  - Unclear what was causing recurrent sudden episodes of elevated blood pressure and flash pulmonary edema but the episodes have stopped since starting hemodialysis..  She has history of renal artery stenting and on renal arterial ultrasound done on 7/31 there was no sonographic evidence of renal artery stenosis.        End-stage renal disease  Metabolic acidosis  Initiated hemodialysis this admission via right internal jugular tunneled catheter..     Hypertension  Hypertensive emergency-resolved  PTA med include carvediol, hydralazine, isordil, clonidine patch. Noted to have elevated normetaneprhine level but metanephrine level was normal. Initially had hypertensive urgency requiring nitroglycerin gtt. blood pressure was better controlled after starting dialysis and actually got hypertensive.  Is now of Isordil and clonidine patch.  - Currently on Coreg 25mg po bid, hydralazine 50mg po bid.  - Restarted nifedipine XL at lower dose (30mg) and systolic blood pressure is in 160s, uptitrate to 60 mg daily.     ?Infectious disease/community-acquired pneumonia.  Was empirically started on ceftriaxone on presentation.  Patient had no fever or leukocytosis.  Mildly elevated procalcitonin of 0.67 which is difficult to interpret in ROMI/CKD and improved to 0.49 on recheck.  Finished 5-day course of ceftriaxone.       Atrial fibrillation: Paroxysmal atrial fibrillation  On 8/4, patient had a self-limiting 30 minutes episode of atrial fibrillation initially with RVR to 130 and then bradycardia to 40.  - Cardiology was reconsulted, was given 150 mg amiodarone IV push x 1. However, patient developed intermittent episodes of atrial fibrillation with RVR and patient was started on amiodarone drip and now transitioned to oral amiodarone 200 mg once a day for about a month.  -Continue Coreg at current dose   - Eliquis 2.5 mg twice daily.     Malnutrition: Moderate nutrition in the context of acute on chronic medical  illness.  Patient was unable to take adequate oral intake due to dependence on BiPAP and later due to decreased appetite.  - Was started on nocturnal tube feeding via Keofeed and which was discontinued on the morning of 8/18.  Apparently she has only be able to eat 40% of her caloric needs as per the dietitian, based on documentation but patient's daughter thinks she ate much better yesterday.  Will give her another date today and if she continues to eat poorly, she will need a G-tube on 8/20 (not yet ordered).    - Continue Megace Remeron and Marinol.     Constipation: On bowel regimen      Generalized weakness  Physical deconditioning  --PT/OT consulted and following.  Plan is to return to TCU once tolerating more p.o. intake.     Concern for cognitive dysfunction:  Patient has trouble with memory but there is no official diagnosis of dementia.  May use Seroquel as needed for delirium.  Will try to avoid benzodiazepines.          Diet: Fluid restriction 1500 ML FLUID  Snacks/Supplements Adult: Nepro Oral Supplement; Between Meals  Regular Diet Adult  Adult Formula Drip Feeding: Specified Time Jevity 1.5; Nasogastric tube; Goal Rate: 45; mL/hr; From: 8:00 PM; To: 8:00 AM; Okay to start at goal rate at beginning of cycle.; Do not advance tube feeding rate unless K+ is = or > 3.0, Mg++ is = or > ...  Calorie Counts    DVT Prophylaxis: DOAC  Rao Catheter: Not present  Lines: PRESENT      CVC Double Lumen Right Subclavian Tunneled;Hemodialysis/CRRT-Site Assessment: WDL      Cardiac Monitoring: ACTIVE order. Indication: Acute decompensated heart failure (48 hours)  Code Status: Full Code      Clinically Significant Risk Factors          # Hypochloremia: Lowest Cl = 96 mmol/L in last 2 days, will monitor as appropriate      # Hypoalbuminemia: Lowest albumin = 2.8 g/dL at 8/16/2025  7:08 AM, will monitor as appropriate     # Hypertension: Noted on problem list             # Moderate Malnutrition: based on nutrition  assessment and treatment provided per dietitian's recommendations.    # Financial/Environmental Concerns: none         Social Drivers of Health    Depression: At risk (6/5/2025)    [facility]    PHQ-2     Depression Risk: 3   Tobacco Use: High Risk (7/31/2025)    Received from [facility]    Patient History     Smoking Tobacco Use: Every Day     Smokeless Tobacco Use: Never     Passive Exposure: Current   Interpersonal Safety: Unknown (7/28/2025)    Interpersonal Safety     Do you feel physically and emotionally safe where you currently live?: Patient unable to answer     Within the past 12 months, have you been hit, slapped, kicked or otherwise physically hurt by someone?: Patient unable to answer     Within the past 12 months, have you been humiliated or emotionally abused in other ways by your partner or ex-partner?: Patient unable to answer          Disposition Plan     Medically Ready for Discharge: Anticipated Tomorrow             Jonathan Light MD  Hospitalist Service  [facility]  Securely message with [facility] (more info)  Text page via [employer] Paging/Directory   ______________________________________________________________________    Interval History   No acute events overnight.  Dietary intake is still inadequate.  Keofeed was removed on 8/18.    Physical Exam   Vital Signs: Temp: 98.6  F (37  C) Temp src: Oral BP: (!) 162/97 Pulse: 74   Resp: 20 SpO2: 92 % O2 Device: None (Room air) Oxygen Delivery: 2 LPM  Weight: 106 lbs 14.4 oz    GEN:  Alert, appears to be in no acute respiratory distress  HEENT:  Normocephalic/atraumatic, no scleral icterus, no nasal discharge  CV:  Regular rate and rhythm, no MRGs.  S1 + S2 noted.  Right-sided tunneled dialysis catheter.  LUNGS: CTABL  ABD: Bowel sounds present, soft, non-tender/non-distended.  No clear rebound/guarding/rigidity.  EXT:  Trace pretibial edema bilaterally.  No cyanosis.    SKIN:  Dry to touch, no  luana noted in the visualized areas.    Medical Decision Making       45 MINUTES SPENT BY ME on the date of service doing chart review, history, exam, documentation & further activities per the note.  I discussed the plan with RN, registered dietitian and patient's daughter over the phone.    Data     I have personally reviewed the following data over the past 24 hrs:    N/A  \   N/A   / 378     N/A N/A N/A /  N/A   4.1 N/A N/A \       Imaging results reviewed over the past 24 hrs:   No results found for this or any previous visit (from the past 24 hours).

## 2025-08-19 NOTE — PROGRESS NOTES
Ok for discharge with out patient dialysis at [facility]) Dialysis Unit.  Her next run will be on Thursday.     I will phone in orders to the outpt dialysis unit.     We will sign off.

## 2025-08-19 NOTE — PROGRESS NOTES
"CALORIE COUNT      Approximate Oral Intake for:    8/18  Calories: 434  Protein: 26      Estimated Needs:    Dosing Weight: 48.5 kg, based on lowest weight this admission  Estimated Energy Needs: 3884-7888 kcals/day (30 - 35 kcals/kg)  Justification: Repletion and Underweight  Estimated Protein Needs: >/= 49-58 grams protein/day (>/= 1 - 1.2 grams of pro/kg)  Justification: Preservation of LBM and Dialysis   Estimated Fluid Needs: Defer to nephrology      Summary:   Based on recorded PO intakes, pt 30% of lower end of estimated energy needs and 53% of lower end of estimated protein needs.   Relayed information to provider. Per provider, pt's daughter stated that she actually ate better yesterday. Concern that intakes may not be accurately documented.     Messaged RN regarding need for consistent documentation of intakes.  Will revisit tomorrow for definitive plan regarding G tube placement vs full PO reliance.    Today will be the last day of calorie counts.  Will continue to follow.    Mary Rider RD, LD  Clinical Dietitian  [facility] Message Group: \"Dietitian [[place]]\"  Office Phone: [phone]    "

## 2025-08-19 NOTE — PLAN OF CARE
"Summary: ESRD; Acute hypoxic respiratory failure; Diastolic CHF exacerbation; Recurrent flash pulmonary edema  Orientation: A&O x 4  Vitals/Tele: VSS on RA; Tele: Sinus Rhythm  IV Access/drains: 3 PIVs SL; Tunneled Cath to right chest wall for dialysis  Diet: Regular with 1500mL fluid restriction; calorie count  Mobility: A1GBW  GI/: Continent of B&B  Wound/Skin: Scattered bruising   Consults: PT, OT, SW; Nutrition; Nephrology signed off  Discharge Plan: Pending TCU placement (bed held at [facility]) and caloric intake stability (G tube placement vs full PO reliance)    See Flow sheets for assessment     Goal Outcome Evaluation:  Overall Patient Progress: improving  Outcome Evaluation: VSS on RA; A&O x 4; Denies nausea and pain    Problem: Adult Inpatient Plan of Care  Goal: Plan of Care Review  Description: The Plan of Care Review/Shift note should be completed every shift.  The Outcome Evaluation is a brief statement about your assessment that the patient is improving, declining, or no change.  This information will be displayed automatically on your shift  note.  Outcome: Progressing  Flowsheets (Taken 8/19/2025 1856)  Outcome Evaluation:   VSS on RA   A&O x 4   Denies nausea and pain  Overall Patient Progress: improving  Goal: Patient-Specific Goal (Individualized)  Description: You can add care plan individualizations to a care plan. Examples of Individualization might be:  \"Parent requests to be called daily at 9am for status\", \"I have a hard time hearing out of my right ear\", or \"Do not touch me to wake me up as it startles  me\".  Outcome: Progressing  Goal: Absence of Hospital-Acquired Illness or Injury  Outcome: Progressing  Intervention: Identify and Manage Fall Risk  Recent Flowsheet Documentation  Taken 8/19/2025 1500 by Sneha Tinoco, RN  Safety Promotion/Fall Prevention:   safety round/check completed   room near nurse's station   nonskid shoes/slippers when out of " bed  Taken 8/19/2025 0736 by Sneha Tinoco RN  Safety Promotion/Fall Prevention:   safety round/check completed   room near nurse's station   nonskid shoes/slippers when out of bed  Intervention: Prevent Skin Injury  Recent Flowsheet Documentation  Taken 8/19/2025 1500 by Sneha Tinoco RN  Body Position: position changed independently  Taken 8/19/2025 0736 by Sneha Tinoco RN  Body Position:   position changed independently   supine, head elevated  Intervention: Prevent and Manage VTE (Venous Thromboembolism) Risk  Recent Flowsheet Documentation  Taken 8/19/2025 1500 by Sneha Tinoco RN  VTE Prevention/Management: SCDs on (sequential compression devices)  Taken 8/19/2025 0736 by Sneha Tinoco RN  VTE Prevention/Management: SCDs on (sequential compression devices)  Intervention: Prevent Infection  Recent Flowsheet Documentation  Taken 8/19/2025 0736 by Sneha Tinoco RN  Infection Prevention:   single patient room provided   rest/sleep promoted   personal protective equipment utilized   hand hygiene promoted  Goal: Optimal Comfort and Wellbeing  Outcome: Progressing  Goal: Readiness for Transition of Care  Outcome: Progressing

## 2025-08-19 NOTE — PLAN OF CARE
"Pt alert and oriented x4. BP elevated, other VS stable. N/V throughout the night. Antiemetics given. Calorie count. Tele SR. K, Mg, and Phos protocol. Assist of 1 w/walker and GB. On RA clear LS. Regular diet with 1500ml fluid restriction. Potential discharge today.    Goal Outcome Evaluation:    Plan of Care Reviewed With: patient    Overall Patient Progress: improvingOverall Patient Progress: improving    Outcome Evaluation: Pt AOx4. BP elevated, other VSS. N/V throughout the night. Antiemetics given.    Problem: Adult Inpatient Plan of Care  Goal: Plan of Care Review  Description: The Plan of Care Review/Shift note should be completed every shift.  The Outcome Evaluation is a brief statement about your assessment that the patient is improving, declining, or no change.  This information will be displayed automatically on your shift  note.  Outcome: Progressing  Flowsheets (Taken 8/19/2025 0806)  Outcome Evaluation: Pt AOx4. BP elevated, other VSS. N/V throughout the night. Antiemetics given.  Plan of Care Reviewed With: patient  Overall Patient Progress: improving  Goal: Patient-Specific Goal (Individualized)  Description: You can add care plan individualizations to a care plan. Examples of Individualization might be:  \"Parent requests to be called daily at 9am for status\", \"I have a hard time hearing out of my right ear\", or \"Do not touch me to wake me up as it startles  me\".  Outcome: Progressing  Goal: Absence of Hospital-Acquired Illness or Injury  Outcome: Progressing  Intervention: Identify and Manage Fall Risk  Recent Flowsheet Documentation  Taken 8/19/2025 0583 by Tom Stanley, RN  Safety Promotion/Fall Prevention: safety round/check completed  Taken 8/18/2025 2050 by Tom Stanley RN  Safety Promotion/Fall Prevention: safety round/check completed  Taken 8/18/2025 1917 by Tom Stanley, RN  Safety Promotion/Fall Prevention: safety round/check completed  Goal: Optimal Comfort and " Wellbeing  Outcome: Progressing  Intervention: Monitor Pain and Promote Comfort  Recent Flowsheet Documentation  Taken 8/18/2025 2050 by Tom Stanley RN  Pain Management Interventions:   medication (see MAR)   heat applied  Intervention: Provide Person-Centered Care  Recent Flowsheet Documentation  Taken 8/19/2025 0539 by Tom Stanley RN  Trust Relationship/Rapport:   care explained   choices provided   questions answered  Taken 8/18/2025 2050 by Tom Stanley RN  Trust Relationship/Rapport:   care explained   choices provided   questions answered  Goal: Readiness for Transition of Care  Outcome: Progressing     Problem: Delirium  Goal: Optimal Coping  Outcome: Progressing  Goal: Improved Behavioral Control  Outcome: Progressing  Intervention: Minimize Safety Risk  Recent Flowsheet Documentation  Taken 8/19/2025 0539 by Tom Stanley RN  Trust Relationship/Rapport:   care explained   choices provided   questions answered  Taken 8/18/2025 2050 by Tom Stanley RN  Trust Relationship/Rapport:   care explained   choices provided   questions answered  Goal: Improved Attention and Thought Clarity  Outcome: Progressing  Goal: Improved Sleep  Outcome: Progressing     Problem: Skin Injury Risk Increased  Goal: Skin Health and Integrity  Outcome: Progressing  Intervention: Plan: Nurse Driven Intervention: Moisture Management  Recent Flowsheet Documentation  Taken 8/19/2025 0539 by Tom Stanley RN  Moisture Interventions: Encourage regular toileting  Taken 8/18/2025 2050 by Tom Stanley RN  Moisture Interventions: Encourage regular toileting  Intervention: Plan: Nurse Driven Intervention: Friction and Shear  Recent Flowsheet Documentation  Taken 8/19/2025 0539 by Tom Stanley RN  Friction/Shear Interventions: HOB 30 degrees or less  Taken 8/18/2025 2050 by Tom Stanley RN  Friction/Shear Interventions: HOB 30 degrees or less  Intervention: Optimize Skin  Protection  Recent Flowsheet Documentation  Taken 8/19/2025 0539 by Tom Stanley, RN  Activity Management:   ambulated to bathroom   back to bed  Taken 8/18/2025 2050 by Tom Stanley, RN  Activity Management:   ambulated to bathroom   back to bed     Problem: Breathing Pattern Ineffective  Goal: Effective Breathing Pattern  Outcome: Progressing     Problem: Comorbidity Management  Goal: Maintenance of COPD Symptom Control  Outcome: Progressing  Goal: Blood Pressure in Desired Range  Outcome: Progressing     Problem: Hypertension Acute  Goal: Blood Pressure Within Desired Range  Outcome: Progressing

## 2025-08-20 VITALS
WEIGHT: 109.9 LBS | HEART RATE: 60 BPM | TEMPERATURE: 98.3 F | BODY MASS INDEX: 18.76 KG/M2 | SYSTOLIC BLOOD PRESSURE: 119 MMHG | RESPIRATION RATE: 18 BRPM | OXYGEN SATURATION: 96 % | DIASTOLIC BLOOD PRESSURE: 66 MMHG | HEIGHT: 64 IN

## 2025-08-20 LAB
HOLD SPECIMEN: NORMAL
MAGNESIUM SERPL-MCNC: 2.1 MG/DL (ref 1.7–2.3)
PHOSPHATE SERPL-MCNC: 4.5 MG/DL (ref 2.5–4.5)
POTASSIUM SERPL-SCNC: 4.2 MMOL/L (ref 3.4–5.3)

## 2025-08-20 PROCEDURE — 250N000013 HC RX MED GY IP 250 OP 250 PS 637: Performed by: STUDENT IN AN ORGANIZED HEALTH CARE EDUCATION/TRAINING PROGRAM

## 2025-08-20 PROCEDURE — 97530 THERAPEUTIC ACTIVITIES: CPT | Mod: GP | Performed by: PHYSICAL THERAPIST

## 2025-08-20 PROCEDURE — 83735 ASSAY OF MAGNESIUM: CPT | Performed by: STUDENT IN AN ORGANIZED HEALTH CARE EDUCATION/TRAINING PROGRAM

## 2025-08-20 PROCEDURE — 99232 SBSQ HOSP IP/OBS MODERATE 35: CPT | Performed by: INTERNAL MEDICINE

## 2025-08-20 PROCEDURE — 97116 GAIT TRAINING THERAPY: CPT | Mod: GP | Performed by: PHYSICAL THERAPIST

## 2025-08-20 PROCEDURE — 84132 ASSAY OF SERUM POTASSIUM: CPT | Performed by: STUDENT IN AN ORGANIZED HEALTH CARE EDUCATION/TRAINING PROGRAM

## 2025-08-20 PROCEDURE — 84100 ASSAY OF PHOSPHORUS: CPT | Performed by: STUDENT IN AN ORGANIZED HEALTH CARE EDUCATION/TRAINING PROGRAM

## 2025-08-20 PROCEDURE — 36415 COLL VENOUS BLD VENIPUNCTURE: CPT | Performed by: STUDENT IN AN ORGANIZED HEALTH CARE EDUCATION/TRAINING PROGRAM

## 2025-08-20 PROCEDURE — 99239 HOSP IP/OBS DSCHRG MGMT >30: CPT | Performed by: STUDENT IN AN ORGANIZED HEALTH CARE EDUCATION/TRAINING PROGRAM

## 2025-08-20 RX ORDER — B COMPLEX, C NO.20/FOLIC ACID 1 MG
1 CAPSULE ORAL DAILY
DISCHARGE
Start: 2025-08-21

## 2025-08-20 RX ORDER — CARVEDILOL 25 MG/1
12.5 TABLET ORAL 2 TIMES DAILY WITH MEALS
DISCHARGE
Start: 2025-08-20

## 2025-08-20 RX ORDER — PAROXETINE 40 MG/1
40 TABLET, FILM COATED ORAL DAILY
DISCHARGE
Start: 2025-08-20

## 2025-08-20 RX ORDER — ACETAMINOPHEN 325 MG/10.15ML
650 LIQUID ORAL EVERY 4 HOURS PRN
DISCHARGE
Start: 2025-08-20

## 2025-08-20 RX ORDER — FAMOTIDINE 40 MG/1
40 TABLET, FILM COATED ORAL DAILY
DISCHARGE
Start: 2025-08-20

## 2025-08-20 RX ORDER — DRONABINOL 2.5 MG/1
2.5 CAPSULE ORAL 2 TIMES DAILY
Status: SHIPPED | DISCHARGE
Start: 2025-08-20 | End: 2025-08-20

## 2025-08-20 RX ORDER — ICOSAPENT ETHYL 1 G/1
2 CAPSULE ORAL 2 TIMES DAILY WITH MEALS
DISCHARGE
Start: 2025-08-20

## 2025-08-20 RX ORDER — MIRTAZAPINE 7.5 MG/1
7.5 TABLET, FILM COATED ORAL AT BEDTIME
DISCHARGE
Start: 2025-08-20

## 2025-08-20 RX ORDER — ALBUMIN (HUMAN) 12.5 G/50ML
50 SOLUTION INTRAVENOUS
Status: DISCONTINUED | OUTPATIENT
Start: 2025-08-20 | End: 2025-08-20 | Stop reason: HOSPADM

## 2025-08-20 RX ORDER — DRONABINOL 2.5 MG/1
2.5 CAPSULE ORAL 2 TIMES DAILY
Qty: 60 CAPSULE | Refills: 0 | Status: SHIPPED | DISCHARGE
Start: 2025-08-20

## 2025-08-20 RX ORDER — MEGESTROL ACETATE 40 MG/ML
200 SUSPENSION ORAL DAILY
DISCHARGE
Start: 2025-08-21

## 2025-08-20 RX ORDER — HYDROMORPHONE HYDROCHLORIDE 2 MG/1
1-2 TABLET ORAL 4 TIMES DAILY PRN
Qty: 20 TABLET | Refills: 0 | Status: SHIPPED | OUTPATIENT
Start: 2025-08-20

## 2025-08-20 RX ORDER — ALBUTEROL SULFATE 0.83 MG/ML
2.5 SOLUTION RESPIRATORY (INHALATION) EVERY 6 HOURS PRN
DISCHARGE
Start: 2025-08-20

## 2025-08-20 RX ORDER — AMIODARONE HYDROCHLORIDE 200 MG/1
200 TABLET ORAL DAILY
DISCHARGE
Start: 2025-08-21

## 2025-08-20 RX ORDER — HEPARIN SODIUM 1000 [USP'U]/ML
500 INJECTION, SOLUTION INTRAVENOUS; SUBCUTANEOUS CONTINUOUS
Status: DISCONTINUED | OUTPATIENT
Start: 2025-08-20 | End: 2025-08-20 | Stop reason: HOSPADM

## 2025-08-20 RX ORDER — CLOPIDOGREL BISULFATE 75 MG/1
75 TABLET ORAL DAILY
DISCHARGE
Start: 2025-08-20

## 2025-08-20 RX ADMIN — AMIODARONE HYDROCHLORIDE 200 MG: 200 TABLET ORAL at 08:57

## 2025-08-20 RX ADMIN — ICOSAPENT ETHYL 2 G: 1 CAPSULE ORAL at 09:10

## 2025-08-20 RX ADMIN — MEGESTROL ACETATE 200 MG: 40 SUSPENSION ORAL at 09:13

## 2025-08-20 RX ADMIN — PAROXETINE HYDROCHLORIDE 40 MG: 10 TABLET, FILM COATED ORAL at 08:57

## 2025-08-20 RX ADMIN — DRONABINOL 2.5 MG: 2.5 CAPSULE ORAL at 08:56

## 2025-08-20 RX ADMIN — Medication 1 CAPSULE: at 08:56

## 2025-08-20 RX ADMIN — MECLIZINE HYDROCHLORIDE 25 MG: 25 TABLET ORAL at 11:37

## 2025-08-20 RX ADMIN — FAMOTIDINE 20 MG: 20 TABLET, FILM COATED ORAL at 08:56

## 2025-08-20 RX ADMIN — MECLIZINE HYDROCHLORIDE 25 MG: 25 TABLET ORAL at 05:54

## 2025-08-20 RX ADMIN — HYDRALAZINE HYDROCHLORIDE 50 MG: 50 TABLET, FILM COATED ORAL at 08:57

## 2025-08-20 RX ADMIN — APIXABAN 2.5 MG: 2.5 TABLET, FILM COATED ORAL at 08:56

## 2025-08-20 RX ADMIN — NIFEDIPINE 60 MG: 30 TABLET, FILM COATED, EXTENDED RELEASE ORAL at 08:56

## 2025-08-20 RX ADMIN — CARVEDILOL 25 MG: 12.5 TABLET, FILM COATED ORAL at 08:56

## 2025-08-20 ASSESSMENT — ACTIVITIES OF DAILY LIVING (ADL)
ADLS_ACUITY_SCORE: 60

## 2025-08-20 NOTE — PROGRESS NOTES
Assessment and Plan:     End-stage renal disease on dialysis:    I phoned in orders to the [facility] dialysis unit in [place].  They are expecting her Thursday.  She did not discharge so we will do dialysis today.  She can run tomorrow as an outpatient to get started on her right schedule.  Dialysis today with right CVC at 400 blood flow rate, 1.5 L UF, 2K 35 bicarb 140 sodium, low-dose heparin, EPO and Venofer on the run.    She may discharge today: if she does discharge we will not run her today as an outpatient chair is set for tomorrow at the [place] unit.                  Interval History:   Nutrition: She is being evaluated for the need for a feeding tube.    Hypertension: She is on hydralazine, nifedipine ER, Coreg.    Aortic aneurysm status post EVAR    Recent hospitalization for fall with pelvic fracture:  Disposition: She will be going to a nursing home on discharge.                       Review of Systems:   She is up with assistance.  TCU bed being held at [facility].  She is scheduled for outpatient dialysis .  She tells me she is taking p.o. well.  Has no shortness of breath.          Medications:     Current active medications and PTA medications reviewed, see medication list for details.            Physical Exam:   Vitals were reviewed  Patient Vitals for the past 24 hrs:   BP Temp Temp src Pulse Resp SpO2 Weight   25 0811 (!) 168/78 98.3  F (36.8  C) Oral 60 18 96 % --   25 0700 -- -- -- -- -- -- 49.9 kg (109 lb 14.4 oz)   25 2321 134/84 97.8  F (36.6  C) Oral 68 18 92 % --   25 2257 128/73 -- -- -- -- -- --   25 1547 124/70 99.8  F (37.7  C) Oral 73 18 95 % --       Temp:  [97.8  F (36.6  C)-99.8  F (37.7  C)] 98.3  F (36.8  C)  Pulse:  [60-73] 60  Resp:  [18] 18  BP: (124-168)/(70-84) 168/78  SpO2:  [92 %-96 %] 96 %    Temperatures:  Current - Temp: 98.3  F (36.8  C); Max - Temp  Av.6  F (37  C)  Min:  97.8  F (36.6  C)  Max: 99.8  F (37.7  C)  Respiration range: Resp  Av  Min: 18  Max: 18  Pulse range: Pulse  Av  Min: 60  Max: 73  Blood pressure range: Systolic (24hrs), Av , Min:124 , Max:168   ; Diastolic (24hrs), Av, Min:70, Max:84    Pulse oximetry range: SpO2  Av.3 %  Min: 92 %  Max: 96 %    I/O last 3 completed shifts:  In: 1180 [P.O.:1180]  Out: -       Intake/Output Summary (Last 24 hours) at 2025 0932  Last data filed at 2025 1700  Gross per 24 hour   Intake 1180 ml   Output --   Net 1180 ml     Alert and responsive  Right CVC with no redness or tenderness at exit site  Lungs with clear breath sounds  Cardiac exam regular rhythm normal S1-S2 no murmur  Lower extremities no edema         Wt Readings from Last 4 Encounters:   25 49.9 kg (109 lb 14.4 oz)   25 54.4 kg (120 lb)   25 54.1 kg (119 lb 3.2 oz)   25 53.5 kg (117 lb 14.4 oz)          Data:          Lab Results   Component Value Date     2025     2025     2025     2018     2016     2015    Lab Results   Component Value Date    CHLORIDE 96 2025    CHLORIDE 95 2025    CHLORIDE 95 2025    CHLORIDE 109 2018    CHLORIDE 107 2016    CHLORIDE 107 2015    Lab Results   Component Value Date    BUN 50.4 2025    BUN 36.2 2025    BUN 44.9 2025    BUN 31 2018    BUN 31 2016    BUN 34 2015      Lab Results   Component Value Date    POTASSIUM 4.2 2025    POTASSIUM 4.1 2025    POTASSIUM 4.2 2025    POTASSIUM 4.1 2018    POTASSIUM 4.0 2016    POTASSIUM 4.6 2015    Lab Results   Component Value Date    CO2 24 2025    CO2 27 2025    CO2 26 2025    CO2 25 2018    CO2 24 2016    CO2 20 2015    Lab Results   Component Value Date    CR 2.51 2025    CR 2.09 2025    CR 2.12 2025    CR  1.49 07/17/2018    CR 1.13 09/09/2016    CR 1.38 08/20/2015        Recent Labs   Lab Test 08/19/25  0631 08/16/25  0708 08/13/25  0525 08/12/25  0526 08/11/25  0557 08/10/25  0614   WBC  --   --   --  11.1* 9.4 8.0   HGB  --   --   --  9.1* 8.8* 9.0*   HCT  --   --   --  27.0* 26.2* 27.6*   .4* 100.8* 100.4* 100 101* 103*    362 280 277 261 276     Recent Labs   Lab Test 07/17/25  0620   AST 22   ALT 11   ALKPHOS 61   BILITOTAL 0.3       Recent Labs   Lab Test 08/20/25  0625 08/19/25  0631 08/18/25  0620   MAG 2.1 2.0 2.1     Recent Labs   Lab Test 08/20/25  0625 08/19/25  0631 08/18/25  0620   PHOS 4.5 4.2 4.0     Recent Labs   Lab Test 08/18/25  0620 08/17/25  0759 08/16/25  0708   BRUCE 9.2 8.8 8.8       Lab Results   Component Value Date    BRUCE 9.2 08/18/2025     Lab Results   Component Value Date    WBC 11.1 (H) 08/12/2025    HGB 9.1 (L) 08/12/2025    HCT 27.0 (L) 08/12/2025    .4 (H) 08/19/2025     08/19/2025     Lab Results   Component Value Date     08/18/2025    POTASSIUM 4.2 08/20/2025    CHLORIDE 96 (L) 08/18/2025    CO2 24 08/18/2025     (H) 08/18/2025     Lab Results   Component Value Date    BUN 50.4 (H) 08/18/2025    CR 2.51 (H) 08/18/2025     Lab Results   Component Value Date    MAG 2.1 08/20/2025     Lab Results   Component Value Date    PHOS 4.5 08/20/2025       Creatinine   Date Value Ref Range Status   08/18/2025 2.51 (H) 0.51 - 0.95 mg/dL Final   08/17/2025 2.09 (H) 0.51 - 0.95 mg/dL Final   08/16/2025 2.12 (H) 0.51 - 0.95 mg/dL Final   08/15/2025 1.67 (H) 0.51 - 0.95 mg/dL Final   08/14/2025 2.08 (H) 0.51 - 0.95 mg/dL Final   08/13/2025 1.85 (H) 0.51 - 0.95 mg/dL Final   07/17/2018 1.49 (H) 0.52 - 1.04 mg/dL Final   09/09/2016 1.13 (H) 0.52 - 1.04 mg/dL Final   08/20/2015 1.38 (H) 0.52 - 1.04 mg/dL Final   08/17/2015 1.55 (H) 0.52 - 1.04 mg/dL Final       Attestation:  I have reviewed today's vital signs, notes, medications, labs and imaging.        Andrew Mansfield MD

## 2025-08-20 NOTE — PLAN OF CARE
Occupational Therapy Discharge Summary    Reason for therapy discharge:    Discharged to transitional care facility.    Progress towards therapy goal(s). See goals on Care Plan in [facility] electronic health record for goal details.  Goals partially met.  Barriers to achieving goals:   discharge from facility.    Therapy recommendation(s):    Continued therapy is recommended.  Rationale/Recommendations:  Pt below baseline in all ADLs and functional mobility, will require TCU prior to returning home to address current deficits.

## 2025-08-20 NOTE — PLAN OF CARE
"Goal Outcome Evaluation:      Plan of Care Reviewed With: patient    Overall Patient Progress: no changeOverall Patient Progress: no change    Outcome Evaluation: A&O x4. Denied nausea and pain. On tele: SR. Assist of 1 w/ gait belt and walker. TCU bed being held for patient at [facility]. Outpatient HD scheduled for 8/21, patient aware.      Problem: Adult Inpatient Plan of Care  Goal: Plan of Care Review  Description: The Plan of Care Review/Shift note should be completed every shift.  The Outcome Evaluation is a brief statement about your assessment that the patient is improving, declining, or no change.  This information will be displayed automatically on your shift  note.  Outcome: Progressing  Flowsheets (Taken 8/20/2025 0233)  Outcome Evaluation: A&O x4. Denies nausea and pain. On tele: SR. Assist of 1 w/ gait belt and walker.  Plan of Care Reviewed With: patient  Overall Patient Progress: no change  Goal: Patient-Specific Goal (Individualized)  Description: You can add care plan individualizations to a care plan. Examples of Individualization might be:  \"Parent requests to be called daily at 9am for status\", \"I have a hard time hearing out of my right ear\", or \"Do not touch me to wake me up as it startles  me\".  Outcome: Progressing  Goal: Absence of Hospital-Acquired Illness or Injury  Outcome: Progressing  Intervention: Identify and Manage Fall Risk  Recent Flowsheet Documentation  Taken 8/19/2025 2029 by Radha Sales, RN  Safety Promotion/Fall Prevention:   activity supervised   assistive device/personal items within reach   nonskid shoes/slippers when out of bed   patient and family education   room near nurse's station   room organization consistent   safety round/check completed  Intervention: Prevent Skin Injury  Recent Flowsheet Documentation  Taken 8/19/2025 2029 by Radha Sales, RN  Body Position: position changed independently  Intervention: Prevent and " Manage VTE (Venous Thromboembolism) Risk  Recent Flowsheet Documentation  Taken 8/19/2025 2029 by Radha Sales RN  VTE Prevention/Management:   SCDs off (sequential compression devices)   patient refused intervention  Intervention: Prevent Infection  Recent Flowsheet Documentation  Taken 8/19/2025 2029 by Radha Sales RN  Infection Prevention:   cohorting utilized   hand hygiene promoted   rest/sleep promoted   single patient room provided  Goal: Optimal Comfort and Wellbeing  Outcome: Progressing  Intervention: Provide Person-Centered Care  Recent Flowsheet Documentation  Taken 8/19/2025 2029 by Radha Sales RN  Trust Relationship/Rapport: care explained  Goal: Readiness for Transition of Care  Outcome: Progressing     Problem: Delirium  Goal: Optimal Coping  Outcome: Progressing  Goal: Improved Behavioral Control  Outcome: Progressing  Intervention: Minimize Safety Risk  Recent Flowsheet Documentation  Taken 8/19/2025 2029 by Radha Sales RN  Enhanced Safety Measures:   room near unit station   review medications for side effects with activity  Trust Relationship/Rapport: care explained  Goal: Improved Attention and Thought Clarity  Outcome: Progressing  Goal: Improved Sleep  Outcome: Progressing     Problem: Skin Injury Risk Increased  Goal: Skin Health and Integrity  Outcome: Progressing  Intervention: Plan: Nurse Driven Intervention: Moisture Management  Recent Flowsheet Documentation  Taken 8/19/2025 2029 by Radha Sales RN  Moisture Interventions:   Encourage regular toileting   Incontinence pad  Intervention: Plan: Nurse Driven Intervention: Friction and Shear  Recent Flowsheet Documentation  Taken 8/19/2025 2029 by Radha Sales RN  Friction/Shear Interventions: HOB 30 degrees or less  Intervention: Optimize Skin Protection  Recent Flowsheet Documentation  Taken 8/19/2025 2029 by Radha Sales RN  Activity Management: activity adjusted per tolerance  Head of  Bed ([facility]) Positioning: HOB at 30 degrees     Problem: Breathing Pattern Ineffective  Goal: Effective Breathing Pattern  Outcome: Progressing  Intervention: Promote Improved Breathing Pattern  Recent Flowsheet Documentation  Taken 8/19/2025 2029 by Radha Sales RN  Head of Bed ([facility]) Positioning: HOB at 30 degrees     Problem: Comorbidity Management  Goal: Maintenance of COPD Symptom Control  Outcome: Progressing  Intervention: Maintain COPD Symptom Control  Recent Flowsheet Documentation  Taken 8/19/2025 2029 by Rdaha Sales RN  Medication Review/Management: medications reviewed  Goal: Blood Pressure in Desired Range  Outcome: Progressing  Intervention: Maintain Blood Pressure Management  Recent Flowsheet Documentation  Taken 8/19/2025 2029 by Radha Sales RN  Medication Review/Management: medications reviewed     Problem: Hypertension Acute  Goal: Blood Pressure Within Desired Range  Outcome: Progressing  Intervention: Normalize Blood Pressure  Recent Flowsheet Documentation  Taken 8/19/2025 2029 by Radha Sales RN  Medication Review/Management: medications reviewed

## 2025-08-20 NOTE — DISCHARGE SUMMARY
[facility]  Hospitalist Discharge Summary      Date of Admission:  7/28/2025  Date of Discharge:  8/20/2025  1:39 PM  Discharging Provider: Linda Tomas DO  Discharge Service: Hospitalist Service    Discharge Diagnoses   Acute hypoxic respiratory failure  Diastolic CHF exacerbation,Recurrent flash pulmonary edema associated with episodes of high blood pressure  End-stage renal disease  Metabolic acidosis  Hypertension  Hypertensive emergency-resolved  Possible community-acquired pneumonia.  Atrial fibrillation: Paroxysmal atrial fibrillation  Malnutrition: Moderate nutrition in the context of acute on chronic medical illness.  Constipation:  Generalized weakness  Physical deconditioning  Concern for cognitive dysfunction    Clinically Significant Risk Factors     # Moderate Malnutrition: based on nutrition assessment and treatment provided per dietitian's recommendations.      Follow-ups Needed After Discharge   Follow-up Appointments       Follow Up and recommended labs and tests      Follow up with Nursing home physician.                Discharge Disposition   Discharged to TCU  Condition at discharge: Stable    Hospital Course   75-year-old female with history of CAD, CKD stage IV, hypertension, hyperlipidemia, intra-abdominal aortic aneurysm with prior stenting, chronic vertigo, recent hospitalization for fall with pelvic fracture, who was admitted 7/28/25 with shortness of breath.       Had elevated D-dimer but VQ scan negative for PE, CT not done due to CKD.  proBNP more than 25,000.  Chest x-ray showed bilateral perihilar interstitial/airspace opacities, left greater than right, are nonspecific for postradiation change versus pneumonia. A small nodule in the peripheral right upper lobe measures 9 mm.      Found to be in hypertensive emergency, admitted to ICU with BiPAP and nitroglycerin drip.  She was weaned off nitroglycerin drip and BiPAP but has had repeated episodes of  rising blood pressure and shortness of breath needing her to go back on BiPAP intermittently.  She was transferred to floor on 7/31 but came back to ICU on 8/2 after rapid response for flash pulmonary edema and hypertensive emergency.       Started on hemodialysis treatment 8/10/25.     On the morning of 8/14, patient went into A-fib with initial rates of 130s and then decreased to 40s without intervention.  She converted back to sinus rhythm in about half an hour.       Currently medically improved and stable. Concern about PO intake especially given her low BMI and new HD. G tube was considered but her PO intake was improving with addition of appetite stimulants and patient/family hesitant for G tube placement.      Acute hypoxic respiratory failure  Diastolic CHF exacerbation,Recurrent flash pulmonary edema associated with episodes of high blood pressure-improved with initiation of hemodialysis  Respiratory status improved with dialysis and improved control of BP.  Off supplemental oxygen now..    - Echo 7/29/25 - with EF of 55 to 60%, left atrium dilated, right vent sys function normal  - Unclear what was causing recurrent sudden episodes of elevated blood pressure and flash pulmonary edema but the episodes have stopped since starting hemodialysis.  She has history of renal artery stenting and on renal arterial ultrasound done on 7/31 there was no sonographic evidence of renal artery stenosis.        End-stage renal disease  Metabolic acidosis  Initiated hemodialysis this admission via right internal jugular tunneled catheter. Dialyzing TTS.     Hypertension  Hypertensive emergency-resolved  PTA med include carvediol, hydralazine, isordil, clonidine patch. Noted to have elevated normetaneprhine level but metanephrine level was normal. Initially had hypertensive urgency requiring nitroglycerin gtt. blood pressure was better controlled after starting dialysis and actually got hypotensive.  Is now off Isordil and  clonidine patch.  - Currently on Coreg 25mg po bid, hydralazine 50mg po bid.  - Restarted nifedipine XL at lower dose (30mg)      Possible community-acquired pneumonia.  Was empirically started on ceftriaxone on presentation.  Patient had no fever or leukocytosis.  Mildly elevated procalcitonin of 0.67 which is difficult to interpret in ROMI/CKD and improved to 0.49 on recheck.  Finished 5-day course of ceftriaxone.       Atrial fibrillation: Paroxysmal atrial fibrillation  On 8/4, patient had a self-limiting 30 minutes episode of atrial fibrillation initially with RVR to 130 and then bradycardia to 40.  - Cardiology was reconsulted, was given 150 mg amiodarone IV push x 1. However, patient developed intermittent episodes of atrial fibrillation with RVR and patient was started on amiodarone drip and now transitioned to oral amiodarone 200 mg once a day for about a month.  -Continue Coreg at current dose   - Eliquis 2.5 mg twice daily.     Malnutrition: Moderate nutrition in the context of acute on chronic medical illness.  Patient was unable to take adequate oral intake due to dependence on BiPAP and later due to decreased appetite.  - Was started on nocturnal tube feeding via Keofeed and which was discontinued on the morning of 8/18. She had minimal PO intake so G tube was considered. Her appetite did improve significantly so after discussion with patient and family, held off on G tube placement.   - Continue Megace Remeron and Marinol.     Constipation: On bowel regimen      Generalized weakness  Physical deconditioning  --PT/OT consulted and following. Discharged to TCU     Concern for cognitive dysfunction:  Patient has trouble with memory but there is no official diagnosis of dementia.      Consultations This Hospital Stay   NEPHROLOGY IP CONSULT  CARE MANAGEMENT / SOCIAL WORK IP CONSULT  CARDIOLOGY IP CONSULT  CONSULT FOR INPATIENT VASCULAR ACCESS CARE  CARDIOLOGY IP CONSULT  PALLIATIVE CARE ADULT IP  CONSULT  PHARMACY/NUTRITION TO START AND MANAGE TPN  VASCULAR ACCESS ADULT IP CONSULT  PHARMACY IP CONSULT  CONSULT FOR INPATIENT VASCULAR ACCESS CARE  PHARMACY IP CONSULT  INTERVENTIONAL RADIOLOGY ADULT/PEDS IP CONSULT  NUTRITION SERVICES ADULT IP CONSULT  PHARMACY IP CONSULT  PHYSICAL THERAPY ADULT IP CONSULT  OCCUPATIONAL THERAPY ADULT IP CONSULT  ADVANCE DIRECTIVE IP CONSULT  PHYSICAL THERAPY ADULT IP CONSULT  OCCUPATIONAL THERAPY ADULT IP CONSULT  NURSING TO CONSULT FOR VIRTUAL CARE IP    Code Status   Prior    Time Spent on this Encounter   ILinda DO, personally saw the patient today and spent greater than 30 minutes discharging this patient.       Linda Tomas DO  [place] [address] [facility]  [address] [place] [address]  Phone: [phone]  Fax: [phone]  ______________________________________________________________________    Physical Exam   Vital Signs: Temp: 98.3  F (36.8  C) Temp src: Oral BP: 119/66 Pulse: 60   Resp: 18 SpO2: 96 % O2 Device: None (Room air)    Weight: 109 lbs 14.4 oz  Constitutional: Awake, alert, no distress, and cooperative  Cardiovascular: Regular rate and rhythm, normal S1 and S2, no S3 or S4, and no murmur noted  Respiratory: No increased work of breathing, good air exchange, clear to auscultation bilaterally, no crackles or wheezing  Gastrointestinal: Abdomen soft, non-tender, non-distended. BS normal. No masses, organomegaly        Primary Care Physician   Mckenna Wilkinson    Discharge Orders      General info for SNF    Length of Stay Estimate: Short Term Care: Estimated # of Days <30  Condition at Discharge: Stable  Level of care:skilled   Rehabilitation Potential: Fair  Admission H&P remains valid and up-to-date: Yes  Recent Chemotherapy: N/A  Use Nursing Home Standing Orders: Yes     Mantoux instructions    Give two-step Mantoux (PPD) Per Facility Policy Yes     Follow Up and recommended labs and tests    Follow up  with Nursing home physician.     Tubes and Drains    Current Tubes and Drains:            CVC Double Lumen Right Subclavian Tunneled;Hemodialysis/CRRT     Reason for your hospital stay    You were in the hospital for respiratory failure related to high blood pressure and fluid buildup. This was managed by starting dialysis.     Activity - Up with nursing assistance     Physical Therapy Adult Consult    Evaluate and treat as clinically indicated.    Reason:  weakness     Occupational Therapy Adult Consult    Evaluate and treat as clinically indicated.    Reason:  weakness     Diet    Follow this diet upon discharge:       Fluid restriction 1500 ML FLUID      Snacks/Supplements Adult: Nepro Oral Supplement; Between Meals      Regular Diet Adult    Free water order:  Free water route: Gastric   Free water - Feeding Tube Flush Frequency: Q 4 Hours   Free water volume: 30 mL   Additional Free water: total FW per Nephrology   Comments:       Significant Results and Procedures   Most Recent 3 CBC's:  Recent Labs   Lab Test 08/19/25  0631 08/16/25  0708 08/13/25  0525 08/12/25  0526 08/11/25  0557 08/10/25  0614   WBC  --   --   --  11.1* 9.4 8.0   HGB  --   --   --  9.1* 8.8* 9.0*   .4* 100.8* 100.4* 100 101* 103*    362 280 277 261 276     Most Recent 3 BMP's:  Recent Labs   Lab Test 08/20/25  0625 08/19/25  0631 08/18/25  0620 08/17/25  0759 08/16/25  0708   NA  --   --  137 134* 135   POTASSIUM 4.2 4.1 4.2 4.6 4.3   CHLORIDE  --   --  96* 95* 95*   CO2  --   --  24 27 26   BUN  --   --  50.4* 36.2* 44.9*   CR  --   --  2.51* 2.09* 2.12*   ANIONGAP  --   --  17* 12 14   BRUCE  --   --  9.2 8.8 8.8   GLC  --   --  111* 102* 129*   ,   Results for orders placed or performed during the hospital encounter of 07/28/25   XR Chest 2 Views    Narrative    EXAM: XR CHEST 2 VIEWS  LOCATION: [facility]  DATE: 7/28/2025    INDICATION: Shortness of breath. Known lung cancer.  COMPARISON:  10/01/2013      Impression    IMPRESSION: Bilateral perihilar interstitial/airspace opacities, left greater than right, are nonspecific for postradiation change versus pneumonia. A small nodule in the peripheral right upper lobe measures 9 mm. Heart size and pulmonary vascularity are   normal. No pleural effusion or pneumothorax. Endoluminal stent graft seen in the abdominal aorta.   US Lower Extremity Venous Duplex Bilateral    Narrative    EXAM: US LOWER EXTREMITY VENOUS DUPLEX BILATERAL  LOCATION: [facility] [place]  DATE: 7/28/2025    INDICATION: sob, elevated d dimer, CKD, ?DVT PE  COMPARISON: None.  TECHNIQUE: Venous Duplex ultrasound of bilateral lower extremities with and without compression, augmentation and duplex. Color flow and spectral Doppler with waveform analysis performed.    FINDINGS: Exam includes the common femoral, femoral, popliteal veins as well as segmentally visualized deep calf veins and greater saphenous vein.     RIGHT: No deep vein thrombosis. No superficial thrombophlebitis. Right popliteal cyst measuring 3.1 x 1.9 x 0.8 cm.    LEFT: No deep vein thrombosis. No superficial thrombophlebitis. No popliteal cyst.      Impression    IMPRESSION:  1.  No deep venous thrombosis in the bilateral lower extremities.   POC US ECHO LIMITED    Impression      Cardiac Ultrasound    Procedure Name: POC Ultrasound Cardiac Exam    Indication: Shortness of breath    Views: Parasternal long axis view  and pulmonary windows    Findings: B-lines in bilateral pulmonary windows, no pericardial effusion, LV>RV    Impression: Suggestion of pulmonary edema, no RV strain    Study performed by: Nhan Wilkerson MD     Images archived: Yes    NM Lung Scan Perfusion Particulate    Narrative    EXAM: NM LUNG SCAN PERFUSION PARTICULATE  LOCATION: [facility] [place]  DATE: 7/28/2025    INDICATION: Shortness of breath  COMPARISON: Chest x-ray dated 7/28/2025  TECHNIQUE: 6.0 mCi  technetium-99m MAA, IV. Standard lung perfusion imaging.    FINDINGS: Normal pulmonary perfusion without segmental defect.      Impression    IMPRESSION:     No evidence of pulmonary embolism.   XR Chest Port 1 View    Narrative    EXAM: XR CHEST PORT 1 VIEW  LOCATION: [facility]  DATE: 7/29/2025    INDICATION: acute hypoxia  COMPARISON: 7/28/2025      Impression    IMPRESSION:     Marked worsening of central airspace opacities, suspicious for multifocal pneumonia, pulmonary edema, or ARDS.    Unchanged right pleural thickening. No new pleural effusion. No pneumothorax.    Stable cardiomediastinal silhouette. Aortic calcifications.    Partially visualized abdominal aortic endograft.   XR Chest Port 1 View    Narrative    EXAM: XR CHEST PORT 1 VIEW  LOCATION: [facility]  DATE: 7/30/2025    INDICATION: Pulmonary edema versus pneumonia.  COMPARISON: 7/29/2025.      Impression    IMPRESSION:   Ill-defined airspace opacities in the perihilar regions have overall improved slightly. Findings suggest pulmonary edema, although an infectious process cannot be excluded. Heart size is stable. Pulmonary vascularity is largely obscured. Aortic   calcification. No pneumothorax. Partially visualized abdominal aortic endograft.   US Renal Complete w Arterial Duplex    Narrative    EXAM:  1. RENAL ULTRASOUND   2. RENAL DUPLEX  LOCATION: [facility]  DATE: 7/31/2025    INDICATION: history of R renal artery stent, please evaluate for re stenosis given labile HTN (or new stenosis on L artery)  COMPARISON: None.  TECHNIQUE: Duplex imaging is performed utilizing gray-scale, two-dimensional images, and color-flow imaging. Doppler waveform analysis and spectral Doppler imaging is also performed.    FINDINGS:     RIGHT KIDNEY: 8.6 x 4.8 x 3.5 cm. Normal without hydronephrosis or masses. There is a 2.1 x 1.8 x 1.2 cm cyst at the lateral interpolar region of the right  kidney.    LEFT KIDNEY 9.7 x 5.2 x 4.2 cm. Normal without hydronephrosis or masses..     BLADDER: Normal.    RENAL DUPLEX:  Aortic PSV: 56 cm/s, multiphasic  Right Renal Artery PSV: Normal, less than 200 cm/s (Normal considered less than 200 cm/s.)  Right Intrarenal Resistive Index: Normal, 0.7 or less  Left Renal Artery PSV Normal, less than 200 cm/s (Normal considered less than 200 cm/s.)  Left Intrarenal Resistive Index: Normal, 0.7 or less      Impression    IMPRESSION:   1.  Per sonographic criteria, no evidence for significant renal artery stenosis.   XR Chest Port 1 View    Narrative    EXAM: XR CHEST PORT 1 VIEW  LOCATION: [facility]  DATE: 7/31/2025    INDICATION: CHF  COMPARISON: 7/30/2025      Impression    IMPRESSION: Extensive bilateral perihilar opacities minimally increased, may represent pulmonary edema or pneumonia. No pleural effusion. Normal heart size. Obscured pulmonary vascularity. Aortic calcifications..   XR Chest Port 1 View    Narrative    EXAM: XR CHEST PORT 1 VIEW  LOCATION: [facility]  DATE: 8/5/2025    INDICATION: Shortness of breath  COMPARISON: 7/31/2025      Impression    IMPRESSION: Extensive infiltrates and areas of consolidation with a somewhat patchy distribution are again noted. These are progressed in the left upper lobe and mildly progressed in the perihilar right lung. Pulmonary vascularity is obscured. Heart size   is unchanged. No significant effusions are identified on this semiupright portable film.   IR CVC Tunnel Placement > 5 Yrs of Age    Narrative    [facility]    EXAM: TUNNELED CENTRAL VENOUS CATHETER PLACEMENT    LOCATION: [facility]    CLINICAL HISTORY: The patient has a history of renal failure and requires dialysis.    PROCEDURES PERFORMED:  1. Ultrasound-guided puncture of jugular vein  2. Creation of subcutaneous tunnel in chest wall.  3. Placement of dialysis catheter through  subcutaneous tunnel, into peel away sheath, and into right atrium     MODERATE SEDATION: 2 mg Versed and 50 mcg Fentanyl were administered intravenously for moderate sedation. Pulse oximetry, heart rate and blood pressure were continuously monitored by an independent trained observer. The physician spent 10 minutes of   face-to-face moderate sedation time with the patient.    ADDITIONAL MEDICATIONS: see EMR    CONTRAST: none    FLUOROSCOPIC TIME: 0.3 minutes  CUMULATIVE AIR KERMA/DOSE: 1 mGy    STERILE BARRIER TECHNIQUE: Maximal Sterile Barrier Technique Utilized: Cap AND mask AND sterile gown AND sterile gloves AND sterile full body drape AND hand hygiene AND skin preparation 2% chlorhexidine for cutaneous antisepsis (or acceptable alternative   antiseptics).   Sterile Ultrasound Technique Utilized ?Sterile gel AND sterile probe covers.    UNIVERSAL PROTOCOL: Standard universal protocol per facility guidelines was followed. See EMR for documentation.     TECHNIQUE:   Risks, benefits and alternatives were explained to the patient and written, informed consent was obtained. The patient was placed in the supine position on the angiography table. The neck and chest were prepped and draped in the usual fashion and   anesthetized with 1% lidocaine. Using ultrasound guidance, the internal jugular vein was accessed with a micropuncture system..  A 0.35 wire was advanced through the sheath and into the IVC. A subcutaneous tunnel was then created in the chest wall using   blunt dissection. The catheter was then attached to a tunneling device and brought through the tunnel. The venostomy site was sequentially dilated. The catheter was then placed through a peel away sheath, and into the right atrium. The puncture site was   closed using surgical glue  The catheter was secured to the skin using a Prolene stitch. Each port was dwelled with heparin (1000 units per cc) solution, and the site was dressed in a sterile fashion.  The  patient tolerated the procedure well without   immediate complications.    FINDINGS:   The right internal jugular vein is anechoic, compressible and patent by ultrasound, and ultrasound images obtained during access show the needle within the vein. Ultrasound images have been permanently captured for documentation.  Fluoroscopic images obtained following placement of the catheter, show that the catheter terminates near the cavoatrial junction..  The catheter has a smooth course in the chest wall. The catheter functions well and is available for immediate use.      Impression    IMPRESSION:  1. Ultrasound and fluoroscopic guided placement of tunneled 23 cm tip to cuff, right internal jugular dialysis catheter.          CPT Codes:                              XR Chest Port 1 View    Narrative    EXAM: XR CHEST PORT 1 VIEW  LOCATION: [facility]  DATE: 8/7/2025    INDICATION: hypoxia , sob  COMPARISON: 8/5/2025      Impression    IMPRESSION: Stable, enlarged cardiomediastinal silhouette. Central venous line along the right side is seen with tip overlying the right atrium. Multifocal consolidative airspace and interstitial opacities are seen in the lungs which appear slightly   improved along the upper lungs since the prior exam. Findings are suggestive of multifocal pneumonia, pulmonary edema or ARDS. Small left pleural effusion. Degenerative changes are seen along the spine.   XR Abdomen Port 1 View    Narrative    EXAM: XR ABDOMEN PORT 1 VIEW  LOCATION: [facility]  DATE: 8/7/2025    INDICATION: Feeding tube placement  COMPARISON: Chest x-ray earlier today      Impression    IMPRESSION: Feeding tube has been placed with tip either curved back in the gastric antrum or at the pylorus.   Right IJ central venous catheter tip in the low right atrium. Abdominal aortic endograft. Stable diffuse mixed interstitial and airspace opacities in the lungs. Stable small left pleural  effusion.   CT Abdomen w/o Contrast    Narrative    EXAM: CT ABDOMEN W/O CONTRAST  LOCATION: [facility]  DATE: 8/8/2025    INDICATION: Hypertensive emergency. High metanephrines. Plz assess for pheochromocytoma, adrenal mass as able without contrast , given ROMI.  COMPARISON: CT abdomen/pelvis 6/17/2019  TECHNIQUE: CT scan of the abdomen was performed without IV contrast. Multiplanar reformats were obtained. Dose reduction techniques were used.   CONTRAST: None.    FINDINGS:    LOWER CHEST: Moderate bilateral pleural effusions with some interstitial and alveolar opacities in the lung bases. Partially visualized dialysis catheter with tip in the right atrium.    HEPATOBILIARY: No evidence cholecystitis or biliary obstruction.    PANCREAS: Normal.    SPLEEN: Normal.    ADRENAL GLANDS: No definite adrenal nodule on this noncontrast exam.     KIDNEYS: Likely bilateral renal cysts, no specific follow-up recommended. No hydronephrosis.    BOWEL: Diverticulosis of the colon. No acute inflammatory change. No obstruction.  Feeding tube with tip in the proximal duodenum.    LYMPH NODES: Subcentimeter retroperitoneal lymph nodes without suspicious lesions by size criteria, no specific follow-up recommended.    VASCULATURE: Abdominal aortic aneurysm status post stent graft placement, only partially included in the field-of-view.    MUSCULOSKELETAL: No acute bony abnormality. Mild body wall edema.      Impression    IMPRESSION:   1.  No evidence of adrenal nodule on this noncontrast exam.  2.  Moderate pleural effusions and edema, partially visualized in the lung bases.     US Thoracentesis    Narrative    ULTRASOUND GUIDED THORACENTESIS  8/11/2025 3:43 PM CDT     HISTORY: SOB. Bilateral pleural effusions.    FINDINGS: Limited ultrasound was performed to evaluate for the presence and best approach for drainage of a pleural effusion. An image is archived. Written and oral informed consent was obtained. A  pause for the cause procedure to verify the correct   patient and correct procedure.     The skin overlying the right chest posteriorly was prepped and draped in the usual sterile fashion. The subcutaneous tissues were anesthetized with 1% lidocaine. Under direct ultrasound guidance a catheter was advanced into the pleural space and 1000 mL   of  marcia colored fluid was drained. The catheter was removed and a sterile dressing was applied.     The skin overlying the left chest posteriorly was prepped and draped in the usual sterile fashion. The subcutaneous tissues were anesthetized with 1% lidocaine. Under direct ultrasound guidance a catheter was advanced into the pleural space and 800 mL of    marcia colored fluid was drained. The catheter was removed and a sterile dressing was applied.     Patient was monitored by nurse under my direct supervision throughout the exam. Ultrasound images were permanently stored.  There were no immediate complications. Patient left the ultrasound suite in satisfactory condition.      Impression    IMPRESSION: Technically successful bilateral thoracentesis without immediate complications.   Echocardiogram Complete     Value    LVEF  55-60%    Narrative    [id number]  [id number]  [id number]  [id number]^     [facility]  Echocardiography Laboratory  [address] [place] [address]     Name: ADRIANA CARUSO  MRN: [id number]  : 1950  Study Date: 2025 07:49 AM  Age: 75 yrs  Gender: Female  Patient Location: [facility]  Reason For Study: Heart Failure  Ordering Physician: NORA BEACH  Performed By: Alcira Mitchell     BSA: 1.6 m2  Height: 64 in  Weight: 120 lb  HR: 79  BP: 148/81 mmHg  ______________________________________________________________________________  Procedure  Echocardiogram with two-dimensional, color and spectral Doppler. [employer]ison (NDC  #1125-2035) given  intravenously.  ______________________________________________________________________________  Interpretation Summary     The left ventricle visual ejection fraction is 55-60%.In some views distal  lateral and distal inferolateral wallhypokinesia  Diastolic Doppler findings (E/E' ratio and/or other parameters) suggest left  ventricular filling pressures are increased.  The right ventricular systolic function is normal.  The left atrium is severely dilated.  Restricted posterior mitral valve leaflet noted.There is moderate mitral  stenosis- Mean gradients 9 mm hg.There is moderate (2+) mitral regurgitation.  There is mild (1+) tricuspid regurgitation.  Pulmonary hypertension- RVSP 37 mm hg +RA.  IVC diameter <2.1 cm collapsing >50% with sniff suggests a normal RA pressure  of 3 mmHg.  ______________________________________________________________________________  Left Ventricle  The left ventricle is normal in size. There is normal left ventricular wall  thickness. The visual ejection fraction is 55-60%. Diastolic Doppler findings  (E/E' ratio and/or other parameters) suggest left ventricular filling  pressures are increased. In some views distal lateral and inferolateral  hypokinesia.     Right Ventricle  The right ventricle is normal size. The right ventricular systolic function is  normal.     Atria  The left atrium is severely dilated. Right atrial size is normal. There is no  color Doppler evidence of an atrial shunt.     Mitral Valve  Restricted posterior mitral valve leaflet noted. There is moderate (2+) mitral  regurgitation. There is moderate mitral stenosis. The mean mitral valve  gradient is 9.2 mmHg.     Tricuspid Valve  There is mild (1+) tricuspid regurgitation. The right ventricular systolic  pressure is approximated at 37.2 mmHg plus the right atrial pressure.  Pulmonary hypertension.     Aortic Valve  The aortic valve is trileaflet. No aortic regurgitation is present. Mild  valvular aortic stenosis.  The mean AoV pressure gradient is 10.0 mmHg.     Pulmonic Valve  There is trace pulmonic valvular regurgitation. There is no pulmonic valvular  stenosis.     Vessels  The aortic root is normal size. Normal size ascending aorta. IVC diameter <2.1  cm collapsing >50% with sniff suggests a normal RA pressure of 3 mmHg.     Pericardium  There is no pericardial effusion.     Rhythm  Sinus rhythm was noted.  ______________________________________________________________________________  MMode/2D Measurements & Calculations     IVSd: 0.89 cm  LVIDd: 4.8 cm  LVIDs: 3.0 cm  LVPWd: 1.00 cm  IVC diam: 2.0 cm  FS: 37.1 %  LV mass(C)d: 160.1 grams  LV mass(C)dI: 101.7 grams/m2  Ao root diam: 2.8 cm  asc Aorta Diam: 2.8 cm  LVOT diam: 1.8 cm  LVOT area: 2.5 cm2  Ao root diam index Ht(cm/m): 1.7  Ao root diam index BSA (cm/m2): 1.8  Asc Ao diam index BSA (cm/m2): 1.8  Asc Ao diam index Ht(cm/m): 1.7  LA Volume (BP): 89.3 ml     LA Volume Index (BP): 56.9 ml/m2  RV Base: 2.8 cm  RWT: 0.41  TAPSE: 2.2 cm     Doppler Measurements & Calculations  MV E max jose francisco: 204.0 cm/sec  MV A max jose francisco: 122.0 cm/sec  MV E/A: 1.7  MV max P.2 mmHg  MV mean P.2 mmHg  MV V2 VTI: 54.3 cm  MVA(VTI): 1.4 cm2  MV P1/2t max jose francisco: 218.2 cm/sec  MV P1/2t: 69.7 msec  MVA(P1/2t): 3.2 cm2  MV dec slope: 916.7 cm/sec2  MV dec time: 0.20 sec  Ao V2 max: 220.0 cm/sec  Ao max P.0 mmHg  Ao V2 mean: 148.0 cm/sec  Ao mean PG: 10.0 mmHg  Ao V2 VTI: 40.5 cm  JAE(I,D): 1.9 cm2  JAE(V,D): 1.6 cm2  LV V1 max P.0 mmHg  LV V1 max: 141.0 cm/sec  LV V1 VTI: 30.6 cm  MR PISA: 2.0 cm2  MR ERO: 0.11 cm2  MR volume: 21.3 ml  SV(LVOT): 77.7 ml  SI(LVOT): 49.4 ml/m2  PA acc time: 0.12 sec     TR max jose francisco: 304.9 cm/sec  TR max P.2 mmHg  AV Jose Francisco Ratio (DI): 0.64  JAE Index (cm2/m2): 1.2  E/E' av.7  Lateral E/e': 24.7  Medial E/e': 34.7  RV S Jose Francisco: 13.9 cm/sec     ______________________________________________________________________________  Report approved by:  Demarco Niño MD on 07/29/2025 11:01 AM             Discharge Medications      Review of your medicines        START taking        Dose / Directions   acetaminophen 325 MG/10.15ML liquid  Commonly known as: TYLENOL  Used for: Closed fracture of right pubis, unspecified portion of pubis, initial encounter (H)      Dose: 650 mg  Take 20.3 mLs (650 mg) by mouth or Feeding Tube every 4 hours as needed for mild pain or fever.  Refills: 0     amiodarone 200 MG tablet  Commonly known as: PACERONE  Used for: Paroxysmal atrial fibrillation (H)      Dose: 200 mg  Start taking on: August 21, 2025  Take 1 tablet (200 mg) by mouth or Feeding Tube daily.  Refills: 0     apixaban ANTICOAGULANT 2.5 MG tablet  Commonly known as: ELIQUIS  Indication: Atrial Fibrillation Not Caused By A Heart Valve Problem  Used for: Paroxysmal atrial fibrillation (H)      Dose: 2.5 mg  Take 1 tablet (2.5 mg) by mouth 2 times daily.  Refills: 0     droNABinol 2.5 MG capsule  Commonly known as: MARINOL  Used for: Poor appetite      Dose: 2.5 mg  Take 1 capsule (2.5 mg) by mouth 2 times daily.  Quantity: 60 capsule  Refills: 0     megestrol 40 MG/ML suspension  Commonly known as: MEGACE  Indication: cachexia  Used for: Poor appetite      Dose: 200 mg  Start taking on: August 21, 2025  Take 5 mLs (200 mg) by mouth or Feeding Tube daily.  Refills: 0     mirtazapine 7.5 MG tablet  Commonly known as: REMERON  Used for: Poor appetite      Dose: 7.5 mg  Take 1 tablet (7.5 mg) by mouth at bedtime.  Refills: 0     multivitamin RENAL 1 capsule capsule  Used for: Renal insufficiency      Dose: 1 capsule  Start taking on: August 21, 2025  Take 1 capsule by mouth daily.  Refills: 0            CHANGE how you take these medications        Dose / Directions   HYDROmorphone 2 MG tablet  Commonly known as: DILAUDID  This may have changed: See the new instructions.  Used for: Closed fracture of right pubis, unspecified portion of pubis, initial encounter (H)      Dose:  1-2 mg  Take 0.5-1 tablets (1-2 mg) by mouth 4 times daily as needed (pain 4-6/10 = 1 mg and pain 7-10/10 = 2 mg).  Quantity: 20 tablet  Refills: 0     NIFEdipine ER 60 MG 24 hr tablet  Commonly known as: ADALAT CC  This may have changed:   medication strength  how much to take  Used for: Hypertension, unspecified type      Dose: 60 mg  Start taking on: August 21, 2025  Take 1 tablet (60 mg) by mouth daily.  Refills: 0            CONTINUE these medicines which have NOT CHANGED        Dose / Directions   albuterol (2.5 MG/3ML) 0.083% neb solution  Commonly known as: PROVENTIL  Used for: Shortness of breath      Dose: 2.5 mg  Take 1 vial (2.5 mg) by nebulization every 6 hours as needed for shortness of breath, wheezing or cough.  Refills: 0     carvedilol 25 MG tablet  Commonly known as: COREG  Used for: Paroxysmal atrial fibrillation (H), Hypertension, unspecified type      Dose: 12.5 mg  Take 0.5 tablets (12.5 mg) by mouth 2 times daily (with meals).  Refills: 0     clopidogrel 75 MG tablet  Commonly known as: PLAVIX  Used for: PVD (peripheral vascular disease), Left carotid stenosis      Dose: 75 mg  Take 1 tablet (75 mg) by mouth daily.  Refills: 0     famotidine 40 MG tablet  Commonly known as: PEPCID  Used for: Gastroesophageal reflux disease without esophagitis      Dose: 40 mg  Take 1 tablet (40 mg) by mouth daily.  Refills: 0     hydrALAZINE 50 MG tablet  Commonly known as: APRESOLINE  Used for: Hypertension, unspecified type      Dose: 50 mg  Take 1 tablet (50 mg) by mouth 3 times daily.  Refills: 0     icosapent ethyl 1 g Caps capsule  Commonly known as: VASCEPA  Used for: PVD (peripheral vascular disease), High cholesterol      Dose: 2 g  Take 2 g by mouth 2 times daily (with meals).  Refills: 0     Lidocaine 4 % Patch  Commonly known as: LIDOCARE  Used for: Closed fracture of right pubis, unspecified portion of pubis, initial encounter (H)      Dose: 2 patch  Place 2 patches over 12 hours onto the skin  every 24 hours. To prevent lidocaine toxicity, patient should be patch free for 12 hrs daily.  Apply to affected area.  Refills: 0     meclizine 25 MG tablet  Commonly known as: ANTIVERT  Used for: Dizziness      Dose: 25 mg  Take 1 tablet (25 mg) by mouth every 6 hours.  Refills: 0     methocarbamol 500 MG tablet  Commonly known as: ROBAXIN  Used for: Closed fracture of right pubis, unspecified portion of pubis, initial encounter (H)      Dose: 500 mg  Take 1 tablet (500 mg) by mouth 3 times daily as needed for muscle spasms.  Refills: 0     ondansetron 4 MG ODT tab  Commonly known as: ZOFRAN ODT  Used for: Nausea      Dose: 4 mg  Take 1 tablet (4 mg) by mouth every 6 hours as needed.  Refills: 0     PARoxetine 40 MG tablet  Commonly known as: PAXIL  Used for: Anxiety      Dose: 40 mg  Take 1 tablet (40 mg) by mouth daily.  Refills: 0     senna-docusate 8.6-50 MG tablet  Commonly known as: SENOKOT-S/PERICOLACE  Used for: Closed fracture of right pubis, unspecified portion of pubis, initial encounter (H)      Dose: 1 tablet  Take 1 tablet by mouth 2 times daily as needed for constipation.  Refills: 0            STOP taking      cloNIDine 0.3 MG/24HR WK patch  Commonly known as: CATAPRES-TTS3        isosorbide dinitrate 20 MG tablet  Commonly known as: ISORDIL        MULTIVITAMIN WOMEN PO        sodium bicarbonate 650 MG tablet                  Where to get your medicines        Some of these will need a paper prescription and others can be bought over the counter. Ask your nurse if you have questions.    Bring a paper prescription for each of these medications  HYDROmorphone 2 MG tablet       Information about where to get these medications is not yet available    Ask your nurse or doctor about these medications  droNABinol 2.5 MG capsule       Allergies   Allergies   Allergen Reactions    Iodine Hives

## 2025-08-20 NOTE — PROGRESS NOTES
"CALORIE COUNT      Approximate Oral Intake for:    8/19  Calories: 1018 kcal  Protein: 48 g       Estimated Needs:    Dosing Weight: 48.5 kg, based on lowest weight this admission  Estimated Energy Needs: 8672-7814 kcals/day (30 - 35 kcals/kg)  Justification: Repletion and Underweight  Estimated Protein Needs: >/= 49-58 grams protein/day (>/= 1 - 1.2 grams of pro/kg)  Justification: Preservation of LBM and Dialysis   Estimated Fluid Needs: Defer to nephrology      Summary:   Based on recorded PO intakes, pt 70% of lower end of estimated energy needs and 98% of lower end of estimated protein needs.     Defer to provider for decision on G tube vs full PO reliance. At this point, she is not meeting needs consistently though possible that appetite could improve over time. Concern regarding pt malnourished at baseline, underweight BMI, and requiring hemodialysis which increases baseline needs.     Discussed information and concerns with provider. She will discuss with family and emphasize importance of adequate intakes and role of nutrition in ability to recover.     Calorie counts completed .      Mary Rider, KAYCEE, LD  Clinical Dietitian  [facility] Message [facility]: \"Dietitian [[facility]]\"  Office Phone: [phone]    "

## 2025-08-20 NOTE — PLAN OF CARE
"Vss A&Ox4 up with  SBA belt and walker. Ls dim. Sats stable on RA at rest and with activity. Discharge to TCU with Daughter. Discharge inst reviewed with pt and daughter by virtual RN . Denies pain.   Problem: Adult Inpatient Plan of Care  Goal: Plan of Care Review  Description: The Plan of Care Review/Shift note should be completed every shift.  The Outcome Evaluation is a brief statement about your assessment that the patient is improving, declining, or no change.  This information will be displayed automatically on your shift  note.  Outcome: Adequate for Care Transition  Flowsheets (Taken 8/20/2025 1328)  Outcome Evaluation: vss d/c tcu  Plan of Care Reviewed With:   patient   family  Overall Patient Progress: improving  Goal: Patient-Specific Goal (Individualized)  Description: You can add care plan individualizations to a care plan. Examples of Individualization might be:  \"Parent requests to be called daily at 9am for status\", \"I have a hard time hearing out of my right ear\", or \"Do not touch me to wake me up as it startles  me\".  Outcome: Adequate for Care Transition  Goal: Absence of Hospital-Acquired Illness or Injury  Outcome: Adequate for Care Transition  Intervention: Identify and Manage Fall Risk  Recent Flowsheet Documentation  Taken 8/20/2025 0930 by Jayden Hi RN  Safety Promotion/Fall Prevention:   activity supervised   assistive device/personal items within reach   clutter free environment maintained   mobility aid in reach   nonskid shoes/slippers when out of bed   patient and family education   room near nurse's station   room organization consistent   safety round/check completed  Intervention: Prevent Skin Injury  Recent Flowsheet Documentation  Taken 8/20/2025 0930 by Jayden Hi RN  Body Position: position changed independently  Intervention: Prevent and Manage VTE (Venous Thromboembolism) Risk  Recent Flowsheet Documentation  Taken 8/20/2025 0930 by Jayden Hi RN  VTE " Prevention/Management: patient refused intervention  Goal: Optimal Comfort and Wellbeing  Outcome: Adequate for Care Transition  Intervention: Provide Person-Centered Care  Recent Flowsheet Documentation  Taken 8/20/2025 0930 by Jayden Hi RN  Trust Relationship/Rapport:   care explained   choices provided   emotional support provided   empathic listening provided   questions answered  Goal: Readiness for Transition of Care  Outcome: Adequate for Care Transition     Problem: Delirium  Goal: Optimal Coping  Outcome: Adequate for Care Transition  Intervention: Optimize Psychosocial Adjustment to Delirium  Recent Flowsheet Documentation  Taken 8/20/2025 0930 by Jayden Hi RN  Supportive Measures:   active listening utilized   relaxation techniques promoted  Goal: Improved Behavioral Control  Outcome: Adequate for Care Transition  Intervention: Minimize Safety Risk  Recent Flowsheet Documentation  Taken 8/20/2025 0930 by Jayden Hi RN  Trust Relationship/Rapport:   care explained   choices provided   emotional support provided   empathic listening provided   questions answered  Goal: Improved Attention and Thought Clarity  Outcome: Adequate for Care Transition  Intervention: Maximize Cognitive Function  Recent Flowsheet Documentation  Taken 8/20/2025 0930 by Jayden Hi RN  Reorientation Measures:   calendar in view   clock in view  Goal: Improved Sleep  Outcome: Adequate for Care Transition     Problem: Skin Injury Risk Increased  Goal: Skin Health and Integrity  Outcome: Adequate for Care Transition  Intervention: Plan: Nurse Driven Intervention: Moisture Management  Recent Flowsheet Documentation  Taken 8/20/2025 0930 by Jayden Hi RN  Moisture Interventions:   Encourage regular toileting   Incontinence pad  Intervention: Plan: Nurse Driven Intervention: Friction and Shear  Recent Flowsheet Documentation  Taken 8/20/2025 0930 by Jayden Hi RN  Friction/Shear  Interventions: HOB 30 degrees or less  Intervention: Optimize Skin Protection  Recent Flowsheet Documentation  Taken 8/20/2025 0930 by Jayden Hi RN  Activity Management:   ambulated in room   ambulated to bathroom     Problem: Breathing Pattern Ineffective  Goal: Effective Breathing Pattern  Outcome: Adequate for Care Transition  Intervention: Promote Improved Breathing Pattern  Recent Flowsheet Documentation  Taken 8/20/2025 0930 by Jayden Hi RN  Breathing Techniques/Airway Clearance: deep/controlled cough encouraged  Supportive Measures:   active listening utilized   relaxation techniques promoted  Airway/Ventilation Management: airway patency maintained     Problem: Comorbidity Management  Goal: Maintenance of COPD Symptom Control  Outcome: Adequate for Care Transition  Intervention: Maintain COPD Symptom Control  Recent Flowsheet Documentation  Taken 8/20/2025 0930 by Jayden Hi RN  Breathing Techniques/Airway Clearance: deep/controlled cough encouraged  Medication Review/Management: medications reviewed  Goal: Blood Pressure in Desired Range  Outcome: Adequate for Care Transition  Intervention: Maintain Blood Pressure Management  Recent Flowsheet Documentation  Taken 8/20/2025 0930 by Jayden Hi RN  Medication Review/Management: medications reviewed     Problem: Hypertension Acute  Goal: Blood Pressure Within Desired Range  Outcome: Adequate for Care Transition  Intervention: Normalize Blood Pressure  Recent Flowsheet Documentation  Taken 8/20/2025 0930 by Jayden Hi RN  Medication Review/Management: medications reviewed   Goal Outcome Evaluation:      Plan of Care Reviewed With: patient, family    Overall Patient Progress: improvingOverall Patient Progress: improving    Outcome Evaluation: vss d/c tcu

## 2025-08-20 NOTE — PROGRESS NOTES
Care Management Discharge Note    Discharge Date: 08/20/2025       Discharge Disposition: [facility]    Discharge Services: new OP HD     Discharge DME:  none    Discharge Transportation: family or friend will provide between [id number]    PAS Confirmation Code:  (N/A- return to TCU)  Patient/family educated on Medicare website which has current facility and service quality ratings:  no    Education Provided on the Discharge Plan:  yes  Persons Notified of Discharge Plans: Transitional Care Unit admissions  Patient/Family in Agreement with the Plan: yes    Handoff Referral Completed: No, handoff not indicated or clinically appropriate    Additional Information:  CM following for discharge planning needs. She was admitted from [facility], has a bed hold and plans to return on discharge. Per MD today, patient is eating appropriate amounts and will be ready to return today with new OP HD. Keofeed was removed on 8/18.    Call placed to [facility] Admissions to inquire about patient returning today. They can take her any time today. They are inquiring about plan for transportation to [facility].    Met with patient and daughter at bedside to discuss discharge plan for today. Daughter states she will be the one to transport to OPH[facility]. Updated her on dialysis times/ facility and information sheet was given with all instructions for her new OPH[facility] schedule at [facility]. Discussed [employer] mobility application online moving forward to assist with future transportation needs. Updated daughter that patient can go to Transitional Care Unit at anytime today. She will be providing transportation and prefers to leave around [phone].    Final OP HD Plan:  New outpatient dialysis is at:  [facility]  [address] [place] [address]  [phone]  First outpatient run will be on Thursday, August 21, 2025.  Please arrive at 5:30am  to complete paperwork.    Call placed to [facility]  admissions to update them on final plan for OP HD transportation and transportation to Transitional Care Unit today. They are agreeable to the plan. Discharge orders are in place and were faxed to [facility] via [employer]. Scripts were faxed for hydromorphone and marinol.     Spoke to Nephrologist and he has already called OP HD orders to [facility] to start tomorrow.     Call placed to [facility] Admissions [phone] and updated them that patient will be discharging to Transitional Care Unit today and will start her new OPHD tomorrow as planned. No other needs.       Shira Vernon RN BSN CM  Inpatient Care Coordination  [facility]  [phone]

## 2025-08-21 ENCOUNTER — PATIENT OUTREACH (OUTPATIENT)
Dept: CARE COORDINATION | Facility: CLINIC | Age: 75
End: 2025-08-21
Payer: MEDICARE

## 2025-08-21 NOTE — PLAN OF CARE
Physical Therapy Discharge Summary    Reason for therapy discharge:    Discharged to transitional care facility.    Progress towards therapy goal(s). See goals on Care Plan in [facility] electronic health record for goal details.  Goals partially met.  Barriers to achieving goals:   discharge from facility.    Therapy recommendation(s):    Continued therapy is recommended.  Rationale/Recommendations:  Per chart review and treating therapist's note: Patient presents below baseline for functional mobility, limited by poor activity tolerance and strength. Improvements with tolerating 260 ft, however needing CGA for balance support to prevent fall. Recommend discharge to TCU in order to increase strength, activity tolerance, balance and independence with mobility..    Goal Outcome Evaluation:

## 2025-08-25 ENCOUNTER — TRANSITIONAL CARE UNIT VISIT (OUTPATIENT)
Dept: GERIATRICS | Facility: CLINIC | Age: 75
End: 2025-08-25
Payer: COMMERCIAL

## 2025-08-25 VITALS
SYSTOLIC BLOOD PRESSURE: 156 MMHG | BODY MASS INDEX: 18.33 KG/M2 | HEART RATE: 77 BPM | DIASTOLIC BLOOD PRESSURE: 75 MMHG | RESPIRATION RATE: 18 BRPM | TEMPERATURE: 99.5 F | OXYGEN SATURATION: 95 % | HEIGHT: 65 IN | WEIGHT: 110 LBS

## 2025-08-25 DIAGNOSIS — I10 HYPERTENSION, UNSPECIFIED TYPE: ICD-10-CM

## 2025-08-25 DIAGNOSIS — J44.9 CHRONIC OBSTRUCTIVE PULMONARY DISEASE, UNSPECIFIED COPD TYPE (H): ICD-10-CM

## 2025-08-25 DIAGNOSIS — R41.89 COGNITIVE IMPAIRMENT: ICD-10-CM

## 2025-08-25 DIAGNOSIS — R06.00 DYSPNEA, UNSPECIFIED TYPE: ICD-10-CM

## 2025-08-25 DIAGNOSIS — R53.81 PHYSICAL DECONDITIONING: ICD-10-CM

## 2025-08-25 DIAGNOSIS — R13.10 DYSPHAGIA, UNSPECIFIED TYPE: ICD-10-CM

## 2025-08-25 DIAGNOSIS — K59.00 CONSTIPATION, UNSPECIFIED CONSTIPATION TYPE: ICD-10-CM

## 2025-08-25 DIAGNOSIS — I48.0 PAROXYSMAL ATRIAL FIBRILLATION (H): ICD-10-CM

## 2025-08-25 DIAGNOSIS — Z99.2 ESRD (END STAGE RENAL DISEASE) ON DIALYSIS (H): ICD-10-CM

## 2025-08-25 DIAGNOSIS — D64.9 ANEMIA, UNSPECIFIED TYPE: ICD-10-CM

## 2025-08-25 DIAGNOSIS — N18.6 ESRD (END STAGE RENAL DISEASE) ON DIALYSIS (H): ICD-10-CM

## 2025-08-25 DIAGNOSIS — S32.82XD MULTIPLE CLOSED FRACTURES OF PELVIS WITHOUT DISRUPTION OF PELVIC RING WITH ROUTINE HEALING, SUBSEQUENT ENCOUNTER: Primary | ICD-10-CM

## 2025-08-25 DIAGNOSIS — R42 DIZZINESS: ICD-10-CM

## 2025-08-25 DIAGNOSIS — R91.1 LUNG NODULE SEEN ON IMAGING STUDY: ICD-10-CM

## 2025-08-25 DIAGNOSIS — F41.9 ANXIETY: ICD-10-CM

## 2025-08-25 PROCEDURE — 99306 1ST NF CARE HIGH MDM 50: CPT | Performed by: INTERNAL MEDICINE

## 2025-08-25 RX ORDER — MECLIZINE HYDROCHLORIDE 25 MG/1
25 TABLET ORAL 2 TIMES DAILY
COMMUNITY

## 2025-08-25 RX ORDER — AMOXICILLIN 250 MG
1 CAPSULE ORAL AT BEDTIME
COMMUNITY

## 2025-08-27 PROBLEM — R91.1 LUNG NODULE SEEN ON IMAGING STUDY: Status: ACTIVE | Noted: 2025-08-27

## 2025-08-28 ENCOUNTER — TRANSITIONAL CARE UNIT VISIT (OUTPATIENT)
Dept: GERIATRICS | Facility: CLINIC | Age: 75
End: 2025-08-28
Payer: MEDICARE

## 2025-09-01 ENCOUNTER — HOSPITAL ENCOUNTER (INPATIENT)
Facility: CLINIC | Age: 75
End: 2025-09-01
Attending: STUDENT IN AN ORGANIZED HEALTH CARE EDUCATION/TRAINING PROGRAM | Admitting: INTERNAL MEDICINE
Payer: MEDICARE

## 2025-09-01 DIAGNOSIS — R06.00 DYSPNEA, UNSPECIFIED TYPE: Primary | ICD-10-CM

## 2025-09-01 DIAGNOSIS — J96.01 ACUTE RESPIRATORY FAILURE WITH HYPOXIA (H): ICD-10-CM

## 2025-09-01 LAB
ANION GAP SERPL CALCULATED.3IONS-SCNC: 15 MMOL/L (ref 7–15)
ANION GAP SERPL CALCULATED.3IONS-SCNC: 17 MMOL/L (ref 7–15)
BASE EXCESS BLDV CALC-SCNC: -0.4 MMOL/L (ref -3–3)
BASE EXCESS BLDV CALC-SCNC: 0.8 MMOL/L (ref -3–3)
BASOPHILS # BLD AUTO: 0.04 10E3/UL (ref 0–0.2)
BASOPHILS NFR BLD AUTO: 0.3 %
BI-PLANE LVEF ECHO: NORMAL
BUN SERPL-MCNC: 33.9 MG/DL (ref 8–23)
BUN SERPL-MCNC: 37.5 MG/DL (ref 8–23)
CALCIUM SERPL-MCNC: 8.5 MG/DL (ref 8.8–10.4)
CALCIUM SERPL-MCNC: 8.9 MG/DL (ref 8.8–10.4)
CHLORIDE SERPL-SCNC: 101 MMOL/L (ref 98–107)
CHLORIDE SERPL-SCNC: 98 MMOL/L (ref 98–107)
CREAT SERPL-MCNC: 3.27 MG/DL (ref 0.51–0.95)
CREAT SERPL-MCNC: 3.36 MG/DL (ref 0.51–0.95)
EGFRCR SERPLBLD CKD-EPI 2021: 14 ML/MIN/1.73M2
EGFRCR SERPLBLD CKD-EPI 2021: 14 ML/MIN/1.73M2
EOSINOPHIL # BLD AUTO: 0.9 10E3/UL (ref 0–0.7)
EOSINOPHIL NFR BLD AUTO: 7.2 %
ERYTHROCYTE [DISTWIDTH] IN BLOOD BY AUTOMATED COUNT: 15.2 % (ref 10–15)
ERYTHROCYTE [DISTWIDTH] IN BLOOD BY AUTOMATED COUNT: 15.2 % (ref 10–15)
FLUAV RNA SPEC QL NAA+PROBE: NEGATIVE
FLUBV RNA RESP QL NAA+PROBE: NEGATIVE
GLUCOSE BLDC GLUCOMTR-MCNC: 106 MG/DL (ref 70–99)
GLUCOSE BLDC GLUCOMTR-MCNC: 87 MG/DL (ref 70–99)
GLUCOSE BLDC GLUCOMTR-MCNC: 97 MG/DL (ref 70–99)
GLUCOSE SERPL-MCNC: 106 MG/DL (ref 70–99)
GLUCOSE SERPL-MCNC: 127 MG/DL (ref 70–99)
HCO3 BLDV-SCNC: 23 MMOL/L (ref 21–28)
HCO3 BLDV-SCNC: 24 MMOL/L (ref 21–28)
HCO3 SERPL-SCNC: 20 MMOL/L (ref 22–29)
HCO3 SERPL-SCNC: 21 MMOL/L (ref 22–29)
HCT VFR BLD AUTO: 22.2 % (ref 35–47)
HCT VFR BLD AUTO: 23.6 % (ref 35–47)
HGB BLD-MCNC: 7.6 G/DL (ref 11.7–15.7)
HGB BLD-MCNC: 8 G/DL (ref 11.7–15.7)
HOLD SPECIMEN: NORMAL
HOLD SPECIMEN: NORMAL
IMM GRANULOCYTES # BLD: 0.05 10E3/UL
IMM GRANULOCYTES NFR BLD: 0.4 %
LACTATE SERPL-SCNC: 0.8 MMOL/L (ref 0.7–2)
LYMPHOCYTES # BLD AUTO: 2.77 10E3/UL (ref 0.8–5.3)
LYMPHOCYTES NFR BLD AUTO: 22.2 %
MCH RBC QN AUTO: 33.3 PG (ref 26.5–33)
MCH RBC QN AUTO: 33.3 PG (ref 26.5–33)
MCHC RBC AUTO-ENTMCNC: 33.9 G/DL (ref 31.5–36.5)
MCHC RBC AUTO-ENTMCNC: 34.2 G/DL (ref 31.5–36.5)
MCV RBC AUTO: 97.4 FL (ref 78–100)
MCV RBC AUTO: 98.3 FL (ref 78–100)
MONOCYTES # BLD AUTO: 1.06 10E3/UL (ref 0–1.3)
MONOCYTES NFR BLD AUTO: 8.5 %
NEUTROPHILS # BLD AUTO: 7.65 10E3/UL (ref 1.6–8.3)
NEUTROPHILS NFR BLD AUTO: 61.4 %
NRBC # BLD AUTO: <0.03 10E3/UL
NRBC BLD AUTO-RTO: 0 /100
NT-PROBNP SERPL-MCNC: ABNORMAL PG/ML (ref 0–624)
NT-PROBNP SERPL-MCNC: ABNORMAL PG/ML (ref 0–624)
O2/TOTAL GAS SETTING VFR VENT: 30 %
O2/TOTAL GAS SETTING VFR VENT: 35 %
OXYHGB MFR BLDV: 86 % (ref 70–75)
OXYHGB MFR BLDV: 87 % (ref 70–75)
PCO2 BLDV: 32 MM HG (ref 40–50)
PCO2 BLDV: 32 MM HG (ref 40–50)
PH BLDV: 7.47 [PH] (ref 7.32–7.43)
PH BLDV: 7.48 [PH] (ref 7.32–7.43)
PLATELET # BLD AUTO: 317 10E3/UL (ref 150–450)
PLATELET # BLD AUTO: 380 10E3/UL (ref 150–450)
PO2 BLDV: 53 MM HG (ref 25–47)
PO2 BLDV: 53 MM HG (ref 25–47)
POTASSIUM SERPL-SCNC: 3.5 MMOL/L (ref 3.4–5.3)
POTASSIUM SERPL-SCNC: 3.8 MMOL/L (ref 3.4–5.3)
PROCALCITONIN SERPL IA-MCNC: 1.08 NG/ML
RBC # BLD AUTO: 2.28 10E6/UL (ref 3.8–5.2)
RBC # BLD AUTO: 2.4 10E6/UL (ref 3.8–5.2)
RSV RNA SPEC NAA+PROBE: NEGATIVE
SAO2 % BLDV: 87.5 % (ref 70–75)
SAO2 % BLDV: 88.6 % (ref 70–75)
SARS-COV-2 RNA RESP QL NAA+PROBE: NEGATIVE
SODIUM SERPL-SCNC: 135 MMOL/L (ref 135–145)
SODIUM SERPL-SCNC: 137 MMOL/L (ref 135–145)
WBC # BLD AUTO: 11.9 10E3/UL (ref 4–11)
WBC # BLD AUTO: 12.47 10E3/UL (ref 4–11)

## 2025-09-01 PROCEDURE — 94660 CPAP INITIATION&MGMT: CPT

## 2025-09-01 PROCEDURE — 80048 BASIC METABOLIC PNL TOTAL CA: CPT | Performed by: INTERNAL MEDICINE

## 2025-09-01 PROCEDURE — 85025 COMPLETE CBC W/AUTO DIFF WBC: CPT | Performed by: STUDENT IN AN ORGANIZED HEALTH CARE EDUCATION/TRAINING PROGRAM

## 2025-09-01 PROCEDURE — 255N000002 HC RX 255 OP 636: Performed by: INTERNAL MEDICINE

## 2025-09-01 PROCEDURE — 83880 ASSAY OF NATRIURETIC PEPTIDE: CPT | Performed by: INTERNAL MEDICINE

## 2025-09-01 PROCEDURE — 80048 BASIC METABOLIC PNL TOTAL CA: CPT | Performed by: STUDENT IN AN ORGANIZED HEALTH CARE EDUCATION/TRAINING PROGRAM

## 2025-09-01 PROCEDURE — 83880 ASSAY OF NATRIURETIC PEPTIDE: CPT | Performed by: STUDENT IN AN ORGANIZED HEALTH CARE EDUCATION/TRAINING PROGRAM

## 2025-09-01 PROCEDURE — 82805 BLOOD GASES W/O2 SATURATION: CPT | Performed by: STUDENT IN AN ORGANIZED HEALTH CARE EDUCATION/TRAINING PROGRAM

## 2025-09-01 PROCEDURE — 87637 SARSCOV2&INF A&B&RSV AMP PRB: CPT | Performed by: STUDENT IN AN ORGANIZED HEALTH CARE EDUCATION/TRAINING PROGRAM

## 2025-09-01 PROCEDURE — 999N000185 HC STATISTIC TRANSPORT TIME EA 15 MIN

## 2025-09-01 PROCEDURE — 36415 COLL VENOUS BLD VENIPUNCTURE: CPT | Performed by: INTERNAL MEDICINE

## 2025-09-01 PROCEDURE — 250N000011 HC RX IP 250 OP 636: Performed by: INTERNAL MEDICINE

## 2025-09-01 PROCEDURE — 200N000001 HC R&B ICU

## 2025-09-01 PROCEDURE — 999N000157 HC STATISTIC RCP TIME EA 10 MIN

## 2025-09-01 PROCEDURE — 99207 PR NO BILLABLE SERVICE THIS VISIT: CPT | Performed by: HOSPITALIST

## 2025-09-01 PROCEDURE — 250N000013 HC RX MED GY IP 250 OP 250 PS 637: Performed by: INTERNAL MEDICINE

## 2025-09-01 PROCEDURE — 99285 EMERGENCY DEPT VISIT HI MDM: CPT | Mod: 25 | Performed by: STUDENT IN AN ORGANIZED HEALTH CARE EDUCATION/TRAINING PROGRAM

## 2025-09-01 PROCEDURE — 85027 COMPLETE CBC AUTOMATED: CPT | Performed by: INTERNAL MEDICINE

## 2025-09-01 PROCEDURE — 90935 HEMODIALYSIS ONE EVALUATION: CPT

## 2025-09-01 PROCEDURE — 99223 1ST HOSP IP/OBS HIGH 75: CPT | Mod: AI | Performed by: INTERNAL MEDICINE

## 2025-09-01 PROCEDURE — 99291 CRITICAL CARE FIRST HOUR: CPT | Mod: 25

## 2025-09-01 PROCEDURE — 93005 ELECTROCARDIOGRAM TRACING: CPT

## 2025-09-01 PROCEDURE — 82805 BLOOD GASES W/O2 SATURATION: CPT | Performed by: INTERNAL MEDICINE

## 2025-09-01 PROCEDURE — 83605 ASSAY OF LACTIC ACID: CPT | Performed by: INTERNAL MEDICINE

## 2025-09-01 PROCEDURE — 36415 COLL VENOUS BLD VENIPUNCTURE: CPT | Performed by: STUDENT IN AN ORGANIZED HEALTH CARE EDUCATION/TRAINING PROGRAM

## 2025-09-01 PROCEDURE — 258N000003 HC RX IP 258 OP 636: Performed by: INTERNAL MEDICINE

## 2025-09-01 PROCEDURE — 84145 PROCALCITONIN (PCT): CPT | Performed by: STUDENT IN AN ORGANIZED HEALTH CARE EDUCATION/TRAINING PROGRAM

## 2025-09-01 RX ORDER — ACETAMINOPHEN 325 MG/1
650 TABLET ORAL EVERY 4 HOURS PRN
Status: DISPENSED | OUTPATIENT
Start: 2025-09-01

## 2025-09-01 RX ORDER — MECLIZINE HYDROCHLORIDE 25 MG/1
25 TABLET ORAL 3 TIMES DAILY PRN
Status: ACTIVE | OUTPATIENT
Start: 2025-09-01

## 2025-09-01 RX ORDER — POLYETHYLENE GLYCOL 3350 17 G/17G
17 POWDER, FOR SOLUTION ORAL DAILY PRN
Status: DISPENSED | OUTPATIENT
Start: 2025-09-01

## 2025-09-01 RX ORDER — CARVEDILOL 12.5 MG/1
12.5 TABLET ORAL 2 TIMES DAILY WITH MEALS
Status: DISPENSED | OUTPATIENT
Start: 2025-09-01

## 2025-09-01 RX ORDER — NICOTINE POLACRILEX 4 MG
15-30 LOZENGE BUCCAL
Status: ACTIVE | OUTPATIENT
Start: 2025-09-01

## 2025-09-01 RX ORDER — DEXTROSE MONOHYDRATE 25 G/50ML
25-50 INJECTION, SOLUTION INTRAVENOUS
Status: ACTIVE | OUTPATIENT
Start: 2025-09-01

## 2025-09-01 RX ORDER — ALBUTEROL SULFATE 0.83 MG/ML
2.5 SOLUTION RESPIRATORY (INHALATION)
Status: ACTIVE | OUTPATIENT
Start: 2025-09-01

## 2025-09-01 RX ORDER — MIRTAZAPINE 7.5 MG/1
7.5 TABLET, FILM COATED ORAL AT BEDTIME
Status: DISPENSED | OUTPATIENT
Start: 2025-09-01

## 2025-09-01 RX ORDER — B COMPLEX, C NO.20/FOLIC ACID 1 MG
1 CAPSULE ORAL DAILY
Status: DISCONTINUED | OUTPATIENT
Start: 2025-09-01 | End: 2025-09-03

## 2025-09-01 RX ORDER — CLOPIDOGREL BISULFATE 75 MG/1
75 TABLET ORAL DAILY
Status: DISPENSED | OUTPATIENT
Start: 2025-09-01

## 2025-09-01 RX ORDER — AMOXICILLIN 250 MG
1 CAPSULE ORAL AT BEDTIME
Status: DISPENSED | OUTPATIENT
Start: 2025-09-01

## 2025-09-01 RX ORDER — PROCHLORPERAZINE MALEATE 5 MG/1
5 TABLET ORAL EVERY 6 HOURS PRN
Status: ACTIVE | OUTPATIENT
Start: 2025-09-01

## 2025-09-01 RX ORDER — HYDRALAZINE HYDROCHLORIDE 50 MG/1
50 TABLET, FILM COATED ORAL 3 TIMES DAILY
Status: DISPENSED | OUTPATIENT
Start: 2025-09-01

## 2025-09-01 RX ORDER — ACETAMINOPHEN 325 MG/10.15ML
650 LIQUID ORAL EVERY 4 HOURS PRN
Status: ACTIVE | OUTPATIENT
Start: 2025-09-01

## 2025-09-01 RX ORDER — DOXYCYCLINE 100 MG/10ML
100 INJECTION, POWDER, LYOPHILIZED, FOR SOLUTION INTRAVENOUS EVERY 12 HOURS
Status: DISPENSED | OUTPATIENT
Start: 2025-09-01

## 2025-09-01 RX ORDER — FUROSEMIDE 10 MG/ML
60 INJECTION INTRAMUSCULAR; INTRAVENOUS ONCE
Status: COMPLETED | OUTPATIENT
Start: 2025-09-01 | End: 2025-09-01

## 2025-09-01 RX ORDER — NIFEDIPINE 30 MG/1
60 TABLET, EXTENDED RELEASE ORAL DAILY
Status: DISPENSED | OUTPATIENT
Start: 2025-09-01

## 2025-09-01 RX ORDER — FAMOTIDINE 20 MG/1
40 TABLET, FILM COATED ORAL DAILY
Status: DISCONTINUED | OUTPATIENT
Start: 2025-09-01 | End: 2025-09-03

## 2025-09-01 RX ORDER — CEFTRIAXONE 2 G/1
2 INJECTION, POWDER, FOR SOLUTION INTRAMUSCULAR; INTRAVENOUS EVERY 24 HOURS
Status: DISPENSED | OUTPATIENT
Start: 2025-09-01

## 2025-09-01 RX ORDER — CARBOXYMETHYLCELLULOSE SODIUM 5 MG/ML
1 SOLUTION/ DROPS OPHTHALMIC
Status: ACTIVE | OUTPATIENT
Start: 2025-09-01

## 2025-09-01 RX ORDER — PAROXETINE 20 MG/1
40 TABLET, FILM COATED ORAL DAILY
Status: DISPENSED | OUTPATIENT
Start: 2025-09-01

## 2025-09-01 RX ORDER — AMIODARONE HYDROCHLORIDE 200 MG/1
200 TABLET ORAL DAILY
Status: DISPENSED | OUTPATIENT
Start: 2025-09-01

## 2025-09-01 RX ORDER — ACETAMINOPHEN 650 MG/1
650 SUPPOSITORY RECTAL EVERY 4 HOURS PRN
Status: ACTIVE | OUTPATIENT
Start: 2025-09-01

## 2025-09-01 RX ORDER — ICOSAPENT ETHYL 1 G/1
2 CAPSULE ORAL 2 TIMES DAILY WITH MEALS
Status: DISCONTINUED | OUTPATIENT
Start: 2025-09-01 | End: 2025-09-01

## 2025-09-01 RX ADMIN — FUROSEMIDE 60 MG: 10 INJECTION, SOLUTION INTRAMUSCULAR; INTRAVENOUS at 04:02

## 2025-09-01 RX ADMIN — PAROXETINE HYDROCHLORIDE 40 MG: 20 TABLET, FILM COATED ORAL at 15:48

## 2025-09-01 RX ADMIN — NIFEDIPINE 60 MG: 30 TABLET, FILM COATED, EXTENDED RELEASE ORAL at 20:28

## 2025-09-01 RX ADMIN — HEPARIN SODIUM 1900 UNITS: 1000 INJECTION, SOLUTION INTRAVENOUS; SUBCUTANEOUS at 16:08

## 2025-09-01 RX ADMIN — HYDRALAZINE HYDROCHLORIDE 50 MG: 50 TABLET ORAL at 21:56

## 2025-09-01 RX ADMIN — DOXYCYCLINE 100 MG: 100 INJECTION, POWDER, LYOPHILIZED, FOR SOLUTION INTRAVENOUS at 05:12

## 2025-09-01 RX ADMIN — CEFTRIAXONE 2 G: 2 INJECTION, POWDER, FOR SOLUTION INTRAMUSCULAR; INTRAVENOUS at 04:37

## 2025-09-01 RX ADMIN — CARVEDILOL 12.5 MG: 12.5 TABLET, FILM COATED ORAL at 20:30

## 2025-09-01 RX ADMIN — HUMAN ALBUMIN MICROSPHERES AND PERFLUTREN 3 ML (DILUTED): 10; .22 INJECTION, SOLUTION INTRAVENOUS at 11:45

## 2025-09-01 RX ADMIN — CLOPIDOGREL BISULFATE 75 MG: 75 TABLET, FILM COATED ORAL at 20:30

## 2025-09-01 RX ADMIN — AMIODARONE HYDROCHLORIDE 200 MG: 200 TABLET ORAL at 20:30

## 2025-09-01 RX ADMIN — DOXYCYCLINE 100 MG: 100 INJECTION, POWDER, LYOPHILIZED, FOR SOLUTION INTRAVENOUS at 17:35

## 2025-09-01 RX ADMIN — HEPARIN SODIUM 1900 UNITS: 1000 INJECTION, SOLUTION INTRAVENOUS; SUBCUTANEOUS at 16:09

## 2025-09-01 RX ADMIN — HYDRALAZINE HYDROCHLORIDE 50 MG: 50 TABLET ORAL at 10:25

## 2025-09-01 RX ADMIN — APIXABAN 2.5 MG: 2.5 TABLET, FILM COATED ORAL at 21:57

## 2025-09-01 RX ADMIN — APIXABAN 2.5 MG: 2.5 TABLET, FILM COATED ORAL at 15:48

## 2025-09-01 RX ADMIN — MIRTAZAPINE 7.5 MG: 7.5 TABLET, FILM COATED ORAL at 21:57

## 2025-09-01 RX ADMIN — SODIUM CHLORIDE 500 ML: 0.9 INJECTION, SOLUTION INTRAVENOUS at 16:08

## 2025-09-01 RX ADMIN — FAMOTIDINE 40 MG: 20 TABLET, FILM COATED ORAL at 20:29

## 2025-09-01 RX ADMIN — SENNOSIDES AND DOCUSATE SODIUM 1 TABLET: 50; 8.6 TABLET ORAL at 21:56

## 2025-09-01 RX ADMIN — Medication 1 CAPSULE: at 20:30

## 2025-09-01 ASSESSMENT — ACTIVITIES OF DAILY LIVING (ADL)
ADLS_ACUITY_SCORE: 70
ADLS_ACUITY_SCORE: 57
ADLS_ACUITY_SCORE: 57
ADLS_ACUITY_SCORE: 70
ADLS_ACUITY_SCORE: 70
ADLS_ACUITY_SCORE: 57
ADLS_ACUITY_SCORE: 70
ADLS_ACUITY_SCORE: 70
ADLS_ACUITY_SCORE: 57
ADLS_ACUITY_SCORE: 69
ADLS_ACUITY_SCORE: 70
ADLS_ACUITY_SCORE: 57
ADLS_ACUITY_SCORE: 57
ADLS_ACUITY_SCORE: 70
DEPENDENT_IADLS:: INDEPENDENT
ADLS_ACUITY_SCORE: 70
ADLS_ACUITY_SCORE: 57

## 2025-09-02 LAB
ATRIAL RATE - MUSE: 86 BPM
DIASTOLIC BLOOD PRESSURE - MUSE: NORMAL MMHG
GLUCOSE BLDC GLUCOMTR-MCNC: 106 MG/DL (ref 70–99)
GLUCOSE BLDC GLUCOMTR-MCNC: 107 MG/DL (ref 70–99)
GLUCOSE BLDC GLUCOMTR-MCNC: 135 MG/DL (ref 70–99)
GLUCOSE BLDC GLUCOMTR-MCNC: 87 MG/DL (ref 70–99)
GLUCOSE BLDC GLUCOMTR-MCNC: 93 MG/DL (ref 70–99)
GLUCOSE BLDC GLUCOMTR-MCNC: 96 MG/DL (ref 70–99)
HBV SURFACE AB SERPL IA-ACNC: <3.5 M[IU]/ML
HBV SURFACE AB SERPL IA-ACNC: NONREACTIVE M[IU]/ML
HBV SURFACE AG SERPL QL IA: REACTIVE
INTERPRETATION ECG - MUSE: NORMAL
P AXIS - MUSE: 79 DEGREES
PR INTERVAL - MUSE: 170 MS
QRS DURATION - MUSE: 70 MS
QT - MUSE: 426 MS
QTC - MUSE: 509 MS
R AXIS - MUSE: 18 DEGREES
SYSTOLIC BLOOD PRESSURE - MUSE: NORMAL MMHG
T AXIS - MUSE: 27 DEGREES
VENTRICULAR RATE- MUSE: 86 BPM

## 2025-09-02 PROCEDURE — 90935 HEMODIALYSIS ONE EVALUATION: CPT

## 2025-09-02 PROCEDURE — 86704 HEP B CORE ANTIBODY TOTAL: CPT | Performed by: INTERNAL MEDICINE

## 2025-09-02 PROCEDURE — 250N000011 HC RX IP 250 OP 636: Performed by: INTERNAL MEDICINE

## 2025-09-02 PROCEDURE — 94660 CPAP INITIATION&MGMT: CPT

## 2025-09-02 PROCEDURE — 258N000003 HC RX IP 258 OP 636: Performed by: INTERNAL MEDICINE

## 2025-09-02 PROCEDURE — 250N000013 HC RX MED GY IP 250 OP 250 PS 637: Performed by: INTERNAL MEDICINE

## 2025-09-02 PROCEDURE — 120N000004 HC R&B MS OVERFLOW

## 2025-09-02 PROCEDURE — 36415 COLL VENOUS BLD VENIPUNCTURE: CPT | Performed by: INTERNAL MEDICINE

## 2025-09-02 PROCEDURE — 999N000157 HC STATISTIC RCP TIME EA 10 MIN

## 2025-09-02 PROCEDURE — 250N000013 HC RX MED GY IP 250 OP 250 PS 637: Performed by: STUDENT IN AN ORGANIZED HEALTH CARE EDUCATION/TRAINING PROGRAM

## 2025-09-02 PROCEDURE — 86706 HEP B SURFACE ANTIBODY: CPT | Performed by: INTERNAL MEDICINE

## 2025-09-02 PROCEDURE — 87340 HEPATITIS B SURFACE AG IA: CPT | Performed by: INTERNAL MEDICINE

## 2025-09-02 RX ADMIN — DOXYCYCLINE 100 MG: 100 INJECTION, POWDER, LYOPHILIZED, FOR SOLUTION INTRAVENOUS at 17:08

## 2025-09-02 RX ADMIN — SODIUM CHLORIDE 500 ML: 0.9 INJECTION, SOLUTION INTRAVENOUS at 12:02

## 2025-09-02 RX ADMIN — CARVEDILOL 12.5 MG: 12.5 TABLET, FILM COATED ORAL at 18:59

## 2025-09-02 RX ADMIN — MIRTAZAPINE 7.5 MG: 7.5 TABLET, FILM COATED ORAL at 21:33

## 2025-09-02 RX ADMIN — Medication 1 CAPSULE: at 08:38

## 2025-09-02 RX ADMIN — CLOPIDOGREL BISULFATE 75 MG: 75 TABLET, FILM COATED ORAL at 08:39

## 2025-09-02 RX ADMIN — AMIODARONE HYDROCHLORIDE 200 MG: 200 TABLET ORAL at 08:39

## 2025-09-02 RX ADMIN — HYDRALAZINE HYDROCHLORIDE 50 MG: 50 TABLET ORAL at 08:39

## 2025-09-02 RX ADMIN — NIFEDIPINE 60 MG: 30 TABLET, FILM COATED, EXTENDED RELEASE ORAL at 08:39

## 2025-09-02 RX ADMIN — APIXABAN 2.5 MG: 2.5 TABLET, FILM COATED ORAL at 20:11

## 2025-09-02 RX ADMIN — ACETAMINOPHEN 650 MG: 325 TABLET ORAL at 11:19

## 2025-09-02 RX ADMIN — ACETAMINOPHEN 650 MG: 325 TABLET ORAL at 21:33

## 2025-09-02 RX ADMIN — POLYETHYLENE GLYCOL 3350 17 G: 17 POWDER, FOR SOLUTION ORAL at 16:09

## 2025-09-02 RX ADMIN — CARVEDILOL 12.5 MG: 12.5 TABLET, FILM COATED ORAL at 08:39

## 2025-09-02 RX ADMIN — HYDRALAZINE HYDROCHLORIDE 50 MG: 50 TABLET ORAL at 20:11

## 2025-09-02 RX ADMIN — DOXYCYCLINE 100 MG: 100 INJECTION, POWDER, LYOPHILIZED, FOR SOLUTION INTRAVENOUS at 05:05

## 2025-09-02 RX ADMIN — PAROXETINE HYDROCHLORIDE 40 MG: 20 TABLET, FILM COATED ORAL at 08:39

## 2025-09-02 RX ADMIN — ACETAMINOPHEN 650 MG: 325 TABLET ORAL at 15:51

## 2025-09-02 RX ADMIN — HEPARIN SODIUM 1900 UNITS: 1000 INJECTION, SOLUTION INTRAVENOUS; SUBCUTANEOUS at 12:01

## 2025-09-02 RX ADMIN — APIXABAN 2.5 MG: 2.5 TABLET, FILM COATED ORAL at 08:39

## 2025-09-02 RX ADMIN — SENNOSIDES AND DOCUSATE SODIUM 1 TABLET: 50; 8.6 TABLET ORAL at 21:33

## 2025-09-02 RX ADMIN — CEFTRIAXONE 2 G: 2 INJECTION, POWDER, FOR SOLUTION INTRAMUSCULAR; INTRAVENOUS at 04:12

## 2025-09-02 RX ADMIN — FAMOTIDINE 40 MG: 20 TABLET, FILM COATED ORAL at 08:39

## 2025-09-02 ASSESSMENT — ACTIVITIES OF DAILY LIVING (ADL)
ADLS_ACUITY_SCORE: 70
ADLS_ACUITY_SCORE: 71
ADLS_ACUITY_SCORE: 70
ADLS_ACUITY_SCORE: 70
ADLS_ACUITY_SCORE: 71
ADLS_ACUITY_SCORE: 71
ADLS_ACUITY_SCORE: 70
ADLS_ACUITY_SCORE: 71
ADLS_ACUITY_SCORE: 70
ADLS_ACUITY_SCORE: 70
ADLS_ACUITY_SCORE: 71
ADLS_ACUITY_SCORE: 70
ADLS_ACUITY_SCORE: 71
ADLS_ACUITY_SCORE: 70

## 2025-09-03 LAB
GLUCOSE BLDC GLUCOMTR-MCNC: 122 MG/DL (ref 70–99)
GLUCOSE BLDC GLUCOMTR-MCNC: 92 MG/DL (ref 70–99)

## 2025-09-03 PROCEDURE — 250N000013 HC RX MED GY IP 250 OP 250 PS 637: Performed by: INTERNAL MEDICINE

## 2025-09-03 PROCEDURE — 250N000013 HC RX MED GY IP 250 OP 250 PS 637: Performed by: STUDENT IN AN ORGANIZED HEALTH CARE EDUCATION/TRAINING PROGRAM

## 2025-09-03 PROCEDURE — 999N000127 HC STATISTIC PERIPHERAL IV START W US GUIDANCE

## 2025-09-03 PROCEDURE — 250N000011 HC RX IP 250 OP 636: Performed by: INTERNAL MEDICINE

## 2025-09-03 PROCEDURE — 120N000001 HC R&B MED SURG/OB

## 2025-09-03 RX ORDER — B COMPLEX, C NO.20/FOLIC ACID 1 MG
1 CAPSULE ORAL AT BEDTIME
Status: DISPENSED | OUTPATIENT
Start: 2025-09-04

## 2025-09-03 RX ORDER — FAMOTIDINE 10 MG
10 TABLET ORAL DAILY
Status: DISPENSED | OUTPATIENT
Start: 2025-09-04

## 2025-09-03 RX ORDER — DIPHENHYDRAMINE HCL 25 MG
25 CAPSULE ORAL
Status: DISPENSED | OUTPATIENT
Start: 2025-09-03

## 2025-09-03 RX ADMIN — Medication 1 CAPSULE: at 09:13

## 2025-09-03 RX ADMIN — CARVEDILOL 12.5 MG: 12.5 TABLET, FILM COATED ORAL at 09:00

## 2025-09-03 RX ADMIN — PAROXETINE HYDROCHLORIDE 40 MG: 20 TABLET, FILM COATED ORAL at 09:01

## 2025-09-03 RX ADMIN — ACETAMINOPHEN 650 MG: 325 TABLET ORAL at 20:26

## 2025-09-03 RX ADMIN — APIXABAN 2.5 MG: 2.5 TABLET, FILM COATED ORAL at 09:02

## 2025-09-03 RX ADMIN — HYDRALAZINE HYDROCHLORIDE 50 MG: 50 TABLET ORAL at 20:26

## 2025-09-03 RX ADMIN — APIXABAN 2.5 MG: 2.5 TABLET, FILM COATED ORAL at 20:26

## 2025-09-03 RX ADMIN — NIFEDIPINE 60 MG: 30 TABLET, FILM COATED, EXTENDED RELEASE ORAL at 09:11

## 2025-09-03 RX ADMIN — DOXYCYCLINE 100 MG: 100 INJECTION, POWDER, LYOPHILIZED, FOR SOLUTION INTRAVENOUS at 05:05

## 2025-09-03 RX ADMIN — CEFTRIAXONE 2 G: 2 INJECTION, POWDER, FOR SOLUTION INTRAMUSCULAR; INTRAVENOUS at 05:05

## 2025-09-03 RX ADMIN — FAMOTIDINE 40 MG: 20 TABLET, FILM COATED ORAL at 09:03

## 2025-09-03 RX ADMIN — MIRTAZAPINE 7.5 MG: 7.5 TABLET, FILM COATED ORAL at 22:05

## 2025-09-03 RX ADMIN — DOXYCYCLINE 100 MG: 100 INJECTION, POWDER, LYOPHILIZED, FOR SOLUTION INTRAVENOUS at 16:26

## 2025-09-03 RX ADMIN — CARVEDILOL 12.5 MG: 12.5 TABLET, FILM COATED ORAL at 18:18

## 2025-09-03 RX ADMIN — HYDRALAZINE HYDROCHLORIDE 50 MG: 50 TABLET ORAL at 09:02

## 2025-09-03 RX ADMIN — AMIODARONE HYDROCHLORIDE 200 MG: 200 TABLET ORAL at 09:00

## 2025-09-03 RX ADMIN — CLOPIDOGREL BISULFATE 75 MG: 75 TABLET, FILM COATED ORAL at 09:07

## 2025-09-03 RX ADMIN — HYDRALAZINE HYDROCHLORIDE 50 MG: 50 TABLET ORAL at 14:39

## 2025-09-03 ASSESSMENT — ACTIVITIES OF DAILY LIVING (ADL)
ADLS_ACUITY_SCORE: 66
ADLS_ACUITY_SCORE: 66
ADLS_ACUITY_SCORE: 63
ADLS_ACUITY_SCORE: 66
ADLS_ACUITY_SCORE: 63
ADLS_ACUITY_SCORE: 66
ADLS_ACUITY_SCORE: 63
ADLS_ACUITY_SCORE: 66
ADLS_ACUITY_SCORE: 63
ADLS_ACUITY_SCORE: 66
ADLS_ACUITY_SCORE: 63
ADLS_ACUITY_SCORE: 66
ADLS_ACUITY_SCORE: 63
ADLS_ACUITY_SCORE: 63
ADLS_ACUITY_SCORE: 66
ADLS_ACUITY_SCORE: 63
ADLS_ACUITY_SCORE: 66
ADLS_ACUITY_SCORE: 63
ADLS_ACUITY_SCORE: 66

## 2025-09-04 VITALS
WEIGHT: 105.1 LBS | OXYGEN SATURATION: 96 % | HEART RATE: 80 BPM | RESPIRATION RATE: 22 BRPM | BODY MASS INDEX: 17.51 KG/M2 | TEMPERATURE: 97.6 F | DIASTOLIC BLOOD PRESSURE: 67 MMHG | HEIGHT: 65 IN | SYSTOLIC BLOOD PRESSURE: 123 MMHG

## 2025-09-04 LAB
ANION GAP SERPL CALCULATED.3IONS-SCNC: 22 MMOL/L (ref 7–15)
BASOPHILS # BLD AUTO: 0.06 10E3/UL (ref 0–0.2)
BASOPHILS NFR BLD AUTO: 0.7 %
BUN SERPL-MCNC: 60.1 MG/DL (ref 8–23)
CALCIUM SERPL-MCNC: 9.1 MG/DL (ref 8.8–10.4)
CHLORIDE SERPL-SCNC: 96 MMOL/L (ref 98–107)
CREAT SERPL-MCNC: 3.7 MG/DL (ref 0.51–0.95)
CV STRESS MAX HR HE: 79
EGFRCR SERPLBLD CKD-EPI 2021: 12 ML/MIN/1.73M2
EOSINOPHIL # BLD AUTO: 1.15 10E3/UL (ref 0–0.7)
EOSINOPHIL NFR BLD AUTO: 12.7 %
ERYTHROCYTE [DISTWIDTH] IN BLOOD BY AUTOMATED COUNT: 14.6 % (ref 10–15)
GLUCOSE SERPL-MCNC: 124 MG/DL (ref 70–99)
HBV CORE AB SERPL QL IA: NONREACTIVE
HCO3 SERPL-SCNC: 20 MMOL/L (ref 22–29)
HCT VFR BLD AUTO: 25.4 % (ref 35–47)
HGB BLD-MCNC: 8.6 G/DL (ref 11.7–15.7)
IMM GRANULOCYTES # BLD: 0.05 10E3/UL
IMM GRANULOCYTES NFR BLD: 0.6 %
LYMPHOCYTES # BLD AUTO: 2.55 10E3/UL (ref 0.8–5.3)
LYMPHOCYTES NFR BLD AUTO: 28.1 %
MCH RBC QN AUTO: 32.7 PG (ref 26.5–33)
MCHC RBC AUTO-ENTMCNC: 33.9 G/DL (ref 31.5–36.5)
MCV RBC AUTO: 96.6 FL (ref 78–100)
MONOCYTES # BLD AUTO: 0.82 10E3/UL (ref 0–1.3)
MONOCYTES NFR BLD AUTO: 9 %
NEUTROPHILS # BLD AUTO: 4.46 10E3/UL (ref 1.6–8.3)
NEUTROPHILS NFR BLD AUTO: 48.9 %
NRBC # BLD AUTO: <0.03 10E3/UL
NRBC BLD AUTO-RTO: 0 /100
NUC STRESS EJECTION FRACTION: 70 %
PLATELET # BLD AUTO: 320 10E3/UL (ref 150–450)
POTASSIUM SERPL-SCNC: 3.7 MMOL/L (ref 3.4–5.3)
RATE PRESSURE PRODUCT: NORMAL
RBC # BLD AUTO: 2.63 10E6/UL (ref 3.8–5.2)
SODIUM SERPL-SCNC: 138 MMOL/L (ref 135–145)
STRESS ECHO BASELINE DIASTOLIC HE: 82
STRESS ECHO BASELINE HR: 73 BPM
STRESS ECHO BASELINE SYSTOLIC BP: 163
STRESS ECHO CALCULATED PERCENT HR: 54 %
STRESS ECHO LAST STRESS DIASTOLIC BP: 77
STRESS ECHO LAST STRESS SYSTOLIC BP: 147
STRESS ECHO TARGET HR: 145
WBC # BLD AUTO: 9.09 10E3/UL (ref 4–11)

## 2025-09-04 PROCEDURE — 250N000013 HC RX MED GY IP 250 OP 250 PS 637: Performed by: INTERNAL MEDICINE

## 2025-09-04 PROCEDURE — 90935 HEMODIALYSIS ONE EVALUATION: CPT

## 2025-09-04 PROCEDURE — 36415 COLL VENOUS BLD VENIPUNCTURE: CPT | Performed by: INTERNAL MEDICINE

## 2025-09-04 PROCEDURE — A9500 TC99M SESTAMIBI: HCPCS | Performed by: INTERNAL MEDICINE

## 2025-09-04 PROCEDURE — 82435 ASSAY OF BLOOD CHLORIDE: CPT | Performed by: INTERNAL MEDICINE

## 2025-09-04 PROCEDURE — 250N000011 HC RX IP 250 OP 636

## 2025-09-04 PROCEDURE — 258N000003 HC RX IP 258 OP 636: Performed by: INTERNAL MEDICINE

## 2025-09-04 PROCEDURE — 250N000011 HC RX IP 250 OP 636: Performed by: INTERNAL MEDICINE

## 2025-09-04 PROCEDURE — 85025 COMPLETE CBC W/AUTO DIFF WBC: CPT | Performed by: INTERNAL MEDICINE

## 2025-09-04 PROCEDURE — 120N000001 HC R&B MED SURG/OB

## 2025-09-04 PROCEDURE — 250N000013 HC RX MED GY IP 250 OP 250 PS 637: Performed by: STUDENT IN AN ORGANIZED HEALTH CARE EDUCATION/TRAINING PROGRAM

## 2025-09-04 PROCEDURE — 343N000001 HC RX 343 MED OP 636: Performed by: INTERNAL MEDICINE

## 2025-09-04 RX ORDER — ALBUTEROL SULFATE 90 UG/1
2 INHALANT RESPIRATORY (INHALATION) EVERY 5 MIN PRN
Status: DISCONTINUED | OUTPATIENT
Start: 2025-09-04 | End: 2025-09-04

## 2025-09-04 RX ORDER — AMINOPHYLLINE 25 MG/ML
50-100 INJECTION, SOLUTION INTRAVENOUS
Status: DISCONTINUED | OUTPATIENT
Start: 2025-09-04 | End: 2025-09-04

## 2025-09-04 RX ORDER — REGADENOSON 0.08 MG/ML
0.4 INJECTION, SOLUTION INTRAVENOUS ONCE
Status: DISCONTINUED | OUTPATIENT
Start: 2025-09-04 | End: 2025-09-04

## 2025-09-04 RX ORDER — REGADENOSON 0.08 MG/ML
INJECTION, SOLUTION INTRAVENOUS
Status: COMPLETED
Start: 2025-09-04 | End: 2025-09-04

## 2025-09-04 RX ADMIN — NIFEDIPINE 60 MG: 30 TABLET, FILM COATED, EXTENDED RELEASE ORAL at 08:52

## 2025-09-04 RX ADMIN — FAMOTIDINE 10 MG: 10 TABLET ORAL at 08:52

## 2025-09-04 RX ADMIN — ACETAMINOPHEN 650 MG: 325 TABLET ORAL at 21:59

## 2025-09-04 RX ADMIN — PAROXETINE HYDROCHLORIDE 40 MG: 20 TABLET, FILM COATED ORAL at 08:53

## 2025-09-04 RX ADMIN — APIXABAN 2.5 MG: 2.5 TABLET, FILM COATED ORAL at 21:59

## 2025-09-04 RX ADMIN — SODIUM CHLORIDE 250 ML: 0.9 INJECTION, SOLUTION INTRAVENOUS at 14:45

## 2025-09-04 RX ADMIN — SODIUM CHLORIDE 200 ML: 0.9 INJECTION, SOLUTION INTRAVENOUS at 14:44

## 2025-09-04 RX ADMIN — HYDRALAZINE HYDROCHLORIDE 50 MG: 50 TABLET ORAL at 16:24

## 2025-09-04 RX ADMIN — SENNOSIDES AND DOCUSATE SODIUM 1 TABLET: 50; 8.6 TABLET ORAL at 22:00

## 2025-09-04 RX ADMIN — DIPHENHYDRAMINE HYDROCHLORIDE 25 MG: 25 CAPSULE ORAL at 00:05

## 2025-09-04 RX ADMIN — HEPARIN SODIUM 1900 UNITS: 1000 INJECTION, SOLUTION INTRAVENOUS; SUBCUTANEOUS at 14:42

## 2025-09-04 RX ADMIN — CARVEDILOL 12.5 MG: 12.5 TABLET, FILM COATED ORAL at 17:20

## 2025-09-04 RX ADMIN — Medication 1 CAPSULE: at 21:58

## 2025-09-04 RX ADMIN — MIRTAZAPINE 7.5 MG: 7.5 TABLET, FILM COATED ORAL at 22:00

## 2025-09-04 RX ADMIN — REGADENOSON 0.4 MG: 0.08 INJECTION, SOLUTION INTRAVENOUS at 09:42

## 2025-09-04 RX ADMIN — HYDRALAZINE HYDROCHLORIDE 50 MG: 50 TABLET ORAL at 08:52

## 2025-09-04 RX ADMIN — AMIODARONE HYDROCHLORIDE 200 MG: 200 TABLET ORAL at 08:51

## 2025-09-04 RX ADMIN — DOXYCYCLINE 100 MG: 100 INJECTION, POWDER, LYOPHILIZED, FOR SOLUTION INTRAVENOUS at 05:15

## 2025-09-04 RX ADMIN — DOXYCYCLINE 100 MG: 100 INJECTION, POWDER, LYOPHILIZED, FOR SOLUTION INTRAVENOUS at 17:20

## 2025-09-04 RX ADMIN — SODIUM CHLORIDE, PRESERVATIVE FREE 10 ML: 5 INJECTION INTRAVENOUS at 14:42

## 2025-09-04 RX ADMIN — AMINOPHYLLINE 50 MG: 25 INJECTION, SOLUTION INTRAVENOUS at 09:56

## 2025-09-04 RX ADMIN — HYDRALAZINE HYDROCHLORIDE 50 MG: 50 TABLET ORAL at 21:59

## 2025-09-04 RX ADMIN — CARVEDILOL 12.5 MG: 12.5 TABLET, FILM COATED ORAL at 08:51

## 2025-09-04 RX ADMIN — HEPARIN SODIUM 1900 UNITS: 1000 INJECTION, SOLUTION INTRAVENOUS; SUBCUTANEOUS at 14:41

## 2025-09-04 RX ADMIN — ACETAMINOPHEN 650 MG: 325 TABLET ORAL at 00:06

## 2025-09-04 RX ADMIN — TECHNETIUM TC 99M SESTAMIBI 10.9 MILLICURIE: 1 INJECTION INTRAVENOUS at 08:15

## 2025-09-04 RX ADMIN — CEFTRIAXONE 2 G: 2 INJECTION, POWDER, FOR SOLUTION INTRAMUSCULAR; INTRAVENOUS at 16:24

## 2025-09-04 RX ADMIN — TECHNETIUM TC 99M SESTAMIBI 33 MILLICURIE: 1 INJECTION INTRAVENOUS at 09:45

## 2025-09-04 ASSESSMENT — ACTIVITIES OF DAILY LIVING (ADL)
ADLS_ACUITY_SCORE: 63
ADLS_ACUITY_SCORE: 66
ADLS_ACUITY_SCORE: 63
ADLS_ACUITY_SCORE: 66
ADLS_ACUITY_SCORE: 63

## (undated) DEVICE — EEG MONITORING SUPPLIES

## (undated) DEVICE — PREP CHLORAPREP W/ORANGE TINT 10.5ML 260715

## (undated) DEVICE — SOL NACL 0.9% INJ 250ML BAG 2B1322Q

## (undated) DEVICE — LINEN TOWEL PACK X5 5464

## (undated) DEVICE — SUCTION CANISTER MEDIVAC LINER 3000ML W/LID 65651-530

## (undated) DEVICE — SHUNT SUNDT 3X4X10CM NL850-5060

## (undated) DEVICE — SU PROLENE 6-0 C-1DA 30" M8706

## (undated) DEVICE — SU SILK 2-0 TIE 24" SA75H

## (undated) DEVICE — SU VICRYL 4-0 PS-2 18" UND J496H

## (undated) DEVICE — DRSG STERI STRIP 1/2X4" R1547

## (undated) DEVICE — PREP CHLORAPREP 26ML TINTED ORANGE  260815

## (undated) DEVICE — SU SILK 3-0 SH CR 8X18" C013D

## (undated) DEVICE — SU SILK 3-0 TIE 24" SA74H

## (undated) DEVICE — PACK VASCULAR SCV15VAFSB

## (undated) DEVICE — NDL 19GA 1.5"

## (undated) DEVICE — SOL NACL 0.9% IRRIG 1000ML BOTTLE 07138-09

## (undated) DEVICE — DECANTER VIAL 2006S

## (undated) DEVICE — ESU GROUND PAD UNIVERSAL W/O CORD

## (undated) DEVICE — SU VICRYL 3-0 CT-1 CR 8X18" J738D

## (undated) DEVICE — SU SILK 3-0 FS-1 18" 684H

## (undated) DEVICE — SURGICEL HEMOSTAT 2X14" 1951

## (undated) DEVICE — SU PROLENE 7-0 BV-1DA 4X30" M8703

## (undated) DEVICE — DECANTER BAG 2002S

## (undated) DEVICE — SYR 05ML SLIP TIP W/O NDL

## (undated) DEVICE — SYR 03ML LL W/O NDL 309657

## (undated) DEVICE — SOL WATER IRRIG 1000ML BOTTLE 2F7114

## (undated) DEVICE — Device

## (undated) RX ORDER — NEOSTIGMINE METHYLSULFATE 1 MG/ML
VIAL (ML) INJECTION
Status: DISPENSED
Start: 2018-07-17

## (undated) RX ORDER — LABETALOL HYDROCHLORIDE 5 MG/ML
INJECTION, SOLUTION INTRAVENOUS
Status: DISPENSED
Start: 2018-07-17

## (undated) RX ORDER — VECURONIUM BROMIDE 1 MG/ML
INJECTION, POWDER, LYOPHILIZED, FOR SOLUTION INTRAVENOUS
Status: DISPENSED
Start: 2018-07-17

## (undated) RX ORDER — ASPIRIN 600 MG/1
SUPPOSITORY RECTAL
Status: DISPENSED
Start: 2018-07-17

## (undated) RX ORDER — FENTANYL CITRATE 50 UG/ML
INJECTION, SOLUTION INTRAMUSCULAR; INTRAVENOUS
Status: DISPENSED
Start: 2025-08-06

## (undated) RX ORDER — LIDOCAINE HYDROCHLORIDE AND EPINEPHRINE 10; 10 MG/ML; UG/ML
INJECTION, SOLUTION INFILTRATION; PERINEURAL
Status: DISPENSED
Start: 2025-08-06

## (undated) RX ORDER — ESMOLOL HYDROCHLORIDE 10 MG/ML
INJECTION INTRAVENOUS
Status: DISPENSED
Start: 2018-07-17

## (undated) RX ORDER — GLYCOPYRROLATE 0.2 MG/ML
INJECTION, SOLUTION INTRAMUSCULAR; INTRAVENOUS
Status: DISPENSED
Start: 2018-07-17

## (undated) RX ORDER — HEPARIN SODIUM 1000 [USP'U]/ML
INJECTION, SOLUTION INTRAVENOUS; SUBCUTANEOUS
Status: DISPENSED
Start: 2025-08-06

## (undated) RX ORDER — CEFAZOLIN SODIUM 2 G/100ML
INJECTION, SOLUTION INTRAVENOUS
Status: DISPENSED
Start: 2018-07-17

## (undated) RX ORDER — PROPOFOL 10 MG/ML
INJECTION, EMULSION INTRAVENOUS
Status: DISPENSED
Start: 2018-07-17

## (undated) RX ORDER — LIDOCAINE HYDROCHLORIDE 10 MG/ML
INJECTION, SOLUTION EPIDURAL; INFILTRATION; INTRACAUDAL; PERINEURAL
Status: DISPENSED
Start: 2025-08-06

## (undated) RX ORDER — CEFAZOLIN SODIUM 2 G/50ML
SOLUTION INTRAVENOUS
Status: DISPENSED
Start: 2025-08-06

## (undated) RX ORDER — HEPARIN SODIUM 1000 [USP'U]/ML
INJECTION, SOLUTION INTRAVENOUS; SUBCUTANEOUS
Status: DISPENSED
Start: 2018-07-17

## (undated) RX ORDER — LIDOCAINE HYDROCHLORIDE 20 MG/ML
INJECTION, SOLUTION EPIDURAL; INFILTRATION; INTRACAUDAL; PERINEURAL
Status: DISPENSED
Start: 2018-07-17

## (undated) RX ORDER — DEXAMETHASONE SODIUM PHOSPHATE 4 MG/ML
INJECTION, SOLUTION INTRA-ARTICULAR; INTRALESIONAL; INTRAMUSCULAR; INTRAVENOUS; SOFT TISSUE
Status: DISPENSED
Start: 2018-07-17

## (undated) RX ORDER — FENTANYL CITRATE 50 UG/ML
INJECTION, SOLUTION INTRAMUSCULAR; INTRAVENOUS
Status: DISPENSED
Start: 2018-07-17

## (undated) RX ORDER — ONDANSETRON 2 MG/ML
INJECTION INTRAMUSCULAR; INTRAVENOUS
Status: DISPENSED
Start: 2018-07-17

## (undated) RX ORDER — LIDOCAINE HYDROCHLORIDE 10 MG/ML
INJECTION, SOLUTION INFILTRATION; PERINEURAL
Status: DISPENSED
Start: 2018-07-17